# Patient Record
Sex: MALE | Race: WHITE
[De-identification: names, ages, dates, MRNs, and addresses within clinical notes are randomized per-mention and may not be internally consistent; named-entity substitution may affect disease eponyms.]

---

## 2017-02-12 ENCOUNTER — HOSPITAL ENCOUNTER (INPATIENT)
Dept: HOSPITAL 74 - JER | Age: 82
LOS: 17 days | Discharge: NURSING, REHAB OR LONG-TERM CARE | DRG: 870 | End: 2017-03-01
Attending: INTERNAL MEDICINE | Admitting: INTERNAL MEDICINE
Payer: COMMERCIAL

## 2017-02-12 VITALS — BODY MASS INDEX: 23.9 KG/M2

## 2017-02-12 DIAGNOSIS — G47.00: ICD-10-CM

## 2017-02-12 DIAGNOSIS — Z85.53: ICD-10-CM

## 2017-02-12 DIAGNOSIS — Z85.46: ICD-10-CM

## 2017-02-12 DIAGNOSIS — N40.0: ICD-10-CM

## 2017-02-12 DIAGNOSIS — D72.829: ICD-10-CM

## 2017-02-12 DIAGNOSIS — I35.0: ICD-10-CM

## 2017-02-12 DIAGNOSIS — K92.2: ICD-10-CM

## 2017-02-12 DIAGNOSIS — G47.30: ICD-10-CM

## 2017-02-12 DIAGNOSIS — R65.21: ICD-10-CM

## 2017-02-12 DIAGNOSIS — E78.5: ICD-10-CM

## 2017-02-12 DIAGNOSIS — A41.50: Primary | ICD-10-CM

## 2017-02-12 DIAGNOSIS — E87.6: ICD-10-CM

## 2017-02-12 DIAGNOSIS — N17.9: ICD-10-CM

## 2017-02-12 DIAGNOSIS — R33.8: ICD-10-CM

## 2017-02-12 DIAGNOSIS — E11.9: ICD-10-CM

## 2017-02-12 DIAGNOSIS — I63.9: ICD-10-CM

## 2017-02-12 DIAGNOSIS — E83.39: ICD-10-CM

## 2017-02-12 DIAGNOSIS — R74.0: ICD-10-CM

## 2017-02-12 DIAGNOSIS — D65: ICD-10-CM

## 2017-02-12 DIAGNOSIS — G93.41: ICD-10-CM

## 2017-02-12 DIAGNOSIS — D69.6: ICD-10-CM

## 2017-02-12 DIAGNOSIS — I11.0: ICD-10-CM

## 2017-02-12 DIAGNOSIS — I25.119: ICD-10-CM

## 2017-02-12 DIAGNOSIS — J44.9: ICD-10-CM

## 2017-02-12 DIAGNOSIS — J96.01: ICD-10-CM

## 2017-02-12 DIAGNOSIS — H04.123: ICD-10-CM

## 2017-02-12 DIAGNOSIS — K80.00: ICD-10-CM

## 2017-02-12 DIAGNOSIS — Z86.73: ICD-10-CM

## 2017-02-12 DIAGNOSIS — A41.9: ICD-10-CM

## 2017-02-12 DIAGNOSIS — E87.2: ICD-10-CM

## 2017-02-12 DIAGNOSIS — D68.9: ICD-10-CM

## 2017-02-12 DIAGNOSIS — I50.30: ICD-10-CM

## 2017-02-12 DIAGNOSIS — Z87.891: ICD-10-CM

## 2017-02-12 DIAGNOSIS — D62: ICD-10-CM

## 2017-02-12 LAB
ALBUMIN SERPL-MCNC: 3.2 G/DL (ref 3.4–5)
ALP SERPL-CCNC: 63 U/L (ref 45–117)
ALT SERPL-CCNC: 22 U/L (ref 12–78)
ANION GAP SERPL CALC-SCNC: 11 MMOL/L (ref 8–16)
APPEARANCE UR: CLEAR
APTT BLD: 35.6 SECONDS (ref 26.9–34.4)
AST SERPL-CCNC: 16 U/L (ref 15–37)
BILIRUB SERPL-MCNC: 0.9 MG/DL (ref 0.2–1)
BILIRUB UR STRIP.AUTO-MCNC: NEGATIVE MG/DL
CALCIUM SERPL-MCNC: 9.1 MG/DL (ref 8.5–10.1)
CO2 SERPL-SCNC: 28 MMOL/L (ref 21–32)
COLOR UR: (no result)
CREAT SERPL-MCNC: 1.3 MG/DL (ref 0.7–1.3)
DEPRECATED RDW RBC AUTO: 15 % (ref 11.9–15.9)
GLUCOSE SERPL-MCNC: 166 MG/DL (ref 74–106)
HYALINE CASTS URNS QL MICRO: 2 /LPF
INR BLD: 1.43 (ref 0.82–1.09)
KETONES UR QL STRIP: (no result)
LEUKOCYTE ESTERASE UR QL STRIP.AUTO: NEGATIVE
MCH RBC QN AUTO: 30 PG (ref 25.7–33.7)
MCHC RBC AUTO-ENTMCNC: 34 G/DL (ref 32–35.9)
MCV RBC: 88 FL (ref 80–96)
MUCOUS THREADS URNS QL MICRO: (no result)
NITRITE UR QL STRIP: NEGATIVE
PH BLDV: 7.43 [PH] (ref 7.31–7.41)
PH UR: 5 [PH] (ref 5–8)
PLATELET # BLD AUTO: 346 K/MM3 (ref 134–434)
PMV BLD: 7.9 FL (ref 7.5–11.1)
PROT SERPL-MCNC: 6.9 G/DL (ref 6.4–8.2)
PROT UR QL STRIP: (no result)
PROT UR QL STRIP: NEGATIVE
PT PNL PPP: 15.9 SEC (ref 9.98–11.88)
RBC # BLD AUTO: <1 /HPF (ref 0–3)
RBC # UR STRIP: NEGATIVE /UL
SAO2 % BLDV: 25.9 MEQ/L (ref 22–29)
SP GR UR: 1.02 (ref 1–1.03)
TROPONIN I SERPL-MCNC: < 0.02 NG/ML (ref 0–0.05)
UROBILINOGEN UR STRIP-MCNC: NEGATIVE E.U./DL (ref 0.2–1)
WBC # BLD AUTO: 20.9 K/MM3 (ref 4–10)
WBC # UR AUTO: (no result) /HPF (ref 3–5)

## 2017-02-12 PROCEDURE — A4358 URINARY LEG OR ABDOMEN BAG: HCPCS

## 2017-02-12 PROCEDURE — G0480 DRUG TEST DEF 1-7 CLASSES: HCPCS

## 2017-02-12 PROCEDURE — C1769 GUIDE WIRE: HCPCS

## 2017-02-12 PROCEDURE — P9058 RBC, L/R, CMV-NEG, IRRAD: HCPCS

## 2017-02-12 PROCEDURE — P9012 CRYOPRECIPITATE EACH UNIT: HCPCS

## 2017-02-12 PROCEDURE — C1729 CATH, DRAINAGE: HCPCS

## 2017-02-12 PROCEDURE — P9038 RBC IRRADIATED: HCPCS

## 2017-02-12 PROCEDURE — P9017 PLASMA 1 DONOR FRZ W/IN 8 HR: HCPCS

## 2017-02-12 NOTE — PDOC
History of Present Illness





- General


History Source: Patient, Family, Old Records


Exam Limitations: No Limitations





- History of Present Illness


Initial Comments: 


02/12/17 22:26


The patient is a 85 year old male with a significant past medical history of 

sleep apnea, HTN, HLD, prostate cancer, kidney cancer status-post nephrectomy, 

COPD with chronic cough, who presents to the emergency department today for 

further evaluation of upper right quadrant pain for 3 days. The patient reports 

that he had one episode of vomiting the first day of pain but has not vomited 

since. The patient reports associated difficulty sleeping and eating. The 

patient states that he has not eaten in 3 days. The patient reports that he 

took 8 Gas-X intermittently to alleviate symptoms with no relief. The patient 

notes that he uses a C-path at night for his sleep apnea. The patient states 

that he is not on O2 at home. The patient denies any associated back pain





The patient denies fever, chills, and sweats.


The patient denies nausea, vomiting, and diarrhea.


The patient denies chest pain and shortness of breath.





PCP: Dr. Conrad


CARDIO: Dr. Robin Vazquez


GASTRO: Dr. Bond





PAST MEDICAL HISTORY: HTN, HLD, prostate cancer, kidney cancer, COPD


PAST SURGICAL HISTORY: Appendectomy, nephrectomy (march 2011), colonoscopy, 

obstructed bowel, 


FAMILY HISTORY:  No pertinent history reported


SOCIAL HISTORY: Former cigarette smoker smoked for 40 years, quit 30 years ago


MEDICATIONS:  Reviewed


ALLERGIES:  As per nursing notes








<Carlton Casey - Last Filed: 02/12/17 22:26>





<Nicole Iniguez - Last Filed: 02/13/17 02:07>





- General


Stated Complaint: ABD PAIN


Time Seen by Provider: 02/12/17 21:07





Past History





<Carlton Casey - Last Filed: 02/12/17 22:26>





- Past Medical History


Anemia: No (PLACED ON IRON PREOP)


Asthma: No


Cancer: Yes (prostate cancer,kidney cancer)


Cardiac Disorders: No


CVA: Yes (TIA,2002)


COPD: Yes (mild,USES ALBUTEROL AS NEEDED)


CHF: No


Dementia: No


Diabetes: No


GI Disorders: Yes (COLON POLYPS, ISCHEMIC COLITIS, DIVERTICULOSIS)


 Disorders: Yes (PROSTATE CA)


HTN: Yes


Hypercholesterolemia: Yes


Liver Disease: No


Suicide Attempt (Hx): No


Seizures: No


Thyroid Disease: No





- Surgical History


Abdominal Surgery: Yes (BILATERAL  INGUINAL HERNIA)


Appendectomy: Yes


Cardiac Surgery: No


Cholecystectomy: No


Lung Surgery: No


Neurologic Surgery: Yes (NECK FUSION)


Orthopedic Surgery: Yes (NECK SURGERY, L knee sx 12/8)





- Immunization History


Immunization Up to Date: Yes





- Psycho/Social/Smoking Cessation Hx


Anxiety: No


Suicidal Ideation: No


Smoking Status: Yes


Smoking History: Former smoker


Have you smoked in the past 12 months: No


Number of Cigarettes Smoked Daily: 0


If you are a former smoker, when did you quit?: 35 years ago


Hx Alcohol Use: No


Drug/Substance Use Hx: No


Substance Use Type: Alcohol


Hx Substance Use Treatment: No





<Nicole Iniguez - Last Filed: 02/13/17 02:07>





- Past Medical History


Allergies/Adverse Reactions: 


 Allergies











Allergy/AdvReac Type Severity Reaction Status Date / Time


 


No Known Drug Allergies Allergy   Verified 02/12/17 21:20











Home Medications: 


Ambulatory Orders





Amlodipine Besylate [Norvasc -] 10 mg PO DAILY 11/06/14 


Ca Cmb No.1/Vit D3/B-6/FA/B12 [Vitamin D3 1,000 Unit Tablet] 1 tab PO DAILY 11/ 06/14 


Carvedilol 12.5 mg PO BID 11/06/14 


Vitamin E 400 unit PO DAILY 11/06/14 


Losartan Potassium [Cozaar] 50 mg PO DAILY 11/07/14 


Atorvastatin Ca [Lipitor] 10 mg PO HS #30 tablet 01/21/15 


Albuterol Sulfate Inhaler - [Ventolin HFA Inhaler -] 1 - 2 inh PO QID PRN 12/08/

15 


Aspirin [ASA -] 325 mg PO DAILY@0800  tablet 12/09/15 


Tamsulosin HCl [Flomax -] 0.8 mg PO DAILY@0830  cap.er.24h 12/23/15 


Cinnamon Bark [Cinnamon] 500 mg PO DAILY 02/12/17 


Finasteride 0 mg PO DAILY 02/12/17 











Abd/GI Specific PMHX





- Complaint Specific PMHX


Diverticulitis: Yes





<Nicole Iniguez - Last Filed: 02/13/17 02:07>





**Review of Systems





- Review of Systems


Able to Perform ROS?: Yes


Comments:: 


02/12/17 22:27


CONSTITUTIONAL:


Absent: fever, no chills, no fatigue


EYES:


Absent: visual changes


ENT:


Absent: ear pain, no sore throat


CARDIOVASCULAR:


Absent: chest pain, no palpitations


RESPIRATORY:


Present: Cough


Absent: No SOB


GI:


Present: abdominal pain, loss of appetite 


Absent: no nausea, no vomiting, no constipation, no diarrhea


GENITOURINARY:


Absent: dysuria, no frequency, no hematuria


MUSCULOSKELETAL:


Absent: back pain, no arthralgia, no myalgia


SKIN:


Absent: rash


NEURO:


Present: difficulty sleeping


Absent: headache





<Carlton Casey - Last Filed: 02/12/17 22:26>





*Physical Exam





- Vital Signs


 Last Vital Signs











Temp Pulse Resp BP Pulse Ox


 


 101.4 F H  90   20   141/52   95 


 


 02/12/17 21:27  02/12/17 21:17  02/12/17 21:17  02/12/17 21:17  02/12/17 21:17














- Physical Exam


Comments: 


02/12/17 22:27


Gaurding rebound abdomen tender to palpaion 


GENERAL: 


Well-appearing, well-nourished. No apparent distress.


HEENT: 


Normocephalic, atraumatic. PERRL, EOM intact.


CARDIOVASCULAR: 


Normal S1, S2. Regular rate and rhythm.


PULMONARY: 


(+) Fine bibasal rales 


ABDOMEN: 


(+) Abdominal pain tender to palpation, guarding, rebound.


EXTREMITIES: 


Normal ROM in all four extremities. No gross deformities.


SKIN: 


Warm, dry.  No rash


NEUROLOGICAL: 


No focal neurological deficits





<Eric Caseyke - Last Filed: 02/12/17 22:26>





ED Treatment Course





- LABORATORY


CBC & Chemistry Diagram: 


 02/12/17 21:40





 02/12/17 21:40





- ADDITIONAL ORDERS


Additional order review: 


 Laboratory  Results











  02/12/17 02/12/17 02/12/17





  22:00 21:40 21:40


 


INR   


 


PTT (Actin FS)   


 


Sodium   


 


Potassium   


 


Chloride   


 


Carbon Dioxide   


 


Anion Gap   


 


BUN   


 


Creatinine   


 


Creat Clearance w eGFR   


 


Random Glucose   


 


Lactic Acid   1.404 


 


Calcium   


 


Total Bilirubin   


 


AST   


 


ALT   


 


Alkaline Phosphatase   


 


Creatine Kinase    Cancelled


 


Troponin I    Cancelled


 


Total Protein   


 


Albumin   


 


Lipase   


 


Urine Color  Dkyellow  


 


Urine Appearance  Clear  


 


Urine pH  5.0  D  


 


Ur Specific Gravity  1.025  


 


Urine Protein  1+ H  


 


Urine Glucose (UA)  Negative  


 


Urine Ketones  Trace H  


 


Urine Blood  Negative  


 


Urine Nitrite  Negative  


 


Urine Bilirubin  Negative  


 


Urine Urobilinogen  Negative  


 


Ur Leukocyte Esterase  Negative  














  02/12/17 02/12/17





  21:40 21:40


 


INR  1.43 H 


 


PTT (Actin FS)  35.6 H 


 


Sodium   Cancelled


 


Potassium   Cancelled


 


Chloride   Cancelled


 


Carbon Dioxide   Cancelled


 


Anion Gap   Cancelled


 


BUN   Cancelled


 


Creatinine   Cancelled


 


Creat Clearance w eGFR   Cancelled


 


Random Glucose   Cancelled


 


Lactic Acid  


 


Calcium   Cancelled


 


Total Bilirubin   Cancelled


 


AST   Cancelled


 


ALT   Cancelled


 


Alkaline Phosphatase   Cancelled


 


Creatine Kinase  


 


Troponin I  


 


Total Protein   Cancelled


 


Albumin   Cancelled


 


Lipase   Cancelled


 


Urine Color  


 


Urine Appearance  


 


Urine pH  


 


Ur Specific Gravity  


 


Urine Protein  


 


Urine Glucose (UA)  


 


Urine Ketones  


 


Urine Blood  


 


Urine Nitrite  


 


Urine Bilirubin  


 


Urine Urobilinogen  


 


Ur Leukocyte Esterase  














- Medications


Given in the ED: 


ED Medications














Discontinued Medications














Generic Name Dose Route Start Last Admin





  Trade Name Jose Alfredo  PRN Reason Stop Dose Admin


 


Acetaminophen  1,000 mg 02/12/17 22:19 02/12/17 22:24





  Ofirmev Injection -  IVPB 02/12/17 22:20  1,000 mg





  ONCE ONE   Administration


 


Piperacillin Sod/Tazobactam  50 mls @ 100 mls/hr 02/12/17 21:51 02/12/17 22:08





  Sod 3.375 gm/ Dextrose  IVPB 02/12/17 22:20  100 mls/hr





  ONCE ONE   Administration





  Protocol   


 


Ondansetron HCl  4 mg 02/12/17 22:07 02/12/17 22:18





  Zofran Injection  IVPUSH 02/12/17 22:08  4 mg





  ONCE ONE   Administration














<Carlton Casey - Last Filed: 02/12/17 22:26>





- LABORATORY


CBC & Chemistry Diagram: 


 02/12/17 21:40





 02/12/17 22:42





<Nicole Iniguez - Last Filed: 02/13/17 02:07>





Medical Decision Making





- Medical Decision Making





02/13/17 01:25


86 yo male with 3 days of abd pain  and fever.                        His 

cardiologist is Dr Robin Vazquez and GI doctor is Dr Bond


-wife said he refused to come earlier


-pt has guarding ,tenderness in epigastric area





leukocytosis 20,000 and pt given zosyn


ct scan revealed acute cholecystitis


the pt's PCP  is Dr Jasso  and Dr Garcia requests hospitalist admits but 

wants Dr Mauri Kellogg for surgical consult and if ID is required he wants Dr Rowan





<Nicole nIiguez - Last Filed: 02/13/17 02:07>





*DC/Admit/Observation/Transfer





- Attestations


Scribe Attestion: 


02/12/17 22:27


Documentation prepared by Carlton Casey, acting as medical scribe for Nicole Iniguez MD.





<Carlton Casey - Last Filed: 02/12/17 22:26>





- Discharge Dispostion


Admit: Yes





<Nicole Iniguez - Last Filed: 02/13/17 02:07>


Diagnosis at time of Disposition: 


 Cholecystitis, acute





Leukocytosis


Qualifiers:


 Leukocytosis type: unspecified Qualified Code(s): D72.829 - Elevated white 

blood cell count, unspecified





Fever


Qualifiers:


 Fever type: other Qualified Code(s): R50.81 - Fever presenting with conditions 

classified elsewhere

## 2017-02-13 LAB
ALBUMIN SERPL-MCNC: 2.7 G/DL (ref 3.4–5)
ALP SERPL-CCNC: 130 U/L (ref 45–117)
ALT SERPL-CCNC: 33 U/L (ref 12–78)
ANION GAP SERPL CALC-SCNC: 9 MMOL/L (ref 8–16)
APTT BLD: 36.9 SECONDS (ref 26.9–34.4)
ART PUNCT SITE: (no result)
ART PUNCT SITE: (no result)
ARTERIAL PATENCY WRIST A: POSITIVE
ARTERIAL PATENCY WRIST A: POSITIVE
AST SERPL-CCNC: 40 U/L (ref 15–37)
BASE EXCESS BLDA CALC-SCNC: -1.9 MEQ/L (ref -2–2)
BASE EXCESS BLDA CALC-SCNC: -5.4 MEQ/L (ref -2–2)
BASOPHILS # BLD: 0.4 % (ref 0–2)
BILIRUB SERPL-MCNC: 0.9 MG/DL (ref 0.2–1)
CALCIUM SERPL-MCNC: 8.7 MG/DL (ref 8.5–10.1)
CO2 SERPL-SCNC: 26 MMOL/L (ref 21–32)
CREAT SERPL-MCNC: 1.1 MG/DL (ref 0.7–1.3)
DEPRECATED RDW RBC AUTO: 14.8 % (ref 11.9–15.9)
DEPRECATED RDW RBC AUTO: 15 % (ref 11.9–15.9)
EOSINOPHIL # BLD: 0.6 % (ref 0–4.5)
EOSINOPHIL # BLD: 1 % (ref 0–4.5)
GLUCOSE SERPL-MCNC: 126 MG/DL (ref 74–106)
HCO3 BLDA-SCNC: 18.5 MEQ/L (ref 22–26)
HCO3 BLDA-SCNC: 19.5 MEQ/L (ref 22–26)
INR BLD: 1.56 (ref 0.82–1.09)
INR BLD: 2.32 (ref 0.82–1.09)
LPM/O2%: (no result)
LPM/O2%: (no result)
MAGNESIUM SERPL-MCNC: 1.9 MG/DL (ref 1.8–2.4)
MCH RBC QN AUTO: 29.8 PG (ref 25.7–33.7)
MCH RBC QN AUTO: 29.9 PG (ref 25.7–33.7)
MCHC RBC AUTO-ENTMCNC: 33.7 G/DL (ref 32–35.9)
MCHC RBC AUTO-ENTMCNC: 33.7 G/DL (ref 32–35.9)
MCV RBC: 88.5 FL (ref 80–96)
MCV RBC: 88.5 FL (ref 80–96)
MECH. VENT.: YES
NEUTROPHILS # BLD: 82.5 % (ref 42.8–82.8)
NEUTROPHILS # BLD: 90 % (ref 42.8–82.8)
PEEP SETTING VENT: 5 CMH2O
PHOSPHATE SERPL-MCNC: 1.7 MG/DL (ref 2.5–4.9)
PLATELET # BLD AUTO: 241 K/MM3 (ref 134–434)
PLATELET # BLD AUTO: 276 K/MM3 (ref 134–434)
PLATELET # BLD EST: ADEQUATE 10*3/UL
PMV BLD: 7.9 FL (ref 7.5–11.1)
PMV BLD: 8 FL (ref 7.5–11.1)
PO2 BLDA: 57.3 MMHG (ref 68–100)
PO2 BLDA: 71.1 MMHG (ref 68–100)
PROT SERPL-MCNC: 5.9 G/DL (ref 6.4–8.2)
PT PNL PPP: 17.3 SEC (ref 9.98–11.88)
PT PNL PPP: 26 SEC (ref 9.98–11.88)
PT. ON O2?: YES
PT. ON O2?: YES
SAO2 % BLDA: 92.3 % (ref 90–98.9)
SAO2 % BLDA: 92.5 % (ref 90–98.9)
TROPONIN I SERPL-MCNC: 0.06 NG/ML (ref 0–0.05)
TYPE OF O2: (no result)
TYPE OF O2: (no result)
VENT RATE: 12
VENT RATE: 16
VT/PRESS: 450
WBC # BLD AUTO: 13.8 K/MM3 (ref 4–10)
WBC # BLD AUTO: 17.3 K/MM3 (ref 4–10)

## 2017-02-13 PROCEDURE — 0F9430Z DRAINAGE OF GALLBLADDER WITH DRAINAGE DEVICE, PERCUTANEOUS APPROACH: ICD-10-PCS | Performed by: RADIOLOGY

## 2017-02-13 PROCEDURE — 0BH17EZ INSERTION OF ENDOTRACHEAL AIRWAY INTO TRACHEA, VIA NATURAL OR ARTIFICIAL OPENING: ICD-10-PCS

## 2017-02-13 PROCEDURE — 5A1955Z RESPIRATORY VENTILATION, GREATER THAN 96 CONSECUTIVE HOURS: ICD-10-PCS

## 2017-02-13 RX ADMIN — PIPERACILLIN SODIUM,TAZOBACTAM SODIUM SCH: 3; .375 INJECTION, POWDER, FOR SOLUTION INTRAVENOUS at 10:56

## 2017-02-13 RX ADMIN — SODIUM CHLORIDE SCH MLS/HR: 9 INJECTION, SOLUTION INTRAVENOUS at 15:12

## 2017-02-13 RX ADMIN — TAMSULOSIN HYDROCHLORIDE SCH MG: 0.4 CAPSULE ORAL at 13:59

## 2017-02-13 RX ADMIN — PIPERACILLIN SODIUM,TAZOBACTAM SODIUM SCH GM: 3; .375 INJECTION, POWDER, FOR SOLUTION INTRAVENOUS at 17:55

## 2017-02-13 RX ADMIN — TAMSULOSIN HYDROCHLORIDE SCH: 0.4 CAPSULE ORAL at 08:22

## 2017-02-13 RX ADMIN — SODIUM CHLORIDE SCH MLS/HR: 9 INJECTION, SOLUTION INTRAVENOUS at 03:09

## 2017-02-13 NOTE — CONS
DATE OF CONSULTATION:

 

REQUESTING PHYSICIAN:  Hospitalist service 

 

HISTORY:  This is an 85-year-old man with past medical history of obstructive sleep

apnea.  He is status post left nephrectomy.  He lives at home with his wife.  He

developed right upper quadrant pain 3 days ago, sharp, intermittent, persistent.  He

had fever to 101 with the pain.  He tried to relieve the symptoms with Gas-X, which

did not work.  The pain persisted.  His daughter and wife encouraged him to come to

the emergency room, which he finally did.  He describes the pain as sharp and

intermittent.  It is not improved since admission.  He had a CAT scan done in the

emergency room that was consistent with cholecystitis.  He reports having had a

previous episode about 1 month ago where he had right upper quadrant gas pain that

resolved spontaneously.  He denies any dysuria.  He has not had a bowel movement in

the last, he thinks, about 48 hours.  He denies any chest pain.

 

PAST MEDICAL HISTORY:  Notable for sleep apnea.  He has a history of hypertension,

hyperlipidemia, prostate cancer, kidney cancer, and COPD.  He has a history of mild

COPD.  He has a history of obstructive sleep apnea and has been using CPAP for the

last 5-7 years at home.  He is not on daytime oxygen.  He is status post TIA in 2002.

 He has a history of ischemic colitis and diverticulosis in the past,

hypercholesterolemia, hypertension.

 

SURGICAL HISTORY:  Notable for bilateral inguinal hernia repairs.  He has had an

appendectomy, neck surgery as well as left knee MAKOplasty in 2005.  He has had

kidney stones in the past and ultimately had a left nephrectomy for renal cell

cancer.  

 

ALLERGIES:  He has no known drug allergies.

 

MEDICATIONS:  He takes amlodipine, calcium with vitamin D, Coreg, vitamin E, Cozaar,

Lipitor, Ventolin, aspirin, Flomax, cinnamon, and finasteride as an outpatient.

 

SOCIAL HISTORY:  He is .  He is retired.  He used to be a salesman.  He lives

with his wife.  There is no history of any recent travel.  He is a former smoker.

 

FAMILY HISTORY:  Notable for Parkinson disease and diabetes.

 

REVIEW OF SYSTEMS:  As per HPI.  He denies any shortness of breath or cough.  His

nausea and vomiting has resolved.  He continues to have abdominal pain.  There is no

dysuria.

 

PHYSICAL EXAMINATION: 

General:  He is awake and alert.

VITAL SIGNS:  Temperature 98.5, T-max 101.4, blood pressure 128/55, pulse 79,

respiratory rate 20.  He weighs 157 pounds.

HEENT:  He is normocephalic.  His eyes are anicteric.

Neck:  Supple.

Lungs:  Clear to auscultation.

Heart:  Regular rate and rhythm.

Abdomen:  Soft.  He has right upper quadrant tenderness.  He has abdominal pain that

is referred to his right upper quadrant when his abdomen is examined.  

Extremities:  Without edema.  He has a well-healed left knee scar that is prior

MAKOplasty.

 

White count on admission was 20.9, this morning is 17.3, hemoglobin 13.4, platelets

276.  BUN 36, creatinine 1.1.  Urinalysis is unremarkable.  Cultures are pending. 

CAT scan report has not been officially read but, per the emergency room note, is

noted for distended thick wall gallbladder with hazy pericholecystic fat.

 

In summary, this is an 85-year-old man with acute cholecystitis started on Zosyn,

which I would continue at this time.  He is for surgical evaluation at this time and

is currently n.p.o.  Further recommendations to follow.

 

 

 

 

SHELLI [id number]

DD: 02/13/2017 10:50

DT: 02/13/2017 11:50

Job #:  [id number]

## 2017-02-13 NOTE — MSN
Progress Note (SOAP)





- Subjective


Chief Complaint: 


Right Upper Quadrant Pain


History of Present Illness: 


84 YO M w/ Hx of HTN, [place], HLD, TIA, COPD, insomnia, Prostate CA, Kidney cancer 

s/p nephrectomy, presened tot he ED after 3 days of Upper right quadrant pain.  

The pain started after the patient ate breakfast 3 days ago, at which time he 

vomited a tan brown color. He has not been able to eat since then.  He has not 

had a bowel movement since then.  He has a sharp intermittent 8/10 pain that 

occurs when he moves.  At rest he has 0/10 pain.  He has had a similar pain 

before but never this bad.  Patient had a fever of 101.4 w/ elevated white 

count on admission.  The patient does not want morphine because it made the 

pain much worse.  He is currently feeling acid reflux symptoms.  Patient denies 

any CP, SOB, Fever, palps,. chills, NVD, or dysuria.  





- Current Medications


Current Medications: 


Active Medications





Acetaminophen (Tylenol -)  650 mg PO Q4H PRN


   PRN Reason: FEVER OR PAIN


Albuterol/Ipratropium (Duoneb -)  1 amp NEB Q4H PRN


   PRN Reason: SHORTNESS OF BREATH


Amlodipine Besylate (Norvasc -)  10 mg PO DAILY [facility]


Carvedilol (Coreg -)  12.5 mg PO BID [facility]


Finasteride (Proscar -)  5 mg PO DAILY [facility]


Heparin Sodium (Porcine) (Heparin -)  5,000 unit SQ BID [facility]


Sodium Chloride (Normal Saline -)  1,000 mls @ 75 mls/hr IV ASDIR [facility]


   Last Admin: 02/13/17 03:09 Dose:  75 mls/hr


Ketorolac Tromethamine (Toradol Injection -)  15 mg IVPUSH Q6H PRN


   PRN Reason: PAIN


   Stop: 02/18/17 10:57


Losartan Potassium (Cozaar -)  50 mg PO DAILY [facility]


Melatonin (Melatonin)  5 mg PO HS PRN


   PRN Reason: INSOMNIA


Morphine Sulfate (Morphine Injection -)  1 mg IVPUSH Q6H PRN


   PRN Reason: PAIN


   Last Admin: 02/13/17 04:50 Dose:  1 mg


Ondansetron HCl (Zofran Injection)  4 mg IVPB Q4H PRN


   PRN Reason: NAUSEA AND/OR VOMITING


Piperacillin Sod/Tazobactam Sod (Zosyn 3.375gm Ivpb (Pre-Docked))  3.375 gm 

IVPB Q8H-IV LAUREN


   PRN Reason: Protocol


   Last Admin: 02/13/17 10:56 Dose:  Not Given


Tamsulosin HCl (Flomax -)  0.8 mg PO DAILY@0830 [facility]


   Last Admin: 02/13/17 08:22 Dose:  Not Given











- Objective


Vital Signs: 


 Vital Signs











Temperature  98.5 F   02/13/17 09:56


 


Pulse Rate  79   02/13/17 09:56


 


Respiratory Rate  20   02/13/17 09:56


 


Blood Pressure  128/55   02/13/17 09:56


 


O2 Sat by Pulse Oximetry (%)  93 L  02/13/17 06:00











Constitutional: Yes: Well Nourished, Calm, Mild Distress


Eyes: Yes: WNL, Conjunctiva Clear, EOM Intact


HENT: Yes: WNL, Atraumatic, Normocephalic


Neck: Yes: WNL, Supple, Trachea Midline


Cardiovascular: Yes: S1, S2, Other (Diminished Heart sounds)


Respiratory: Yes: WNL, Regular, CTA Bilaterally


Gastrointestinal: Yes: Hyperactive Bowel Sounds, Tenderness, Tenderness, 

Epigastrium, Tenderness, Rebound


Musculoskeletal: Yes: WNL


Extremities: Yes: WNL


Peripheral Pulses WNL: Yes


Edema: No


Integumentary: Yes: WNL


Neurological: Yes: WNL, Alert, Oriented


...Motor Strength: Yes: WNL


Psychiatric: Yes: WNL, Alert, Oriented





Labs


Lab Results: 


 CBC, BMP





 02/13/17 06:50 





 02/13/17 06:50 











Assessment/Plan


84 YO M w/ Hx of HTN, [place], HLD, TIA, COPD, insomnia, Prostate CA, Kidney cancer 

s/p nephrectomy, presened tot he ED after 3 days of Upper right quadrant pain. 

Due to the nature of the pain and CT findings, a diagnosis of acute 

cholycystitis was made. 





Acute Cholycystits/Choledocolithasis


-Zosyn


-Surg Consult


-Fluids


-ID Consult- Cont. Abx


-Ultrasound GB


-MRCP


-NPO





Pleural Effusion


-Pulm consult





Possible Bladder obstruction


-Suprapubic distension


-Bladder US


-possible [place]





HTN


-Cont. Home meds





CAR[place]INA


-Cont. Home meds


-Cpap





HLD


-Cont. Home meds





TIA


-Cont. Home meds





Insomnia


-Cont. Home meds

## 2017-02-13 NOTE — PROC
Intubation





- Intubation


Reason for Intubation: Respiratory Insufficiency


Time of Intubation: 22:10


Blade used: Glidescope


Tube Size (cm): 7.5


Tube position @ lip (cm): 23


Tube position confirmed by: Direct visualization, CO2 detector, Chest x-ray (

pending), Breath sounds


Breath Sounds after Intubation: equal


Post Intubation Xray:  (pending)


Remarks: 


Dried blood in oropharynx. Intubated without incident. Dr. Chacon supervising.

## 2017-02-13 NOTE — CONSULT
Consult


Consult Specialty:: Pulm/CC





- History of Present Illness


History of Present Illness: 


Pt is an 85yr old man with PMHx of COPD, [place], HTN, HLD, TIA, prostate CA, renal 

CA now s/p left nephrectomy. Pt presents to the Er on 2/12 with CC of RT 

quadrant pain x 3 days. Pt found to have acute cholecystitis. Pt now s/p RUQ 

percutaneous drain. On 2/13 ~2200 I was called for transfer from floor 

secondary to acute respiratory distress. Upon presentation pt confused, not 

orientated and lethargic, BP 94/52, , RR 30s, O2 sat upper 80s on 50% 

venti, temp 103.1. Pt emergently intubated.  








0030 -- Pt vomited copious coffee ground emesis, immediately hooked NGT to LWS, 

1300mL suctioned in approximately 10 minutes. Pt continues to be hypotensive, 

requiring increased pressor support. GI stat consulted. PPI stat/BID, PRBC stat 

ordered. 





- History Source


History Provided By: Medical Record


Limitations to Obtaining History: Intubated





- Past Medical History


CNS: Yes: TIA


Cardio/Vascular: Yes: HTN


Pulmonary: Yes: COPD


Gastrointestinal: Yes: Diverticulitis, Other (ischemic colitis)


Renal/: Yes: Cancer (Prostate/KIDNEY)


Psych: Yes: Anxiety


Rheumatology: Yes: Other (arthritis knees)





- Past Surgical History


Past Surgical History: Yes: Appendectomy, Colonoscopy (last 1 yrs ago, lynn 

2 polyps. h/o ischemic colitis 3,2 and ?years ago), Hernia Repair (b/l), 

Nephrectomy (left)





- Alcohol/Substance Use


Hx Alcohol Use: No





- Smoking History


Smoking history: Former smoker


Have you smoked in the past 12 months: No


Aproximately how many cigarettes per day: 0


If you are a former smoker, when did you quit?: 35 years ago





- Social History


ADL: Independent


Occupation: retired salesman


History of Recent Travel: No





Home Medications





- Allergies


Allergies/Adverse Reactions: 


 Allergies











Allergy/AdvReac Type Severity Reaction Status Date / Time


 


No Known Drug Allergies Allergy   Verified 02/12/17 21:20














- Home Medications


Home Medications: 


Ambulatory Orders





Amlodipine Besylate [Norvasc -] 10 mg PO DAILY 11/06/14 


Ca Cmb No.1/Vit D3/B-6/FA/B12 [Vitamin D3 1,000 Unit Tablet] 1 tab PO DAILY 11/ 06/14 


Carvedilol 12.5 mg PO BID 11/06/14 


Vitamin E 400 unit PO DAILY 11/06/14 


Losartan Potassium [Cozaar] 50 mg PO DAILY 11/07/14 


Atorvastatin Ca [Lipitor] 10 mg PO HS #30 tablet 01/21/15 


Aspirin [ASA -] 325 mg PO DAILY@0800  tablet 12/09/15 


Tamsulosin HCl [Flomax -] 0.8 mg PO DAILY@0830  cap.er.24h 12/23/15 


Cinnamon Bark [Cinnamon] 500 mg PO DAILY 02/12/17 


Finasteride 0 mg PO DAILY 02/12/17 


Ranitidine [Zantac -] 150 mg PO DAILY 02/13/17 











Family Disease History





- Family Disease History


Family History: Unable to Obtain





Review of Systems


Unable to obtain ROS, reason: ALEJANDRA





Physical Exam


Vital Signs: 


 Vital Signs











 Period  Temp  Pulse  Resp  BP Sys/Yun  Pulse Ox


 


 Last 24 Hr  98 F-100.5 F    16-36  123-164/54-87  90-97








 Intake & Output











 02/10/17 02/11/17 02/12/17 02/13/17





 23:59 23:59 23:59 23:59


 


Intake Total    1250


 


Output Total    650


 


Balance    600


 


Weight   160 lb 157 lb 6.4 oz











Constitutional: Yes: Well Nourished, Anxious, Mild Distress


HENT: Yes: WNL


Cardiovascular: Yes: Tachycardia (low 100s), S1, S2


Respiratory: Yes: Diminished (L>R), Rhonchi (L>R), Other (initially on NRB, 

then intubated)


Gastrointestinal: Yes: Normal Bowel Sounds, Abdomen, Obese, Distention (slight)

, Other (rt UQ drain with sanguinous/pus drainage ~50mL)


...Rectal Exam: Yes: Deferred


Renal/: Yes: [place] Present (yellow output)


Musculoskeletal: Yes: WNL


Edema: No


Peripheral Pulses WNL: Yes (+2 bilateral pedal pulses)


Integumentary: Yes: WNL


Neurological: Yes: Confusion, Lethargy


Psychiatric: No: Oriented


Labs: 


 Abnormal Lab Results











  02/12/17 02/12/17 02/13/17





  21:40 22:42 06:50


 


WBC    17.3 H


 


Neutrophils %  90.0 H D  


 


Lymphocytes %    7.0 L


 


Monocytes %  1.0 L D  


 


INR   


 


ABG pH   


 


ABG pCO2 at Pt Temp   


 


ABG pO2 at Pt Temp   


 


ABG HCO3   


 


Sodium   135 L 


 


Chloride   96 L 


 


BUN   37 H D 


 


Random Glucose   166 H 


 


AST   


 


Alkaline Phosphatase   


 


Total Protein   


 


Albumin   3.2 L 














  02/13/17 02/13/17 02/13/17





  06:50 06:50 21:09


 


WBC   


 


Neutrophils %   


 


Lymphocytes %   


 


Monocytes %   


 


INR   1.56 H 


 


ABG pH    7.54 H


 


ABG pCO2 at Pt Temp    21.6 L


 


ABG pO2 at Pt Temp    57.3 L


 


ABG HCO3    18.5 L


 


Sodium   


 


Chloride   


 


BUN  36 H  


 


Random Glucose  126 H D  


 


AST  40 H D  


 


Alkaline Phosphatase  130 H D  


 


Total Protein  5.9 L  


 


Albumin  2.7 L  














Imaging





- Results


X-ray: Image Reviewed


Cat Scan: Report Reviewed (Prelim read of ab/pel CT)





Assessment/Plan


Pt is an 85yr old man with PMHx of COPD, [place], HTN, HLD, TIA, prostate CA, renal 

CA now s/p left nephrectomy. Now in the ICU for management of acute on chronic 

respiratory failure, sepsis secondary to acute cholecystitis +/- hcap and GIB.





Pulm:


-Continue mechanical ventilation overnight with sedation for vent synchrony


-SBT as able


-Fio2 for O2 90-94


-Nebulizers standing and prn


-f/u repeat abg and chest xray





ID: Septic, likely from abdominal source +/- left hcap


-f/u cultures


-ID consulted


-Continue Zosyn


-Will add Flagyl


-F/U lactic acid





GI:


-GI consulted


-NGT to LWS


-PPI


-Aspiration precautions


-Abdomen xray, CT when more stable





Renal: s/p nephrectomy


-IVF as tolerated


-Replete electrolytes PRN


-I/Os





Cardiovascular


-Consult


-Levophed for BP support, MAP 65-75


-f/u cardiac enzymes


-Monitor H/H, transfuse prn


-Consider Echo


-CVP goal 8-12





Heme: 


-Monitor coags/fibrinogen as INR increasing with concern for early d.i.c


-Transfuse prn





Neuro


-Pain management





Prophylactic


-SCDs

## 2017-02-13 NOTE — EKG
Test Reason : 

Blood Pressure : ***/*** mmHG

Vent. Rate : 087 BPM     Atrial Rate : 087 BPM

   P-R Int : 162 ms          QRS Dur : 122 ms

    QT Int : 372 ms       P-R-T Axes : 032 -53 041 degrees

   QTc Int : 447 ms

 

SINUS RHYTHM WITH MARKED SINUS ARRHYTHMIA

INCOMPLETE  RIGHT BUNDLE BRANCH BLOCK

LEFT ANTERIOR FASCICULAR BLOCK

*** BIFASCICULAR BLOCK ***

MODERATE VOLTAGE CRITERIA FOR LVH, MAY BE NORMAL VARIANT

ABNORMAL ECG

WHEN COMPARED WITH ECG OF 28-MAR-2016 16:42,

COMPARED TO EKG

NO SIGNIFICANT CHANGE IS FOUND

Confirmed by MARTIN CHU MD ([id number]) on 2/13/2017 12:01:28 PM

 

Referred By:             Confirmed By:MARTIN CHU MD

## 2017-02-13 NOTE — HOSP
Addendum entered and electronically signed by Mitchell Hays RES  02/14/17 00:

56: 





Patient was intubated and central line placed in ICU. Fentanyl ,Phenylephrine ,

Norepinephrine were started. Shortly after patient was found to have coffee 

emesis. 1300 cc of black emesis suctioned out. GI (dr. fitzgerald) consulted. 

Protonix drip started. All NSAIDS and AC stopped. Possible DIC  given sepsis.   

STAT labs CBC, Lactic acid, fibrinogen, fibrin degrad.products, and haptoglobin 

ordered. 





Original Note:








Subjective





- Review of Symptoms


Events since last encounter: 


called to see patient with acute respirtory failure while on BiPAP. 


Subjective: 


Patient seen at bedside. Increased work of breathing. Accessory muscle use. 

Minimally responsive. 


General: Yes: Fatigue


Cardiovascular: Yes: Palpitations.  No: Chest Pain


Musculoskeletal: Yes: No Symptoms


Neurological: Yes: Weakness, Confusion





Physical Examination


Vital Signs: 


               Vital Signs











Temperature  100.8 F   02/13/17 20:46


 


Pulse Rate  118 H  02/13/17 20:46


 


Respiratory Rate  36 H  02/13/17 20:46


 


Blood Pressure  84/56   02/13/17 20:46


 


O2 Sat by Pulse Oximetry (%)  89 L  02/13/17 20:46











Constitutional: Yes: Moderate Distress, Pallor


Eyes: Yes: Conjunctiva Clear.  No: Sclera Icterus


HENT: Yes: Atraumatic, Normocephalic


Neck: Yes: Supple


Cardiovascular: Yes: Tachycardia.  No: JVD


Respiratory: Yes: Accessory Muscle Use, On BiPap, Tachypnea


Gastrointestinal: Yes: Normal Bowel Sounds, Soft, Other


...Rectal Exam: Yes: Deferred


Labs: 


 CBC, BMP





 02/13/17 06:50 





 02/13/17 06:50 











Hospitalist Encounter


Assessment: 


Patient increased work of breathing and hypotension  despite fluid challenge 

and bipap. STAT ABG showed respiratory alkalosis. Decision was made to transfer 

patient to ICU for intubation.


Outcome: 


transfer to ICU


Recommendations/Interventions: 


Transfer to ICU.  STAT labs- CBC, CMP,EKG, lactic acid, and CXR. 





Visit type





- Emergency Visit


Emergency Visit: Yes


ED Registration Date: 02/13/17


Care time: The patient presented to the Emergency Department on the above date 

and was hospitalized for further evaluation of their emergent condition.





- New Patient


This patient is new to me today: Yes


Date on this admission: 02/14/17





- Critical Care


Critical Care patient: No

## 2017-02-13 NOTE — HOSP
Subjective





- Review of Symptoms


General: Yes: Chills.  No: Fatigue


HEENT: No: Head Aches


Pulmonary: No: Dyspnea, Pleuritic Chest Pain


Cardiovascular: No: Chest Pain, Palpitations, Light Headedness


Gastrointestinal: Yes: Abdominal Pain.  No: Nausea, Vomiting


Musculoskeletal: No: Extremity Pain, Muscle Cramps


Neurological: No: Confusion





Physical Examination


Vital Signs: 


 Vital Signs











Temperature  98.1 F   02/13/17 14:33


 


Pulse Rate  86   02/13/17 14:33


 


Respiratory Rate  20   02/13/17 14:33


 


Blood Pressure  159/73   02/13/17 14:33


 


O2 Sat by Pulse Oximetry (%)  90 L  02/13/17 13:31











Constitutional: Yes: Calm, Mild Distress


Eyes: Yes: Conjunctiva Clear, EOM Intact


HENT: Yes: Atraumatic, Normocephalic.  No: Nasal Congestion


Neck: Yes: Supple, Trachea Midline


Cardiovascular: Yes: Tachycardia (100-117), S1, S2


Respiratory: Yes: CTA Bilaterally, On Nasal O2


Gastrointestinal: Yes: Normal Bowel Sounds, Soft, Other (drain in RUQ, no 

surrounding erythema/leakage, tenderness around site, no guarding)


Musculoskeletal: No: Muscle Pain, Muscle Weakness


Extremities: Yes: WNL


Edema: No


Peripheral Pulses WNL: Yes


Peripheral Pulses: Left Radial: 2+, Right Radial: 2+, Left Doralis Pedis: 2+, 

Right Dorsalis Pedis: 2+


Wound/Incision: Yes: Clean/Dry, Dressing Dry and Intact (RUQ drain)


Neurological: Yes: Alert, Oriented.  No: Confusion


Labs: 


 CBC, BMP





 02/13/17 06:50 





 02/13/17 06:50 











Hospitalist Encounter


Assessment: 


Recently brought up from IR after having drain in placed in RUQ, 100cc of pus 

drained.


fever 100.5 oral temp. was having oxygen saturation to 88-90% on 3lpm, BP of 164

/78, hr 113, o2 sat88% on 3lpm prior to being called.


BP and chills improved with increase in NS to 100mls/hr, repeat 149/87 rr22 

o2sat 91-92% on 3lpm rr29/min


patient is awake, alert, able to speak in full sentences, denies feeling short 

of breath or having difficulty breathing, denies chest pain, or abdominal pain, 

nausea. 


he declined pain medication.





Tylenol 650mg po for fever


Venti mask 40%, if increased respiration continues or saturation does not 

improve, will switch to bipap.








Visit type





- Emergency Visit


Emergency Visit: No





- New Patient


This patient is new to me today: Yes


Date on this admission: 02/13/17





- Critical Care


Critical Care patient: No

## 2017-02-13 NOTE — HP
Admitting History and Physical





- Admission


Chief Complaint: abdomen pain


History of Present Illness: 


85 year old male with a significant past medical history of sleep apnea, HTN, 

HLD, prostate cancer, TIA, [place] kidney cancer status-post nephrectomy, COPD with 

chronic cough, who presents to the emergency department today for further 

evaluation of upper right quadrant pain for 3 day. He states the pain began 

across his lower abdomen and he took some Gas x which seemed to help but for 

the RUQ. Patient states the pain is sharp, intermittent, 7/10 with movement. He 

reports having similar pain before but not as bad. He states on the 1st day the 

pain started he vomited tan/brown fluid. He denies chest pain, sob, heart palps

, fevers, chills, nausea, dysuria. He denies any heart trouble, heart attack, 

cad. 








PMH/PSH: Sleep apnea, HTN, HLD, prostate cancer, kidney cancer status-post 

nephrectomy, COPD, insomnia with chronic cough, TIA, [place]


Social: former smoker, . Denies rec drugs. 


Famhx:dad- parkinsons, mom- dm





Ros neg except for hpi








Phsyical:


General- in nad, alert


HENT- at/nc,jovi, neck supple, trachea midline, no lymphandeopathy


RESP- no cough, no accessory muscle use, no rales, no ronchi, no wheeze


CARDS- s1s2 heard, no rubs, no jvd,no leg edema, extremity pulses +2


Skin- no rash, no erythema


Psych- cooperative, no agitation


Neuro- cn2-12 grossly intact


GI- TTP RUQ, no rebound, no guarding, no distention, bowel sounds normoactive


Musk- Normal arom bue/ble











Prob list





Acute amy


htn


carolina


hlp


copd


bph


hx of TIA


insomnia





Imaging:


EKG-NSR, RBBB, L anterior fasicular block


ctap - distended thick walled gallbladder w hazy infiltration of the 

pericholecystic fat. consistent with acute amy


cxr pending





A/P:85 year old male with a significant past medical history of sleep apnea, HTN

, HLD, prostate cancer, tia, [place], insomnia, kidney cancer status-post 

nephrectomy, COPD with chronic cough, who presents to the emergency department 

today for further evaluation of upper right quadrant pain for 3 day admitted 

for eval of their emergent condition.





1. Acute cholecysitis, sepsis


Zosyn 3.375 q8hour


Surgery consult


IVF


ID consult





2.HTN


Cont home meds





3. [place], COPD


Continue home meds


Patient states he uses cpap; continue 





4. HLP


Cont elizabeth meds





5. BPH


Continue home meds





6. Hx of TIA


Hold ASA 3235mg in anticipation for surgery





7. Insomnia


Cont home meds





FEN


NPO


IVF 75cc/hr





DVT prophy


scd, oob, hep sq





Dispo- Requires >2mn stay for acute amy. surgery eval pending


History Source: Patient


Limitations to Obtaining History: No Limitations





- Past Medical History


CNS: Yes: TIA.  No: Alzheimer's, CVA, Dementia, Migraine


Cardiovascular: Yes: HTN.  No: AFIB, Aneurysm, Aortic Insufficiency


Pulmonary: Yes: COPD


Gastrointestinal: Yes: Diverticulitis, Other (ischemic colitis)


Renal/: Yes: Cancer (Prostate/KIDNEY)


Psych: Yes: Anxiety


Rheumatology: Yes: Other (arthritis knees)





- Past Surgical History


Past Surgical History: Yes: Appendectomy, Colonoscopy (last 1 yrs ago, lynn 

2 polyps. h/o ischemic colitis 3,2 and ?years ago), Hernia Repair (b/l), 

Nephrectomy (left)





- Smoking History


Smoking history: Former smoker


Have you smoked in the past 12 months: No


Aproximately how many cigarettes per day: 0


If you are a former smoker, when did you quit?: 35 years ago





- Alcohol/Substance Use


Hx Alcohol Use: No





- Social History


ADL: Independent


Occupation: retired salesman


History of Recent Travel: No





Home Medications





- Allergies


Allergies/Adverse Reactions: 


 Allergies











Allergy/AdvReac Type Severity Reaction Status Date / Time


 


No Known Drug Allergies Allergy   Verified 02/12/17 21:20














- Home Medications


Home Medications: 


Ambulatory Orders





Amlodipine Besylate [Norvasc -] 10 mg PO DAILY 11/06/14 


Ca Cmb No.1/Vit D3/B-6/FA/B12 [Vitamin D3 1,000 Unit Tablet] 1 tab PO DAILY 11/ 06/14 


Carvedilol 12.5 mg PO BID 11/06/14 


Vitamin E 400 unit PO DAILY 11/06/14 


Losartan Potassium [Cozaar] 50 mg PO DAILY 11/07/14 


Atorvastatin Ca [Lipitor] 10 mg PO HS #30 tablet 01/21/15 


Albuterol Sulfate Inhaler - [Ventolin HFA Inhaler -] 1 - 2 inh PO QID PRN 12/08/

15 


Aspirin [ASA -] 325 mg PO DAILY@0800  tablet 12/09/15 


Tamsulosin HCl [Flomax -] 0.8 mg PO DAILY@0830  cap.er.24h 12/23/15 


Cinnamon Bark [Cinnamon] 500 mg PO DAILY 02/12/17 


Finasteride 0 mg PO DAILY 02/12/17 











Physical Examination


Vital Signs: 


 Vital Signs











Temperature  99.1 F   02/13/17 00:05


 


Pulse Rate  65   02/13/17 00:05


 


Respiratory Rate  16   02/13/17 00:05


 


Blood Pressure  148/63   02/13/17 00:05


 


O2 Sat by Pulse Oximetry (%)  94 L  02/13/17 00:05











Labs: 


 CBC, BMP





 02/12/17 21:40 





 02/12/17 22:42 











Visit type





- Emergency Visit


Emergency Visit: Yes


ED Registration Date: 02/13/17


Care time: The patient presented to the Emergency Department on the above date 

and was hospitalized for further evaluation of their emergent condition.





- New Patient


This patient is new to me today: Yes


Date on this admission: 02/13/17





- Critical Care


Critical Care patient: No

## 2017-02-13 NOTE — CON.PULM
Consult


Consult Specialty:: PULM/CCM 


Referred by:: GHISLAINE


Reason for Consultation:: COPD / OSAS





- History of Present Illness


Chief Complaint: Abdominal pain


History of Present Illness: 


85 M, sleep apnea on CPAP @ 10 cm H2O, COPD due to former smoking (quit 20 

years ago), HTN, HLD, prostate cancer, TIA, and kidney cancer status-post 

nephrectomy.  





Admitted via the ER due to upper right quadrant pain and fever for 3 day.  Pain 

is localized across his lower abdomen into the RUQ. 





He took GasX without relief.  He had 1 episode of vomiting (bilious).  





He denies chest pain or SOB. 





- History Source


History Provided By: Patient


Limitations to Obtaining History: No Limitations





- Past Medical History


CNS: Yes: TIA.  No: Alzheimer's, CVA, Dementia, Migraine


Cardio/Vascular: Yes: HTN.  No: AFIB, Aneurysm, Aortic Insufficiency


Pulmonary: Yes: COPD


Gastrointestinal: Yes: Diverticulitis, Other (ischemic colitis)


Renal/: Yes: Cancer (Prostate/KIDNEY)


Psych: Yes: Anxiety


Rheumatology: Yes: Other (arthritis knees)





- Past Surgical History


Past Surgical History: Yes: Appendectomy, Colonoscopy (last 1 yrs ago, lynn 

2 polyps. h/o ischemic colitis 3,2 and ?years ago), Hernia Repair (b/l), 

Nephrectomy (left)





- Alcohol/Substance Use


Hx Alcohol Use: No





- Smoking History


Smoking history: Former smoker


Have you smoked in the past 12 months: No


Aproximately how many cigarettes per day: 0


If you are a former smoker, when did you quit?: 35 years ago





- Social History


ADL: Independent


Occupation: retired salesman


History of Recent Travel: No





Home Medications





- Allergies


Allergies/Adverse Reactions: 


 Allergies











Allergy/AdvReac Type Severity Reaction Status Date / Time


 


No Known Drug Allergies Allergy   Verified 02/12/17 21:20














- Home Medications


Home Medications: 


Ambulatory Orders





Amlodipine Besylate [Norvasc -] 10 mg PO DAILY 11/06/14 


Ca Cmb No.1/Vit D3/B-6/FA/B12 [Vitamin D3 1,000 Unit Tablet] 1 tab PO DAILY 11/ 06/14 


Carvedilol 12.5 mg PO BID 11/06/14 


Vitamin E 400 unit PO DAILY 11/06/14 


Losartan Potassium [Cozaar] 50 mg PO DAILY 11/07/14 


Atorvastatin Ca [Lipitor] 10 mg PO HS #30 tablet 01/21/15 


Albuterol Sulfate Inhaler - [Ventolin HFA Inhaler -] 1 - 2 inh PO QID PRN 12/08/

15 


Aspirin [ASA -] 325 mg PO DAILY@0800  tablet 12/09/15 


Tamsulosin HCl [Flomax -] 0.8 mg PO DAILY@0830  cap.er.24h 12/23/15 


Cinnamon Bark [Cinnamon] 500 mg PO DAILY 02/12/17 


Finasteride 0 mg PO DAILY 02/12/17 











Review of Systems





- Review of Systems


Constitutional: reports: Fever, Loss of Appetite, Malaise, Weakness.  denies: 

Chills, Night Sweats, Unintentional Wgt. Loss


Eyes: reports: No Symptoms


HENT: reports: No Symptoms


Neck: reports: No Symptoms


Cardiovascular: reports: No Symptoms.  denies: Chest Pain, Edema, Palpitations, 

Shortness of Breath


Respiratory: reports: Cough, Snoring.  denies: Hemoptysis, SOB, SOB on Exertion

, Wheezing


Gastrointestinal: reports: Abdominal Pain, Bloating, Indigestion, Nausea, 

Vomiting.  denies: Diarrhea, Melena, Rectal Bleeding, Vomiting Blood


Genitourinary: reports: No Symptoms


Breasts: reports: No Symptoms Reported


Musculoskeletal: reports: No Symptoms


Integumentary: reports: No Symptoms


Neurological: reports: No Symptoms


Endocrine: reports: No Symptoms


Hematology/Lymphatic: reports: No Symptoms


Psychiatric: reports: No Symptoms





Physical Exam


Vital Sings: 


 Vital Signs











Temperature  98.5 F   02/13/17 09:56


 


Pulse Rate  79   02/13/17 09:56


 


Respiratory Rate  20   02/13/17 09:56


 


Blood Pressure  128/55   02/13/17 09:56


 


O2 Sat by Pulse Oximetry (%)  93 L  02/13/17 06:00











Constitutional: Yes: Well Nourished, No Distress, Calm


Eyes: Yes: Conjunctiva Clear, EOM Intact


HENT: Yes: Atraumatic, Normocephalic


Neck: Yes: Supple, Trachea Midline


Cardiovascular: Yes: Regular Rate and Rhythm


Respiratory: Yes: Regular, CTA Bilaterally


...Inspection: Yes: WNL


...Clubbing: No


Gastrointestinal: Yes: Soft, Abdomen, Obese, Tenderness, Tenderness, 

Epigastrium.  No: Ascites, Palpable Mass, Pulsatile Mass, Rectal Bleeding, 

Tenderness, Rebound, Vomiting


Renal/: Yes: WNL


Musculoskeletal: Yes: WNL


Extremities: Yes: WNL


Edema: No


Peripheral Pulses WNL: Yes


Integumentary: Yes: WNL


Neurological: Yes: WNL, Alert, Oriented


...Motor Strength: WNL


Psychiatric: Yes: WNL, Alert, Oriented


Labs: 


 CBC, BMP





 02/13/17 06:50 





 02/13/17 06:50 











Imaging





- Results


Chest X-ray: Report Reviewed, Image Reviewed (Basilar atelectasis)





Problem List





- Problems


(1) Cholecystitis, acute


Code(s): K81.0 - ACUTE CHOLECYSTITIS





(2) Fever


Code(s): R50.9 - FEVER, UNSPECIFIED   Qualifiers: 


     Fever type: other     Qualified Code(s): R50.81 - Fever presenting with 

conditions classified elsewhere  





(3) Leukocytosis


Code(s): D72.829 - ELEVATED WHITE BLOOD CELL COUNT, UNSPECIFIED   Qualifiers: 


     Leukocytosis type: unspecified     Qualified Code(s): D72.829 - Elevated 

white blood cell count, unspecified  





(4) Kidney malignancy


Code(s): C64.9 - MALIGNANT NEOPLASM OF UNSP KIDNEY, EXCEPT RENAL PELVIS   

Qualifiers: 


     Laterality: left     Qualified Code(s): C64.2 - Malignant neoplasm of left 

kidney, except renal pelvis  





(5) Prostate CA


Code(s): C61 - MALIGNANT NEOPLASM OF PROSTATE





(6) COPD (chronic obstructive pulmonary disease)


Code(s): J44.9 - CHRONIC OBSTRUCTIVE PULMONARY DISEASE, UNSPECIFIED   





(7) Sleep apnea


Code(s): G47.30 - SLEEP APNEA, UNSPECIFIED   








Assessment/Plan


PLAN: 


CPAP @ 10 cm H2O


BD TX PRN 


O2 as needed


ABX per ID 


Surgical consult has been called 


NPO 


VTE prophylaxis 


No need for systemic steroids 


Will follow 





Thank you. 





Dr Madera

## 2017-02-13 NOTE — EKG
Test Reason : 

Blood Pressure : ***/*** mmHG

Vent. Rate : 082 BPM     Atrial Rate : 082 BPM

   P-R Int : 164 ms          QRS Dur : 124 ms

    QT Int : 406 ms       P-R-T Axes : 052 -63 060 degrees

   QTc Int : 474 ms

 

NORMAL SINUS RHYTHM

RIGHT BUNDLE BRANCH BLOCK

LEFT ANTERIOR FASCICULAR BLOCK

*** BIFASCICULAR BLOCK ***

MINIMAL VOLTAGE CRITERIA FOR LVH, MAY BE NORMAL VARIANT

SEPTAL INFARCT , AGE UNDETERMINED

ABNORMAL ECG

WHEN COMPARED WITH ECG OF 12-FEB-2017 21:33,

COMPARED TO EKG

NO SIGNIFICANT CHANGE IS FOUND

Confirmed by MARTIN CHU MD ([id number]) on 2/13/2017 11:57:25 AM

 

Referred By:  WILMER           Confirmed By:MARTIN CHU MD

## 2017-02-13 NOTE — CON.CARD
Consult


Consult Specialty:: Cardiology


Referred by:: Hospitalist


Reason for Consultation:: Cardiac evaluation and for clearance





- History of Present Illness


Chief Complaint: Abdominal pain (RUQ)


History of Present Illness: 


Patient is an 85 year old male well known to me with underlying history of 

prostate CA, renal CA s/p left nephrectomy, TIA, COPD and hypertension now 

admitted with RUQ pain c/w acute cholecystitis (also determined by CT scan of 

abdomen in the ED)  He started to have abdominal pain few days ago and ended up 

coming in after his symptoms got worse.  He also had fever of 101.  He denies 

chest pain, shortness of breath or palpitations. Denies paroxysmal nocturnal 

dyspnea or orthopnea. Denies headache or lightheadedness. Cardiology 

consultation was called for further input and for cardiac clearance.





- History Source


History Provided By: Patient, Family Member, Medical Record


Limitations to Obtaining History: No Limitations





- Past Medical History


CNS: Yes: TIA


Cardio/Vascular: Yes: HTN


Pulmonary: Yes: COPD


Gastrointestinal: Yes: Diverticulitis, Other (ischemic colitis)


Renal/: Yes: Cancer (Prostate/KIDNEY)


Psych: Yes: Anxiety


Rheumatology: Yes: Other (arthritis knees)





- Past Surgical History


Past Surgical History: Yes: Appendectomy, Colonoscopy (last 1 yrs ago, lynn 

2 polyps. h/o ischemic colitis 3,2 and ?years ago), Hernia Repair (b/l), 

Nephrectomy (left)





- Alcohol/Substance Use


Hx Alcohol Use: No





- Smoking History


Smoking history: Former smoker


Have you smoked in the past 12 months: No


Aproximately how many cigarettes per day: 0


If you are a former smoker, when did you quit?: 35 years ago





- Social History


ADL: Independent


Occupation: retired salesman


History of Recent Travel: No





Home Medications





- Allergies


Allergies/Adverse Reactions: 


 Allergies











Allergy/AdvReac Type Severity Reaction Status Date / Time


 


No Known Drug Allergies Allergy   Verified 02/12/17 21:20














- Home Medications


Home Medications: 


Ambulatory Orders





Amlodipine Besylate [Norvasc -] 10 mg PO DAILY 11/06/14 


Ca Cmb No.1/Vit D3/B-6/FA/B12 [Vitamin D3 1,000 Unit Tablet] 1 tab PO DAILY 11/ 06/14 


Carvedilol 12.5 mg PO BID 11/06/14 


Vitamin E 400 unit PO DAILY 11/06/14 


Losartan Potassium [Cozaar] 50 mg PO DAILY 11/07/14 


Atorvastatin Ca [Lipitor] 10 mg PO HS #30 tablet 01/21/15 


Aspirin [ASA -] 325 mg PO DAILY@0800  tablet 12/09/15 


Tamsulosin HCl [Flomax -] 0.8 mg PO DAILY@0830  cap.er.24h 12/23/15 


Cinnamon Bark [Cinnamon] 500 mg PO DAILY 02/12/17 


Finasteride 0 mg PO DAILY 02/12/17 


Ranitidine [Zantac -] 150 mg PO DAILY 02/13/17 











Review of Systems





- Review of Systems


Constitutional: reports: Fever.  denies: Chills


Cardiovascular: denies: Chest Pain, Palpitations, Shortness of Breath


Respiratory: denies: Cough, Hemoptysis, Orthopnea, PND


Gastrointestinal: reports: Abdominal Pain.  denies: Diarrhea, Melena, Nausea, 

Rectal Bleeding, Vomiting


Genitourinary: denies: Dysuria


Neurological: denies: Dizziness, Headache, Seizure, Syncope


Vital Signs: 


 Vital Signs











Temperature  98.5 F   02/13/17 09:56


 


Pulse Rate  79   02/13/17 09:56


 


Respiratory Rate  20   02/13/17 09:56


 


Blood Pressure  128/55   02/13/17 09:56


 


O2 Sat by Pulse Oximetry (%)  93 L  02/13/17 06:00











Neck: Yes: Supple


Respiratory: Yes: CTA Bilaterally


Gastrointestinal: Yes: Normal Bowel Sounds, Tenderness


Cardiovascular: Yes: Regular Rate and Rhythm


JVD: No


Carotid Bruit: No


PMI: Non-Displaced


Heart Sounds: Yes: S1, S2


Edema: No





- Other Data


Labs, Other Data: 


 CBC, BMP





 02/13/17 06:50 





 02/13/17 06:50 





 INR, PTT











INR  1.56  (0.82-1.09)  H  02/13/17  06:50    








 Troponin, BNP











  02/13/17





  05:45


 


Troponin I  < 0.02








 


 Laboratory Results - last 24 hr











  02/12/17 02/12/17 02/12/17





  21:40 21:40 21:40


 


WBC  20.9 H D  


 


RBC  5.07  D  


 


Hgb  15.2  D  


 


Hct  44.6  D  


 


MCV  88.0  


 


MCHC  34.0  


 


RDW  15.0  


 


Plt Count  346  


 


MPV  7.9  


 


Neutrophils %  90.0 H D  


 


Lymphocytes %  8.0  D  


 


Monocytes %  1.0 L D  


 


Eosinophils %  1.0  


 


Basophils %   


 


Platelet Estimate  Adequate  


 


RBC Morphology  Appears normal  


 


INR    1.43 H


 


PTT (Actin FS)    35.6 H


 


VBG pH   


 


POC VBG pCO2   


 


POC VBG pO2   


 


Sodium   Cancelled 


 


Potassium   Cancelled 


 


Chloride   Cancelled 


 


Carbon Dioxide   Cancelled 


 


Anion Gap   Cancelled 


 


BUN   Cancelled 


 


Creatinine   Cancelled 


 


Creat Clearance w eGFR   Cancelled 


 


Random Glucose   Cancelled 


 


Lactic Acid   


 


Calcium   Cancelled 


 


Total Bilirubin   Cancelled 


 


AST   Cancelled 


 


ALT   Cancelled 


 


Alkaline Phosphatase   Cancelled 


 


Creatine Kinase   


 


Troponin I   


 


Total Protein   Cancelled 


 


Albumin   Cancelled 


 


Lipase   Cancelled 


 


Urine Color   


 


Urine Appearance   


 


Urine pH   


 


Ur Specific Gravity   


 


Urine Protein   


 


Urine Glucose (UA)   


 


Urine Ketones   


 


Urine Blood   


 


Urine Nitrite   


 


Urine Bilirubin   


 


Urine Urobilinogen   


 


Ur Leukocyte Esterase   


 


Urine RBC   


 


Urine WBC   


 


Hyaline Casts   


 


Urine Mucus   


 


Blood Type   


 


Antibody Screen   

















  02/12/17 02/12/17 02/12/17





  22:00 22:35 22:42


 


WBC   


 


RBC   


 


Hgb   


 


Hct   


 


MCV   


 


MCHC   


 


RDW   


 


Plt Count   


 


MPV   


 


Neutrophils %   


 


Lymphocytes %   


 


Monocytes %   


 


Eosinophils %   


 


Basophils %   


 


Platelet Estimate   


 


RBC Morphology   


 


INR   


 


PTT (Actin FS)   


 


VBG pH   7.43 H 


 


POC VBG pCO2   39.4 L 


 


POC VBG pO2   25.1 L 


 


Sodium   


 


Potassium   


 


Chloride   


 


Carbon Dioxide   


 


Anion Gap   


 


BUN   


 


Creatinine   


 


Creat Clearance w eGFR   


 


Random Glucose   


 


Lactic Acid   


 


Calcium   


 


Total Bilirubin   


 


AST   


 


ALT   


 


Alkaline Phosphatase   


 


Creatine Kinase    52


 


Troponin I    < 0.02


 


Total Protein   


 


Albumin   


 


Lipase   


 


Urine Color  Dkyellow  


 


Urine Appearance  Clear  


 


Urine pH  5.0  D  


 


Ur Specific Gravity  1.025  


 


Urine Protein  1+ H  


 


Urine Glucose (UA)  Negative  


 


Urine Ketones  Trace H  


 


Urine Blood  Negative  


 


Urine Nitrite  Negative  


 


Urine Bilirubin  Negative  


 


Urine Urobilinogen  Negative  


 


Ur Leukocyte Esterase  Negative  


 


Urine RBC  <1  


 


Urine WBC  None  


 


Hyaline Casts  2  


 


Urine Mucus  Rare  


 


Blood Type   


 


Antibody Screen   














  02/12/17 02/12/17 02/13/17





  22:42 22:42 01:16


 


WBC   


 


RBC   


 


Hgb   


 


Hct   


 


MCV   


 


MCHC   


 


RDW   


 


Plt Count   


 


MPV   


 


Neutrophils %   


 


Lymphocytes %   


 


Monocytes %   


 


Eosinophils %   


 


Basophils %   


 


Platelet Estimate   


 


RBC Morphology   


 


INR   


 


PTT (Actin FS)   


 


VBG pH   


 


POC VBG pCO2   


 


POC VBG pO2   


 


Sodium  135 L  


 


Potassium  4.6  D  


 


Chloride  96 L  


 


Carbon Dioxide  28  


 


Anion Gap  11  


 


BUN  37 H D  


 


Creatinine  1.3  D  


 


Creat Clearance w eGFR  52.47  


 


Random Glucose  166 H  


 


Lactic Acid    1.078


 


Calcium  9.1  


 


Total Bilirubin  0.9  D  


 


AST  16  D  


 


ALT  22  


 


Alkaline Phosphatase  63  


 


Creatine Kinase   


 


Troponin I   


 


Total Protein  6.9  


 


Albumin  3.2 L  


 


Lipase   122 


 


Urine Color   


 


Urine Appearance   


 


Urine pH   


 


Ur Specific Gravity   


 


Urine Protein   


 


Urine Glucose (UA)   


 


Urine Ketones   


 


Urine Blood   


 


Urine Nitrite   


 


Urine Bilirubin   


 


Urine Urobilinogen   


 


Ur Leukocyte Esterase   


 


Urine RBC   


 


Urine WBC   


 


Hyaline Casts   


 


Urine Mucus   


 


Blood Type   


 


Antibody Screen   














  02/13/17 02/13/17 02/13/17





  05:45 06:50 06:50


 


WBC   17.3 H 


 


RBC   4.49 


 


Hgb   13.4  D 


 


Hct   39.7 


 


MCV   88.5 


 


MCHC   33.7 


 


RDW   14.8 


 


Plt Count   276  D 


 


MPV   7.9 


 


Neutrophils %   82.5 


 


Lymphocytes %   7.0 L 


 


Monocytes %   9.5  D 


 


Eosinophils %   0.6 


 


Basophils %   0.4 


 


Platelet Estimate   


 


RBC Morphology   


 


INR   


 


PTT (Actin FS)   


 


VBG pH   


 


POC VBG pCO2   


 


POC VBG pO2   


 


Sodium    137


 


Potassium    3.7


 


Chloride    102


 


Carbon Dioxide    26


 


Anion Gap    9


 


BUN    36 H


 


Creatinine    1.1


 


Creat Clearance w eGFR    > 60


 


Random Glucose    126 H D


 


Lactic Acid   


 


Calcium    8.7


 


Total Bilirubin    0.9


 


AST    40 H D


 


ALT    33  D


 


Alkaline Phosphatase    130 H D


 


Creatine Kinase   


 


Troponin I  < 0.02  


 


Total Protein    5.9 L


 


Albumin    2.7 L


 


Lipase   


 


Urine Color   


 


Urine Appearance   


 


Urine pH   


 


Ur Specific Gravity   


 


Urine Protein   


 


Urine Glucose (UA)   


 


Urine Ketones   


 


Urine Blood   


 


Urine Nitrite   


 


Urine Bilirubin   


 


Urine Urobilinogen   


 


Ur Leukocyte Esterase   


 


Urine RBC   


 


Urine WBC   


 


Hyaline Casts   


 


Urine Mucus   


 


Blood Type   


 


Antibody Screen   














Sinus rhythm with RBBB





Imaging





- Results


Chest X-ray: Report Reviewed (Mild increased density - pleural effusion)


Cat Scan: Pending (Abdominal CT official reading to follow)


EKG: Report Reviewed





Problem List





- Problems


(1) COPD (chronic obstructive pulmonary disease)


Code(s): J44.9 - CHRONIC OBSTRUCTIVE PULMONARY DISEASE, UNSPECIFIED   Qualifiers

: 


     COPD type: unspecified COPD        Qualified Code(s): J44.9 - Chronic 

obstructive pulmonary disease, unspecified  





(2) Cholecystitis, acute


Code(s): K81.0 - ACUTE CHOLECYSTITIS





(3) Fever


Code(s): R50.9 - FEVER, UNSPECIFIED   Qualifiers: 


     Fever type: other     Qualified Code(s): R50.81 - Fever presenting with 

conditions classified elsewhere  





(4) HLD (hyperlipidemia)


Code(s): E78.5 - HYPERLIPIDEMIA, UNSPECIFIED   Qualifiers: 


     Hyperlipidemia type: pure hypercholesterolemia        Qualified Code(s): 

E78.0 - Pure hypercholesterolemia  





(5) HTN (hypertension)


Code(s): I10 - ESSENTIAL (PRIMARY) HYPERTENSION   Qualifiers: 


     Hypertension type: essential hypertension        Qualified Code(s): I10 - 

Essential (primary) hypertension  





(6) Leukocytosis


Code(s): D72.829 - ELEVATED WHITE BLOOD CELL COUNT, UNSPECIFIED   Qualifiers: 


     Leukocytosis type: unspecified     Qualified Code(s): D72.829 - Elevated 

white blood cell count, unspecified  





(7) Colitis


Code(s): K52.9 - NONINFECTIVE GASTROENTERITIS AND COLITIS, UNSPECIFIED





(8) Diverticulitis


Code(s): K57.92 - DVTRCLI OF INTEST, PART UNSP, W/O PERF OR ABSCESS W/O BLEED   





(9) Kidney malignancy


Code(s): C64.9 - MALIGNANT NEOPLASM OF UNSP KIDNEY, EXCEPT RENAL PELVIS   

Qualifiers: 


     Laterality: left     Qualified Code(s): C64.2 - Malignant neoplasm of left 

kidney, except renal pelvis  





(10) Prostate CA


Code(s): C61 - MALIGNANT NEOPLASM OF PROSTATE








Assessment/Plan


1. Acute cholecystitis


2. Hypertension


3. History of diverticular disease/colitis


4. History of TIA


5. COPD


6. History of renal CA s/p nephrectomy


7. History of prostate CA





PLAN:


1. Await surgical consultation in regards to timing of surgery (cholecystectomy)


2. Antibiotic coverage


3. Continue Carvedilol, Losartan and Amlodipine as tolerated


4. No absolute contraindication in proceeding with cholecystectomy if deemed 

necessary in view of absence of ischemic symptoms, decompensated congestive 

heart failure or malignant arrhythmias. 





Further plans are to follow


Robin Vazquez MM

## 2017-02-13 NOTE — PN
Progress Note (short form)





- Note


Progress Note: 


ID consutl dictated





imp/reccd





85 year old man with CAROLINA (cpap at night), left nephrectomy for renal cancer


admitted with 3 day history of RUQ pain, no vomiting, no nausea


fevers to 101 at home


was taking gas-x at home without relief





one month ago had transient RUQ gas pains that resolved on its own





acute cholycystits


started on zosyn, will continue 


for surgical evaluation


NPO

## 2017-02-13 NOTE — PN
Teaching Attending Note


Name of Resident: Buster Ramirez


ATTENDING PHYSICIAN STATEMENT





I saw and evaluated the patient.


I reviewed the resident's note and discussed the case with the resident.


I agree with the resident's findings and plan as documented.








SUBJECTIVE:continues to have abdominal pain. states morphine makes it worse. 

reports no repeated CP. denies CP, fever, chills, N/V/C/D








OBJECTIVE:


 Last Vital Signs











Temp Pulse Resp BP Pulse Ox


 


 98.5 F   79   20   128/55   93 L


 


 02/13/17 09:56  02/13/17 09:56  02/13/17 09:56  02/13/17 09:56  02/13/17 06:00








General NAD


CV S1 S2 RRR no murmur/rub/gallop


Lungs CTA B/L no wheezing/rales/rhonchi


Abdomen diffusely tender +guarding. does not allow for deep palpation or 

testing of murphys sign





ASSESSMENT AND PLAN:


86yo M with PMH HTN, prostate ca, TIA, RCCC s/p nephrectomy, COPD presented to 

the ER and was admitted for further evaluation of their emergent condition


1. Sepsis due to acute cholecystitis- Tm 101.4. started on Zosyn day2. pt will 

likely require cholecystectomy on this admission. surgery consulted awaiting 

their decision, no indication for emergent surgery. will advance diet to 

liquids. will switch morphine to ketorlac as increased pain may be related to 

sphincter of oddi dysfunction. awaiting full report on CT scan. no mention of 

CBD distention. may need to be further evaluated for choledocholithasis if 

dilated. decision pending official read. nausea control


2. CP- likely referred from abdominal pain. cardiac markers neg x2. cardio 

consulted on the case


3. HTN- controlled. cont home medications


4. BPD- on flomax/proscar


5. COPD- no signs of acute exacerbation. cont inhalers.


6. DVT ppx- hep sq

## 2017-02-13 NOTE — PN
Physical Exam: 


SUBJECTIVE: Patient seen and examined at bedside. Pt continues to have pain 

when moving at RUQ. Pt states morphine made pain worse last night in RUQ. 

Denies N/V/F/C currently. Would like to try clear liquid diet in meantime if 

possible. Pt states at rest pain is not severe but any movement makes the pain 

jump to 8/10 in severity. Pt has not had BM in 3 days and says it may be 

because he has not eaten over last 3 days.  Pt is passing gas. Also states he 

has not urinated for at least a day.








OBJECTIVE:





 


 Vital Signs











Temperature  98.5 F   02/13/17 09:56


 


Pulse Rate  79   02/13/17 09:56


 


Respiratory Rate  20   02/13/17 09:56


 


Blood Pressure  128/55   02/13/17 09:56


 


O2 Sat by Pulse Oximetry (%)  93 L  02/13/17 06:00














GENERAL: The patient is awake, alert, and fully oriented, in no acute distress.


HEAD: Normal with no signs of trauma.


EYES: PERRL, extraocular movements intact, sclera anicteric, conjunctiva clear. 

No ptosis. 


ENT: Ears normal, nares patent, oropharynx clear without exudates, moist mucous 


membranes.


NECK: Trachea midline, full range of motion, supple. 


LUNGS: Diminshed breath sounds B/L. 


HEART: Regular rate and rhythm, S1, S2 without murmur, rub or gallop.


ABDOMEN: Normoactive bowel sounds, tender in RUQ when palpating any part of 

abdomen. Severe RUQ tenderness to palpation.


EXTREMITIES: 2+ pulses, warm, well-perfused, no edema. 


NEUROLOGICAL:  Normal speech, gait not observed.


PSYCH: Normal mood, normal affect.


SKIN: Warm, dry, normal turgor, no rashes or lesions noted














 Laboratory Results - last 24 hr











  02/13/17 02/13/17 02/13/17





  05:45 06:50 06:50


 


WBC   17.3 H 


 


RBC   4.49 


 


Hgb   13.4  D 


 


Hct   39.7 


 


MCV   88.5 


 


MCHC   33.7 


 


RDW   14.8 


 


Plt Count   276  D 


 


MPV   7.9 


 


Neutrophils %   82.5 


 


Lymphocytes %   7.0 L 


 


Monocytes %   9.5  D 


 


Eosinophils %   0.6 


 


Basophils %   0.4 


 


INR   


 


Sodium    137


 


Potassium    3.7


 


Chloride    102


 


Carbon Dioxide    26


 


Anion Gap    9


 


BUN    36 H


 


Creatinine    1.1


 


Creat Clearance w eGFR    > 60


 


Random Glucose    126 H D


 


Calcium    8.7


 


Total Bilirubin    0.9


 


AST    40 H D


 


ALT    33  D


 


Alkaline Phosphatase    130 H D


 


Troponin I  < 0.02  


 


Total Protein    5.9 L


 


Albumin    2.7 L


 


Blood Type   


 


Antibody Screen   














  02/13/17 02/13/17





  06:50 06:50


 


WBC  


 


RBC  


 


Hgb  


 


Hct  


 


MCV  


 


MCHC  


 


RDW  


 


Plt Count  


 


MPV  


 


Neutrophils %  


 


Lymphocytes %  


 


Monocytes %  


 


Eosinophils %  


 


Basophils %  


 


INR  1.56 H 


 


Sodium  


 


Potassium  


 


Chloride  


 


Carbon Dioxide  


 


Anion Gap  


 


BUN  


 


Creatinine  


 


Creat Clearance w eGFR  


 


Random Glucose  


 


Calcium  


 


Total Bilirubin  


 


AST  


 


ALT  


 


Alkaline Phosphatase  


 


Troponin I  


 


Total Protein  


 


Albumin  


 


Blood Type   A POSITIVE


 


Antibody Screen   Negative








Active Medications











Generic Name Dose Route Start Last Admin





  Trade Name Freq  PRN Reason Stop Dose Admin


 


Acetaminophen  650 mg 02/13/17 01:44  





  Tylenol -  PO   





  Q4H PRN   





  FEVER OR PAIN   


 


Albuterol/Ipratropium  1 amp 02/13/17 01:45  





  Duoneb -  NEB   





  Q4H PRN   





  SHORTNESS OF BREATH   


 


Amlodipine Besylate  10 mg 02/13/17 10:00  





  Norvasc -  PO   





  DAILY [facility]   


 


Carvedilol  12.5 mg 02/13/17 10:00  





  Coreg -  PO   





  BID [facility]   


 


Finasteride  5 mg 02/13/17 10:00  





  Proscar -  PO   





  DAILY [facility]   


 


Heparin Sodium (Porcine)  5,000 unit 02/13/17 10:00  





  Heparin -  SQ   





  BID [facility]   


 


Sodium Chloride  1,000 mls @ 75 mls/hr 02/13/17 02:45 02/13/17 03:09





  Normal Saline -  IV   75 mls/hr





  ASDIR LAUREN   Administration


 


Ketorolac Tromethamine  15 mg 02/13/17 10:58  





  Toradol Injection -  IVPUSH 02/18/17 10:57  





  Q6H PRN   





  PAIN   


 


Losartan Potassium  50 mg 02/13/17 10:00  





  Cozaar -  PO   





  DAILY [facility]   


 


Melatonin  5 mg 02/13/17 05:26  





  Melatonin  PO   





  HS PRN   





  INSOMNIA   


 


Morphine Sulfate  1 mg 02/13/17 02:34 02/13/17 04:50





  Morphine Injection -  IVPUSH   1 mg





  Q6H PRN   Administration





  PAIN   


 


Ondansetron HCl  4 mg 02/13/17 01:45  





  Zofran Injection  IVPB   





  Q4H PRN   





  NAUSEA AND/OR VOMITING   


 


Piperacillin Sod/Tazobactam Sod  3.375 gm 02/13/17 10:45 02/13/17 10:56





  Zosyn 3.375gm Ivpb (Pre-Docked)  IVPB   Not Given





  Q8H-IV [facility]   





  Protocol   


 


Tamsulosin HCl  0.8 mg 02/13/17 08:30 02/13/17 08:22





  Flomax -  PO   Not Given





  DAILY@0830 [facility]   











ASSESSMENT/PLAN:


84 y/o M w/ PMH of CAROLINA, HTN, HLD, prostate ca, TIA, Kidney cancer s/p 

nephrectomy, COPD presented to ER with abdominal pain (RUQ) for 3 days. 

Admitted for acute cholecystitis.





-Leukocytosis secondary to Acute Cholecystitis with possible choledocholithiasis


   -CT abd/pelvis: pending official read. prelim: distended thick walled 

gallbladder w hazy infiltration of the pericholecystic fat. consistent with 

acute amy.


   -RUQ pain, worse with food.


   -AST and Alk phos elevated now - suspicion for choledocholithiasis possibly


   -Lipase wnl 122


   -c/w zosyn; ID on board


   -f/u BCx


   -Surgery consulted


   -NS @ 75 ml/hr


   -Placed on clear liquid diet for now, NPO after midnight


   -pain control with tylenol 650 mg po q4h prn, toradol 15 mg IV q6h prn


   -zofran 4 mg iv q4h prn





-Distended abdomen


   -possibly due to recent/acute urinary retention


   -bladder scan ordered, will order muñiz placement if retaining urine





-HTN


   -c/w losartan 50 mg po qd, coreg 12.5 mg po bid, amlodipine 10 mg po qd





-CAR[place]


   -CPAP @ 10 cm H2O





-Hx of TIA


   -asa held





-HLD


   c/w lipitor 10 mg PO qhs





-insomnia


   -c/w melatonin 5 mg po qhs prn





-BPH


   -c/w flomax 0.8 mg po qd


   -c/w finasteride 5 mg po qd





-DVT ppx


   -SCDs; heparin sq bid





-FEN


   -NS @ 75 ml/hr


   -monitor lytes


   -Clear liquid diet; NPO after midnight





-Dispo:


   -Monitor on floors





Problem List





- Problems


(1) COPD (chronic obstructive pulmonary disease)


Code(s): J44.9 - CHRONIC OBSTRUCTIVE PULMONARY DISEASE, UNSPECIFIED   





(2) Cholecystitis, acute


Code(s): K81.0 - ACUTE CHOLECYSTITIS





(3) Fever


Code(s): R50.9 - FEVER, UNSPECIFIED   Qualifiers: 


     Fever type: other     Qualified Code(s): R50.81 - Fever presenting with 

conditions classified elsewhere  





(4) Leukocytosis


Code(s): D72.829 - ELEVATED WHITE BLOOD CELL COUNT, UNSPECIFIED   Qualifiers: 


     Leukocytosis type: unspecified     Qualified Code(s): D72.829 - Elevated 

white blood cell count, unspecified  





(5) Sleep apnea


Code(s): G47.30 - SLEEP APNEA, UNSPECIFIED   





(6) Acute urinary retention


Code(s): R33.8 - OTHER RETENTION OF URINE





(7) Insomnia


Code(s): G47.00 - INSOMNIA, UNSPECIFIED   





(8) HTN (hypertension)


Code(s): I10 - ESSENTIAL (PRIMARY) HYPERTENSION   





(9) HLD (hyperlipidemia)


Code(s): E78.5 - HYPERLIPIDEMIA, UNSPECIFIED   





(10) BPH (benign prostatic hyperplasia)


Code(s): N40.0 - BENIGN PROSTATIC HYPERPLASIA WITHOUT LOWER URINRY TRACT SYMP   








Visit type





- Emergency Visit


Emergency Visit: Yes


ED Registration Date: 02/13/17


Care time: The patient presented to the Emergency Department on the above date 

and was hospitalized for further evaluation of their emergent condition.





- New Patient


This patient is new to me today: Yes


Date on this admission: 02/13/17





- Critical Care


Critical Care patient: No

## 2017-02-13 NOTE — CONS
DATE OF CONSULTATION:  02/13/2017

 

SURGICAL CONSULTATION 

 

REQUESTING PHYSICIAN:  Corey Garcia M.D. 

 

RESPONDING PHYSICIAN:  Jhoana Zhu M.D. 

 

REASON FOR CONSULTATION:  Cholecystitis less cholelithiasis.  

 

HISTORY:  This is an 85-year-old man who was apparently admitted to the hospital

yesterday with a 3-day history of a right upper quadrant abdominal pain.  Here at the

hospital, patient has an obvious infected gallbladder/acute cholecystitis.  Since

admission, he presented with fever and a white count of 20,000.  He has exquisite

tenderness in the right upper quadrant.  

 

Review of CT scan of abdomen and pelvis with Dr. Severo Evans reveals a massively

distended gallbladder abutting the anterior abdominal wall with very thick

gallbladder wall and pericholecystic fluid.  

 

According to the patient, he has had no prior history with his gallbladder. 

Nonetheless, the current presentation has now persisted for several days.  

 

PAST MEDICAL HISTORY:  Patient's past medical history is significant for

hypertension, hyperlipidemia, COPD, history of TIAs, history of prostate cancer,

history of renal cancer status post left nephrectomy.  

 

Patient also with severe sleep apnea.  

 

PAST SURGICAL HISTORY:  Significant for appendectomy, nephrectomy.  

 

SOCIAL HISTORY:  A 40 year history of smoking at least a pack per day, quit

approximately 20 years ago.  Essentially negative alcohol.  

 

FAMILY HISTORY:  None.

 

PHYSICAL EXAMINATION:

General:  Awake and alert in obvious distress.  Mildly tachypneic.  

Abdomen:  Distended, exquisite tenderness in the right upper quadrant associated with

the local guarding.  

 

Local guarding.  Palpable mass in right upper quadrant.  

 

LABORATORY DATA:  White count 20.9 yesterday/white count 17.3 today.  Liver

chemistries normal on admission with mild elevation of his transaminases and ALT

today.  Bilirubin normal.  

 

IMPRESSION:  Acute cholecystitis/cholelithiasis in this ill-appearing 85-year-old man

with fever, leukocytosis.  Multiple underlying medical issues.

 

PLAN:  Patient more likely to fare better with percutaneous drainage at this time and

consideration for elective cholecystectomy once the acute infectious process has been

managed.  Given the patient's age, medical status, and current sepsis,

cholecystectomy will be associated with significant increase in morbidity and

mortality at this time.  

 

Spoke with Dr. Severo Evans of the interventional service.  Spoke with family as

well.  Patient and family agree.  Dr. Evans agrees.  Will proceed with percutaneous

cholecystostomy at this time. 

 

Continue antibiotics.  Remain available and thank you.  

 

 

JHOANA ZHU M.D.

 

[id number]

DD: 02/13/2017 14:52

DT: 02/13/2017 19:53

Job #:  [id number]

## 2017-02-13 NOTE — HOSP
Subjective





- Review of Symptoms


Subjective: 


Pt. Seen at bedside for inc. RR on BIPAP


Confused at bedside [id number]


 Vital Signs











 Period  Temp  Pulse  Resp  BP Sys/Yun  Pulse Ox


 


 Last 24 Hr  98 F-100.5 F    16-36  123-164/54-87  90-97








Physical:


GEN: Mod Distress, unable to speak full sentences


CARD: S tach S1, S2


RESP: Decreased Breath Sounds at bases


ABD: Drain in place with serosanginous drainage arouns 100cc, Soft, non-

distended, Non-tender, BSX4


EXT: - C/C/E





 ABG Results











ABG pH  7.54  (7.35-7.45)  H  02/13/17  21:09    


 


ABG pCO2 at Pt Temp  21.6 mmHg (35-45)  L  02/13/17  21:09    


 


ABG pO2 at Pt Temp  57.3 mmHg ()  L  02/13/17  21:09    


 


ABG HCO3  18.5 meq/L (22-26)  L  02/13/17  21:09    


 


ABG O2 Sat (Measured)  92.5 % (90-98.9)   02/13/17  21:09    


 


ABG O2 Content  17.3 % vol (15-22)   02/13/17  21:09    


 


ABG Base Excess  -1.9 meq/l (-2-2)   02/13/17  21:09    














A/P.) 85 M with Sepsis secondary to acute choley, with acute resp. failure


- ABG reviewed


- Intubation


- Repeat labs, LA


- Tyelnol prn fever, already on abx


- Cx taken


- Accepted to ICU


- Family notified





CC Time: 40 minutes











Physical Examination


Vital Signs: 


 Vital Signs











Temperature  98.6 F   02/13/17 20:46


 


Pulse Rate  118 H  02/13/17 20:46


 


Respiratory Rate  36 H  02/13/17 20:46


 


Blood Pressure  124/56   02/13/17 20:46


 


O2 Sat by Pulse Oximetry (%)  94 L  02/13/17 20:46











Labs: 


 CBC, BMP





 02/13/17 06:50 





 02/13/17 06:50

## 2017-02-14 LAB
ALBUMIN SERPL-MCNC: 1.9 G/DL (ref 3.4–5)
ALBUMIN SERPL-MCNC: 1.9 G/DL (ref 3.4–5)
ALBUMIN SERPL-MCNC: 2.2 G/DL (ref 3.4–5)
ALP SERPL-CCNC: 223 U/L (ref 45–117)
ALP SERPL-CCNC: 235 U/L (ref 45–117)
ALP SERPL-CCNC: 307 U/L (ref 45–117)
ALT SERPL-CCNC: 115 U/L (ref 12–78)
ALT SERPL-CCNC: 121 U/L (ref 12–78)
ALT SERPL-CCNC: 84 U/L (ref 12–78)
ANION GAP SERPL CALC-SCNC: 12 MMOL/L (ref 8–16)
ANION GAP SERPL CALC-SCNC: 17 MMOL/L (ref 8–16)
ANION GAP SERPL CALC-SCNC: 17 MMOL/L (ref 8–16)
APPEARANCE UR: CLEAR
APTT BLD: 39.6 SECONDS (ref 26.9–34.4)
ART PUNCT SITE: (no result)
ARTERIAL PATENCY WRIST A: POSITIVE
AST SERPL-CCNC: 108 U/L (ref 15–37)
AST SERPL-CCNC: 131 U/L (ref 15–37)
AST SERPL-CCNC: 156 U/L (ref 15–37)
BASE EXCESS BLDA CALC-SCNC: -4.4 MEQ/L (ref -2–2)
BASOPHILS # BLD: 0 % (ref 0–2)
BASOPHILS # BLD: 0.1 % (ref 0–2)
BILIRUB SERPL-MCNC: 2.2 MG/DL (ref 0.2–1)
BILIRUB SERPL-MCNC: 3 MG/DL (ref 0.2–1)
BILIRUB SERPL-MCNC: 3.2 MG/DL (ref 0.2–1)
BILIRUB UR STRIP.AUTO-MCNC: NEGATIVE MG/DL
CALCIUM SERPL-MCNC: 7 MG/DL (ref 8.5–10.1)
CALCIUM SERPL-MCNC: 7.4 MG/DL (ref 8.5–10.1)
CALCIUM SERPL-MCNC: 7.4 MG/DL (ref 8.5–10.1)
CO2 SERPL-SCNC: 17 MMOL/L (ref 21–32)
CO2 SERPL-SCNC: 21 MMOL/L (ref 21–32)
CO2 SERPL-SCNC: 24 MMOL/L (ref 21–32)
COLOR UR: YELLOW
CREAT SERPL-MCNC: 1.6 MG/DL (ref 0.7–1.3)
CREAT SERPL-MCNC: 1.7 MG/DL (ref 0.7–1.3)
CREAT SERPL-MCNC: 1.8 MG/DL (ref 0.7–1.3)
DEPRECATED RDW RBC AUTO: 15 % (ref 11.9–15.9)
DEPRECATED RDW RBC AUTO: 15.2 % (ref 11.9–15.9)
DEPRECATED RDW RBC AUTO: 15.4 % (ref 11.9–15.9)
EOSINOPHIL # BLD: 0 % (ref 0–4.5)
EOSINOPHIL # BLD: 0 % (ref 0–4.5)
EOSINOPHIL # BLD: 1 % (ref 0–4.5)
FIBRINOGEN PPP-MCNC: < 100 MG/DL (ref 238–498)
GLUCOSE SERPL-MCNC: 104 MG/DL (ref 74–106)
GLUCOSE SERPL-MCNC: 301 MG/DL (ref 74–106)
GLUCOSE SERPL-MCNC: 401 MG/DL (ref 74–106)
HCO3 BLDA-SCNC: 20.5 MEQ/L (ref 22–26)
INR BLD: 1.86 (ref 0.82–1.09)
KETONES UR QL STRIP: (no result)
LEUKOCYTE ESTERASE UR QL STRIP.AUTO: NEGATIVE
LPM/O2%: (no result)
MAGNESIUM SERPL-MCNC: 1.8 MG/DL (ref 1.8–2.4)
MAGNESIUM SERPL-MCNC: 1.9 MG/DL (ref 1.8–2.4)
MCH RBC QN AUTO: 29.5 PG (ref 25.7–33.7)
MCH RBC QN AUTO: 29.8 PG (ref 25.7–33.7)
MCH RBC QN AUTO: 30 PG (ref 25.7–33.7)
MCHC RBC AUTO-ENTMCNC: 33.6 G/DL (ref 32–35.9)
MCHC RBC AUTO-ENTMCNC: 33.8 G/DL (ref 32–35.9)
MCHC RBC AUTO-ENTMCNC: 33.9 G/DL (ref 32–35.9)
MCV RBC: 87.9 FL (ref 80–96)
MCV RBC: 87.9 FL (ref 80–96)
MCV RBC: 88.7 FL (ref 80–96)
MECH. VENT.: YES
NEUTROPHILS # BLD: 87 % (ref 42.8–82.8)
NEUTROPHILS # BLD: 94 % (ref 42.8–82.8)
NEUTROPHILS # BLD: 94 % (ref 42.8–82.8)
NEUTROPHILS # BLD: 95.9 % (ref 42.8–82.8)
NITRITE UR QL STRIP: NEGATIVE
PEEP SETTING VENT: 5 CMH2O
PH UR: 5 [PH] (ref 5–8)
PHOSPHATE SERPL-MCNC: 2.1 MG/DL (ref 2.5–4.9)
PHOSPHATE SERPL-MCNC: 2.2 MG/DL (ref 2.5–4.9)
PLATELET # BLD AUTO: 157 K/MM3 (ref 134–434)
PLATELET # BLD AUTO: 225 K/MM3 (ref 134–434)
PLATELET # BLD AUTO: 226 K/MM3 (ref 134–434)
PLATELET # BLD EST: ADEQUATE 10*3/UL
PLATELET # BLD EST: ADEQUATE 10*3/UL
PMV BLD: 7.5 FL (ref 7.5–11.1)
PMV BLD: 8.1 FL (ref 7.5–11.1)
PMV BLD: 8.4 FL (ref 7.5–11.1)
PO2 BLDA: 120 MMHG (ref 68–100)
PROT SERPL-MCNC: 4.3 G/DL (ref 6.4–8.2)
PROT SERPL-MCNC: 4.6 G/DL (ref 6.4–8.2)
PROT SERPL-MCNC: 4.7 G/DL (ref 6.4–8.2)
PROT UR QL STRIP: NEGATIVE
PROT UR QL STRIP: NEGATIVE
PT PNL PPP: 20.7 SEC (ref 9.98–11.88)
PT. ON O2?: YES
RBC # UR STRIP: NEGATIVE /UL
SAO2 % BLDA: 98.1 % (ref 90–98.9)
SP GR UR: 1.02 (ref 1–1.03)
TROPONIN I SERPL-MCNC: 0.06 NG/ML (ref 0–0.05)
TYPE OF O2: (no result)
UROBILINOGEN UR STRIP-MCNC: NEGATIVE E.U./DL (ref 0.2–1)
VENT RATE: 16
VT/PRESS: 450
WBC # BLD AUTO: 22.6 K/MM3 (ref 4–10)
WBC # BLD AUTO: 28.5 K/MM3 (ref 4–10)
WBC # BLD AUTO: 33.5 K/MM3 (ref 4–10)

## 2017-02-14 PROCEDURE — 05HM33Z INSERTION OF INFUSION DEVICE INTO RIGHT INTERNAL JUGULAR VEIN, PERCUTANEOUS APPROACH: ICD-10-PCS

## 2017-02-14 PROCEDURE — 30233M1 TRANSFUSION OF NONAUTOLOGOUS PLASMA CRYOPRECIPITATE INTO PERIPHERAL VEIN, PERCUTANEOUS APPROACH: ICD-10-PCS | Performed by: INTERNAL MEDICINE

## 2017-02-14 RX ADMIN — PANTOPRAZOLE SODIUM SCH MLS/HR: 40 INJECTION, POWDER, FOR SOLUTION INTRAVENOUS at 02:46

## 2017-02-14 RX ADMIN — PANTOPRAZOLE SODIUM SCH MLS/HR: 40 INJECTION, POWDER, FOR SOLUTION INTRAVENOUS at 22:01

## 2017-02-14 RX ADMIN — PIPERACILLIN SODIUM,TAZOBACTAM SODIUM SCH GM: 3; .375 INJECTION, POWDER, FOR SOLUTION INTRAVENOUS at 09:03

## 2017-02-14 RX ADMIN — METRONIDAZOLE SCH: 500 INJECTION, SOLUTION INTRAVENOUS at 11:26

## 2017-02-14 RX ADMIN — INSULIN ASPART SCH: 100 INJECTION, SOLUTION INTRAVENOUS; SUBCUTANEOUS at 22:14

## 2017-02-14 RX ADMIN — IPRATROPIUM BROMIDE AND ALBUTEROL SULFATE SCH AMP: .5; 3 SOLUTION RESPIRATORY (INHALATION) at 10:00

## 2017-02-14 RX ADMIN — IMIPENEM AND CILASTATIN SODIUM SCH MLS/HR: 500; 500 INJECTION, POWDER, FOR SOLUTION INTRAVENOUS at 14:59

## 2017-02-14 RX ADMIN — IPRATROPIUM BROMIDE AND ALBUTEROL SULFATE SCH AMP: .5; 3 SOLUTION RESPIRATORY (INHALATION) at 21:20

## 2017-02-14 RX ADMIN — PIPERACILLIN SODIUM,TAZOBACTAM SODIUM SCH GM: 3; .375 INJECTION, POWDER, FOR SOLUTION INTRAVENOUS at 02:51

## 2017-02-14 RX ADMIN — IMIPENEM AND CILASTATIN SODIUM SCH MLS/HR: 500; 500 INJECTION, POWDER, FOR SOLUTION INTRAVENOUS at 18:00

## 2017-02-14 RX ADMIN — DEXTROSE MONOHYDRATE SCH MLS/HR: 50 INJECTION, SOLUTION INTRAVENOUS at 00:30

## 2017-02-14 RX ADMIN — FENTANYL CITRATE SCH MLS/HR: 50 INJECTION INTRAMUSCULAR; INTRAVENOUS at 15:00

## 2017-02-14 RX ADMIN — METRONIDAZOLE SCH MLS/HR: 500 INJECTION, SOLUTION INTRAVENOUS at 09:00

## 2017-02-14 RX ADMIN — SODIUM CHLORIDE SCH MLS/HR: 9 INJECTION, SOLUTION INTRAVENOUS at 21:54

## 2017-02-14 RX ADMIN — PANTOPRAZOLE SODIUM SCH: 40 INJECTION, POWDER, FOR SOLUTION INTRAVENOUS at 09:01

## 2017-02-14 RX ADMIN — PANTOPRAZOLE SODIUM SCH MLS/HR: 40 INJECTION, POWDER, FOR SOLUTION INTRAVENOUS at 11:26

## 2017-02-14 RX ADMIN — METRONIDAZOLE SCH MLS/HR: 500 INJECTION, SOLUTION INTRAVENOUS at 18:00

## 2017-02-14 RX ADMIN — DOPAMINE HYDROCHLORIDE SCH MLS/HR: 160 INJECTION, SOLUTION INTRAVENOUS at 07:48

## 2017-02-14 RX ADMIN — POTASSIUM CHLORIDE SCH MLS/HR: 7.46 INJECTION, SOLUTION INTRAVENOUS at 09:00

## 2017-02-14 RX ADMIN — POTASSIUM CHLORIDE SCH MLS/HR: 7.46 INJECTION, SOLUTION INTRAVENOUS at 06:00

## 2017-02-14 RX ADMIN — POTASSIUM CHLORIDE SCH MLS/HR: 7.46 INJECTION, SOLUTION INTRAVENOUS at 07:46

## 2017-02-14 RX ADMIN — FENTANYL CITRATE SCH MLS/HR: 50 INJECTION INTRAMUSCULAR; INTRAVENOUS at 02:39

## 2017-02-14 NOTE — PN
Progress Note (short form)





- Note


Progress Note: 


events noted


underwent percutaneous cholystotomy tube yesterday


last PM developed acute respiratory distress with confusion and fever


transferred to ICU


intubated


coffee ground emesis noted


central line placed


on pressors- dopamine and levophed


he is awake and responsive


Fi02 50%





received zosyn/flagyl/zithromax and vancomycin





 Vital Signs











 Period  Temp  Pulse  Resp  BP Sys/Yun  Pulse Ox


 


 Last 24 Hr  97 F-103.1 F    12-36  /41-87  90-94








orallly intubated


coffee grounds in NGT


cor-rrr


lungs decreased bs at bases


abd soft, +biliary tube with bile- no pus


+muñiz-clear urine


ext no edema





 CBC, BMP





 02/14/17 05:00 





 02/14/17 05:00 





 Microbiology





02/12/17 21:40   Blood - Peripheral Venous   Blood Culture - Preliminary


                            NO GROWTH OBTAINED AFTER 24 HOURS, INCUBATION TO 

CONTINUE


                            FOR 4 DAYS.


02/12/17 21:40   Blood - Peripheral Venous   Blood Culture - Preliminary


                            NO GROWTH OBTAINED AFTER 24 HOURS, INCUBATION TO 

CONTINUE


                            FOR 4 DAYS.





 Current Medications





Acetaminophen (Tylenol -)  650 mg PO Q4H PRN


   PRN Reason: FEVER OR PAIN


Albuterol/Ipratropium (Duoneb -)  1 amp NEB Q4H PRN


   PRN Reason: SHORTNESS OF BREATH


Albuterol/Ipratropium (Duoneb -)  1 amp NEB BID LAUREN


Fentanyl 500 mcg/ Dextrose  100 mls @ 10 mls/hr IVPB TITR LAUREN


   PRN Reason: 50 MCG/HR


   Stop: 02/14/17 22:29


   Last Admin: 02/14/17 02:39 Dose:  10 mls/hr


Phenylephrine HCl 10,000 mcg/ (Dextrose)  500 mls @ 120 mls/hr IV TITR LAUREN; 40 

MCG/MIN


   PRN Reason: Protocol


   Last Admin: 02/13/17 22:30 Dose:  120 mls/hr


Metronidazole (Flagyl 500mg Premixed Ivpb -)  100 mls @ 100 mls/hr IVPB Q8H-IV 

LAUREN


Sodium Chloride (Normal Saline -)  1,000 mls @ 100 mls/hr IV ASDIR LAUREN


   Last Admin: 02/13/17 23:40 Dose:  100 mls/hr


Norepinephrine Bitartrate 8, (000 mcg/ Dextrose)  500 mls @ 18.75 mls/hr IV 

TITR LAUREN; 5 MCG/MIN


   PRN Reason: Protocol


   Last Admin: 02/14/17 00:30 Dose:  18.75 mls/hr


Pantoprazole Sodium (Protonix 40mg Ivpb (Pre-Docked))  100 mls @ 200 mls/hr 

IVPB BID LAUREN


Pantoprazole Sodium 80 mg/ (Sodium Chloride)  100 mls @ 10 mls/hr IVPB Q10H LAUREN


   PRN Reason: 8 MG/HR


   Last Admin: 02/14/17 02:46 Dose:  10 mls/hr


Lactated Ringer's (Lactated Ringers Solution)  1,000 mls @ 100 mls/hr IV ASDIR 

LAUREN


   Last Admin: 02/14/17 01:15 Dose:  100 mls/hr


Propofol (Diprivan -)  100 mls @ 2.142 mls/hr IVPB TITR LAUREN; 5 MCG/KG/MIN


   PRN Reason: Protocol


   Last Admin: 02/14/17 02:50 Dose:  2.142 mls/hr


Azithromycin 250 mg/ Dextrose  250 mls @ 250 mls/hr IVPB DAILY LAUREN


Potassium Chloride (Potassium Chloride 10 Meq Premix Ivpb -)  100 mls @ 100 mls/

hr IVPB Q60M LAUREN


   Stop: 02/14/17 08:59


   Last Admin: 02/14/17 07:46 Dose:  100 mls/hr


Dopamine HCl/Dextrose (Dopamine 400 Mg/D5w -)  250 mls @ 13.387 mls/hr IVPB 

TITR LAUREN; 5 MCG/KG/MIN


   PRN Reason: Protocol


   Last Admin: 02/14/17 07:48 Dose:  26.773 mls/hr


Piperacillin Sod/Tazobactam Sod (Zosyn 3.375gm Ivpb (Pre-Docked))  3.375 gm 

IVPB Q8H-IV LAUREN


   PRN Reason: Protocol


   Last Admin: 02/14/17 02:51 Dose:  3.375 gm





 Microbiology





02/12/17 21:40   Blood - Peripheral Venous   Blood Culture - Preliminary


                            NO GROWTH OBTAINED AFTER 24 HOURS, INCUBATION TO 

CONTINUE


                            FOR 4 DAYS.


02/12/17 21:40   Blood - Peripheral Venous   Blood Culture - Preliminary


                            NO GROWTH OBTAINED AFTER 24 HOURS, INCUBATION TO 

CONTINUE


                            FOR 4 DAYS.








a/p


sepsis


acute respiratory failure


acute cholycystitis s/p percutaneous drainage


coffee ground emesis


SANGITA (solitary kidney)





pressors/fluids per ICU


correct electrolytes


will f/u cultures





overall prognosis is guarded


45 minutes spent in the care of this critically ill patient





repeat blood cultures sent


rising lfts noted


consider repeat imaging- ?retained stone


will change empirically to imipenem/flagyl to cover esbl GNR

## 2017-02-14 NOTE — PN
Progress Note (short form)





- Note


Progress Note: 


Chief Complaint: Events noted, notes reviewed, intubated acute respiratory 

failure related to probable pneumonia, sedated and on pressors for hypotension 

related to probable sepsis syndrome/septic shock





History of Present Illness: 


Seen and examined in the ICU. Events noted, notes reviewed, intubated acute 

respiratory failure related to probable pneumonia, sedated and on pressors for 

hypotension related to probable sepsis syndrome/septic shock





Upper GI bleed was noted





Sedation on Fentanyl and Propofol (Diprivan)





Pressors on Phenylephrine and Dopamine





Medications: 





 Current Medications





Acetaminophen (Tylenol -)  650 mg PO Q4H PRN


   PRN Reason: FEVER OR PAIN


Albuterol/Ipratropium (Duoneb -)  1 amp NEB Q4H PRN


   PRN Reason: SHORTNESS OF BREATH


Albuterol/Ipratropium (Duoneb -)  1 amp NEB BID LAUREN


Fentanyl 500 mcg/ Dextrose  100 mls @ 10 mls/hr IVPB TITR LAUREN


   PRN Reason: 50 MCG/HR


   Stop: 02/14/17 22:29


   Last Admin: 02/14/17 02:39 Dose:  10 mls/hr


Phenylephrine HCl 10,000 mcg/ (Dextrose)  500 mls @ 120 mls/hr IV TITR LAUREN; 40 

MCG/MIN


   PRN Reason: Protocol


   Last Admin: 02/13/17 22:30 Dose:  120 mls/hr


Metronidazole (Flagyl 500mg Premixed Ivpb -)  100 mls @ 100 mls/hr IVPB Q8H-IV 

LAUREN


Sodium Chloride (Normal Saline -)  1,000 mls @ 100 mls/hr IV ASDIR LAUREN


   Last Admin: 02/13/17 23:40 Dose:  100 mls/hr


Norepinephrine Bitartrate 8, (000 mcg/ Dextrose)  500 mls @ 18.75 mls/hr IV 

TITR LAUREN; 5 MCG/MIN


   PRN Reason: Protocol


   Last Admin: 02/14/17 00:30 Dose:  18.75 mls/hr


Pantoprazole Sodium (Protonix 40mg Ivpb (Pre-Docked))  100 mls @ 200 mls/hr 

IVPB BID LAUREN


Pantoprazole Sodium 80 mg/ (Sodium Chloride)  100 mls @ 10 mls/hr IVPB Q10H LAUREN


   PRN Reason: 8 MG/HR


   Last Admin: 02/14/17 02:46 Dose:  10 mls/hr


Lactated Ringer's (Lactated Ringers Solution)  1,000 mls @ 100 mls/hr IV ASDIR 

LAUREN


   Last Admin: 02/14/17 01:15 Dose:  100 mls/hr


Propofol (Diprivan -)  100 mls @ 2.142 mls/hr IVPB TITR LAUREN; 5 MCG/KG/MIN


   PRN Reason: Protocol


   Last Admin: 02/14/17 02:50 Dose:  2.142 mls/hr


Azithromycin 250 mg/ Dextrose  250 mls @ 250 mls/hr IVPB DAILY LAUREN


Potassium Chloride (Potassium Chloride 10 Meq Premix Ivpb -)  100 mls @ 100 mls/

hr IVPB Q60M LAUREN


   Stop: 02/14/17 08:59


Vancomycin HCl (Vancomycin (Pre-Docked))  250 mls @ 166.667 mls/hr IVPB ONCE ONE


   Stop: 02/14/17 07:38


Piperacillin Sod/Tazobactam Sod (Zosyn 3.375gm Ivpb (Pre-Docked))  3.375 gm 

IVPB Q8H-IV LAUREN


   PRN Reason: Protocol


   Last Admin: 02/14/17 02:51 Dose:  3.375 gm








 Review of Systems 





Unable to obtain





Vital Signs: 





 Last Vital Signs











Temp Pulse Resp BP Pulse Ox


 


 97.9 F   82   21   127/55   92 L


 


 02/14/17 02:00  02/14/17 06:09  02/14/17 06:09  02/14/17 06:09  02/13/17 23:38











Neck: Supple Negative JVD No Bruit


Respiratory: Bilateral Scattered Rhonchi


Cardiovascular: S1 S2 Regular Rate and Rhythm


Gastrointestinal: Soft Benign Normal Bowel Sounds, Tenderness


Cardiovascular: Yes: Regular Rate and Rhythm


Ext: No Edema





Labs: 





 CBC, BMP





 02/14/17 05:00 





 02/14/17 05:00 








 Assessment/Plan 





ASSESSMENT:





1. Acute respiratory failure related to probable pneumonia, probable aspiration 

pneumonia complicated by


2. Hypotension related to sepsis syndrome/septic shock


3. Acute cholecystitis post cholecystostomy drain


4. Upper GI bleed 


5. History of hypertension


6. Hyperglycemia


7. Hypokalemia


8. Acute renal insufficiency


9. History of TIA


10. History of COPD


11. History of diverticular disease


12. History of colitis


13. History of renal carcinoma post nephrectomy


14. History of prostate carcinoma





PLAN:





1. Continue pressors maintaining MAP's > 65


2. Hold Carvedilol, Losartan and Amlodipine therapies


3. Monitor CBC and transfuse as needed maintaining Hg > 8.0


4. Correction of Hypokalemia


5. Antibiotic as per the primary team


6. Ventilator management as per the critical care team





Condition critical














Viji Emanuel MD

## 2017-02-14 NOTE — PN
<Buster Ramirez - Last Filed: 02/14/17 12:13>


Physical Exam: 


SUBJECTIVE: Patient seen and examined at bedside in ICU. Pt was sedated, 

intubated, on ventilator, and on pressors.  Pt was able to answer questions by 

nodding or shaking head despite being on pressors this AM. Pt denied pain when 

asked.








OBJECTIVE:





 


 Vital Signs











Temperature  97.8 F   02/14/17 10:00


 


Pulse Rate  84   02/14/17 11:02


 


Respiratory Rate  23   02/14/17 10:02


 


Blood Pressure  91/53   02/14/17 10:00


 


O2 Sat by Pulse Oximetry (%)  93 L  02/14/17 11:02














GENERAL: Sedated, on ventilator/intubated.


HEAD: Normal with no signs of trauma.


EYES: Constricted pupils. 


ENT: moist mucous membranes. OG tube in place.


NECK: Trachea midline, full range of motion


LUNGS: Auscultated anteriorly. Diminished breath sounds. 


HEART: Regular rate and rhythm, S1, S2 without murmur, rub or gallop.


ABDOMEN: RUQ pain upon palpation. Normoactive bowel sounds.


EXTREMITIES: 2+ pulses, warm, well-perfused, no edema. 


NEUROLOGICAL:  gait not observed. Sedated.


PSYCH: Sedated.


SKIN: Warm, dry, normal turgor, no rashes or lesions noted














 Laboratory Results - last 24 hr











  02/13/17 02/13/17 02/13/17





  06:50 21:09 21:14


 


WBC   


 


RBC   


 


Hgb   


 


Hct   


 


MCV   


 


MCHC   


 


RDW   


 


Plt Count   


 


MPV   


 


Neutrophils %   


 


Lymphocytes %   


 


Monocytes %   


 


Eosinophils %   


 


Basophils %   


 


Band Neutrophils   


 


Nucleated RBCs   


 


Platelet Estimate   


 


Platelet Comment   


 


INR   


 


PTT (Actin FS)   


 


Fibrinogen   


 


Fibrin Degrad Products   


 


D-Dimer   


 


Puncture Site   Right radial 


 


ABG pH   7.54 H 


 


ABG pCO2 at Pt Temp   21.6 L 


 


ABG pO2 at Pt Temp   57.3 L 


 


ABG HCO3   18.5 L 


 


ABG O2 Sat (Measured)   92.5 


 


ABG O2 Content   17.3 


 


ABG Base Excess   -1.9 


 


Sb Test   Positive 


 


O2 Delivery Device   Bipap 


 


Oxygen Flow Rate   50% 


 


Vent Mode   S/t 


 


Vent Rate   12 


 


Mechanical Rate   


 


PEEP   


 


Pressure Support Vent   10/4 


 


Sodium   


 


Potassium   


 


Chloride   


 


Carbon Dioxide   


 


Anion Gap   


 


BUN   


 


Creatinine   


 


Creat Clearance w eGFR   


 


POC Glucometer    119


 


Random Glucose   


 


Lactic Acid   


 


Calcium   


 


Phosphorus   


 


Magnesium   


 


Total Bilirubin   


 


AST   


 


ALT   


 


Alkaline Phosphatase   


 


Creatine Kinase   


 


Troponin I   


 


B-Natriuretic Peptide   


 


Total Protein   


 


Albumin   


 


Total Amylase   


 


Lipase   


 


Urine Color   


 


Urine Appearance   


 


Urine pH   


 


Ur Specific Gravity   


 


Urine Protein   


 


Urine Glucose (UA)   


 


Urine Ketones   


 


Urine Blood   


 


Urine Nitrite   


 


Urine Bilirubin   


 


Urine Urobilinogen   


 


Ur Leukocyte Esterase   


 


Blood Type  A POSITIVE  


 


Antibody Screen  Negative  


 


Crossmatch  See Detail  














  02/13/17 02/13/17 02/13/17





  22:46 22:46 22:46


 


WBC   


 


RBC   


 


Hgb   


 


Hct   


 


MCV   


 


MCHC   


 


RDW   


 


Plt Count   


 


MPV   


 


Neutrophils %   


 


Lymphocytes %   


 


Monocytes %   


 


Eosinophils %   


 


Basophils %   


 


Band Neutrophils   


 


Nucleated RBCs   


 


Platelet Estimate   


 


Platelet Comment   


 


INR   


 


PTT (Actin FS)   


 


Fibrinogen   


 


Fibrin Degrad Products   


 


D-Dimer   


 


Puncture Site   


 


ABG pH   


 


ABG pCO2 at Pt Temp   


 


ABG pO2 at Pt Temp   


 


ABG HCO3   


 


ABG O2 Sat (Measured)   


 


ABG O2 Content   


 


ABG Base Excess   


 


Sb Test   


 


O2 Delivery Device   


 


Oxygen Flow Rate   


 


Vent Mode   


 


Vent Rate   


 


Mechanical Rate   


 


PEEP   


 


Pressure Support Vent   


 


Sodium  144  


 


Potassium  3.5  


 


Chloride  106  


 


Carbon Dioxide  21  


 


Anion Gap  17 H  


 


BUN  42 H  


 


Creatinine  1.8 H D  


 


Creat Clearance w eGFR  36.04  


 


POC Glucometer   


 


Random Glucose  104  


 


Lactic Acid   


 


Calcium  7.4 L  


 


Phosphorus   


 


Magnesium   


 


Total Bilirubin  3.0 H D  


 


AST  131 H D  


 


ALT  84 H D  


 


Alkaline Phosphatase  307 H D  


 


Creatine Kinase   


 


Troponin I   


 


B-Natriuretic Peptide   2683.01 H 


 


Total Protein  4.6 L D  


 


Albumin  2.2 L  


 


Total Amylase    79


 


Lipase   


 


Urine Color   


 


Urine Appearance   


 


Urine pH   


 


Ur Specific Gravity   


 


Urine Protein   


 


Urine Glucose (UA)   


 


Urine Ketones   


 


Urine Blood   


 


Urine Nitrite   


 


Urine Bilirubin   


 


Urine Urobilinogen   


 


Ur Leukocyte Esterase   


 


Blood Type   


 


Antibody Screen   


 


Crossmatch   














  02/13/17 02/13/17 02/13/17





  22:46 22:46 22:50


 


WBC    13.8 H


 


RBC    4.31


 


Hgb    12.9


 


Hct    38.1


 


MCV    88.5


 


MCHC    33.7


 


RDW    15.0


 


Plt Count    241


 


MPV    8.0


 


Neutrophils %    94.0 H


 


Lymphocytes %    3.0 L D


 


Monocytes %   


 


Eosinophils %   


 


Basophils %   


 


Band Neutrophils    3.0  D


 


Nucleated RBCs    1 H


 


Platelet Estimate    Adequate


 


Platelet Comment    Few large plts


 


INR   


 


PTT (Actin FS)   


 


Fibrinogen   


 


Fibrin Degrad Products   


 


D-Dimer   


 


Puncture Site   


 


ABG pH   


 


ABG pCO2 at Pt Temp   


 


ABG pO2 at Pt Temp   


 


ABG HCO3   


 


ABG O2 Sat (Measured)   


 


ABG O2 Content   


 


ABG Base Excess   


 


Sb Test   


 


O2 Delivery Device   


 


Oxygen Flow Rate   


 


Vent Mode   


 


Vent Rate   


 


Mechanical Rate   


 


PEEP   


 


Pressure Support Vent   


 


Sodium   


 


Potassium   


 


Chloride   


 


Carbon Dioxide   


 


Anion Gap   


 


BUN   


 


Creatinine   


 


Creat Clearance w eGFR   


 


POC Glucometer   


 


Random Glucose   


 


Lactic Acid   


 


Calcium   


 


Phosphorus   


 


Magnesium   


 


Total Bilirubin   


 


AST   


 


ALT   


 


Alkaline Phosphatase   


 


Creatine Kinase   


 


Troponin I   


 


B-Natriuretic Peptide   


 


Total Protein   


 


Albumin   


 


Total Amylase   


 


Lipase   162 


 


Urine Color  Yellow  


 


Urine Appearance  Clear  


 


Urine pH  5.0  


 


Ur Specific Gravity  1.016  


 


Urine Protein  Negative  


 


Urine Glucose (UA)  Negative  


 


Urine Ketones  Trace H  


 


Urine Blood  Negative  


 


Urine Nitrite  Negative  


 


Urine Bilirubin  Negative  


 


Urine Urobilinogen  Negative  


 


Ur Leukocyte Esterase  Negative  


 


Blood Type   


 


Antibody Screen   


 


Crossmatch   














  02/13/17 02/13/17 02/13/17





  22:50 22:50 22:50


 


WBC   


 


RBC   


 


Hgb   


 


Hct   


 


MCV   


 


MCHC   


 


RDW   


 


Plt Count   


 


MPV   


 


Neutrophils %   


 


Lymphocytes %   


 


Monocytes %   


 


Eosinophils %   


 


Basophils %   


 


Band Neutrophils   


 


Nucleated RBCs   


 


Platelet Estimate   


 


Platelet Comment   


 


INR   2.32 H D 


 


PTT (Actin FS)   36.9 H 


 


Fibrinogen   


 


Fibrin Degrad Products   


 


D-Dimer   


 


Puncture Site   


 


ABG pH   


 


ABG pCO2 at Pt Temp   


 


ABG pO2 at Pt Temp   


 


ABG HCO3   


 


ABG O2 Sat (Measured)   


 


ABG O2 Content   


 


ABG Base Excess   


 


Sb Test   


 


O2 Delivery Device   


 


Oxygen Flow Rate   


 


Vent Mode   


 


Vent Rate   


 


Mechanical Rate   


 


PEEP   


 


Pressure Support Vent   


 


Sodium   


 


Potassium   


 


Chloride   


 


Carbon Dioxide   


 


Anion Gap   


 


BUN   


 


Creatinine   


 


Creat Clearance w eGFR   


 


POC Glucometer   


 


Random Glucose   


 


Lactic Acid  6.778 H*  


 


Calcium   


 


Phosphorus   


 


Magnesium   


 


Total Bilirubin    3.0 H


 


AST   


 


ALT   


 


Alkaline Phosphatase   


 


Creatine Kinase   


 


Troponin I   


 


B-Natriuretic Peptide   


 


Total Protein   


 


Albumin   


 


Total Amylase   


 


Lipase   


 


Urine Color   


 


Urine Appearance   


 


Urine pH   


 


Ur Specific Gravity   


 


Urine Protein   


 


Urine Glucose (UA)   


 


Urine Ketones   


 


Urine Blood   


 


Urine Nitrite   


 


Urine Bilirubin   


 


Urine Urobilinogen   


 


Ur Leukocyte Esterase   


 


Blood Type   


 


Antibody Screen   


 


Crossmatch   














  02/13/17 02/13/17 02/13/17





  22:50 23:00 23:01


 


WBC   


 


RBC   


 


Hgb   


 


Hct   


 


MCV   


 


MCHC   


 


RDW   


 


Plt Count   


 


MPV   


 


Neutrophils %   


 


Lymphocytes %   


 


Monocytes %   


 


Eosinophils %   


 


Basophils %   


 


Band Neutrophils   


 


Nucleated RBCs   


 


Platelet Estimate   


 


Platelet Comment   


 


INR   


 


PTT (Actin FS)   


 


Fibrinogen   


 


Fibrin Degrad Products   


 


D-Dimer   


 


Puncture Site   Right radial 


 


ABG pH   7.33 L D 


 


ABG pCO2 at Pt Temp   38.4  D 


 


ABG pO2 at Pt Temp   71.1  D 


 


ABG HCO3   19.5 L 


 


ABG O2 Sat (Measured)   92.3 


 


ABG O2 Content   14.8 L 


 


ABG Base Excess   -5.4 L 


 


Sb Test   Positive 


 


O2 Delivery Device   Mech vent 


 


Oxygen Flow Rate   75% 


 


Vent Mode   A/c 


 


Vent Rate   16 


 


Mechanical Rate   Yes 


 


PEEP   5.0 


 


Pressure Support Vent   450 


 


Sodium   


 


Potassium   


 


Chloride   


 


Carbon Dioxide   


 


Anion Gap   


 


BUN   


 


Creatinine   


 


Creat Clearance w eGFR   


 


POC Glucometer    120.71269


 


Random Glucose   


 


Lactic Acid   


 


Calcium   


 


Phosphorus  1.7 L D  


 


Magnesium  1.9  


 


Total Bilirubin   


 


AST   


 


ALT   


 


Alkaline Phosphatase   


 


Creatine Kinase  102  


 


Troponin I  0.06 H D  


 


B-Natriuretic Peptide   


 


Total Protein   


 


Albumin   


 


Total Amylase   


 


Lipase   


 


Urine Color   


 


Urine Appearance   


 


Urine pH   


 


Ur Specific Gravity   


 


Urine Protein   


 


Urine Glucose (UA)   


 


Urine Ketones   


 


Urine Blood   


 


Urine Nitrite   


 


Urine Bilirubin   


 


Urine Urobilinogen   


 


Ur Leukocyte Esterase   


 


Blood Type   


 


Antibody Screen   


 


Crossmatch   














  02/14/17 02/14/17 02/14/17





  01:35 01:35 02:00


 


WBC   22.6 H D 


 


RBC   3.62 L 


 


Hgb   10.8 L D 


 


Hct   32.1 L D 


 


MCV   88.7 


 


MCHC   33.8 


 


RDW   15.0 


 


Plt Count   225 


 


MPV   8.4 


 


Neutrophils %   95.9 H 


 


Lymphocytes %   1.4 L D 


 


Monocytes %   2.7 L 


 


Eosinophils %   0.0  D 


 


Basophils %   0.0 


 


Band Neutrophils   


 


Nucleated RBCs   


 


Platelet Estimate   


 


Platelet Comment   


 


INR   


 


PTT (Actin FS)   


 


Fibrinogen    < 100.0 L*


 


Fibrin Degrad Products    Greater than 40


 


D-Dimer   


 


Puncture Site   


 


ABG pH   


 


ABG pCO2 at Pt Temp   


 


ABG pO2 at Pt Temp   


 


ABG HCO3   


 


ABG O2 Sat (Measured)   


 


ABG O2 Content   


 


ABG Base Excess   


 


Sb Test   


 


O2 Delivery Device   


 


Oxygen Flow Rate   


 


Vent Mode   


 


Vent Rate   


 


Mechanical Rate   


 


PEEP   


 


Pressure Support Vent   


 


Sodium   


 


Potassium   


 


Chloride   


 


Carbon Dioxide   


 


Anion Gap   


 


BUN   


 


Creatinine   


 


Creat Clearance w eGFR   


 


POC Glucometer   


 


Random Glucose   


 


Lactic Acid  2.888 H*  


 


Calcium   


 


Phosphorus   


 


Magnesium   


 


Total Bilirubin   


 


AST   


 


ALT   


 


Alkaline Phosphatase   


 


Creatine Kinase   


 


Troponin I   


 


B-Natriuretic Peptide   


 


Total Protein   


 


Albumin   


 


Total Amylase   


 


Lipase   


 


Urine Color   


 


Urine Appearance   


 


Urine pH   


 


Ur Specific Gravity   


 


Urine Protein   


 


Urine Glucose (UA)   


 


Urine Ketones   


 


Urine Blood   


 


Urine Nitrite   


 


Urine Bilirubin   


 


Urine Urobilinogen   


 


Ur Leukocyte Esterase   


 


Blood Type   


 


Antibody Screen   


 


Crossmatch   














  02/14/17 02/14/17 02/14/17





  02:00 04:30 05:00


 


WBC    33.5 H* D


 


RBC    3.72 L


 


Hgb    11.1 L


 


Hct    32.7 L


 


MCV    87.9


 


MCHC    33.9


 


RDW    15.2


 


Plt Count    226


 


MPV    8.1


 


Neutrophils %    94.0 H


 


Lymphocytes %    1.5 L


 


Monocytes %    4.4


 


Eosinophils %    0.0


 


Basophils %    0.1  D


 


Band Neutrophils   


 


Nucleated RBCs   


 


Platelet Estimate   


 


Platelet Comment   


 


INR   


 


PTT (Actin FS)   


 


Fibrinogen   


 


Fibrin Degrad Products   


 


D-Dimer  4753 H  


 


Puncture Site   Right radial 


 


ABG pH   7.34 L 


 


ABG pCO2 at Pt Temp   39.2 


 


ABG pO2 at Pt Temp   120.0 H D 


 


ABG HCO3   20.5 L 


 


ABG O2 Sat (Measured)   98.1 


 


ABG O2 Content   15.7 


 


ABG Base Excess   -4.4 L 


 


Sb Test   Positive 


 


O2 Delivery Device   Mech vent 


 


Oxygen Flow Rate   70% 


 


Vent Mode   A/c 


 


Vent Rate   16 


 


Mechanical Rate   Yes 


 


PEEP   5.0 


 


Pressure Support Vent   450 


 


Sodium   


 


Potassium   


 


Chloride   


 


Carbon Dioxide   


 


Anion Gap   


 


BUN   


 


Creatinine   


 


Creat Clearance w eGFR   


 


POC Glucometer   


 


Random Glucose   


 


Lactic Acid   


 


Calcium   


 


Phosphorus   


 


Magnesium   


 


Total Bilirubin   


 


AST   


 


ALT   


 


Alkaline Phosphatase   


 


Creatine Kinase   


 


Troponin I   


 


B-Natriuretic Peptide   


 


Total Protein   


 


Albumin   


 


Total Amylase   


 


Lipase   


 


Urine Color   


 


Urine Appearance   


 


Urine pH   


 


Ur Specific Gravity   


 


Urine Protein   


 


Urine Glucose (UA)   


 


Urine Ketones   


 


Urine Blood   


 


Urine Nitrite   


 


Urine Bilirubin   


 


Urine Urobilinogen   


 


Ur Leukocyte Esterase   


 


Blood Type   


 


Antibody Screen   


 


Crossmatch   














  02/14/17 02/14/17 02/14/17





  05:00 05:00 05:00


 


WBC   


 


RBC   


 


Hgb   


 


Hct   


 


MCV   


 


MCHC   


 


RDW   


 


Plt Count   


 


MPV   


 


Neutrophils %   


 


Lymphocytes %   


 


Monocytes %   


 


Eosinophils %   


 


Basophils %   


 


Band Neutrophils   


 


Nucleated RBCs   


 


Platelet Estimate   


 


Platelet Comment   


 


INR    1.86 H


 


PTT (Actin FS)    39.6 H


 


Fibrinogen   


 


Fibrin Degrad Products   


 


D-Dimer   


 


Puncture Site   


 


ABG pH   


 


ABG pCO2 at Pt Temp   


 


ABG pO2 at Pt Temp   


 


ABG HCO3   


 


ABG O2 Sat (Measured)   


 


ABG O2 Content   


 


ABG Base Excess   


 


Sb Test   


 


O2 Delivery Device   


 


Oxygen Flow Rate   


 


Vent Mode   


 


Vent Rate   


 


Mechanical Rate   


 


PEEP   


 


Pressure Support Vent   


 


Sodium  141  


 


Potassium  2.9 L*  


 


Chloride  105  


 


Carbon Dioxide  24  


 


Anion Gap  12  


 


BUN  39 H  


 


Creatinine  1.6 H  


 


Creat Clearance w eGFR  41.29  


 


POC Glucometer   


 


Random Glucose  301 H* D  


 


Lactic Acid   2.297 H* 


 


Calcium  7.0 L  


 


Phosphorus   


 


Magnesium   


 


Total Bilirubin  3.2 H  


 


AST  156 H  


 


ALT  121 H D  


 


Alkaline Phosphatase  235 H D  


 


Creatine Kinase   


 


Troponin I   


 


B-Natriuretic Peptide   


 


Total Protein  4.3 L  


 


Albumin  1.9 L  


 


Total Amylase   


 


Lipase   


 


Urine Color   


 


Urine Appearance   


 


Urine pH   


 


Ur Specific Gravity   


 


Urine Protein   


 


Urine Glucose (UA)   


 


Urine Ketones   


 


Urine Blood   


 


Urine Nitrite   


 


Urine Bilirubin   


 


Urine Urobilinogen   


 


Ur Leukocyte Esterase   


 


Blood Type   


 


Antibody Screen   


 


Crossmatch   














  02/14/17 02/14/17





  05:00 05:00


 


WBC  


 


RBC  


 


Hgb  


 


Hct  


 


MCV  


 


MCHC  


 


RDW  


 


Plt Count  


 


MPV  


 


Neutrophils %  


 


Lymphocytes %  


 


Monocytes %  


 


Eosinophils %  


 


Basophils %  


 


Band Neutrophils  


 


Nucleated RBCs  


 


Platelet Estimate  


 


Platelet Comment  


 


INR  


 


PTT (Actin FS)  


 


Fibrinogen  


 


Fibrin Degrad Products  


 


D-Dimer  


 


Puncture Site  


 


ABG pH  


 


ABG pCO2 at Pt Temp  


 


ABG pO2 at Pt Temp  


 


ABG HCO3  


 


ABG O2 Sat (Measured)  


 


ABG O2 Content  


 


ABG Base Excess  


 


Sb Test  


 


O2 Delivery Device  


 


Oxygen Flow Rate  


 


Vent Mode  


 


Vent Rate  


 


Mechanical Rate  


 


PEEP  


 


Pressure Support Vent  


 


Sodium  


 


Potassium  


 


Chloride  


 


Carbon Dioxide  


 


Anion Gap  


 


BUN  


 


Creatinine  


 


Creat Clearance w eGFR  


 


POC Glucometer  


 


Random Glucose  


 


Lactic Acid  


 


Calcium  


 


Phosphorus  2.1 L D 


 


Magnesium  1.8 


 


Total Bilirubin  


 


AST  


 


ALT  


 


Alkaline Phosphatase  


 


Creatine Kinase   101


 


Troponin I   0.06 H


 


B-Natriuretic Peptide  


 


Total Protein  


 


Albumin  


 


Total Amylase  


 


Lipase  


 


Urine Color  


 


Urine Appearance  


 


Urine pH  


 


Ur Specific Gravity  


 


Urine Protein  


 


Urine Glucose (UA)  


 


Urine Ketones  


 


Urine Blood  


 


Urine Nitrite  


 


Urine Bilirubin  


 


Urine Urobilinogen  


 


Ur Leukocyte Esterase  


 


Blood Type  


 


Antibody Screen  


 


Crossmatch  








 Microbiology





02/12/17 21:40   Urine - Urine Clean Catch   Urine Culture - Final


                            NO GROWTH OBTAINED


02/12/17 21:40   Blood - Peripheral Venous   Blood Culture - Preliminary


                            NO GROWTH OBTAINED AFTER 24 HOURS, INCUBATION TO 

CONTINUE


                            FOR 4 DAYS.


02/12/17 21:40   Blood - Peripheral Venous   Blood Culture - Preliminary


                            NO GROWTH OBTAINED AFTER 24 HOURS, INCUBATION TO 

CONTINUE


                            FOR 4 DAYS.


Imaging


CT Abd/Pelvis 2/13/17: Significantly dilated/enlarged gallbladder with diffuse 

thickening of its wall and possible minimal pericholecystic free fluid 

consistent with acute cholecystitis.





Abd KUB 2/14/17: No evidence of intestinal obstruction. Residual contrast is 

seen in the nondilated colon.





CXR 2/14/17: The tip of ET tube projecting over the bifurcation of the trachea. 

Right vascular catheter is noted, the tip projecting over the right fourth rib. 

Nasogastric tube extending below the EG junction over the region of the 

stomach. No evidence of pneumothorax. Unchanged contour of the 

cardiomediastinal silhouette. The right diaphragm is elevated. No evidence of 

vascular congestion. Chronic postinflammatory changes are noted in the left 

upper lobe region.











Active Medications











Generic Name Dose Route Start Last Admin





  Trade Name Freq  PRN Reason Stop Dose Admin


 


Acetaminophen  650 mg 02/13/17 23:33  





  Tylenol -  PO   





  Q4H PRN   





  FEVER OR PAIN   


 


Albuterol/Ipratropium  1 amp 02/13/17 23:33  





  Duoneb -  NEB   





  Q4H PRN   





  SHORTNESS OF BREATH   


 


Albuterol/Ipratropium  1 amp 02/14/17 10:00  





  Duoneb -  NEB   





  BID LAUREN   


 


Fentanyl 500 mcg/ Dextrose  100 mls @ 10 mls/hr 02/13/17 22:30 02/14/17 02:39





  IVPB 02/14/17 22:29  10 mls/hr





  TITR LAUREN   Administration





  50 MCG/HR   


 


Phenylephrine HCl 10,000 mcg/  500 mls @ 120 mls/hr 02/13/17 22:30 02/13/17 22:

30





  Dextrose  IV   120 mls/hr





  TITR LAUREN   Administration





  Protocol   





  40 MCG/MIN   


 


Metronidazole  100 mls @ 100 mls/hr 02/14/17 08:00 02/14/17 09:00





  Flagyl 500mg Premixed Ivpb -  IVPB   100 mls/hr





  Q8H-IV LAUREN   Administration


 


Sodium Chloride  1,000 mls @ 100 mls/hr 02/13/17 23:33 02/13/17 23:40





  Normal Saline -  IV   100 mls/hr





  ASDIR LAUREN   Administration


 


Norepinephrine Bitartrate 8,  500 mls @ 18.75 mls/hr 02/14/17 00:30 02/14/17 00:

30





  000 mcg/ Dextrose  IV   18.75 mls/hr





  TITR LAUREN   Administration





  Protocol   





  5 MCG/MIN   


 


Pantoprazole Sodium  100 mls @ 200 mls/hr 02/14/17 10:00 02/14/17 09:01





  Protonix 40mg Ivpb (Pre-Docked)  IVPB   Not Given





  BID LAUREN   


 


Pantoprazole Sodium 80 mg/  100 mls @ 10 mls/hr 02/14/17 00:45 02/14/17 02:46





  Sodium Chloride  IVPB   10 mls/hr





  Q10H LAUREN   Administration





  8 MG/HR   


 


Lactated Ringer's  1,000 mls @ 100 mls/hr 02/14/17 01:15 02/14/17 01:15





  Lactated Ringers Solution  IV   100 mls/hr





  ASDIR LAUREN   Administration


 


Propofol  100 mls @ 2.142 mls/hr 02/14/17 01:45 02/14/17 02:50





  Diprivan -  IVPB   2.142 mls/hr





  TITR LAUREN   Administration





  Protocol   





  5 MCG/KG/MIN   


 


Dopamine HCl/Dextrose  250 mls @ 13.387 mls/hr 02/14/17 07:00 02/14/17 07:48





  Dopamine 400 Mg/D5w -  IVPB   26.773 mls/hr





  TITR LAUREN   Administration





  Protocol   





  5 MCG/KG/MIN   


 


Piperacillin Sod/Tazobactam Sod  3.375 gm 02/14/17 02:00 02/14/17 09:03





  Zosyn 3.375gm Ivpb (Pre-Docked)  IVPB   3.375 gm





  Q8H-IV LAUREN   Administration





  Protocol   











ASSESSMENT/PLAN:


84 y/o M w/ PMH of [place], HTN, HLD, prostate ca, TIA, Kidney cancer s/p 

nephrectomy, COPD presented to ER with abdominal pain (RUQ) for 3 days. 

Admitted for acute cholecystitis. Perc cholecystostomy placed on 2/13/17. Pt 

developed SOB after procedure, was intubated, had central line placed, on 

pressors and sedated and in ICU for septic shock.





-Septic shock secondary to Acute Cholecystitis with possible choledocholithiasis


   -Spiked fever of 103.1 last night. (repeat BCx sent); WBC 33.5


   -On pressors: norepi and dopamine, titrate to keep MAP > 65


   -CT abd/pelvis: Significantly dilated/enlarged gallbladder with diffuse 

thickening of its wall and possible minimal pericholecystic free fluid 

consistent with acute cholecystitis.


   -LFTs trending up, f/u LFTs


   -Lactic acid trending down: now at 2.3


   -Lipase wnl 162


   -Abx : imipenem/flagyl as per ID


   -f/u repeat BCx


   -Surgery consulted


   -LR @ 100 ml/hr to keep MAP >65


   -zofran 4 mg iv q4h prn





-Acute hypoxic respiratory failure


   -Sedated on propofol and fentanyl


   -Intubated, on vent


   -ABG in AM


   -ABG today: 7.34/39/120





-Upper GI bleed


   -OG tube in place, not currently draining anything


   -Initially 1300 ml of coffee ground emesis reported from OG tube.





-Elevated trops


   -mildly elevated


   -most likely secondary to demand ischemia from septic shock





-Transaminitis


   -Secondary to septic shock


   -INR elevated as well


   -Monitor LFTs





-SANGITA secondary to septic shock


   -BUN/Cr 39/1.6


   -Producing urine at the moment


   -monitor urine output, BUN/Cr





-Possible DIC secondary to septic shock


   -fibrinogen low, firbinogen degradation products elevated, d-dimer elevated





-Hyperglycemia


   -out of range BGM; f/u CMP that was drawn near time of BGM


   -10 units novolog sq ordered once





-HTN


   -hold anti-hypertensives in light of septic shock





-[place]


   -Intubated, on vent





-Hx of TIA


   -asa held





-HLD


   -held lipitor 10 mg PO qhs





-insomnia


   -held melatonin 5 mg po qhs prn





-BPH


   -held flomax 0.8 mg po qd


   -held finasteride 5 mg po qd





-DVT ppx


   -SCDs; heparin held due to coffee ground emesis/upper gi bleed





-FEN


   -LR @ 100 ml/hr


   -hypokalemia - repleted with 3x 10 meq potassium chloride. f/u lytes


   -phos low at 2.1 - repleted with 10 mmol sodium phosphate. monitor phos, mg.


   -Clear liquid diet; NPO after midnight





-Dispo:


   -Monitor on floors





Problem List





- Problems


(1) COPD (chronic obstructive pulmonary disease)


Code(s): J44.9 - CHRONIC OBSTRUCTIVE PULMONARY DISEASE, UNSPECIFIED   Qualifiers

: 


     COPD type: unspecified COPD        Qualified Code(s): J44.9 - Chronic 

obstructive pulmonary disease, unspecified  





(2) Cholecystitis, acute


Code(s): K81.0 - ACUTE CHOLECYSTITIS





(3) Fever


Code(s): R50.9 - FEVER, UNSPECIFIED   Qualifiers: 


     Fever type: other        Qualified Code(s): R50.81 - Fever presenting with 

conditions classified elsewhere  





(4) Leukocytosis


Code(s): D72.829 - ELEVATED WHITE BLOOD CELL COUNT, UNSPECIFIED   Qualifiers: 


     Leukocytosis type: unspecified        Qualified Code(s): D72.829 - 

Elevated white blood cell count, unspecified  





(5) Sleep apnea


Code(s): G47.30 - SLEEP APNEA, UNSPECIFIED   





(6) Acute urinary retention


Code(s): R33.8 - OTHER RETENTION OF URINE





(7) Insomnia


Code(s): G47.00 - INSOMNIA, UNSPECIFIED   





(8) HTN (hypertension)


Code(s): I10 - ESSENTIAL (PRIMARY) HYPERTENSION   Qualifiers: 


     Hypertension type: essential hypertension        Qualified Code(s): I10 - 

Essential (primary) hypertension  





(9) HLD (hyperlipidemia)


Code(s): E78.5 - HYPERLIPIDEMIA, UNSPECIFIED   Qualifiers: 


     Hyperlipidemia type: pure hypercholesterolemia        Qualified Code(s): 

E78.0 - Pure hypercholesterolemia  





(10) BPH (benign prostatic hyperplasia)


Code(s): N40.0 - BENIGN PROSTATIC HYPERPLASIA WITHOUT LOWER URINRY TRACT SYMP   





(11) Septic shock


Code(s): A41.9 - SEPSIS, UNSPECIFIED ORGANISM


R65.21 - SEVERE SEPSIS WITH SEPTIC SHOCK





(12) Acute respiratory failure with hypoxia


Code(s): J96.01 - ACUTE RESPIRATORY FAILURE WITH HYPOXIA





(13) SANGITA (acute kidney injury)


Code(s): N17.9 - ACUTE KIDNEY FAILURE, UNSPECIFIED





(14) Transaminitis


Code(s): R74.0 - NONSPEC ELEV OF LEVELS OF TRANSAMNS & LACTIC ACID DEHYDRGNSE





(15) Upper GI bleed


Code(s): K92.2 - GASTROINTESTINAL HEMORRHAGE, UNSPECIFIED





(16) Elevated troponin


Code(s): R79.89 - OTHER SPECIFIED ABNORMAL FINDINGS OF BLOOD CHEMISTRY





(17) Demand ischemia


Code(s): I24.8 - OTHER FORMS OF ACUTE ISCHEMIC HEART DISEASE





(18) DIC (disseminated intravascular coagulation)


Code(s): D65 - DISSEMINATED INTRAVASCULAR COAGULATION





(19) Hypophosphatemia


Code(s): E83.39 - OTHER DISORDERS OF PHOSPHORUS METABOLISM





(20) Hypokalemia


Code(s): E87.6 - HYPOKALEMIA








Visit type





- Emergency Visit


Emergency Visit: Yes


ED Registration Date: 02/13/17


Care time: The patient presented to the Emergency Department on the above date 

and was hospitalized for further evaluation of their emergent condition.





- New Patient


This patient is new to me today: No





- Critical Care


Critical Care patient: Yes


Total Critical Care Time (in minutes): 39


Critical Care Statement: The care of this patient involved high complexity 

decision making to prevent further life threatening deterioration of the patient

's condition and/or to evalute & treat vital organ system(s) failure or risk of 

failure.





<Fabian Hale - Last Filed: 02/14/17 15:45>


Physical Exam: 


ATTENDING PHYSICIAN STATEMENT





I saw and evaluated the patient.


I reviewed the resident's note and discussed the case with the resident.


I agree with the resident's findings and plan as documented.








SUBJECTIVE:


seen and evaluated at the bedside





OBJECTIVE:


intubated and sedated





ASSESSMENT AND PLAN:


85 year old man admitted for sepsis due to acute cholecystitis





Sepsis


-s/p drainage by IR


-pt currently intubated and sedated on levophed and dopamine in septic shock


-cont zosyn as per ID recs


-vent management as per pulm





Acute blood loss anemia due to upper GI Bleed


--pt currently intubated and sedated 


-on protonix drip


-trend Hb


-follow up with GI attending

## 2017-02-14 NOTE — PN
Progress Note (short form)





- Note


Progress Note: 


Consult dictated:


85M with suspected ? acalculous cholecystitis s/p cholecystotstomy drain


Worsened clinically last night with coffee grounds. now intubated on pressors





On exam:


hypoactive bowelsounds, no grimacing upon palpation











Imp:


suspected sepsis secondary to cholecystitis


current clinical condition unlikely from primary GI bleed event: ? sstress 

gastritis / ? retained gastrointestinal content secondary to stasis from sepsis





Plan:


ID following


Surgery following


Ordered bedside US 


BID protonix for now


Will review cholecystostomy films with Dr. Watson to see if CBD was adequately 

evaluated on this exam

## 2017-02-14 NOTE — PN
Progress Note (short form)





- Note


Progress Note: 


reviewed cholangiogram feom cholecystostomy with dr. becerra. contrasr beiskly 

traveled through cbd into small bowel. No obvious biliary obstruction noted. 

can have formal cholangiogram when more stable. in terms of timing of drop in h/

h and coffee ground emesis, will continue to observe. if melena develops, 

continued drop in h/h, if patient stable enough egd could be performed to 

exclude bleeding source such as hemobilia given transhepatic cholecystostomy in 

setting of anticoagulation /elevated inr/ low fibeinogen. would correct 

coagulopathy, hold a/c for now. ordered repeat cbc.

## 2017-02-14 NOTE — PN
Physical Exam: 


SUBJECTIVE: 





Patient seen and examined at bedside in ICU. Pt was on vent and sedated. He was 

awake, alert at one point in the AM even though under sedation and able to open 

eyes and follow commands. When asked, he denied pain. Per nurse, no acute event 

since transfer to ICU.











OBJECTIVE:





 Vital Signs











 Period  Temp  Pulse  Resp  BP Sys/Yun  Pulse Ox


 


 Last 24 Hr  97 F-103.1 F    12-36  /32-87  90-94











GENERAL: The patient is sedated and on mechanical ventilation


EYES: bilateral pin point pupils, non-reactive


NECK: triple lumen central line in place 


LUNGS: CTAB


HEART: Regular rate and rhythm, S1, S2 without murmur, rub or gallop.


ABDOMEN: Soft, RUQ tenderness, nondistended, hypoactive bowel sounds. RUQ 

percutaneous drain in place


EXTREMITIES: no edema. 


NEUROLOGICAL: unable to assess


: muñiz in place, clear urine output














 Laboratory Results - last 24 hr











  02/13/17 02/13/17 02/13/17





  06:50 21:09 21:14


 


WBC   


 


RBC   


 


Hgb   


 


Hct   


 


MCV   


 


MCHC   


 


RDW   


 


Plt Count   


 


MPV   


 


Neutrophils %   


 


Lymphocytes %   


 


Monocytes %   


 


Eosinophils %   


 


Basophils %   


 


Band Neutrophils   


 


Nucleated RBCs   


 


Platelet Estimate   


 


Platelet Comment   


 


INR   


 


PTT (Actin FS)   


 


Fibrinogen   


 


Fibrin Degrad Products   


 


D-Dimer   


 


Puncture Site   Right radial 


 


ABG pH   7.54 H 


 


ABG pCO2 at Pt Temp   21.6 L 


 


ABG pO2 at Pt Temp   57.3 L 


 


ABG HCO3   18.5 L 


 


ABG O2 Sat (Measured)   92.5 


 


ABG O2 Content   17.3 


 


ABG Base Excess   -1.9 


 


Sb Test   Positive 


 


O2 Delivery Device   Bipap 


 


Oxygen Flow Rate   50% 


 


Vent Mode   S/t 


 


Vent Rate   12 


 


Mechanical Rate   


 


PEEP   


 


Pressure Support Vent   10/4 


 


Sodium   


 


Potassium   


 


Chloride   


 


Carbon Dioxide   


 


Anion Gap   


 


BUN   


 


Creatinine   


 


Creat Clearance w eGFR   


 


POC Glucometer    119


 


Random Glucose   


 


Lactic Acid   


 


Calcium   


 


Phosphorus   


 


Magnesium   


 


Total Bilirubin   


 


AST   


 


ALT   


 


Alkaline Phosphatase   


 


Creatine Kinase   


 


Troponin I   


 


B-Natriuretic Peptide   


 


Total Protein   


 


Albumin   


 


Total Amylase   


 


Lipase   


 


Urine Color   


 


Urine Appearance   


 


Urine pH   


 


Ur Specific Gravity   


 


Urine Protein   


 


Urine Glucose (UA)   


 


Urine Ketones   


 


Urine Blood   


 


Urine Nitrite   


 


Urine Bilirubin   


 


Urine Urobilinogen   


 


Ur Leukocyte Esterase   


 


Blood Type  A POSITIVE  


 


Antibody Screen  Negative  


 


Crossmatch  See Detail  














  02/13/17 02/13/17 02/13/17





  22:46 22:46 22:46


 


WBC   


 


RBC   


 


Hgb   


 


Hct   


 


MCV   


 


MCHC   


 


RDW   


 


Plt Count   


 


MPV   


 


Neutrophils %   


 


Lymphocytes %   


 


Monocytes %   


 


Eosinophils %   


 


Basophils %   


 


Band Neutrophils   


 


Nucleated RBCs   


 


Platelet Estimate   


 


Platelet Comment   


 


INR   


 


PTT (Actin FS)   


 


Fibrinogen   


 


Fibrin Degrad Products   


 


D-Dimer   


 


Puncture Site   


 


ABG pH   


 


ABG pCO2 at Pt Temp   


 


ABG pO2 at Pt Temp   


 


ABG HCO3   


 


ABG O2 Sat (Measured)   


 


ABG O2 Content   


 


ABG Base Excess   


 


Sb Test   


 


O2 Delivery Device   


 


Oxygen Flow Rate   


 


Vent Mode   


 


Vent Rate   


 


Mechanical Rate   


 


PEEP   


 


Pressure Support Vent   


 


Sodium  144  


 


Potassium  3.5  


 


Chloride  106  


 


Carbon Dioxide  21  


 


Anion Gap  17 H  


 


BUN  42 H  


 


Creatinine  1.8 H D  


 


Creat Clearance w eGFR  36.04  


 


POC Glucometer   


 


Random Glucose  104  


 


Lactic Acid   


 


Calcium  7.4 L  


 


Phosphorus   


 


Magnesium   


 


Total Bilirubin  3.0 H D  


 


AST  131 H D  


 


ALT  84 H D  


 


Alkaline Phosphatase  307 H D  


 


Creatine Kinase   


 


Troponin I   


 


B-Natriuretic Peptide   2683.01 H 


 


Total Protein  4.6 L D  


 


Albumin  2.2 L  


 


Total Amylase    79


 


Lipase   


 


Urine Color   


 


Urine Appearance   


 


Urine pH   


 


Ur Specific Gravity   


 


Urine Protein   


 


Urine Glucose (UA)   


 


Urine Ketones   


 


Urine Blood   


 


Urine Nitrite   


 


Urine Bilirubin   


 


Urine Urobilinogen   


 


Ur Leukocyte Esterase   


 


Blood Type   


 


Antibody Screen   


 


Crossmatch   














  02/13/17 02/13/17 02/13/17





  22:46 22:46 22:50


 


WBC    13.8 H


 


RBC    4.31


 


Hgb    12.9


 


Hct    38.1


 


MCV    88.5


 


MCHC    33.7


 


RDW    15.0


 


Plt Count    241


 


MPV    8.0


 


Neutrophils %    94.0 H


 


Lymphocytes %    3.0 L D


 


Monocytes %   


 


Eosinophils %   


 


Basophils %   


 


Band Neutrophils    3.0  D


 


Nucleated RBCs    1 H


 


Platelet Estimate    Adequate


 


Platelet Comment    Few large plts


 


INR   


 


PTT (Actin FS)   


 


Fibrinogen   


 


Fibrin Degrad Products   


 


D-Dimer   


 


Puncture Site   


 


ABG pH   


 


ABG pCO2 at Pt Temp   


 


ABG pO2 at Pt Temp   


 


ABG HCO3   


 


ABG O2 Sat (Measured)   


 


ABG O2 Content   


 


ABG Base Excess   


 


Sb Test   


 


O2 Delivery Device   


 


Oxygen Flow Rate   


 


Vent Mode   


 


Vent Rate   


 


Mechanical Rate   


 


PEEP   


 


Pressure Support Vent   


 


Sodium   


 


Potassium   


 


Chloride   


 


Carbon Dioxide   


 


Anion Gap   


 


BUN   


 


Creatinine   


 


Creat Clearance w eGFR   


 


POC Glucometer   


 


Random Glucose   


 


Lactic Acid   


 


Calcium   


 


Phosphorus   


 


Magnesium   


 


Total Bilirubin   


 


AST   


 


ALT   


 


Alkaline Phosphatase   


 


Creatine Kinase   


 


Troponin I   


 


B-Natriuretic Peptide   


 


Total Protein   


 


Albumin   


 


Total Amylase   


 


Lipase   162 


 


Urine Color  Yellow  


 


Urine Appearance  Clear  


 


Urine pH  5.0  


 


Ur Specific Gravity  1.016  


 


Urine Protein  Negative  


 


Urine Glucose (UA)  Negative  


 


Urine Ketones  Trace H  


 


Urine Blood  Negative  


 


Urine Nitrite  Negative  


 


Urine Bilirubin  Negative  


 


Urine Urobilinogen  Negative  


 


Ur Leukocyte Esterase  Negative  


 


Blood Type   


 


Antibody Screen   


 


Crossmatch   














  02/13/17 02/13/17 02/13/17





  22:50 22:50 22:50


 


WBC   


 


RBC   


 


Hgb   


 


Hct   


 


MCV   


 


MCHC   


 


RDW   


 


Plt Count   


 


MPV   


 


Neutrophils %   


 


Lymphocytes %   


 


Monocytes %   


 


Eosinophils %   


 


Basophils %   


 


Band Neutrophils   


 


Nucleated RBCs   


 


Platelet Estimate   


 


Platelet Comment   


 


INR   2.32 H D 


 


PTT (Actin FS)   36.9 H 


 


Fibrinogen   


 


Fibrin Degrad Products   


 


D-Dimer   


 


Puncture Site   


 


ABG pH   


 


ABG pCO2 at Pt Temp   


 


ABG pO2 at Pt Temp   


 


ABG HCO3   


 


ABG O2 Sat (Measured)   


 


ABG O2 Content   


 


ABG Base Excess   


 


Sb Test   


 


O2 Delivery Device   


 


Oxygen Flow Rate   


 


Vent Mode   


 


Vent Rate   


 


Mechanical Rate   


 


PEEP   


 


Pressure Support Vent   


 


Sodium   


 


Potassium   


 


Chloride   


 


Carbon Dioxide   


 


Anion Gap   


 


BUN   


 


Creatinine   


 


Creat Clearance w eGFR   


 


POC Glucometer   


 


Random Glucose   


 


Lactic Acid  6.778 H*  


 


Calcium   


 


Phosphorus   


 


Magnesium   


 


Total Bilirubin    3.0 H


 


AST   


 


ALT   


 


Alkaline Phosphatase   


 


Creatine Kinase   


 


Troponin I   


 


B-Natriuretic Peptide   


 


Total Protein   


 


Albumin   


 


Total Amylase   


 


Lipase   


 


Urine Color   


 


Urine Appearance   


 


Urine pH   


 


Ur Specific Gravity   


 


Urine Protein   


 


Urine Glucose (UA)   


 


Urine Ketones   


 


Urine Blood   


 


Urine Nitrite   


 


Urine Bilirubin   


 


Urine Urobilinogen   


 


Ur Leukocyte Esterase   


 


Blood Type   


 


Antibody Screen   


 


Crossmatch   














  02/13/17 02/13/17 02/13/17





  22:50 23:00 23:01


 


WBC   


 


RBC   


 


Hgb   


 


Hct   


 


MCV   


 


MCHC   


 


RDW   


 


Plt Count   


 


MPV   


 


Neutrophils %   


 


Lymphocytes %   


 


Monocytes %   


 


Eosinophils %   


 


Basophils %   


 


Band Neutrophils   


 


Nucleated RBCs   


 


Platelet Estimate   


 


Platelet Comment   


 


INR   


 


PTT (Actin FS)   


 


Fibrinogen   


 


Fibrin Degrad Products   


 


D-Dimer   


 


Puncture Site   Right radial 


 


ABG pH   7.33 L D 


 


ABG pCO2 at Pt Temp   38.4  D 


 


ABG pO2 at Pt Temp   71.1  D 


 


ABG HCO3   19.5 L 


 


ABG O2 Sat (Measured)   92.3 


 


ABG O2 Content   14.8 L 


 


ABG Base Excess   -5.4 L 


 


Sb Test   Positive 


 


O2 Delivery Device   Mech vent 


 


Oxygen Flow Rate   75% 


 


Vent Mode   A/c 


 


Vent Rate   16 


 


Mechanical Rate   Yes 


 


PEEP   5.0 


 


Pressure Support Vent   450 


 


Sodium   


 


Potassium   


 


Chloride   


 


Carbon Dioxide   


 


Anion Gap   


 


BUN   


 


Creatinine   


 


Creat Clearance w eGFR   


 


POC Glucometer    120.14814


 


Random Glucose   


 


Lactic Acid   


 


Calcium   


 


Phosphorus  1.7 L D  


 


Magnesium  1.9  


 


Total Bilirubin   


 


AST   


 


ALT   


 


Alkaline Phosphatase   


 


Creatine Kinase  102  


 


Troponin I  0.06 H D  


 


B-Natriuretic Peptide   


 


Total Protein   


 


Albumin   


 


Total Amylase   


 


Lipase   


 


Urine Color   


 


Urine Appearance   


 


Urine pH   


 


Ur Specific Gravity   


 


Urine Protein   


 


Urine Glucose (UA)   


 


Urine Ketones   


 


Urine Blood   


 


Urine Nitrite   


 


Urine Bilirubin   


 


Urine Urobilinogen   


 


Ur Leukocyte Esterase   


 


Blood Type   


 


Antibody Screen   


 


Crossmatch   














  02/14/17 02/14/17 02/14/17





  01:35 01:35 02:00


 


WBC   22.6 H D 


 


RBC   3.62 L 


 


Hgb   10.8 L D 


 


Hct   32.1 L D 


 


MCV   88.7 


 


MCHC   33.8 


 


RDW   15.0 


 


Plt Count   225 


 


MPV   8.4 


 


Neutrophils %   95.9 H 


 


Lymphocytes %   1.4 L D 


 


Monocytes %   2.7 L 


 


Eosinophils %   0.0  D 


 


Basophils %   0.0 


 


Band Neutrophils   


 


Nucleated RBCs   


 


Platelet Estimate   


 


Platelet Comment   


 


INR   


 


PTT (Actin FS)   


 


Fibrinogen    < 100.0 L*


 


Fibrin Degrad Products    Greater than 40


 


D-Dimer   


 


Puncture Site   


 


ABG pH   


 


ABG pCO2 at Pt Temp   


 


ABG pO2 at Pt Temp   


 


ABG HCO3   


 


ABG O2 Sat (Measured)   


 


ABG O2 Content   


 


ABG Base Excess   


 


Sb Test   


 


O2 Delivery Device   


 


Oxygen Flow Rate   


 


Vent Mode   


 


Vent Rate   


 


Mechanical Rate   


 


PEEP   


 


Pressure Support Vent   


 


Sodium   


 


Potassium   


 


Chloride   


 


Carbon Dioxide   


 


Anion Gap   


 


BUN   


 


Creatinine   


 


Creat Clearance w eGFR   


 


POC Glucometer   


 


Random Glucose   


 


Lactic Acid  2.888 H*  


 


Calcium   


 


Phosphorus   


 


Magnesium   


 


Total Bilirubin   


 


AST   


 


ALT   


 


Alkaline Phosphatase   


 


Creatine Kinase   


 


Troponin I   


 


B-Natriuretic Peptide   


 


Total Protein   


 


Albumin   


 


Total Amylase   


 


Lipase   


 


Urine Color   


 


Urine Appearance   


 


Urine pH   


 


Ur Specific Gravity   


 


Urine Protein   


 


Urine Glucose (UA)   


 


Urine Ketones   


 


Urine Blood   


 


Urine Nitrite   


 


Urine Bilirubin   


 


Urine Urobilinogen   


 


Ur Leukocyte Esterase   


 


Blood Type   


 


Antibody Screen   


 


Crossmatch   














  02/14/17 02/14/17 02/14/17





  02:00 04:30 05:00


 


WBC    33.5 H* D


 


RBC    3.72 L


 


Hgb    11.1 L


 


Hct    32.7 L


 


MCV    87.9


 


MCHC    33.9


 


RDW    15.2


 


Plt Count    226


 


MPV    8.1


 


Neutrophils %    94.0 H


 


Lymphocytes %    1.5 L


 


Monocytes %    4.4


 


Eosinophils %    0.0


 


Basophils %    0.1  D


 


Band Neutrophils   


 


Nucleated RBCs   


 


Platelet Estimate   


 


Platelet Comment   


 


INR   


 


PTT (Actin FS)   


 


Fibrinogen   


 


Fibrin Degrad Products   


 


D-Dimer  4753 H  


 


Puncture Site   Right radial 


 


ABG pH   7.34 L 


 


ABG pCO2 at Pt Temp   39.2 


 


ABG pO2 at Pt Temp   120.0 H D 


 


ABG HCO3   20.5 L 


 


ABG O2 Sat (Measured)   98.1 


 


ABG O2 Content   15.7 


 


ABG Base Excess   -4.4 L 


 


Sb Test   Positive 


 


O2 Delivery Device   Mech vent 


 


Oxygen Flow Rate   70% 


 


Vent Mode   A/c 


 


Vent Rate   16 


 


Mechanical Rate   Yes 


 


PEEP   5.0 


 


Pressure Support Vent   450 


 


Sodium   


 


Potassium   


 


Chloride   


 


Carbon Dioxide   


 


Anion Gap   


 


BUN   


 


Creatinine   


 


Creat Clearance w eGFR   


 


POC Glucometer   


 


Random Glucose   


 


Lactic Acid   


 


Calcium   


 


Phosphorus   


 


Magnesium   


 


Total Bilirubin   


 


AST   


 


ALT   


 


Alkaline Phosphatase   


 


Creatine Kinase   


 


Troponin I   


 


B-Natriuretic Peptide   


 


Total Protein   


 


Albumin   


 


Total Amylase   


 


Lipase   


 


Urine Color   


 


Urine Appearance   


 


Urine pH   


 


Ur Specific Gravity   


 


Urine Protein   


 


Urine Glucose (UA)   


 


Urine Ketones   


 


Urine Blood   


 


Urine Nitrite   


 


Urine Bilirubin   


 


Urine Urobilinogen   


 


Ur Leukocyte Esterase   


 


Blood Type   


 


Antibody Screen   


 


Crossmatch   














  02/14/17 02/14/17 02/14/17





  05:00 05:00 05:00


 


WBC   


 


RBC   


 


Hgb   


 


Hct   


 


MCV   


 


MCHC   


 


RDW   


 


Plt Count   


 


MPV   


 


Neutrophils %   


 


Lymphocytes %   


 


Monocytes %   


 


Eosinophils %   


 


Basophils %   


 


Band Neutrophils   


 


Nucleated RBCs   


 


Platelet Estimate   


 


Platelet Comment   


 


INR    1.86 H


 


PTT (Actin FS)    39.6 H


 


Fibrinogen   


 


Fibrin Degrad Products   


 


D-Dimer   


 


Puncture Site   


 


ABG pH   


 


ABG pCO2 at Pt Temp   


 


ABG pO2 at Pt Temp   


 


ABG HCO3   


 


ABG O2 Sat (Measured)   


 


ABG O2 Content   


 


ABG Base Excess   


 


Sb Test   


 


O2 Delivery Device   


 


Oxygen Flow Rate   


 


Vent Mode   


 


Vent Rate   


 


Mechanical Rate   


 


PEEP   


 


Pressure Support Vent   


 


Sodium  141  


 


Potassium  2.9 L*  


 


Chloride  105  


 


Carbon Dioxide  24  


 


Anion Gap  12  


 


BUN  39 H  


 


Creatinine  1.6 H  


 


Creat Clearance w eGFR  41.29  


 


POC Glucometer   


 


Random Glucose  301 H* D  


 


Lactic Acid   2.297 H* 


 


Calcium  7.0 L  


 


Phosphorus   


 


Magnesium   


 


Total Bilirubin  3.2 H  


 


AST  156 H  


 


ALT  121 H D  


 


Alkaline Phosphatase  235 H D  


 


Creatine Kinase   


 


Troponin I   


 


B-Natriuretic Peptide   


 


Total Protein  4.3 L  


 


Albumin  1.9 L  


 


Total Amylase   


 


Lipase   


 


Urine Color   


 


Urine Appearance   


 


Urine pH   


 


Ur Specific Gravity   


 


Urine Protein   


 


Urine Glucose (UA)   


 


Urine Ketones   


 


Urine Blood   


 


Urine Nitrite   


 


Urine Bilirubin   


 


Urine Urobilinogen   


 


Ur Leukocyte Esterase   


 


Blood Type   


 


Antibody Screen   


 


Crossmatch   














  02/14/17 02/14/17 02/14/17





  05:00 05:00 12:00


 


WBC   


 


RBC   


 


Hgb   


 


Hct   


 


MCV   


 


MCHC   


 


RDW   


 


Plt Count   


 


MPV   


 


Neutrophils %   


 


Lymphocytes %   


 


Monocytes %   


 


Eosinophils %   


 


Basophils %   


 


Band Neutrophils   


 


Nucleated RBCs   


 


Platelet Estimate   


 


Platelet Comment   


 


INR   


 


PTT (Actin FS)   


 


Fibrinogen   


 


Fibrin Degrad Products   


 


D-Dimer   


 


Puncture Site   


 


ABG pH   


 


ABG pCO2 at Pt Temp   


 


ABG pO2 at Pt Temp   


 


ABG HCO3   


 


ABG O2 Sat (Measured)   


 


ABG O2 Content   


 


ABG Base Excess   


 


Sb Test   


 


O2 Delivery Device   


 


Oxygen Flow Rate   


 


Vent Mode   


 


Vent Rate   


 


Mechanical Rate   


 


PEEP   


 


Pressure Support Vent   


 


Sodium    139


 


Potassium    3.5  D


 


Chloride    105


 


Carbon Dioxide    17 L D


 


Anion Gap    17 H


 


BUN    32 H


 


Creatinine    1.7 H


 


Creat Clearance w eGFR    38.50


 


POC Glucometer   


 


Random Glucose    401 H* D


 


Lactic Acid   


 


Calcium    7.4 L


 


Phosphorus  2.1 L D   2.2 L


 


Magnesium  1.8   1.9


 


Total Bilirubin    2.2 H D


 


AST    108 H D


 


ALT    115 H


 


Alkaline Phosphatase    223 H


 


Creatine Kinase   101 


 


Troponin I   0.06 H 


 


B-Natriuretic Peptide   


 


Total Protein    4.7 L


 


Albumin    1.9 L


 


Total Amylase   


 


Lipase   


 


Urine Color   


 


Urine Appearance   


 


Urine pH   


 


Ur Specific Gravity   


 


Urine Protein   


 


Urine Glucose (UA)   


 


Urine Ketones   


 


Urine Blood   


 


Urine Nitrite   


 


Urine Bilirubin   


 


Urine Urobilinogen   


 


Ur Leukocyte Esterase   


 


Blood Type   


 


Antibody Screen   


 


Crossmatch   








Active Medications











Generic Name Dose Route Start Last Admin





  Trade Name Freq  PRN Reason Stop Dose Admin


 


Acetaminophen  650 mg 02/13/17 23:33  





  Tylenol -  PO   





  Q4H PRN   





  FEVER OR PAIN   


 


Albuterol/Ipratropium  1 amp 02/13/17 23:33  





  Duoneb -  NEB   





  Q4H PRN   





  SHORTNESS OF BREATH   


 


Albuterol/Ipratropium  1 amp 02/14/17 10:00  





  Duoneb -  NEB   





  BID LAUREN   


 


Fentanyl 500 mcg/ Dextrose  100 mls @ 10 mls/hr 02/13/17 22:30 02/14/17 02:39





  IVPB 02/14/17 22:29  10 mls/hr





  TITR LAUREN   Administration





  50 MCG/HR   


 


Phenylephrine HCl 10,000 mcg/  500 mls @ 120 mls/hr 02/13/17 22:30 02/13/17 22:

30





  Dextrose  IV   120 mls/hr





  TITR LAUREN   Administration





  Protocol   





  40 MCG/MIN   


 


Metronidazole  100 mls @ 100 mls/hr 02/14/17 08:00 02/14/17 11:26





  Flagyl 500mg Premixed Ivpb -  IVPB   Not Given





  Q8H-IV LAUREN   


 


Sodium Chloride  1,000 mls @ 100 mls/hr 02/13/17 23:33 02/13/17 23:40





  Normal Saline -  IV   100 mls/hr





  ASDIR LAUREN   Administration


 


Norepinephrine Bitartrate 8,  500 mls @ 18.75 mls/hr 02/14/17 00:30 02/14/17 00:

30





  000 mcg/ Dextrose  IV   18.75 mls/hr





  TITR LAUREN   Administration





  Protocol   





  5 MCG/MIN   


 


Pantoprazole Sodium  100 mls @ 200 mls/hr 02/14/17 10:00 02/14/17 09:01





  Protonix 40mg Ivpb (Pre-Docked)  IVPB   Not Given





  BID LAUREN   


 


Lactated Ringer's  1,000 mls @ 100 mls/hr 02/14/17 01:15 02/14/17 01:15





  Lactated Ringers Solution  IV   100 mls/hr





  ASDIR ALUREN   Administration


 


Propofol  100 mls @ 2.142 mls/hr 02/14/17 01:45 02/14/17 02:50





  Diprivan -  IVPB   2.142 mls/hr





  TITR LARUEN   Administration





  Protocol   





  5 MCG/KG/MIN   


 


Dopamine HCl/Dextrose  250 mls @ 13.387 mls/hr 02/14/17 07:00 02/14/17 07:48





  Dopamine 400 Mg/D5w -  IVPB   26.773 mls/hr





  TITR LAUREN   Administration





  Protocol   





  5 MCG/KG/MIN   


 


Imipenem/Cilastatin Sodium 500  100 mls @ 200 mls/hr 02/14/17 13:00  





  mg/ Sodium Chloride  IVPB   





  Q8H-IV LAUREN   





  Protocol   


 


Sodium Phosphate 10 mm/  253.3333 mls @ 62.5 mls/hr 02/14/17 13:00  





  Dextrose  IVPB 02/14/17 17:03  





  ONCE ONE   








Imaging





KUB: No evidence of intestinal obstruction. Residual contrast is seen in the 

nondilated colon


CXR: no acute pathology





Microbiology





02/13/17 22:41   Blood - Central Line   Blood Culture - Preliminary


                            Pending Organism


02/13/17 22:46   Sputum - Endotrachea Suction/Ventilator   Gram Stain - Final


02/13/17 17:30   Body Fluid - Other   Gram Stain - Final


02/12/17 21:40   Urine - Urine Clean Catch   Urine Culture - Final


                            NO GROWTH OBTAINED


02/12/17 21:40   Blood - Peripheral Venous   Blood Culture - Preliminary


                            NO GROWTH OBTAINED AFTER 24 HOURS, INCUBATION TO 

CONTINUE


                            FOR 4 DAYS.


02/12/17 21:40   Blood - Peripheral Venous   Blood Culture - Preliminary


                            NO GROWTH OBTAINED AFTER 24 HOURS, INCUBATION TO 

CONTINUE


                            FOR 4 DAYS.














ASSESSMENT/PLAN:





86 yo h/o COPD, [place], HTN, HLD, TIA, prostate CA, renal CA now s/p left 

nephrectomy was admitted to the ICU for acute respiratory failure and sepsis 

secondary to acute cholecystitis s/p percutaenous drain.





Neuro


   -cont. sedation


      * will attempt sedation holiday tomorrow





ID


   - septic shock 2/2 acute cholecystitis


   - cont. imipenem and flagyl


   - trend lactate


   - f/u on cultures


   - daily CBC


   


Pulm


   -cont. mechanical ventilation with current settings


      * maintain O2 sat. > 90%


      * will attempt SBT in due course


   -daily ABG and CXR





Cardio


   - cont. levophed and dopamine


   - maintain MAP > 65%


   - maintain CVP 8 -12





GI


   - acute cholecystitis s/p percutaneous drain


   - 


   - cont. PPI drip





Renal/


   - cont. hydration NS 100ml/hr


   - replete electrolytes as needed


   - monitor I/O





Heme 


   - monitor H/H


   - daily coag study


   - 10 units of cryoprecipitate





Prophylaxis


   - DVT: SCD


   - GI: protonix





Nutrition


   - NPO





Dispo: cont. to monitor in ICU





Visit type





- Emergency Visit


Emergency Visit: No





- New Patient


This patient is new to me today: Yes


Date on this admission: 02/14/17





- Critical Care


Critical Care patient: Yes


Total Critical Care Time (in minutes): 45


Critical Care Statement: The care of this patient involved high complexity 

decision making to prevent further life threatening deterioration of the patient

's condition and/or to evalute & treat vital organ system(s) failure or risk of 

failure.





- Discharge Referral


Referred to [facility] No

## 2017-02-14 NOTE — CONS
DATE OF CONSULTATION:

 

DATE OF DICTATION:  2017

 

GASTROINTESTINAL CONSULTATION 

 

REQUESTING PHYSICIAN:  Aunpam Carter D.O. 

 

HISTORY OF PRESENT ILLNESS:  Patient is an 85-year-old male initially admitted to [facility] emergency room on the  for evaluation of intermittent

right upper quadrant pain which was going on for approximately 3 days prior to his

admission, it was associated difficulty of sleeping and eating.  The history was

obtained through the chart, as the patient is currently nonverbal and intubated in

the ICU.  He apparently took Gas-Ex intermittently to alleviate his symptoms and no

relief.  He was on admission noted to have a white blood count of 20.9, elevated

liver chemistries of AST of 16, ALT of 22, alkaline phosphatase 23, and total

bilirubin of 0.9.  He also had a CT scan of the abdomen and pelvis performed at that

time that revealed significantly over-distended gallbladder that measured 16 cm in

length with diffuse thickness of its wall and also revealed an enlarged prostate and

distended gallbladder without thickening of its wall.  Patient was evaluated by

surgery yesterday, was also evaluated by infectious disease given the leukocytosis. 

Surgery advised percutaneous drainage, as he felt he had acute cholecystitis;

however, given his age and multiple medical problems, the risk of cholecystectomy was

high at that time, and he did undergo percutaneous drainage that revealed "infected

bile" by the radiologist.  The hospitalist note states that possibly was aspirated

out of the gallbladder, that 100 mL of pus was drained out of the gallbladder.  He

then was noted to be in respiratory distress.  Last night he had coffee ground emesis

which I was called to evaluate and was intubated.  He is hypotensive.  He is placed

on pressors and transferred to the ICU where he remains on 2 pressors, dopamine and

Talib-Synephrine.  Per his nurse today, about 1 to 1.5 L of dark fluid was suctioned

from his NG tube last night when he was intubated an NG tube was placed.  There has

been no drainage from his NG tube today.  There has been no reported gross rectal

bleeding or melena.  There has been no reported gross hematemesis or blood from his

NG tube.

 

PAST MEDICAL HISTORY:  Includes hypertension, COPD he uses BiPAP at night secondary

to obstructive sleep apnea, hyperlipidemia, TIA, left renal cancer status post

resection, prostate cancer, E. coli sepsis in the summer of  after a prostate

biopsy, epididymitis, adenomatous colon polyps, diverticulosis, history of ischemia

colitis, small bowel  obstruction in 2015, BPH.

 

PAST SURGICAL HISTORY:  Includes appendectomy, right inguinal hernia repair, left

inguinal hernia repair,  neck fusion, left nephrectomy for renal cancer in , left

knee replacement which was a Makoplasty.  

 

MEDICATION:  Prior to admission included finasteride, Flomax, vitamin B, cinnamon

allergenic extract, vitamin E, vitamin D, albuterol, Nasonex, Ecotrin 325 mg aspirin

daily, carvedilol, losartan, and amlodipine.  

 

SOCIAL HISTORY:  He was born in [place].  He is .  Retired furniture

salesman.  Drinks occasional beer.  Stopped smoking in .  No history of

intravenous drug abuse or illicit drugs.

FAMILY HISTORY:  Father had Parkinson disease,  of heart disease at age 67, his

mother  at age 66 of heart disease, she had diabetes mellitus as well.  Eight

siblings, one had breast cancer, another had uterine cancer, another had ovarian

cancer, and a brother had lung cancer.  There is no reported history of GI

malignancies.  

 

ALLERGIES:  No known drug allergies per the chart.

 

REVIEW OF SYSTEMS:  He is unable to give review of systems, as patient is intubated

and sedated on pressors.  There has been no reported rectal bleeding, melena, gross

hematemesis.  Right upper quadrant pain was described prior to me evaluating him and

prior to his percutaneous cholecystostomy placement.  There has been no reported

diarrhea.  He did have fevers during admission.  

 

PHYSICAL EXAMINATION:

General:  The patient is found lying in his ICU bed.  He appeared to be in  no

distress.  ET tube was in place.  He was afebrile with a pulse os 85, blood pressure

105/32.  

HEENT:  Sclerae are mildly icteric.

Neck:  Supple.

Cardiovascular:  Heart regular rate and rhythm.  No murmurs were appreciated.  

Lungs:  Decreased breath sounds at the bases bilaterally.  

Abdomen:   Nondistended.  He did have hypoactive bowel sounds.  There was no tympany

noted.  No hepatosplenomegaly was appreciated.  No masses were palpated.  No hernias

were noted.  In particular, there is no right upper quadrant tenderness.  There was

no grimacing upon palpation.  

Extremities:  No lower extremity edema.  

Rectal:  Digital rectal exam, no external lesions, and he had soft brown stool in the

rectal vault.  No gross melena or blood.  NG tube was lavaged with 500 mL of sterile

water.  He did have some coffee grounds initially in the NG tube, it was clear, and

then there was some coffee ground material towards the end of the lavage.  There was

no gross blood identified.  

 

LABORATORY EVALUATION:  White blood count 33.5, hemoglobin 11.1, hematocrit 32.7,

platelets 226, yesterday his white blood count was 22.6 with a hemoglobin of 10.8. 

Sodium 139, potassium 3.5, chloride 105, bicarbonate 17, BUN 32, creatinine 1.7,

glucose 401, , , alkaline phosphatase 223, with a total bilirubin of

2.2.  Earlier this morning his AST was 156 with an ALT of 121, alkaline phosphatase

235 and total bilirubin of 3.2.  

 

RADIOLOGY REPORTS:  As noted in history of present illness.  He also had an abdominal

x-ray performed today revealing no evidence of intestinal obstruction with residual

contrast seen in the nondilated colon.

 

IMPRESSION:  An 85-year-old male with sepsis likely secondary to acalculous

cholecystitis.  Liver function tests appear to be trending down which is trending

down from this morning.  It appears as though during the cholecystostomy tube

placement a cholangiogram was performed, albeit it may be limited; however, I will

review this with Dr. Evans to see if he can comment more on the common bile duct. 

If not, if he continues to improve clinically a formal cholangiogram through the

cholecystostomy could be performed to further evaluated for pathology within the bile

duct itself; however, for now, would not subject him to endoscopic retrograde

cholangiopancreatography given his current clinical situation.  I would continue to

monitor his liver chemistries, and he is on broad spectrum antibiotics per infectious

disease, and surgery is following as well, and ultimately the patient, if he recovers

from this acute issue, will need to have a cholecystectomy.  I have ordered an

abdominal ultrasound to assess to see if any information can be obtained regarding

the bile duct and to assess for any perihepatic fluid collections.  In terms of his

coffee ground emesis, it is possible that he may have a stress gastritis or that this

could represent retained gastrointestinal content in the setting of sepsis.  His

hemoglobin and hematocrit has remained stable from yesterday to today, and there has

been no overt gastrointestinal bleeding.  As noted, there is no melena on my exam.  I

have stopped the PPI drip and I have changed it to Protonix 40 mg twice daily.  I

have continued to monitor for signs of active ongoing gastrointestinal bleeding,

would avoid nonsteroidal antiinflammatory drugs therapy, and the treatment of his

metabolic derangement per critical care and his primary care team.  We will follow

the patient with you.  I thank you for this consultative opportunity.  

 

 

LEONARDO FERNÁNDEZ DO [id number]

DD: 2017 13:08

DT: 2017 19:58

Job #:  [id number]

## 2017-02-14 NOTE — PN
Progress Note (short form)





- Note


Progress Note: 


surgery





pt seen and examined.  developed fever, septic shock, respiratory failure after 

intervention last night.  currently on high dose levophed and dopamine.  

sedated with propofol and fentanyl.





afebrile, tmax 103, 90/60 on pressors





gram stain of bile pending, cultures pending, bc pending





abd- soft, nt, nd (benign exam), cholecystostomy tube with bile present





Plan- acute cholecystitis s/p perc drainage with septic shock- based on 

physical exam and trashepatic approach unlikely bile peritonitis or injury to 

other viscera.  More likey bacteremia as cause. 





agree with current management.  would repeat ct in several days if no 

improvement.  Eventual cholecystectomy as an outpt if medically a candidate.

## 2017-02-14 NOTE — PROC
Central Line Insertion


Indication: Sepsis, Vasopressor


Risks and Benefits Explained: Yes (To wife, pt intubated)


Consent on Chart: Yes


Central Line: Triple Lumen Catheter


Anesthesia: 1% Lidocaine


Sterile Technique: Yes


Ultrasound Guided Assistance: Yes


Position: Right Internal Jugular


Post Insertion: Yes: Bilateral  Breath Sounds, Bilateral Chest Expansion, Chest 

X-Ray Ordered


Sterile Dressing Applied: Yes


Remarks: 


Good blood flow and flush x 3. Chest xray read pending.

## 2017-02-14 NOTE — PN
Teaching Attending Note


Name of Resident: Zaire Phillips


ATTENDING PHYSICIAN STATEMENT





I saw and evaluated the patient.


I reviewed the resident's note and discussed the case with the resident.


I agree with the resident's findings and plan as documented.








SUBJECTIVE:





Pt seen and examined in the ICU. Pt intubated, sedated on high dose levophed 

and dopamine gtt.





OBJECTIVE:





 Last Vital Signs











Temp Pulse Resp BP Pulse Ox


 


 97.8 F   85   16   105/32   93 L


 


 02/14/17 10:00  02/14/17 12:00  02/14/17 12:02  02/14/17 12:00  02/14/17 11:02








 Intake & Output











 02/11/17 02/12/17 02/13/17 02/14/17





 23:59 23:59 23:59 23:59


 


Intake Total   1250 8119


 


Output Total   650 3000


 


Balance   600 5119


 


Weight  160 lb 157 lb 6.4 oz 174 lb 9.698 oz








Gen:  intubated, sedated


Heart:  RRR


Lung: decreased breath sounds at the bases


Abd: soft, +cholecystostomy tube with bilious drainage


Ext: + edema, warm, well perfused





 CBC, BMP





 02/14/17 05:00 





 INR, PTT











INR  1.86  (0.82-1.09)  H  02/14/17  05:00    


 


Fibrinogen  < 100.0 mg/dL (238-498)  L*  02/14/17  02:00    











Active Medications





Acetaminophen (Tylenol -)  650 mg PO Q4H PRN


   PRN Reason: FEVER OR PAIN


Albuterol/Ipratropium (Duoneb -)  1 amp NEB Q4H PRN


   PRN Reason: SHORTNESS OF BREATH


Albuterol/Ipratropium (Duoneb -)  1 amp NEB BID LAUREN


Fentanyl 500 mcg/ Dextrose  100 mls @ 10 mls/hr IVPB TITR LAUREN


   PRN Reason: 50 MCG/HR


   Stop: 02/14/17 22:29


   Last Admin: 02/14/17 02:39 Dose:  10 mls/hr


Phenylephrine HCl 10,000 mcg/ (Dextrose)  500 mls @ 120 mls/hr IV TITR LAUREN; 40 

MCG/MIN


   PRN Reason: Protocol


   Last Admin: 02/13/17 22:30 Dose:  120 mls/hr


Metronidazole (Flagyl 500mg Premixed Ivpb -)  100 mls @ 100 mls/hr IVPB Q8H-IV 

LAUREN


   Last Admin: 02/14/17 11:26 Dose:  Not Given


Sodium Chloride (Normal Saline -)  1,000 mls @ 100 mls/hr IV ASDIR LAUREN


   Last Admin: 02/13/17 23:40 Dose:  100 mls/hr


Norepinephrine Bitartrate 8, (000 mcg/ Dextrose)  500 mls @ 18.75 mls/hr IV 

TITR LAUREN; 5 MCG/MIN


   PRN Reason: Protocol


   Last Admin: 02/14/17 00:30 Dose:  18.75 mls/hr


Pantoprazole Sodium (Protonix 40mg Ivpb (Pre-Docked))  100 mls @ 200 mls/hr 

IVPB BID LAUREN


   Last Admin: 02/14/17 09:01 Dose:  Not Given


Lactated Ringer's (Lactated Ringers Solution)  1,000 mls @ 100 mls/hr IV ASDIR 

LAUREN


   Last Admin: 02/14/17 01:15 Dose:  100 mls/hr


Propofol (Diprivan -)  100 mls @ 2.142 mls/hr IVPB TITR LAUREN; 5 MCG/KG/MIN


   PRN Reason: Protocol


   Last Admin: 02/14/17 02:50 Dose:  2.142 mls/hr


Dopamine HCl/Dextrose (Dopamine 400 Mg/D5w -)  250 mls @ 13.387 mls/hr IVPB 

TITR LAUREN; 5 MCG/KG/MIN


   PRN Reason: Protocol


   Last Admin: 02/14/17 07:48 Dose:  26.773 mls/hr


Imipenem/Cilastatin Sodium 500 (mg/ Sodium Chloride)  100 mls @ 200 mls/hr IVPB 

Q8H-IV LAUREN


   PRN Reason: Protocol


Potassium Phosphate 20 mm/ (Dextrose)  256.6667 mls @ 62.5 mls/hr IVPB ONCE ONE


   Stop: 02/14/17 16:36


Sodium Phosphate 10 mm/ (Dextrose)  253.3333 mls @ 62.5 mls/hr IVPB ONCE ONE


   Stop: 02/14/17 16:33


Potassium Chloride (Kcl Oral Solution -)  40 meq PO ONCE ONE


   Stop: 02/14/17 12:30








ASSESSMENT AND PLAN:


Acute Cholecystitis


s/p Cholecystostomy Placement


Profound Septic Shock


Acute Hypoxic Respiratory Failure


Acute Kidney Injury


Lactic Acidosis


Demand Ischemia


RCC s/p Left Nephrectomy


r/o DIC


COPD


HTN


Hyperlipidemia





-  antibiotics per ID


-  f/u cultures


-  monitor drainage


-  IVF to keep CVP 8-12


-  titrate pressors to maintain MAP >65


-  echocardiogram 


-  replete lytes


-  sedate for vent synchrony


-  trend lactate, cardiac enzymes


-  transfuse cryo


-  monitor CBC, coags, fibrinogen level


-  continue volume assist control


-  taper FiO2 to keep SpO2 >90%


-  not a candidate for weaning at this time


-  NPO


-  DVT/GI prophylaxis


-  ICU monitoring

## 2017-02-14 NOTE — EKG
Test Reason : 

Blood Pressure : ***/*** mmHG

Vent. Rate : 115 BPM     Atrial Rate : 115 BPM

   P-R Int : 148 ms          QRS Dur : 114 ms

    QT Int : 334 ms       P-R-T Axes : 036 -54 049 degrees

   QTc Int : 462 ms

 

SINUS TACHYCARDIA

INCOMPLETE RIGHT BUNDLE BRANCH BLOCK

LEFT ANTERIOR FASCICULAR BLOCK

MODERATE VOLTAGE CRITERIA FOR LVH, MAY BE NORMAL VARIANT

ABNORMAL ECG

WHEN COMPARED WITH ECG OF 13-FEB-2017 05:55,

LIKELY NO SIGNIFICANT CHANGES

Confirmed by ALLAN HUA MD ([id number]) on 2/14/2017 10:27:38 AM

 

Referred By:             Confirmed By:ALLAN HUA MD

## 2017-02-14 NOTE — MSN
Progress Note (SOAP)





- Subjective


Chief Complaint: 


RUQ pain


History of Present Illness: 


Patient was seen at bedside in the ICU.  Patient was intubated and unable to 

speak, however could respond to yes or no questions.  Patient said he was 

feeling hot.  He denied any pain or NVD.  He has an OG tube placed, and had 

200mL of urine production over the night into his muñiz catheter.  Though he is 

on both fentanyl and propofol and was still able to respond to questions





- Current Medications


Current Medications: 


Active Medications





Acetaminophen (Tylenol -)  650 mg PO Q4H PRN


   PRN Reason: FEVER OR PAIN


Albuterol/Ipratropium (Duoneb -)  1 amp NEB Q4H PRN


   PRN Reason: SHORTNESS OF BREATH


Albuterol/Ipratropium (Duoneb -)  1 amp NEB BID LAUREN


Fentanyl 500 mcg/ Dextrose  100 mls @ 10 mls/hr IVPB TITR LAUREN


   PRN Reason: 50 MCG/HR


   Stop: 02/14/17 22:29


   Last Admin: 02/14/17 02:39 Dose:  10 mls/hr


Phenylephrine HCl 10,000 mcg/ (Dextrose)  500 mls @ 120 mls/hr IV TITR LAUREN; 40 

MCG/MIN


   PRN Reason: Protocol


   Last Admin: 02/13/17 22:30 Dose:  120 mls/hr


Metronidazole (Flagyl 500mg Premixed Ivpb -)  100 mls @ 100 mls/hr IVPB Q8H-IV 

LAUREN


Sodium Chloride (Normal Saline -)  1,000 mls @ 100 mls/hr IV ASDIR LAUREN


   Last Admin: 02/13/17 23:40 Dose:  100 mls/hr


Norepinephrine Bitartrate 8, (000 mcg/ Dextrose)  500 mls @ 18.75 mls/hr IV 

TITR LAUREN; 5 MCG/MIN


   PRN Reason: Protocol


   Last Admin: 02/14/17 00:30 Dose:  18.75 mls/hr


Pantoprazole Sodium (Protonix 40mg Ivpb (Pre-Docked))  100 mls @ 200 mls/hr 

IVPB BID LAUREN


Pantoprazole Sodium 80 mg/ (Sodium Chloride)  100 mls @ 10 mls/hr IVPB Q10H LAUREN


   PRN Reason: 8 MG/HR


   Last Admin: 02/14/17 02:46 Dose:  10 mls/hr


Lactated Ringer's (Lactated Ringers Solution)  1,000 mls @ 100 mls/hr IV ASDIR 

LAUREN


   Last Admin: 02/14/17 01:15 Dose:  100 mls/hr


Propofol (Diprivan -)  100 mls @ 2.142 mls/hr IVPB TITR LAUREN; 5 MCG/KG/MIN


   PRN Reason: Protocol


   Last Admin: 02/14/17 02:50 Dose:  2.142 mls/hr


Potassium Chloride (Potassium Chloride 10 Meq Premix Ivpb -)  100 mls @ 100 mls/

hr IVPB Q60M LAUREN


   Stop: 02/14/17 08:59


   Last Admin: 02/14/17 07:46 Dose:  100 mls/hr


Dopamine HCl/Dextrose (Dopamine 400 Mg/D5w -)  250 mls @ 13.387 mls/hr IVPB 

TITR LAUREN; 5 MCG/KG/MIN


   PRN Reason: Protocol


   Last Admin: 02/14/17 07:48 Dose:  26.773 mls/hr


Piperacillin Sod/Tazobactam Sod (Zosyn 3.375gm Ivpb (Pre-Docked))  3.375 gm 

IVPB Q8H-IV LAUREN


   PRN Reason: Protocol


   Last Admin: 02/14/17 02:51 Dose:  3.375 gm











- Objective


Vital Signs: 


 Vital Signs











Temperature  97 F L  02/14/17 08:00


 


Pulse Rate  92 H  02/14/17 08:00


 


Respiratory Rate  18   02/14/17 08:00


 


Blood Pressure  112/61   02/14/17 08:00


 


O2 Sat by Pulse Oximetry (%)  92 L  02/13/17 23:38











Constitutional: Yes: No Distress, Calm


Eyes: Yes: WNL, Conjunctiva Clear, EOM Intact


HENT: Yes: WNL, Atraumatic, Normocephalic


Neck: Yes: WNL, Supple, Trachea Midline


Cardiovascular: Yes: S1, S2, Other (Diminished)


Respiratory: Yes: Mechanically Ventilated, Rales (Course rales)


Gastrointestinal: Yes: Hematemesis


Genitourinary: Yes: WNL


Extremities: Yes: WNL


Peripheral Pulses WNL: No


Edema: No


Integumentary: Yes: WNL


Neurological: Yes: Alert


...Motor Strength: Yes: WNL


Psychiatric: Yes: Alert





Labs


Lab Results: 


 CBC, BMP





 02/14/17 05:00 





 02/14/17 05:00 











Assessment/Plan


86 YO M w/ Hx of HTN, [place], HLD, TIA, COPD, insomnia, Prostate CA, Kidney cancer 

s/p nephrectomy, presened tot he ED after 3 days of Upper right quadrant pain. 

Due to the nature of the pain and CT findings, a diagnosis of acute 

cholycystitis was made. 





Sepsis Acute Cholycystits/Choledocolithasis


-Zosyn


-Surg Consult-Tube placed


-Fluids


-ID Consult- Cont. Abx


-Blood Cult-Neg growth


-Ultrasound GB


-MRCP


-NPO





Intubation


-Note if O2 status improves


-CXR for secondary PNA


-Cont Abx


-If O2 improves decrease setting and start breathing trials





Hematemasis 


-GI consult


-PPI


-NPO


-Endoscopy 





Pleural Effusion


-Pulm consult





Possible Bladder obstruction


-Suprapubic distension


-Bladder US


-possible Muñiz





HTN


-Cont. Home meds





[place]


-Cont. Home meds


-Cpap





HLD


-Cont. Home meds





TIA


-Cont. Home meds





Insomnia


-Cont. Home meds

## 2017-02-15 LAB
ALBUMIN SERPL-MCNC: 1.7 G/DL (ref 3.4–5)
ALBUMIN SERPL-MCNC: 1.9 G/DL (ref 3.4–5)
ALP SERPL-CCNC: 155 U/L (ref 45–117)
ALP SERPL-CCNC: 184 U/L (ref 45–117)
ALT SERPL-CCNC: 66 U/L (ref 12–78)
ALT SERPL-CCNC: 80 U/L (ref 12–78)
ANION GAP SERPL CALC-SCNC: 10 MMOL/L (ref 8–16)
ANION GAP SERPL CALC-SCNC: 9 MMOL/L (ref 8–16)
APTT BLD: 35 SECONDS (ref 26.9–34.4)
ART PUNCT SITE: (no result)
ARTERIAL PATENCY WRIST A: POSITIVE
AST SERPL-CCNC: 32 U/L (ref 15–37)
AST SERPL-CCNC: 44 U/L (ref 15–37)
BASE EXCESS BLDA CALC-SCNC: -3.4 MEQ/L (ref -2–2)
BILIRUB CONJ SERPL-MCNC: 1.2 MG/DL (ref 0–0.2)
BILIRUB SERPL-MCNC: 1.1 MG/DL (ref 0.2–1)
BILIRUB SERPL-MCNC: 1.5 MG/DL (ref 0.2–1)
CALCIUM SERPL-MCNC: 6.8 MG/DL (ref 8.5–10.1)
CALCIUM SERPL-MCNC: 7.3 MG/DL (ref 8.5–10.1)
CO2 SERPL-SCNC: 24 MMOL/L (ref 21–32)
CO2 SERPL-SCNC: 26 MMOL/L (ref 21–32)
CREAT SERPL-MCNC: 1.5 MG/DL (ref 0.7–1.3)
CREAT SERPL-MCNC: 1.5 MG/DL (ref 0.7–1.3)
DEPRECATED RDW RBC AUTO: 15.1 % (ref 11.9–15.9)
EOSINOPHIL # BLD: 3 % (ref 0–4.5)
GLUCOSE SERPL-MCNC: 173 MG/DL (ref 74–106)
GLUCOSE SERPL-MCNC: 213 MG/DL (ref 74–106)
HCO3 BLDA-SCNC: 21.4 MEQ/L (ref 22–26)
INR BLD: 1.46 (ref 0.82–1.09)
LPM/O2%: (no result)
MAGNESIUM SERPL-MCNC: 1.7 MG/DL (ref 1.8–2.4)
MAGNESIUM SERPL-MCNC: 2 MG/DL (ref 1.8–2.4)
MCH RBC QN AUTO: 29.7 PG (ref 25.7–33.7)
MCHC RBC AUTO-ENTMCNC: 33.8 G/DL (ref 32–35.9)
MCV RBC: 88 FL (ref 80–96)
MECH. VENT.: YES
NEUTROPHILS # BLD: 77 % (ref 42.8–82.8)
PEEP SETTING VENT: 5 CMH2O
PHOSPHATE SERPL-MCNC: 1.6 MG/DL (ref 2.5–4.9)
PHOSPHATE SERPL-MCNC: 1.8 MG/DL (ref 2.5–4.9)
PLATELET # BLD AUTO: 140 K/MM3 (ref 134–434)
PMV BLD: 8 FL (ref 7.5–11.1)
PO2 BLDA: 84.8 MMHG (ref 68–100)
PROT SERPL-MCNC: 1.7 G/DL (ref 6.4–8.2)
PROT SERPL-MCNC: 4.7 G/DL (ref 6.4–8.2)
PT PNL PPP: 16.2 SEC (ref 9.98–11.88)
PT. ON O2?: YES
SAO2 % BLDA: 96.5 % (ref 90–98.9)
TROPONIN I SERPL-MCNC: 0.12 NG/ML (ref 0–0.05)
TYPE OF O2: (no result)
VENT RATE: 17
VT/PRESS: 450
WBC # BLD AUTO: 29.2 K/MM3 (ref 4–10)

## 2017-02-15 RX ADMIN — IPRATROPIUM BROMIDE AND ALBUTEROL SULFATE SCH AMP: .5; 3 SOLUTION RESPIRATORY (INHALATION) at 09:58

## 2017-02-15 RX ADMIN — IPRATROPIUM BROMIDE AND ALBUTEROL SULFATE SCH AMP: .5; 3 SOLUTION RESPIRATORY (INHALATION) at 22:19

## 2017-02-15 RX ADMIN — INSULIN ASPART SCH UNITS: 100 INJECTION, SOLUTION INTRAVENOUS; SUBCUTANEOUS at 11:44

## 2017-02-15 RX ADMIN — DOPAMINE HYDROCHLORIDE SCH MLS/HR: 160 INJECTION, SOLUTION INTRAVENOUS at 06:35

## 2017-02-15 RX ADMIN — DOPAMINE HYDROCHLORIDE SCH MLS/HR: 160 INJECTION, SOLUTION INTRAVENOUS at 09:41

## 2017-02-15 RX ADMIN — INSULIN ASPART SCH UNITS: 100 INJECTION, SOLUTION INTRAVENOUS; SUBCUTANEOUS at 06:35

## 2017-02-15 RX ADMIN — IMIPENEM AND CILASTATIN SODIUM SCH MLS/HR: 500; 500 INJECTION, POWDER, FOR SOLUTION INTRAVENOUS at 18:40

## 2017-02-15 RX ADMIN — PANTOPRAZOLE SODIUM SCH MLS/HR: 40 INJECTION, POWDER, FOR SOLUTION INTRAVENOUS at 21:40

## 2017-02-15 RX ADMIN — IMIPENEM AND CILASTATIN SODIUM SCH MLS/HR: 500; 500 INJECTION, POWDER, FOR SOLUTION INTRAVENOUS at 01:23

## 2017-02-15 RX ADMIN — INSULIN ASPART SCH UNITS: 100 INJECTION, SOLUTION INTRAVENOUS; SUBCUTANEOUS at 16:57

## 2017-02-15 RX ADMIN — IMIPENEM AND CILASTATIN SODIUM SCH MLS/HR: 500; 500 INJECTION, POWDER, FOR SOLUTION INTRAVENOUS at 10:15

## 2017-02-15 RX ADMIN — METRONIDAZOLE SCH MLS/HR: 500 INJECTION, SOLUTION INTRAVENOUS at 01:22

## 2017-02-15 RX ADMIN — SODIUM CHLORIDE SCH MLS/HR: 9 INJECTION, SOLUTION INTRAVENOUS at 21:56

## 2017-02-15 RX ADMIN — METRONIDAZOLE SCH MLS/HR: 500 INJECTION, SOLUTION INTRAVENOUS at 09:12

## 2017-02-15 RX ADMIN — DEXTROSE MONOHYDRATE SCH MLS/HR: 50 INJECTION, SOLUTION INTRAVENOUS at 01:23

## 2017-02-15 RX ADMIN — METRONIDAZOLE SCH MLS/HR: 500 INJECTION, SOLUTION INTRAVENOUS at 17:24

## 2017-02-15 RX ADMIN — ACETAMINOPHEN PRN MG: 325 TABLET ORAL at 21:55

## 2017-02-15 RX ADMIN — INSULIN ASPART SCH UNITS: 100 INJECTION, SOLUTION INTRAVENOUS; SUBCUTANEOUS at 21:57

## 2017-02-15 RX ADMIN — PANTOPRAZOLE SODIUM SCH MLS/HR: 40 INJECTION, POWDER, FOR SOLUTION INTRAVENOUS at 09:10

## 2017-02-15 NOTE — PN
GI Progress Note


Subjective: 


GI NOte: Minimal NG output and Hb is stable arguing against significant GI 

bleeding. I believe that he has an ileus related to cholecystitis causing small 

bowel contents to stagnate. BC has gm neg bacilli. Await GB fluid cultures. 

Pressors being weaned. Patient is moving but not interactive. Situation 

discussed with the family 





- Objective


Vital Signs: 


 Vital Signs











Temperature  99.9 F H  02/15/17 12:00


 


Pulse Rate  97 H  02/15/17 12:08


 


Respiratory Rate  19   02/15/17 12:00


 


Blood Pressure  129/55   02/15/17 12:08


 


O2 Sat by Pulse Oximetry (%)  95   02/15/17 10:10











Constitutional: Other (Not interative in venitilator)


...Auscultate: Yes: Hypoactive Bowel Sounds


...Palpate: Yes: Soft, Other (cholecystostomy tube in place)


Labs: 


 CBC, BMP





 02/15/17 05:00 





 INR, PTT











INR  1.46  (0.82-1.09)  H  02/15/17  04:55    


 


Fibrinogen  123.0 mg/dL (238-498)  L D 02/15/17  04:55    














Assessment/Plan


Gram negative sepsis related to cholecystitis being managed by percutaneous 

drainage and antibiotics. Doubt GI bleeding but would continue PPI empiricially 

and to prevent stress gastritis . Keep NG suctioning for now.

## 2017-02-15 NOTE — PN
Progress Note, Physician


Chief Complaint: 


ID








ICU follow up for this 85 year old male with sepsit c shock in the ICU 

gallbladder source








Remains intubated with low grade fevers





Sedation off poorly responsive 





On pressor support





Imipenem for gram negative bacteremia





- Current Medication List


Current Medications: 


Active Medications





Acetaminophen (Tylenol -)  650 mg PO Q4H PRN


   PRN Reason: FEVER OR PAIN


Albuterol/Ipratropium (Duoneb -)  1 amp NEB Q4H PRN


   PRN Reason: SHORTNESS OF BREATH


Albuterol/Ipratropium (Duoneb -)  1 amp NEB BID LAUREN


   Last Admin: 02/14/17 21:20 Dose:  1 amp


Metronidazole (Flagyl 500mg Premixed Ivpb -)  100 mls @ 100 mls/hr IVPB Q8H-IV 

LAUREN


   Last Admin: 02/15/17 01:22 Dose:  100 mls/hr


Norepinephrine Bitartrate 8, (000 mcg/ Dextrose)  500 mls @ 18.75 mls/hr IV 

TITR LAUREN; 5 MCG/MIN


   PRN Reason: Protocol


   Last Titration: 02/15/17 07:00 Dose:  35 mcg/min


Pantoprazole Sodium (Protonix 40mg Ivpb (Pre-Docked))  100 mls @ 200 mls/hr 

IVPB BID LARUEN


   Last Admin: 02/14/17 22:01 Dose:  200 mls/hr


Dopamine HCl/Dextrose (Dopamine 400 Mg/D5w -)  250 mls @ 13.387 mls/hr IVPB 

TITR LAUREN; 5 MCG/KG/MIN


   PRN Reason: Protocol


   Last Titration: 02/15/17 07:00 Dose:  10 mcg/kg/min


Imipenem/Cilastatin Sodium 500 (mg/ Sodium Chloride)  100 mls @ 200 mls/hr IVPB 

Q8H-IV LAUREN


   PRN Reason: Protocol


   Last Admin: 02/15/17 01:23 Dose:  200 mls/hr


Midazolam HCl 100 mg/ Sodium (Chloride)  100 mls @ 5 mls/hr IVPB TITR LAUREN; 5 MG/

HR


   PRN Reason: Protocol


   Last Titration: 02/15/17 07:00 Dose:  4 mg/hr


Potassium Phosphate 20 mm/ (Dextrose)  256.6667 mls @ 62.5 mls/hr IVPB ONCE ONE


   Stop: 02/15/17 09:47


   Last Admin: 02/15/17 06:04 Dose:  62.5 mls/hr


Insulin Aspart (Novolog Vial Sliding Scale -)  1 vial SQ ACHS LAUREN


   PRN Reason: Protocol


   Last Admin: 02/15/17 06:35 Dose:  2 units











- Objective


Vital Signs: 


 Vital Signs











Temperature  99.6 F   02/15/17 06:00


 


Pulse Rate  102 H  02/15/17 07:00


 


Respiratory Rate  17   02/15/17 06:17


 


Blood Pressure  87/42   02/15/17 07:00


 


O2 Sat by Pulse Oximetry (%)  92 L  02/14/17 22:25











Constitutional: Yes: No Distress, Other (Intubated)


HENT: Yes: Other (ET tube)


Neck: Yes: WNL


Cardiovascular: Yes: Regular Rate and Rhythm, S1, S2.  No: Murmur


Respiratory: Yes: WNL, Regular, CTA Bilaterally.  No: Rhonchi, Wheezes


Gastrointestinal: Yes: WNL, Normal Bowel Sounds, Soft, Other (Biliary drain 

gallbladder).  No: Tenderness


Extremities: No: Cold, Cool, Cyanosis


Edema: No


Labs: 


 CBC, BMP





 02/15/17 05:00 





 02/15/17 04:55 





 INR, PTT











INR  1.46  (0.82-1.09)  H  02/15/17  04:55    


 


Fibrinogen  123.0 mg/dL (238-498)  L D 02/15/17  04:55    














Problem List





- Problems


(1) Acute respiratory failure with hypoxia


Code(s): J96.01 - ACUTE RESPIRATORY FAILURE WITH HYPOXIA





(2) Cholecystitis, acute


Code(s): K81.0 - ACUTE CHOLECYSTITIS





(3) DIC (disseminated intravascular coagulation)


Code(s): D65 - DISSEMINATED INTRAVASCULAR COAGULATION





(4) Gram-negative bacteremia


Code(s): R78.81 - BACTEREMIA








Assessment/Plan


Microbiology





02/13/17 17:30   Body Fluid - Other   Gram Stain - Final


02/12/17 21:40   Urine - Urine Clean Catch   Urine Culture - Final


                              NO GROWTH OBTAINED


02/13/17 22:41   Blood - Central Line   Blood Culture - Preliminary


                              Pending Organism


02/12/17 21:40   Blood - Peripheral Venous   Blood Culture - Preliminary


                              NO GROWTH OBTAINED AFTER 48 HOURS, INCUBATION TO 

CONTINUE


                              FOR 3 DAYS.


02/12/17 21:40   Blood - Peripheral Venous   Blood Culture - Preliminary


                              NO GROWTH OBTAINED AFTER 48 HOURS, INCUBATION TO 

CONTINUE


                              FOR 3 DAYS.





 Laboratory Tests











  02/14/17 02/14/17 02/15/17





  02:00 05:00 04:55


 


WBC   33.5 H* D 


 


Hgb   


 


Hct   


 


Plt Count   


 


Lymphocytes %   


 


Eosinophils %   


 


Band Neutrophils   


 


INR   


 


Fibrinogen   


 


Fibrin Degrad Products  Greater than 40  


 


ABG pH   


 


ABG pCO2 at Pt Temp   


 


ABG pO2 at Pt Temp   


 


Oxygen Flow Rate   


 


Random Vancomycin    5.270














  02/15/17 02/15/17 02/15/17





  04:55 04:55 05:00


 


WBC    29.2 H


 


Hgb    11.2 L


 


Hct    33.2 L


 


Plt Count    140


 


Lymphocytes %    3.0 L D


 


Eosinophils %    3.0  D


 


Band Neutrophils    15.0 H D


 


INR  1.46 H  


 


Fibrinogen   123.0 L D 


 


Fibrin Degrad Products   


 


ABG pH   


 


ABG pCO2 at Pt Temp   


 


ABG pO2 at Pt Temp   


 


Oxygen Flow Rate   


 


Random Vancomycin   














  02/15/17





  07:20


 


WBC 


 


Hgb 


 


Hct 


 


Plt Count 


 


Lymphocytes % 


 


Eosinophils % 


 


Band Neutrophils 


 


INR 


 


Fibrinogen 


 


Fibrin Degrad Products 


 


ABG pH  7.35


 


ABG pCO2 at Pt Temp  40.1


 


ABG pO2 at Pt Temp  84.8  D


 


Oxygen Flow Rate  40%


 


Random Vancomycin 








Assessment





Respiratory failure


Septic shock gallbladder the source


Post cholecystomy tube


DIC


Gram negative bacteremia LF ? E Coli








Plan


Continue Imiepenem while waiting for final cultures 


Off Sedation


Cholecystomy tube


Pressor support





"Critical care time" spent reviewing chart examining patient 








Anum CARBALLO

## 2017-02-15 NOTE — PN
Teaching Attending Note


Name of Resident: Zaire Phillips


ATTENDING PHYSICIAN STATEMENT





I saw and evaluated the patient.


I reviewed the resident's note and discussed the case with the resident.


I agree with the resident's findings and plan as documented.








SUBJECTIVE:





Pt seen and examined in the ICU. Remains intubated, sedated. On lower dose 

levophed and dopamine gtts. Vented on volume assist control with 40% FiO2.





OBJECTIVE:





 Last Vital Signs











Temp Pulse Resp BP Pulse Ox


 


 100.2 F H  99 H  17   119/68   95 


 


 02/15/17 10:00  02/15/17 10:10  02/15/17 10:00  02/15/17 10:04  02/15/17 10:10








 Intake & Output











 02/12/17 02/13/17 02/14/17 02/15/17





 23:59 23:59 23:59 23:59


 


Intake Total  1250 84064 3023.2


 


Output Total  650 5580 925


 


Balance  600 6731 2098.2


 


Weight 160 lb 157 lb 6.4 oz 174 lb 9.698 oz 








Gen:  intubated, sedated


Heart:  RRR


Lung:  distant breath sounds


Abd: soft, +cholecystostomy with dark brown bilious drainage


Ext:  + edema





 CBC, BMP





 02/15/17 05:00 





 02/15/17 04:55 





Active Medications





Acetaminophen (Tylenol -)  650 mg PO Q4H PRN


   PRN Reason: FEVER OR PAIN


Albuterol/Ipratropium (Duoneb -)  1 amp NEB Q4H PRN


   PRN Reason: SHORTNESS OF BREATH


Albuterol/Ipratropium (Duoneb -)  1 amp NEB BID LAUREN


   Last Admin: 02/15/17 09:58 Dose:  1 amp


Metronidazole (Flagyl 500mg Premixed Ivpb -)  100 mls @ 100 mls/hr IVPB Q8H-IV 

LAUREN


   Last Admin: 02/15/17 09:12 Dose:  100 mls/hr


Norepinephrine Bitartrate 8, (000 mcg/ Dextrose)  500 mls @ 18.75 mls/hr IV 

TITR LAUREN; 5 MCG/MIN


   PRN Reason: Protocol


   Last Titration: 02/15/17 10:04 Dose:  15 mcg/min


Pantoprazole Sodium (Protonix 40mg Ivpb (Pre-Docked))  100 mls @ 200 mls/hr 

IVPB BID LAUREN


   Last Admin: 02/15/17 09:10 Dose:  200 mls/hr


Dopamine HCl/Dextrose (Dopamine 400 Mg/D5w -)  250 mls @ 13.387 mls/hr IVPB 

TITR LAUREN; 5 MCG/KG/MIN


   PRN Reason: Protocol


   Last Admin: 02/15/17 09:41 Dose:  26.773 mls/hr


Imipenem/Cilastatin Sodium 500 (mg/ Sodium Chloride)  100 mls @ 200 mls/hr IVPB 

Q8H-IV LAUREN


   PRN Reason: Protocol


   Last Admin: 02/15/17 10:15 Dose:  200 mls/hr


Midazolam HCl 100 mg/ Sodium (Chloride)  100 mls @ 5 mls/hr IVPB TITR LAUREN; 5 MG/

HR


   PRN Reason: Protocol


   Last Titration: 02/15/17 08:30 Dose:  0 mg/hr


Insulin Aspart (Novolog Vial Sliding Scale -)  1 vial SQ ACHS LAUERN


   PRN Reason: Protocol


   Last Admin: 02/15/17 06:35 Dose:  2 units








ASSESSMENT AND PLAN:


Acute Cholecystitis


s/p Cholecystostomy Placement


Profound Septic Shock


Acute Hypoxic Respiratory Failure


Acute Kidney Injury


Lactic Acidosis resolving


Demand Ischemia


RCC s/p Left Nephrectomy


r/o DIC


COPD


HTN


Hyperlipidemia





-  antibiotics per ID


-  f/u cultures


-  monitor drainage


-  IVF to keep CVP 8-12


-  titrate pressors to maintain MAP >65


-  replete lytes


-  sedate for vent synchrony


-  trend lactate, cardiac enzymes


-  monitor CBC, coags, fibrinogen level


-  continue volume assist control


-  taper FiO2 to keep SpO2 >90%


-  not a candidate for weaning at this time


-  NPO


-  DVT/GI prophylaxis


-  continue ICU monitoring

## 2017-02-15 NOTE — PN
Progress Note, Physician


History of Present Illness: 


Weaning off sedation, remains on levo and dopa gtt and mechanical ventilation.





- Current Medication List


Current Medications: 


Active Medications





Acetaminophen (Tylenol -)  650 mg PO Q4H PRN


   PRN Reason: FEVER OR PAIN


Albuterol/Ipratropium (Duoneb -)  1 amp NEB Q4H PRN


   PRN Reason: SHORTNESS OF BREATH


Albuterol/Ipratropium (Duoneb -)  1 amp NEB BID LAUREN


   Last Admin: 02/14/17 21:20 Dose:  1 amp


Metronidazole (Flagyl 500mg Premixed Ivpb -)  100 mls @ 100 mls/hr IVPB Q8H-IV 

LAUREN


   Last Admin: 02/15/17 09:12 Dose:  100 mls/hr


Norepinephrine Bitartrate 8, (000 mcg/ Dextrose)  500 mls @ 18.75 mls/hr IV 

TITR LAUREN; 5 MCG/MIN


   PRN Reason: Protocol


   Last Titration: 02/15/17 08:00 Dose:  25 mcg/min


Pantoprazole Sodium (Protonix 40mg Ivpb (Pre-Docked))  100 mls @ 200 mls/hr 

IVPB BID LAUREN


   Last Admin: 02/15/17 09:10 Dose:  200 mls/hr


Dopamine HCl/Dextrose (Dopamine 400 Mg/D5w -)  250 mls @ 13.387 mls/hr IVPB 

TITR LAUREN; 5 MCG/KG/MIN


   PRN Reason: Protocol


   Last Titration: 02/15/17 07:00 Dose:  10 mcg/kg/min


Imipenem/Cilastatin Sodium 500 (mg/ Sodium Chloride)  100 mls @ 200 mls/hr IVPB 

Q8H-IV LAUREN


   PRN Reason: Protocol


   Last Admin: 02/15/17 01:23 Dose:  200 mls/hr


Midazolam HCl 100 mg/ Sodium (Chloride)  100 mls @ 5 mls/hr IVPB TITR LAUREN; 5 MG/

HR


   PRN Reason: Protocol


   Last Titration: 02/15/17 08:30 Dose:  0 mg/hr


Potassium Phosphate 20 mm/ (Dextrose)  256.6667 mls @ 62.5 mls/hr IVPB ONCE ONE


   Stop: 02/15/17 09:47


   Last Admin: 02/15/17 06:04 Dose:  62.5 mls/hr


Insulin Aspart (Novolog Vial Sliding Scale -)  1 vial SQ ACHS LAUREN


   PRN Reason: Protocol


   Last Admin: 02/15/17 06:35 Dose:  2 units











- Objective


Vital Signs: 


 Vital Signs











Temperature  100.2 F H  02/15/17 09:00


 


Pulse Rate  108 H  02/15/17 09:00


 


Respiratory Rate  21   02/15/17 09:02


 


Blood Pressure  109/48   02/15/17 09:00


 


O2 Sat by Pulse Oximetry (%)  95   02/15/17 09:02











Cardiovascular: Yes: Regular Rate and Rhythm


Respiratory: Yes: Intubated, Mechanically Ventilated, Rhonchi


Gastrointestinal: Yes: Normal Bowel Sounds


Edema: No


Labs: 


 CBC, BMP





 02/15/17 05:00 





 02/15/17 04:55 





 INR, PTT











INR  1.46  (0.82-1.09)  H  02/15/17  04:55    


 


Fibrinogen  123.0 mg/dL (238-498)  L D 02/15/17  04:55    














- ....Imaging


Chest X-ray: Report Reviewed (Cardiomegaly, mild congestion)





Problem List





- Problems


(1) SANGITA (acute kidney injury)


Code(s): N17.9 - ACUTE KIDNEY FAILURE, UNSPECIFIED





(2) Acute respiratory failure with hypoxia


Code(s): J96.01 - ACUTE RESPIRATORY FAILURE WITH HYPOXIA





(3) COPD (chronic obstructive pulmonary disease)


Code(s): J44.9 - CHRONIC OBSTRUCTIVE PULMONARY DISEASE, UNSPECIFIED   Qualifiers

: 


     COPD type: unspecified COPD        Qualified Code(s): J44.9 - Chronic 

obstructive pulmonary disease, unspecified  





(4) Cholecystitis, acute


Code(s): K81.0 - ACUTE CHOLECYSTITIS





(5) DIC (disseminated intravascular coagulation)


Code(s): D65 - DISSEMINATED INTRAVASCULAR COAGULATION





(6) Demand ischemia


Code(s): I24.8 - OTHER FORMS OF ACUTE ISCHEMIC HEART DISEASE





(7) Diastolic CHF


Code(s): I50.30 - UNSPECIFIED DIASTOLIC (CONGESTIVE) HEART FAILURE   Qualifiers

: 


     Congestive heart failure chronicity: chronic        Qualified Code(s): 

I50.32 - Chronic diastolic (congestive) heart failure  





(8) Gram-negative bacteremia


Code(s): R78.81 - BACTEREMIA





(9) HLD (hyperlipidemia)


Code(s): E78.5 - HYPERLIPIDEMIA, UNSPECIFIED   Qualifiers: 


     Hyperlipidemia type: pure hypercholesterolemia        Qualified Code(s): 

E78.0 - Pure hypercholesterolemia  





(10) HTN (hypertension)


Code(s): I10 - ESSENTIAL (PRIMARY) HYPERTENSION   Qualifiers: 


     Hypertension type: essential hypertension        Qualified Code(s): I10 - 

Essential (primary) hypertension  





(11) Hypokalemia


Code(s): E87.6 - HYPOKALEMIA





(12) Septic shock


Code(s): A41.9 - SEPSIS, UNSPECIFIED ORGANISM


R65.21 - SEVERE SEPSIS WITH SEPTIC SHOCK








Assessment/Plan


1. Acute hypoxic respiratory failure referable to septic shock (gram negative 

bacteremia)


2. Acute cholecystitis post cholecystostomy drain


3. Acute kidney injury with lactic acidosis improving


4. Demand ischemia


5. Hyperglycemia


6. Hypokalemia


7. History of TIA


8. History of COPD


9. History of diverticular disease


10. History of colitis


11. History of renal carcinoma post nephrectomy


12. History of prostate carcinoma





PLAN:





1. Titirate pressors maintaining MAP's > 65


2. Hold Carvedilol, Losartan and Amlodipine therapies pending hemodynamic 

stability


3. Monitor CBC and transfuse as needed maintaining Hg > 8.0


4. Correction of Hypokalemia


5. Antibiotic as per ID with f/u C&S


6. Ventilator management as per the critical care team, BD as needed


7. DVT/GI prophylaxis

## 2017-02-15 NOTE — PN
<Buster Ramirez - Last Filed: 02/15/17 08:48>


Physical Exam: 


SUBJECTIVE: Patient seen and examined at bedside. Pt is intubated, on vent, and 

recently off sedation (for morning sedation vacation). Had approximately 700 ml 

of bilious fluid collection from OG tube since yesterday. Urine output stable. 

Pt is non-responsive due to being on sedation recently.  Blood pressure 

improving with decreased titration of pressors this AM.








OBJECTIVE:





 


 Vital Signs











Temperature  99.6 F   02/15/17 06:00


 


Pulse Rate  103 H  02/15/17 08:00


 


Respiratory Rate  17   02/15/17 06:17


 


Blood Pressure  121/50   02/15/17 08:00


 


O2 Sat by Pulse Oximetry (%)  92 L  02/14/17 22:25














GENERAL: Sedated, on ventilator/intubated.


HEAD: Normal with no signs of trauma.


EYES: Constricted pupils. 


ENT: moist mucous membranes. OG tube in place. Some bilious fluid in tube.


NECK: Trachea midline


LUNGS: Auscultated anteriorly. Diminished breath sounds. 


HEART: Distant heart sounds, Regular rate and rhythm, S1, S2 without murmur, 

rub or gallop.


ABDOMEN: No grimacing today upon palpation of abdomen. Hypoactive bowel sounds.


EXTREMITIES: 2+ pulses, warm, well-perfused, no edema. 


NEUROLOGICAL:  gait not observed. Sedated.


PSYCH: Sedated.


SKIN: Warm, dry, normal turgor, no rashes or lesions noted

















 Laboratory Results - last 24 hr











  02/13/17 02/14/17 02/14/17





  06:50 01:35 12:00


 


WBC   


 


RBC   


 


Hgb   


 


Hct   


 


MCV   


 


MCHC   


 


RDW   


 


Plt Count   


 


MPV   


 


Neutrophils %   


 


Lymphocytes %   


 


Monocytes %   


 


Eosinophils %   


 


Band Neutrophils   


 


Differential Comment   


 


Platelet Estimate   


 


Haptoglobin   187 


 


INR   


 


PTT (Actin FS)   


 


Fibrinogen   


 


Puncture Site   


 


ABG pH   


 


ABG pCO2 at Pt Temp   


 


ABG pO2 at Pt Temp   


 


ABG HCO3   


 


ABG O2 Sat (Measured)   


 


ABG O2 Content   


 


ABG Base Excess   


 


Sb Test   


 


O2 Delivery Device   


 


Oxygen Flow Rate   


 


Vent Mode   


 


Vent Rate   


 


Mechanical Rate   


 


PEEP   


 


Pressure Support Vent   


 


Sodium    139


 


Potassium    3.5  D


 


Chloride    105


 


Carbon Dioxide    17 L D


 


Anion Gap    17 H


 


BUN    32 H


 


Creatinine    1.7 H


 


Creat Clearance w eGFR    38.50


 


POC Glucometer   


 


Random Glucose    401 H* D


 


Lactic Acid   


 


Calcium    7.4 L


 


Phosphorus    2.2 L


 


Magnesium    1.9


 


Total Bilirubin    2.2 H D


 


Direct Bilirubin   


 


AST    108 H D


 


ALT    115 H


 


Alkaline Phosphatase    223 H


 


Total Protein    4.7 L


 


Albumin    1.9 L


 


Random Vancomycin   


 


Crossmatch  See Detail  














  02/14/17 02/14/17 02/14/17





  17:05 20:00 22:06


 


WBC   28.5 H 


 


RBC   3.54 L 


 


Hgb   10.5 L 


 


Hct   31.1 L 


 


MCV   87.9 


 


MCHC   33.6 


 


RDW   15.4 


 


Plt Count   157  D 


 


MPV   7.5 


 


Neutrophils %   87.0 H 


 


Lymphocytes %   6.0 L D 


 


Monocytes %   1.0 L 


 


Eosinophils %   1.0  D 


 


Band Neutrophils   5.0  D 


 


Differential Comment   Manual diff done 


 


Platelet Estimate   Adequate 


 


Haptoglobin   


 


INR   


 


PTT (Actin FS)   


 


Fibrinogen   


 


Puncture Site   


 


ABG pH   


 


ABG pCO2 at Pt Temp   


 


ABG pO2 at Pt Temp   


 


ABG HCO3   


 


ABG O2 Sat (Measured)   


 


ABG O2 Content   


 


ABG Base Excess   


 


Sb Test   


 


O2 Delivery Device   


 


Oxygen Flow Rate   


 


Vent Mode   


 


Vent Rate   


 


Mechanical Rate   


 


PEEP   


 


Pressure Support Vent   


 


Sodium   


 


Potassium   


 


Chloride   


 


Carbon Dioxide   


 


Anion Gap   


 


BUN   


 


Creatinine   


 


Creat Clearance w eGFR   


 


POC Glucometer  [phone]   [phone]


 


Random Glucose   


 


Lactic Acid   


 


Calcium   


 


Phosphorus   


 


Magnesium   


 


Total Bilirubin   


 


Direct Bilirubin   


 


AST   


 


ALT   


 


Alkaline Phosphatase   


 


Total Protein   


 


Albumin   


 


Random Vancomycin   


 


Crossmatch   














  02/15/17 02/15/17 02/15/17





  04:55 04:55 04:55


 


WBC   


 


RBC   


 


Hgb   


 


Hct   


 


MCV   


 


MCHC   


 


RDW   


 


Plt Count   


 


MPV   


 


Neutrophils %   


 


Lymphocytes %   


 


Monocytes %   


 


Eosinophils %   


 


Band Neutrophils   


 


Differential Comment   


 


Platelet Estimate   


 


Haptoglobin   


 


INR   


 


PTT (Actin FS)   


 


Fibrinogen   


 


Puncture Site   


 


ABG pH   


 


ABG pCO2 at Pt Temp   


 


ABG pO2 at Pt Temp   


 


ABG HCO3   


 


ABG O2 Sat (Measured)   


 


ABG O2 Content   


 


ABG Base Excess   


 


Sb Test   


 


O2 Delivery Device   


 


Oxygen Flow Rate   


 


Vent Mode   


 


Vent Rate   


 


Mechanical Rate   


 


PEEP   


 


Pressure Support Vent   


 


Sodium  139  


 


Potassium  3.0 L  


 


Chloride  104  


 


Carbon Dioxide  26  D  


 


Anion Gap  9  


 


BUN  28 H  


 


Creatinine  1.5 H  


 


Creat Clearance w eGFR   


 


POC Glucometer   


 


Random Glucose  173 H D  


 


Lactic Acid    1.477


 


Calcium  7.3 L  


 


Phosphorus   1.6 L D 


 


Magnesium   1.7 L 


 


Total Bilirubin   1.5 H D 


 


Direct Bilirubin   1.2 H D 


 


AST   44 H D 


 


ALT   80 H D 


 


Alkaline Phosphatase   184 H 


 


Total Protein   4.7 L 


 


Albumin   1.9 L 


 


Random Vancomycin  5.270  


 


Crossmatch   














  02/15/17 02/15/17 02/15/17





  04:55 04:55 05:00


 


WBC    29.2 H


 


RBC    3.77 L


 


Hgb    11.2 L


 


Hct    33.2 L


 


MCV    88.0


 


MCHC    33.8


 


RDW    15.1


 


Plt Count    140


 


MPV    8.0


 


Neutrophils %    77.0


 


Lymphocytes %    3.0 L D


 


Monocytes %    2.0 L D


 


Eosinophils %    3.0  D


 


Band Neutrophils    15.0 H D


 


Differential Comment   


 


Platelet Estimate   


 


Haptoglobin   


 


INR  1.46 H  


 


PTT (Actin FS)  35.0 H  


 


Fibrinogen   123.0 L D 


 


Puncture Site   


 


ABG pH   


 


ABG pCO2 at Pt Temp   


 


ABG pO2 at Pt Temp   


 


ABG HCO3   


 


ABG O2 Sat (Measured)   


 


ABG O2 Content   


 


ABG Base Excess   


 


Sb Test   


 


O2 Delivery Device   


 


Oxygen Flow Rate   


 


Vent Mode   


 


Vent Rate   


 


Mechanical Rate   


 


PEEP   


 


Pressure Support Vent   


 


Sodium   


 


Potassium   


 


Chloride   


 


Carbon Dioxide   


 


Anion Gap   


 


BUN   


 


Creatinine   


 


Creat Clearance w eGFR   


 


POC Glucometer   


 


Random Glucose   


 


Lactic Acid   


 


Calcium   


 


Phosphorus   


 


Magnesium   


 


Total Bilirubin   


 


Direct Bilirubin   


 


AST   


 


ALT   


 


Alkaline Phosphatase   


 


Total Protein   


 


Albumin   


 


Random Vancomycin   


 


Crossmatch   














  02/15/17 02/15/17





  05:14 07:20


 


WBC  


 


RBC  


 


Hgb  


 


Hct  


 


MCV  


 


MCHC  


 


RDW  


 


Plt Count  


 


MPV  


 


Neutrophils %  


 


Lymphocytes %  


 


Monocytes %  


 


Eosinophils %  


 


Band Neutrophils  


 


Differential Comment  


 


Platelet Estimate  


 


Haptoglobin  


 


INR  


 


PTT (Actin FS)  


 


Fibrinogen  


 


Puncture Site   Right radial


 


ABG pH   7.35


 


ABG pCO2 at Pt Temp   40.1


 


ABG pO2 at Pt Temp   84.8  D


 


ABG HCO3   21.4 L


 


ABG O2 Sat (Measured)   96.5


 


ABG O2 Content   15.1


 


ABG Base Excess   -3.4 L


 


Sb Test   Positive


 


O2 Delivery Device   Vent


 


Oxygen Flow Rate   40%


 


Vent Mode   A/c


 


Vent Rate   17


 


Mechanical Rate   Yes


 


PEEP   5.0


 


Pressure Support Vent   450


 


Sodium  


 


Potassium  


 


Chloride  


 


Carbon Dioxide  


 


Anion Gap  


 


BUN  


 


Creatinine  


 


Creat Clearance w eGFR  


 


POC Glucometer  [phone] 


 


Random Glucose  


 


Lactic Acid  


 


Calcium  


 


Phosphorus  


 


Magnesium  


 


Total Bilirubin  


 


Direct Bilirubin  


 


AST  


 


ALT  


 


Alkaline Phosphatase  


 


Total Protein  


 


Albumin  


 


Random Vancomycin  


 


Crossmatch  








 Microbiology





02/13/17 22:41   Blood - Central Line   Blood Culture - Preliminary


                            Lactose Fermenting Neg Bacilli


02/12/17 21:40   Blood - Peripheral Venous   Blood Culture - Preliminary


                            NO GROWTH OBTAINED AFTER 48 HOURS, INCUBATION TO 

CONTINUE


                            FOR 3 DAYS.


02/12/17 21:40   Blood - Peripheral Venous   Blood Culture - Preliminary


                            NO GROWTH OBTAINED AFTER 48 HOURS, INCUBATION TO 

CONTINUE


                            FOR 3 DAYS.


02/13/17 22:46   Sputum - Endotrachea Suction/Ventilator   Gram Stain - Final


02/13/17 17:30   Body Fluid - Other   Gram Stain - Final


02/12/17 21:40   Urine - Urine Clean Catch   Urine Culture - Final


                            NO GROWTH OBTAINED











Imaging:


CXR: 2/15/17: R moderate pleural effusion. L small pleural effusion.





Abd U/S: 2/14/17 gallbladder has small calculi. No evidence of intra or 

extrahepatic duct dilatation. CBD = 7mm. Thick gallbladder wall, cholelithiasis

, no evidence of sig biliary ductal dilatation.





ECHO: 2/14/17 - EF: 63%, LV nl size, LVSF nl, no regional wall abnl noted, LV 

impaired relaxation, mild MR, mod TR, mild AS, no pericardial effusion.








Active Medications











Generic Name Dose Route Start Last Admin





  Trade Name Freq  PRN Reason Stop Dose Admin


 


Acetaminophen  650 mg 02/13/17 23:33  





  Tylenol -  PO   





  Q4H PRN   





  FEVER OR PAIN   


 


Albuterol/Ipratropium  1 amp 02/13/17 23:33  





  Duoneb -  NEB   





  Q4H PRN   





  SHORTNESS OF BREATH   


 


Albuterol/Ipratropium  1 amp 02/14/17 10:00 02/14/17 21:20





  Duoneb -  NEB   1 amp





  BID LAUREN   Administration


 


Metronidazole  100 mls @ 100 mls/hr 02/14/17 08:00 02/15/17 01:22





  Flagyl 500mg Premixed Ivpb -  IVPB   100 mls/hr





  Q8H-IV LAUREN   Administration


 


Norepinephrine Bitartrate 8,  500 mls @ 18.75 mls/hr 02/14/17 00:30 02/15/17 08:

00





  000 mcg/ Dextrose  IV   25 mcg/min





  TITR LAUREN   Titration





  Protocol   





  5 MCG/MIN   


 


Pantoprazole Sodium  100 mls @ 200 mls/hr 02/14/17 10:00 02/14/17 22:01





  Protonix 40mg Ivpb (Pre-Docked)  IVPB   200 mls/hr





  BID LAUREN   Administration


 


Dopamine HCl/Dextrose  250 mls @ 13.387 mls/hr 02/14/17 07:00 02/15/17 07:00





  Dopamine 400 Mg/D5w -  IVPB   10 mcg/kg/min





  TITR LAUREN   Titration





  Protocol   





  5 MCG/KG/MIN   


 


Imipenem/Cilastatin Sodium 500  100 mls @ 200 mls/hr 02/14/17 13:00 02/15/17 01:

23





  mg/ Sodium Chloride  IVPB   200 mls/hr





  Q8H-IV LAUREN   Administration





  Protocol   


 


Midazolam HCl 100 mg/ Sodium  100 mls @ 5 mls/hr 02/14/17 21:45 02/15/17 08:30





  Chloride  IVPB   0 mg/hr





  TITR LAUREN   Titration





  Protocol   





  5 MG/HR   


 


Potassium Phosphate 20 mm/  256.6667 mls @ 62.5 mls/hr 02/15/17 05:41 02/15/17 

06:04





  Dextrose  IVPB 02/15/17 09:47  62.5 mls/hr





  ONCE ONE   Administration


 


Insulin Aspart  1 vial 02/14/17 22:00 02/15/17 06:35





  Novolog Vial Sliding Scale -  SQ   2 units





  ACHS LAUREN   Administration





  Protocol   











ASSESSMENT/PLAN:


86 y/o M w/ PMH of [place], HTN, HLD, prostate ca, TIA, Kidney cancer s/p 

nephrectomy, COPD presented to ER with abdominal pain (RUQ) for 3 days. 

Admitted for acute cholecystitis. Perc cholecystostomy placed on 2/13/17. Pt 

developed SOB after procedure, was intubated, had central line placed, on 

pressors and sedated and in ICU for septic shock.





-Septic shock secondary to Acute Cholecystitis from cholelithiasis


   -improving


   -No fevers over last 24 hours but currently temp is trending up; WBC 29.2


      -IV ofirmev once ordered


   -On pressors: norepi and dopamine, titrate to keep MAP > 65


   -CT abd/pelvis: 2/13/17 - Significantly dilated/enlarged gallbladder with 

diffuse thickening of its wall and possible minimal pericholecystic free fluid 

consistent with acute cholecystitis.


   -Abd U/S: 2/14/17 - gallbladder has small calculi. No evidence of intra or 

extrahepatic duct dilatation. CBD = 7mm. Thick gallbladder wall, cholelithiasis

, no evidence of sig biliary ductal dilatation.


   -LFTs trending down.


   -Lactic acid trending down: now at 1.5


   -Abx : imipenem/flagyl as per ID; ID on board


   -repeat BCx -1/2 bottles: Gram Neg bacilli (possibly e. coli, awaiting final 

organism)


   -Body fluid cx (from drain): Gram neg bacilli


   -Surgery on board





-Acute hypoxic respiratory failure


   -Sedated on midazolam


   -Intubated, on vent


   -ABG in AM


   -ABG today: 7.35/40/85


   -wean trials





-Anemia secondary to Upper GI bleed


   -resolving, bilious fluid drainage now


   -OG tube in place


   -H/H stable


   -EGD when more stable


   -GI on board





-Elevated trops


   -mildly elevated


   -most likely secondary to demand ischemia from septic shock


   -Echo: 2/14/17 - EF: 63%, LV nl size, LVSF nl, no regional wall abnl noted, 

LV impaired relaxation, mild MR, mod TR, mild AS, no pericardial effusion.





-Diastolic CHF:


   -CXR: 2/15/17: R moderate pleural effusion. L small pleural effusion.


   -ECHO: 2/14/17 - EF: 63%, LV nl size, LVSF nl, no regional wall abnl noted, 

LV impaired relaxation, mild MR, mod TR, mild AS, no pericardial effusion.


   -not on fluids currently.





-Transaminitis


   -Secondary to septic shock


   -INR elevated as well, trending down


   -Monitor LFTs, LFTs currently trending down





-SANGITA secondary to septic shock


   -improving


   -BUN/Cr 28/1.5


   -Producing urine at the moment


   -monitor urine output, BUN/Cr





-Possible DIC secondary to septic shock


   -s/p cyroprecipitate yesterday


   -fibrinogen low, firbinogen degradation products elevated, d-dimer elevated


   -fibrinogen trending up





-Hyperglycemia


   -Monitor sugars


   -Currently random glucose 173





-HTN


   -hold anti-hypertensives in light of septic shock





-[place]


   -Intubated, on vent





-Hx of TIA


   -asa held





-HLD


   -held lipitor 10 mg PO qhs





-insomnia


   -held melatonin 5 mg po qhs prn





-BPH


   -held flomax 0.8 mg po qd


   -held finasteride 5 mg po qd





-DVT ppx


   -SCDs; heparin held due to coffee ground emesis/upper gi bleed





-FEN


   -no fluids for now.


   -hypokalemia - currently being repleted, f/u lytes


   -phos low at 1.6 - currently being repleted. Mg low at 1.7, being repleted. 

monitor phos, mg.


   -NPO, once more stable can start enteral feeds as per dietary 

recommendations.





-Dispo:


   -Monitor in ICU.





Problem List





- Problems


(1) COPD (chronic obstructive pulmonary disease)


Code(s): J44.9 - CHRONIC OBSTRUCTIVE PULMONARY DISEASE, UNSPECIFIED   Qualifiers

: 


     COPD type: unspecified COPD        Qualified Code(s): J44.9 - Chronic 

obstructive pulmonary disease, unspecified  





(2) Cholecystitis, acute


Code(s): K81.0 - ACUTE CHOLECYSTITIS





(3) Fever


Code(s): R50.9 - FEVER, UNSPECIFIED   Qualifiers: 


     Fever type: other        Qualified Code(s): R50.81 - Fever presenting with 

conditions classified elsewhere  





(4) Leukocytosis


Code(s): D72.829 - ELEVATED WHITE BLOOD CELL COUNT, UNSPECIFIED   Qualifiers: 


     Leukocytosis type: unspecified        Qualified Code(s): D72.829 - 

Elevated white blood cell count, unspecified  





(5) Sleep apnea


Code(s): G47.30 - SLEEP APNEA, UNSPECIFIED   





(6) Acute urinary retention


Code(s): R33.8 - OTHER RETENTION OF URINE





(7) Insomnia


Code(s): G47.00 - INSOMNIA, UNSPECIFIED   





(8) HTN (hypertension)


Code(s): I10 - ESSENTIAL (PRIMARY) HYPERTENSION   Qualifiers: 


     Hypertension type: essential hypertension        Qualified Code(s): I10 - 

Essential (primary) hypertension  





(9) HLD (hyperlipidemia)


Code(s): E78.5 - HYPERLIPIDEMIA, UNSPECIFIED   Qualifiers: 


     Hyperlipidemia type: pure hypercholesterolemia        Qualified Code(s): 

E78.0 - Pure hypercholesterolemia  





(10) BPH (benign prostatic hyperplasia)


Code(s): N40.0 - BENIGN PROSTATIC HYPERPLASIA WITHOUT LOWER URINRY TRACT SYMP   





(11) Septic shock


Code(s): A41.9 - SEPSIS, UNSPECIFIED ORGANISM


R65.21 - SEVERE SEPSIS WITH SEPTIC SHOCK





(12) Acute respiratory failure with hypoxia


Code(s): J96.01 - ACUTE RESPIRATORY FAILURE WITH HYPOXIA





(13) SANGITA (acute kidney injury)


Code(s): N17.9 - ACUTE KIDNEY FAILURE, UNSPECIFIED





(14) Transaminitis


Code(s): R74.0 - NONSPEC ELEV OF LEVELS OF TRANSAMNS & LACTIC ACID DEHYDRGNSE





(15) Upper GI bleed


Code(s): K92.2 - GASTROINTESTINAL HEMORRHAGE, UNSPECIFIED





(16) Elevated troponin


Code(s): R79.89 - OTHER SPECIFIED ABNORMAL FINDINGS OF BLOOD CHEMISTRY





(17) Demand ischemia


Code(s): I24.8 - OTHER FORMS OF ACUTE ISCHEMIC HEART DISEASE





(18) DIC (disseminated intravascular coagulation)


Code(s): D65 - DISSEMINATED INTRAVASCULAR COAGULATION





(19) Hypophosphatemia


Code(s): E83.39 - OTHER DISORDERS OF PHOSPHORUS METABOLISM





(20) Hypokalemia


Code(s): E87.6 - HYPOKALEMIA





(21) Anemia due to GI blood loss


Code(s): D50.0 - IRON DEFICIENCY ANEMIA SECONDARY TO BLOOD LOSS (CHRONIC)





(22) Diastolic CHF


Code(s): I50.30 - UNSPECIFIED DIASTOLIC (CONGESTIVE) HEART FAILURE   








Visit type





- Emergency Visit


Emergency Visit: Yes


ED Registration Date: 02/13/17


Care time: The patient presented to the Emergency Department on the above date 

and was hospitalized for further evaluation of their emergent condition.





- New Patient


This patient is new to me today: No





- Critical Care


Critical Care patient: Yes


Total Critical Care Time (in minutes): 40


Critical Care Statement: The care of this patient involved high complexity 

decision making to prevent further life threatening deterioration of the patient

's condition and/or to evalute & treat vital organ system(s) failure or risk of 

failure.





<Fabian Hale - Last Filed: 02/15/17 11:15>


Physical Exam: 


ATTENDING PHYSICIAN STATEMENT





I saw and evaluated the patient.


I reviewed the resident's note and discussed the case with the resident.


I agree with the resident's findings and plan as documented.








SUBJECTIVE:


seen and evaluated at the bedside





OBJECTIVE:


intubated and sedated





ASSESSMENT AND PLAN:


85 year old man admitted for sepsis due to acute cholecystitis





Sepsis


-s/p drainage by IR


-pt currently intubated and sedated on levophed and dopamine in septic shock


-now on imipenem/flagyl as per ID recs


-vent management as per pulm





Acute blood loss anemia due to upper GI Bleed


-pt currently intubated and sedated 


-on protonix IV


-trend Hb


-follow up with GI attending

## 2017-02-15 NOTE — PN
Physical Exam: 


SUBJECTIVE: 





Patient seen and examined at bedside in the ICU. Off sedation but unable to 

arouse. Still intubated and only respond to painful stimuli. Per nurse, patient 

remains status quo and tolerating sedation vocation. No other acute event noted.








OBJECTIVE:





 Vital Signs











 Period  Temp  Pulse  Resp  BP Sys/Yun  Pulse Ox


 


 Last 24 Hr  97.4 F-100.2 F    12-22  /40-68  92-95








GENERAL: The patient off sedation and on pressors and mechanical ventilation


EYES: bilateral pin point pupils, non-reactive


NECK: triple lumen central line in place 


LUNGS: CTAB


HEART: Regular rate and rhythm, S1, S2 without murmur, rub or gallop.


ABDOMEN: Soft, facial grimaces upon deep palpations, nondistended, hypoactive 

bowel sounds. RUQ percutaneous drain in dark yellow/brown color  


EXTREMITIES: warm and no edema 


NEUROLOGICAL: unable to assess


: muñiz in place, clear urine output














 Laboratory Results - last 24 hr











  02/13/17 02/14/17 02/14/17





  06:50 01:35 11:18


 


WBC   


 


RBC   


 


Hgb   


 


Hct   


 


MCV   


 


MCHC   


 


RDW   


 


Plt Count   


 


MPV   


 


Neutrophils %   


 


Lymphocytes %   


 


Monocytes %   


 


Eosinophils %   


 


Band Neutrophils   


 


Differential Comment   


 


Platelet Estimate   


 


Haptoglobin   187 


 


INR   


 


PTT (Actin FS)   


 


Fibrinogen   


 


Puncture Site   


 


ABG pH   


 


ABG pCO2 at Pt Temp   


 


ABG pO2 at Pt Temp   


 


ABG HCO3   


 


ABG O2 Sat (Measured)   


 


ABG O2 Content   


 


ABG Base Excess   


 


Sb Test   


 


O2 Delivery Device   


 


Oxygen Flow Rate   


 


Vent Mode   


 


Vent Rate   


 


Mechanical Rate   


 


PEEP   


 


Pressure Support Vent   


 


Sodium   


 


Potassium   


 


Chloride   


 


Carbon Dioxide   


 


Anion Gap   


 


BUN   


 


Creatinine   


 


Creat Clearance w eGFR   


 


POC Glucometer    > 400


 


Random Glucose   


 


Lactic Acid   


 


Calcium   


 


Phosphorus   


 


Magnesium   


 


Total Bilirubin   


 


Direct Bilirubin   


 


AST   


 


ALT   


 


Alkaline Phosphatase   


 


Creatine Kinase   


 


Troponin I   


 


Total Protein   


 


Albumin   


 


Random Vancomycin   


 


Crossmatch  See Detail  














  02/14/17 02/14/17 02/14/17





  11:22 17:05 20:00


 


WBC    28.5 H


 


RBC    3.54 L


 


Hgb    10.5 L


 


Hct    31.1 L


 


MCV    87.9


 


MCHC    33.6


 


RDW    15.4


 


Plt Count    157  D


 


MPV    7.5


 


Neutrophils %    87.0 H


 


Lymphocytes %    6.0 L D


 


Monocytes %    1.0 L


 


Eosinophils %    1.0  D


 


Band Neutrophils    5.0  D


 


Differential Comment    Manual diff done


 


Platelet Estimate    Adequate


 


Haptoglobin   


 


INR   


 


PTT (Actin FS)   


 


Fibrinogen   


 


Puncture Site   


 


ABG pH   


 


ABG pCO2 at Pt Temp   


 


ABG pO2 at Pt Temp   


 


ABG HCO3   


 


ABG O2 Sat (Measured)   


 


ABG O2 Content   


 


ABG Base Excess   


 


Sb Test   


 


O2 Delivery Device   


 


Oxygen Flow Rate   


 


Vent Mode   


 


Vent Rate   


 


Mechanical Rate   


 


PEEP   


 


Pressure Support Vent   


 


Sodium   


 


Potassium   


 


Chloride   


 


Carbon Dioxide   


 


Anion Gap   


 


BUN   


 


Creatinine   


 


Creat Clearance w eGFR   


 


POC Glucometer  > 400  [phone] 


 


Random Glucose   


 


Lactic Acid   


 


Calcium   


 


Phosphorus   


 


Magnesium   


 


Total Bilirubin   


 


Direct Bilirubin   


 


AST   


 


ALT   


 


Alkaline Phosphatase   


 


Creatine Kinase   


 


Troponin I   


 


Total Protein   


 


Albumin   


 


Random Vancomycin   


 


Crossmatch   














  02/14/17 02/15/17 02/15/17





  22:06 04:55 04:55


 


WBC   


 


RBC   


 


Hgb   


 


Hct   


 


MCV   


 


MCHC   


 


RDW   


 


Plt Count   


 


MPV   


 


Neutrophils %   


 


Lymphocytes %   


 


Monocytes %   


 


Eosinophils %   


 


Band Neutrophils   


 


Differential Comment   


 


Platelet Estimate   


 


Haptoglobin   


 


INR   


 


PTT (Actin FS)   


 


Fibrinogen   


 


Puncture Site   


 


ABG pH   


 


ABG pCO2 at Pt Temp   


 


ABG pO2 at Pt Temp   


 


ABG HCO3   


 


ABG O2 Sat (Measured)   


 


ABG O2 Content   


 


ABG Base Excess   


 


Sb Test   


 


O2 Delivery Device   


 


Oxygen Flow Rate   


 


Vent Mode   


 


Vent Rate   


 


Mechanical Rate   


 


PEEP   


 


Pressure Support Vent   


 


Sodium   139 


 


Potassium   3.0 L 


 


Chloride   104 


 


Carbon Dioxide   26  D 


 


Anion Gap   9 


 


BUN   28 H 


 


Creatinine   1.5 H 


 


Creat Clearance w eGFR   


 


POC Glucometer  [phone]  


 


Random Glucose   173 H D 


 


Lactic Acid   


 


Calcium   7.3 L 


 


Phosphorus    1.6 L D


 


Magnesium    1.7 L


 


Total Bilirubin    1.5 H D


 


Direct Bilirubin    1.2 H D


 


AST    44 H D


 


ALT    80 H D


 


Alkaline Phosphatase    184 H


 


Creatine Kinase   


 


Troponin I   


 


Total Protein    4.7 L


 


Albumin    1.9 L


 


Random Vancomycin   5.270 


 


Crossmatch   














  02/15/17 02/15/17 02/15/17





  04:55 04:55 04:55


 


WBC   


 


RBC   


 


Hgb   


 


Hct   


 


MCV   


 


MCHC   


 


RDW   


 


Plt Count   


 


MPV   


 


Neutrophils %   


 


Lymphocytes %   


 


Monocytes %   


 


Eosinophils %   


 


Band Neutrophils   


 


Differential Comment   


 


Platelet Estimate   


 


Haptoglobin   


 


INR   1.46 H 


 


PTT (Actin FS)   35.0 H 


 


Fibrinogen    123.0 L D


 


Puncture Site   


 


ABG pH   


 


ABG pCO2 at Pt Temp   


 


ABG pO2 at Pt Temp   


 


ABG HCO3   


 


ABG O2 Sat (Measured)   


 


ABG O2 Content   


 


ABG Base Excess   


 


Sb Test   


 


O2 Delivery Device   


 


Oxygen Flow Rate   


 


Vent Mode   


 


Vent Rate   


 


Mechanical Rate   


 


PEEP   


 


Pressure Support Vent   


 


Sodium   


 


Potassium   


 


Chloride   


 


Carbon Dioxide   


 


Anion Gap   


 


BUN   


 


Creatinine   


 


Creat Clearance w eGFR   


 


POC Glucometer   


 


Random Glucose   


 


Lactic Acid  1.477  


 


Calcium   


 


Phosphorus   


 


Magnesium   


 


Total Bilirubin   


 


Direct Bilirubin   


 


AST   


 


ALT   


 


Alkaline Phosphatase   


 


Creatine Kinase   


 


Troponin I   


 


Total Protein   


 


Albumin   


 


Random Vancomycin   


 


Crossmatch   














  02/15/17 02/15/17 02/15/17





  05:00 05:14 07:20


 


WBC  29.2 H  


 


RBC  3.77 L  


 


Hgb  11.2 L  


 


Hct  33.2 L  


 


MCV  88.0  


 


MCHC  33.8  


 


RDW  15.1  


 


Plt Count  140  


 


MPV  8.0  


 


Neutrophils %  77.0  


 


Lymphocytes %  3.0 L D  


 


Monocytes %  2.0 L D  


 


Eosinophils %  3.0  D  


 


Band Neutrophils  15.0 H D  


 


Differential Comment   


 


Platelet Estimate   


 


Haptoglobin   


 


INR   


 


PTT (Actin FS)   


 


Fibrinogen   


 


Puncture Site    Right radial


 


ABG pH    7.35


 


ABG pCO2 at Pt Temp    40.1


 


ABG pO2 at Pt Temp    84.8  D


 


ABG HCO3    21.4 L


 


ABG O2 Sat (Measured)    96.5


 


ABG O2 Content    15.1


 


ABG Base Excess    -3.4 L


 


Sb Test    Positive


 


O2 Delivery Device    Vent


 


Oxygen Flow Rate    40%


 


Vent Mode    A/c


 


Vent Rate    17


 


Mechanical Rate    Yes


 


PEEP    5.0


 


Pressure Support Vent    450


 


Sodium   


 


Potassium   


 


Chloride   


 


Carbon Dioxide   


 


Anion Gap   


 


BUN   


 


Creatinine   


 


Creat Clearance w eGFR   


 


POC Glucometer   [phone] 


 


Random Glucose   


 


Lactic Acid   


 


Calcium   


 


Phosphorus   


 


Magnesium   


 


Total Bilirubin   


 


Direct Bilirubin   


 


AST   


 


ALT   


 


Alkaline Phosphatase   


 


Creatine Kinase   


 


Troponin I   


 


Total Protein   


 


Albumin   


 


Random Vancomycin   


 


Crossmatch   














  02/15/17





  11:35


 


WBC 


 


RBC 


 


Hgb 


 


Hct 


 


MCV 


 


MCHC 


 


RDW 


 


Plt Count 


 


MPV 


 


Neutrophils % 


 


Lymphocytes % 


 


Monocytes % 


 


Eosinophils % 


 


Band Neutrophils 


 


Differential Comment 


 


Platelet Estimate 


 


Haptoglobin 


 


INR 


 


PTT (Actin FS) 


 


Fibrinogen 


 


Puncture Site 


 


ABG pH 


 


ABG pCO2 at Pt Temp 


 


ABG pO2 at Pt Temp 


 


ABG HCO3 


 


ABG O2 Sat (Measured) 


 


ABG O2 Content 


 


ABG Base Excess 


 


Sb Test 


 


O2 Delivery Device 


 


Oxygen Flow Rate 


 


Vent Mode 


 


Vent Rate 


 


Mechanical Rate 


 


PEEP 


 


Pressure Support Vent 


 


Sodium  136


 


Potassium  3.4 L


 


Chloride  102


 


Carbon Dioxide  24


 


Anion Gap  10


 


BUN  24 H


 


Creatinine  1.5 H


 


Creat Clearance w eGFR  44.48


 


POC Glucometer 


 


Random Glucose  213 H D


 


Lactic Acid 


 


Calcium  6.8 L*


 


Phosphorus  1.8 L


 


Magnesium  2.0


 


Total Bilirubin  1.1 H D


 


Direct Bilirubin 


 


AST  32  D


 


ALT  66


 


Alkaline Phosphatase  155 H


 


Creatine Kinase  53


 


Troponin I  0.12 H D


 


Total Protein  1.7 L D


 


Albumin  1.7 L


 


Random Vancomycin 


 


Crossmatch 








Active Medications











Generic Name Dose Route Start Last Admin





  Trade Name Freq  PRN Reason Stop Dose Admin


 


Acetaminophen  650 mg 02/13/17 23:33  





  Tylenol -  PO   





  Q4H PRN   





  FEVER OR PAIN   


 


Albuterol/Ipratropium  1 amp 02/13/17 23:33  





  Duoneb -  NEB   





  Q4H PRN   





  SHORTNESS OF BREATH   


 


Albuterol/Ipratropium  1 amp 02/14/17 10:00 02/15/17 09:58





  Duoneb -  NEB   1 amp





  BID LAUREN   Administration


 


Metronidazole  100 mls @ 100 mls/hr 02/14/17 08:00 02/15/17 09:12





  Flagyl 500mg Premixed Ivpb -  IVPB   100 mls/hr





  Q8H-IV LAUREN   Administration


 


Norepinephrine Bitartrate 8,  500 mls @ 18.75 mls/hr 02/14/17 00:30 02/15/17 12:

08





  000 mcg/ Dextrose  IV   17.5 mcg/min





  TITR LAUREN   Titration





  Protocol   





  5 MCG/MIN   


 


Pantoprazole Sodium  100 mls @ 200 mls/hr 02/14/17 10:00 02/15/17 09:10





  Protonix 40mg Ivpb (Pre-Docked)  IVPB   200 mls/hr





  BID LAUREN   Administration


 


Dopamine HCl/Dextrose  250 mls @ 13.387 mls/hr 02/14/17 07:00 02/15/17 12:08





  Dopamine 400 Mg/D5w -  IVPB   5 mcg/kg/min





  TITR LAUREN   Titration





  Protocol   





  5 MCG/KG/MIN   


 


Imipenem/Cilastatin Sodium 500  100 mls @ 200 mls/hr 02/14/17 13:00 02/15/17 10:

15





  mg/ Sodium Chloride  IVPB   200 mls/hr





  Q8H-IV LAUREN   Administration





  Protocol   


 


Midazolam HCl 100 mg/ Sodium  100 mls @ 5 mls/hr 02/14/17 21:45 02/15/17 08:30





  Chloride  IVPB   0 mg/hr





  TITR LAUREN   Titration





  Protocol   





  5 MG/HR   


 


Potassium Chloride  100 mls @ 100 mls/hr 02/15/17 14:00  





  Potassium Chloride 10 Meq Premix Ivpb -  IVPB 02/15/17 14:59  





  Q60M LAUREN   


 


Potassium Phosphate 20 mm/  256.6667 mls @ 62.5 mls/hr 02/15/17 13:48  





  Dextrose  IVPB 02/15/17 17:54  





  ONCE ONE   


 


Insulin Aspart  1 vial 02/14/17 22:00 02/15/17 11:44





  Novolog Vial Sliding Scale -  SQ   2 units





  ACHS LAUREN   Administration





  Protocol   








Microbiology





02/13/17 17:30   Body Fluid - Other   Gram Stain - Final


02/13/17 17:30   Body Fluid - Other   Body Fluid Culture - Preliminary


                            Lactose Fermenting Neg Bacilli


                            Group D Strep Or Entero Coccus


02/13/17 22:46   Sputum - Endotrachea Suction/Ventilator   Gram Stain - Final


02/13/17 22:46   Sputum - Endotrachea Suction/Ventilator   Sputum Culture - 

Preliminary


                            Yeast Like Organism


02/13/17 22:41   Blood - Central Line   Blood Culture - Preliminary


                            NO GROWTH OBTAINED AFTER 24 HOURS, INCUBATION TO 

CONTINUE


                            FOR 4 DAYS.


02/13/17 22:41   Blood - Central Line   Blood Culture - Preliminary


                            Lactose Fermenting Neg Bacilli


02/12/17 21:40   Blood - Peripheral Venous   Blood Culture - Preliminary


                            NO GROWTH OBTAINED AFTER 48 HOURS, INCUBATION TO 

CONTINUE


                            FOR 3 DAYS.


02/12/17 21:40   Blood - Peripheral Venous   Blood Culture - Preliminary


                            NO GROWTH OBTAINED AFTER 48 HOURS, INCUBATION TO 

CONTINUE


                            FOR 3 DAYS.


02/12/17 21:40   Urine - Urine Clean Catch   Urine Culture - Final


                            NO GROWTH OBTAINED





Imaging





CXR: Mild cardiomegaly and probable mild pulmonary venous congestion. Interval 

better evaluation of the lower lobes 


U/S Markedly limited study with thick walled gallbladder, cholelithiasis and no 

evidence of significant biliary ductal dilatation.


ECHO: normal EF





ASSESSMENT/PLAN:





84 yo h/o COPD, [place], HTN, HLD, TIA, prostate CA, renal CA now s/p left 

nephrectomy was admitted to the ICU for acute respiratory failure and sepsis 

secondary to acute cholecystitis s/p percutaenous drain.





Neuro


   - off sedation


   - neuro checks





ID


   - septic shock 2/2 acute cholecystitis


   - cont. imipenem and flagyl


   - lactate normalized


   - f/u on final cultures


   - daily CBC


   


Pulm


   -cont. mechanical ventilation with current settings


      * maintain O2 sat. > 90%


      * will attempt SBT in due course


   -daily ABG and CXR





Cardio


   - cont. levophed (15mcg/hr) and dopamine (5mcg/hr)


   - maintain MAP > 65%


   - maintain CVP 8 -12





GI


   - GIB unlikely


   - acute cholecystitis s/p percutaneous drain


   - liver chemistries improved


      * cont. to monitor CMP


   - cont. PPI drip





Renal/


   - SANGITA 2/2 hypotension


      *cont. IVF


      *Cr improving


   - replete phos and K+ with K+phosph and KCl


   - monitor I/O





Heme 


   - monitor H/H


   - daily coag study


   - s/p 10 units of cryoprecipitate





Endo


   - on sliding scale





Prophylaxis


   - DVT: SCD


   - GI: protonix





Nutrition


   - NPO





Dispo: cont. to monitor in ICU





Visit type





- Emergency Visit


Emergency Visit: No





- New Patient


This patient is new to me today: No





- Critical Care


Critical Care patient: Yes


Total Critical Care Time (in minutes): 45


Critical Care Statement: The care of this patient involved high complexity 

decision making to prevent further life threatening deterioration of the patient

's condition and/or to evalute & treat vital organ system(s) failure or risk of 

failure.

## 2017-02-16 LAB
ALBUMIN SERPL-MCNC: 1.8 G/DL (ref 3.4–5)
ALP SERPL-CCNC: 156 U/L (ref 45–117)
ALT SERPL-CCNC: 45 U/L (ref 12–78)
ANION GAP SERPL CALC-SCNC: 11 MMOL/L (ref 8–16)
APTT BLD: 36.1 SECONDS (ref 26.9–34.4)
ART PUNCT SITE: (no result)
ARTERIAL PATENCY WRIST A: POSITIVE
AST SERPL-CCNC: 21 U/L (ref 15–37)
BASE EXCESS BLDA CALC-SCNC: 0.4 MEQ/L (ref -2–2)
BASOPHILS # BLD: 0.1 % (ref 0–2)
BILIRUB CONJ SERPL-MCNC: 0.5 MG/DL (ref 0–0.2)
BILIRUB SERPL-MCNC: 0.8 MG/DL (ref 0.2–1)
CALCIUM SERPL-MCNC: 7 MG/DL (ref 8.5–10.1)
CO2 SERPL-SCNC: 25 MMOL/L (ref 21–32)
CREAT SERPL-MCNC: 1.3 MG/DL (ref 0.7–1.3)
CRP SERPL-MCNC: 25.9 MG/DL (ref 0–0.3)
DEPRECATED RDW RBC AUTO: 15.3 % (ref 11.9–15.9)
EOSINOPHIL # BLD: 1.9 % (ref 0–4.5)
FIBRINOGEN PPP-MCNC: < 100 MG/DL (ref 238–498)
GLUCOSE SERPL-MCNC: 202 MG/DL (ref 74–106)
HCO3 BLDA-SCNC: 23.5 MEQ/L (ref 22–26)
INR BLD: 1.7 (ref 0.82–1.09)
MAGNESIUM SERPL-MCNC: 2 MG/DL (ref 1.8–2.4)
MCH RBC QN AUTO: 29.6 PG (ref 25.7–33.7)
MCHC RBC AUTO-ENTMCNC: 34.1 G/DL (ref 32–35.9)
MCV RBC: 86.6 FL (ref 80–96)
MECH. VENT.: (no result)
NEUTROPHILS # BLD: 88.7 % (ref 42.8–82.8)
PEEP SETTING VENT: 5 CMH2O
PHOSPHATE SERPL-MCNC: 1.8 MG/DL (ref 2.5–4.9)
PLATELET # BLD AUTO: 114 K/MM3 (ref 134–434)
PMV BLD: 8.5 FL (ref 7.5–11.1)
PO2 BLDA: 74.2 MMHG (ref 68–100)
PROT SERPL-MCNC: 4.2 G/DL (ref 6.4–8.2)
PT PNL PPP: 18.9 SEC (ref 9.98–11.88)
PT. ON O2?: YES
SAO2 % BLDA: 95.9 % (ref 90–98.9)
TROPONIN I SERPL-MCNC: 0.06 NG/ML (ref 0–0.05)
TYPE OF O2: (no result)
VENT RATE: 16
VT/PRESS: 450
WBC # BLD AUTO: 21.9 K/MM3 (ref 4–10)

## 2017-02-16 RX ADMIN — PANTOPRAZOLE SODIUM SCH MLS/HR: 40 INJECTION, POWDER, FOR SOLUTION INTRAVENOUS at 21:15

## 2017-02-16 RX ADMIN — DOPAMINE HYDROCHLORIDE SCH MLS/HR: 160 INJECTION, SOLUTION INTRAVENOUS at 07:00

## 2017-02-16 RX ADMIN — IPRATROPIUM BROMIDE AND ALBUTEROL SULFATE SCH AMP: .5; 3 SOLUTION RESPIRATORY (INHALATION) at 10:15

## 2017-02-16 RX ADMIN — METRONIDAZOLE SCH MLS/HR: 500 INJECTION, SOLUTION INTRAVENOUS at 01:45

## 2017-02-16 RX ADMIN — PROPOFOL SCH: 10 INJECTION, EMULSION INTRAVENOUS at 17:56

## 2017-02-16 RX ADMIN — INSULIN ASPART SCH UNITS: 100 INJECTION, SOLUTION INTRAVENOUS; SUBCUTANEOUS at 07:01

## 2017-02-16 RX ADMIN — IMIPENEM AND CILASTATIN SODIUM SCH MLS/HR: 500; 500 INJECTION, POWDER, FOR SOLUTION INTRAVENOUS at 01:45

## 2017-02-16 RX ADMIN — IPRATROPIUM BROMIDE AND ALBUTEROL SULFATE SCH AMP: .5; 3 SOLUTION RESPIRATORY (INHALATION) at 21:40

## 2017-02-16 RX ADMIN — INSULIN ASPART SCH: 100 INJECTION, SOLUTION INTRAVENOUS; SUBCUTANEOUS at 21:19

## 2017-02-16 RX ADMIN — DEXTROSE MONOHYDRATE SCH MLS/HR: 50 INJECTION, SOLUTION INTRAVENOUS at 01:45

## 2017-02-16 RX ADMIN — INSULIN ASPART SCH: 100 INJECTION, SOLUTION INTRAVENOUS; SUBCUTANEOUS at 17:24

## 2017-02-16 RX ADMIN — IMIPENEM AND CILASTATIN SODIUM SCH MLS/HR: 500; 500 INJECTION, POWDER, FOR SOLUTION INTRAVENOUS at 18:46

## 2017-02-16 RX ADMIN — PANTOPRAZOLE SODIUM SCH MLS/HR: 40 INJECTION, POWDER, FOR SOLUTION INTRAVENOUS at 09:56

## 2017-02-16 RX ADMIN — INSULIN ASPART SCH: 100 INJECTION, SOLUTION INTRAVENOUS; SUBCUTANEOUS at 11:24

## 2017-02-16 RX ADMIN — IMIPENEM AND CILASTATIN SODIUM SCH MLS/HR: 500; 500 INJECTION, POWDER, FOR SOLUTION INTRAVENOUS at 09:55

## 2017-02-16 RX ADMIN — ACETAMINOPHEN PRN MG: 10 INJECTION, SOLUTION INTRAVENOUS at 16:36

## 2017-02-16 RX ADMIN — ACETAMINOPHEN PRN MG: 325 TABLET ORAL at 13:46

## 2017-02-16 NOTE — PN
Progress Note (short form)





- Note


Progress Note: 


surgery  2/16/17





pt seen and examined.  still intubated and sedated.  on minimal levophed.  wbc 

improved.  good urine output.  cholecystotomy with 80 drainage of bile.





afebrile





abd- soft, nt, nt





  


Plan- acute cholecystitis s/p perc drainage with septic shock- based on 

physical exam and trans-hepatic approach unlikely bile peritonitis or injury to 

other viscera.  More likey bacteremia as cause. Being treated for gram negative 

sepsis bacteremia matching organism in bile.  possible eneroccocus in bile as 

well.  follow final cultures and sensitivity per ID.





agree with current management.   Eventual cholecystectomy as an outpt if 

medically a candidate.   Would not do inpatient cholecystectomy.





 











Surgery





2/14/17





pt seen and examined.  developed fever, septic shock, respiratory failure after 

intervention last night.  currently on high dose levophed and dopamine.  

sedated with propofol and fentanyl.





afebrile, tmax 103, 90/60 on pressors





gram stain of bile pending, cultures pending, bc pending





abd- soft, nt, nd (benign exam), cholecystostomy tube with bile present





Plan- acute cholecystitis s/p perc drainage with septic shock- based on 

physical exam and trashepatic approach unlikely bile peritonitis or injury to 

other viscera.  More likey bacteremia as cause. 





agree with current management.  would repeat ct in several days if no 

improvement.  Eventual cholecystectomy as an outpt if medically a candidate.

## 2017-02-16 NOTE — PN
Progress Note, Physician


History of Present Illness: 


Weaning off sedation, remains on levo gtt and mechanical ventilation.





- Current Medication List


Current Medications: 


Active Medications





Acetaminophen (Tylenol -)  650 mg PO Q4H PRN


   PRN Reason: FEVER OR PAIN


   Last Admin: 02/15/17 21:55 Dose:  650 mg


Albuterol/Ipratropium (Duoneb -)  1 amp NEB Q4H PRN


   PRN Reason: SHORTNESS OF BREATH


Albuterol/Ipratropium (Duoneb -)  1 amp NEB BID LAUREN


   Last Admin: 02/16/17 10:15 Dose:  1 amp


Norepinephrine Bitartrate 8, (000 mcg/ Dextrose)  500 mls @ 18.75 mls/hr IV 

TITR LAUREN; 5 MCG/MIN


   PRN Reason: Protocol


   Last Titration: 02/16/17 02:00 Dose:  11 mcg/min


Pantoprazole Sodium (Protonix 40mg Ivpb (Pre-Docked))  100 mls @ 200 mls/hr 

IVPB BID LAUREN


   Last Admin: 02/16/17 09:56 Dose:  200 mls/hr


Dopamine HCl/Dextrose (Dopamine 400 Mg/D5w -)  250 mls @ 13.387 mls/hr IVPB 

TITR LAUREN; 5 MCG/KG/MIN


   PRN Reason: Protocol


   Last Admin: 02/16/17 07:00 Dose:  5.355 mls/hr


Fentanyl 500 mcg/ Dextrose  100 mls @ 5 mls/hr IJ TITR LAUREN


   PRN Reason: 25 MCG/HR


   Last Admin: 02/16/17 03:45 Dose:  5 mls/hr


Fluconazole (Diflucan 200 Mg/Ns Premixed Ivpb -)  100 mls @ 100 mls/hr IVPB 

DAILY LAUREN


Imipenem/Cilastatin Sodium 500 (mg/ Sodium Chloride)  100 mls @ 200 mls/hr IVPB 

Q8H-IV LAUREN


   PRN Reason: Protocol


   Last Admin: 02/16/17 09:55 Dose:  200 mls/hr


Propofol (Diprivan -)  100 mls @ 2.457 mls/hr IVPB TITR LAUREN; 5 MCG/KG/MIN


   PRN Reason: Protocol


Insulin Aspart (Novolog Vial Sliding Scale -)  1 vial SQ ACHS LAUREN


   PRN Reason: Protocol


   Last Admin: 02/16/17 11:24 Dose:  Not Given











- Objective


Vital Signs: 


 Vital Signs











Temperature  100.7 F H  02/16/17 10:00


 


Pulse Rate  83   02/16/17 12:00


 


Respiratory Rate  14   02/16/17 12:00


 


Blood Pressure  110/51   02/16/17 12:00


 


O2 Sat by Pulse Oximetry (%)  95   02/16/17 11:00











Cardiovascular: Yes: Regular Rate and Rhythm


Respiratory: Yes: Diminished, Intubated, Mechanically Ventilated


Gastrointestinal: Yes: Normal Bowel Sounds, Soft


Edema: No


Labs: 


 CBC, BMP





 02/16/17 05:00 





 02/16/17 05:00 





 INR, PTT











INR  1.70  (0.82-1.09)  H  02/16/17  05:00    


 


Fibrinogen  < 100.0 mg/dL (238-498)  L*  02/16/17  05:00    














- ....Imaging


Chest X-ray: Report Reviewed (Congestion with bilateral effusions)


EKG: Report Reviewed (Tele: SR)





Problem List





- Problems


(1) SANGITA (acute kidney injury)


Code(s): N17.9 - ACUTE KIDNEY FAILURE, UNSPECIFIED





(2) Acute respiratory failure with hypoxia


Code(s): J96.01 - ACUTE RESPIRATORY FAILURE WITH HYPOXIA





(3) COPD (chronic obstructive pulmonary disease)


Code(s): J44.9 - CHRONIC OBSTRUCTIVE PULMONARY DISEASE, UNSPECIFIED   Qualifiers

: 


     COPD type: unspecified COPD        Qualified Code(s): J44.9 - Chronic 

obstructive pulmonary disease, unspecified  





(4) Cholecystitis, acute


Code(s): K81.0 - ACUTE CHOLECYSTITIS





(5) DIC (disseminated intravascular coagulation)


Code(s): D65 - DISSEMINATED INTRAVASCULAR COAGULATION





(6) Demand ischemia


Code(s): I24.8 - OTHER FORMS OF ACUTE ISCHEMIC HEART DISEASE





(7) Diastolic CHF


Code(s): I50.30 - UNSPECIFIED DIASTOLIC (CONGESTIVE) HEART FAILURE   Qualifiers

: 


     Congestive heart failure chronicity: chronic        Qualified Code(s): 

I50.32 - Chronic diastolic (congestive) heart failure  





(8) Gram-negative bacteremia


Code(s): R78.81 - BACTEREMIA





(9) HLD (hyperlipidemia)


Code(s): E78.5 - HYPERLIPIDEMIA, UNSPECIFIED   Qualifiers: 


     Hyperlipidemia type: pure hypercholesterolemia        Qualified Code(s): 

E78.0 - Pure hypercholesterolemia  





(10) HTN (hypertension)


Code(s): I10 - ESSENTIAL (PRIMARY) HYPERTENSION   Qualifiers: 


     Hypertension type: essential hypertension        Qualified Code(s): I10 - 

Essential (primary) hypertension  





(11) Hypokalemia


Code(s): E87.6 - HYPOKALEMIA





(12) Septic shock


Code(s): A41.9 - SEPSIS, UNSPECIFIED ORGANISM


R65.21 - SEVERE SEPSIS WITH SEPTIC SHOCK








Assessment/Plan


1. Acute hypoxic respiratory failure referable to septic shock (gram negative 

bacteremia) improving


2. Acute cholecystitis post cholecystostomy drain


3. Acute kidney injury with lactic acidosis improving


4. Demand ischemia


5. Hyperglycemia


6. Hypokalemia


7. History of TIA


8. History of COPD


9. History of diverticular disease


10. History of colitis


11. History of renal carcinoma post left nephrectomy


12. History of prostate carcinoma


13. HTN


14. Hyperlipidemia


13. DIC





PLAN:





1. Titirate pressors maintaining MAP's > 65, d/c ivf


2. Hold Carvedilol, Losartan and Amlodipine therapies pending hemodynamic 

stability


3. Monitor CBC and transfuse as needed maintaining Hg > 8.0, cryoprecipitate


4. Correction of Hypokalemia


5. Antibiotic as per ID with f/u C&S


6. Ventilator management as per the critical care team, BD as needed


7. DVT/GI prophylaxis

## 2017-02-16 NOTE — PN
GI Progress Note


Subjective: 


Off pressors


Still febrile


Noted bacteremic on Abx per ID


Bilious drainage from cholecystostomy





- Objective


Vital Signs: 


 Vital Signs











Temperature  100.7 F H  02/16/17 10:00


 


Pulse Rate  83   02/16/17 12:00


 


Respiratory Rate  20   02/16/17 13:54


 


Blood Pressure  110/51   02/16/17 12:00


 


O2 Sat by Pulse Oximetry (%)  95   02/16/17 11:00











Constitutional: Calm


Eyes: No: Sclera Icterus


Cardiovascular: Yes: Regular Rate and Rhythm


Respiratory: Yes: Diminished (at bases, poor inspiratory effort)


Gastrointestinal Inspection: Yes: Other (RUQ drain, bilious fluid).  No: 

Distention


...Auscultate: Yes: Hypoactive Bowel Sounds


...Palpate: No: Tenderness (no grimacing upon palpation)


...Percussion: No: Tympanitic


Neurological: Yes: Other (intubated)


Labs: 


 CBC, BMP





 02/16/17 05:00 





 02/16/17 05:00 





 INR, PTT











INR  1.70  (0.82-1.09)  H  02/16/17  05:00    


 


Fibrinogen  < 100.0 mg/dL (238-498)  L*  02/16/17  05:00    








 Hepatic Panel











Total Bilirubin  0.8 mg/dL (0.2-1.0)  D 02/16/17  05:00    


 


Direct Bilirubin  0.5 mg/dL (0.0-0.2)  H D 02/16/17  05:00    


 


AST  21 U/L (15-37)  D 02/16/17  05:00    


 


ALT  45 U/L (12-78)  D 02/16/17  05:00    


 


Alkaline Phosphatase  156 U/L ()  H  02/16/17  05:00    


 


Albumin  1.8 g/dl (3.4-5.0)  L  02/16/17  05:00    














Problem List





- Problems


(1) Cholecystitis, acute


Assessment/Plan: 


Now with cholecystostomy tube


On Abx per ID


Tube feeds should be considered


When more stable, formal cholangiogram via cholecystostomy


Surgical f/u


ID following








Code(s): K81.0 - ACUTE CHOLECYSTITIS

## 2017-02-16 NOTE — PN
Progress Note, Physician


Chief Complaint: 


ID








Followup for sepsis syndrome in the ICU





Remains intubated and still with pressor requirements





Imipenem continues empirically as cultures not final yet





- Current Medication List


Current Medications: 


Active Medications





Acetaminophen (Tylenol -)  650 mg PO Q4H PRN


   PRN Reason: FEVER OR PAIN


   Last Admin: 02/15/17 21:55 Dose:  650 mg


Albuterol/Ipratropium (Duoneb -)  1 amp NEB Q4H PRN


   PRN Reason: SHORTNESS OF BREATH


Albuterol/Ipratropium (Duoneb -)  1 amp NEB BID LAUREN


   Last Admin: 02/15/17 22:19 Dose:  1 amp


Metronidazole (Flagyl 500mg Premixed Ivpb -)  100 mls @ 100 mls/hr IVPB Q8H-IV 

LAUREN


   Last Admin: 02/16/17 01:45 Dose:  100 mls/hr


Norepinephrine Bitartrate 8, (000 mcg/ Dextrose)  500 mls @ 18.75 mls/hr IV 

TITR LAUREN; 5 MCG/MIN


   PRN Reason: Protocol


   Last Admin: 02/16/17 01:45 Dose:  41.25 mls/hr


Pantoprazole Sodium (Protonix 40mg Ivpb (Pre-Docked))  100 mls @ 200 mls/hr 

IVPB BID LAUREN


   Last Admin: 02/15/17 21:40 Dose:  200 mls/hr


Dopamine HCl/Dextrose (Dopamine 400 Mg/D5w -)  250 mls @ 13.387 mls/hr IVPB 

TITR LAUREN; 5 MCG/KG/MIN


   PRN Reason: Protocol


   Last Titration: 02/15/17 14:10 Dose:  5 mcg/kg/min


Imipenem/Cilastatin Sodium 500 (mg/ Sodium Chloride)  100 mls @ 200 mls/hr IVPB 

Q8H-IV LAUERN


   PRN Reason: Protocol


   Last Admin: 02/16/17 01:45 Dose:  200 mls/hr


Midazolam HCl 100 mg/ Sodium (Chloride)  100 mls @ 5 mls/hr IVPB TITR LAUREN; 5 MG/

HR


   PRN Reason: Protocol


   Last Admin: 02/15/17 21:56 Dose:  6 mls/hr


Fentanyl 500 mcg/ Dextrose  100 mls @ 5 mls/hr IJ TITR LAUREN


   PRN Reason: 25 MCG/HR


Insulin Aspart (Novolog Vial Sliding Scale -)  1 vial SQ ACHS LAUREN


   PRN Reason: Protocol


   Last Admin: 02/15/17 21:57 Dose:  2 units











- Objective


Vital Signs: 


 Vital Signs











Temperature  100.3 F H  02/16/17 06:00


 


Pulse Rate  83   02/16/17 06:00


 


Respiratory Rate  18   02/16/17 06:00


 


Blood Pressure  119/55   02/16/17 06:00


 


O2 Sat by Pulse Oximetry (%)  95   02/15/17 21:02











Constitutional: Yes: Other (Intubated)


HENT: Yes: WNL, Atraumatic


Neck: Yes: WNL, Supple, Other (Central lne right)


Respiratory: Yes: WNL, Regular, CTA Bilaterally, Rhonchi.  No: Rales, Wheezes


Gastrointestinal: Yes: WNL, Normal Bowel Sounds, Soft, Other (RUQ GB drain).  No

: Splenomegaly, Tenderness, Tenderness, Rebound


Genitourinary: Yes: [place] Present, Other ([place]).  No: Hematuria


Extremities: No: Cold, Cool, Erythema


Edema: No


Integumentary: No: Rash


Labs: 


 CBC, BMP





 02/16/17 05:00 





 02/16/17 05:00 





 INR, PTT











INR  1.70  (0.82-1.09)  H  02/16/17  05:00    


 


Fibrinogen  123.0 mg/dL (238-498)  L D 02/15/17  04:55    














Problem List





- Problems


(1) Acute respiratory failure with hypoxia


Code(s): J96.01 - ACUTE RESPIRATORY FAILURE WITH HYPOXIA





(2) Cholecystitis, acute


Code(s): K81.0 - ACUTE CHOLECYSTITIS





(3) DIC (disseminated intravascular coagulation)


Code(s): D65 - DISSEMINATED INTRAVASCULAR COAGULATION





(4) Gram-negative bacteremia


Code(s): R78.81 - BACTEREMIA








Assessment/Plan


Microbiology





02/13/17 22:46   Sputum - Endotrachea Suction/Ventilator   Gram Stain - Final


02/13/17 17:30   Body Fluid - Other   Gram Stain - Final


02/13/17 22:46   Sputum - Endotrachea Suction/Ventilator   Sputum Culture - 

Preliminary


                              Yeast Like Organism


02/13/17 22:41   Blood - Central Line   Blood Culture - Preliminary


                              NO GROWTH OBTAINED AFTER 24 HOURS, INCUBATION TO 

CONTINUE


                              FOR 4 DAYS.


02/13/17 22:41   Blood - Central Line   Blood Culture - Preliminary


                              Lactose Fermenting Neg Bacilli


02/13/17 17:30   Body Fluid - Other   Body Fluid Culture - Preliminary


                              Lactose Fermenting Neg Bacilli


                              Group D Strep Or Entero Coccus





 Laboratory Tests











  02/15/17 02/15/17 02/16/17





  04:55 07:20 05:00


 


WBC    21.9 H


 


Hgb    11.1 L


 


Hct    32.5 L


 


Plt Count    114 L


 


INR   


 


Fibrinogen  123.0 L D  


 


ABG pH   7.35 


 


ABG pCO2 at Pt Temp   40.1 


 


ABG pO2 at Pt Temp   84.8  D 


 


Oxygen Flow Rate   40% 


 


BUN   


 


Creatinine   


 


Creat Clearance w eGFR   


 


Direct Bilirubin   


 


Alkaline Phosphatase   














  02/16/17 02/16/17





  05:00 05:00


 


WBC  


 


Hgb  


 


Hct  


 


Plt Count  


 


INR  1.70 H 


 


Fibrinogen  


 


ABG pH  


 


ABG pCO2 at Pt Temp  


 


ABG pO2 at Pt Temp  


 


Oxygen Flow Rate  


 


BUN   21 H


 


Creatinine   1.3


 


Creat Clearance w eGFR   52.47


 


Direct Bilirubin   0.5 H D


 


Alkaline Phosphatase   156 H








Assessment


Septic shock biliary source


Cholecystitis post cholecystotomy drain


Gram negative neda bacteremia


Drain with GNB and Enteroccus to be identified


DIC secondary GNR sepsis


SANGITA





Plan


Continue Imipenem pending final cultures ( this has enterococcal coverage)


Await ID of isolates


Add Fluconazole at critically ill and high risk for Candida sepsis


Remains critical





38 minutes ICU time spent


Anum CARBALLO

## 2017-02-16 NOTE — PN
Teaching Attending Note


Name of Resident: Zaire Phillips


ATTENDING PHYSICIAN STATEMENT





I saw and evaluated the patient.


I reviewed the resident's note and discussed the case with the resident.


I agree with the resident's findings and plan as documented.








SUBJECTIVE:





Pt seen and examined in the ICU. Remains intubated, sedated. On levophed gtt 

5mcg/min. On volume assist control with 35% FiO2.





OBJECTIVE:





 Last Vital Signs











Temp Pulse Resp BP Pulse Ox


 


 100.7 F H  82   23   110/50   99 


 


 02/16/17 10:00  02/16/17 10:00  02/16/17 10:20  02/16/17 10:00  02/16/17 09:00








 Intake & Output











 02/13/17 02/14/17 02/15/17 02/16/17





 23:59 23:59 23:59 23:59


 


Intake Total 1250 32308 4723.2 851.0


 


Output Total 650 5580 2910 1800


 


Balance 600 6731 1813.2 -949.0


 


Weight 157 lb 6.4 oz 174 lb 9.698 oz  180 lb 9 oz








Gen:  intubated, sedated


Heart:  RRR


Lung: decreased breath sounds at the bases


Abd: soft, nontender, +cholecystostomy with dark bilious fluid


Ext: + edema





 CBC, BMP





 02/16/17 05:00 





 02/16/17 05:00 





Active Medications





Acetaminophen (Tylenol -)  650 mg PO Q4H PRN


   PRN Reason: FEVER OR PAIN


   Last Admin: 02/15/17 21:55 Dose:  650 mg


Albuterol/Ipratropium (Duoneb -)  1 amp NEB Q4H PRN


   PRN Reason: SHORTNESS OF BREATH


Albuterol/Ipratropium (Duoneb -)  1 amp NEB BID LAUREN


   Last Admin: 02/16/17 10:15 Dose:  1 amp


Norepinephrine Bitartrate 8, (000 mcg/ Dextrose)  500 mls @ 18.75 mls/hr IV 

TITR LAUREN; 5 MCG/MIN


   PRN Reason: Protocol


   Last Titration: 02/16/17 02:00 Dose:  11 mcg/min


Pantoprazole Sodium (Protonix 40mg Ivpb (Pre-Docked))  100 mls @ 200 mls/hr 

IVPB BID LAUREN


   Last Admin: 02/16/17 09:56 Dose:  200 mls/hr


Dopamine HCl/Dextrose (Dopamine 400 Mg/D5w -)  250 mls @ 13.387 mls/hr IVPB 

TITR LAUREN; 5 MCG/KG/MIN


   PRN Reason: Protocol


   Last Admin: 02/16/17 07:00 Dose:  5.355 mls/hr


Fentanyl 500 mcg/ Dextrose  100 mls @ 5 mls/hr IJ TITR LAUREN


   PRN Reason: 25 MCG/HR


   Last Admin: 02/16/17 03:45 Dose:  5 mls/hr


Fluconazole (Diflucan 200 Mg/Ns Premixed Ivpb -)  100 mls @ 100 mls/hr IVPB 

DAILY LAUREN


Potassium Phosphate 20 mm/ (Sodium Chloride)  256.6667 mls @ 64.16 mls/hr IVPB 

ONCE ONE


   PRN Reason: 20 MM/4 HR


   Stop: 02/16/17 12:46


   Last Admin: 02/16/17 10:31 Dose:  64.16 mls/hr


Imipenem/Cilastatin Sodium 500 (mg/ Sodium Chloride)  100 mls @ 200 mls/hr IVPB 

Q8H-IV LAUREN


   PRN Reason: Protocol


   Last Admin: 02/16/17 09:55 Dose:  200 mls/hr


Propofol (Diprivan -)  100 mls @ 2.457 mls/hr IVPB PRN LAUREN; 5 MCG/KG/MIN


   PRN Reason: Protocol


Insulin Aspart (Novolog Vial Sliding Scale -)  1 vial SQ ACHS LAUREN


   PRN Reason: Protocol


   Last Admin: 02/16/17 11:24 Dose:  Not Given








ASSESSMENT AND PLAN:


Acute Cholecystitis


s/p Cholecystostomy Placement


Profound Septic Shock improving


Acute Hypoxic Respiratory Failure


Acute Kidney Injury


Lactic Acidosis resolved


Demand Ischemia


RCC s/p Left Nephrectomy


DIC


COPD


HTN


Hyperlipidemia





-  antibiotics per ID


-  f/u cultures


-  monitor cholecystostomy drainage


-  IVF to keep CVP 8-12


-  titrate pressors to maintain MAP >65


-  replete lytes


-  transfuse cryoprecipitate today


-  monitor CBC, coags, fibrinogen level


-  continue volume assist control


-  FiO2 to keep SpO2 >90%


-  lighten sedation in AM to assess mental status


-  start spontaneous breathing trials when mental status improves


-  NPO


-  DVT/GI prophylaxis


-  continue ICU monitoring

## 2017-02-16 NOTE — PN
<Buster Ramirez - Last Filed: 02/16/17 10:59>


Physical Exam: 


SUBJECTIVE: Patient seen and examined at bedside in ICU. Pt has been off 

sedation since approximately 7 this morning and dopamine has been stopped this 

AM as well.  Pt currently is only on norepi. Despite being off sedation pt is 

still currently unarousable.








OBJECTIVE:





 


 Vital Signs











Temperature  100.3 F H  02/16/17 07:00


 


Pulse Rate  84   02/16/17 07:00


 


Respiratory Rate  20   02/16/17 07:13


 


Blood Pressure  140/65   02/16/17 07:00


 


O2 Sat by Pulse Oximetry (%)  95   02/15/17 21:02














GENERAL: Sedated, on ventilator/intubated.


HEAD: Normal with no signs of trauma.


EYES: Constricted pupils. Reactive to light.


ENT: moist mucous membranes. OG tube in place. Some bilious fluid in tube.


NECK: Trachea midline


LUNGS: Auscultated anteriorly. Diminished breath sounds. 


HEART: Distant heart sounds, Regular rate and rhythm, S1, S2 without murmur, 

rub or gallop.


ABDOMEN: Grimacing on RUQ palpation. Hypoactive bowel sounds.


EXTREMITIES: 2+ pulses, warm, well-perfused, no edema. 


NEUROLOGICAL:  gait not observed. Sedated.


PSYCH: Sedated.


SKIN: Warm, dry, normal turgor, no rashes or lesions noted











 Laboratory Results - last 24 hr











  02/14/17 02/14/17 02/15/17





  11:18 11:22 11:35


 


WBC   


 


RBC   


 


Hgb   


 


Hct   


 


MCV   


 


MCHC   


 


RDW   


 


Plt Count   


 


MPV   


 


Neutrophils %   


 


Lymphocytes %   


 


Monocytes %   


 


Eosinophils %   


 


Basophils %   


 


INR   


 


PTT (Actin FS)   


 


Fibrinogen   


 


Puncture Site   


 


ABG pH   


 


ABG pCO2 at Pt Temp   


 


ABG pO2 at Pt Temp   


 


ABG HCO3   


 


ABG O2 Sat (Measured)   


 


ABG O2 Content   


 


ABG Base Excess   


 


Sb Test   


 


O2 Delivery Device   


 


Oxygen Flow Rate   


 


Vent Mode   


 


Vent Rate   


 


Mechanical Rate   


 


PEEP   


 


Pressure Support Vent   


 


Sodium    136


 


Potassium    3.4 L


 


Chloride    102


 


Carbon Dioxide    24


 


Anion Gap    10


 


BUN    24 H


 


Creatinine    1.5 H


 


Creat Clearance w eGFR    44.48


 


POC Glucometer  > 400  > 400 


 


Random Glucose    213 H D


 


Lactic Acid   


 


Calcium    6.8 L*


 


Phosphorus    1.8 L


 


Magnesium    2.0


 


Total Bilirubin    1.1 H D


 


Direct Bilirubin   


 


AST    32  D


 


ALT    66


 


Alkaline Phosphatase    155 H


 


Creatine Kinase    53


 


Troponin I    0.12 H D


 


Total Protein    1.7 L D


 


Albumin    1.7 L














  02/16/17 02/16/17 02/16/17





  05:00 05:00 05:00


 


WBC  21.9 H  


 


RBC  3.75 L  


 


Hgb  11.1 L  


 


Hct  32.5 L  


 


MCV  86.6  


 


MCHC  34.1  


 


RDW  15.3  


 


Plt Count  114 L  


 


MPV  8.5  


 


Neutrophils %  88.7 H  


 


Lymphocytes %  5.7 L D  


 


Monocytes %  3.6 L  


 


Eosinophils %  1.9  


 


Basophils %  0.1  


 


INR   1.70 H 


 


PTT (Actin FS)   36.1 H 


 


Fibrinogen   < 100.0 L* 


 


Puncture Site   


 


ABG pH   


 


ABG pCO2 at Pt Temp   


 


ABG pO2 at Pt Temp   


 


ABG HCO3   


 


ABG O2 Sat (Measured)   


 


ABG O2 Content   


 


ABG Base Excess   


 


Sb Test   


 


O2 Delivery Device   


 


Oxygen Flow Rate   


 


Vent Mode   


 


Vent Rate   


 


Mechanical Rate   


 


PEEP   


 


Pressure Support Vent   


 


Sodium    140


 


Potassium    3.1 L


 


Chloride    104


 


Carbon Dioxide    25


 


Anion Gap    11


 


BUN    21 H


 


Creatinine    1.3


 


Creat Clearance w eGFR    52.47


 


POC Glucometer   


 


Random Glucose    202 H


 


Lactic Acid   


 


Calcium    7.0 L


 


Phosphorus    1.8 L


 


Magnesium    2.0


 


Total Bilirubin    0.8  D


 


Direct Bilirubin    0.5 H D


 


AST    21  D


 


ALT    45  D


 


Alkaline Phosphatase    156 H


 


Creatine Kinase   


 


Troponin I   


 


Total Protein    4.2 L D


 


Albumin    1.8 L














  02/16/17 02/16/17 02/16/17





  05:00 05:00 07:45


 


WBC   


 


RBC   


 


Hgb   


 


Hct   


 


MCV   


 


MCHC   


 


RDW   


 


Plt Count   


 


MPV   


 


Neutrophils %   


 


Lymphocytes %   


 


Monocytes %   


 


Eosinophils %   


 


Basophils %   


 


INR   


 


PTT (Actin FS)   


 


Fibrinogen   


 


Puncture Site    Right radial


 


ABG pH    7.45


 


ABG pCO2 at Pt Temp    34.1 L


 


ABG pO2 at Pt Temp    74.2


 


ABG HCO3    23.5


 


ABG O2 Sat (Measured)    95.9


 


ABG O2 Content    14.6 L


 


ABG Base Excess    0.4


 


Sb Test    Positive


 


O2 Delivery Device    Mec.vent


 


Oxygen Flow Rate    35%


 


Vent Mode    A/c


 


Vent Rate    16


 


Mechanical Rate    Esprit


 


PEEP    5.0


 


Pressure Support Vent    450


 


Sodium   


 


Potassium   


 


Chloride   


 


Carbon Dioxide   


 


Anion Gap   


 


BUN   


 


Creatinine   


 


Creat Clearance w eGFR   


 


POC Glucometer   


 


Random Glucose   


 


Lactic Acid  1.538  


 


Calcium   


 


Phosphorus   


 


Magnesium   


 


Total Bilirubin   


 


Direct Bilirubin   


 


AST   


 


ALT   


 


Alkaline Phosphatase   


 


Creatine Kinase   26 L 


 


Troponin I   0.06 H D 


 


Total Protein   


 


Albumin   








 ABG Results











ABG pH  7.45  (7.35-7.45)   02/16/17  07:45    


 


ABG pCO2 at Pt Temp  34.1 mmHg (35-45)  L  02/16/17  07:45    


 


ABG pO2 at Pt Temp  74.2 mmHg ()   02/16/17  07:45    


 


ABG HCO3  23.5 meq/L (22-26)   02/16/17  07:45    


 


ABG O2 Sat (Measured)  95.9 % (90-98.9)   02/16/17  07:45    


 


ABG O2 Content  14.6 % vol (15-22)  L  02/16/17  07:45    


 


ABG Base Excess  0.4 meq/l (-2-2)   02/16/17  07:45    








 Microbiology





02/12/17 21:40   Blood - Peripheral Venous   Blood Culture - Preliminary


                            NO GROWTH OBTAINED AFTER 72 HOURS, INCUBATION TO 

CONTINUE


                            FOR 2 DAYS.


02/12/17 21:40   Blood - Peripheral Venous   Blood Culture - Preliminary


                            NO GROWTH OBTAINED AFTER 72 HOURS, INCUBATION TO 

CONTINUE


                            FOR 2 DAYS.


02/13/17 17:30   Body Fluid - Other   Gram Stain - Final


02/13/17 17:30   Body Fluid - Other   Body Fluid Culture - Preliminary


                            Lactose Fermenting Neg Bacilli


                            Group D Strep Or Entero Coccus


02/13/17 22:46   Sputum - Endotrachea Suction/Ventilator   Gram Stain - Final


02/13/17 22:46   Sputum - Endotrachea Suction/Ventilator   Sputum Culture - 

Preliminary


                            Yeast Like Organism


02/13/17 22:41   Blood - Central Line   Blood Culture - Preliminary


                            NO GROWTH OBTAINED AFTER 24 HOURS, INCUBATION TO 

CONTINUE


                            FOR 4 DAYS.


02/13/17 22:41   Blood - Central Line   Blood Culture - Preliminary


                            Lactose Fermenting Neg Bacilli


02/12/17 21:40   Urine - Urine Clean Catch   Urine Culture - Final


                            NO GROWTH OBTAINED








Active Medications











Generic Name Dose Route Start Last Admin





  Trade Name Freq  PRN Reason Stop Dose Admin


 


Acetaminophen  650 mg 02/13/17 23:33 02/15/17 21:55





  Tylenol -  PO   650 mg





  Q4H PRN   Administration





  FEVER OR PAIN   


 


Albuterol/Ipratropium  1 amp 02/13/17 23:33  





  Duoneb -  NEB   





  Q4H PRN   





  SHORTNESS OF BREATH   


 


Albuterol/Ipratropium  1 amp 02/14/17 10:00 02/15/17 22:19





  Duoneb -  NEB   1 amp





  BID LAUREN   Administration


 


Metronidazole  100 mls @ 100 mls/hr 02/14/17 08:00 02/16/17 01:45





  Flagyl 500mg Premixed Ivpb -  IVPB   100 mls/hr





  Q8H-IV LAUREN   Administration


 


Norepinephrine Bitartrate 8,  500 mls @ 18.75 mls/hr 02/14/17 00:30 02/16/17 02:

00





  000 mcg/ Dextrose  IV   11 mcg/min





  TITR LAUREN   Titration





  Protocol   





  5 MCG/MIN   


 


Pantoprazole Sodium  100 mls @ 200 mls/hr 02/14/17 10:00 02/15/17 21:40





  Protonix 40mg Ivpb (Pre-Docked)  IVPB   200 mls/hr





  BID LAUREN   Administration


 


Dopamine HCl/Dextrose  250 mls @ 13.387 mls/hr 02/14/17 07:00 02/16/17 07:00





  Dopamine 400 Mg/D5w -  IVPB   5.355 mls/hr





  TITR LAUREN   Administration





  Protocol   





  5 MCG/KG/MIN   


 


Imipenem/Cilastatin Sodium 500  100 mls @ 200 mls/hr 02/14/17 13:00 02/16/17 01:

45





  mg/ Sodium Chloride  IVPB   200 mls/hr





  Q8H-IV LAUREN   Administration





  Protocol   


 


Midazolam HCl 100 mg/ Sodium  100 mls @ 5 mls/hr 02/14/17 21:45 02/15/17 21:56





  Chloride  IVPB   6 mls/hr





  TITR LAUREN   Administration





  Protocol   





  5 MG/HR   


 


Fentanyl 500 mcg/ Dextrose  100 mls @ 5 mls/hr 02/16/17 03:45 02/16/17 03:45





  IJ   5 mls/hr





  TITR LAUREN   Administration





  25 MCG/HR   


 


Fluconazole  100 mls @ 100 mls/hr 02/17/17 10:00  





  Diflucan 200 Mg/Ns Premixed Ivpb -  IVPB   





  DAILY LAUREN   


 


Fluconazole  200 mls @ 100 mls/hr 02/16/17 08:45  





  Diflucan 400 Mg/Ns Premixed Ivpb -  IVPB 02/16/17 10:44  





  ONCE ONE   


 


Potassium Phosphate 20 mm/  256.6667 mls @ 62.5 mls/hr 02/16/17 08:46  





  Sodium Chloride  IVPB 02/16/17 12:52  





  ONCE ONE   


 


Insulin Aspart  1 vial 02/14/17 22:00 02/16/17 07:01





  Novolog Vial Sliding Scale -  SQ   2 units





  ACHS LAUREN   Administration





  Protocol   


 


Potassium Chloride  20 meq 02/16/17 08:48  





  Potassium Chloride 20 Meq Premix Ivpb -  IVPB 02/16/17 08:49  





  ONCE ONE   











ASSESSMENT/PLAN:


84 y/o M w/ PMH of [place], HTN, HLD, prostate ca, TIA, Kidney cancer s/p 

nephrectomy, COPD presented to ER with abdominal pain (RUQ) for 3 days. 

Admitted for acute cholecystitis. Perc cholecystostomy placed on 2/13/17. Pt 

developed SOB after procedure, was intubated, had central line placed, on 

pressors and sedated and in ICU for septic shock.





-Septic shock secondary to Acute Cholecystitis from cholelithiasis


   -improving


   -Febrile, Tmax 24 hours: 101.6; WBC: 22 (improving)


   -On pressors: norepi, titrate to keep MAP > 65


   -CT abd/pelvis: 2/13/17 - Significantly dilated/enlarged gallbladder with 

diffuse thickening of its wall and possible minimal pericholecystic free fluid 

consistent with acute cholecystitis.


   -Abd U/S: 2/14/17 - gallbladder has small calculi. No evidence of intra or 

extrahepatic duct dilatation. CBD = 7mm. Thick gallbladder wall, cholelithiasis

, no evidence of sig biliary ductal dilatation.


   -LFTs trending down.


   -Lactic acid trending down: now at 1.5, trending down


   -Abx : imipenem/flagyl as per ID; ID on board


   -Spcx: yeast like organism, at risk for candida sepsis and diflucan added by 

ID


   -repeat BCx -1/2 bottles: Lactose fermenting Gram Neg bacilli (possibly e. 

coli, awaiting final organism); Group D strep or enterococcus


   -Body fluid cx (from drain): Lactose fermenting Gram neg bacilli


   -Surgery on board





-Acute hypoxic respiratory failure


   -off sedation this AM


   -Intubated, on vent: 35% FiO2, 16 RR, 5 PEEP, 450 TV


   -ABG in AM


   -ABG today: 7.45/34/74


   -wean trials, vent management as per ICU team





-Anemia secondary to Upper GI bleed


   -resolving, bilious fluid drainage now; 50 ml drained yesterday


   -OG tube in place


   -H/H stable


   -EGD when more stable


   -GI on board


   -c/w protonix





-Elevated trops


   -mildly elevated, 0.12 peak, last trop 0.06


   -most likely secondary to demand ischemia from septic shock


   -Echo: 2/14/17 - EF: 63%, LV nl size, LVSF nl, no regional wall abnl noted, 

LV impaired relaxation, mild MR, mod TR, mild AS, no pericardial effusion.





-Diastolic CHF:


   -CXR: 2/15/17: R moderate pleural effusion. L small pleural effusion.


   -ECHO: 2/14/17 - EF: 63%, LV nl size, LVSF nl, no regional wall abnl noted, 

LV impaired relaxation, mild MR, mod TR, mild AS, no pericardial effusion.


   -not on fluids currently.





-Transaminitis


   -Secondary to septic shock


   -INR elevated as well, trending down


   -Monitor LFTs, LFTs currently trending down





-SANGITA secondary to septic shock


   -improving


   -BUN/Cr 21/1.3


   -Producing urine at the moment


   -monitor urine output, BUN/Cr





-Ileus secondary to cholecystitis


   -will monitor 





-Possible DIC secondary to septic shock


   -s/p cyroprecipitate


   -fibrinogen again trending down





-Hyperglycemia


   -Monitor sugars


   -Currently random glucose 173





-HTN


   -hold anti-hypertensives in light of septic shock





-[place]


   -Intubated, on vent





-Hx of TIA


   -asa held





-HLD


   -held lipitor 10 mg PO qhs





-insomnia


   -held melatonin 5 mg po qhs prn





-BPH


   -held flomax 0.8 mg po qd


   -held finasteride 5 mg po qd





-DVT ppx


   -SCDs; heparin held due to coffee ground emesis/upper gi bleed





-FEN


   -no fluids for now.


   -hypokalemia - 20 meq kcl ordered, monitor lytes


   -hypophosphatemia - 20 mmol k-phos ordered, monitor


   -NPO, once more stable can start enteral feeds as per dietary 

recommendations.





-Dispo:


   -Monitor in ICU.





Problem List





- Problems


(1) COPD (chronic obstructive pulmonary disease)


Code(s): J44.9 - CHRONIC OBSTRUCTIVE PULMONARY DISEASE, UNSPECIFIED   Qualifiers

: 


     COPD type: unspecified COPD        Qualified Code(s): J44.9 - Chronic 

obstructive pulmonary disease, unspecified  





(2) Cholecystitis, acute


Code(s): K81.0 - ACUTE CHOLECYSTITIS





(3) Fever


Code(s): R50.9 - FEVER, UNSPECIFIED   Qualifiers: 


     Fever type: other        Qualified Code(s): R50.81 - Fever presenting with 

conditions classified elsewhere  





(4) Leukocytosis


Code(s): D72.829 - ELEVATED WHITE BLOOD CELL COUNT, UNSPECIFIED   Qualifiers: 


     Leukocytosis type: unspecified        Qualified Code(s): D72.829 - 

Elevated white blood cell count, unspecified  





(5) Sleep apnea


Code(s): G47.30 - SLEEP APNEA, UNSPECIFIED   





(6) Acute urinary retention


Code(s): R33.8 - OTHER RETENTION OF URINE





(7) Insomnia


Code(s): G47.00 - INSOMNIA, UNSPECIFIED   





(8) HTN (hypertension)


Code(s): I10 - ESSENTIAL (PRIMARY) HYPERTENSION   Qualifiers: 


     Hypertension type: essential hypertension        Qualified Code(s): I10 - 

Essential (primary) hypertension  





(9) HLD (hyperlipidemia)


Code(s): E78.5 - HYPERLIPIDEMIA, UNSPECIFIED   Qualifiers: 


     Hyperlipidemia type: pure hypercholesterolemia        Qualified Code(s): 

E78.0 - Pure hypercholesterolemia  





(10) BPH (benign prostatic hyperplasia)


Code(s): N40.0 - BENIGN PROSTATIC HYPERPLASIA WITHOUT LOWER URINRY TRACT SYMP   





(11) Septic shock


Code(s): A41.9 - SEPSIS, UNSPECIFIED ORGANISM


R65.21 - SEVERE SEPSIS WITH SEPTIC SHOCK





(12) Acute respiratory failure with hypoxia


Code(s): J96.01 - ACUTE RESPIRATORY FAILURE WITH HYPOXIA





(13) SANGITA (acute kidney injury)


Code(s): N17.9 - ACUTE KIDNEY FAILURE, UNSPECIFIED





(14) Transaminitis


Code(s): R74.0 - NONSPEC ELEV OF LEVELS OF TRANSAMNS & LACTIC ACID DEHYDRGNSE





(15) Upper GI bleed


Code(s): K92.2 - GASTROINTESTINAL HEMORRHAGE, UNSPECIFIED





(16) Elevated troponin


Code(s): R79.89 - OTHER SPECIFIED ABNORMAL FINDINGS OF BLOOD CHEMISTRY





(17) Demand ischemia


Code(s): I24.8 - OTHER FORMS OF ACUTE ISCHEMIC HEART DISEASE





(18) DIC (disseminated intravascular coagulation)


Code(s): D65 - DISSEMINATED INTRAVASCULAR COAGULATION





(19) Hypophosphatemia


Code(s): E83.39 - OTHER DISORDERS OF PHOSPHORUS METABOLISM





(20) Hypokalemia


Code(s): E87.6 - HYPOKALEMIA





(21) Anemia due to GI blood loss


Code(s): D50.0 - IRON DEFICIENCY ANEMIA SECONDARY TO BLOOD LOSS (CHRONIC)





(22) Diastolic CHF


Code(s): I50.30 - UNSPECIFIED DIASTOLIC (CONGESTIVE) HEART FAILURE   Qualifiers

: 


     Congestive heart failure chronicity: chronic        Qualified Code(s): 

I50.32 - Chronic diastolic (congestive) heart failure  








Visit type





- Emergency Visit


Emergency Visit: Yes


ED Registration Date: 02/13/17


Care time: The patient presented to the Emergency Department on the above date 

and was hospitalized for further evaluation of their emergent condition.





- New Patient


This patient is new to me today: No





- Critical Care


Critical Care patient: Yes


Total Critical Care Time (in minutes): 39


Critical Care Statement: The care of this patient involved high complexity 

decision making to prevent further life threatening deterioration of the patient

's condition and/or to evalute & treat vital organ system(s) failure or risk of 

failure.





<Fabian Hale - Last Filed: 02/16/17 19:11>


Physical Exam: 


ATTENDING PHYSICIAN STATEMENT





I saw and evaluated the patient.


I reviewed the resident's note and discussed the case with the resident.


I agree with the resident's findings and plan as documented.








SUBJECTIVE:


seen and evaluated at the bedside





OBJECTIVE:


intubated and sedated





ASSESSMENT AND PLAN:


85 year old man admitted for sepsis due to acute cholecystitis





Sepsis


-s/p drainage by IR


-pt currently intubated and sedated on levophed and dopamine in septic shock


-now on imipenem/flagyl as per ID recs


-vent management as per pulm





Acute blood loss anemia due to upper GI Bleed


-pt currently intubated and sedated 


-on protonix IV


-Hb trended down from 13 to 11 after hematemesis but has been stable since


-follow up with GI attending for possible upper endoscopy when stable for 

procedure

## 2017-02-16 NOTE — PN
Physical Exam: 


SUBJECTIVE: 





Patient seen and examined at bedside in the ICU. On sedation overnight due to 

agitation. Off all sedation and dopamine in AM and remained unarousable. Still 

intubated and only respond to painful stimuli. Per nurse, low grade fever 

overnight but no other acute event noted.








OBJECTIVE:





 Vital Signs











 Period  Temp  Pulse  Resp  BP Sys/Yun  Pulse Ox


 


 Last 24 Hr  99.4 F-101.6 F    12-26  /41-75  95-99








GENERAL: off sedation and on pressors support and mechanical ventilation


EYES: bilateral pin point pupils, non-reactive


NECK: triple lumen central line in place 


LUNGS: CTAB


HEART: Regular rate and rhythm, S1, S2 without murmur, rub or gallop.


ABDOMEN: Soft, facial grimaces upon deep palpations, nondistended, hypoactive 

bowel sounds. RUQ percutaneous drain in dark yellow/brown color  


EXTREMITIES: warm and no edema 


NEUROLOGICAL: unable to assess


: muñiz in place, clear urine output











 Laboratory Results - last 24 hr











  02/13/17 02/15/17 02/16/17





  06:50 11:35 05:00


 


WBC    21.9 H


 


RBC    3.75 L


 


Hgb    11.1 L


 


Hct    32.5 L


 


MCV    86.6


 


MCHC    34.1


 


RDW    15.3


 


Plt Count    114 L


 


MPV    8.5


 


Neutrophils %    88.7 H


 


Lymphocytes %    5.7 L D


 


Monocytes %    3.6 L


 


Eosinophils %    1.9


 


Basophils %    0.1


 


INR   


 


PTT (Actin FS)   


 


Fibrinogen   


 


Puncture Site   


 


ABG pH   


 


ABG pCO2 at Pt Temp   


 


ABG pO2 at Pt Temp   


 


ABG HCO3   


 


ABG O2 Sat (Measured)   


 


ABG O2 Content   


 


ABG Base Excess   


 


Sb Test   


 


O2 Delivery Device   


 


Oxygen Flow Rate   


 


Vent Mode   


 


Vent Rate   


 


Mechanical Rate   


 


PEEP   


 


Pressure Support Vent   


 


Sodium   136 


 


Potassium   3.4 L 


 


Chloride   102 


 


Carbon Dioxide   24 


 


Anion Gap   10 


 


BUN   24 H 


 


Creatinine   1.5 H 


 


Creat Clearance w eGFR   44.48 


 


Random Glucose   213 H D 


 


Lactic Acid   


 


Calcium   6.8 L* 


 


Phosphorus   1.8 L 


 


Magnesium   2.0 


 


Total Bilirubin   1.1 H D 


 


Direct Bilirubin   


 


AST   32  D 


 


ALT   66 


 


Alkaline Phosphatase   155 H 


 


Creatine Kinase   53 


 


Troponin I   0.12 H D 


 


C-Reactive Protein   


 


Total Protein   1.7 L D 


 


Albumin   1.7 L 


 


Crossmatch  See Detail  














  02/16/17 02/16/17 02/16/17





  05:00 05:00 05:00


 


WBC   


 


RBC   


 


Hgb   


 


Hct   


 


MCV   


 


MCHC   


 


RDW   


 


Plt Count   


 


MPV   


 


Neutrophils %   


 


Lymphocytes %   


 


Monocytes %   


 


Eosinophils %   


 


Basophils %   


 


INR  1.70 H  


 


PTT (Actin FS)  36.1 H  


 


Fibrinogen  < 100.0 L*  


 


Puncture Site   


 


ABG pH   


 


ABG pCO2 at Pt Temp   


 


ABG pO2 at Pt Temp   


 


ABG HCO3   


 


ABG O2 Sat (Measured)   


 


ABG O2 Content   


 


ABG Base Excess   


 


Sb Test   


 


O2 Delivery Device   


 


Oxygen Flow Rate   


 


Vent Mode   


 


Vent Rate   


 


Mechanical Rate   


 


PEEP   


 


Pressure Support Vent   


 


Sodium   140 


 


Potassium   3.1 L 


 


Chloride   104 


 


Carbon Dioxide   25 


 


Anion Gap   11 


 


BUN   21 H 


 


Creatinine   1.3 


 


Creat Clearance w eGFR   52.47 


 


Random Glucose   202 H 


 


Lactic Acid    1.538


 


Calcium   7.0 L 


 


Phosphorus   1.8 L 


 


Magnesium   2.0 


 


Total Bilirubin   0.8  D 


 


Direct Bilirubin   0.5 H D 


 


AST   21  D 


 


ALT   45  D 


 


Alkaline Phosphatase   156 H 


 


Creatine Kinase   


 


Troponin I   


 


C-Reactive Protein   25.9 H D 


 


Total Protein   4.2 L D 


 


Albumin   1.8 L 


 


Crossmatch   














  02/16/17 02/16/17 02/16/17





  05:00 05:00 07:45


 


WBC   


 


RBC   


 


Hgb   


 


Hct   


 


MCV   


 


MCHC   


 


RDW   


 


Plt Count   


 


MPV   


 


Neutrophils %   


 


Lymphocytes %   


 


Monocytes %   


 


Eosinophils %   


 


Basophils %   


 


INR   


 


PTT (Actin FS)   


 


Fibrinogen   


 


Puncture Site    Right radial


 


ABG pH    7.45


 


ABG pCO2 at Pt Temp    34.1 L


 


ABG pO2 at Pt Temp    74.2


 


ABG HCO3    23.5


 


ABG O2 Sat (Measured)    95.9


 


ABG O2 Content    14.6 L


 


ABG Base Excess    0.4


 


Sb Test    Positive


 


O2 Delivery Device    Mec.vent


 


Oxygen Flow Rate    35%


 


Vent Mode    A/c


 


Vent Rate    16


 


Mechanical Rate    Esprit


 


PEEP    5.0


 


Pressure Support Vent    450


 


Sodium   


 


Potassium   


 


Chloride   


 


Carbon Dioxide   


 


Anion Gap   


 


BUN   


 


Creatinine   


 


Creat Clearance w eGFR   


 


Random Glucose   


 


Lactic Acid   


 


Calcium   


 


Phosphorus   


 


Magnesium   


 


Total Bilirubin   


 


Direct Bilirubin   


 


AST   


 


ALT   


 


Alkaline Phosphatase   


 


Creatine Kinase  26 L  


 


Troponin I  0.06 H D  


 


C-Reactive Protein   Cancelled 


 


Total Protein   


 


Albumin   


 


Crossmatch   








Active Medications











Generic Name Dose Route Start Last Admin





  Trade Name Freq  PRN Reason Stop Dose Admin


 


Acetaminophen  650 mg 02/13/17 23:33 02/15/17 21:55





  Tylenol -  PO   650 mg





  Q4H PRN   Administration





  FEVER OR PAIN   


 


Albuterol/Ipratropium  1 amp 02/13/17 23:33  





  Duoneb -  NEB   





  Q4H PRN   





  SHORTNESS OF BREATH   


 


Albuterol/Ipratropium  1 amp 02/14/17 10:00 02/16/17 10:15





  Duoneb -  NEB   1 amp





  BID LAUREN   Administration


 


Norepinephrine Bitartrate 8,  500 mls @ 18.75 mls/hr 02/14/17 00:30 02/16/17 02:

00





  000 mcg/ Dextrose  IV   11 mcg/min





  TITR LAUREN   Titration





  Protocol   





  5 MCG/MIN   


 


Pantoprazole Sodium  100 mls @ 200 mls/hr 02/14/17 10:00 02/16/17 09:56





  Protonix 40mg Ivpb (Pre-Docked)  IVPB   200 mls/hr





  BID LAUREN   Administration


 


Dopamine HCl/Dextrose  250 mls @ 13.387 mls/hr 02/14/17 07:00 02/16/17 07:00





  Dopamine 400 Mg/D5w -  IVPB   5.355 mls/hr





  TITR LAUREN   Administration





  Protocol   





  5 MCG/KG/MIN   


 


Fentanyl 500 mcg/ Dextrose  100 mls @ 5 mls/hr 02/16/17 03:45 02/16/17 03:45





  IJ   5 mls/hr





  TITR LAUREN   Administration





  25 MCG/HR   


 


Fluconazole  100 mls @ 100 mls/hr 02/17/17 10:00  





  Diflucan 200 Mg/Ns Premixed Ivpb -  IVPB   





  DAILY LAUREN   


 


Potassium Phosphate 20 mm/  256.6667 mls @ 64.16 mls/hr 02/16/17 08:46 02/16/17 

10:31





  Sodium Chloride  IVPB 02/16/17 12:46  64.16 mls/hr





  ONCE ONE   Administration





  20 MM/4 HR   


 


Imipenem/Cilastatin Sodium 500  100 mls @ 200 mls/hr 02/16/17 10:00 02/16/17 09:

55





  mg/ Sodium Chloride  IVPB   200 mls/hr





  Q8H-IV LAUREN   Administration





  Protocol   


 


Propofol  100 mls @ 2.457 mls/hr 02/16/17 12:00  





  Diprivan -  IVPB   





  PRN LAUREN   





  Protocol   





  5 MCG/KG/MIN   


 


Insulin Aspart  1 vial 02/14/17 22:00 02/16/17 11:24





  Novolog Vial Sliding Scale -  SQ   Not Given





  ACHS LAUREN   





  Protocol   





Imaging





CXR: b/l congestive changes and pleural effusion





ASSESSMENT/PLAN:





86 yo h/o COPD, [place], HTN, HLD, TIA, prostate CA, renal CA now s/p left 

nephrectomy was admitted to the ICU for acute respiratory failure and sepsis 

secondary to acute cholecystitis s/p percutaenous drain.





Neuro


   - off sedation


   - d/c versed


   - propofol drip and fentyl push PRN


   - neuro checks





ID


   - septic shock 2/2 acute cholecystitis


   - cont. imipenem and flagyl


   - lactate normalized


   - daily CBC


   


Pulm


   -cont. mechanical ventilation with current settings


      * maintain O2 sat. > 90%


      * will attempt SBT in due course


   -daily ABG and CXR





Cardio


   - titrate down levophed 


   - maintain MAP > 65%


   - maintain CVP 8 -12





GI


   - GIB unlikely


   - acute cholecystitis s/p percutaneous drain


   - liver chemistries improved


      * cont. to monitor CMP


   - cont. PPI drip





Renal/


   - SANGITA 2/2 hypotension


      *cont. IVF


      *Cr improving


   - replete phos and K+ with K+phosph  


   - monitor I/O





Heme 


   - low platelets and fibrinogen


      * transfuse another 10 units of cryoprecipitate


   - f/u on daily coag and fibrinogen





Endo


   - on sliding scale





Prophylaxis


   - DVT: SCD


   - GI: protonix





Nutrition


   - NPO





Dispo: cont. to monitor in ICU





Visit type





- Emergency Visit


Emergency Visit: No





- New Patient


This patient is new to me today: No





- Critical Care


Critical Care patient: Yes


Total Critical Care Time (in minutes): 45


Critical Care Statement: The care of this patient involved high complexity 

decision making to prevent further life threatening deterioration of the patient

's condition and/or to evalute & treat vital organ system(s) failure or risk of 

failure.

## 2017-02-17 LAB
ALBUMIN SERPL-MCNC: 1.9 G/DL (ref 3.4–5)
ALP SERPL-CCNC: 170 U/L (ref 45–117)
ALT SERPL-CCNC: 30 U/L (ref 12–78)
ANION GAP SERPL CALC-SCNC: 11 MMOL/L (ref 8–16)
APTT BLD: 36.8 SECONDS (ref 26.9–34.4)
AST SERPL-CCNC: 19 U/L (ref 15–37)
BASOPHILS # BLD: 0.3 % (ref 0–2)
BILIRUB SERPL-MCNC: 0.7 MG/DL (ref 0.2–1)
CALCIUM SERPL-MCNC: 7.3 MG/DL (ref 8.5–10.1)
CO2 SERPL-SCNC: 25 MMOL/L (ref 21–32)
CREAT SERPL-MCNC: 1.1 MG/DL (ref 0.7–1.3)
DEPRECATED RDW RBC AUTO: 15.8 % (ref 11.9–15.9)
EOSINOPHIL # BLD: 2.2 % (ref 0–4.5)
FIBRINOGEN PPP-MCNC: 118 MG/DL (ref 238–498)
GLUCOSE SERPL-MCNC: 146 MG/DL (ref 74–106)
INR BLD: 1.71 (ref 0.82–1.09)
MAGNESIUM SERPL-MCNC: 2.1 MG/DL (ref 1.8–2.4)
MCH RBC QN AUTO: 29.7 PG (ref 25.7–33.7)
MCHC RBC AUTO-ENTMCNC: 34.6 G/DL (ref 32–35.9)
MCV RBC: 85.9 FL (ref 80–96)
NEUTROPHILS # BLD: 83 % (ref 42.8–82.8)
PEEP SETTING VENT: 0 CMH2O
PHOSPHATE SERPL-MCNC: 2.3 MG/DL (ref 2.5–4.9)
PLATELET # BLD AUTO: 103 K/MM3 (ref 134–434)
PMV BLD: 9.1 FL (ref 7.5–11.1)
PROT SERPL-MCNC: 4.5 G/DL (ref 6.4–8.2)
PT PNL PPP: 19 SEC (ref 9.98–11.88)
WBC # BLD AUTO: 14.9 K/MM3 (ref 4–10)

## 2017-02-17 RX ADMIN — PROPOFOL SCH: 10 INJECTION, EMULSION INTRAVENOUS at 14:39

## 2017-02-17 RX ADMIN — VORICONAZOLE SCH MLS/HR: 10 INJECTION, POWDER, LYOPHILIZED, FOR SOLUTION INTRAVENOUS at 10:32

## 2017-02-17 RX ADMIN — INSULIN ASPART SCH: 100 INJECTION, SOLUTION INTRAVENOUS; SUBCUTANEOUS at 21:08

## 2017-02-17 RX ADMIN — ACETAMINOPHEN PRN MG: 10 INJECTION, SOLUTION INTRAVENOUS at 06:36

## 2017-02-17 RX ADMIN — INSULIN ASPART SCH: 100 INJECTION, SOLUTION INTRAVENOUS; SUBCUTANEOUS at 17:19

## 2017-02-17 RX ADMIN — INSULIN ASPART SCH: 100 INJECTION, SOLUTION INTRAVENOUS; SUBCUTANEOUS at 11:37

## 2017-02-17 RX ADMIN — IPRATROPIUM BROMIDE AND ALBUTEROL SULFATE SCH AMP: .5; 3 SOLUTION RESPIRATORY (INHALATION) at 23:15

## 2017-02-17 RX ADMIN — VORICONAZOLE SCH MLS/HR: 10 INJECTION, POWDER, LYOPHILIZED, FOR SOLUTION INTRAVENOUS at 21:08

## 2017-02-17 RX ADMIN — IMIPENEM AND CILASTATIN SODIUM SCH MLS/HR: 500; 500 INJECTION, POWDER, FOR SOLUTION INTRAVENOUS at 01:18

## 2017-02-17 RX ADMIN — PANTOPRAZOLE SODIUM SCH MLS/HR: 40 INJECTION, POWDER, FOR SOLUTION INTRAVENOUS at 21:03

## 2017-02-17 RX ADMIN — IMIPENEM AND CILASTATIN SODIUM SCH MLS/HR: 500; 500 INJECTION, POWDER, FOR SOLUTION INTRAVENOUS at 09:45

## 2017-02-17 RX ADMIN — PANTOPRAZOLE SODIUM SCH MLS/HR: 40 INJECTION, POWDER, FOR SOLUTION INTRAVENOUS at 09:44

## 2017-02-17 RX ADMIN — IMIPENEM AND CILASTATIN SODIUM SCH MLS/HR: 500; 500 INJECTION, POWDER, FOR SOLUTION INTRAVENOUS at 17:22

## 2017-02-17 RX ADMIN — IPRATROPIUM BROMIDE AND ALBUTEROL SULFATE SCH AMP: .5; 3 SOLUTION RESPIRATORY (INHALATION) at 09:20

## 2017-02-17 RX ADMIN — INSULIN ASPART SCH: 100 INJECTION, SOLUTION INTRAVENOUS; SUBCUTANEOUS at 07:17

## 2017-02-17 RX ADMIN — DEXTROSE MONOHYDRATE SCH MLS/HR: 50 INJECTION, SOLUTION INTRAVENOUS at 01:19

## 2017-02-17 RX ADMIN — ACETAMINOPHEN PRN MG: 10 INJECTION, SOLUTION INTRAVENOUS at 21:36

## 2017-02-17 NOTE — PN
Physical Exam: 


SUBJECTIVE: Patient seen and examined at bedside. Pt has been off sedation 

since 4 am this morning and off pressors since approximately 7 am. Pt is still 

lethargic/unarousable despite being off pressors. Blood pressure is stable off 

pressors. Pt has also been on cpap mode since approximately 7 am and has been 

tolerating it well.








OBJECTIVE:





 


 Vital Signs











Temperature  98.6 F   02/17/17 12:00


 


Pulse Rate  76   02/17/17 12:00


 


Respiratory Rate  24   02/17/17 12:00


 


Blood Pressure  104/60   02/17/17 12:00


 


O2 Sat by Pulse Oximetry (%)  94 L  02/17/17 09:00











GENERAL: Sedated, on ventilator/intubated.


HEAD: Normal with no signs of trauma.


EYES: Constricted pupils. Reactive to light.


ENT: moist mucous membranes. OG tube in place. Some bilious fluid in tube.


NECK: Trachea midline


LUNGS: Auscultated anteriorly. Diminished breath sounds. 


HEART: Distant heart sounds, Regular rate and rhythm, S1, S2 without murmur, 

rub or gallop.


ABDOMEN: Grimacing on RUQ, LUQ palpation. Hypoactive bowel sounds.


EXTREMITIES: 2+ pulses, warm, well-perfused, no edema. 


NEUROLOGICAL:  gait not observed. Sedated.


PSYCH: Sedated.


SKIN: Warm, dry, normal turgor, no rashes or lesions noted

















 Laboratory Results - last 24 hr











  02/13/17 02/17/17 02/17/17





  21:09 05:35 05:35


 


WBC   14.9 H D 


 


RBC   3.89 L 


 


Hgb   11.6 L 


 


Hct   33.4 L 


 


MCV   85.9 


 


MCHC   34.6 


 


RDW   15.8 


 


Plt Count   103 L 


 


MPV   9.1 


 


Neutrophils %   83.0 H 


 


Lymphocytes %   8.7  D 


 


Monocytes %   5.8 


 


Eosinophils %   2.2 


 


Basophils %   0.3 


 


INR   


 


PTT (Actin FS)   


 


Fibrinogen   


 


PEEP  0.0  


 


Sodium    144


 


Potassium    3.6


 


Chloride    108 H


 


Carbon Dioxide    25


 


Anion Gap    11


 


BUN    24 H


 


Creatinine    1.1


 


Creat Clearance w eGFR    > 60


 


Random Glucose    146 H D


 


Calcium    7.3 L


 


Phosphorus    2.3 L D


 


Magnesium    2.1


 


Total Bilirubin    0.7


 


AST    19


 


ALT    30  D


 


Alkaline Phosphatase    170 H


 


Total Protein    4.5 L


 


Albumin    1.9 L














  02/17/17





  05:35


 


WBC 


 


RBC 


 


Hgb 


 


Hct 


 


MCV 


 


MCHC 


 


RDW 


 


Plt Count 


 


MPV 


 


Neutrophils % 


 


Lymphocytes % 


 


Monocytes % 


 


Eosinophils % 


 


Basophils % 


 


INR  1.71 H


 


PTT (Actin FS)  36.8 H


 


Fibrinogen  118.0 L


 


PEEP 


 


Sodium 


 


Potassium 


 


Chloride 


 


Carbon Dioxide 


 


Anion Gap 


 


BUN 


 


Creatinine 


 


Creat Clearance w eGFR 


 


Random Glucose 


 


Calcium 


 


Phosphorus 


 


Magnesium 


 


Total Bilirubin 


 


AST 


 


ALT 


 


Alkaline Phosphatase 


 


Total Protein 


 


Albumin 








 Microbiology





02/13/17 17:30   Body Fluid - Other   Gram Stain - Final


02/13/17 17:30   Body Fluid - Other   Body Fluid Culture - Final


                            Enterobacter Asburiae


                            Enterococcus Faecium


02/13/17 17:30   Body Fluid - Other   Anaerobic Culture - Final


                            NO ANAEROBES WERE ISOLATED


02/13/17 22:41   Blood - Central Line   Blood Culture - Preliminary


                            NO GROWTH OBTAINED AFTER 72 HOURS, INCUBATION TO 

CONTINUE


                            FOR 2 DAYS.


02/13/17 22:46   Sputum - Endotrachea Suction/Ventilator   Gram Stain - Final


02/13/17 22:46   Sputum - Endotrachea Suction/Ventilator   Sputum Culture - 

Final


                            Fungal Isolate: Mold


                            Mr S Aureus


02/12/17 21:40   Blood - Peripheral Venous   Blood Culture - Preliminary


                            NO GROWTH OBTAINED AFTER 96 HOURS, INCUBATION TO 

CONTINUE


                            FOR 1 DAYS.


02/12/17 21:40   Blood - Peripheral Venous   Blood Culture - Preliminary


                            NO GROWTH OBTAINED AFTER 96 HOURS, INCUBATION TO 

CONTINUE


                            FOR 1 DAYS.


02/13/17 22:41   Blood - Central Line   Blood Culture - Final


                            Escherichia Coli


02/12/17 21:40   Urine - Urine Clean Catch   Urine Culture - Final


                            NO GROWTH OBTAINED








Active Medications











Generic Name Dose Route Start Last Admin





  Trade Name Freq  PRN Reason Stop Dose Admin


 


Acetaminophen  650 mg 02/13/17 23:33 02/16/17 13:46





  Tylenol -  PO   650 mg





  Q4H PRN   Administration





  FEVER OR PAIN   


 


Acetaminophen  1,000 mg 02/16/17 16:07 02/17/17 06:36





  Ofirmev Injection -  IVPB   1,000 mg





  Q6H PRN   Administration





  FEVER   


 


Albuterol/Ipratropium  1 amp 02/13/17 23:33  





  Duoneb -  NEB   





  Q4H PRN   





  SHORTNESS OF BREATH   


 


Albuterol/Ipratropium  1 amp 02/14/17 10:00 02/17/17 09:20





  Duoneb -  NEB   1 amp





  BID LAUREN   Administration


 


Norepinephrine Bitartrate 8,  500 mls @ 18.75 mls/hr 02/14/17 00:30 02/17/17 04:

00





  000 mcg/ Dextrose  IV   0 mcg/min





  TITR LAUREN   Titration





  Protocol   





  5 MCG/MIN   


 


Pantoprazole Sodium  100 mls @ 200 mls/hr 02/14/17 10:00 02/17/17 09:44





  Protonix 40mg Ivpb (Pre-Docked)  IVPB   200 mls/hr





  BID LAUREN   Administration


 


Imipenem/Cilastatin Sodium 500  100 mls @ 200 mls/hr 02/16/17 10:00 02/17/17 09:

45





  mg/ Sodium Chloride  IVPB   200 mls/hr





  Q8H-IV LAUREN   Administration





  Protocol   


 


Propofol  100 mls @ 2.457 mls/hr 02/16/17 12:00 02/16/17 17:56





  Diprivan -  IVPB   Not Given





  TITR LAUREN   





  Protocol   





  5 MCG/KG/MIN   


 


Voriconazole 480 mg/ Dextrose  298 mls @ 149 mls/hr 02/17/17 10:00 02/17/17 10:

32





  IVPB 02/17/17 23:59  149 mls/hr





  BID LAUREN   Administration


 


Voriconazole 320 mg/ Dextrose  282 mls @ 141 mls/hr 02/18/17 10:00  





  IVPB   





  BID LAUREN   


 


Insulin Aspart  1 vial 02/14/17 22:00 02/17/17 11:37





  Novolog Vial Sliding Scale -  SQ   Not Given





  ACHS LAUREN   





  Protocol   











ASSESSMENT/PLAN:


84 y/o M w/ PMH of [place], HTN, HLD, prostate ca, TIA, Kidney cancer s/p 

nephrectomy, COPD presented to ER with abdominal pain (RUQ) for 3 days. 

Admitted for acute cholecystitis. Perc cholecystostomy placed on 2/13/17. Pt 

developed SOB after procedure, was intubated, had central line placed, on 

pressors and sedated and in ICU for septic shock.





-Septic shock secondary to Acute Cholecystitis from cholelithiasis


   -improving


   -Febrile, Tmax 24 hours: 101.2; WBC: 15 (improving)


   -On pressors: norepi, titrate to keep MAP > 65


   -CT abd/pelvis: 2/13/17 - Significantly dilated/enlarged gallbladder with 

diffuse thickening of its wall and possible minimal pericholecystic free fluid 

consistent with acute cholecystitis.


   -Abd U/S: 2/14/17 - gallbladder has small calculi. No evidence of intra or 

extrahepatic duct dilatation. CBD = 7mm. Thick gallbladder wall, cholelithiasis

, no evidence of sig biliary ductal dilatation.


   -LFTs trending down; Alk phos elevated


      -Cholangiogram via cholecystostomy once more stable recommended by GI


   -Abx : imipenem, vanco; ID on board


   -Spcx: mold, MRSA - voriconazole started by ID to cover for aspergillus, 

vanco restarted.


      -CT chest when more stable to assess for cavitations.


   -repeat BCx -1/2 bottles: E. Coli


   -Body fluid cx (from drain): Enterobacter asburiae, enterococcus faecium


   -Surgery on board





-Acute hypoxic respiratory failure


   -off sedation this AM


   -Intubated, on vent - CPAP: 35% FiO2, 16 RR, 10 PSV, 450 TV


   -wean trials, vent management as per ICU team





-Anemia secondary to Upper GI bleed


   -resolving upper gi bleed, bilious fluid drainage now; 50 ml drained today


   -OG tube in place


   -H/H stable


   -EGD when more stable


   -GI on board


   -c/w protonix





-Elevated trops


   -mildly elevated, 0.12 peak, last trop 0.06


   -most likely secondary to demand ischemia from septic shock


   -Echo: 2/14/17 - EF: 63%, LV nl size, LVSF nl, no regional wall abnl noted, 

LV impaired relaxation, mild MR, mod TR, mild AS, no pericardial effusion.





-Diastolic CHF:


   -CXR: 2/15/17: R moderate pleural effusion. L small pleural effusion.


   -ECHO: 2/14/17 - EF: 63%, LV nl size, LVSF nl, no regional wall abnl noted, 

LV impaired relaxation, mild MR, mod TR, mild AS, no pericardial effusion.


   -not on fluids currently.


   -CXR, pleural effusions still present, no change from yesterday's cxr





-Transaminitis


   -Secondary to septic shock


   -INR elevated as well, trending down


   -Monitor LFTs, LFTs currently trending down





-SANGITA secondary to septic shock


   -improving


   -BUN/Cr 24/1.1


   -Urine output 2400 ml yesterday, 350 ml today


   -monitor urine output, BUN/Cr





-Ileus secondary to cholecystitis


   -will monitor 





-Possible DIC secondary to septic shock


   -s/p cyroprecipitate


   -fibrinogen trending up


   -platelets trending down





-Hyperglycemia


   -Monitor sugars


   -Currently random glucose 146





-HTN


   -hold anti-hypertensives in light of septic shock





-[place]


   -Intubated, on vent





-Hx of TIA


   -asa held





-HLD


   -held lipitor 10 mg PO qhs





-insomnia


   -held melatonin 5 mg po qhs prn





-BPH


   -held flomax 0.8 mg po qd


   -held finasteride 5 mg po qd





-DVT ppx


   -SCDs; heparin held due to coffee ground emesis/upper gi bleed





-FEN


   -no fluids for now.


   -hypokalemia - resolved


   -hypophosphatemia - replete


   -NPO, once more stable can start enteral feeds as per dietary 

recommendations.


      -Dietary recommendations: -If/when able, recommend initiate Vital 1.2 @ 

20cc/hr, increase 10cc as tolerated to goal 62cc/hr.


      -When pressors decreased, switch to volume based feeds.  Target volume 

1500ml.





-Dispo:


   -Monitor in ICU.





Problem List





- Problems


(1) COPD (chronic obstructive pulmonary disease)


Code(s): J44.9 - CHRONIC OBSTRUCTIVE PULMONARY DISEASE, UNSPECIFIED   Qualifiers

: 


     COPD type: unspecified COPD        Qualified Code(s): J44.9 - Chronic 

obstructive pulmonary disease, unspecified  





(2) Cholecystitis, acute


Code(s): K81.0 - ACUTE CHOLECYSTITIS





(3) Fever


Code(s): R50.9 - FEVER, UNSPECIFIED   Qualifiers: 


     Fever type: other     Qualified Code(s): R50.81 - Fever presenting with 

conditions classified elsewhere  





(4) Leukocytosis


Code(s): D72.829 - ELEVATED WHITE BLOOD CELL COUNT, UNSPECIFIED   Qualifiers: 


     Leukocytosis type: unspecified     Qualified Code(s): D72.829 - Elevated 

white blood cell count, unspecified  





(5) Sleep apnea


Code(s): G47.30 - SLEEP APNEA, UNSPECIFIED   





(6) Acute urinary retention


Code(s): R33.8 - OTHER RETENTION OF URINE





(7) Insomnia


Code(s): G47.00 - INSOMNIA, UNSPECIFIED   





(8) HTN (hypertension)


Code(s): I10 - ESSENTIAL (PRIMARY) HYPERTENSION   Qualifiers: 


     Hypertension type: essential hypertension        Qualified Code(s): I10 - 

Essential (primary) hypertension  





(9) HLD (hyperlipidemia)


Code(s): E78.5 - HYPERLIPIDEMIA, UNSPECIFIED   Qualifiers: 


     Hyperlipidemia type: pure hypercholesterolemia        Qualified Code(s): 

E78.0 - Pure hypercholesterolemia  





(10) BPH (benign prostatic hyperplasia)


Code(s): N40.0 - BENIGN PROSTATIC HYPERPLASIA WITHOUT LOWER URINRY TRACT SYMP   





(11) Septic shock


Code(s): A41.9 - SEPSIS, UNSPECIFIED ORGANISM


R65.21 - SEVERE SEPSIS WITH SEPTIC SHOCK





(12) Acute respiratory failure with hypoxia


Code(s): J96.01 - ACUTE RESPIRATORY FAILURE WITH HYPOXIA





(13) SANGITA (acute kidney injury)


Code(s): N17.9 - ACUTE KIDNEY FAILURE, UNSPECIFIED





(14) Transaminitis


Code(s): R74.0 - NONSPEC ELEV OF LEVELS OF TRANSAMNS & LACTIC ACID DEHYDRGNSE





(15) Upper GI bleed


Code(s): K92.2 - GASTROINTESTINAL HEMORRHAGE, UNSPECIFIED





(16) Elevated troponin


Code(s): R79.89 - OTHER SPECIFIED ABNORMAL FINDINGS OF BLOOD CHEMISTRY





(17) Demand ischemia


Code(s): I24.8 - OTHER FORMS OF ACUTE ISCHEMIC HEART DISEASE





(18) DIC (disseminated intravascular coagulation)


Code(s): D65 - DISSEMINATED INTRAVASCULAR COAGULATION





(19) Hypophosphatemia


Code(s): E83.39 - OTHER DISORDERS OF PHOSPHORUS METABOLISM





(20) Hypokalemia


Code(s): E87.6 - HYPOKALEMIA





(21) Anemia due to GI blood loss


Code(s): D50.0 - IRON DEFICIENCY ANEMIA SECONDARY TO BLOOD LOSS (CHRONIC)





(22) Diastolic CHF


Code(s): I50.30 - UNSPECIFIED DIASTOLIC (CONGESTIVE) HEART FAILURE   Qualifiers

: 


     Congestive heart failure chronicity: chronic        Qualified Code(s): 

I50.32 - Chronic diastolic (congestive) heart failure  








Visit type





- Emergency Visit


Emergency Visit: Yes


ED Registration Date: 02/13/17


Care time: The patient presented to the Emergency Department on the above date 

and was hospitalized for further evaluation of their emergent condition.





- New Patient


This patient is new to me today: No





- Critical Care


Critical Care patient: Yes


Total Critical Care Time (in minutes): 40


Critical Care Statement: The care of this patient involved high complexity 

decision making to prevent further life threatening deterioration of the patient

's condition and/or to evalute & treat vital organ system(s) failure or risk of 

failure.

## 2017-02-17 NOTE — PN
Physical Exam: 


SUBJECTIVE: 





Patient seen and examined at bedside in the ICU. Off sedation and pressors but 

remained unarousable. On CPAP now and only respond to painful stimuli. Per nurse

, low grade fever overnight but no other acute event noted.








OBJECTIVE:





 Vital Signs











 Period  Temp  Pulse  Resp  BP Sys/Yun  Pulse Ox


 


 Last 24 Hr  98.6 F-100.2 F  63-83  15-29  /46-90  94-95











GENERAL: on CPAP, off sedation and off pressors, unarousable, only respond to 

painful stimuli


EYES: pupils markedly constricted, reactive


NECK: triple lumen central line in place 


LUNGS: CTAB


HEART: Regular rate and rhythm, S1, S2 without murmur, rub or gallop.


ABDOMEN: Soft, facial grimaces upon deep palpations, nondistended, hypoactive 

bowel sounds. RUQ percutaneous drain in dark yellow/brown color  


EXTREMITIES: warm and no edema 


NEUROLOGICAL: unable to assess


: muñiz in place, clear urine output














 Laboratory Results - last 24 hr











  02/13/17 02/17/17 02/17/17





  21:09 05:35 05:35


 


WBC   14.9 H D 


 


RBC   3.89 L 


 


Hgb   11.6 L 


 


Hct   33.4 L 


 


MCV   85.9 


 


MCHC   34.6 


 


RDW   15.8 


 


Plt Count   103 L 


 


MPV   9.1 


 


Neutrophils %   83.0 H 


 


Lymphocytes %   8.7  D 


 


Monocytes %   5.8 


 


Eosinophils %   2.2 


 


Basophils %   0.3 


 


INR   


 


PTT (Actin FS)   


 


Fibrinogen   


 


PEEP  0.0  


 


Sodium    144


 


Potassium    3.6


 


Chloride    108 H


 


Carbon Dioxide    25


 


Anion Gap    11


 


BUN    24 H


 


Creatinine    1.1


 


Creat Clearance w eGFR    > 60


 


Random Glucose    146 H D


 


Calcium    7.3 L


 


Phosphorus    2.3 L D


 


Magnesium    2.1


 


Total Bilirubin    0.7


 


AST    19


 


ALT    30  D


 


Alkaline Phosphatase    170 H


 


Total Protein    4.5 L


 


Albumin    1.9 L














  02/17/17





  05:35


 


WBC 


 


RBC 


 


Hgb 


 


Hct 


 


MCV 


 


MCHC 


 


RDW 


 


Plt Count 


 


MPV 


 


Neutrophils % 


 


Lymphocytes % 


 


Monocytes % 


 


Eosinophils % 


 


Basophils % 


 


INR  1.71 H


 


PTT (Actin FS)  36.8 H


 


Fibrinogen  118.0 L


 


PEEP 


 


Sodium 


 


Potassium 


 


Chloride 


 


Carbon Dioxide 


 


Anion Gap 


 


BUN 


 


Creatinine 


 


Creat Clearance w eGFR 


 


Random Glucose 


 


Calcium 


 


Phosphorus 


 


Magnesium 


 


Total Bilirubin 


 


AST 


 


ALT 


 


Alkaline Phosphatase 


 


Total Protein 


 


Albumin 








Active Medications











Generic Name Dose Route Start Last Admin





  Trade Name Freq  PRN Reason Stop Dose Admin


 


Acetaminophen  650 mg 02/13/17 23:33 02/16/17 13:46





  Tylenol -  PO   650 mg





  Q4H PRN   Administration





  FEVER OR PAIN   


 


Acetaminophen  1,000 mg 02/16/17 16:07 02/17/17 06:36





  Ofirmev Injection -  IVPB   1,000 mg





  Q6H PRN   Administration





  FEVER   


 


Albuterol/Ipratropium  1 amp 02/13/17 23:33  





  Duoneb -  NEB   





  Q4H PRN   





  SHORTNESS OF BREATH   


 


Albuterol/Ipratropium  1 amp 02/14/17 10:00 02/17/17 09:20





  Duoneb -  NEB   1 amp





  BID LAUREN   Administration


 


Norepinephrine Bitartrate 8,  500 mls @ 18.75 mls/hr 02/14/17 00:30 02/17/17 04:

00





  000 mcg/ Dextrose  IV   0 mcg/min





  TITR LAUREN   Titration





  Protocol   





  5 MCG/MIN   


 


Pantoprazole Sodium  100 mls @ 200 mls/hr 02/14/17 10:00 02/17/17 09:44





  Protonix 40mg Ivpb (Pre-Docked)  IVPB   200 mls/hr





  BID LAUERN   Administration


 


Imipenem/Cilastatin Sodium 500  100 mls @ 200 mls/hr 02/16/17 10:00 02/17/17 09:

45





  mg/ Sodium Chloride  IVPB   200 mls/hr





  Q8H-IV LAUREN   Administration





  Protocol   


 


Propofol  100 mls @ 2.457 mls/hr 02/16/17 12:00 02/17/17 14:39





  Diprivan -  IVPB   Not Given





  TITR LAUREN   





  Protocol   





  5 MCG/KG/MIN   


 


Voriconazole 480 mg/ Dextrose  298 mls @ 149 mls/hr 02/17/17 10:00 02/17/17 10:

32





  IVPB 02/17/17 23:59  149 mls/hr





  BID LAUREN   Administration


 


Voriconazole 320 mg/ Dextrose  282 mls @ 141 mls/hr 02/18/17 10:00  





  IVPB   





  BID LAUREN   


 


Insulin Aspart  1 vial 02/14/17 22:00 02/17/17 11:37





  Novolog Vial Sliding Scale -  SQ   Not Given





  ACHS LAUREN   





  Protocol   








Microbiology





02/13/17 17:30   Body Fluid - Other   Gram Stain - Final


02/13/17 17:30   Body Fluid - Other   Body Fluid Culture - Final


                            Enterobacter Asburiae


                            Enterococcus Faecium


02/13/17 22:46   Sputum - Endotrachea Suction/Ventilator   Gram Stain - Final


02/13/17 22:46   Sputum - Endotrachea Suction/Ventilator   Sputum Culture - 

Final


                            Fungal Isolate: Mold


                            Mr S Aureus





ASSESSMENT/PLAN:





84 yo h/o COPD, [place], HTN, HLD, TIA, prostate CA, renal CA now s/p left 

nephrectomy was admitted to the ICU for acute respiratory failure and sepsis 

secondary to acute cholecystitis s/p percutaenous drain.





Neuro


   - off sedation and off pressors


   - propofol drip and fentyl push PRN


   - neuro checks





ID


   - septic shock 2/2 acute cholecystitis


   - positive sputum and drain cultures (see above)


      * cont. imipenem


      * redosed vanco


      * added voriconazole


   - lactate normalized


   - daily CBC


   


Pulm


   -cont. mechanical ventilation AC mode


      * maintain O2 sat. > 90%


      * will attempt SBT tomorrow again


   -daily ABG and CXR





Cardio


   - off pressor  


   - maintain MAP > 65%





GI


   - GIB unlikely


   - acute cholecystitis s/p percutaneous drain


   - liver chemistries improved


      * cont. to monitor 


   - cont. PPI drip





Renal/


   - SANGITA 2/2 hypotension


      * resolved


   - replete phos and K+ with K+phosph  


   - monitor I/O


   - diurese as needed after mental status returns





Heme 


   - DIC


      * transfused 20 cryoprecipitate total so far


   - f/u on daily coag and fibrinogen





Endo


   - on sliding scale





Prophylaxis


   - DVT: SCD


   - GI: protonix





Nutrition


   - NPO





Dispo: cont. to monitor in ICU








Visit type





- Emergency Visit


Emergency Visit: No





- New Patient


This patient is new to me today: No





- Critical Care


Critical Care patient: Yes


Total Critical Care Time (in minutes): 45


Critical Care Statement: The care of this patient involved high complexity 

decision making to prevent further life threatening deterioration of the patient

's condition and/or to evalute & treat vital organ system(s) failure or risk of 

failure.

## 2017-02-17 NOTE — PN
Progress Note, Physician


Chief Complaint: 


ID








Remains intubated





New finding MOLD and Staph aureus in the respiratory culture !!





On Imipenem and Fluconazole ( the latter will not cover mold)





- Current Medication List


Current Medications: 


Active Medications





Acetaminophen (Tylenol -)  650 mg PO Q4H PRN


   PRN Reason: FEVER OR PAIN


   Last Admin: 02/16/17 13:46 Dose:  650 mg


Acetaminophen (Ofirmev Injection -)  1,000 mg IVPB Q6H PRN


   PRN Reason: FEVER


   Last Admin: 02/17/17 06:36 Dose:  1,000 mg


Albuterol/Ipratropium (Duoneb -)  1 amp NEB Q4H PRN


   PRN Reason: SHORTNESS OF BREATH


Albuterol/Ipratropium (Duoneb -)  1 amp NEB BID LAUREN


   Last Admin: 02/16/17 21:40 Dose:  1 amp


Norepinephrine Bitartrate 8, (000 mcg/ Dextrose)  500 mls @ 18.75 mls/hr IV 

TITR LAUREN; 5 MCG/MIN


   PRN Reason: Protocol


   Last Titration: 02/17/17 04:00 Dose:  0 mcg/min


Pantoprazole Sodium (Protonix 40mg Ivpb (Pre-Docked))  100 mls @ 200 mls/hr 

IVPB BID LAUREN


   Last Admin: 02/16/17 21:15 Dose:  200 mls/hr


Fluconazole (Diflucan 200 Mg/Ns Premixed Ivpb -)  100 mls @ 100 mls/hr IVPB 

DAILY LAUREN


Imipenem/Cilastatin Sodium 500 (mg/ Sodium Chloride)  100 mls @ 200 mls/hr IVPB 

Q8H-IV LAUREN


   PRN Reason: Protocol


   Last Admin: 02/17/17 01:18 Dose:  200 mls/hr


Propofol (Diprivan -)  100 mls @ 2.457 mls/hr IVPB TITR LAUREN; 5 MCG/KG/MIN


   PRN Reason: Protocol


   Last Admin: 02/16/17 17:56 Dose:  Not Given


Insulin Aspart (Novolog Vial Sliding Scale -)  1 vial SQ ACHS LAUREN


   PRN Reason: Protocol


   Last Admin: 02/17/17 07:17 Dose:  Not Given











- Objective


Vital Signs: 


 Vital Signs











Temperature  100.2 F H  02/17/17 06:00


 


Pulse Rate  80   02/17/17 07:00


 


Respiratory Rate  20   02/17/17 07:00


 


Blood Pressure  98/46   02/17/17 07:00


 


O2 Sat by Pulse Oximetry (%)  95   02/16/17 21:00











Constitutional: Yes: Other (INtubated)


Cardiovascular: Yes: S1, S2


Respiratory: Yes: WNL, Regular, CTA Bilaterally


Gastrointestinal: Yes: Soft


Edema: No


Labs: 


 CBC, BMP





 02/17/17 05:35 





 02/17/17 05:35 





 INR, PTT











INR  1.71  (0.82-1.09)  H  02/17/17  05:35    


 


Fibrinogen  118.0 mg/dL (238-498)  L  02/17/17  05:35    














Problem List





- Problems


(1) Acute respiratory failure with hypoxia


Code(s): J96.01 - ACUTE RESPIRATORY FAILURE WITH HYPOXIA





(2) Cholecystitis, acute


Code(s): K81.0 - ACUTE CHOLECYSTITIS





(3) DIC (disseminated intravascular coagulation)


Code(s): D65 - DISSEMINATED INTRAVASCULAR COAGULATION





(4) Gram-negative bacteremia


Code(s): R78.81 - BACTEREMIA











Assessment/Plan


Microbiology





02/13/17 22:46   Sputum - Endotrachea Suction/Ventilator   Gram Stain - Final


02/13/17 22:41   Blood - Central Line   Blood Culture - Final


                              Escherichia Coli


02/13/17 17:30   Body Fluid - Other   Gram Stain - Final


02/13/17 17:30   Body Fluid - Other   Body Fluid Culture - Final


                              Enterobacter Asburiae


                              Enterococcus Faecium


02/13/17 22:46   Sputum - Endotrachea Suction/Ventilator   Sputum Culture - 

Preliminary


                              Fungal Isolate: Mold


                              Staphylococcus Latex Coag Pos


02/13/17 22:41   Blood - Central Line   Blood Culture - Preliminary


                              NO GROWTH OBTAINED AFTER 48 HOURS, INCUBATION TO 

CONTINUE


                              FOR 3 DAYS.


02/13/17 17:30   Body Fluid - Other   Anaerobic Culture - Preliminary


                              No growth.





 Laboratory Tests











  02/15/17 02/17/17 02/17/17





  04:55 05:35 05:35


 


WBC   14.9 H D 


 


Hgb   11.6 L 


 


Hct   33.4 L 


 


Plt Count   103 L 


 


INR   


 


BUN    24 H


 


Creatinine    1.1


 


AST    19


 


ALT    30  D


 


Alkaline Phosphatase    170 H


 


Random Vancomycin  5.270  














  02/17/17





  05:35


 


WBC 


 


Hgb 


 


Hct 


 


Plt Count 


 


INR  1.71 H


 


BUN 


 


Creatinine 


 


AST 


 


ALT 


 


Alkaline Phosphatase 


 


Random Vancomycin 








Assessment


Sepsis syndrome from the gallbladder


Cholecystitis now with drainage tube


E Coli bacteremia


DIC


Finding of mold and Staph aureus in the spustum ?? Aspergillus wiath history of 

chronic couph ???PTA











Plan


Continue Imipenem ( sensititivity of GNB in bile pending)


Stop Diflucan


Add Voriconazole


Beta D glucan and galactomannin


CRP


CT chest when stable


38 minute spent with critical care and discussion with resident


Anum CARBALLO

## 2017-02-17 NOTE — PN
Progress Note (short form)





- Note


Progress Note: 


surgery





no surgical contraindication to feeds.  Being treated for staph and mold /

pneumonia.  No indication for surgery this admission.

## 2017-02-17 NOTE — PN
Progress Note, Physician


History of Present Illness: 


Weaned off levo gtt, remains on mechanical ventilation, low grade temp 

overnight.





- Current Medication List


Current Medications: 


Active Medications





Acetaminophen (Tylenol -)  650 mg PO Q4H PRN


   PRN Reason: FEVER OR PAIN


   Last Admin: 02/16/17 13:46 Dose:  650 mg


Acetaminophen (Ofirmev Injection -)  1,000 mg IVPB Q6H PRN


   PRN Reason: FEVER


   Last Admin: 02/17/17 06:36 Dose:  1,000 mg


Albuterol/Ipratropium (Duoneb -)  1 amp NEB Q4H PRN


   PRN Reason: SHORTNESS OF BREATH


Albuterol/Ipratropium (Duoneb -)  1 amp NEB BID LAUREN


   Last Admin: 02/17/17 09:20 Dose:  1 amp


Norepinephrine Bitartrate 8, (000 mcg/ Dextrose)  500 mls @ 18.75 mls/hr IV 

TITR LAUREN; 5 MCG/MIN


   PRN Reason: Protocol


   Last Titration: 02/17/17 04:00 Dose:  0 mcg/min


Pantoprazole Sodium (Protonix 40mg Ivpb (Pre-Docked))  100 mls @ 200 mls/hr 

IVPB BID LAUREN


   Last Admin: 02/17/17 09:44 Dose:  200 mls/hr


Imipenem/Cilastatin Sodium 500 (mg/ Sodium Chloride)  100 mls @ 200 mls/hr IVPB 

Q8H-IV LAUREN


   PRN Reason: Protocol


   Last Admin: 02/17/17 09:45 Dose:  200 mls/hr


Propofol (Diprivan -)  100 mls @ 2.457 mls/hr IVPB TITR LAUREN; 5 MCG/KG/MIN


   PRN Reason: Protocol


   Last Admin: 02/16/17 17:56 Dose:  Not Given


Voriconazole 480 mg/ Dextrose  298 mls @ 149 mls/hr IVPB BID LAUREN


   Stop: 02/17/17 23:59


   Last Admin: 02/17/17 10:32 Dose:  149 mls/hr


Voriconazole 320 mg/ Dextrose  282 mls @ 141 mls/hr IVPB BID LAUREN


Insulin Aspart (Novolog Vial Sliding Scale -)  1 vial SQ ACHS LAUREN


   PRN Reason: Protocol


   Last Admin: 02/17/17 11:37 Dose:  Not Given











- Objective


Vital Signs: 


 Vital Signs











Temperature  98.6 F   02/17/17 12:00


 


Pulse Rate  76   02/17/17 12:00


 


Respiratory Rate  24   02/17/17 12:00


 


Blood Pressure  104/60   02/17/17 12:00


 


O2 Sat by Pulse Oximetry (%)  94 L  02/17/17 09:00











Constitutional: Yes: No Distress, Calm


Cardiovascular: Yes: Regular Rate and Rhythm


Respiratory: Yes: Intubated, Mechanically Ventilated, Rhonchi


Gastrointestinal: Yes: Soft, Hypoactive Bowel Sounds, Other (Bilious drainage)


Edema: No


Labs: 


 CBC, BMP





 02/17/17 05:35 





 02/17/17 05:35 





 INR, PTT











INR  1.71  (0.82-1.09)  H  02/17/17  05:35    


 


Fibrinogen  118.0 mg/dL (238-498)  L  02/17/17  05:35    














- ....Imaging


Chest X-ray: Report Reviewed (Stable effusions)





Problem List





- Problems


(1) Acute respiratory failure with hypoxia


Code(s): J96.01 - ACUTE RESPIRATORY FAILURE WITH HYPOXIA





(2) COPD (chronic obstructive pulmonary disease)


Code(s): J44.9 - CHRONIC OBSTRUCTIVE PULMONARY DISEASE, UNSPECIFIED   Qualifiers

: 


     COPD type: unspecified COPD        Qualified Code(s): J44.9 - Chronic 

obstructive pulmonary disease, unspecified  





(3) Cholecystitis, acute


Code(s): K81.0 - ACUTE CHOLECYSTITIS





(4) DIC (disseminated intravascular coagulation)


Code(s): D65 - DISSEMINATED INTRAVASCULAR COAGULATION





(5) Demand ischemia


Code(s): I24.8 - OTHER FORMS OF ACUTE ISCHEMIC HEART DISEASE





(6) Diastolic CHF


Code(s): I50.30 - UNSPECIFIED DIASTOLIC (CONGESTIVE) HEART FAILURE   Qualifiers

: 


     Congestive heart failure chronicity: chronic        Qualified Code(s): 

I50.32 - Chronic diastolic (congestive) heart failure  





(7) Gram-negative bacteremia


Code(s): R78.81 - BACTEREMIA





(8) HLD (hyperlipidemia)


Code(s): E78.5 - HYPERLIPIDEMIA, UNSPECIFIED   Qualifiers: 


     Hyperlipidemia type: pure hypercholesterolemia        Qualified Code(s): 

E78.0 - Pure hypercholesterolemia  





(9) HTN (hypertension)


Code(s): I10 - ESSENTIAL (PRIMARY) HYPERTENSION   Qualifiers: 


     Hypertension type: essential hypertension        Qualified Code(s): I10 - 

Essential (primary) hypertension  





(10) Hypokalemia


Code(s): E87.6 - HYPOKALEMIA





(11) Septic shock


Code(s): A41.9 - SEPSIS, UNSPECIFIED ORGANISM


R65.21 - SEVERE SEPSIS WITH SEPTIC SHOCK





(12) Cholecystostomy care


Code(s): Z43.4 - ENCOUNTER FOR ATTN TO OTH ARTIF OPENINGS OF DIGESTIVE TRACT








Assessment/Plan


1. Acute hypoxic respiratory failure referable to septic shock (E. coli 

bacteremia) improving


2. Acute cholecystitis post cholecystostomy drain


3. Demand ischemia


4. Hyperglycemia


5. Hypokalemia


6. History of TIA


7. History of COPD


8. History of diverticular disease


9. History of colitis


10. History of renal carcinoma post left nephrectomy


11. History of prostate carcinoma


12. HTN


13. Hyperlipidemia


14. DIC





PLAN:





1. Weaned off pressors


2. Hold Carvedilol, Losartan and Amlodipine therapies pending hemodynamic 

stability


3. Monitor CBC and transfuse as needed maintaining Hg > 8.0, cryoprecipitate


4. Correction of Hypokalemia


5. Antibiotic and antifungal as per ID with f/u C&S


6. Ventilator management as per the critical care team, BD as needed


7. Cholangiogram through cholecystotomy once stable


8. DVT/GI prophylaxis

## 2017-02-17 NOTE — PN
Teaching Attending Note


Name of Resident: Buster Ramirez


ATTENDING PHYSICIAN STATEMENT





I saw and evaluated the patient.


I reviewed the resident's note and discussed the case with the resident.


I agree with the resident's findings and plan as documented.








SUBJECTIVE:


seen and evaluated at the bedside





OBJECTIVE:


intubated and sedated





ASSESSMENT AND PLAN:


85 year old man admitted for sepsis due to acute cholecystitis





Sepsis


-s/p drainage by IR


-pt currently intubated and sedated but now off levophed and dopamine 


-has MRSA and mold in sputum culture; E. coli in blood culture; enterobacter 

and enterococcus in billiary drain culture


-now on imipenem/vanco/voriconazole as per ID recs 


-vent management as per pulm





Acute blood loss anemia due to upper GI Bleed


-on protonix IV BID


-Hb trended down from 13 to 11 after hematemesis but has been stable since


-no need for endoscopy as per GI attending

## 2017-02-17 NOTE — PN
Teaching Attending Note


Name of Resident: Zaire Phillips


ATTENDING PHYSICIAN STATEMENT





I saw and evaluated the patient.


I reviewed the resident's note and discussed the case with the resident.


I agree with the resident's findings and plan as documented.








SUBJECTIVE:


Patient seen and examined in the ICU.  Remains intubated, sedated. 


Off NE since 4 AM.  


AC mode of vent. 





 Intake & Output











 02/14/17 02/15/17 02/16/17 02/17/17





 23:59 23:59 23:59 23:59


 


Intake Total 68006 4723.2 1961.0 190


 


Output Total 5580 2910 2450 550


 


Balance 6731 1813.2 -489.0 -360


 


Weight 174 lb 9.698 oz  180 lb 9 oz 179 lb 4 oz








 Last Vital Signs











Temp Pulse Resp BP Pulse Ox


 


 98.6 F   76   24   104/60   94 L


 


 02/17/17 12:00  02/17/17 12:00  02/17/17 12:00  02/17/17 12:00  02/17/17 09:00








Active Medications





Acetaminophen (Tylenol -)  650 mg PO Q4H PRN


   PRN Reason: FEVER OR PAIN


   Last Admin: 02/16/17 13:46 Dose:  650 mg


Acetaminophen (Ofirmev Injection -)  1,000 mg IVPB Q6H PRN


   PRN Reason: FEVER


   Last Admin: 02/17/17 06:36 Dose:  1,000 mg


Albuterol/Ipratropium (Duoneb -)  1 amp NEB Q4H PRN


   PRN Reason: SHORTNESS OF BREATH


Albuterol/Ipratropium (Duoneb -)  1 amp NEB BID LAUREN


   Last Admin: 02/17/17 09:20 Dose:  1 amp


Norepinephrine Bitartrate 8, (000 mcg/ Dextrose)  500 mls @ 18.75 mls/hr IV 

TITR LAUREN; 5 MCG/MIN


   PRN Reason: Protocol


   Last Titration: 02/17/17 04:00 Dose:  0 mcg/min


Pantoprazole Sodium (Protonix 40mg Ivpb (Pre-Docked))  100 mls @ 200 mls/hr 

IVPB BID LAUREN


   Last Admin: 02/17/17 09:44 Dose:  200 mls/hr


Imipenem/Cilastatin Sodium 500 (mg/ Sodium Chloride)  100 mls @ 200 mls/hr IVPB 

Q8H-IV LAUREN


   PRN Reason: Protocol


   Last Admin: 02/17/17 09:45 Dose:  200 mls/hr


Propofol (Diprivan -)  100 mls @ 2.457 mls/hr IVPB TITR LAUREN; 5 MCG/KG/MIN


   PRN Reason: Protocol


   Last Admin: 02/16/17 17:56 Dose:  Not Given


Voriconazole 480 mg/ Dextrose  298 mls @ 149 mls/hr IVPB BID LAUREN


   Stop: 02/17/17 23:59


   Last Admin: 02/17/17 10:32 Dose:  149 mls/hr


Voriconazole 320 mg/ Dextrose  282 mls @ 141 mls/hr IVPB BID LAUREN


Insulin Aspart (Novolog Vial Sliding Scale -)  1 vial SQ ACHS LAUREN


   PRN Reason: Protocol


   Last Admin: 02/17/17 11:37 Dose:  Not Given








Gen:  intubated, sedated


Heart:  RRR


Lung: decreased breath sounds at the bases


Abd: soft, nontender, +cholecystostomy with dark bilious fluid


Ext: + edema





 


 Laboratory Results - last 24 hr











  02/13/17 02/17/17 02/17/17





  21:09 05:35 05:35


 


WBC   14.9 H D 


 


RBC   3.89 L 


 


Hgb   11.6 L 


 


Hct   33.4 L 


 


MCV   85.9 


 


MCHC   34.6 


 


RDW   15.8 


 


Plt Count   103 L 


 


MPV   9.1 


 


Neutrophils %   83.0 H 


 


Lymphocytes %   8.7  D 


 


Monocytes %   5.8 


 


Eosinophils %   2.2 


 


Basophils %   0.3 


 


INR   


 


PTT (Actin FS)   


 


Fibrinogen   


 


PEEP  0.0  


 


Sodium    144


 


Potassium    3.6


 


Chloride    108 H


 


Carbon Dioxide    25


 


Anion Gap    11


 


BUN    24 H


 


Creatinine    1.1


 


Creat Clearance w eGFR    > 60


 


Random Glucose    146 H D


 


Calcium    7.3 L


 


Phosphorus    2.3 L D


 


Magnesium    2.1


 


Total Bilirubin    0.7


 


AST    19


 


ALT    30  D


 


Alkaline Phosphatase    170 H


 


Total Protein    4.5 L


 


Albumin    1.9 L














  02/17/17





  05:35


 


WBC 


 


RBC 


 


Hgb 


 


Hct 


 


MCV 


 


MCHC 


 


RDW 


 


Plt Count 


 


MPV 


 


Neutrophils % 


 


Lymphocytes % 


 


Monocytes % 


 


Eosinophils % 


 


Basophils % 


 


INR  1.71 H


 


PTT (Actin FS)  36.8 H


 


Fibrinogen  118.0 L


 


PEEP 


 


Sodium 


 


Potassium 


 


Chloride 


 


Carbon Dioxide 


 


Anion Gap 


 


BUN 


 


Creatinine 


 


Creat Clearance w eGFR 


 


Random Glucose 


 


Calcium 


 


Phosphorus 


 


Magnesium 


 


Total Bilirubin 


 


AST 


 


ALT 


 


Alkaline Phosphatase 


 


Total Protein 


 


Albumin 











ASSESSMENT AND PLAN:


Acute Cholecystitis


s/p Cholecystostomy Placement


Profound Septic Shock improving


Acute Hypoxic Respiratory Failure


Acute Kidney Injury


Lactic Acidosis resolved


Demand Ischemia


RCC s/p Left Nephrectomy


DIC


COPD


HTN


Hyperlipidemia








-  antibiotics/antifungal per ID


-  monitor cholecystostomy drainage


-  Monitor off IVF 


-  titrate pressors to maintain MAP >65


-  replete lytes


-  transfuse cryoprecipitate today


-  monitor CBC, coags, fibrinogen level


-  continue volume assist control


-  FiO2 to keep SpO2 >90%


-  lighten sedation in AM to assess mental status


-  start spontaneous breathing trials when mental status improves


-  NPO


-  DVT/GI prophylaxis


-  continue ICU monitoring








OBJECTIVE:








ASSESSMENT AND PLAN:








Problem List





- Problems


(1) Cholecystitis, acute


Code(s): K81.0 - ACUTE CHOLECYSTITIS





(2) Fever


Code(s): R50.9 - FEVER, UNSPECIFIED   Qualifiers: 


        Qualified Code(s): R50.81 - Fever presenting with conditions classified 

elsewhere  





(3) Leukocytosis


Code(s): D72.829 - ELEVATED WHITE BLOOD CELL COUNT, UNSPECIFIED   Qualifiers: 


        Qualified Code(s): D72.829 - Elevated white blood cell count, 

unspecified  





(4) Kidney malignancy


Code(s): C64.9 - MALIGNANT NEOPLASM OF UNSP KIDNEY, EXCEPT RENAL PELVIS   

Qualifiers: 


        Qualified Code(s): C64.2 - Malignant neoplasm of left kidney, except 

renal pelvis  





(5) Prostate CA


Code(s): C61 - MALIGNANT NEOPLASM OF PROSTATE





(6) COPD (chronic obstructive pulmonary disease)


Code(s): J44.9 - CHRONIC OBSTRUCTIVE PULMONARY DISEASE, UNSPECIFIED   Qualifiers

: 


        Qualified Code(s): J44.9 - Chronic obstructive pulmonary disease, 

unspecified  





(7) Sleep apnea


Code(s): G47.30 - SLEEP APNEA, UNSPECIFIED

## 2017-02-18 LAB
ALBUMIN SERPL-MCNC: 1.7 G/DL (ref 3.4–5)
ALP SERPL-CCNC: 235 U/L (ref 45–117)
ALT SERPL-CCNC: 23 U/L (ref 12–78)
ANION GAP SERPL CALC-SCNC: 9 MMOL/L (ref 8–16)
APTT BLD: 36.4 SECONDS (ref 26.9–34.4)
ART PUNCT SITE: (no result)
ARTERIAL PATENCY WRIST A: POSITIVE
AST SERPL-CCNC: 17 U/L (ref 15–37)
BASE EXCESS BLDA CALC-SCNC: 0.5 MEQ/L (ref -2–2)
BILIRUB SERPL-MCNC: 0.6 MG/DL (ref 0.2–1)
CALCIUM SERPL-MCNC: 7.1 MG/DL (ref 8.5–10.1)
CO2 SERPL-SCNC: 25 MMOL/L (ref 21–32)
CREAT SERPL-MCNC: 0.9 MG/DL (ref 0.7–1.3)
DEPRECATED RDW RBC AUTO: 15.3 % (ref 11.9–15.9)
EOSINOPHIL # BLD: 4 % (ref 0–4.5)
FIBRINOGEN PPP-MCNC: 101 MG/DL (ref 238–498)
GLUCOSE SERPL-MCNC: 177 MG/DL (ref 74–106)
HCO3 BLDA-SCNC: 24.3 MEQ/L (ref 22–26)
INR BLD: 1.79 (ref 0.82–1.09)
MAGNESIUM SERPL-MCNC: 2.1 MG/DL (ref 1.8–2.4)
MCH RBC QN AUTO: 29.7 PG (ref 25.7–33.7)
MCHC RBC AUTO-ENTMCNC: 34.9 G/DL (ref 32–35.9)
MCV RBC: 85.1 FL (ref 80–96)
MECH. VENT.: (no result)
NEUTROPHILS # BLD: 75 % (ref 42.8–82.8)
PEEP SETTING VENT: 5 CMH2O
PHOSPHATE SERPL-MCNC: 2.2 MG/DL (ref 2.5–4.9)
PLATELET # BLD AUTO: 95 K/MM3 (ref 134–434)
PMV BLD: 9.1 FL (ref 7.5–11.1)
PO2 BLDA: 77.3 MMHG (ref 68–100)
PROT SERPL-MCNC: 4.2 G/DL (ref 6.4–8.2)
PT PNL PPP: 19.9 SEC (ref 9.98–11.88)
PT. ON O2?: YES
SAO2 % BLDA: 95.5 % (ref 90–98.9)
TYPE OF O2: (no result)
VENT RATE: 16
VT/PRESS: 450
WBC # BLD AUTO: 10.8 K/MM3 (ref 4–10)

## 2017-02-18 RX ADMIN — POLYETHYLENE GLYCOL 3350 SCH GRAMS: 17 POWDER, FOR SOLUTION ORAL at 21:44

## 2017-02-18 RX ADMIN — INSULIN ASPART SCH UNITS: 100 INJECTION, SOLUTION INTRAVENOUS; SUBCUTANEOUS at 18:27

## 2017-02-18 RX ADMIN — VANCOMYCIN HYDROCHLORIDE SCH MG: 1 INJECTION, POWDER, LYOPHILIZED, FOR SOLUTION INTRAVENOUS at 22:49

## 2017-02-18 RX ADMIN — ACETAMINOPHEN PRN MG: 10 INJECTION, SOLUTION INTRAVENOUS at 20:36

## 2017-02-18 RX ADMIN — PANTOPRAZOLE SODIUM SCH MLS/HR: 40 INJECTION, POWDER, FOR SOLUTION INTRAVENOUS at 10:45

## 2017-02-18 RX ADMIN — PROPOFOL SCH: 10 INJECTION, EMULSION INTRAVENOUS at 12:58

## 2017-02-18 RX ADMIN — VORICONAZOLE SCH MLS/HR: 10 INJECTION, POWDER, LYOPHILIZED, FOR SOLUTION INTRAVENOUS at 12:53

## 2017-02-18 RX ADMIN — INSULIN ASPART SCH UNITS: 100 INJECTION, SOLUTION INTRAVENOUS; SUBCUTANEOUS at 22:46

## 2017-02-18 RX ADMIN — IMIPENEM AND CILASTATIN SODIUM SCH MLS/HR: 500; 500 INJECTION, POWDER, FOR SOLUTION INTRAVENOUS at 02:46

## 2017-02-18 RX ADMIN — ERTAPENEM SODIUM SCH MLS/HR: 1 INJECTION, POWDER, LYOPHILIZED, FOR SOLUTION INTRAMUSCULAR; INTRAVENOUS at 09:26

## 2017-02-18 RX ADMIN — IPRATROPIUM BROMIDE AND ALBUTEROL SULFATE SCH AMP: .5; 3 SOLUTION RESPIRATORY (INHALATION) at 09:54

## 2017-02-18 RX ADMIN — INSULIN ASPART SCH UNITS: 100 INJECTION, SOLUTION INTRAVENOUS; SUBCUTANEOUS at 13:03

## 2017-02-18 RX ADMIN — PANTOPRAZOLE SODIUM SCH MLS/HR: 40 INJECTION, POWDER, FOR SOLUTION INTRAVENOUS at 21:44

## 2017-02-18 RX ADMIN — INSULIN ASPART SCH UNITS: 100 INJECTION, SOLUTION INTRAVENOUS; SUBCUTANEOUS at 06:24

## 2017-02-18 RX ADMIN — IPRATROPIUM BROMIDE AND ALBUTEROL SULFATE SCH AMP: .5; 3 SOLUTION RESPIRATORY (INHALATION) at 23:00

## 2017-02-18 RX ADMIN — PROPOFOL SCH MLS/HR: 10 INJECTION, EMULSION INTRAVENOUS at 20:37

## 2017-02-18 RX ADMIN — VORICONAZOLE SCH MLS/HR: 10 INJECTION, POWDER, LYOPHILIZED, FOR SOLUTION INTRAVENOUS at 21:44

## 2017-02-18 RX ADMIN — VANCOMYCIN HYDROCHLORIDE SCH MG: 1 INJECTION, POWDER, LYOPHILIZED, FOR SOLUTION INTRAVENOUS at 10:50

## 2017-02-18 RX ADMIN — CHLORHEXIDINE GLUCONATE 0.12% ORAL RINSE SCH ML: 1.2 LIQUID ORAL at 21:43

## 2017-02-18 NOTE — PN
Progress Note (short form)





- Note


Progress Note: 


Patient seen and examined in the ICU.  Remains intubated on AC Mode of vent.  

Drowsy but arousbale and intermittently able to follow commands. 


No pressors. 





CXR: No gross change in effusion / congestion 








 Intake & Output











 02/15/17 02/16/17 02/17/17 02/18/17





 23:59 23:59 23:59 23:59


 


Intake Total 4723.2 1961.0 1650 320


 


Output Total 2910 2450 1150 300


 


Balance 1813.2 -489.0 500 20


 


Weight  180 lb 9 oz 179 lb 4 oz 180 lb








 Last Vital Signs











Temp Pulse Resp BP Pulse Ox


 


 99.5 F   92 H  26 H  133/55   96 


 


 02/18/17 08:00  02/18/17 08:00  02/18/17 09:52  02/18/17 08:00  02/18/17 08:58








Active Medications





Acetaminophen (Tylenol -)  650 mg PO Q4H PRN


   PRN Reason: FEVER OR PAIN


   Last Admin: 02/16/17 13:46 Dose:  650 mg


Acetaminophen (Ofirmev Injection -)  1,000 mg IVPB Q6H PRN


   PRN Reason: FEVER


   Last Admin: 02/17/17 21:36 Dose:  1,000 mg


Albuterol/Ipratropium (Duoneb -)  1 amp NEB Q4H PRN


   PRN Reason: SHORTNESS OF BREATH


   Last Admin: 02/18/17 05:57 Dose:  1 amp


Albuterol/Ipratropium (Duoneb -)  1 amp NEB BID LAUREN


   Last Admin: 02/18/17 09:54 Dose:  1 amp


Artificial Tears (Artificial Tears)  1 drop OU TID PRN


   PRN Reason: DRY EYES


Pantoprazole Sodium (Protonix 40mg Ivpb (Pre-Docked))  100 mls @ 200 mls/hr 

IVPB BID LAUREN


   Last Admin: 02/17/17 21:03 Dose:  200 mls/hr


Propofol (Diprivan -)  100 mls @ 2.457 mls/hr IVPB TITR LAUREN; 5 MCG/KG/MIN


   PRN Reason: Protocol


   Last Admin: 02/17/17 14:39 Dose:  Not Given


Voriconazole 320 mg/ Dextrose  282 mls @ 141 mls/hr IVPB BID LAUREN


Ertapenem (Invanz (Pre-Docked))  50 mls @ 50 mls/hr IVPB DAILY LAUREN


   PRN Reason: Protocol


   Last Admin: 02/18/17 09:26 Dose:  50 mls/hr


Insulin Aspart (Novolog Vial Sliding Scale -)  1 vial SQ ACHS LAUREN


   PRN Reason: Protocol


   Last Admin: 02/18/17 06:24 Dose:  2 units


Vancomycin HCl (Vancomycin (Pre-Docked))  1,000 mg IVPB BID LAUREN


   PRN Reason: Protocol











Gen:  intubated, drowsy 


Heart:  RRR


Lung: decreased breath sounds at the bases


Abd: soft, nontender, +cholecystostomy with dark bilious fluid


Ext: + edema





 


 Laboratory Results - last 24 hr











  02/13/17 02/15/17 02/15/17





  06:50 11:42 16:55


 


WBC   


 


RBC   


 


Hgb   


 


Hct   


 


MCV   


 


MCHC   


 


RDW   


 


Plt Count   


 


MPV   


 


Neutrophils %   


 


Lymphocytes %   


 


Monocytes %   


 


Eosinophils %   


 


INR   


 


PTT (Actin FS)   


 


Fibrinogen   


 


Puncture Site   


 


ABG pH   


 


ABG pCO2 at Pt Temp   


 


ABG pO2 at Pt Temp   


 


ABG HCO3   


 


ABG O2 Sat (Measured)   


 


ABG O2 Content   


 


ABG Base Excess   


 


Sb Test   


 


O2 Delivery Device   


 


Oxygen Flow Rate   


 


Vent Mode   


 


Vent Rate   


 


Mechanical Rate   


 


PEEP   


 


Pressure Support Vent   


 


Sodium   


 


Potassium   


 


Chloride   


 


Carbon Dioxide   


 


Anion Gap   


 


BUN   


 


Creatinine   


 


Creat Clearance w eGFR   


 


POC Glucometer   [phone]  [phone]


 


Random Glucose   


 


Calcium   


 


Total Bilirubin   


 


AST   


 


ALT   


 


Alkaline Phosphatase   


 


Total Protein   


 


Albumin   


 


Crossmatch  See Detail  














  02/15/17 02/16/17 02/16/17





  21:52 05:21 11:23


 


WBC   


 


RBC   


 


Hgb   


 


Hct   


 


MCV   


 


MCHC   


 


RDW   


 


Plt Count   


 


MPV   


 


Neutrophils %   


 


Lymphocytes %   


 


Monocytes %   


 


Eosinophils %   


 


INR   


 


PTT (Actin FS)   


 


Fibrinogen   


 


Puncture Site   


 


ABG pH   


 


ABG pCO2 at Pt Temp   


 


ABG pO2 at Pt Temp   


 


ABG HCO3   


 


ABG O2 Sat (Measured)   


 


ABG O2 Content   


 


ABG Base Excess   


 


Sb Test   


 


O2 Delivery Device   


 


Oxygen Flow Rate   


 


Vent Mode   


 


Vent Rate   


 


Mechanical Rate   


 


PEEP   


 


Pressure Support Vent   


 


Sodium   


 


Potassium   


 


Chloride   


 


Carbon Dioxide   


 


Anion Gap   


 


BUN   


 


Creatinine   


 


Creat Clearance w eGFR   


 


POC Glucometer  [phone]  [phone]  [phone]


 


Random Glucose   


 


Calcium   


 


Total Bilirubin   


 


AST   


 


ALT   


 


Alkaline Phosphatase   


 


Total Protein   


 


Albumin   


 


Crossmatch   














  02/16/17 02/16/17 02/17/17





  16:44 21:18 06:03


 


WBC   


 


RBC   


 


Hgb   


 


Hct   


 


MCV   


 


MCHC   


 


RDW   


 


Plt Count   


 


MPV   


 


Neutrophils %   


 


Lymphocytes %   


 


Monocytes %   


 


Eosinophils %   


 


INR   


 


PTT (Actin FS)   


 


Fibrinogen   


 


Puncture Site   


 


ABG pH   


 


ABG pCO2 at Pt Temp   


 


ABG pO2 at Pt Temp   


 


ABG HCO3   


 


ABG O2 Sat (Measured)   


 


ABG O2 Content   


 


ABG Base Excess   


 


Sb Test   


 


O2 Delivery Device   


 


Oxygen Flow Rate   


 


Vent Mode   


 


Vent Rate   


 


Mechanical Rate   


 


PEEP   


 


Pressure Support Vent   


 


Sodium   


 


Potassium   


 


Chloride   


 


Carbon Dioxide   


 


Anion Gap   


 


BUN   


 


Creatinine   


 


Creat Clearance w eGFR   


 


POC Glucometer  [phone]  169.78396  [phone]


 


Random Glucose   


 


Calcium   


 


Total Bilirubin   


 


AST   


 


ALT   


 


Alkaline Phosphatase   


 


Total Protein   


 


Albumin   


 


Crossmatch   














  02/17/17 02/17/17 02/17/17





  11:36 17:10 21:07


 


WBC   


 


RBC   


 


Hgb   


 


Hct   


 


MCV   


 


MCHC   


 


RDW   


 


Plt Count   


 


MPV   


 


Neutrophils %   


 


Lymphocytes %   


 


Monocytes %   


 


Eosinophils %   


 


INR   


 


PTT (Actin FS)   


 


Fibrinogen   


 


Puncture Site   


 


ABG pH   


 


ABG pCO2 at Pt Temp   


 


ABG pO2 at Pt Temp   


 


ABG HCO3   


 


ABG O2 Sat (Measured)   


 


ABG O2 Content   


 


ABG Base Excess   


 


Sb Test   


 


O2 Delivery Device   


 


Oxygen Flow Rate   


 


Vent Mode   


 


Vent Rate   


 


Mechanical Rate   


 


PEEP   


 


Pressure Support Vent   


 


Sodium   


 


Potassium   


 


Chloride   


 


Carbon Dioxide   


 


Anion Gap   


 


BUN   


 


Creatinine   


 


Creat Clearance w eGFR   


 


POC Glucometer  [phone]  195.18871  [phone]


 


Random Glucose   


 


Calcium   


 


Total Bilirubin   


 


AST   


 


ALT   


 


Alkaline Phosphatase   


 


Total Protein   


 


Albumin   


 


Crossmatch   














  02/18/17 02/18/17 02/18/17





  05:00 05:00 05:00


 


WBC  10.8 H  


 


RBC  3.68 L  


 


Hgb  10.9 L  


 


Hct  31.3 L  


 


MCV  85.1  


 


MCHC  34.9  


 


RDW  15.3  


 


Plt Count  95 L  


 


MPV  9.1  


 


Neutrophils %  75.0  


 


Lymphocytes %  13.0  D  


 


Monocytes %  8.0  


 


Eosinophils %  4.0  D  


 


INR   1.79 H 


 


PTT (Actin FS)   36.4 H 


 


Fibrinogen   101.0 L 


 


Puncture Site   


 


ABG pH   


 


ABG pCO2 at Pt Temp   


 


ABG pO2 at Pt Temp   


 


ABG HCO3   


 


ABG O2 Sat (Measured)   


 


ABG O2 Content   


 


ABG Base Excess   


 


Sb Test   


 


O2 Delivery Device   


 


Oxygen Flow Rate   


 


Vent Mode   


 


Vent Rate   


 


Mechanical Rate   


 


PEEP   


 


Pressure Support Vent   


 


Sodium    141


 


Potassium    3.2 L


 


Chloride    107


 


Carbon Dioxide    25


 


Anion Gap    9


 


BUN    26 H


 


Creatinine    0.9


 


Creat Clearance w eGFR    > 60


 


POC Glucometer   


 


Random Glucose    177 H D


 


Calcium    7.1 L


 


Total Bilirubin    0.6


 


AST    17


 


ALT    23  D


 


Alkaline Phosphatase    235 H D


 


Total Protein    4.2 L


 


Albumin    1.7 L


 


Crossmatch   














  02/18/17





  07:45


 


WBC 


 


RBC 


 


Hgb 


 


Hct 


 


MCV 


 


MCHC 


 


RDW 


 


Plt Count 


 


MPV 


 


Neutrophils % 


 


Lymphocytes % 


 


Monocytes % 


 


Eosinophils % 


 


INR 


 


PTT (Actin FS) 


 


Fibrinogen 


 


Puncture Site  Right radial


 


ABG pH  7.42


 


ABG pCO2 at Pt Temp  37.8


 


ABG pO2 at Pt Temp  77.3


 


ABG HCO3  24.3


 


ABG O2 Sat (Measured)  95.5


 


ABG O2 Content  14.9 L


 


ABG Base Excess  0.5


 


Sb Test  Positive


 


O2 Delivery Device  Mec.vent


 


Oxygen Flow Rate  35%


 


Vent Mode  A/c


 


Vent Rate  16


 


Mechanical Rate  Esprit


 


PEEP  5.0


 


Pressure Support Vent  450


 


Sodium 


 


Potassium 


 


Chloride 


 


Carbon Dioxide 


 


Anion Gap 


 


BUN 


 


Creatinine 


 


Creat Clearance w eGFR 


 


POC Glucometer 


 


Random Glucose 


 


Calcium 


 


Total Bilirubin 


 


AST 


 


ALT 


 


Alkaline Phosphatase 


 


Total Protein 


 


Albumin 


 


Crossmatch 











Problem List





- Problems


(1) Cholecystitis, acute


Code(s): K81.0 - ACUTE CHOLECYSTITIS





(2) Fever


Code(s): R50.9 - FEVER, UNSPECIFIED   Qualifiers: 


        Qualified Code(s): R50.81 - Fever presenting with conditions classified 

elsewhere  





(3) Leukocytosis


Code(s): D72.829 - ELEVATED WHITE BLOOD CELL COUNT, UNSPECIFIED   Qualifiers: 


        Qualified Code(s): D72.829 - Elevated white blood cell count, 

unspecified  





(4) Kidney malignancy


Code(s): C64.9 - MALIGNANT NEOPLASM OF UNSP KIDNEY, EXCEPT RENAL PELVIS   

Qualifiers: 


        Qualified Code(s): C64.2 - Malignant neoplasm of left kidney, except 

renal pelvis  





(5) Prostate CA


Code(s): C61 - MALIGNANT NEOPLASM OF PROSTATE





(6) COPD (chronic obstructive pulmonary disease)


Code(s): J44.9 - CHRONIC OBSTRUCTIVE PULMONARY DISEASE, UNSPECIFIED   Qualifiers

: 


        Qualified Code(s): J44.9 - Chronic obstructive pulmonary disease, 

unspecified  





(7) Sleep apnea


Code(s): G47.30 - SLEEP APNEA, UNSPECIFIED   








ASSESSMENT AND PLAN:


Acute Cholecystitis


s/p Cholecystostomy Placement


Profound Septic Shock improving


Acute Hypoxic Respiratory Failure


Acute Kidney Injury


Lactic Acidosis resolved


Demand Ischemia


RCC s/p Left Nephrectomy


DIC


COPD


HTN


Hyperlipidemia








-  antibiotics/antifungal per ID


-  monitor cholecystostomy drainage


-  Monitor off IVF 


-  replete lytes


-  SBTs as tolerated 


-  FiO2 to keep SpO2 >90%


-  Monitor off sedation 


-  DVT/GI prophylaxis


-  Enteral feeds 


-  CT chest 








Dr [place] 


CCTime 35" 





Problem List





- Problems


(1) Cholecystitis, acute


Code(s): K81.0 - ACUTE CHOLECYSTITIS





(2) Fever


Code(s): R50.9 - FEVER, UNSPECIFIED   Qualifiers: 


     Fever type: other     Qualified Code(s): R50.81 - Fever presenting with 

conditions classified elsewhere  





(3) Leukocytosis


Code(s): D72.829 - ELEVATED WHITE BLOOD CELL COUNT, UNSPECIFIED   Qualifiers: 


     Leukocytosis type: unspecified     Qualified Code(s): D72.829 - Elevated 

white blood cell count, unspecified  





(4) Kidney malignancy


Code(s): C64.9 - MALIGNANT NEOPLASM OF UNSP KIDNEY, EXCEPT RENAL PELVIS   

Qualifiers: 


     Laterality: left     Qualified Code(s): C64.2 - Malignant neoplasm of left 

kidney, except renal pelvis  





(5) Prostate CA


Code(s): C61 - MALIGNANT NEOPLASM OF PROSTATE





(6) COPD (chronic obstructive pulmonary disease)


Code(s): J44.9 - CHRONIC OBSTRUCTIVE PULMONARY DISEASE, UNSPECIFIED   Qualifiers

: 


     COPD type: unspecified COPD        Qualified Code(s): J44.9 - Chronic 

obstructive pulmonary disease, unspecified  





(7) Sleep apnea


Code(s): G47.30 - SLEEP APNEA, UNSPECIFIED

## 2017-02-18 NOTE — PN
<Buster Ramirez - Last Filed: 02/18/17 09:35>


Physical Exam: 


SUBJECTIVE: Patient seen and examined at bedside. Off pressors and off 

sedation. Opens eyes when name is called loudly. Squeezes fingers when asked. 

Following some basic commands, still lethargic.








OBJECTIVE:





 


 Vital Signs











Temperature  99.5 F   02/18/17 08:00


 


Pulse Rate  92 H  02/18/17 08:00


 


Respiratory Rate  24   02/18/17 09:00


 


Blood Pressure  133/55   02/18/17 08:00


 


O2 Sat by Pulse Oximetry (%)  96   02/18/17 08:58














GENERAL: lethargic, on ventilator/intubated. Following some basic commands, 

opens eyes when called, squeezes fingers.


HEAD: Normal with no signs of trauma.


EYES: Constricted pupils. Reactive to light.


ENT: moist mucous membranes. OG tube in place w/ feeds


NECK: Trachea midline


LUNGS: Auscultated anteriorly. Diminished breath sounds. 


HEART: Distant heart sounds, Regular rate and rhythm, S1, S2 without murmur, 

rub or gallop.


ABDOMEN: Grimacing on RUQ, LUQ palpation. Hypoactive bowel sounds.


EXTREMITIES: 2+ pulses, warm, well-perfused, no edema. 


NEUROLOGICAL:  gait not observed. Lethargic


PSYCH: Lethargic


SKIN: Warm, dry, normal turgor, no rashes or lesions noted

















 Laboratory Results - last 24 hr











  02/13/17 02/15/17 02/15/17





  06:50 11:42 16:55


 


WBC   


 


RBC   


 


Hgb   


 


Hct   


 


MCV   


 


MCHC   


 


RDW   


 


Plt Count   


 


MPV   


 


Neutrophils %   


 


Lymphocytes %   


 


Monocytes %   


 


Eosinophils %   


 


INR   


 


PTT (Actin FS)   


 


Fibrinogen   


 


Puncture Site   


 


ABG pH   


 


ABG pCO2 at Pt Temp   


 


ABG pO2 at Pt Temp   


 


ABG HCO3   


 


ABG O2 Sat (Measured)   


 


ABG O2 Content   


 


ABG Base Excess   


 


Sb Test   


 


O2 Delivery Device   


 


Oxygen Flow Rate   


 


Vent Mode   


 


Vent Rate   


 


Mechanical Rate   


 


PEEP   


 


Pressure Support Vent   


 


Sodium   


 


Potassium   


 


Chloride   


 


Carbon Dioxide   


 


Anion Gap   


 


BUN   


 


Creatinine   


 


Creat Clearance w eGFR   


 


POC Glucometer   [phone]  [phone]


 


Random Glucose   


 


Calcium   


 


Total Bilirubin   


 


AST   


 


ALT   


 


Alkaline Phosphatase   


 


Total Protein   


 


Albumin   


 


Crossmatch  See Detail  














  02/15/17 02/16/17 02/16/17





  21:52 05:21 11:23


 


WBC   


 


RBC   


 


Hgb   


 


Hct   


 


MCV   


 


MCHC   


 


RDW   


 


Plt Count   


 


MPV   


 


Neutrophils %   


 


Lymphocytes %   


 


Monocytes %   


 


Eosinophils %   


 


INR   


 


PTT (Actin FS)   


 


Fibrinogen   


 


Puncture Site   


 


ABG pH   


 


ABG pCO2 at Pt Temp   


 


ABG pO2 at Pt Temp   


 


ABG HCO3   


 


ABG O2 Sat (Measured)   


 


ABG O2 Content   


 


ABG Base Excess   


 


Sb Test   


 


O2 Delivery Device   


 


Oxygen Flow Rate   


 


Vent Mode   


 


Vent Rate   


 


Mechanical Rate   


 


PEEP   


 


Pressure Support Vent   


 


Sodium   


 


Potassium   


 


Chloride   


 


Carbon Dioxide   


 


Anion Gap   


 


BUN   


 


Creatinine   


 


Creat Clearance w eGFR   


 


POC Glucometer  [phone]  225.87388  [phone]


 


Random Glucose   


 


Calcium   


 


Total Bilirubin   


 


AST   


 


ALT   


 


Alkaline Phosphatase   


 


Total Protein   


 


Albumin   


 


Crossmatch   














  02/16/17 02/16/17 02/17/17





  16:44 21:18 06:03


 


WBC   


 


RBC   


 


Hgb   


 


Hct   


 


MCV   


 


MCHC   


 


RDW   


 


Plt Count   


 


MPV   


 


Neutrophils %   


 


Lymphocytes %   


 


Monocytes %   


 


Eosinophils %   


 


INR   


 


PTT (Actin FS)   


 


Fibrinogen   


 


Puncture Site   


 


ABG pH   


 


ABG pCO2 at Pt Temp   


 


ABG pO2 at Pt Temp   


 


ABG HCO3   


 


ABG O2 Sat (Measured)   


 


ABG O2 Content   


 


ABG Base Excess   


 


Sb Test   


 


O2 Delivery Device   


 


Oxygen Flow Rate   


 


Vent Mode   


 


Vent Rate   


 


Mechanical Rate   


 


PEEP   


 


Pressure Support Vent   


 


Sodium   


 


Potassium   


 


Chloride   


 


Carbon Dioxide   


 


Anion Gap   


 


BUN   


 


Creatinine   


 


Creat Clearance w eGFR   


 


POC Glucometer  [phone]  169.49216  [phone]


 


Random Glucose   


 


Calcium   


 


Total Bilirubin   


 


AST   


 


ALT   


 


Alkaline Phosphatase   


 


Total Protein   


 


Albumin   


 


Crossmatch   














  02/17/17 02/17/17 02/17/17





  11:36 17:10 21:07


 


WBC   


 


RBC   


 


Hgb   


 


Hct   


 


MCV   


 


MCHC   


 


RDW   


 


Plt Count   


 


MPV   


 


Neutrophils %   


 


Lymphocytes %   


 


Monocytes %   


 


Eosinophils %   


 


INR   


 


PTT (Actin FS)   


 


Fibrinogen   


 


Puncture Site   


 


ABG pH   


 


ABG pCO2 at Pt Temp   


 


ABG pO2 at Pt Temp   


 


ABG HCO3   


 


ABG O2 Sat (Measured)   


 


ABG O2 Content   


 


ABG Base Excess   


 


Sb Test   


 


O2 Delivery Device   


 


Oxygen Flow Rate   


 


Vent Mode   


 


Vent Rate   


 


Mechanical Rate   


 


PEEP   


 


Pressure Support Vent   


 


Sodium   


 


Potassium   


 


Chloride   


 


Carbon Dioxide   


 


Anion Gap   


 


BUN   


 


Creatinine   


 


Creat Clearance w eGFR   


 


POC Glucometer  [phone]  195.33842  [phone]


 


Random Glucose   


 


Calcium   


 


Total Bilirubin   


 


AST   


 


ALT   


 


Alkaline Phosphatase   


 


Total Protein   


 


Albumin   


 


Crossmatch   














  02/18/17 02/18/17 02/18/17





  05:00 05:00 05:00


 


WBC  10.8 H  


 


RBC  3.68 L  


 


Hgb  10.9 L  


 


Hct  31.3 L  


 


MCV  85.1  


 


MCHC  34.9  


 


RDW  15.3  


 


Plt Count  95 L  


 


MPV  9.1  


 


Neutrophils %  75.0  


 


Lymphocytes %  13.0  D  


 


Monocytes %  8.0  


 


Eosinophils %  4.0  D  


 


INR   1.79 H 


 


PTT (Actin FS)   36.4 H 


 


Fibrinogen   101.0 L 


 


Puncture Site   


 


ABG pH   


 


ABG pCO2 at Pt Temp   


 


ABG pO2 at Pt Temp   


 


ABG HCO3   


 


ABG O2 Sat (Measured)   


 


ABG O2 Content   


 


ABG Base Excess   


 


Sb Test   


 


O2 Delivery Device   


 


Oxygen Flow Rate   


 


Vent Mode   


 


Vent Rate   


 


Mechanical Rate   


 


PEEP   


 


Pressure Support Vent   


 


Sodium    141


 


Potassium    3.2 L


 


Chloride    107


 


Carbon Dioxide    25


 


Anion Gap    9


 


BUN    26 H


 


Creatinine    0.9


 


Creat Clearance w eGFR    > 60


 


POC Glucometer   


 


Random Glucose    177 H D


 


Calcium    7.1 L


 


Total Bilirubin    0.6


 


AST    17


 


ALT    23  D


 


Alkaline Phosphatase    235 H D


 


Total Protein    4.2 L


 


Albumin    1.7 L


 


Crossmatch   














  02/18/17





  07:45


 


WBC 


 


RBC 


 


Hgb 


 


Hct 


 


MCV 


 


MCHC 


 


RDW 


 


Plt Count 


 


MPV 


 


Neutrophils % 


 


Lymphocytes % 


 


Monocytes % 


 


Eosinophils % 


 


INR 


 


PTT (Actin FS) 


 


Fibrinogen 


 


Puncture Site  Right radial


 


ABG pH  7.42


 


ABG pCO2 at Pt Temp  37.8


 


ABG pO2 at Pt Temp  77.3


 


ABG HCO3  24.3


 


ABG O2 Sat (Measured)  95.5


 


ABG O2 Content  14.9 L


 


ABG Base Excess  0.5


 


Sb Test  Positive


 


O2 Delivery Device  Mec.vent


 


Oxygen Flow Rate  35%


 


Vent Mode  A/c


 


Vent Rate  16


 


Mechanical Rate  Esprit


 


PEEP  5.0


 


Pressure Support Vent  450


 


Sodium 


 


Potassium 


 


Chloride 


 


Carbon Dioxide 


 


Anion Gap 


 


BUN 


 


Creatinine 


 


Creat Clearance w eGFR 


 


POC Glucometer 


 


Random Glucose 


 


Calcium 


 


Total Bilirubin 


 


AST 


 


ALT 


 


Alkaline Phosphatase 


 


Total Protein 


 


Albumin 


 


Crossmatch 








 Microbiology





02/12/17 21:40   Blood - Peripheral Venous   Blood Culture - Final


                            NO GROWTH AFTER 5 DAYS INCUBATION


02/12/17 21:40   Blood - Peripheral Venous   Blood Culture - Final


                            NO GROWTH AFTER 5 DAYS INCUBATION


02/13/17 17:30   Body Fluid - Other   Gram Stain - Final


02/13/17 17:30   Body Fluid - Other   Body Fluid Culture - Final


                            Enterobacter Asburiae


                            Enterococcus Faecium


02/13/17 17:30   Body Fluid - Other   Anaerobic Culture - Final


                            NO ANAEROBES WERE ISOLATED


02/13/17 22:41   Blood - Central Line   Blood Culture - Preliminary


                            NO GROWTH OBTAINED AFTER 72 HOURS, INCUBATION TO 

CONTINUE


                            FOR 2 DAYS.


02/13/17 22:46   Sputum - Endotrachea Suction/Ventilator   Gram Stain - Final


02/13/17 22:46   Sputum - Endotrachea Suction/Ventilator   Sputum Culture - 

Final


                            Fungal Isolate: Mold


                            Mr S Aureus


02/13/17 22:41   Blood - Central Line   Blood Culture - Final


                            Escherichia Coli


02/12/17 21:40   Urine - Urine Clean Catch   Urine Culture - Final


                            NO GROWTH OBTAINED








Active Medications











Generic Name Dose Route Start Last Admin





  Trade Name Freq  PRN Reason Stop Dose Admin


 


Acetaminophen  650 mg 02/13/17 23:33 02/16/17 13:46





  Tylenol -  PO   650 mg





  Q4H PRN   Administration





  FEVER OR PAIN   


 


Acetaminophen  1,000 mg 02/16/17 16:07 02/17/17 21:36





  Ofirmev Injection -  IVPB   1,000 mg





  Q6H PRN   Administration





  FEVER   


 


Albuterol/Ipratropium  1 amp 02/13/17 23:33 02/18/17 05:57





  Duoneb -  NEB   1 amp





  Q4H PRN   Administration





  SHORTNESS OF BREATH   


 


Albuterol/Ipratropium  1 amp 02/14/17 10:00 02/17/17 23:15





  Duoneb -  NEB   1 amp





  BID LAUREN   Administration


 


Artificial Tears  1 drop 02/18/17 09:28  





  Artificial Tears  OU   





  TID PRN   





  DRY EYES   


 


Pantoprazole Sodium  100 mls @ 200 mls/hr 02/14/17 10:00 02/17/17 21:03





  Protonix 40mg Ivpb (Pre-Docked)  IVPB   200 mls/hr





  BID LAUREN   Administration


 


Propofol  100 mls @ 2.457 mls/hr 02/16/17 12:00 02/17/17 14:39





  Diprivan -  IVPB   Not Given





  TITR LAUREN   





  Protocol   





  5 MCG/KG/MIN   


 


Voriconazole 320 mg/ Dextrose  282 mls @ 141 mls/hr 02/18/17 10:00  





  IVPB   





  BID LAUREN   


 


Ertapenem  50 mls @ 50 mls/hr 02/18/17 10:00 02/18/17 09:26





  Invanz (Pre-Docked)  IVPB   50 mls/hr





  DAILY LAUREN   Administration





  Protocol   


 


Insulin Aspart  1 vial 02/14/17 22:00 02/18/17 06:24





  Novolog Vial Sliding Scale -  SQ   2 units





  [facility]   Administration





  Protocol   


 


Vancomycin HCl  1,000 mg 02/18/17 10:00  





  Vancomycin (Pre-Docked)  IVPB   





  [facility]   





  Protocol   











ASSESSMENT/PLAN:


86 y/o M w/ PMH of [place], HTN, HLD, prostate ca, TIA, Kidney cancer s/p 

nephrectomy, COPD presented to ER with abdominal pain (RUQ) for 3 days. 

Admitted for acute cholecystitis. Perc cholecystostomy placed on 2/13/17. Pt 

developed SOB after procedure, was intubated, had central line placed, on 

pressors and sedated and in ICU for septic shock.





-Septic shock secondary to Acute Cholecystitis from cholelithiasis


   -improving


   - Tmax 24 hours: 100.3; WBC: 10.8 (improving)


   -Off pressors, hemodynamically stable off pressors


   -CT abd/pelvis: 2/13/17 - Significantly dilated/enlarged gallbladder with 

diffuse thickening of its wall and possible minimal pericholecystic free fluid 

consistent with acute cholecystitis.


   -Abd U/S: 2/14/17 - gallbladder has small calculi. No evidence of intra or 

extrahepatic duct dilatation. CBD = 7mm. Thick gallbladder wall, cholelithiasis

, no evidence of sig biliary ductal dilatation.


   -LFTs trending down; Alk phos elevated


      -Cholangiogram via cholecystostomy once more stable recommended by GI


   -Abx : off imipenem, now on ertapenem, vanco; ID on board


   -Spcx: mold, MRSA - voriconazole started by ID to cover for aspergillus, 

vanco restarted.


      -CT chest to assess for cavitations ordered


   -repeat BCx -1/2 bottles: E. Coli


   -Body fluid cx (from drain): Enterobacter asburiae, enterococcus faecium


   -Surgery on board





-Acute hypoxic respiratory failure


   -off sedation since yesterday AM


   -Intubated, on vent: 35% FiO2, 16 RR, PEEP 5, 450 TV


   -wean trials, vent management as per ICU team





-Anemia secondary to Upper GI bleed


   -resolving upper gi bleed, receiving feeds via OGT


   -OG tube in place


   -H/H stable, monitor


   -EGD when more stable


   -GI on board


   -c/w protonix





-Elevated trops


   -mildly elevated, 0.12 peak, last trop 0.06


   -most likely secondary to demand ischemia from septic shock


   -Echo: 2/14/17 - EF: 63%, LV nl size, LVSF nl, no regional wall abnl noted, 

LV impaired relaxation, mild MR, mod TR, mild AS, no pericardial effusion.





-Diastolic CHF:


   -CXR: 2/15/17: R moderate pleural effusion. L small pleural effusion.


   -ECHO: 2/14/17 - EF: 63%, LV nl size, LVSF nl, no regional wall abnl noted, 

LV impaired relaxation, mild MR, mod TR, mild AS, no pericardial effusion.


   -not on fluids currently.


   -CXR, pleural effusions still present, no change from yesterday's cxr


   -not on fluids





-Transaminitis


   -Secondary to septic shock


   -INR elevated as well, trending down


   -Monitor LFTs, LFTs currently trending down


   -Alk phos trending up, secondary to drain vs acute amy





-SANGITA secondary to septic shock


   -improving


   -BUN/Cr 26/0.9


   -Urine output 950 ml yesterday, 300 ml today


   -monitor urine output, BUN/Cr





-Possible DIC secondary to septic shock


   -s/p cyroprecipitate


   -fibrinogen slightly down from yesterday


   -platelets trending down





-Hyperglycemia


   -Monitor sugars


   -Currently random glucose 177, on ISS





-Dry eyes


   -Artifical tears tid PRN for dry eyes





-HTN


   -hold anti-hypertensives in light of septic shock





-[place]


   -Intubated, on vent





-Hx of TIA


   -asa held





-HLD


   -held lipitor 10 mg PO qhs





-insomnia


   -held melatonin 5 mg po qhs prn





-BPH


   -held flomax 0.8 mg po qd


   -held finasteride 5 mg po qd





-DVT ppx


   -SCDs





-FEN


   -no fluids for now.


   -hypokalemia - repleted with k-cl 20 meq, on feeds now too, will monitor


   -hypophosphatemia - add-on lab for today order, monitor, replete as necessary

, on feeds now.


   -Dietary recommendations: -Vital 1.2 @ 20cc/hr, increase 10cc as tolerated 

to goal 62cc/hr.


      -Target volume 1500ml.





-Dispo:


   -Monitor in ICU.





Problem List





- Problems


(1) COPD (chronic obstructive pulmonary disease)


Code(s): J44.9 - CHRONIC OBSTRUCTIVE PULMONARY DISEASE, UNSPECIFIED   Qualifiers

: 


     COPD type: unspecified COPD        Qualified Code(s): J44.9 - Chronic 

obstructive pulmonary disease, unspecified  





(2) Cholecystitis, acute


Code(s): K81.0 - ACUTE CHOLECYSTITIS





(3) Fever


Code(s): R50.9 - FEVER, UNSPECIFIED   Qualifiers: 


     Fever type: other        Qualified Code(s): R50.81 - Fever presenting with 

conditions classified elsewhere  





(4) Leukocytosis


Code(s): D72.829 - ELEVATED WHITE BLOOD CELL COUNT, UNSPECIFIED   Qualifiers: 


     Leukocytosis type: unspecified        Qualified Code(s): D72.829 - 

Elevated white blood cell count, unspecified  





(5) Sleep apnea


Code(s): G47.30 - SLEEP APNEA, UNSPECIFIED   





(6) Acute urinary retention


Code(s): R33.8 - OTHER RETENTION OF URINE





(7) Insomnia


Code(s): G47.00 - INSOMNIA, UNSPECIFIED   





(8) HTN (hypertension)


Code(s): I10 - ESSENTIAL (PRIMARY) HYPERTENSION   Qualifiers: 


     Hypertension type: essential hypertension        Qualified Code(s): I10 - 

Essential (primary) hypertension  





(9) HLD (hyperlipidemia)


Code(s): E78.5 - HYPERLIPIDEMIA, UNSPECIFIED   Qualifiers: 


     Hyperlipidemia type: pure hypercholesterolemia        Qualified Code(s): 

E78.0 - Pure hypercholesterolemia  





(10) BPH (benign prostatic hyperplasia)


Code(s): N40.0 - BENIGN PROSTATIC HYPERPLASIA WITHOUT LOWER URINRY TRACT SYMP   





(11) Septic shock


Code(s): A41.9 - SEPSIS, UNSPECIFIED ORGANISM


R65.21 - SEVERE SEPSIS WITH SEPTIC SHOCK





(12) Acute respiratory failure with hypoxia


Code(s): J96.01 - ACUTE RESPIRATORY FAILURE WITH HYPOXIA





(13) SANGITA (acute kidney injury)


Code(s): N17.9 - ACUTE KIDNEY FAILURE, UNSPECIFIED





(14) Transaminitis


Code(s): R74.0 - NONSPEC ELEV OF LEVELS OF TRANSAMNS & LACTIC ACID DEHYDRGNSE





(15) Upper GI bleed


Code(s): K92.2 - GASTROINTESTINAL HEMORRHAGE, UNSPECIFIED





(16) Elevated troponin


Code(s): R79.89 - OTHER SPECIFIED ABNORMAL FINDINGS OF BLOOD CHEMISTRY





(17) Demand ischemia


Code(s): I24.8 - OTHER FORMS OF ACUTE ISCHEMIC HEART DISEASE





(18) DIC (disseminated intravascular coagulation)


Code(s): D65 - DISSEMINATED INTRAVASCULAR COAGULATION





(19) Hypophosphatemia


Code(s): E83.39 - OTHER DISORDERS OF PHOSPHORUS METABOLISM





(20) Hypokalemia


Code(s): E87.6 - HYPOKALEMIA





(21) Anemia due to GI blood loss


Code(s): D50.0 - IRON DEFICIENCY ANEMIA SECONDARY TO BLOOD LOSS (CHRONIC)





(22) Diastolic CHF


Code(s): I50.30 - UNSPECIFIED DIASTOLIC (CONGESTIVE) HEART FAILURE   Qualifiers

: 


     Congestive heart failure chronicity: chronic        Qualified Code(s): 

I50.32 - Chronic diastolic (congestive) heart failure  





(23) Dry eyes


Code(s): H04.123 - DRY EYE SYNDROME OF BILATERAL LACRIMAL GLANDS








Visit type





- Emergency Visit


Emergency Visit: Yes


ED Registration Date: 02/13/17


Care time: The patient presented to the Emergency Department on the above date 

and was hospitalized for further evaluation of their emergent condition.





- New Patient


This patient is new to me today: No





- Critical Care


Critical Care patient: Yes


Total Critical Care Time (in minutes): 38


Critical Care Statement: The care of this patient involved high complexity 

decision making to prevent further life threatening deterioration of the patient

's condition and/or to evalute & treat vital organ system(s) failure or risk of 

failure.





<Fabian Hale - Last Filed: 02/18/17 11:49>


Physical Exam: 


ATTENDING PHYSICIAN STATEMENT





I saw and evaluated the patient.


I reviewed the resident's note and discussed the case with the resident.


I agree with the resident's findings and plan as documented.








SUBJECTIVE:


seen and evaluated at the bedside





OBJECTIVE:


intubated and sedated





ASSESSMENT AND PLAN:


85 year old man admitted for sepsis due to acute cholecystitis





Sepsis


-s/p drainage by IR


-pt currently intubated and sedated but now off levophed and dopamine 


-has MRSA and mold in sputum culture; E. coli in blood culture; enterobacter 

and enterococcus in billiary drain culture


-now on imipenem/vanco/voriconazole as per ID recs 


-for CT chest to eval for possible cavitary lesion given mold in sputum culture


-vent management as per pulm





Acute blood loss anemia due to upper GI Bleed


-on protonix IV BID


-Hb trended down from 13 to 11 after hematemesis but has been stable since


-no need for endoscopy as per GI attending

## 2017-02-18 NOTE — PN
Progress Note (short form)





- Note


Progress Note: 


off pressors


remains intubated





following commands, wiggles toes





 Vital Signs











 Period  Temp  Pulse  Resp  BP Sys/Yun  Pulse Ox


 


 Last 24 Hr  98.6 F-100.3 F  76-88  19-29  102-130/44-60  94-94








cor-rrr


lungs decreased bs at bases


abd soft,nt


+bs +ruq biliary drain


ext no edema





 CBC, BMP





 02/18/17 05:00 





 02/18/17 05:00 





 Microbiology





02/12/17 21:40   Blood - Peripheral Venous   Blood Culture - Final


                            NO GROWTH AFTER 5 DAYS INCUBATION


02/12/17 21:40   Blood - Peripheral Venous   Blood Culture - Final


                            NO GROWTH AFTER 5 DAYS INCUBATION


02/13/17 17:30   Body Fluid - Other   Gram Stain - Final


02/13/17 17:30   Body Fluid - Other   Body Fluid Culture - Final


                            Enterobacter Asburiae


                            Enterococcus Faecium


02/13/17 17:30   Body Fluid - Other   Anaerobic Culture - Final


                            NO ANAEROBES WERE ISOLATED


02/13/17 22:41   Blood - Central Line   Blood Culture - Preliminary


                            NO GROWTH OBTAINED AFTER 72 HOURS, INCUBATION TO 

CONTINUE


                            FOR 2 DAYS.


02/13/17 22:46   Sputum - Endotrachea Suction/Ventilator   Gram Stain - Final


02/13/17 22:46   Sputum - Endotrachea Suction/Ventilator   Sputum Culture - 

Final


                            Fungal Isolate: Mold


                            Mr S Aureus


02/13/17 22:41   Blood - Central Line   Blood Culture - Final


                            Escherichia Coli


02/12/17 21:40   Urine - Urine Clean Catch   Urine Culture - Final


                            NO GROWTH OBTAINED





a/p


septic shock- biliary sepsis secondary to acute cholycystitis- now with biliary 

drain


ecoli bacteremia


sputum with mold and MRSA


DIC


thrombocytopenia


respiratory failure


started on vancomycin and voriconazole with plans for ct scan of chest when 

stable


clinicallly improved


d/c imipenem, switch to ertapenem

## 2017-02-18 NOTE — PN
Progress Note, Physician


Chief Complaint: 


Events noted


Currently in ICU intubated and sedated


History of Present Illness: 


Patient was seen and examined in ICU. On mechanical respirator. Chart was 

reviewed


Post cholecystostomy draining bilious fluid





- Current Medication List


Current Medications: 


Active Medications





Acetaminophen (Tylenol -)  650 mg PO Q4H PRN


   PRN Reason: FEVER OR PAIN


   Last Admin: 02/16/17 13:46 Dose:  650 mg


Acetaminophen (Ofirmev Injection -)  1,000 mg IVPB Q6H PRN


   PRN Reason: FEVER


   Last Admin: 02/17/17 21:36 Dose:  1,000 mg


Albuterol/Ipratropium (Duoneb -)  1 amp NEB Q4H PRN


   PRN Reason: SHORTNESS OF BREATH


   Last Admin: 02/18/17 05:57 Dose:  1 amp


Albuterol/Ipratropium (Duoneb -)  1 amp NEB BID [facility]


   Last Admin: 02/18/17 09:54 Dose:  1 amp


Artificial Tears (Artificial Tears)  1 drop OU TID PRN


   PRN Reason: DRY EYES


Pantoprazole Sodium (Protonix 40mg Ivpb (Pre-Docked))  100 mls @ 200 mls/hr 

IVPB BID [facility]


   Last Admin: 02/18/17 10:45 Dose:  200 mls/hr


Propofol (Diprivan -)  100 mls @ 2.457 mls/hr IVPB TITR [facility]; 5 MCG/KG/MIN


   PRN Reason: Protocol


   Last Admin: 02/18/17 12:58 Dose:  Not Given


Voriconazole 320 mg/ Dextrose  282 mls @ 141 mls/hr IVPB BID [facility]


   Last Admin: 02/18/17 12:53 Dose:  141 mls/hr


Ertapenem (Invanz (Pre-Docked))  50 mls @ 50 mls/hr IVPB DAILY [facility]


   PRN Reason: Protocol


   Last Admin: 02/18/17 09:26 Dose:  50 mls/hr


Potassium Phosphate 15 mm/ (Sodium Chloride)  255 mls @ 62.5 mls/hr IVPB ONCE 

ONE


   Stop: 02/18/17 17:04


   Last Admin: 02/18/17 14:05 Dose:  62.5 mls/hr


Insulin Aspart (Novolog Vial Sliding Scale -)  1 vial SQ ACHS [facility]


   PRN Reason: Protocol


   Last Admin: 02/18/17 13:03 Dose:  2 units


Vancomycin HCl (Vancomycin (Pre-Docked))  1,000 mg IVPB BID [facility]


   PRN Reason: Protocol


   Last Admin: 02/18/17 10:50 Dose:  1,000 mg











- Objective


Vital Signs: 


 Vital Signs











Temperature  99.5 F   02/18/17 08:00


 


Pulse Rate  92 H  02/18/17 08:00


 


Respiratory Rate  19   02/18/17 12:49


 


Blood Pressure  133/55   02/18/17 08:00


 


O2 Sat by Pulse Oximetry (%)  96   02/18/17 08:58











Cardiovascular: Yes: Regular Rate and Rhythm, Murmur (Soft SM), S1, S2


Respiratory: Yes: Diminished, Mechanically Ventilated


Gastrointestinal: Yes: Soft.  No: Tenderness


Edema: No


Labs: 


 CBC, BMP





 02/18/17 05:00 





 02/18/17 05:00 





 INR, PTT











INR  1.79  (0.82-1.09)  H  02/18/17  05:00    


 


Fibrinogen  101.0 mg/dL (238-498)  L  02/18/17  05:00    














Problem List





- Problems


(1) COPD (chronic obstructive pulmonary disease)


Code(s): J44.9 - CHRONIC OBSTRUCTIVE PULMONARY DISEASE, UNSPECIFIED   Qualifiers

: 


     COPD type: unspecified COPD        Qualified Code(s): J44.9 - Chronic 

obstructive pulmonary disease, unspecified  





(2) Cholecystitis, acute


Code(s): K81.0 - ACUTE CHOLECYSTITIS





(3) Fever


Code(s): R50.9 - FEVER, UNSPECIFIED   Qualifiers: 


     Fever type: other     Qualified Code(s): R50.81 - Fever presenting with 

conditions classified elsewhere  





(4) HLD (hyperlipidemia)


Code(s): E78.5 - HYPERLIPIDEMIA, UNSPECIFIED   Qualifiers: 


     Hyperlipidemia type: pure hypercholesterolemia        Qualified Code(s): 

E78.0 - Pure hypercholesterolemia  





(5) HTN (hypertension)


Code(s): I10 - ESSENTIAL (PRIMARY) HYPERTENSION   Qualifiers: 


     Hypertension type: essential hypertension        Qualified Code(s): I10 - 

Essential (primary) hypertension  





(6) Leukocytosis


Code(s): D72.829 - ELEVATED WHITE BLOOD CELL COUNT, UNSPECIFIED   Qualifiers: 


     Leukocytosis type: unspecified     Qualified Code(s): D72.829 - Elevated 

white blood cell count, unspecified  





(7) Colitis


Code(s): K52.9 - NONINFECTIVE GASTROENTERITIS AND COLITIS, UNSPECIFIED





(8) Diverticulitis


Code(s): K57.92 - DVTRCLI OF INTEST, PART UNSP, W/O PERF OR ABSCESS W/O BLEED   





(9) Kidney malignancy


Code(s): C64.9 - MALIGNANT NEOPLASM OF UNSP KIDNEY, EXCEPT RENAL PELVIS   

Qualifiers: 


     Laterality: left     Qualified Code(s): C64.2 - Malignant neoplasm of left 

kidney, except renal pelvis  





(10) Prostate CA


Code(s): C61 - MALIGNANT NEOPLASM OF PROSTATE





(11) Acute respiratory failure with hypoxia


Code(s): J96.01 - ACUTE RESPIRATORY FAILURE WITH HYPOXIA





(12) Cholecystostomy care


Code(s): Z43.4 - ENCOUNTER FOR ATTN TO OTH ARTIF OPENINGS OF DIGESTIVE TRACT





(13) DIC (disseminated intravascular coagulation)


Code(s): D65 - DISSEMINATED INTRAVASCULAR COAGULATION





(14) Hypokalemia


Code(s): E87.6 - HYPOKALEMIA





(15) Septic shock


Code(s): A41.9 - SEPSIS, UNSPECIFIED ORGANISM


R65.21 - SEVERE SEPSIS WITH SEPTIC SHOCK








Assessment/Plan


1. Acute hypoxic respiratory failure referable to septic shock (E. coli 

bacteremia) 


2. Acute cholecystitis post cholecystostomy drain


3. Post demand ischemia


4. Hyperglycemia


5. Hypokalemia


6. History of TIA


7. History of COPD


8. History of diverticular disease


9. History of colitis


10. History of renal carcinoma post left nephrectomy


11. History of prostate carcinoma


12. HTN


13. Hyperlipidemia


14. DIC





PLAN:


1. Ventilator management as per ICU team


2. Cardiac medications are on hold (Carvedilol, Losartan and Amlodipine). 

Resume therapies pending hemodynamic stability


3. Monitor CBC and transfuse as needed maintaining Hgb > 8.0


4. Correction of Hypokalemia


5. Antibiotic and antifungal as per ID service


6. Surgical follow up





Further plans are to follow


Sunshine Vazquez MD

## 2017-02-19 LAB
ALBUMIN SERPL-MCNC: 1.8 G/DL (ref 3.4–5)
ALP SERPL-CCNC: 287 U/L (ref 45–117)
ALT SERPL-CCNC: 23 U/L (ref 12–78)
ANION GAP SERPL CALC-SCNC: 9 MMOL/L (ref 8–16)
APTT BLD: 30.3 SECONDS (ref 26.9–34.4)
AST SERPL-CCNC: 27 U/L (ref 15–37)
BILIRUB SERPL-MCNC: 0.4 MG/DL (ref 0.2–1)
CALCIUM SERPL-MCNC: 7.2 MG/DL (ref 8.5–10.1)
CO2 SERPL-SCNC: 26 MMOL/L (ref 21–32)
CREAT SERPL-MCNC: 1 MG/DL (ref 0.7–1.3)
DEPRECATED RDW RBC AUTO: 15.7 % (ref 11.9–15.9)
FIBRINOGEN PPP-MCNC: < 100 MG/DL (ref 238–498)
GLUCOSE SERPL-MCNC: 168 MG/DL (ref 74–106)
INR BLD: 1.54 (ref 0.82–1.09)
MAGNESIUM SERPL-MCNC: 2 MG/DL (ref 1.8–2.4)
MCH RBC QN AUTO: 30.1 PG (ref 25.7–33.7)
MCHC RBC AUTO-ENTMCNC: 34.8 G/DL (ref 32–35.9)
MCV RBC: 86.4 FL (ref 80–96)
PHOSPHATE SERPL-MCNC: 2.9 MG/DL (ref 2.5–4.9)
PLATELET # BLD AUTO: 123 K/MM3 (ref 134–434)
PMV BLD: 9.4 FL (ref 7.5–11.1)
PROT SERPL-MCNC: 4.4 G/DL (ref 6.4–8.2)
PT PNL PPP: 17.1 SEC (ref 9.98–11.88)
WBC # BLD AUTO: 10 K/MM3 (ref 4–10)

## 2017-02-19 RX ADMIN — PANTOPRAZOLE SODIUM SCH MLS/HR: 40 INJECTION, POWDER, FOR SOLUTION INTRAVENOUS at 21:55

## 2017-02-19 RX ADMIN — CHLORHEXIDINE GLUCONATE 0.12% ORAL RINSE SCH ML: 1.2 LIQUID ORAL at 22:00

## 2017-02-19 RX ADMIN — CHLORHEXIDINE GLUCONATE 0.12% ORAL RINSE SCH ML: 1.2 LIQUID ORAL at 10:07

## 2017-02-19 RX ADMIN — POLYETHYLENE GLYCOL 3350 SCH GRAMS: 17 POWDER, FOR SOLUTION ORAL at 10:06

## 2017-02-19 RX ADMIN — ERTAPENEM SODIUM SCH MLS/HR: 1 INJECTION, POWDER, LYOPHILIZED, FOR SOLUTION INTRAMUSCULAR; INTRAVENOUS at 10:04

## 2017-02-19 RX ADMIN — INSULIN ASPART SCH UNITS: 100 INJECTION, SOLUTION INTRAVENOUS; SUBCUTANEOUS at 18:23

## 2017-02-19 RX ADMIN — INSULIN ASPART SCH: 100 INJECTION, SOLUTION INTRAVENOUS; SUBCUTANEOUS at 07:25

## 2017-02-19 RX ADMIN — PROPOFOL SCH MLS/HR: 10 INJECTION, EMULSION INTRAVENOUS at 19:00

## 2017-02-19 RX ADMIN — VORICONAZOLE SCH MLS/HR: 10 INJECTION, POWDER, LYOPHILIZED, FOR SOLUTION INTRAVENOUS at 12:38

## 2017-02-19 RX ADMIN — IPRATROPIUM BROMIDE AND ALBUTEROL SULFATE SCH AMP: .5; 3 SOLUTION RESPIRATORY (INHALATION) at 09:30

## 2017-02-19 RX ADMIN — VANCOMYCIN HYDROCHLORIDE SCH MG: 1 INJECTION, POWDER, LYOPHILIZED, FOR SOLUTION INTRAVENOUS at 10:08

## 2017-02-19 RX ADMIN — ACETAMINOPHEN PRN MG: 10 INJECTION, SOLUTION INTRAVENOUS at 21:54

## 2017-02-19 RX ADMIN — VANCOMYCIN HYDROCHLORIDE SCH MG: 1 INJECTION, POWDER, LYOPHILIZED, FOR SOLUTION INTRAVENOUS at 21:58

## 2017-02-19 RX ADMIN — VANCOMYCIN HYDROCHLORIDE SCH: 1 INJECTION, POWDER, LYOPHILIZED, FOR SOLUTION INTRAVENOUS at 22:29

## 2017-02-19 RX ADMIN — POLYETHYLENE GLYCOL 3350 SCH GRAMS: 17 POWDER, FOR SOLUTION ORAL at 22:07

## 2017-02-19 RX ADMIN — PANTOPRAZOLE SODIUM SCH MLS/HR: 40 INJECTION, POWDER, FOR SOLUTION INTRAVENOUS at 12:39

## 2017-02-19 RX ADMIN — INSULIN ASPART SCH UNITS: 100 INJECTION, SOLUTION INTRAVENOUS; SUBCUTANEOUS at 12:37

## 2017-02-19 RX ADMIN — INSULIN ASPART SCH UNITS: 100 INJECTION, SOLUTION INTRAVENOUS; SUBCUTANEOUS at 22:08

## 2017-02-19 RX ADMIN — VORICONAZOLE SCH MLS/HR: 10 INJECTION, POWDER, LYOPHILIZED, FOR SOLUTION INTRAVENOUS at 22:00

## 2017-02-19 NOTE — PN
Progress Note, Physician


Chief Complaint: 


Events noted


Currently in ICU intubated and sedated


History of Present Illness: 


Patient was seen and examined in ICU. On mechanical respirator. Chart was 

reviewed


Post cholecystostomy draining bilious fluid





- Current Medication List


Current Medications: 


Active Medications





Acetaminophen (Tylenol -)  650 mg PO Q4H PRN


   PRN Reason: FEVER OR PAIN


   Last Admin: 02/16/17 13:46 Dose:  650 mg


Acetaminophen (Ofirmev Injection -)  1,000 mg IVPB Q6H PRN


   PRN Reason: FEVER


   Last Admin: 02/18/17 20:36 Dose:  1,000 mg


Artificial Tears (Artificial Tears)  1 drop OU TID PRN


   PRN Reason: DRY EYES


Bisacodyl (Dulcolax Suppository -)  10 mg RC PRN PRN


   PRN Reason: CONSTIPATION


Chlorhexidine Gluconate (Peridex -)  15 ml MM BID [facility]


   Last Admin: 02/19/17 10:07 Dose:  15 ml


Pantoprazole Sodium (Protonix 40mg Ivpb (Pre-Docked))  100 mls @ 200 mls/hr 

IVPB BID [facility]


   Last Admin: 02/19/17 12:39 Dose:  200 mls/hr


Voriconazole 320 mg/ Dextrose  282 mls @ 141 mls/hr IVPB BID [facility]


   Last Admin: 02/19/17 12:38 Dose:  141 mls/hr


Ertapenem (Invanz (Pre-Docked))  50 mls @ 50 mls/hr IVPB DAILY [facility]


   PRN Reason: Protocol


   Last Admin: 02/19/17 10:04 Dose:  50 mls/hr


Propofol (Diprivan -)  100 mls @ 2.457 mls/hr IVPB TITR [facility]; 5 MCG/KG/MIN


   PRN Reason: Protocol


   Last Admin: 02/18/17 20:37 Dose:  2.457 mls/hr


Insulin Aspart (Novolog Vial Sliding Scale -)  1 vial SQ ACHS [facility]


   PRN Reason: Protocol


   Last Admin: 02/19/17 18:23 Dose:  2 units


Polyethylene Glycol (Miralax (For Daily Use) -)  17 gm NGT BID [facility]


   Last Admin: 02/19/17 10:06 Dose:  17 grams


Vancomycin HCl (Vancomycin (Pre-Docked))  1,000 mg IVPB BID [facility]


   PRN Reason: Protocol


   Last Admin: 02/19/17 10:08 Dose:  1,000 mg











- Objective


Vital Signs: 


 Vital Signs











Temperature  99.4 F   02/19/17 18:20


 


Pulse Rate  94 H  02/19/17 18:20


 


Respiratory Rate  21   02/19/17 18:47


 


Blood Pressure  144/53   02/19/17 18:20


 


O2 Sat by Pulse Oximetry (%)  100   02/19/17 09:00











Neck: Yes: Supple


Cardiovascular: Yes: Regular Rate and Rhythm, Murmur (Soft SM), S1, S2


Respiratory: Yes: Mechanically Ventilated


Gastrointestinal: Yes: Soft.  No: Tenderness


Edema: No


Labs: 


 CBC, BMP





 02/19/17 05:00 





 02/19/17 05:00 





 INR, PTT











INR  1.54  (0.82-1.09)  H  02/19/17  05:00    


 


Fibrinogen  < 100.0 mg/dL (238-498)  L*  02/19/17  05:00    














Problem List





- Problems


(1) COPD (chronic obstructive pulmonary disease)


Code(s): J44.9 - CHRONIC OBSTRUCTIVE PULMONARY DISEASE, UNSPECIFIED   Qualifiers

: 


     COPD type: unspecified COPD        Qualified Code(s): J44.9 - Chronic 

obstructive pulmonary disease, unspecified  





(2) Cholecystitis, acute


Code(s): K81.0 - ACUTE CHOLECYSTITIS





(3) Fever


Code(s): R50.9 - FEVER, UNSPECIFIED   Qualifiers: 


     Fever type: other     Qualified Code(s): R50.81 - Fever presenting with 

conditions classified elsewhere  





(4) HLD (hyperlipidemia)


Code(s): E78.5 - HYPERLIPIDEMIA, UNSPECIFIED   Qualifiers: 


     Hyperlipidemia type: pure hypercholesterolemia        Qualified Code(s): 

E78.0 - Pure hypercholesterolemia  





(5) HTN (hypertension)


Code(s): I10 - ESSENTIAL (PRIMARY) HYPERTENSION   Qualifiers: 


     Hypertension type: essential hypertension        Qualified Code(s): I10 - 

Essential (primary) hypertension  





(6) Leukocytosis


Code(s): D72.829 - ELEVATED WHITE BLOOD CELL COUNT, UNSPECIFIED   Qualifiers: 


     Leukocytosis type: unspecified     Qualified Code(s): D72.829 - Elevated 

white blood cell count, unspecified  





(7) Colitis


Code(s): K52.9 - NONINFECTIVE GASTROENTERITIS AND COLITIS, UNSPECIFIED





(8) Diverticulitis


Code(s): K57.92 - DVTRCLI OF INTEST, PART UNSP, W/O PERF OR ABSCESS W/O BLEED   





(9) Kidney malignancy


Code(s): C64.9 - MALIGNANT NEOPLASM OF UNSP KIDNEY, EXCEPT RENAL PELVIS   

Qualifiers: 


     Laterality: left     Qualified Code(s): C64.2 - Malignant neoplasm of left 

kidney, except renal pelvis  





(10) Prostate CA


Code(s): C61 - MALIGNANT NEOPLASM OF PROSTATE





(11) Acute respiratory failure with hypoxia


Code(s): J96.01 - ACUTE RESPIRATORY FAILURE WITH HYPOXIA





(12) Cholecystostomy care


Code(s): Z43.4 - ENCOUNTER FOR ATTN TO OT ARTIF OPENINGS OF DIGESTIVE TRACT





(13) DIC (disseminated intravascular coagulation)


Code(s): D65 - DISSEMINATED INTRAVASCULAR COAGULATION





(14) Hypokalemia


Code(s): E87.6 - HYPOKALEMIA





(15) Septic shock


Code(s): A41.9 - SEPSIS, UNSPECIFIED ORGANISM


R65.21 - SEVERE SEPSIS WITH SEPTIC SHOCK








Assessment/Plan


1. Acute hypoxic respiratory failure referable to septic shock (E. coli 

bacteremia) 


2. Acute cholecystitis post cholecystostomy drain


3. Post demand ischemia


4. Hyperglycemia


5. Hypokalemia


6. History of TIA


7. History of COPD


8. History of diverticular disease


9. History of colitis


10. History of renal carcinoma post left nephrectomy


11. History of prostate carcinoma


12. HTN


13. Hyperlipidemia


14. DIC





PLAN:


1. Ventilator management as per ICU team


2. Cardiac medications are on hold (Carvedilol, Losartan and Amlodipine). 

Resume therapies pending hemodynamic stability


3. Monitor CBC and transfuse as needed maintaining Hgb > 8.0


4. Antibiotic and antifungal as per ID service





Further plans are to follow


Sunshine Vazquez MD

## 2017-02-19 NOTE — PN
Progress Note (short form)





- Note


Progress Note: 


Patient seen and examined in the ICU.  Remains intubated on AC Mode of vent.  

Sedated on propofol.  


No pressors. 





CT Chest: Bibasilar consolidation / infiltrate / RUL cavitary lesion 





CXR: ETT in position 





 Intake & Output











 02/16/17 02/17/17 02/18/17 02/19/17





 23:59 23:59 23:59 23:59


 


Intake Total 1961.0 1650 2340 539


 


Output Total 2450 1150 835 425


 


Balance -489.0 500 1505 114


 


Weight 180 lb 9 oz 179 lb 4 oz 180 lb 180 lb 8 oz








 Last Vital Signs











Temp Pulse Resp BP Pulse Ox


 


 98.6 F   88   24   133/59   100 


 


 02/19/17 06:00  02/19/17 08:00  02/19/17 09:30  02/19/17 08:00  02/18/17 21:00








Active Medications





Acetaminophen (Tylenol -)  650 mg PO Q4H PRN


   PRN Reason: FEVER OR PAIN


   Last Admin: 02/16/17 13:46 Dose:  650 mg


Acetaminophen (Ofirmev Injection -)  1,000 mg IVPB Q6H PRN


   PRN Reason: FEVER


   Last Admin: 02/18/17 20:36 Dose:  1,000 mg


Artificial Tears (Artificial Tears)  1 drop OU TID PRN


   PRN Reason: DRY EYES


Bisacodyl (Dulcolax Suppository -)  10 mg RC PRN PRN


   PRN Reason: CONSTIPATION


Chlorhexidine Gluconate (Peridex -)  15 ml MM BID [facility]


   Last Admin: 02/18/17 21:43 Dose:  15 ml


Pantoprazole Sodium (Protonix 40mg Ivpb (Pre-Docked))  100 mls @ 200 mls/hr 

IVPB BID [facility]


   Last Admin: 02/18/17 21:44 Dose:  200 mls/hr


Voriconazole 320 mg/ Dextrose  282 mls @ 141 mls/hr IVPB BID [facility]


   Last Admin: 02/18/17 21:44 Dose:  141 mls/hr


Ertapenem (Invanz (Pre-Docked))  50 mls @ 50 mls/hr IVPB DAILY [facility]


   PRN Reason: Protocol


   Last Admin: 02/18/17 09:26 Dose:  50 mls/hr


Propofol (Diprivan -)  100 mls @ 2.457 mls/hr IVPB TITR [facility]; 5 MCG/KG/MIN


   PRN Reason: Protocol


   Last Admin: 02/18/17 20:37 Dose:  2.457 mls/hr


Insulin Aspart (Novolog Vial Sliding Scale -)  1 vial SQ ACHS [facility]


   PRN Reason: Protocol


   Last Admin: 02/19/17 07:25 Dose:  Not Given


Polyethylene Glycol (Miralax (For Daily Use) -)  17 gm NGT BID [facility]


   Last Admin: 02/18/17 21:44 Dose:  17 grams


Vancomycin HCl (Vancomycin (Pre-Docked))  1,000 mg IVPB BID [facility]


   PRN Reason: Protocol


   Last Admin: 02/18/17 22:49 Dose:  1,000 mg








Gen:  intubated, sedated  


Heart:  RRR


Lung: decreased breath sounds at the bases


Abd: soft, nontender, +cholecystostomy with dark bilious fluid


Ext: + edema





 


 


 Laboratory Results - last 24 hr











  02/18/17 02/18/17 02/18/17





  05:00 05:00 06:15


 


WBC   


 


RBC   


 


Hgb   


 


Hct   


 


MCV   


 


MCHC   


 


RDW   


 


Plt Count   


 


MPV   


 


INR   


 


PTT (Actin FS)   


 


Fibrinogen   


 


Sodium   


 


Potassium   


 


Chloride   


 


Carbon Dioxide   


 


Anion Gap   


 


BUN   


 


Creatinine   


 


Creat Clearance w eGFR   


 


POC Glucometer    [phone]


 


Random Glucose   


 


Calcium   


 


Phosphorus  2.2 L  Cancelled 


 


Magnesium  2.1  Cancelled 


 


Total Bilirubin   


 


AST   


 


ALT   


 


Alkaline Phosphatase   


 


Total Protein   


 


Albumin   














  02/18/17 02/18/17 02/18/17





  12:47 16:45 22:46


 


WBC   


 


RBC   


 


Hgb   


 


Hct   


 


MCV   


 


MCHC   


 


RDW   


 


Plt Count   


 


MPV   


 


INR   


 


PTT (Actin FS)   


 


Fibrinogen   


 


Sodium   


 


Potassium   


 


Chloride   


 


Carbon Dioxide   


 


Anion Gap   


 


BUN   


 


Creatinine   


 


Creat Clearance w eGFR   


 


POC Glucometer  [phone]  [phone]  212.36385


 


Random Glucose   


 


Calcium   


 


Phosphorus   


 


Magnesium   


 


Total Bilirubin   


 


AST   


 


ALT   


 


Alkaline Phosphatase   


 


Total Protein   


 


Albumin   














  02/19/17 02/19/17 02/19/17





  05:00 05:00 05:00


 


WBC  10.0  


 


RBC  3.72 L  


 


Hgb  11.2 L  


 


Hct  32.1 L  


 


MCV  86.4  


 


MCHC  34.8  


 


RDW  15.7  


 


Plt Count  123 L D  


 


MPV  9.4  


 


INR    1.54 H


 


PTT (Actin FS)    30.3


 


Fibrinogen    < 100.0 L*


 


Sodium   143 


 


Potassium   3.9  D 


 


Chloride   108 H 


 


Carbon Dioxide   26 


 


Anion Gap   9 


 


BUN   25 H 


 


Creatinine   1.0 


 


Creat Clearance w eGFR   > 60 


 


POC Glucometer   


 


Random Glucose   168 H 


 


Calcium   7.2 L 


 


Phosphorus   2.9  D 


 


Magnesium   2.0 


 


Total Bilirubin   0.4  D 


 


AST   27  D 


 


ALT   23 


 


Alkaline Phosphatase   287 H D 


 


Total Protein   4.4 L 


 


Albumin   1.8 L 











Problem List





- Problems


(1) Cholecystitis, acute


Code(s): K81.0 - ACUTE CHOLECYSTITIS





(2) Fever


Code(s): R50.9 - FEVER, UNSPECIFIED   Qualifiers: 


        Qualified Code(s): R50.81 - Fever presenting with conditions classified 

elsewhere  





(3) Leukocytosis


Code(s): D72.829 - ELEVATED WHITE BLOOD CELL COUNT, UNSPECIFIED   Qualifiers: 


        Qualified Code(s): D72.829 - Elevated white blood cell count, 

unspecified  





(4) Kidney malignancy


Code(s): C64.9 - MALIGNANT NEOPLASM OF UNSP KIDNEY, EXCEPT RENAL PELVIS   

Qualifiers: 


        Qualified Code(s): C64.2 - Malignant neoplasm of left kidney, except 

renal pelvis  





(5) Prostate CA


Code(s): C61 - MALIGNANT NEOPLASM OF PROSTATE





(6) COPD (chronic obstructive pulmonary disease)


Code(s): J44.9 - CHRONIC OBSTRUCTIVE PULMONARY DISEASE, UNSPECIFIED   Qualifiers

: 


        Qualified Code(s): J44.9 - Chronic obstructive pulmonary disease, 

unspecified  





(7) Sleep apnea


Code(s): G47.30 - SLEEP APNEA, UNSPECIFIED   








ASSESSMENT AND PLAN:


Acute Cholecystitis


s/p Cholecystostomy Placement


Profound Septic Shock improving


Acute Hypoxic Respiratory Failure


Acute Kidney Injury


Lactic Acidosis resolved


Demand Ischemia


RCC s/p Left Nephrectomy


DIC


COPD


HTN


Hyperlipidemia








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 


-  antibiotics/antifungal per ID


-  monitor cholecystostomy drainage


-  SBTs as tolerated 


-  FiO2 to keep SpO2 >90%


-  Sedation vacation to assess mental status  


-  DVT/GI prophylaxis


-  Enteral feeds 








Dr [place] 


CCTime 35" 





Problem List





- Problems


(1) Cholecystitis, acute


Code(s): K81.0 - ACUTE CHOLECYSTITIS





(2) Fever


Code(s): R50.9 - FEVER, UNSPECIFIED   Qualifiers: 


     Fever type: other     Qualified Code(s): R50.81 - Fever presenting with 

conditions classified elsewhere  





(3) Leukocytosis


Code(s): D72.829 - ELEVATED WHITE BLOOD CELL COUNT, UNSPECIFIED   Qualifiers: 


     Leukocytosis type: unspecified     Qualified Code(s): D72.829 - Elevated 

white blood cell count, unspecified  





(4) Kidney malignancy


Code(s): C64.9 - MALIGNANT NEOPLASM OF UNSP KIDNEY, EXCEPT RENAL PELVIS   

Qualifiers: 


     Laterality: left     Qualified Code(s): C64.2 - Malignant neoplasm of left 

kidney, except renal pelvis  





(5) Prostate CA


Code(s): C61 - MALIGNANT NEOPLASM OF PROSTATE





(6) COPD (chronic obstructive pulmonary disease)


Code(s): J44.9 - CHRONIC OBSTRUCTIVE PULMONARY DISEASE, UNSPECIFIED   Qualifiers

: 


     COPD type: unspecified COPD        Qualified Code(s): J44.9 - Chronic 

obstructive pulmonary disease, unspecified  





(7) Sleep apnea


Code(s): G47.30 - SLEEP APNEA, UNSPECIFIED

## 2017-02-19 NOTE — PN
Progress Note (short form)





- Note


Progress Note: 


sedated


intubated





 Vital Signs











 Period  Temp  Pulse  Resp  BP Sys/Yun  Pulse Ox


 


 Last 24 Hr  98.6 F-100.3 F    19-30  104-146/49-67  100-100








cor-rrr


lungs decreased bs at bases


abd softl +biliary drain


ext no edema





 CBC, BMP





 02/19/17 05:00 





 02/19/17 05:00 





 Microbiology





02/13/17 22:41   Blood - Central Line   Blood Culture - Final


                            NO GROWTH AFTER 5 DAYS INCUBATION


02/12/17 21:40   Blood - Peripheral Venous   Blood Culture - Final


                            NO GROWTH AFTER 5 DAYS INCUBATION


02/12/17 21:40   Blood - Peripheral Venous   Blood Culture - Final


                            NO GROWTH AFTER 5 DAYS INCUBATION


02/13/17 17:30   Body Fluid - Other   Gram Stain - Final


02/13/17 17:30   Body Fluid - Other   Body Fluid Culture - Final


                            Enterobacter Asburiae


                            Enterococcus Faecium


02/13/17 17:30   Body Fluid - Other   Anaerobic Culture - Final


                            NO ANAEROBES WERE ISOLATED


02/13/17 22:46   Sputum - Endotrachea Suction/Ventilator   Gram Stain - Final


02/13/17 22:46   Sputum - Endotrachea Suction/Ventilator   Sputum Culture - 

Final


                            Fungal Isolate: Mold


                            Mr S Aureus


02/13/17 22:41   Blood - Central Line   Blood Culture - Final


                            Escherichia Coli


02/12/17 21:40   Urine - Urine Clean Catch   Urine Culture - Final


                            NO GROWTH OBTAINED





chest ct noted with left apical cavity





a/p


resp failure


sepsis- ecoli bacteremia,entrobacter/enterococcus in biliary fluid


mold/mrsa in sputum with cavity on ct- await fungitell and galactomannan


check vancomycin trough


continue ertapenem/vanco/voriconazole


DIC


improving thrombocytopenia

## 2017-02-20 LAB
ALBUMIN SERPL-MCNC: 1.8 G/DL (ref 3.4–5)
ALP SERPL-CCNC: 427 U/L (ref 45–117)
ALT SERPL-CCNC: 53 U/L (ref 12–78)
ANION GAP SERPL CALC-SCNC: 11 MMOL/L (ref 8–16)
APTT BLD: 21.1 SECONDS (ref 26.9–34.4)
ART PUNCT SITE: (no result)
ARTERIAL PATENCY WRIST A: POSITIVE
AST SERPL-CCNC: 88 U/L (ref 15–37)
BASE EXCESS BLDA CALC-SCNC: 2.5 MEQ/L (ref -2–2)
BILIRUB SERPL-MCNC: 0.7 MG/DL (ref 0.2–1)
CALCIUM SERPL-MCNC: 7.6 MG/DL (ref 8.5–10.1)
CO2 SERPL-SCNC: 26 MMOL/L (ref 21–32)
CREAT SERPL-MCNC: 0.8 MG/DL (ref 0.7–1.3)
DEPRECATED RDW RBC AUTO: 15.4 % (ref 11.9–15.9)
FIBRINOGEN PPP-MCNC: < 100 MG/DL (ref 238–498)
GLUCOSE SERPL-MCNC: 189 MG/DL (ref 74–106)
HCO3 BLDA-SCNC: 26.3 MEQ/L (ref 22–26)
INR BLD: 1.31 (ref 0.82–1.09)
LPM/O2%: 35
MAGNESIUM SERPL-MCNC: 2.1 MG/DL (ref 1.8–2.4)
MCH RBC QN AUTO: 29.7 PG (ref 25.7–33.7)
MCHC RBC AUTO-ENTMCNC: 34.5 G/DL (ref 32–35.9)
MCV RBC: 86.3 FL (ref 80–96)
MECH. VENT.: YES
PEEP SETTING VENT: 5 CMH2O
PHOSPHATE SERPL-MCNC: 2.4 MG/DL (ref 2.5–4.9)
PLATELET # BLD AUTO: 138 K/MM3 (ref 134–434)
PMV BLD: 9.2 FL (ref 7.5–11.1)
PO2 BLDA: 78.8 MMHG (ref 68–100)
PROT SERPL-MCNC: 4.4 G/DL (ref 6.4–8.2)
PT PNL PPP: 14.5 SEC (ref 9.98–11.88)
PT. ON O2?: YES
SAO2 % BLDA: 96.2 % (ref 90–98.9)
TYPE OF O2: (no result)
VENT RATE: 16
VT/PRESS: 450
WBC # BLD AUTO: 7.5 K/MM3 (ref 4–10)

## 2017-02-20 PROCEDURE — 30233N1 TRANSFUSION OF NONAUTOLOGOUS RED BLOOD CELLS INTO PERIPHERAL VEIN, PERCUTANEOUS APPROACH: ICD-10-PCS | Performed by: INTERNAL MEDICINE

## 2017-02-20 RX ADMIN — VANCOMYCIN HYDROCHLORIDE SCH MG: 1 INJECTION, POWDER, LYOPHILIZED, FOR SOLUTION INTRAVENOUS at 21:14

## 2017-02-20 RX ADMIN — PROPOFOL SCH MLS/HR: 10 INJECTION, EMULSION INTRAVENOUS at 21:12

## 2017-02-20 RX ADMIN — VORICONAZOLE SCH MLS/HR: 10 INJECTION, POWDER, LYOPHILIZED, FOR SOLUTION INTRAVENOUS at 12:33

## 2017-02-20 RX ADMIN — PANTOPRAZOLE SODIUM SCH MLS/HR: 40 INJECTION, POWDER, FOR SOLUTION INTRAVENOUS at 09:24

## 2017-02-20 RX ADMIN — INSULIN ASPART SCH UNITS: 100 INJECTION, SOLUTION INTRAVENOUS; SUBCUTANEOUS at 13:33

## 2017-02-20 RX ADMIN — POLYETHYLENE GLYCOL 3350 SCH GRAMS: 17 POWDER, FOR SOLUTION ORAL at 21:13

## 2017-02-20 RX ADMIN — CARVEDILOL SCH MG: 3.12 TABLET, FILM COATED ORAL at 21:13

## 2017-02-20 RX ADMIN — POLYETHYLENE GLYCOL 3350 SCH GRAMS: 17 POWDER, FOR SOLUTION ORAL at 09:28

## 2017-02-20 RX ADMIN — ERTAPENEM SODIUM SCH MLS/HR: 1 INJECTION, POWDER, LYOPHILIZED, FOR SOLUTION INTRAMUSCULAR; INTRAVENOUS at 09:23

## 2017-02-20 RX ADMIN — ACETAMINOPHEN PRN MG: 10 INJECTION, SOLUTION INTRAVENOUS at 16:07

## 2017-02-20 RX ADMIN — INSULIN ASPART SCH UNITS: 100 INJECTION, SOLUTION INTRAVENOUS; SUBCUTANEOUS at 21:43

## 2017-02-20 RX ADMIN — CHLORHEXIDINE GLUCONATE 0.12% ORAL RINSE SCH ML: 1.2 LIQUID ORAL at 21:15

## 2017-02-20 RX ADMIN — FAMOTIDINE SCH MLS/HR: 20 INJECTION, SOLUTION INTRAVENOUS at 21:14

## 2017-02-20 RX ADMIN — INSULIN ASPART SCH UNITS: 100 INJECTION, SOLUTION INTRAVENOUS; SUBCUTANEOUS at 06:24

## 2017-02-20 RX ADMIN — INSULIN ASPART SCH: 100 INJECTION, SOLUTION INTRAVENOUS; SUBCUTANEOUS at 18:19

## 2017-02-20 RX ADMIN — CHLORHEXIDINE GLUCONATE 0.12% ORAL RINSE SCH ML: 1.2 LIQUID ORAL at 09:24

## 2017-02-20 RX ADMIN — VANCOMYCIN HYDROCHLORIDE SCH MG: 1 INJECTION, POWDER, LYOPHILIZED, FOR SOLUTION INTRAVENOUS at 09:26

## 2017-02-20 RX ADMIN — VORICONAZOLE SCH MLS/HR: 10 INJECTION, POWDER, LYOPHILIZED, FOR SOLUTION INTRAVENOUS at 21:50

## 2017-02-20 RX ADMIN — CARVEDILOL SCH MG: 3.12 TABLET, FILM COATED ORAL at 13:35

## 2017-02-20 NOTE — PN
Physical Exam: 


SUBJECTIVE: 





Patient seen and examined at bedside in the ICU. He remains intubated and 

sedated. Unable to tolerate CPAP during weaning trial. Per nurse in charge, he 

had spike fever of 100.7 F overnight.








OBJECTIVE:





Intubation day 7; Vent Settings: AC, , FiO2 35%, RR 16, PEEP 5


Now sedated on propofol gtt at 30mcg, fentanyl gtt at 5mcg 


Central line (IJ) day 7








GENERAL: off pressors, lightly sedated, does not open eyes, only respond to 

painful stimuli


NECK: triple lumen central line in place 


LUNGS: CTAB


HEART: Regular rate and rhythm, S1, S2 without murmur, rub or gallop.


ABDOMEN: Soft, facial grimaces upon deep palpations, nondistended. RUQ 

percutaneous drain in dark yellow/brown color  


EXTREMITIES: warm and no edema 


NEUROLOGICAL: unable to assess


: muñiz in place, clear urine outpu








 Vital Signs











Temp  99.5 F   02/20/17 20:00


 


Pulse  77   02/20/17 20:00


 


Resp  21   02/20/17 20:00


 


BP  124/49   02/20/17 20:00


 


Pulse Ox  96   02/20/17 19:51











 Intake & Output











 02/17/17 02/18/17 02/19/17 02/20/17





 23:59 23:59 23:59 23:59


 


Intake Total 1650 2340 539 1224


 


Output Total 9754 343 2155 840


 


Balance 500 1505 -626 384


 


Weight 81.306 kg 81.647 kg 81.873 kg 83.915 kg














 CBCD











WBC  7.5 K/mm3 (4.0-10.0)   02/20/17  05:00    


 


RBC  2.78 M/mm3 (4.00-5.60)  L D 02/20/17  05:00    


 


Hgb  8.3 GM/dL (11.7-16.9)  L D 02/20/17  05:00    


 


Hct  24.0 % (35.4-49)  L D 02/20/17  05:00    


 


MCV  86.3 fl (80-96)   02/20/17  05:00    


 


MCHC  34.5 g/dl (32.0-35.9)   02/20/17  05:00    


 


RDW  15.4 % (11.9-15.9)   02/20/17  05:00    


 


Plt Count  138 K/MM3 (134-434)   02/20/17  05:00    


 


MPV  9.2 fl (7.5-11.1)   02/20/17  05:00    








 CMP











Sodium  144 mmol/L (136-145)   02/20/17  05:00    


 


Potassium  3.8 mmol/L (3.5-5.1)   02/20/17  05:00    


 


Chloride  107 mmol/L ()   02/20/17  05:00    


 


Carbon Dioxide  26 mmol/L (21-32)   02/20/17  05:00    


 


Anion Gap  11  (8-16)   02/20/17  05:00    


 


BUN  25 mg/dL (7-18)  H  02/20/17  05:00    


 


Creatinine  0.8 mg/dL (0.7-1.3)   02/20/17  05:00    


 


Creat Clearance w eGFR  > 60  (>60)   02/20/17  05:00    


 


Calcium  7.6 mg/dL (8.5-10.1)  L  02/20/17  05:00    


 


Total Bilirubin  0.7 mg/dL (0.2-1.0)  D 02/20/17  05:00    


 


AST  88 U/L (15-37)  H D 02/20/17  05:00    


 


ALT  53 U/L (12-78)  D 02/20/17  05:00    


 


Alkaline Phosphatase  427 U/L ()  H D 02/20/17  05:00    


 


Total Protein  4.4 g/dl (6.4-8.2)  L  02/20/17  05:00    


 


Albumin  1.8 g/dl (3.4-5.0)  L  02/20/17  05:00    








 ABG Results











ABG pH  7.44  (7.35-7.45)   02/20/17  07:30    


 


ABG pCO2 at Pt Temp  39.4 mmHg (35-45)   02/20/17  07:30    


 


ABG pO2 at Pt Temp  78.8 mmHg ()   02/20/17  07:30    


 


ABG HCO3  26.3 meq/L (22-26)  H  02/20/17  07:30    


 


ABG O2 Sat (Measured)  96.2 % (90-98.9)   02/20/17  07:30    


 


ABG O2 Content  13.7 % vol (15-22)  L  02/20/17  07:30    


 


ABG Base Excess  2.5 meq/l (-2-2)  H  02/20/17  07:30    











Active Medications











Generic Name Dose Route Start Last Admin





  Trade Name Freq  PRN Reason Stop Dose Admin


 


Acetaminophen  650 mg 02/13/17 23:33 02/16/17 13:46





  Tylenol -  PO   650 mg





  Q4H PRN   Administration





  FEVER OR PAIN   


 


Acetaminophen  1,000 mg 02/16/17 16:07 02/20/17 16:07





  Ofirmev Injection -  IVPB   1,000 mg





  Q6H PRN   Administration





  FEVER   


 


Artificial Tears  1 drop 02/18/17 09:28  





  Artificial Tears  OU   





  TID PRN   





  DRY EYES   


 


Bisacodyl  10 mg 02/18/17 20:25  





  Dulcolax Suppository -  RC   





  PRN PRN   





  CONSTIPATION   


 


Carvedilol  3.125 mg 02/20/17 10:30 02/20/17 13:35





  Coreg -  NGT   3.125 mg





  BID LAUREN   Administration


 


Chlorhexidine Gluconate  15 ml 02/18/17 22:00 02/20/17 09:24





  Peridex -  MM   15 ml





  BID LAUREN   Administration


 


Voriconazole 320 mg/ Dextrose  282 mls @ 141 mls/hr 02/18/17 10:00 02/20/17 12:

33





  IVPB   141 mls/hr





  BID LAUREN   Administration


 


Ertapenem  50 mls @ 50 mls/hr 02/18/17 10:00 02/20/17 09:23





  Invanz (Pre-Docked)  IVPB   50 mls/hr





  DAILY LAUREN   Administration





  Protocol   


 


Propofol  100 mls @ 2.457 mls/hr 02/18/17 18:29 02/20/17 06:23





  Diprivan -  IVPB   10 mcg/kg/min





  TITR LAUREN   Titration





  Protocol   





  5 MCG/KG/MIN   


 


Fentanyl 500 mcg/ Dextrose  100 mls @ 0.4 mls/hr 02/20/17 16:00 02/20/17 16:06





  IJ 02/21/17 15:59  10 mcg/hr





  TITR LAUREN   Titration





  2 MCG/HR   


 


Famotidine/Sodium Chloride  50 mls @ 100 mls/hr 02/20/17 22:00  





  Pepcid 20 Mg Premixed Ivpb -  IVPB   





  BID LAUREN   


 


Insulin Aspart  1 vial 02/14/17 22:00 02/20/17 18:19





  Novolog Vial Sliding Scale -  SQ   Not Given





  ACHS LAUREN   





  Protocol   


 


Polyethylene Glycol  17 gm 02/18/17 22:00 02/20/17 09:28





  Miralax (For Daily Use) -  NGT   17 grams





  BID LAUREN   Administration


 


Vancomycin HCl  1,000 mg 02/18/17 10:00 02/20/17 09:26





  Vancomycin (Pre-Docked)  IVPB   1,000 mg





  BID LAUREN   Administration





  Protocol   











 Microbiology





02/13/17 22:46   Sputum - Endotrachea Suction/Ventilator   Fungus Mold 

Identification - Preliminary


02/13/17 17:30   Body Fluid - Other   Body Fluid Culture - Final


                            Enterobacter Asburiae


                            Enterococcus Faecium


02/13/17 22:46   Sputum - Endotrachea Suction/Ventilator   Sputum Culture - 

Final


                            Fungal Isolate: Mold


                            Mr S Aureus


02/13/17 22:41   Blood - Central Line   Blood Culture - Final


                            Escherichia Coli





IMAGING





CXR - 2/20: Endotracheal tube placement as described above with improving 

bibasilar infiltrates. 


CT Chest - 2/18: Bilateral lower lobe infiltrates, right more the left. Small 

bilateral pleural effusions. 1.3 x 1 cm left apical cyst/cavity with concentric 

wall thickening. There is subjacent mild left upper lobe bronchiectasis with 

associated peribronchial thickening. centrilobular emphysema. 


CT Head - 2/18: In comparison to a previous CT exam of 4/9/2015 interval 

development of left occipital and left parietal cortical infarcts are noted 

which are probably chronic versus late subacute   





ASSESSMENT/PLAN:





86 yo h/o COPD, [place], HTN, HLD, TIA, prostate CA, renal CA now s/p left 

nephrectomy was admitted to the ICU for acute respiratory failure and sepsis 

secondary to acute cholecystitis s/p percutaenous drain. Patient has been 

intubated for 7 days, on an off sedation but completely off pressors. 

Experienced respiratory distress during daily weaning trials and sustained low 

grade fevers at times.





Respiratory: Acute hypoxic respiratory failure


   - intubation day 7


      * see above for vent settings


      * maintain SpO2 > 90%


   - daily ABGs essentially normal


      * will stop daily ABGs unless necessary


   - Maintain SPO2 > 90% 


   - SBT as tolerated





ID: Profound septic shock 


   - source: gallbladder vs. lung


   - WBC normalized, improving infiltrate, fevers


      *re-cultured


      *IV tynelol for spike fevers


      *f/u daily CXR


   - positive sputum, blood and biliary fluid cultures (see micro lab)


   - cavity/cyst on CT chest


      *staph vs. aspergillus


   - cont. vanco, ertapanem, voriconazole





GI: Acute cholecystitis s/p percutaneous drain


   - abnormal LFT likely 2/2 cholecystitis vs. sepsis


      * cont. to monitor liver chemistries


   - cholangiogram via cholecystostomy when stable


   - cont. PPI drip





Heme: DIC with sepsis


   - low plt and fibrinogen


      * will transfuse 2 units of cryo


      * f/u daily coag.


   - low H&H


      * will transfuse 1 unit PRBC


      * maintain H/H at 8 gm





Neuro: on sedation


   - restarted propofol gtt and fentanyl gtt due to pain and agitation


      * daily sedation vocation





Cardiac: HTN


   - resumed carvedilol


   - off pressor


   - maintain MAP > 65%





Endo: DM2


   - on sliding scale


   - BGM





FEN


   - fluid restrict due to pleural effusion


   - low phos but will hold off giving K+Phos because tube feed has began


   - tube feed vital 1.2





Prophylaxis 


   - DVT: SCD


   - GI: protonix 





Disposition


   - cont. to monitor in ICU





Code status


   - Full code





Visit type





- Emergency Visit


Emergency Visit: No





- New Patient


This patient is new to me today: No





- Critical Care


Critical Care patient: Yes


Total Critical Care Time (in minutes): 45


Critical Care Statement: The care of this patient involved high complexity 

decision making to prevent further life threatening deterioration of the patient

's condition and/or to evalute & treat vital organ system(s) failure or risk of 

failure.

## 2017-02-20 NOTE — PN
Teaching Attending Note


Name of Resident: Zaire Phillips


ATTENDING PHYSICIAN STATEMENT





I saw and evaluated the patient.


I reviewed the resident's note and discussed the case with the resident.


I agree with the resident's findings and plan as documented.








SUBJECTIVE:


Patient seen and examined in the ICU.  Remains intubated on AC Mode of vent, 35

% FiO2.  Did not tolerate wean this AM, RR increased to 40s.   


No pressors. 





CXR: No gross change in effusion / congestion 





 Intake & Output











 02/17/17 02/18/17 02/19/17 02/20/17





 23:59 23:59 23:59 23:59


 


Intake Total 1650 2340 539 1224


 


Output Total 8096 380 2132 400


 


Balance 500 1505 -626 824


 


Weight 179 lb 4 oz 180 lb 180 lb 8 oz 185 lb








 Last Vital Signs











Temp Pulse Resp BP Pulse Ox


 


 97.9 F   88   22   128/52   96 


 


 02/20/17 06:00  02/20/17 10:15  02/20/17 11:39  02/20/17 06:00  02/20/17 11:23








Active Medications





Acetaminophen (Tylenol -)  650 mg PO Q4H PRN


   PRN Reason: FEVER OR PAIN


   Last Admin: 02/16/17 13:46 Dose:  650 mg


Acetaminophen (Ofirmev Injection -)  1,000 mg IVPB Q6H PRN


   PRN Reason: FEVER


   Last Admin: 02/19/17 21:54 Dose:  1,000 mg


Artificial Tears (Artificial Tears)  1 drop OU TID PRN


   PRN Reason: DRY EYES


Bisacodyl (Dulcolax Suppository -)  10 mg RC PRN PRN


   PRN Reason: CONSTIPATION


Carvedilol (Coreg -)  3.125 mg NGT BID [facility]


Chlorhexidine Gluconate (Peridex -)  15 ml MM BID [facility]


   Last Admin: 02/20/17 09:24 Dose:  15 ml


Pantoprazole Sodium (Protonix 40mg Ivpb (Pre-Docked))  100 mls @ 200 mls/hr 

IVPB BID LAUREN


   Last Admin: 02/20/17 09:24 Dose:  200 mls/hr


Voriconazole 320 mg/ Dextrose  282 mls @ 141 mls/hr IVPB BID [facility]


   Last Admin: 02/19/17 22:00 Dose:  141 mls/hr


Ertapenem (Invanz (Pre-Docked))  50 mls @ 50 mls/hr IVPB DAILY LAUREN


   PRN Reason: Protocol


   Last Admin: 02/20/17 09:23 Dose:  50 mls/hr


Propofol (Diprivan -)  100 mls @ 2.457 mls/hr IVPB TITR LAUREN; 5 MCG/KG/MIN


   PRN Reason: Protocol


   Last Titration: 02/20/17 06:23 Dose:  10 mcg/kg/min


Insulin Aspart (Novolog Vial Sliding Scale -)  1 vial SQ ACHS LAUREN


   PRN Reason: Protocol


   Last Admin: 02/20/17 06:24 Dose:  2 units


Polyethylene Glycol (Miralax (For Daily Use) -)  17 gm NGT BID [facility]


   Last Admin: 02/20/17 09:28 Dose:  17 grams


Vancomycin HCl (Vancomycin (Pre-Docked))  1,000 mg IVPB BID LAUREN


   PRN Reason: Protocol


   Last Admin: 02/20/17 09:26 Dose:  1,000 mg








Gen:  intubated, sedated  


Heart:  RRR


Lung: decreased breath sounds at the bases


Abd: soft, nontender, +cholecystostomy with dark bilious fluid


Ext: + edema





 


 


 Laboratory Results - last 24 hr











  02/19/17 02/19/17 02/19/17





  12:10 16:42 21:50


 


WBC   


 


RBC   


 


Hgb   


 


Hct   


 


MCV   


 


MCHC   


 


RDW   


 


Plt Count   


 


MPV   


 


INR   


 


PTT (Actin FS)   


 


Fibrinogen   


 


Fibrin Degrad Products   


 


Puncture Site   


 


ABG pH   


 


ABG pCO2 at Pt Temp   


 


ABG pO2 at Pt Temp   


 


ABG HCO3   


 


ABG O2 Sat (Measured)   


 


ABG O2 Content   


 


ABG Base Excess   


 


Sb Test   


 


O2 Delivery Device   


 


Oxygen Flow Rate   


 


Vent Mode   


 


Vent Rate   


 


Mechanical Rate   


 


PEEP   


 


Pressure Support Vent   


 


Sodium   


 


Potassium   


 


Chloride   


 


Carbon Dioxide   


 


Anion Gap   


 


BUN   


 


Creatinine   


 


Creat Clearance w eGFR   


 


POC Glucometer  [phone]  [phone] 


 


Random Glucose   


 


Calcium   


 


Phosphorus   


 


Magnesium   


 


Total Bilirubin   


 


AST   


 


ALT   


 


Alkaline Phosphatase   


 


Total Protein   


 


Albumin   


 


Vancomycin Trough    14.171 H


 


Random Vancomycin   














  02/19/17 02/20/17 02/20/17





  22:07 05:00 05:00


 


WBC   7.5 


 


RBC   2.78 L D 


 


Hgb   8.3 L D 


 


Hct   24.0 L D 


 


MCV   86.3 


 


MCHC   34.5 


 


RDW   15.4 


 


Plt Count   138 


 


MPV   9.2 


 


INR   


 


PTT (Actin FS)   


 


Fibrinogen    < 100.0 L*


 


Fibrin Degrad Products    Greater than 40


 


Puncture Site   


 


ABG pH   


 


ABG pCO2 at Pt Temp   


 


ABG pO2 at Pt Temp   


 


ABG HCO3   


 


ABG O2 Sat (Measured)   


 


ABG O2 Content   


 


ABG Base Excess   


 


Sb Test   


 


O2 Delivery Device   


 


Oxygen Flow Rate   


 


Vent Mode   


 


Vent Rate   


 


Mechanical Rate   


 


PEEP   


 


Pressure Support Vent   


 


Sodium   


 


Potassium   


 


Chloride   


 


Carbon Dioxide   


 


Anion Gap   


 


BUN   


 


Creatinine   


 


Creat Clearance w eGFR   


 


POC Glucometer  [phone]  


 


Random Glucose   


 


Calcium   


 


Phosphorus   


 


Magnesium   


 


Total Bilirubin   


 


AST   


 


ALT   


 


Alkaline Phosphatase   


 


Total Protein   


 


Albumin   


 


Vancomycin Trough   


 


Random Vancomycin   














  02/20/17 02/20/17 02/20/17





  05:00 05:00 05:00


 


WBC   


 


RBC   


 


Hgb   


 


Hct   


 


MCV   


 


MCHC   


 


RDW   


 


Plt Count   


 


MPV   


 


INR   1.31 H 


 


PTT (Actin FS)   21.1 L D 


 


Fibrinogen   


 


Fibrin Degrad Products   


 


Puncture Site   


 


ABG pH   


 


ABG pCO2 at Pt Temp   


 


ABG pO2 at Pt Temp   


 


ABG HCO3   


 


ABG O2 Sat (Measured)   


 


ABG O2 Content   


 


ABG Base Excess   


 


Sb Test   


 


O2 Delivery Device   


 


Oxygen Flow Rate   


 


Vent Mode   


 


Vent Rate   


 


Mechanical Rate   


 


PEEP   


 


Pressure Support Vent   


 


Sodium  144  


 


Potassium  3.8  


 


Chloride  107  


 


Carbon Dioxide  26  


 


Anion Gap  11  


 


BUN  25 H  


 


Creatinine  0.8  


 


Creat Clearance w eGFR  > 60  


 


POC Glucometer   


 


Random Glucose  189 H  


 


Calcium  7.6 L  


 


Phosphorus  2.4 L  


 


Magnesium  2.1  


 


Total Bilirubin  0.7  D  


 


AST  88 H D  


 


ALT  53  D  


 


Alkaline Phosphatase  427 H D  


 


Total Protein  4.4 L  


 


Albumin  1.8 L  


 


Vancomycin Trough   


 


Random Vancomycin    14.013














  02/20/17 02/20/17





  05:29 07:30


 


WBC  


 


RBC  


 


Hgb  


 


Hct  


 


MCV  


 


MCHC  


 


RDW  


 


Plt Count  


 


MPV  


 


INR  


 


PTT (Actin FS)  


 


Fibrinogen  


 


Fibrin Degrad Products  


 


Puncture Site   Left radial


 


ABG pH   7.44


 


ABG pCO2 at Pt Temp   39.4


 


ABG pO2 at Pt Temp   78.8


 


ABG HCO3   26.3 H


 


ABG O2 Sat (Measured)   96.2


 


ABG O2 Content   13.7 L


 


ABG Base Excess   2.5 H


 


Sb Test   Positive


 


O2 Delivery Device   Mech vent


 


Oxygen Flow Rate   35


 


Vent Mode   A/c


 


Vent Rate   16


 


Mechanical Rate   Yes


 


PEEP   5.0


 


Pressure Support Vent   450


 


Sodium  


 


Potassium  


 


Chloride  


 


Carbon Dioxide  


 


Anion Gap  


 


BUN  


 


Creatinine  


 


Creat Clearance w eGFR  


 


POC Glucometer  [phone] 


 


Random Glucose  


 


Calcium  


 


Phosphorus  


 


Magnesium  


 


Total Bilirubin  


 


AST  


 


ALT  


 


Alkaline Phosphatase  


 


Total Protein  


 


Albumin  


 


Vancomycin Trough  


 


Random Vancomycin  











Problem List





- Problems


(1) Cholecystitis, acute


Code(s): K81.0 - ACUTE CHOLECYSTITIS





(2) Fever


Code(s): R50.9 - FEVER, UNSPECIFIED   Qualifiers: 


        Qualified Code(s): R50.81 - Fever presenting with conditions classified 

elsewhere  





(3) Leukocytosis


Code(s): D72.829 - ELEVATED WHITE BLOOD CELL COUNT, UNSPECIFIED   Qualifiers: 


        Qualified Code(s): D72.829 - Elevated white blood cell count, 

unspecified  





(4) Kidney malignancy


Code(s): C64.9 - MALIGNANT NEOPLASM OF UNSP KIDNEY, EXCEPT RENAL PELVIS   

Qualifiers: 


        Qualified Code(s): C64.2 - Malignant neoplasm of left kidney, except 

renal pelvis  





(5) Prostate CA


Code(s): C61 - MALIGNANT NEOPLASM OF PROSTATE





(6) COPD (chronic obstructive pulmonary disease)


Code(s): J44.9 - CHRONIC OBSTRUCTIVE PULMONARY DISEASE, UNSPECIFIED   Qualifiers

: 


        Qualified Code(s): J44.9 - Chronic obstructive pulmonary disease, 

unspecified  





(7) Sleep apnea


Code(s): G47.30 - SLEEP APNEA, UNSPECIFIED   








ASSESSMENT AND PLAN:


Acute Cholecystitis


s/p Cholecystostomy Placement


Profound Septic Shock improving


Acute Hypoxic Respiratory Failure


Acute Kidney Injury


Lactic Acidosis resolved


Demand Ischemia


RCC s/p Left Nephrectomy


DIC


COPD


HTN


Hyperlipidemia


(?) Fungal lung abscess 








-  antibiotics/antifungal per ID


-  monitor cholecystostomy drainage


-  Monitor off IVF 


-  replete lytes


-  SBTs as tolerated 


-  FiO2 to keep SpO2 >90%


-  Sedation vacation  


-  DVT/GI prophylaxis


-  Enteral feeds 








Dr [place] 


CCTime 35" 





Problem List





- Problems


(1) Cholecystitis, acute


Code(s): K81.0 - ACUTE CHOLECYSTITIS





(2) Fever


Code(s): R50.9 - FEVER, UNSPECIFIED   Qualifiers: 


     Fever type: other     Qualified Code(s): R50.81 - Fever presenting with 

conditions classified elsewhere  





(3) Leukocytosis


Code(s): D72.829 - ELEVATED WHITE BLOOD CELL COUNT, UNSPECIFIED   Qualifiers: 


     Leukocytosis type: unspecified     Qualified Code(s): D72.829 - Elevated 

white blood cell count, unspecified  





(4) Kidney malignancy


Code(s): C64.9 - MALIGNANT NEOPLASM OF UNSP KIDNEY, EXCEPT RENAL PELVIS   

Qualifiers: 


     Laterality: left     Qualified Code(s): C64.2 - Malignant neoplasm of left 

kidney, except renal pelvis  





(5) Prostate CA


Code(s): C61 - MALIGNANT NEOPLASM OF PROSTATE





(6) COPD (chronic obstructive pulmonary disease)


Code(s): J44.9 - CHRONIC OBSTRUCTIVE PULMONARY DISEASE, UNSPECIFIED   Qualifiers

: 


     COPD type: unspecified COPD        Qualified Code(s): J44.9 - Chronic 

obstructive pulmonary disease, unspecified  





(7) Sleep apnea


Code(s): G47.30 - SLEEP APNEA, UNSPECIFIED

## 2017-02-20 NOTE — PN
Progress Note, Physician





- Current Medication List


Current Medications: 


Active Medications





Acetaminophen (Tylenol -)  650 mg PO Q4H PRN


   PRN Reason: FEVER OR PAIN


   Last Admin: 02/16/17 13:46 Dose:  650 mg


Acetaminophen (Ofirmev Injection -)  1,000 mg IVPB Q6H PRN


   PRN Reason: FEVER


   Last Admin: 02/19/17 21:54 Dose:  1,000 mg


Artificial Tears (Artificial Tears)  1 drop OU TID PRN


   PRN Reason: DRY EYES


Bisacodyl (Dulcolax Suppository -)  10 mg RC PRN PRN


   PRN Reason: CONSTIPATION


Carvedilol (Coreg -)  3.125 mg NGT BID [facility]


   Last Admin: 02/20/17 13:35 Dose:  3.125 mg


Chlorhexidine Gluconate (Peridex -)  15 ml MM BID [facility]


   Last Admin: 02/20/17 09:24 Dose:  15 ml


Pantoprazole Sodium (Protonix 40mg Ivpb (Pre-Docked))  100 mls @ 200 mls/hr 

IVPB BID [facility]


   Last Admin: 02/20/17 09:24 Dose:  200 mls/hr


Voriconazole 320 mg/ Dextrose  282 mls @ 141 mls/hr IVPB BID [facility]


   Last Admin: 02/20/17 12:33 Dose:  141 mls/hr


Ertapenem (Invanz (Pre-Docked))  50 mls @ 50 mls/hr IVPB DAILY [facility]


   PRN Reason: Protocol


   Last Admin: 02/20/17 09:23 Dose:  50 mls/hr


Propofol (Diprivan -)  100 mls @ 2.457 mls/hr IVPB TITR LAUREN; 5 MCG/KG/MIN


   PRN Reason: Protocol


   Last Titration: 02/20/17 06:23 Dose:  10 mcg/kg/min


Insulin Aspart (Novolog Vial Sliding Scale -)  1 vial SQ ACHS LAUREN


   PRN Reason: Protocol


   Last Admin: 02/20/17 13:33 Dose:  2 units


Polyethylene Glycol (Miralax (For Daily Use) -)  17 gm NGT BID [facility]


   Last Admin: 02/20/17 09:28 Dose:  17 grams


Vancomycin HCl (Vancomycin (Pre-Docked))  1,000 mg IVPB BID [facility]


   PRN Reason: Protocol


   Last Admin: 02/20/17 09:26 Dose:  1,000 mg











- Objective


Vital Signs: 


 Vital Signs











Temperature  97.9 F   02/20/17 06:00


 


Pulse Rate  88   02/20/17 10:15


 


Respiratory Rate  18   02/20/17 13:01


 


Blood Pressure  128/52   02/20/17 06:00


 


O2 Sat by Pulse Oximetry (%)  96   02/20/17 11:23











Constitutional: Yes: Well Nourished, No Distress, Calm, Other (intubated, 

arousable)


Eyes: Yes: WNL, Conjunctiva Clear


HENT: Yes: WNL, Atraumatic, Normocephalic


Neck: Yes: WNL, Supple, Trachea Midline


Cardiovascular: Yes: WNL, Regular Rate and Rhythm


Respiratory: Yes: WNL, Regular, CTA Bilaterally


Gastrointestinal: Yes: Normal Bowel Sounds, Soft, Other (biliary drain in place)


Musculoskeletal: Yes: WNL


Extremities: Yes: WNL


Edema: No


Integumentary: Yes: WNL


Neurological: Yes: WNL, Alert, Oriented


...Motor Strength: WNL


Psychiatric: Yes: WNL


Labs: 


 CBC, BMP





 02/20/17 05:00 





 02/20/17 05:00 





 INR, PTT











INR  1.31  (0.82-1.09)  H  02/20/17  05:00    


 


Fibrinogen  < 100.0 mg/dL (238-498)  L*  02/20/17  05:00    














Impression/Plan


Impression/Plan: 


85 year old man admitted for sepsis due to acute cholecystitis





Sepsis


-s/p drainage by IR


-pt currently intubated off sedation


-off levophed and dopamine 


-has MRSA and mold in sputum culture; E. coli in blood culture; enterobacter 

and enterococcus in billiary drain culture


-now on imipenem/vanco/voriconazole as per ID recs 


-CT chest shows bronchiectasis and left sided cavitary lesion which goes along 

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


-vent management as per pulm





Acute blood loss anemia due to upper GI Bleed


-on protonix IV BID


-Hb trended down from 13 to 11 after hematemesis but has been stable since


-no need for endoscopy as per GI attending 





Thrombocytopenia


-now trending up


-was likely due to DIC from septic shock


-infection now improving which is likely why platelets now trending up but 

still has decreased fibrinogen


-follow up heme consult





Visit type





- Emergency Visit


Emergency Visit: Yes


ED Registration Date: 02/13/17


Care time: The patient presented to the Emergency Department on the above date 

and was hospitalized for further evaluation of their emergent condition.





- New Patient


This patient is new to me today: No





- Critical Care


Critical Care patient: Yes


Total Critical Care Time (in minutes): 40


Critical Care Statement: The care of this patient involved high complexity 

decision making to prevent further life threatening deterioration of the patient

's condition and/or to evalute & treat vital organ system(s) failure or risk of 

failure.

## 2017-02-20 NOTE — CONSULT
Consult


Consult Specialty:: Oncology -hematology


Referred by:: Dr. Carias





- History of Present Illness


Chief Complaint: Gall bladder sepsis. Gram negative bacteremia, Coagulopathy, 

DIC, Respiratory failure, intubation,.  Hypofibrinogenemia





- History Source


History Provided By: Medical Record


Limitations to Obtaining History: Clinical Condition





- Past Medical History


CNS: Yes: TIA


Cardio/Vascular: Yes: HTN


Pulmonary: Yes: COPD


Gastrointestinal: Yes: Diverticulitis


Psych: Yes: Anxiety





- Past Surgical History


Past Surgical History: Yes: Appendectomy, Colonoscopy (last 1 yrs ago, lynn 

2 polyps. h/o ischemic colitis 3,2 and ?years ago), Hernia Repair (b/l), 

Nephrectomy (left)





- Alcohol/Substance Use


Hx Alcohol Use: No





- Smoking History


Smoking history: Former smoker


Have you smoked in the past 12 months: No


Aproximately how many cigarettes per day: 0


If you are a former smoker, when did you quit?: 35 years ago





- Social History


ADL: Independent


Occupation: retired salesman


History of Recent Travel: No





Home Medications





- Allergies


Allergies/Adverse Reactions: 


 Allergies











Allergy/AdvReac Type Severity Reaction Status Date / Time


 


No Known Drug Allergies Allergy   Verified 02/12/17 21:20














- Home Medications


Home Medications: 


Ambulatory Orders





Amlodipine Besylate [Norvasc -] 10 mg PO DAILY 11/06/14 


Ca Cmb No.1/Vit D3/B-6/FA/B12 [Vitamin D3 1,000 Unit Tablet] 1 tab PO DAILY 11/ 06/14 


Carvedilol 12.5 mg PO BID 11/06/14 


Vitamin E 400 unit PO DAILY 11/06/14 


Losartan Potassium [Cozaar] 50 mg PO DAILY 11/07/14 


Atorvastatin Ca [Lipitor] 10 mg PO HS #30 tablet 01/21/15 


Aspirin [ASA -] 325 mg PO DAILY@0800  tablet 12/09/15 


Tamsulosin HCl [Flomax -] 0.8 mg PO DAILY@0830  cap.er.24h 12/23/15 


Cinnamon Bark [Cinnamon] 500 mg PO DAILY 02/12/17 


Finasteride 0 mg PO DAILY 02/12/17 


Ranitidine [Zantac -] 150 mg PO DAILY 02/13/17 











Physical Exam


Vital Signs: 


 Vital Signs











Temperature  97.9 F   02/20/17 06:00


 


Pulse Rate  88   02/20/17 10:15


 


Respiratory Rate  22   02/20/17 11:39


 


Blood Pressure  128/52   02/20/17 06:00


 


O2 Sat by Pulse Oximetry (%)  96   02/20/17 11:23











Constitutional: Yes: Other (Intubated)


Eyes: Yes: PERRL.  No: Diplopia, Sclera Icterus


HENT: Yes: Thrush.  No: Epistaxis, Pharyngeal Erythema


Neck: Yes: Other (E-T tibe)


Cardiovascular: Yes: Regular Rate and Rhythm


Respiratory: Yes: Rales


Gastrointestinal: Yes: Other (s/p biliary drainage).  No: Hepatomegaly, 

Splenomegaly


Renal/: Yes: Other (muñiz catheter)


Musculoskeletal: No: Back Pain


Extremities: Yes: Other (scd).  No: Calf Tenderness, Cool, Cyanosis


Neurological: Yes: Other (intubated , non responsive)


Labs: 


 CBC, BMP





 02/20/17 05:00 





 02/20/17 05:00 











Problem List





- Problems


(1) SANGITA (acute kidney injury)


Code(s): N17.9 - ACUTE KIDNEY FAILURE, UNSPECIFIED





(2) Acute respiratory failure with hypoxia


Assessment/Plan: 


On ventilator support


Code(s): J96.01 - ACUTE RESPIRATORY FAILURE WITH HYPOXIA





(3) Anemia due to GI blood loss


Assessment/Plan: 


Blood letting, sepsis, ineffective erythropoiesis, GI blood loss


Code(s): D50.0 - IRON DEFICIENCY ANEMIA SECONDARY TO BLOOD LOSS (CHRONIC)





(4) Cholecystitis, acute


Assessment/Plan: 


s/p cholecystostomy





Code(s): K81.0 - ACUTE CHOLECYSTITIS





(5) DIC (disseminated intravascular coagulation)


Assessment/Plan: 


Coagulopathy with DIC with sepsis, E. coli bacteremia, and enterobacter via 

biliary drainage. On antibiotics per I.D. 


Still with abnormal LFT's, hypofibrinogenemia. 


Falling Hct-- 32%- 24%. 


In light of falling Hct, , abnormal LfT's suggestionof ongoing smoldering 

coagulopathy and bleeding , would currently give cryoppt. 


Maintain Hct /Hb 8.0 gm , > 24%, 


Fibrinogen if feasible > 100 mg% 


Ongoing treatment of underlying sepsis.


 


Code(s): D65 - DISSEMINATED INTRAVASCULAR COAGULATION





(6) Transaminitis


Code(s): R74.0 - NONSPEC ELEV OF LEVELS OF TRANSAMNS & LACTIC ACID DEHYDRGNSE





(7) Kidney malignancy


Code(s): C64.9 - MALIGNANT NEOPLASM OF UNSP KIDNEY, EXCEPT RENAL PELVIS   

Qualifiers: 


     Laterality: left     Qualified Code(s): C64.2 - Malignant neoplasm of left 

kidney, except renal pelvis  





(8) Prostate CA


Code(s): C61 - MALIGNANT NEOPLASM OF PROSTATE

## 2017-02-20 NOTE — PN
Progress Note, Physician


Chief Complaint: 


ID








Unresponsive on the ventilator





Voriconazole


Vancomcyin


Ertepenem ( day 7 since biliary drainage) 





- Current Medication List


Current Medications: 


Active Medications





Acetaminophen (Tylenol -)  650 mg PO Q4H PRN


   PRN Reason: FEVER OR PAIN


   Last Admin: 02/16/17 13:46 Dose:  650 mg


Acetaminophen (Ofirmev Injection -)  1,000 mg IVPB Q6H PRN


   PRN Reason: FEVER


   Last Admin: 02/19/17 21:54 Dose:  1,000 mg


Artificial Tears (Artificial Tears)  1 drop OU TID PRN


   PRN Reason: DRY EYES


Bisacodyl (Dulcolax Suppository -)  10 mg RC PRN PRN


   PRN Reason: CONSTIPATION


Chlorhexidine Gluconate (Peridex -)  15 ml MM BID [facility]


   Last Admin: 02/19/17 22:00 Dose:  15 ml


Pantoprazole Sodium (Protonix 40mg Ivpb (Pre-Docked))  100 mls @ 200 mls/hr 

IVPB BID [facility]


   Last Admin: 02/19/17 21:55 Dose:  200 mls/hr


Voriconazole 320 mg/ Dextrose  282 mls @ 141 mls/hr IVPB BID [facility]


   Last Admin: 02/19/17 22:00 Dose:  141 mls/hr


Ertapenem (Invanz (Pre-Docked))  50 mls @ 50 mls/hr IVPB DAILY [facility]


   PRN Reason: Protocol


   Last Admin: 02/19/17 10:04 Dose:  50 mls/hr


Propofol (Diprivan -)  100 mls @ 2.457 mls/hr IVPB TITR [facility]; 5 MCG/KG/MIN


   PRN Reason: Protocol


   Last Titration: 02/20/17 06:23 Dose:  10 mcg/kg/min


Insulin Aspart (Novolog Vial Sliding Scale -)  1 vial SQ ACHS [facility]


   PRN Reason: Protocol


   Last Admin: 02/20/17 06:24 Dose:  2 units


Polyethylene Glycol (Miralax (For Daily Use) -)  17 gm NGT BID [facility]


   Last Admin: 02/19/17 22:07 Dose:  17 grams


Vancomycin HCl (Vancomycin (Pre-Docked))  1,000 mg IVPB BID [facility]


   PRN Reason: Protocol


   Last Admin: 02/19/17 22:29 Dose:  Not Given











- Objective


Vital Signs: 


 Vital Signs











Temperature  97.9 F   02/20/17 06:00


 


Pulse Rate  75   02/20/17 06:00


 


Respiratory Rate  20   02/20/17 06:10


 


Blood Pressure  128/52   02/20/17 06:00


 


O2 Sat by Pulse Oximetry (%)  97   02/19/17 21:00











Constitutional: Yes: Other (INtubated)


Cardiovascular: Yes: S1, S2


Respiratory: Yes: WNL, Regular, CTA Bilaterally


Gastrointestinal: Yes: Soft.  No: Tenderness, Rebound


Edema: No


Labs: 


 CBC, BMP





 02/20/17 05:00 





 02/20/17 05:00 





 INR, PTT











INR  1.31  (0.82-1.09)  H  02/20/17  05:00    


 


Fibrinogen  < 100.0 mg/dL (238-498)  L*  02/20/17  05:00    














Problem List





- Problems


(1) Acute respiratory failure with hypoxia


Code(s): J96.01 - ACUTE RESPIRATORY FAILURE WITH HYPOXIA





(2) Cholecystitis, acute


Code(s): K81.0 - ACUTE CHOLECYSTITIS





(3) DIC (disseminated intravascular coagulation)


Code(s): D65 - DISSEMINATED INTRAVASCULAR COAGULATION





(4) Gram-negative bacteremia


Code(s): R78.81 - BACTEREMIA











Assessment/Plan


 Laboratory Tests











  02/18/17 02/18/17 02/20/17





  05:00 05:00 05:00


 


WBC    7.5


 


Hgb    8.3 L D


 


Hct    24.0 L D


 


Plt Count    138


 


ABG pH   


 


ABG pCO2 at Pt Temp   


 


ABG pO2 at Pt Temp   


 


Oxygen Flow Rate   


 


BUN   


 


AST   


 


ALT   


 


Alkaline Phosphatase   


 


Random Vancomycin   


 


A. galactomannan Ag  Pending  


 


Beta-(1,3)-D-Glucan   Pending 














  02/20/17 02/20/17 02/20/17





  05:00 05:00 07:30


 


WBC   


 


Hgb   


 


Hct   


 


Plt Count   


 


ABG pH    7.44


 


ABG pCO2 at Pt Temp    39.4


 


ABG pO2 at Pt Temp    78.8


 


Oxygen Flow Rate    35


 


BUN  25 H  


 


AST  88 H D  


 


ALT  53  D  


 


Alkaline Phosphatase  427 H D  


 


Random Vancomycin   14.013 


 


A. galactomannan Ag   


 


Beta-(1,3)-D-Glucan   








Assessment


Sepsis syndrome gallbladder source


E Coli bacteremia


Enterobacater sp. in biliary drain


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 

disseminated disease


MRSA sputum on Vanco


Elevated Alk phos








Plan


Continue Vancomycin


Blood markers for mold pending


Fungal culture sent out Should sent fungal culture specifically


Ertepenem 


Should look for paranasal sinus disease with mold


Consider GI evaluation for elevated Alk phos








38 minutes spent critical care time and reviewing with johnny Rowan MD

## 2017-02-20 NOTE — PN
Progress Note, Physician


History of Present Illness: 


Arousable, remains on mechanical ventilation, low grade temp overnight, 

hemodynamics stable.





- Current Medication List


Current Medications: 


Active Medications





Acetaminophen (Tylenol -)  650 mg PO Q4H PRN


   PRN Reason: FEVER OR PAIN


   Last Admin: 02/16/17 13:46 Dose:  650 mg


Acetaminophen (Ofirmev Injection -)  1,000 mg IVPB Q6H PRN


   PRN Reason: FEVER


   Last Admin: 02/19/17 21:54 Dose:  1,000 mg


Artificial Tears (Artificial Tears)  1 drop OU TID PRN


   PRN Reason: DRY EYES


Bisacodyl (Dulcolax Suppository -)  10 mg RC PRN PRN


   PRN Reason: CONSTIPATION


Chlorhexidine Gluconate (Peridex -)  15 ml MM BID [facility]


   Last Admin: 02/20/17 09:24 Dose:  15 ml


Pantoprazole Sodium (Protonix 40mg Ivpb (Pre-Docked))  100 mls @ 200 mls/hr 

IVPB BID [facility]


   Last Admin: 02/20/17 09:24 Dose:  200 mls/hr


Voriconazole 320 mg/ Dextrose  282 mls @ 141 mls/hr IVPB BID [facility]


   Last Admin: 02/19/17 22:00 Dose:  141 mls/hr


Ertapenem (Invanz (Pre-Docked))  50 mls @ 50 mls/hr IVPB DAILY [facility]


   PRN Reason: Protocol


   Last Admin: 02/20/17 09:23 Dose:  50 mls/hr


Propofol (Diprivan -)  100 mls @ 2.457 mls/hr IVPB TITR [facility]; 5 MCG/KG/MIN


   PRN Reason: Protocol


   Last Titration: 02/20/17 06:23 Dose:  10 mcg/kg/min


Insulin Aspart (Novolog Vial Sliding Scale -)  1 vial SQ ACHS [facility]


   PRN Reason: Protocol


   Last Admin: 02/20/17 06:24 Dose:  2 units


Polyethylene Glycol (Miralax (For Daily Use) -)  17 gm NGT BID [facility]


   Last Admin: 02/20/17 09:28 Dose:  17 grams


Vancomycin HCl (Vancomycin (Pre-Docked))  1,000 mg IVPB BID [facility]


   PRN Reason: Protocol


   Last Admin: 02/20/17 09:26 Dose:  1,000 mg











- Objective


Vital Signs: 


 Vital Signs











Temperature  97.9 F   02/20/17 06:00


 


Pulse Rate  75   02/20/17 06:00


 


Respiratory Rate  20   02/20/17 08:28


 


Blood Pressure  128/52   02/20/17 06:00


 


O2 Sat by Pulse Oximetry (%)  97   02/20/17 08:28











Cardiovascular: Yes: Regular Rate and Rhythm


Respiratory: Yes: Intubated, Mechanically Ventilated, Rhonchi


Gastrointestinal: Yes: Normal Bowel Sounds, Soft, Abdomen, Obese


Edema: No


Labs: 


 CBC, BMP





 02/20/17 05:00 





 02/20/17 05:00 





 INR, PTT











INR  1.31  (0.82-1.09)  H  02/20/17  05:00    


 


Fibrinogen  < 100.0 mg/dL (238-498)  L*  02/20/17  05:00    














Problem List





- Problems


(1) Acute respiratory failure with hypoxia


Code(s): J96.01 - ACUTE RESPIRATORY FAILURE WITH HYPOXIA





(2) COPD (chronic obstructive pulmonary disease)


Code(s): J44.9 - CHRONIC OBSTRUCTIVE PULMONARY DISEASE, UNSPECIFIED   Qualifiers

: 


     COPD type: unspecified COPD        Qualified Code(s): J44.9 - Chronic 

obstructive pulmonary disease, unspecified  





(3) Cholecystitis, acute


Code(s): K81.0 - ACUTE CHOLECYSTITIS





(4) DIC (disseminated intravascular coagulation)


Code(s): D65 - DISSEMINATED INTRAVASCULAR COAGULATION





(5) Demand ischemia


Code(s): I24.8 - OTHER FORMS OF ACUTE ISCHEMIC HEART DISEASE





(6) Diastolic CHF


Code(s): I50.30 - UNSPECIFIED DIASTOLIC (CONGESTIVE) HEART FAILURE   Qualifiers

: 


     Congestive heart failure chronicity: chronic        Qualified Code(s): 

I50.32 - Chronic diastolic (congestive) heart failure  





(7) Gram-negative bacteremia


Code(s): R78.81 - BACTEREMIA





(8) HLD (hyperlipidemia)


Code(s): E78.5 - HYPERLIPIDEMIA, UNSPECIFIED   Qualifiers: 


     Hyperlipidemia type: pure hypercholesterolemia        Qualified Code(s): 

E78.0 - Pure hypercholesterolemia  





(9) HTN (hypertension)


Code(s): I10 - ESSENTIAL (PRIMARY) HYPERTENSION   Qualifiers: 


     Hypertension type: essential hypertension        Qualified Code(s): I10 - 

Essential (primary) hypertension  





(10) Hypokalemia


Code(s): E87.6 - HYPOKALEMIA





(11) Septic shock


Code(s): A41.9 - SEPSIS, UNSPECIFIED ORGANISM


R65.21 - SEVERE SEPSIS WITH SEPTIC SHOCK





(12) Cholecystostomy care


Code(s): Z43.4 - ENCOUNTER FOR ATTN TO OTH ARTIF OPENINGS OF DIGESTIVE TRACT








Assessment/Plan


1. Acute hypoxic respiratory failure referable to septic shock (E. coli 

bacteremia, fungal mold related aspergillus, enterobacter sp in biliary drain)


2. Acute cholecystitis post cholecystostomy drain


3. Demand ischemia


4. Hyperglycemia


5. Hypokalemia


6. History of TIA


7. History of COPD


8. History of diverticular disease


9. History of colitis


10. History of renal carcinoma post left nephrectomy


11. History of prostate carcinoma


12. HTN


13. Hyperlipidemia


14. DIC


15. Anemia





PLAN:


1. Ventilator management as per ICU team


2. Resume cardiac medications (Carvedilol, Losartan and Amlodipine) as 

hemodynamics tolerate


3. Monitor CBC and transfuse as needed maintaining Hgb > 8.0


4. Antibiotic and antifungal as per ID service


5. Cholangiogram through cholecystotomy once stable


6. DVT/GI prophylaxis

## 2017-02-21 LAB
ALBUMIN SERPL-MCNC: 1.8 G/DL (ref 3.4–5)
ALP SERPL-CCNC: 415 U/L (ref 45–117)
ALT SERPL-CCNC: 59 U/L (ref 12–78)
ANION GAP SERPL CALC-SCNC: 9 MMOL/L (ref 8–16)
APPEARANCE UR: (no result)
APTT BLD: 33.9 SECONDS (ref 26.9–34.4)
ART PUNCT SITE: (no result)
ARTERIAL PATENCY WRIST A: POSITIVE
AST SERPL-CCNC: 76 U/L (ref 15–37)
BASE EXCESS BLDA CALC-SCNC: 3.1 MEQ/L (ref -2–2)
BILIRUB SERPL-MCNC: 2.1 MG/DL (ref 0.2–1)
BILIRUB UR STRIP.AUTO-MCNC: NEGATIVE MG/DL
CALCIUM SERPL-MCNC: 7.4 MG/DL (ref 8.5–10.1)
CO2 SERPL-SCNC: 27 MMOL/L (ref 21–32)
COLOR UR: YELLOW
CREAT SERPL-MCNC: 0.8 MG/DL (ref 0.7–1.3)
DEPRECATED RDW RBC AUTO: 15.5 % (ref 11.9–15.9)
FIBRINOGEN PPP-MCNC: 116 MG/DL (ref 238–498)
GLUCOSE SERPL-MCNC: 172 MG/DL (ref 74–106)
HCO3 BLDA-SCNC: 26.9 MEQ/L (ref 22–26)
INR BLD: 1.36 (ref 0.82–1.09)
KETONES UR QL STRIP: NEGATIVE
LEUKOCYTE ESTERASE UR QL STRIP.AUTO: NEGATIVE
LPM/O2%: 45
MAGNESIUM SERPL-MCNC: 2 MG/DL (ref 1.8–2.4)
MCH RBC QN AUTO: 30.3 PG (ref 25.7–33.7)
MCHC RBC AUTO-ENTMCNC: 35.8 G/DL (ref 32–35.9)
MCV RBC: 84.8 FL (ref 80–96)
MECH. VENT.: YES
NITRITE UR QL STRIP: NEGATIVE
PEEP SETTING VENT: 5 CMH2O
PH UR: 5 [PH] (ref 5–8)
PHOSPHATE SERPL-MCNC: 2.5 MG/DL (ref 2.5–4.9)
PLATELET # BLD AUTO: 212 K/MM3 (ref 134–434)
PMV BLD: 8.7 FL (ref 7.5–11.1)
PO2 BLDA: 64.8 MMHG (ref 68–100)
PROT SERPL-MCNC: 4.4 G/DL (ref 6.4–8.2)
PROT UR QL STRIP: NEGATIVE
PROT UR QL STRIP: NEGATIVE
PT PNL PPP: 15 SEC (ref 9.98–11.88)
PT. ON O2?: YES
RBC # UR STRIP: NEGATIVE /UL
SAO2 % BLDA: 93.6 % (ref 90–98.9)
SP GR UR: 1.01 (ref 1–1.03)
TYPE OF O2: (no result)
UROBILINOGEN UR STRIP-MCNC: NEGATIVE E.U./DL (ref 0.2–1)
VENT RATE: 16
VT/PRESS: 450
WBC # BLD AUTO: 10.2 K/MM3 (ref 4–10)

## 2017-02-21 PROCEDURE — BF13YZZ FLUOROSCOPY OF GALLBLADDER AND BILE DUCTS USING OTHER CONTRAST: ICD-10-PCS | Performed by: RADIOLOGY

## 2017-02-21 RX ADMIN — HEPARIN SODIUM SCH UNIT: 5000 INJECTION, SOLUTION INTRAVENOUS; SUBCUTANEOUS at 21:26

## 2017-02-21 RX ADMIN — FAMOTIDINE SCH MLS/HR: 20 INJECTION, SOLUTION INTRAVENOUS at 21:30

## 2017-02-21 RX ADMIN — FAMOTIDINE SCH MLS/HR: 20 INJECTION, SOLUTION INTRAVENOUS at 09:31

## 2017-02-21 RX ADMIN — ASPIRIN 325 MG ORAL TABLET SCH MG: 325 PILL ORAL at 15:41

## 2017-02-21 RX ADMIN — CARVEDILOL SCH MG: 3.12 TABLET, FILM COATED ORAL at 09:32

## 2017-02-21 RX ADMIN — CHLORHEXIDINE GLUCONATE 0.12% ORAL RINSE SCH ML: 1.2 LIQUID ORAL at 09:32

## 2017-02-21 RX ADMIN — POLYETHYLENE GLYCOL 3350 SCH GRAMS: 17 POWDER, FOR SOLUTION ORAL at 09:32

## 2017-02-21 RX ADMIN — CHLORHEXIDINE GLUCONATE 0.12% ORAL RINSE SCH ML: 1.2 LIQUID ORAL at 21:25

## 2017-02-21 RX ADMIN — ACETAMINOPHEN PRN MG: 10 INJECTION, SOLUTION INTRAVENOUS at 08:41

## 2017-02-21 RX ADMIN — POLYETHYLENE GLYCOL 3350 SCH GRAMS: 17 POWDER, FOR SOLUTION ORAL at 21:28

## 2017-02-21 RX ADMIN — VORICONAZOLE SCH MLS/HR: 10 INJECTION, POWDER, LYOPHILIZED, FOR SOLUTION INTRAVENOUS at 22:05

## 2017-02-21 RX ADMIN — CARVEDILOL SCH MG: 3.12 TABLET, FILM COATED ORAL at 21:26

## 2017-02-21 RX ADMIN — VANCOMYCIN HYDROCHLORIDE SCH MG: 1 INJECTION, POWDER, LYOPHILIZED, FOR SOLUTION INTRAVENOUS at 09:32

## 2017-02-21 RX ADMIN — ATORVASTATIN CALCIUM SCH MG: 20 TABLET, FILM COATED ORAL at 21:26

## 2017-02-21 RX ADMIN — INSULIN ASPART SCH UNITS: 100 INJECTION, SOLUTION INTRAVENOUS; SUBCUTANEOUS at 19:22

## 2017-02-21 RX ADMIN — INSULIN ASPART SCH: 100 INJECTION, SOLUTION INTRAVENOUS; SUBCUTANEOUS at 06:19

## 2017-02-21 RX ADMIN — INSULIN ASPART SCH UNITS: 100 INJECTION, SOLUTION INTRAVENOUS; SUBCUTANEOUS at 12:34

## 2017-02-21 RX ADMIN — VANCOMYCIN HYDROCHLORIDE SCH MG: 1 INJECTION, POWDER, LYOPHILIZED, FOR SOLUTION INTRAVENOUS at 21:26

## 2017-02-21 RX ADMIN — INSULIN ASPART SCH: 100 INJECTION, SOLUTION INTRAVENOUS; SUBCUTANEOUS at 22:08

## 2017-02-21 RX ADMIN — ERTAPENEM SODIUM SCH MLS/HR: 1 INJECTION, POWDER, LYOPHILIZED, FOR SOLUTION INTRAMUSCULAR; INTRAVENOUS at 09:31

## 2017-02-21 RX ADMIN — VORICONAZOLE SCH MLS/HR: 10 INJECTION, POWDER, LYOPHILIZED, FOR SOLUTION INTRAVENOUS at 12:18

## 2017-02-21 RX ADMIN — PROPOFOL SCH MLS/HR: 10 INJECTION, EMULSION INTRAVENOUS at 18:13

## 2017-02-21 NOTE — PN
Teaching Attending Note


Name of Resident: Buster Ramirez


ATTENDING PHYSICIAN STATEMENT





I saw and evaluated the patient.


I reviewed the resident's note and discussed the case with the resident.


I agree with the resident's findings and plan as documented.








SUBJECTIVE:alert, following simple commands. intubated








OBJECTIVE:


 Last Vital Signs











Temp Pulse Resp BP Pulse Ox


 


 101.5 F H  110 H  16   124/46   96 


 


 02/21/17 10:00  02/21/17 13:05  02/21/17 13:05  02/21/17 13:05  02/21/17 07:35








 Intake & Output











 02/18/17 02/19/17 02/20/17 02/21/17





 23:59 23:59 23:59 23:59


 


Intake Total 2340 539 1224 1836


 


Output Total 835 1165 1140 720


 


Balance 1505 -097 04 4496


 


Weight 180 lb 180 lb 8 oz 185 lb 189 lb 4.8 oz








General NAD, following simple commands


CV S1 S2 RRR no murmur/rub/gallop


Lungs CTA B/L no wheezing/rales/rhonchi


Abdomen RUQ drain in place. no erythema or swelling around insertion site. 

abdomen is firm and distended. NT


Extremities trace pitting edema B/L UE


 Microbiology





02/13/17 22:46   Sputum - Endotrachea Suction/Ventilator   Fungus Mold 

Identification - Preliminary


02/13/17 22:41   Blood - Central Line   Blood Culture - Final


                            NO GROWTH AFTER 5 DAYS INCUBATION


02/12/17 21:40   Blood - Peripheral Venous   Blood Culture - Final


                            NO GROWTH AFTER 5 DAYS INCUBATION


02/12/17 21:40   Blood - Peripheral Venous   Blood Culture - Final


                            NO GROWTH AFTER 5 DAYS INCUBATION


02/13/17 17:30   Body Fluid - Other   Gram Stain - Final


02/13/17 17:30   Body Fluid - Other   Body Fluid Culture - Final


                            Enterobacter Asburiae


                            Enterococcus Faecium


02/13/17 17:30   Body Fluid - Other   Anaerobic Culture - Final


                            NO ANAEROBES WERE ISOLATED


02/13/17 22:46   Sputum - Endotrachea Suction/Ventilator   Gram Stain - Final


02/13/17 22:46   Sputum - Endotrachea Suction/Ventilator   Sputum Culture - 

Final


                            Fungal Isolate: Mold


                            Mr S Aureus


02/13/17 22:41   Blood - Central Line   Blood Culture - Final


                            Escherichia Coli


02/12/17 21:40   Urine - Urine Clean Catch   Urine Culture - Final


                            NO GROWTH OBTAINED








ASSESSMENT AND PLAN:


86yo M with PMH [place], dyslipidemia, COPD, TIA and prostate and RCC presented to 

the ER and was admitted for further evaluation of their emergent condition


1. Sepsis due to Acute cholecystitis- continues to have intermittent fevers. ROGELIO 

drain with 140cc serosangeous drainage which is more than previously. 

Enterobacter and enterococcus in growth from WCx. on Ertapenem/Vanco. ID on 

board. drain management per surgery. mild uptrend in bilirubin is concerning 

for blocked tube. will trend labs. have IR re-evaluate placement if continues 

to trend up. 


2. Acute posthemorrhagic anemia in the setting of upper GI bleed- no repeated 

episodes. s/p 1 unit PRBC this hospitalization. on PPI ggt


3. DIC- likely in the setting of sepsis. received 4 units of cryo this 

admission. now starting to improve. fibrinogen trending up. platelet counts 

WNL. no signs of bleeding. hematology on board


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 awaiting isolate. on voriconazole


5. Acute hypoxic respiratory failure- remains intubated. follow some commands 

on the vent. tolerating current cpap trial. wean to extubate per ICU team


6. CVA- subacute seen on CT scan from 2/18. neuro consulted. started on full 

dose ASA. will need PT and speech therapist once extubated. 


7. DVT ppx- hep sq








The care of this patient involved high complexity decision making to prevent 

further life threatening deterioration of the patient's condition and/or to 

evaluate & treat vital organ system(s) failure or risk of failure. critical 

care time 45 minutes

## 2017-02-21 NOTE — PN
Progress Note, Physician


Chief Complaint: 


Events noted


Currently in ICU intubated and sedated - arousable


History of Present Illness: 


Patient was seen and examined in ICU. On mechanical respirator. Chart was 

reviewed


Post cholecystostomy 


Low grade fever





- Current Medication List


Current Medications: 


Active Medications





Acetaminophen (Tylenol -)  650 mg PO Q4H PRN


   PRN Reason: FEVER OR PAIN


   Last Admin: 02/16/17 13:46 Dose:  650 mg


Acetaminophen (Ofirmev Injection -)  1,000 mg IVPB Q6H PRN


   PRN Reason: FEVER


   Last Admin: 02/21/17 08:41 Dose:  1,000 mg


Artificial Tears (Artificial Tears)  1 drop OU TID PRN


   PRN Reason: DRY EYES


Bisacodyl (Dulcolax Suppository -)  10 mg RC PRN PRN


   PRN Reason: CONSTIPATION


Carvedilol (Coreg -)  3.125 mg NGT BID [facility]


   Last Admin: 02/20/17 21:13 Dose:  3.125 mg


Chlorhexidine Gluconate (Peridex -)  15 ml MM BID [facility]


   Last Admin: 02/20/17 21:15 Dose:  15 ml


Voriconazole 320 mg/ Dextrose  282 mls @ 141 mls/hr IVPB BID [facility]


   Last Admin: 02/20/17 21:50 Dose:  141 mls/hr


Ertapenem (Invanz (Pre-Docked))  50 mls @ 50 mls/hr IVPB DAILY [facility]


   PRN Reason: Protocol


   Last Admin: 02/20/17 09:23 Dose:  50 mls/hr


Propofol (Diprivan -)  100 mls @ 2.457 mls/hr IVPB TITR [facility]; 5 MCG/KG/MIN


   PRN Reason: Protocol


   Last Titration: 02/21/17 02:30 Dose:  20 mcg/kg/min


Fentanyl 500 mcg/ Dextrose  100 mls @ 0.4 mls/hr IJ TITR [facility]


   PRN Reason: 2 MCG/HR


   Stop: 02/21/17 15:59


   Last Titration: 02/21/17 02:30 Dose:  50 mcg/hr


Famotidine/Sodium Chloride (Pepcid 20 Mg Premixed Ivpb -)  50 mls @ 100 mls/hr 

IVPB BID [facility]


   Last Admin: 02/20/17 21:14 Dose:  100 mls/hr


Insulin Aspart (Novolog Vial Sliding Scale -)  1 vial SQ ACHS [facility]


   PRN Reason: Protocol


   Last Admin: 02/21/17 06:19 Dose:  Not Given


Polyethylene Glycol (Miralax (For Daily Use) -)  17 gm NGT BID [facility]


   Last Admin: 02/20/17 21:13 Dose:  17 grams


Vancomycin HCl (Vancomycin (Pre-Docked))  1,000 mg IVPB BID [facility]


   PRN Reason: Protocol


   Last Admin: 02/20/17 21:14 Dose:  1,000 mg











- Objective


Vital Signs: 


 Vital Signs











Temperature  100.1 F H  02/21/17 06:00


 


Pulse Rate  82   02/21/17 08:29


 


Respiratory Rate  16   02/21/17 08:29


 


Blood Pressure  139/52   02/21/17 08:29


 


O2 Sat by Pulse Oximetry (%)  96   02/21/17 07:35











Cardiovascular: Yes: Regular Rate and Rhythm, S1, S2


Respiratory: Yes: Diminished, Mechanically Ventilated


Gastrointestinal: Yes: Soft.  No: Tenderness


Edema: No


Labs: 


 CBC, BMP





 02/21/17 05:00 





 02/21/17 05:00 





 INR, PTT











INR  1.36  (0.82-1.09)  H  02/21/17  05:00    


 


Fibrinogen  116.0 mg/dL (238-498)  L  02/21/17  05:00    














Problem List





- Problems


(1) COPD (chronic obstructive pulmonary disease)


Code(s): J44.9 - CHRONIC OBSTRUCTIVE PULMONARY DISEASE, UNSPECIFIED   Qualifiers

: 


     COPD type: unspecified COPD        Qualified Code(s): J44.9 - Chronic 

obstructive pulmonary disease, unspecified  





(2) Cholecystitis, acute


Code(s): K81.0 - ACUTE CHOLECYSTITIS





(3) Fever


Code(s): R50.9 - FEVER, UNSPECIFIED   Qualifiers: 


     Fever type: other     Qualified Code(s): R50.81 - Fever presenting with 

conditions classified elsewhere  





(4) HLD (hyperlipidemia)


Code(s): E78.5 - HYPERLIPIDEMIA, UNSPECIFIED   Qualifiers: 


     Hyperlipidemia type: pure hypercholesterolemia        Qualified Code(s): 

E78.0 - Pure hypercholesterolemia  





(5) HTN (hypertension)


Code(s): I10 - ESSENTIAL (PRIMARY) HYPERTENSION   Qualifiers: 


     Hypertension type: essential hypertension        Qualified Code(s): I10 - 

Essential (primary) hypertension  





(6) Leukocytosis


Code(s): D72.829 - ELEVATED WHITE BLOOD CELL COUNT, UNSPECIFIED   Qualifiers: 


     Leukocytosis type: unspecified     Qualified Code(s): D72.829 - Elevated 

white blood cell count, unspecified  





(7) Colitis


Code(s): K52.9 - NONINFECTIVE GASTROENTERITIS AND COLITIS, UNSPECIFIED





(8) Diverticulitis


Code(s): K57.92 - DVTRCLI OF INTEST, PART UNSP, W/O PERF OR ABSCESS W/O BLEED   





(9) Kidney malignancy


Code(s): C64.9 - MALIGNANT NEOPLASM OF UNSP KIDNEY, EXCEPT RENAL PELVIS   

Qualifiers: 


     Laterality: left     Qualified Code(s): C64.2 - Malignant neoplasm of left 

kidney, except renal pelvis  





(10) Prostate CA


Code(s): C61 - MALIGNANT NEOPLASM OF PROSTATE





(11) Acute respiratory failure with hypoxia


Code(s): J96.01 - ACUTE RESPIRATORY FAILURE WITH HYPOXIA





(12) Cholecystostomy care


Code(s): Z43.4 - ENCOUNTER FOR ATTN TO OTH ARTIF OPENINGS OF DIGESTIVE TRACT





(13) DIC (disseminated intravascular coagulation)


Code(s): D65 - DISSEMINATED INTRAVASCULAR COAGULATION





(14) Hypokalemia


Code(s): E87.6 - HYPOKALEMIA





(15) Septic shock


Code(s): A41.9 - SEPSIS, UNSPECIFIED ORGANISM


R65.21 - SEVERE SEPSIS WITH SEPTIC SHOCK








Assessment/Plan


1. Acute hypoxic respiratory failure referable to septic shock (E. coli 

bacteremia) 


2. Acute cholecystitis post cholecystostomy drain


3. Post demand ischemia


4. Hyperglycemia


5. Hypokalemia


6. History of TIA 


7. History of COPD


8. History of diverticular disease


9. History of colitis


10. History of renal carcinoma post left nephrectomy


11. History of prostate carcinoma


12. HTN


13. Hyperlipidemia


14. DIC





PLAN:


1. Ventilator management as per ICU team


2. Continue Carvedilol, ASA and Lipitor for now via NG.  Resume remaining 

therapies pending hemodynamic stability (Losartan and Amlodipine).


3. Monitor CBC and transfuse as needed maintaining Hgb > 8.0


4. Antibiotic and antifungal as per ID service


5. Neuro input pending


6. Hematology input noted regarding DIC





Further plans are to follow


Guarded


Robin Vazquez MD

## 2017-02-21 NOTE — PN
Progress Note (short form)





- Note


Progress Note: 


Patient seen and examined 


Remains intubated and sedated


S/P cerebral infarcts


Ongoing coagulopathy


Abnormal LFT's persistent


Febrile course continues


Good urinary  drainage


60+ cc of biliary drainage


 Last Vital Signs











Temp Pulse Resp BP Pulse Ox


 


 100.4 F H  86   16   125/55   96 


 


 02/21/17 17:00  02/21/17 17:00  02/21/17 17:00  02/21/17 17:00  02/21/17 07:35











HEENT: BOGDAN, EOM Intact


Oropharynx: coated tongue, mucositis, E-T tube


Cor: RSR, No murmurs, No gallops


Lungs: diminished breath sounds bilaterally


Abd: Soft, Normal bowel sounds, No organomegaly


Ext:No significant edema LE, Upper extremity edema


Skin: No rashes, Integument intact


 CBC, BMP





 02/21/17 05:00 





 02/21/17 05:00 





 INR, PTT











INR  1.36  (0.82-1.09)  H  02/21/17  05:00    


 


Fibrinogen  116.0 mg/dL (238-498)  L  02/21/17  05:00    








 Current Medications











Generic Name Dose Route Start Last Admin





  Trade Name Freq  PRN Reason Stop Dose Admin


 


Acetaminophen  650 mg 02/13/17 23:33 02/16/17 13:46





  Tylenol -  PO   650 mg





  Q4H PRN   Administration





  FEVER OR PAIN   


 


Acetaminophen  1,000 mg 02/16/17 16:07 02/21/17 08:41





  Ofirmev Injection -  IVPB   1,000 mg





  Q6H PRN   Administration





  FEVER   


 


Artificial Tears  1 drop 02/18/17 09:28  





  Artificial Tears  OU   





  TID PRN   





  DRY EYES   


 


Aspirin  325 mg 02/21/17 12:45 02/21/17 15:41





  Asa -  NGT   325 mg





  DAILY LAUREN   Administration


 


Atorvastatin Calcium  20 mg 02/21/17 22:00  





  Lipitor -  PO   





  HS LAUREN   


 


Bisacodyl  10 mg 02/18/17 20:25  





  Dulcolax Suppository -  RC   





  PRN PRN   





  CONSTIPATION   


 


Carvedilol  3.125 mg 02/20/17 10:30 02/21/17 09:32





  Coreg -  NGT   3.125 mg





  BID LAUREN   Administration


 


Chlorhexidine Gluconate  15 ml 02/18/17 22:00 02/21/17 09:32





  Peridex -  MM   15 ml





  BID LAUREN   Administration


 


Heparin Sodium (Porcine)  5,000 unit 02/21/17 22:00  





  Heparin -  SQ   





  BID LAUREN   


 


Voriconazole 320 mg/ Dextrose  282 mls @ 141 mls/hr 02/18/17 10:00 02/21/17 12:

18





  IVPB   141 mls/hr





  BID LAUREN   Administration


 


Ertapenem  50 mls @ 50 mls/hr 02/18/17 10:00 02/21/17 09:31





  Invanz (Pre-Docked)  IVPB   50 mls/hr





  DAILY LAUREN   Administration





  Protocol   


 


Propofol  100 mls @ 2.457 mls/hr 02/18/17 18:29 02/21/17 18:13





  Diprivan -  IVPB   14.742 mls/hr





  TITR LAUREN   Administration





  Protocol   





  5 MCG/KG/MIN   


 


Famotidine/Sodium Chloride  50 mls @ 100 mls/hr 02/20/17 22:00 02/21/17 09:31





  Pepcid 20 Mg Premixed Ivpb -  IVPB   100 mls/hr





  BID LAUREN   Administration


 


Insulin Aspart  1 vial 02/14/17 22:00 02/21/17 12:34





  Novolog Vial Sliding Scale -  SQ   2 units





  ACHS LAUREN   Administration





  Protocol   


 


Polyethylene Glycol  17 gm 02/18/17 22:00 02/21/17 09:32





  Miralax (For Daily Use) -  NGT   17 grams





  BID LAUREN   Administration


 


Vancomycin HCl  1,000 mg 02/18/17 10:00 02/21/17 09:32





  Vancomycin (Pre-Docked)  IVPB   1,000 mg





  BID LAUREN   Administration





  Protocol   








Impression:





Respiratory failure- intubated, awake, intermittent sedation


Fevers- continuing on antibiotic therapy


Coagulopathy- underling sepsis, liver disease, 


Hypofibrinogenemia- s/p 2 units of cryoppt - fibrinogen-116


Cerebral infarction-ASA per Neurology


DVT prophylaxis- heparin therapy





Difficult problem:


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 

Biliary or pulmonary infection, hypofibrinogenemia and now with multiple 

cerebral infarcts. 


Risk of bleeding not insignificant. Will need to attempt to maintain fibrinogen

  at > 100 while on asa and heparin prophylaxis.  











Problem List





- Problems


(1) SANGITA (acute kidney injury)


Code(s): N17.9 - ACUTE KIDNEY FAILURE, UNSPECIFIED





(2) Acute respiratory failure with hypoxia


Code(s): J96.01 - ACUTE RESPIRATORY FAILURE WITH HYPOXIA





(3) Anemia due to GI blood loss


Code(s): D50.0 - IRON DEFICIENCY ANEMIA SECONDARY TO BLOOD LOSS (CHRONIC)





(4) Cholecystitis, acute


Code(s): K81.0 - ACUTE CHOLECYSTITIS





(5) DIC (disseminated intravascular coagulation)


Code(s): D65 - DISSEMINATED INTRAVASCULAR COAGULATION





(6) Transaminitis


Code(s): R74.0 - NONSPEC ELEV OF LEVELS OF TRANSAMNS & LACTIC ACID DEHYDRGNSE





(7) Kidney malignancy


Code(s): C64.9 - MALIGNANT NEOPLASM OF UNSP KIDNEY, EXCEPT RENAL PELVIS   

Qualifiers: 


     Laterality: left     Qualified Code(s): C64.2 - Malignant neoplasm of left 

kidney, except renal pelvis  





(8) Prostate CA


Code(s): C61 - MALIGNANT NEOPLASM OF PROSTATE

## 2017-02-21 NOTE — PN
Physical Exam: 


SUBJECTIVE: 





Patient seen and examined at bedside in the ICU. He remains intubated. 

Tolerated sedation break and CPAP. Persistent fever throughout the day and 

night.








OBJECTIVE:





Intubation day 8; Vent Settings: AC, , FiO2 35%, RR 16, PEEP 5


off sedation


Central line (IJ) day 7


 Vital Signs











 Period  Temp  Pulse  Resp  BP Sys/Yun  Pulse Ox


 


 Last 24 Hr  98.5 F-101.5 F    16-33  116-139/46-57  96-96











GENERAL: off pressors and intubated, arousable, open eyes but unable to 

maintain open.


NECK: triple lumen central line in place 


LUNGS: b/l rhonchi


HEART: Regular rate and rhythm, S1, S2 without murmur, rub or gallop.


ABDOMEN: Soft, facial grimaces upon deep palpations, nondistended. RUQ 

percutaneous drain in dark brown color  


EXTREMITIES: warm and no edema 


NEUROLOGICAL: unable to assess


: muñiz in place, clear urine output





 CBCD











WBC  10.2 K/mm3 (4.0-10.0)  H D 02/21/17  05:00    


 


RBC  3.56 M/mm3 (4.00-5.60)  L D 02/21/17  05:00    


 


Hgb  10.8 GM/dL (11.7-16.9)  L D 02/21/17  05:00    


 


Hct  30.2 % (35.4-49)  L D 02/21/17  05:00    


 


MCV  84.8 fl (80-96)   02/21/17  05:00    


 


MCHC  35.8 g/dl (32.0-35.9)   02/21/17  05:00    


 


RDW  15.5 % (11.9-15.9)   02/21/17  05:00    


 


Plt Count  212 K/MM3 (134-434)  D 02/21/17  05:00    


 


MPV  8.7 fl (7.5-11.1)   02/21/17  05:00    








 CMP











Sodium  141 mmol/L (136-145)   02/21/17  05:00    


 


Potassium  3.9 mmol/L (3.5-5.1)   02/21/17  05:00    


 


Chloride  105 mmol/L ()   02/21/17  05:00    


 


Carbon Dioxide  27 mmol/L (21-32)   02/21/17  05:00    


 


Anion Gap  9  (8-16)   02/21/17  05:00    


 


BUN  29 mg/dL (7-18)  H  02/21/17  05:00    


 


Creatinine  0.8 mg/dL (0.7-1.3)   02/21/17  05:00    


 


Creat Clearance w eGFR  > 60  (>60)   02/21/17  05:00    


 


Calcium  7.4 mg/dL (8.5-10.1)  L  02/21/17  05:00    


 


Total Bilirubin  2.1 mg/dL (0.2-1.0)  H D 02/21/17  05:00    


 


AST  76 U/L (15-37)  H  02/21/17  05:00    


 


ALT  59 U/L (12-78)   02/21/17  05:00    


 


Alkaline Phosphatase  415 U/L ()  H  02/21/17  05:00    


 


Total Protein  4.4 g/dl (6.4-8.2)  L  02/21/17  05:00    


 


Albumin  1.8 g/dl (3.4-5.0)  L  02/21/17  05:00    








 ABG Results











ABG pH  7.45  (7.35-7.45)   02/21/17  07:20    


 


ABG pCO2 at Pt Temp  39.5 mmHg (35-45)   02/21/17  07:20    


 


ABG pO2 at Pt Temp  64.8 mmHg ()  L  02/21/17  07:20    


 


ABG HCO3  26.9 meq/L (22-26)  H  02/21/17  07:20    


 


ABG O2 Sat (Measured)  93.6 % (90-98.9)   02/21/17  07:20    


 


ABG O2 Content  13.8 % vol (15-22)  L  02/21/17  07:20    


 


ABG Base Excess  3.1 meq/l (-2-2)  H  02/21/17  07:20    








 Intake & Output











 02/18/17 02/19/17 02/20/17 02/21/17





 23:59 23:59 23:59 23:59


 


Intake Total 3002 539 1224 1836


 


Output Total 837 1165 1140 1720


 


Balance 1505 -626 84 116


 


Weight 81.647 kg 81.873 kg 83.915 kg 85.865 kg











Active Medications











Generic Name Dose Route Start Last Admin





  Trade Name Freq  PRN Reason Stop Dose Admin


 


Acetaminophen  650 mg 02/13/17 23:33 02/16/17 13:46





  Tylenol -  PO   650 mg





  Q4H PRN   Administration





  FEVER OR PAIN   


 


Acetaminophen  1,000 mg 02/16/17 16:07 02/21/17 08:41





  Ofirmev Injection -  IVPB   1,000 mg





  Q6H PRN   Administration





  FEVER   


 


Artificial Tears  1 drop 02/18/17 09:28  





  Artificial Tears  OU   





  TID PRN   





  DRY EYES   


 


Aspirin  325 mg 02/21/17 12:45  





  Asa -  NGT   





  DAILY LAUREN   


 


Atorvastatin Calcium  20 mg 02/21/17 22:00  





  Lipitor -  PO   





  HS LAUREN   


 


Bisacodyl  10 mg 02/18/17 20:25  





  Dulcolax Suppository -  RC   





  PRN PRN   





  CONSTIPATION   


 


Carvedilol  3.125 mg 02/20/17 10:30 02/21/17 09:32





  Coreg -  NGT   3.125 mg





  BID LAUREN   Administration


 


Chlorhexidine Gluconate  15 ml 02/18/17 22:00 02/21/17 09:32





  Peridex -  MM   15 ml





  BID LAUREN   Administration


 


Heparin Sodium (Porcine)  5,000 unit 02/21/17 14:00  





  Heparin -  SQ   





  TID LAUREN   


 


Voriconazole 320 mg/ Dextrose  282 mls @ 141 mls/hr 02/18/17 10:00 02/21/17 12:

18





  IVPB   141 mls/hr





  BID LAUREN   Administration


 


Ertapenem  50 mls @ 50 mls/hr 02/18/17 10:00 02/21/17 09:31





  Invanz (Pre-Docked)  IVPB   50 mls/hr





  DAILY LAUREN   Administration





  Protocol   


 


Propofol  100 mls @ 2.457 mls/hr 02/18/17 18:29 02/21/17 02:30





  Diprivan -  IVPB   20 mcg/kg/min





  TITR LAUREN   Titration





  Protocol   





  5 MCG/KG/MIN   


 


Fentanyl 500 mcg/ Dextrose  100 mls @ 0.4 mls/hr 02/20/17 16:00 02/21/17 02:30





  IJ 02/21/17 15:59  50 mcg/hr





  TITR LAUREN   Titration





  2 MCG/HR   


 


Famotidine/Sodium Chloride  50 mls @ 100 mls/hr 02/20/17 22:00 02/21/17 09:31





  Pepcid 20 Mg Premixed Ivpb -  IVPB   100 mls/hr





  BID LAUREN   Administration


 


Insulin Aspart  1 vial 02/14/17 22:00 02/21/17 12:34





  Novolog Vial Sliding Scale -  SQ   2 units





  ACHS LAUREN   Administration





  Protocol   


 


Polyethylene Glycol  17 gm 02/18/17 22:00 02/21/17 09:32





  Miralax (For Daily Use) -  NGT   17 grams





  BID LAUREN   Administration


 


Vancomycin HCl  1,000 mg 02/18/17 10:00 02/21/17 09:32





  Vancomycin (Pre-Docked)  IVPB   1,000 mg





  BID LAUREN   Administration





  Protocol   








Microbiology





02/13/17 22:46   Sputum - Endotrachea Suction/Ventilator   Fungus Mold 

Identification - Preliminary


02/13/17 17:30   Body Fluid - Other   Body Fluid Culture - Final


                            Enterobacter Asburiae


                            Enterococcus Faecium


02/13/17 22:46   Sputum - Endotrachea Suction/Ventilator   Sputum Culture - 

Final


                            Fungal Isolate: Mold


                             S Aureus


02/13/17 22:41   Blood - Central Line   Blood Culture - Final


                            Escherichia Coli





IMAGING





CXR - 2/11: L pleural effusion and left retrocardiac density. Cardiomegaly and 

increased perihilar lung markings may reflect mild congestive changes.


CXR - 2/20: Endotracheal tube placement as described above with improving 

bibasilar infiltrates. 


CT Chest - 2/18: Bilateral lower lobe infiltrates, right more the left. Small 

bilateral pleural effusions. 1.3 x 1 cm left apical cyst/cavity with concentric 

wall thickening. There is subjacent mild left upper lobe bronchiectasis with 

associated peribronchial thickening. centrilobular emphysema. 


CT Head - 2/18: In comparison to a previous CT exam of 4/9/2015 interval 

development of left occipital and left parietal cortical infarcts are noted 

which are probably chronic versus late subacute   





ASSESSMENT/PLAN:





84 yo h/o COPD, CAROLINA, HTN, HLD, TIA, prostate CA, renal CA now s/p left 

nephrectomy was admitted to the ICU for acute respiratory failure and sepsis 

secondary to acute cholecystitis s/p percutaenous drain. Patient has been 

intubated for 8 days, on an off sedation but completely off pressors. 

Experienced respiratory distress during daily weaning trials and sustained low 

grade fevers at times. Will remove central line and attempt extubation today. 





Respiratory: Acute hypoxic respiratory failure


   - intubation day 8


      * see above for vent settings


      * maintain SpO2 > 90%


   - CXR shows infiltrate and pleural effusion


      * 1 dose of lasix 40mg IVPUSH


   - Stop daily ABGs unless necessary


   - Maintain SPO2 > 90% 


   - attempt extubation tomorrow





ID: Profound septic shock 


   - source: pulmonary vs. gallbladder


      * negative CT abd + pelvis w/ contrast


   - elevated WBC with CXR infiltrate and persistent fevers


      *f/u repeat cultures


      *IV tynelol for spike fevers


      *f/u daily CXR


   - positive sputum, blood and biliary fluid cultures (see micro lab)


   - cavity/cyst on CT chest


      *staph vs. aspergillus


   - cont. vanco, ertapanem, voriconazole





GI: Acute cholecystitis s/p percutaneous drain


   - abnormal LFT likely 2/2 gallbladder abscess


      * cholangiogram negative for abscess, stone, obstruction


      * cont. to monitor liver chemistries


   - cont. PPI drip





Heme: DIC with sepsis


   - low plt and fibrinogen


      * s/p 2 cryo yesterday


      * f/u daily coag.


   - low H&H


      * s/p 1 PRBC


      * maintain H/H at 8 gm





Neuro: off sedation


   - off propofol gtt and fentanyl gtt


   - repeat CT confirmed old infarct


   - f/u on a1c, lipid profile, carotid doppler





Cardiac: HTN


   - resumed carvedilol


   - maintain MAP > 65%





Endo: DM2


   - on sliding scale


   - BGM





FEN


   - fluid restrict due to pleural effusion


   - low phos but will hold off giving K+Phos because tube feed has began


   - tube feed vital 1.2





Prophylaxis 


   - DVT: SCD and heparin SQ 5000 BID


   - GI: protonix 





Disposition


   - cont. to monitor in ICU





Code status


   - Full code








Visit type





- Emergency Visit


Emergency Visit: No





- New Patient


This patient is new to me today: No





- Critical Care


Critical Care patient: Yes


Total Critical Care Time (in minutes): 45


Critical Care Statement: The care of this patient involved high complexity 

decision making to prevent further life threatening deterioration of the patient

's condition and/or to evalute & treat vital organ system(s) failure or risk of 

failure.

## 2017-02-21 NOTE — CON.NEURO
Consult


Consult Specialty:: Neurology 


Referred by:: Zaire Phillips


Reason for Consultation:: Subacute vs Old Stroke





- History of Present Illness


History of Present Illness: 


85 year old male with pmh of HTN, CAROLINA, HPLD, Prostate/Renal CA s/p left 

nephrectomy presented to the ED with right upper quadrant pain for 3 days. Pt 

was found to have acute cholecystitis. Pt was then transferred to the ICU for 

Acute respiratory failure, confusion, lethargy. In the ICU, Pt was found to be 

Hypotensive, tachycardic, febrile with T103.1, tachypneic with resp rate in 30's

, O2 sat 80's while on 50% venti-mask, pt also had copious coffee ground 

emesis. Chlecystectomy was not done due precarious general medical conditions 

but instead a cholecystostomy tube was placed by IR on 2/13/17. Pt was intubated

, sedated, on pressors for septic shock. Pt responds to verbal stimuli and 

follows simple direction while on 10 mcg/kg/mn of proprofol. For the last few 

days, the ICU team has been trying to get patient off the vent unsuccessfully, 

per ICU team pt gets agitated when awake and fail weaning trials. Pt had a CT 

head on 2/18/17 that showed chronic versus late subacute left occipital and 

left parietal cortical infarcts. We were consulted to evaluate the brain 

infarcts and see if it is rt to the weaning failures. 











- History Source


History Provided By: Medical Record


Limitations to Obtaining History: Intubated





- Past Medical History


CNS: Yes: TIA


Cardio/Vascular: Yes: HTN


Pulmonary: Yes: COPD


Gastrointestinal: Yes: Diverticulitis


Renal/: Yes: Cancer (Prostate/KIDNEY)


Psych: Yes: Anxiety


Rheumatology: Yes: Other (arthritis knees)





- Past Surgical History


Past Surgical History: Yes: Appendectomy, Colonoscopy (last 1 yrs ago, lynn 

2 polyps. h/o ischemic colitis 3,2 and ?years ago), Hernia Repair (b/l), 

Nephrectomy (left)





- Alcohol/Substance Use


Hx Alcohol Use: No





- Smoking History


Smoking history: Former smoker


Have you smoked in the past 12 months: No


Aproximately how many cigarettes per day: 0


If you are a former smoker, when did you quit?: 35 years ago





- Social History


ADL: Independent


Occupation: retired salesman


History of Recent Travel: No





Home Medications





- Allergies


Allergies/Adverse Reactions: 


 Allergies











Allergy/AdvReac Type Severity Reaction Status Date / Time


 


No Known Drug Allergies Allergy   Verified 02/12/17 21:20














- Home Medications


Home Medications: 


Ambulatory Orders





Amlodipine Besylate [Norvasc -] 10 mg PO DAILY 11/06/14 


Ca Cmb No.1/Vit D3/B-6/FA/B12 [Vitamin D3 1,000 Unit Tablet] 1 tab PO DAILY 11/ 06/14 


Carvedilol 12.5 mg PO BID 11/06/14 


Vitamin E 400 unit PO DAILY 11/06/14 


Losartan Potassium [Cozaar] 50 mg PO DAILY 11/07/14 


Atorvastatin Ca [Lipitor] 10 mg PO HS #30 tablet 01/21/15 


Aspirin [ASA -] 325 mg PO DAILY@0800  tablet 12/09/15 


Tamsulosin HCl [Flomax -] 0.8 mg PO DAILY@0830  cap.er.24h 12/23/15 


Cinnamon Bark [Cinnamon] 500 mg PO DAILY 02/12/17 


Finasteride 0 mg PO DAILY 02/12/17 


Ranitidine [Zantac -] 150 mg PO DAILY 02/13/17 











Physical Exam-Neuro


Vital Signs: 


 Vital Signs











Temperature  101.5 F H  02/21/17 10:00


 


Pulse Rate  110 H  02/21/17 13:05


 


Respiratory Rate  16   02/21/17 13:05


 


Blood Pressure  124/46   02/21/17 13:05


 


O2 Sat by Pulse Oximetry (%)  96   02/21/17 07:35











Constitutional: Yes: Other (Intubated and sedated )


Cardiovascular: Yes: Regular Rate and Rhythm, S1, S2


Respiratory: Yes: Regular, Diminished, Intubated, Mechanically Ventilated


Gastrointestinal: Yes: Normal Bowel Sounds, Soft, Abdomen, Obese


Edema: No


Labs: 


 CBC, BMP





 02/21/17 05:00 





 02/21/17 05:00 





 INR, PTT











INR  1.36  (0.82-1.09)  H  02/21/17  05:00    


 


Fibrinogen  116.0 mg/dL (238-498)  L  02/21/17  05:00    














- Neuro Exam


Level Of Consciousness: Yes: Alert, Oriented to Person


Eyes: Yes: PERRLA


Speech: Other (intubated on sedation)


Gag: Present


Response to light touch: Normal


Motor Strength: 4/5: Left Arm, Right Arm, Left Leg, Right Leg


Gait: Deferred





Imaging





- Results


Cat Scan: Report Reviewed (CT head w/o 2/18/17: In comparison to a previous CT 

exam of 4/9/2015 interval development of left occipital and left parietal 

cortical infarcts are noted which are probably chronic versus late subacute as 

discussed.)


MRI: Report Reviewed (MRI brain 1/20/17:Subacute infarct involving the cortex, 

subcortical white matter of left occipital lobe is noted with T2 shine through, 

lamina necrosis.  Subacute ischemic changes are observed in the left posterior 

periventricular parietal white matter with T2 shine through.)





Assessment/Plan


85 year old male with multiple cardiovascular comorbidities presented with 

cholecystitis and developed septic shock, DIC (resolved), GI bleed, acute 

hypoxic respiratory failure with multiple brain infarcts found on CT head 





Left occipital and left parietal ischemic infarcts


No focal gross  neurological deficit seen on neuro exam which was limited by 

intubation and pt being on propofol 


Infarct were seen on Ct head of 2/18/17. Upon review of MRI brain from 1/20/ 2016. Infarcts were already present which showed that the infarcts are old 


CT head w/o contrast to monitor evolution of infarcts


MRI brain when pt is stable  


Continue ASA 325mg PO 


Atorvastatin 20mg  qhs 


Lipid profile in am with LDL goal of 70


Glycemic control 


Hga1c in am 


Carotid Doppler US 


Echo was previously done in 2/17/17, result are noted 





Metabolic encephalopathy rt to hypoxia and septic shock


Pt is following simple directions, moves all ext 


Weaning trial failure not likely rt to brain infarcts since they are old


Most likely rt to metabolic encephalopathy 


Continue weaning trial and supportive care 





Disposition: Thank you for the consulting opportunity. We will continue to 

follow this patient. 


Case discussed with Dr Domingo, Neurology Attending, Please follow her note for 

final recommendations

## 2017-02-21 NOTE — PN
Physical Exam: 


SUBJECTIVE: Patient seen and examined at bedside in ICU. Pt off sedation since 

AM, following basic commands.








OBJECTIVE:





 Vital Signs











 Period  Temp  Pulse  Resp  BP Sys/Yun  Pulse Ox


 


 Last 24 Hr  98.5 F-101.5 F    16-33  116-139/49-63  96-96











GENERAL: on ventilator/intubated. Following some basic commands, opens eyes 

when called, squeezes fingers, wiggles toes, raises arms.


HEAD: Normal with no signs of trauma.


EYES: Reactive to light.


ENT: moist mucous membranes. OG tube in place w/ feeds


NECK: Trachea midline


LUNGS: Auscultated anteriorly. coarse breath sounds b/l 


HEART: Distant heart sounds, Regular rate and rhythm, S1, S2 without murmur, 

rub or gallop.


ABDOMEN: Grimacing on RUQ, LUQ palpation. Hypoactive bowel sounds. Abd drain in 

place.


EXTREMITIES: 2+ pulses, warm, well-perfused, no edema. 


NEUROLOGICAL:  gait not observed.


PSYCH: Lethargic


SKIN: Warm, dry, normal turgor, no rashes or lesions noted











 Laboratory Results - last 24 hr











  02/20/17 02/20/17 02/20/17





  12:35 12:50 18:06


 


WBC   


 


RBC   


 


Hgb   


 


Hct   


 


MCV   


 


MCHC   


 


RDW   


 


Plt Count   


 


MPV   


 


INR   


 


PTT (Actin FS)   


 


Fibrinogen   


 


Puncture Site   


 


ABG pH   


 


ABG pCO2 at Pt Temp   


 


ABG pO2 at Pt Temp   


 


ABG HCO3   


 


ABG O2 Sat (Measured)   


 


ABG O2 Content   


 


ABG Base Excess   


 


Sb Test   


 


O2 Delivery Device   


 


Oxygen Flow Rate   


 


Vent Mode   


 


Vent Rate   


 


Mechanical Rate   


 


PEEP   


 


Pressure Support Vent   


 


Sodium   


 


Potassium   


 


Chloride   


 


Carbon Dioxide   


 


Anion Gap   


 


BUN   


 


Creatinine   


 


Creat Clearance w eGFR   


 


POC Glucometer   [phone]  [phone]


 


Random Glucose   


 


Calcium   


 


Phosphorus   


 


Magnesium   


 


Total Bilirubin   


 


AST   


 


ALT   


 


Alkaline Phosphatase   


 


Total Protein   


 


Albumin   


 


Urine Color   


 


Urine Appearance   


 


Urine pH   


 


Ur Specific Gravity   


 


Urine Protein   


 


Urine Glucose (UA)   


 


Urine Ketones   


 


Urine Blood   


 


Urine Nitrite   


 


Urine Bilirubin   


 


Urine Urobilinogen   


 


Ur Leukocyte Esterase   


 


Blood Type  A POSITIVE  


 


Antibody Screen  Negative  


 


Crossmatch  See Detail  














  02/20/17 02/21/17 02/21/17





  21:28 05:00 05:00


 


WBC   10.2 H D 


 


RBC   3.56 L D 


 


Hgb   10.8 L D 


 


Hct   30.2 L D 


 


MCV   84.8 


 


MCHC   35.8 


 


RDW   15.5 


 


Plt Count   212  D 


 


MPV   8.7 


 


INR   


 


PTT (Actin FS)   


 


Fibrinogen   


 


Puncture Site   


 


ABG pH   


 


ABG pCO2 at Pt Temp   


 


ABG pO2 at Pt Temp   


 


ABG HCO3   


 


ABG O2 Sat (Measured)   


 


ABG O2 Content   


 


ABG Base Excess   


 


Sb Test   


 


O2 Delivery Device   


 


Oxygen Flow Rate   


 


Vent Mode   


 


Vent Rate   


 


Mechanical Rate   


 


PEEP   


 


Pressure Support Vent   


 


Sodium    141


 


Potassium    3.9


 


Chloride    105


 


Carbon Dioxide    27


 


Anion Gap    9


 


BUN    29 H


 


Creatinine    0.8


 


Creat Clearance w eGFR    > 60


 


POC Glucometer  [phone]  


 


Random Glucose    172 H


 


Calcium    7.4 L


 


Phosphorus    2.5


 


Magnesium    2.0


 


Total Bilirubin    2.1 H D


 


AST    76 H


 


ALT    59


 


Alkaline Phosphatase    415 H


 


Total Protein    4.4 L


 


Albumin    1.8 L


 


Urine Color   


 


Urine Appearance   


 


Urine pH   


 


Ur Specific Gravity   


 


Urine Protein   


 


Urine Glucose (UA)   


 


Urine Ketones   


 


Urine Blood   


 


Urine Nitrite   


 


Urine Bilirubin   


 


Urine Urobilinogen   


 


Ur Leukocyte Esterase   


 


Blood Type   


 


Antibody Screen   


 


Crossmatch   














  02/21/17 02/21/17 02/21/17





  05:00 05:14 07:20


 


WBC   


 


RBC   


 


Hgb   


 


Hct   


 


MCV   


 


MCHC   


 


RDW   


 


Plt Count   


 


MPV   


 


INR  1.36 H  


 


PTT (Actin FS)  33.9  D  


 


Fibrinogen  116.0 L  


 


Puncture Site    Right radial


 


ABG pH    7.45


 


ABG pCO2 at Pt Temp    39.5


 


ABG pO2 at Pt Temp    64.8 L


 


ABG HCO3    26.9 H


 


ABG O2 Sat (Measured)    93.6


 


ABG O2 Content    13.8 L


 


ABG Base Excess    3.1 H


 


Sb Test    Positive


 


O2 Delivery Device    Vent


 


Oxygen Flow Rate    45


 


Vent Mode    A/c


 


Vent Rate    16


 


Mechanical Rate    Yes


 


PEEP    5.0


 


Pressure Support Vent    450


 


Sodium   


 


Potassium   


 


Chloride   


 


Carbon Dioxide   


 


Anion Gap   


 


BUN   


 


Creatinine   


 


Creat Clearance w eGFR   


 


POC Glucometer   [phone] 


 


Random Glucose   


 


Calcium   


 


Phosphorus   


 


Magnesium   


 


Total Bilirubin   


 


AST   


 


ALT   


 


Alkaline Phosphatase   


 


Total Protein   


 


Albumin   


 


Urine Color   


 


Urine Appearance   


 


Urine pH   


 


Ur Specific Gravity   


 


Urine Protein   


 


Urine Glucose (UA)   


 


Urine Ketones   


 


Urine Blood   


 


Urine Nitrite   


 


Urine Bilirubin   


 


Urine Urobilinogen   


 


Ur Leukocyte Esterase   


 


Blood Type   


 


Antibody Screen   


 


Crossmatch   














  02/21/17





  12:15


 


WBC 


 


RBC 


 


Hgb 


 


Hct 


 


MCV 


 


MCHC 


 


RDW 


 


Plt Count 


 


MPV 


 


INR 


 


PTT (Actin FS) 


 


Fibrinogen 


 


Puncture Site 


 


ABG pH 


 


ABG pCO2 at Pt Temp 


 


ABG pO2 at Pt Temp 


 


ABG HCO3 


 


ABG O2 Sat (Measured) 


 


ABG O2 Content 


 


ABG Base Excess 


 


Sb Test 


 


O2 Delivery Device 


 


Oxygen Flow Rate 


 


Vent Mode 


 


Vent Rate 


 


Mechanical Rate 


 


PEEP 


 


Pressure Support Vent 


 


Sodium 


 


Potassium 


 


Chloride 


 


Carbon Dioxide 


 


Anion Gap 


 


BUN 


 


Creatinine 


 


Creat Clearance w eGFR 


 


POC Glucometer 


 


Random Glucose 


 


Calcium 


 


Phosphorus 


 


Magnesium 


 


Total Bilirubin 


 


AST 


 


ALT 


 


Alkaline Phosphatase 


 


Total Protein 


 


Albumin 


 


Urine Color  Yellow


 


Urine Appearance  Slcloudy


 


Urine pH  5.0


 


Ur Specific Gravity  1.009


 


Urine Protein  Negative


 


Urine Glucose (UA)  Negative


 


Urine Ketones  Negative


 


Urine Blood  Negative


 


Urine Nitrite  Negative


 


Urine Bilirubin  Negative


 


Urine Urobilinogen  Negative


 


Ur Leukocyte Esterase  Negative


 


Blood Type 


 


Antibody Screen 


 


Crossmatch 








 ABG Results











ABG pH  7.45  (7.35-7.45)   02/21/17  07:20    


 


ABG pCO2 at Pt Temp  39.5 mmHg (35-45)   02/21/17  07:20    


 


ABG pO2 at Pt Temp  64.8 mmHg ()  L  02/21/17  07:20    


 


ABG HCO3  26.9 meq/L (22-26)  H  02/21/17  07:20    


 


ABG O2 Sat (Measured)  93.6 % (90-98.9)   02/21/17  07:20    


 


ABG O2 Content  13.8 % vol (15-22)  L  02/21/17  07:20    


 


ABG Base Excess  3.1 meq/l (-2-2)  H  02/21/17  07:20    











 Microbiology





02/13/17 22:46   Sputum - Endotrachea Suction/Ventilator   Fungus Mold 

Identification - Preliminary


02/13/17 22:41   Blood - Central Line   Blood Culture - Final


                            NO GROWTH AFTER 5 DAYS INCUBATION


02/12/17 21:40   Blood - Peripheral Venous   Blood Culture - Final


                            NO GROWTH AFTER 5 DAYS INCUBATION


02/12/17 21:40   Blood - Peripheral Venous   Blood Culture - Final


                            NO GROWTH AFTER 5 DAYS INCUBATION


02/13/17 17:30   Body Fluid - Other   Gram Stain - Final


02/13/17 17:30   Body Fluid - Other   Body Fluid Culture - Final


                            Enterobacter Asburiae


                            Enterococcus Faecium


02/13/17 17:30   Body Fluid - Other   Anaerobic Culture - Final


                            NO ANAEROBES WERE ISOLATED


02/13/17 22:46   Sputum - Endotrachea Suction/Ventilator   Gram Stain - Final


02/13/17 22:46   Sputum - Endotrachea Suction/Ventilator   Sputum Culture - 

Final


                            Fungal Isolate: Mold


                            Mr S Aureus


02/13/17 22:41   Blood - Central Line   Blood Culture - Final


                            Escherichia Coli


02/12/17 21:40   Urine - Urine Clean Catch   Urine Culture - Final


                            NO GROWTH OBTAINED








Active Medications











Generic Name Dose Route Start Last Admin





  Trade Name Freq  PRN Reason Stop Dose Admin


 


Acetaminophen  650 mg 02/13/17 23:33 02/16/17 13:46





  Tylenol -  PO   650 mg





  Q4H PRN   Administration





  FEVER OR PAIN   


 


Acetaminophen  1,000 mg 02/16/17 16:07 02/21/17 08:41





  Ofirmev Injection -  IVPB   1,000 mg





  Q6H PRN   Administration





  FEVER   


 


Artificial Tears  1 drop 02/18/17 09:28  





  Artificial Tears  OU   





  TID PRN   





  DRY EYES   


 


Aspirin  325 mg 02/21/17 12:45  





  Asa -  NGT   





  DAILY LAUREN   


 


Bisacodyl  10 mg 02/18/17 20:25  





  Dulcolax Suppository -  RC   





  PRN PRN   





  CONSTIPATION   


 


Carvedilol  3.125 mg 02/20/17 10:30 02/21/17 09:32





  Coreg -  NGT   3.125 mg





  BID LAUREN   Administration


 


Chlorhexidine Gluconate  15 ml 02/18/17 22:00 02/21/17 09:32





  Peridex -  MM   15 ml





  BID LAUREN   Administration


 


Heparin Sodium (Porcine)  5,000 unit 02/21/17 14:00  





  Heparin -  SQ   





  TID LAUREN   


 


Voriconazole 320 mg/ Dextrose  282 mls @ 141 mls/hr 02/18/17 10:00 02/21/17 12:

18





  IVPB   141 mls/hr





  BID LAUREN   Administration


 


Ertapenem  50 mls @ 50 mls/hr 02/18/17 10:00 02/21/17 09:31





  Invanz (Pre-Docked)  IVPB   50 mls/hr





  DAILY LAUREN   Administration





  Protocol   


 


Propofol  100 mls @ 2.457 mls/hr 02/18/17 18:29 02/21/17 02:30





  Diprivan -  IVPB   20 mcg/kg/min





  TITR LAUREN   Titration





  Protocol   





  5 MCG/KG/MIN   


 


Fentanyl 500 mcg/ Dextrose  100 mls @ 0.4 mls/hr 02/20/17 16:00 02/21/17 02:30





  IJ 02/21/17 15:59  50 mcg/hr





  TITR LAUREN   Titration





  2 MCG/HR   


 


Famotidine/Sodium Chloride  50 mls @ 100 mls/hr 02/20/17 22:00 02/21/17 09:31





  Pepcid 20 Mg Premixed Ivpb -  IVPB   100 mls/hr





  BID LAUREN   Administration


 


Insulin Aspart  1 vial 02/14/17 22:00 02/21/17 12:34





  Novolog Vial Sliding Scale -  SQ   2 units





  ACHS LAUREN   Administration





  Protocol   


 


Polyethylene Glycol  17 gm 02/18/17 22:00 02/21/17 09:32





  Miralax (For Daily Use) -  NGT   17 grams





  BID LAUREN   Administration


 


Vancomycin HCl  1,000 mg 02/18/17 10:00 02/21/17 09:32





  Vancomycin (Pre-Docked)  IVPB   1,000 mg





  BID LAUREN   Administration





  Protocol   











ASSESSMENT/PLAN:


84 y/o M w/ PMH of [place], HTN, HLD, prostate ca, TIA, Kidney cancer s/p 

nephrectomy, COPD presented to ER with abdominal pain (RUQ) for 3 days. 

Admitted for acute cholecystitis. Perc cholecystostomy placed on 2/13/17. Pt 

developed SOB after procedure, was intubated, had central line placed, on 

pressors and sedated and in ICU for septic shock.





-Septic shock secondary to Acute Cholecystitis from cholelithiasis


   -improving


   -Tmax 24 hours: 101.5; WBC: 10.2


   -Off pressors, hemodynamically stable off pressors


   -LFTs trending down; Alk phos, AST elevated


      -Cholangiogram via cholecystostomy once more stable recommended by GI


   -Abx : ertapenem, vanco; ID on board


   -Spcx: mold, MRSA - voriconazole started by ID to cover for aspergillus, 

vanco restarted.


      -CT chest shows L apical lung cyst/cavity


   -BCx -1/2 bottles: E. Coli


   -Repeat cultures ordered by ID


   -Body fluid cx (from drain): Enterobacter asburiae, enterococcus faecium


   -Surgery on board





-Acute hypoxic respiratory failure


   -off sedation since AM


   -Intubated, on vent


   -wean trials, vent management as per ICU team





-Anemia secondary to Upper GI bleed


   -resolving upper gi bleed, receiving feeds via OGT


   -OG tube in place


   -H/H stable, monitor


   -EGD when more stable


   -GI on board


   -c/w protonix





-Diastolic CHF:


   -CXR: 2/15/17: R moderate pleural effusion. L small pleural effusion.


   -ECHO: 2/14/17 - EF: 63%, LV nl size, LVSF nl, no regional wall abnl noted, 

LV impaired relaxation, mild MR, mod TR, mild AS, no pericardial effusion.


   -not on fluids currently.


   -CXR, pleural effusions still present, no change from yesterday's cxr


   -not on fluids





-Transaminitis


   -Secondary to septic shock


   -INR elevated as well, trending down


   -Monitor LFTs, LFTs currently trending down


   -Alk phos trending up, secondary to drain vs acute amy





-SANGITA secondary to septic shock


   -improving


   -BUN/Cr 29/0.8, pre-renal etiology most likely as Bun:Cr ratio >20


   -Urine output 950 ml yesterday, 300 ml today


   -monitor urine output, BUN/Cr





-DIC secondary to septic shock


   -s/p cyroprecipitate


   -fibrinogen slightly improved today


   -platelets trending up





-Hyperglycemia


   -Monitor sugars


   -Currently random glucose 172, on ISS





-Dry eyes


   -Artifical tears tid PRN for dry eyes





-HTN


   -hold anti-hypertensives in light of septic shock





-[place]


   -Intubated, on vent





-Hx of TIA


   -asa held





-HLD


   -held lipitor 10 mg PO qhs





-insomnia


   -held melatonin 5 mg po qhs prn





-BPH


   -held flomax 0.8 mg po qd


   -held finasteride 5 mg po qd





-DVT ppx


   -SCDs





-FEN


   -no fluids for now.


   -Dietary recommendations: -Vital 1.2 @ 20cc/hr, increase 10cc as tolerated 

to goal 62cc/hr.


      -Target volume 1500ml.





-Dispo:


   -Monitor in ICU.








Problem List





- Problems


(1) COPD (chronic obstructive pulmonary disease)


Code(s): J44.9 - CHRONIC OBSTRUCTIVE PULMONARY DISEASE, UNSPECIFIED   Qualifiers

: 


     COPD type: unspecified COPD        Qualified Code(s): J44.9 - Chronic 

obstructive pulmonary disease, unspecified  





(2) Cholecystitis, acute


Code(s): K81.0 - ACUTE CHOLECYSTITIS





(3) Fever


Code(s): R50.9 - FEVER, UNSPECIFIED   Qualifiers: 


     Fever type: other     Qualified Code(s): R50.81 - Fever presenting with 

conditions classified elsewhere  





(4) Leukocytosis


Code(s): D72.829 - ELEVATED WHITE BLOOD CELL COUNT, UNSPECIFIED   Qualifiers: 


     Leukocytosis type: unspecified     Qualified Code(s): D72.829 - Elevated 

white blood cell count, unspecified  





(5) Sleep apnea


Code(s): G47.30 - SLEEP APNEA, UNSPECIFIED   





(6) Acute urinary retention


Code(s): R33.8 - OTHER RETENTION OF URINE





(7) Insomnia


Code(s): G47.00 - INSOMNIA, UNSPECIFIED   





(8) HTN (hypertension)


Code(s): I10 - ESSENTIAL (PRIMARY) HYPERTENSION   Qualifiers: 


     Hypertension type: essential hypertension        Qualified Code(s): I10 - 

Essential (primary) hypertension  





(9) HLD (hyperlipidemia)


Code(s): E78.5 - HYPERLIPIDEMIA, UNSPECIFIED   Qualifiers: 


     Hyperlipidemia type: pure hypercholesterolemia        Qualified Code(s): 

E78.0 - Pure hypercholesterolemia  





(10) BPH (benign prostatic hyperplasia)


Code(s): N40.0 - BENIGN PROSTATIC HYPERPLASIA WITHOUT LOWER URINRY TRACT SYMP   





(11) Septic shock


Code(s): A41.9 - SEPSIS, UNSPECIFIED ORGANISM


R65.21 - SEVERE SEPSIS WITH SEPTIC SHOCK





(12) Acute respiratory failure with hypoxia


Code(s): J96.01 - ACUTE RESPIRATORY FAILURE WITH HYPOXIA





(13) SANGITA (acute kidney injury)


Code(s): N17.9 - ACUTE KIDNEY FAILURE, UNSPECIFIED





(14) Transaminitis


Code(s): R74.0 - NONSPEC ELEV OF LEVELS OF TRANSAMNS & LACTIC ACID DEHYDRGNSE





(15) Upper GI bleed


Code(s): K92.2 - GASTROINTESTINAL HEMORRHAGE, UNSPECIFIED





(16) Elevated troponin


Code(s): R79.89 - OTHER SPECIFIED ABNORMAL FINDINGS OF BLOOD CHEMISTRY





(17) Demand ischemia


Code(s): I24.8 - OTHER FORMS OF ACUTE ISCHEMIC HEART DISEASE





(18) DIC (disseminated intravascular coagulation)


Code(s): D65 - DISSEMINATED INTRAVASCULAR COAGULATION





(19) Hypophosphatemia


Code(s): E83.39 - OTHER DISORDERS OF PHOSPHORUS METABOLISM





(20) Hypokalemia


Code(s): E87.6 - HYPOKALEMIA





(21) Anemia due to GI blood loss


Code(s): D50.0 - IRON DEFICIENCY ANEMIA SECONDARY TO BLOOD LOSS (CHRONIC)





(22) Diastolic CHF


Code(s): I50.30 - UNSPECIFIED DIASTOLIC (CONGESTIVE) HEART FAILURE   Qualifiers

: 


     Congestive heart failure chronicity: chronic        Qualified Code(s): 

I50.32 - Chronic diastolic (congestive) heart failure  





(23) Dry eyes


Code(s): H04.123 - DRY EYE SYNDROME OF BILATERAL LACRIMAL GLANDS








Visit type





- Emergency Visit


Emergency Visit: Yes


ED Registration Date: 02/13/17


Care time: The patient presented to the Emergency Department on the above date 

and was hospitalized for further evaluation of their emergent condition.





- New Patient


This patient is new to me today: No





- Critical Care


Critical Care patient: Yes


Total Critical Care Time (in minutes): 35


Critical Care Statement: The care of this patient involved high complexity 

decision making to prevent further life threatening deterioration of the patient

's condition and/or to evalute & treat vital organ system(s) failure or risk of 

failure.

## 2017-02-21 NOTE — PN
Progress Note (short form)





- Note


Progress Note: 


sedation off


more awake





intermittent fevers





 Vital Signs











 Period  Temp  Pulse  Resp  BP Sys/Yun  Pulse Ox


 


 Last 24 Hr  98.0 F-100.1 F    16-33  116-169/49-84  96-96








intubated


cor-rrr


lungs bilateral rhonchi


abd soft, NT +RUQ biliary drain


ext no edema


central line site no erythema


muñiz


 CBC, BMP





 02/21/17 05:00 





 02/21/17 05:00 








 Laboratory Tests











  02/20/17





  05:00


 


Random Vancomycin  14.013








 Microbiology





02/13/17 22:46   Sputum - Endotrachea Suction/Ventilator   Fungus Mold 

Identification - Preliminary


02/13/17 22:41   Blood - Central Line   Blood Culture - Final


                            NO GROWTH AFTER 5 DAYS INCUBATION


02/12/17 21:40   Blood - Peripheral Venous   Blood Culture - Final


                            NO GROWTH AFTER 5 DAYS INCUBATION


02/12/17 21:40   Blood - Peripheral Venous   Blood Culture - Final


                            NO GROWTH AFTER 5 DAYS INCUBATION


02/13/17 17:30   Body Fluid - Other   Gram Stain - Final


02/13/17 17:30   Body Fluid - Other   Body Fluid Culture - Final


                            Enterobacter Asburiae


                            Enterococcus Faecium


02/13/17 17:30   Body Fluid - Other   Anaerobic Culture - Final


                            NO ANAEROBES WERE ISOLATED


02/13/17 22:46   Sputum - Endotrachea Suction/Ventilator   Gram Stain - Final


02/13/17 22:46   Sputum - Endotrachea Suction/Ventilator   Sputum Culture - 

Final


                            Fungal Isolate: Mold


                            Mr S Aureus


02/13/17 22:41   Blood - Central Line   Blood Culture - Final


                            Escherichia Coli


02/12/17 21:40   Urine - Urine Clean Catch   Urine Culture - Final


                            NO GROWTH OBTAINED





cxray unchanged





a/p


resp failure


sepsis-ecoli bacterema, enterobacter biliary fluid- biliary sepsis


mrsa/mold sputum 


cavitary lung lesion


dic





s/p transfusion





continue vancomycin/voriconazole/ertapenem


reculture


fungal serologies pending

## 2017-02-21 NOTE — CONSULT
Consult


Consult Specialty:: Neurology


Reason for Consultation:: Stroke





- History of Present Illness


History of Present Illness: 


85 year old man with history of hypertension, hyperlipidemia, prostate cancer, 

kidney cancer status post nephrectomy, COPD, presented to ED with abdominal 

pain secondary to acute cholecystitis, status post cholecystectomy Feb 13, 2017. Patient subsequently developed acute respiratory failure requiring 

intubation and septic shock requiring pressors. On Feb 18th patient underwent 

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 

chronic. Neurology consulted for CT head findings.





Patient is currently intubated, on propofol. He is able to follow commands and 

exam limited but appears to be non focal.  





- Past Medical History


CNS: Yes: TIA


Cardio/Vascular: Yes: HTN


Pulmonary: Yes: COPD


Gastrointestinal: Yes: Diverticulitis


Renal/: Yes: Cancer (Prostate/KIDNEY)


Psych: Yes: Anxiety


Rheumatology: Yes: Other (arthritis knees)





- Past Surgical History


Past Surgical History: Yes: Appendectomy, Colonoscopy (last 1 yrs ago, [place] 

2 polyps. h/o ischemic colitis 3,2 and ?years ago), Hernia Repair (b/l), 

Nephrectomy (left)





- Alcohol/Substance Use


Hx Alcohol Use: No





- Smoking History


Smoking history: Former smoker


Have you smoked in the past 12 months: No


Aproximately how many cigarettes per day: 0


If you are a former smoker, when did you quit?: 35 years ago





- Social History


ADL: Independent


Occupation: retired salesman


History of Recent Travel: No





Home Medications





- Allergies


Allergies/Adverse Reactions: 


 Allergies











Allergy/AdvReac Type Severity Reaction Status Date / Time


 


No Known Drug Allergies Allergy   Verified 02/12/17 21:20














- Home Medications


Home Medications: 


Ambulatory Orders





Amlodipine Besylate [Norvasc -] 10 mg PO DAILY 11/06/14 


Ca Cmb No.1/Vit D3/B-6/FA/B12 [Vitamin D3 1,000 Unit Tablet] 1 tab PO DAILY 11/ 06/14 


Carvedilol 12.5 mg PO BID 11/06/14 


Vitamin E 400 unit PO DAILY 11/06/14 


Losartan Potassium [Cozaar] 50 mg PO DAILY 11/07/14 


Atorvastatin Ca [Lipitor] 10 mg PO HS #30 tablet 01/21/15 


Aspirin [ASA -] 325 mg PO DAILY@0800  tablet 12/09/15 


Tamsulosin HCl [Flomax -] 0.8 mg PO DAILY@0830  cap.er.24h 12/23/15 


Cinnamon Bark [Cinnamon] 500 mg PO DAILY 02/12/17 


Finasteride 0 mg PO DAILY 02/12/17 


Ranitidine [Zantac -] 150 mg PO DAILY 02/13/17 











Review of Systems


Unable to obtain ROS, reason: intubated 





Physical Exam


Vital Signs: 


 Vital Signs











Temperature  101.5 F H  02/21/17 10:00


 


Pulse Rate  110 H  02/21/17 13:05


 


Respiratory Rate  16   02/21/17 13:05


 


Blood Pressure  124/46   02/21/17 13:05


 


O2 Sat by Pulse Oximetry (%)  96   02/21/17 07:35











Constitutional: Yes: No Distress


Eyes: Yes: Conjunctiva Clear, EOM Intact


HENT: Yes: Atraumatic, Normocephalic


Cardiovascular: Yes: S1, S2 (Intubated, on sedation, can open eyes to verbal 

stimuli able to show two fingers on command CNII-XII corneal reflex intact, 

pupils pinpoint Motor/sensory withdraws to noxious stim in all extremities)


Labs: 


 CBC, BMP





 02/21/17 05:00 





 02/21/17 05:00 











Assessment/Plan


85 year old man with history of hypertension, hyperlipidemia, prostate cancer, 

kidney cancer status post nephrectomy, COPD, presented to ED with abdominal 

pain secondary to acute cholecystitis, status post cholecystectomy Feb 13, 2017. Patient subsequently developed acute respiratory failure requiring 

intubation and septic shock requiring pressors. On Feb 18th patient underwent 

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 

chronic. Neurology consulted for CT head findings.





Patient is currently intubated, on propofol. He is able to follow commands and 

exam limited but appears to be non focal. 





Ischemic infart


Left occipital and left parietal infarcts noted on head CT- personally reviewed 

prior MRI from Jan 2016 which shows stroke in similar location


Recommend repeating head CT to see if stroke has evolved


MRI brain when stable


Start aspirin 325 mg daily when able


Start atorvastatin 20 mg daily


Check carotid doppler


Echo recently completed Feb 2017- EF within normal limits


LDL, hga1c





Metabolic encephalopathy


In setting of sepsis


Patient following commands, continue supportive care

## 2017-02-21 NOTE — PN
Teaching Attending Note


Name of Resident: Zaire Phillips


ATTENDING PHYSICIAN STATEMENT





I saw and evaluated the patient.


I reviewed the resident's note and discussed the case with the resident.


I agree with the resident's findings and plan as documented.








SUBJECTIVE:


Patient seen and examined in the ICU.  Remains intubated on AC Mode of vent, 35

% FiO2.  Fever.  


No pressors. 





CXR: Possible slight increase in vascular congestion 





 Intake & Output











 02/18/17 02/19/17 02/20/17 02/21/17





 23:59 23:59 23:59 23:59


 


Intake Total 2340 539 1224 1836


 


Output Total 835 1165 1140 720


 


Balance 1505 -998 88 9488


 


Weight 180 lb 180 lb 8 oz 185 lb 189 lb 4.8 oz








 Last Vital Signs











Temp Pulse Resp BP Pulse Ox


 


 101.5 F H  109 H  33 H  131/49   96 


 


 02/21/17 10:00  02/21/17 11:03  02/21/17 12:12  02/21/17 11:03  02/21/17 07:35








Active Medications





Acetaminophen (Tylenol -)  650 mg PO Q4H PRN


   PRN Reason: FEVER OR PAIN


   Last Admin: 02/16/17 13:46 Dose:  650 mg


Acetaminophen (Ofirmev Injection -)  1,000 mg IVPB Q6H PRN


   PRN Reason: FEVER


   Last Admin: 02/21/17 08:41 Dose:  1,000 mg


Artificial Tears (Artificial Tears)  1 drop OU TID PRN


   PRN Reason: DRY EYES


Aspirin (Asa -)  325 mg NGT DAILY [facility]


Bisacodyl (Dulcolax Suppository -)  10 mg RC PRN PRN


   PRN Reason: CONSTIPATION


Carvedilol (Coreg -)  3.125 mg NGT BID [facility]


   Last Admin: 02/21/17 09:32 Dose:  3.125 mg


Chlorhexidine Gluconate (Peridex -)  15 ml MM BID [facility]


   Last Admin: 02/21/17 09:32 Dose:  15 ml


Heparin Sodium (Porcine) (Heparin -)  5,000 unit SQ TID [facility]


Voriconazole 320 mg/ Dextrose  282 mls @ 141 mls/hr IVPB BID [facility]


   Last Admin: 02/21/17 12:18 Dose:  141 mls/hr


Ertapenem (Invanz (Pre-Docked))  50 mls @ 50 mls/hr IVPB DAILY [facility]


   PRN Reason: Protocol


   Last Admin: 02/21/17 09:31 Dose:  50 mls/hr


Propofol (Diprivan -)  100 mls @ 2.457 mls/hr IVPB TITR [facility]; 5 MCG/KG/MIN


   PRN Reason: Protocol


   Last Titration: 02/21/17 02:30 Dose:  20 mcg/kg/min


Fentanyl 500 mcg/ Dextrose  100 mls @ 0.4 mls/hr IJ TITR [facility]


   PRN Reason: 2 MCG/HR


   Stop: 02/21/17 15:59


   Last Titration: 02/21/17 02:30 Dose:  50 mcg/hr


Famotidine/Sodium Chloride (Pepcid 20 Mg Premixed Ivpb -)  50 mls @ 100 mls/hr 

IVPB BID [facility]


   Last Admin: 02/21/17 09:31 Dose:  100 mls/hr


Insulin Aspart (Novolog Vial Sliding Scale -)  1 vial SQ ACHS [facility]


   PRN Reason: Protocol


   Last Admin: 02/21/17 12:34 Dose:  2 units


Polyethylene Glycol (Miralax (For Daily Use) -)  17 gm NGT BID [facility]


   Last Admin: 02/21/17 09:32 Dose:  17 grams


Vancomycin HCl (Vancomycin (Pre-Docked))  1,000 mg IVPB BID [facility]


   PRN Reason: Protocol


   Last Admin: 02/21/17 09:32 Dose:  1,000 mg














Gen:  intubated, sedated  


Heart:  RRR


Lung: Basilar rales / rhonchi 


Abd: soft, nontender, +cholecystostomy with dark bilious fluid


Ext: + edema





 


 


 Laboratory Results - last 24 hr











  02/20/17 02/20/17 02/20/17





  12:35 12:50 18:06


 


WBC   


 


RBC   


 


Hgb   


 


Hct   


 


MCV   


 


MCHC   


 


RDW   


 


Plt Count   


 


MPV   


 


INR   


 


PTT (Actin FS)   


 


Fibrinogen   


 


Puncture Site   


 


ABG pH   


 


ABG pCO2 at Pt Temp   


 


ABG pO2 at Pt Temp   


 


ABG HCO3   


 


ABG O2 Sat (Measured)   


 


ABG O2 Content   


 


ABG Base Excess   


 


Sb Test   


 


O2 Delivery Device   


 


Oxygen Flow Rate   


 


Vent Mode   


 


Vent Rate   


 


Mechanical Rate   


 


PEEP   


 


Pressure Support Vent   


 


Sodium   


 


Potassium   


 


Chloride   


 


Carbon Dioxide   


 


Anion Gap   


 


BUN   


 


Creatinine   


 


Creat Clearance w eGFR   


 


POC Glucometer   [phone]  [phone]


 


Random Glucose   


 


Calcium   


 


Phosphorus   


 


Magnesium   


 


Total Bilirubin   


 


AST   


 


ALT   


 


Alkaline Phosphatase   


 


Total Protein   


 


Albumin   


 


Urine Color   


 


Urine Appearance   


 


Urine pH   


 


Ur Specific Gravity   


 


Urine Protein   


 


Urine Glucose (UA)   


 


Urine Ketones   


 


Urine Blood   


 


Urine Nitrite   


 


Urine Bilirubin   


 


Urine Urobilinogen   


 


Ur Leukocyte Esterase   


 


Blood Type  A POSITIVE  


 


Antibody Screen  Negative  


 


Crossmatch  See Detail  














  02/20/17 02/21/17 02/21/17





  21:28 05:00 05:00


 


WBC   10.2 H D 


 


RBC   3.56 L D 


 


Hgb   10.8 L D 


 


Hct   30.2 L D 


 


MCV   84.8 


 


MCHC   35.8 


 


RDW   15.5 


 


Plt Count   212  D 


 


MPV   8.7 


 


INR   


 


PTT (Actin FS)   


 


Fibrinogen   


 


Puncture Site   


 


ABG pH   


 


ABG pCO2 at Pt Temp   


 


ABG pO2 at Pt Temp   


 


ABG HCO3   


 


ABG O2 Sat (Measured)   


 


ABG O2 Content   


 


ABG Base Excess   


 


Sb Test   


 


O2 Delivery Device   


 


Oxygen Flow Rate   


 


Vent Mode   


 


Vent Rate   


 


Mechanical Rate   


 


PEEP   


 


Pressure Support Vent   


 


Sodium    141


 


Potassium    3.9


 


Chloride    105


 


Carbon Dioxide    27


 


Anion Gap    9


 


BUN    29 H


 


Creatinine    0.8


 


Creat Clearance w eGFR    > 60


 


POC Glucometer  [phone]  


 


Random Glucose    172 H


 


Calcium    7.4 L


 


Phosphorus    2.5


 


Magnesium    2.0


 


Total Bilirubin    2.1 H D


 


AST    76 H


 


ALT    59


 


Alkaline Phosphatase    415 H


 


Total Protein    4.4 L


 


Albumin    1.8 L


 


Urine Color   


 


Urine Appearance   


 


Urine pH   


 


Ur Specific Gravity   


 


Urine Protein   


 


Urine Glucose (UA)   


 


Urine Ketones   


 


Urine Blood   


 


Urine Nitrite   


 


Urine Bilirubin   


 


Urine Urobilinogen   


 


Ur Leukocyte Esterase   


 


Blood Type   


 


Antibody Screen   


 


Crossmatch   














  02/21/17 02/21/17 02/21/17





  05:00 05:14 07:20


 


WBC   


 


RBC   


 


Hgb   


 


Hct   


 


MCV   


 


MCHC   


 


RDW   


 


Plt Count   


 


MPV   


 


INR  1.36 H  


 


PTT (Actin FS)  33.9  D  


 


Fibrinogen  116.0 L  


 


Puncture Site    Right radial


 


ABG pH    7.45


 


ABG pCO2 at Pt Temp    39.5


 


ABG pO2 at Pt Temp    64.8 L


 


ABG HCO3    26.9 H


 


ABG O2 Sat (Measured)    93.6


 


ABG O2 Content    13.8 L


 


ABG Base Excess    3.1 H


 


Sb Test    Positive


 


O2 Delivery Device    Vent


 


Oxygen Flow Rate    45


 


Vent Mode    A/c


 


Vent Rate    16


 


Mechanical Rate    Yes


 


PEEP    5.0


 


Pressure Support Vent    450


 


Sodium   


 


Potassium   


 


Chloride   


 


Carbon Dioxide   


 


Anion Gap   


 


BUN   


 


Creatinine   


 


Creat Clearance w eGFR   


 


POC Glucometer   [phone] 


 


Random Glucose   


 


Calcium   


 


Phosphorus   


 


Magnesium   


 


Total Bilirubin   


 


AST   


 


ALT   


 


Alkaline Phosphatase   


 


Total Protein   


 


Albumin   


 


Urine Color   


 


Urine Appearance   


 


Urine pH   


 


Ur Specific Gravity   


 


Urine Protein   


 


Urine Glucose (UA)   


 


Urine Ketones   


 


Urine Blood   


 


Urine Nitrite   


 


Urine Bilirubin   


 


Urine Urobilinogen   


 


Ur Leukocyte Esterase   


 


Blood Type   


 


Antibody Screen   


 


Crossmatch   














  02/21/17





  12:15


 


WBC 


 


RBC 


 


Hgb 


 


Hct 


 


MCV 


 


MCHC 


 


RDW 


 


Plt Count 


 


MPV 


 


INR 


 


PTT (Actin FS) 


 


Fibrinogen 


 


Puncture Site 


 


ABG pH 


 


ABG pCO2 at Pt Temp 


 


ABG pO2 at Pt Temp 


 


ABG HCO3 


 


ABG O2 Sat (Measured) 


 


ABG O2 Content 


 


ABG Base Excess 


 


Sb Test 


 


O2 Delivery Device 


 


Oxygen Flow Rate 


 


Vent Mode 


 


Vent Rate 


 


Mechanical Rate 


 


PEEP 


 


Pressure Support Vent 


 


Sodium 


 


Potassium 


 


Chloride 


 


Carbon Dioxide 


 


Anion Gap 


 


BUN 


 


Creatinine 


 


Creat Clearance w eGFR 


 


POC Glucometer 


 


Random Glucose 


 


Calcium 


 


Phosphorus 


 


Magnesium 


 


Total Bilirubin 


 


AST 


 


ALT 


 


Alkaline Phosphatase 


 


Total Protein 


 


Albumin 


 


Urine Color  Yellow


 


Urine Appearance  Slcloudy


 


Urine pH  5.0


 


Ur Specific Gravity  1.009


 


Urine Protein  Negative


 


Urine Glucose (UA)  Negative


 


Urine Ketones  Negative


 


Urine Blood  Negative


 


Urine Nitrite  Negative


 


Urine Bilirubin  Negative


 


Urine Urobilinogen  Negative


 


Ur Leukocyte Esterase  Negative


 


Blood Type 


 


Antibody Screen 


 


Crossmatch 














Problem List





- Problems


(1) Cholecystitis, acute


Code(s): K81.0 - ACUTE CHOLECYSTITIS





(2) Fever


Code(s): R50.9 - FEVER, UNSPECIFIED   Qualifiers: 


        Qualified Code(s): R50.81 - Fever presenting with conditions classified 

elsewhere  





(3) Leukocytosis


Code(s): D72.829 - ELEVATED WHITE BLOOD CELL COUNT, UNSPECIFIED   Qualifiers: 


        Qualified Code(s): D72.829 - Elevated white blood cell count, 

unspecified  





(4) Kidney malignancy


Code(s): C64.9 - MALIGNANT NEOPLASM OF UNSP KIDNEY, EXCEPT RENAL PELVIS   

Qualifiers: 


        Qualified Code(s): C64.2 - Malignant neoplasm of left kidney, except 

renal pelvis  





(5) Prostate CA


Code(s): C61 - MALIGNANT NEOPLASM OF PROSTATE





(6) COPD (chronic obstructive pulmonary disease)


Code(s): J44.9 - CHRONIC OBSTRUCTIVE PULMONARY DISEASE, UNSPECIFIED   Qualifiers

: 


        Qualified Code(s): J44.9 - Chronic obstructive pulmonary disease, 

unspecified  





(7) Sleep apnea


Code(s): G47.30 - SLEEP APNEA, UNSPECIFIED   








ASSESSMENT AND PLAN:


Acute Cholecystitis


s/p Cholecystostomy Placement


Profound Septic Shock improving


Acute Hypoxic Respiratory Failure


Acute Kidney Injury


Lactic Acidosis resolved


Demand Ischemia


RCC s/p Left Nephrectomy


DIC


COPD


HTN


Hyperlipidemia


(?) Fungal lung abscess 








-  CT evaluation 


-  antibiotics/antifungal per ID


-  monitor cholecystostomy drainage


-  Lasix IV today 


-  replete lytes


-  SBTs as tolerated -> Hope to extubate by tomorrow 


-  FiO2 to keep SpO2 >90%


-  Sedation vacation  


-  DVT/GI prophylaxis


-  Enteral feeds 








Dr Mad[place] 


CCTime 35" 








Problem List





- Problems


(1) Cholecystitis, acute


Code(s): K81.0 - ACUTE CHOLECYSTITIS





(2) Fever


Code(s): R50.9 - FEVER, UNSPECIFIED   Qualifiers: 


        Qualified Code(s): R50.81 - Fever presenting with conditions classified 

elsewhere  





(3) Leukocytosis


Code(s): D72.829 - ELEVATED WHITE BLOOD CELL COUNT, UNSPECIFIED   Qualifiers: 


        Qualified Code(s): D72.829 - Elevated white blood cell count, 

unspecified  





(4) Kidney malignancy


Code(s): C64.9 - MALIGNANT NEOPLASM OF UNSP KIDNEY, EXCEPT RENAL PELVIS   

Qualifiers: 


        Qualified Code(s): C64.2 - Malignant neoplasm of left kidney, except 

renal pelvis  





(5) Prostate CA


Code(s): C61 - MALIGNANT NEOPLASM OF PROSTATE





(6) COPD (chronic obstructive pulmonary disease)


Code(s): J44.9 - CHRONIC OBSTRUCTIVE PULMONARY DISEASE, UNSPECIFIED   Qualifiers

: 


        Qualified Code(s): J44.9 - Chronic obstructive pulmonary disease, 

unspecified  





(7) Sleep apnea


Code(s): G47.30 - SLEEP APNEA, UNSPECIFIED

## 2017-02-21 NOTE — MSN
Progress Note (SOAP)





- Subjective


Chief Complaint: 


RUQ Pain


History of Present Illness: 


Patient was interviewed at bedside during sedation vacation.  Patient was able 

to respond to simple commands and prompts.  He was not able to lift his head 

off the pillow but he could move both arms and legs.  Patient was in no 

apparent distress.





- Current Medications


Current Medications: 


Active Medications





Acetaminophen (Tylenol -)  650 mg PO Q4H PRN


   PRN Reason: FEVER OR PAIN


   Last Admin: 02/16/17 13:46 Dose:  650 mg


Acetaminophen (Ofirmev Injection -)  1,000 mg IVPB Q6H PRN


   PRN Reason: FEVER


   Last Admin: 02/21/17 08:41 Dose:  1,000 mg


Artificial Tears (Artificial Tears)  1 drop OU TID PRN


   PRN Reason: DRY EYES


Bisacodyl (Dulcolax Suppository -)  10 mg RC PRN PRN


   PRN Reason: CONSTIPATION


Carvedilol (Coreg -)  3.125 mg NGT BID [facility]


   Last Admin: 02/21/17 09:32 Dose:  3.125 mg


Chlorhexidine Gluconate (Peridex -)  15 ml MM BID [facility]


   Last Admin: 02/21/17 09:32 Dose:  15 ml


Voriconazole 320 mg/ Dextrose  282 mls @ 141 mls/hr IVPB BID [facility]


   Last Admin: 02/20/17 21:50 Dose:  141 mls/hr


Ertapenem (Invanz (Pre-Docked))  50 mls @ 50 mls/hr IVPB DAILY [facility]


   PRN Reason: Protocol


   Last Admin: 02/21/17 09:31 Dose:  50 mls/hr


Propofol (Diprivan -)  100 mls @ 2.457 mls/hr IVPB TITR [facility]; 5 MCG/KG/MIN


   PRN Reason: Protocol


   Last Titration: 02/21/17 02:30 Dose:  20 mcg/kg/min


Fentanyl 500 mcg/ Dextrose  100 mls @ 0.4 mls/hr IJ TITR [facility]


   PRN Reason: 2 MCG/HR


   Stop: 02/21/17 15:59


   Last Titration: 02/21/17 02:30 Dose:  50 mcg/hr


Famotidine/Sodium Chloride (Pepcid 20 Mg Premixed Ivpb -)  50 mls @ 100 mls/hr 

IVPB BID [facility]


   Last Admin: 02/21/17 09:31 Dose:  100 mls/hr


Insulin Aspart (Novolog Vial Sliding Scale -)  1 vial SQ ACHS [facility]


   PRN Reason: Protocol


   Last Admin: 02/21/17 06:19 Dose:  Not Given


Polyethylene Glycol (Miralax (For Daily Use) -)  17 gm NGT BID [facility]


   Last Admin: 02/21/17 09:32 Dose:  17 grams


Vancomycin HCl (Vancomycin (Pre-Docked))  1,000 mg IVPB BID [facility]


   PRN Reason: Protocol


   Last Admin: 02/21/17 09:32 Dose:  1,000 mg











- Objective


Vital Signs: 


 Vital Signs











Temperature  101.5 F H  02/21/17 10:00


 


Pulse Rate  109 H  02/21/17 11:03


 


Respiratory Rate  16   02/21/17 11:03


 


Blood Pressure  131/49   02/21/17 11:03


 


O2 Sat by Pulse Oximetry (%)  96   02/21/17 07:35











Constitutional: Yes: Well Nourished, No Distress, Calm


Eyes: Yes: WNL, Conjunctiva Clear, EOM Intact


HENT: Yes: WNL, Atraumatic, Normocephalic


Neck: Yes: WNL, Supple, Trachea Midline


Cardiovascular: Yes: Regular Rate and Rhythm, S1, S2


Respiratory: Yes: WNL, Regular, CTA Bilaterally, Intubated


Gastrointestinal: Yes: WNL, Normal Bowel Sounds, Soft.  No: Melena, Tenderness, 

Tenderness, Epigastrium, Tenderness, Rebound


Genitourinary: Yes: WNL


Extremities: Yes: Cold


Peripheral Pulses WNL: Yes


Edema: Yes


Integumentary: Yes: WNL


Wound/Incision: Yes: Clean/Dry, Well Approximated


Neurological: Yes: Cran Nerves II-XII Intact


Psychiatric: No: Agitated





Labs


Lab Results: 


 CBC, BMP





 02/21/17 05:00 





 02/21/17 05:00 











Assessment/Plan


84 YO M w/ hx of COPD, [place], HTN, HLD, TIA, prostate CA, renal CA now s/p left 

nephrectomy. He came to the ED with Right Upper Quadrant pain and was admitted 

to the ICU for acute respiratory failure and sepsis secondary to acute 

cholecystitis s/p percutaenous drain. Patient has been intubated and on an off 

sedation.  No pressors but patient has had remitting fevers





Respiratory: Acute hypoxic respiratory failure


- intubation day 7


-daily ABGs essentially normal


-Maintain SPO2 > 90% 


-SBT as tolerated





Profound septic shock 


-gallbladder vs. lung


-WBC normalized, improving infiltrate, fevers


-positive sputum, blood and biliary fluid cultures (see micro lab)


-cavity/cyst on CT chest


-cont. vanco, ertapanem, voriconazole





Acute cholecystitis s/p percutaneous drain


-Monitor LFTs


-ERCP when stable


-cont. PPI drip





DIC with sepsis


-low plt and fibrinogen- transfuse 2 units of cryo


-low H&H





Sedation


-propofol gtt and fentanyl gtt due to pain and agitation


-Sedation Vacation





HTN


-Cont. Meds





DM


-on sliding scale





Prophylaxis 


-DVT: SCD


-GI: protonix

## 2017-02-22 LAB
ALBUMIN SERPL-MCNC: 1.9 G/DL (ref 3.4–5)
ALP SERPL-CCNC: 395 U/L (ref 45–117)
ALT SERPL-CCNC: 53 U/L (ref 12–78)
ANION GAP SERPL CALC-SCNC: 9 MMOL/L (ref 8–16)
ART PUNCT SITE: (no result)
ARTERIAL PATENCY WRIST A: POSITIVE
AST SERPL-CCNC: 53 U/L (ref 15–37)
BASE EXCESS BLDA CALC-SCNC: 5.1 MEQ/L (ref -2–2)
BILIRUB SERPL-MCNC: 0.8 MG/DL (ref 0.2–1)
CALCIUM SERPL-MCNC: 7.4 MG/DL (ref 8.5–10.1)
CHOLEST SERPL-MCNC: 107 MG/DL (ref 50–200)
CO2 SERPL-SCNC: 29 MMOL/L (ref 21–32)
CREAT SERPL-MCNC: 1 MG/DL (ref 0.7–1.3)
DEPRECATED RDW RBC AUTO: 15.5 % (ref 11.9–15.9)
FIBRINOGEN PPP-MCNC: 102 MG/DL (ref 238–498)
GLUCOSE SERPL-MCNC: 147 MG/DL (ref 74–106)
HCO3 BLDA-SCNC: 28.9 MEQ/L (ref 22–26)
INR BLD: 1.29 (ref 0.82–1.09)
LDLC SERPL CALC-MCNC: 54 MG/DL (ref 5–100)
MAGNESIUM SERPL-MCNC: 2.2 MG/DL (ref 1.8–2.4)
MCH RBC QN AUTO: 29.9 PG (ref 25.7–33.7)
MCHC RBC AUTO-ENTMCNC: 35.3 G/DL (ref 32–35.9)
MCV RBC: 84.6 FL (ref 80–96)
MECH. VENT.: YES
PEEP SETTING VENT: 5 CMH2O
PHOSPHATE SERPL-MCNC: 3 MG/DL (ref 2.5–4.9)
PLATELET # BLD AUTO: 351 K/MM3 (ref 134–434)
PMV BLD: 8.5 FL (ref 7.5–11.1)
PO2 BLDA: 60.4 MMHG (ref 68–100)
PROT SERPL-MCNC: 4.9 G/DL (ref 6.4–8.2)
PT PNL PPP: 14.3 SEC (ref 9.98–11.88)
PT. ON O2?: YES
SAO2 % BLDA: 91.9 % (ref 90–98.9)
TYPE OF O2: (no result)
VENT RATE: 16
VT/PRESS: 450
WBC # BLD AUTO: 11 K/MM3 (ref 4–10)

## 2017-02-22 RX ADMIN — CARVEDILOL SCH: 6.25 TABLET, FILM COATED ORAL at 21:44

## 2017-02-22 RX ADMIN — INSULIN ASPART SCH: 100 INJECTION, SOLUTION INTRAVENOUS; SUBCUTANEOUS at 06:27

## 2017-02-22 RX ADMIN — ATORVASTATIN CALCIUM SCH: 20 TABLET, FILM COATED ORAL at 21:44

## 2017-02-22 RX ADMIN — VORICONAZOLE SCH MLS/HR: 10 INJECTION, POWDER, LYOPHILIZED, FOR SOLUTION INTRAVENOUS at 21:51

## 2017-02-22 RX ADMIN — CARVEDILOL SCH MG: 3.12 TABLET, FILM COATED ORAL at 09:32

## 2017-02-22 RX ADMIN — ERTAPENEM SODIUM SCH MLS/HR: 1 INJECTION, POWDER, LYOPHILIZED, FOR SOLUTION INTRAMUSCULAR; INTRAVENOUS at 09:09

## 2017-02-22 RX ADMIN — INSULIN ASPART SCH UNITS: 100 INJECTION, SOLUTION INTRAVENOUS; SUBCUTANEOUS at 12:54

## 2017-02-22 RX ADMIN — HEPARIN SODIUM SCH UNIT: 5000 INJECTION, SOLUTION INTRAVENOUS; SUBCUTANEOUS at 21:52

## 2017-02-22 RX ADMIN — VANCOMYCIN HYDROCHLORIDE SCH MG: 1 INJECTION, POWDER, LYOPHILIZED, FOR SOLUTION INTRAVENOUS at 21:51

## 2017-02-22 RX ADMIN — FAMOTIDINE SCH MLS/HR: 20 INJECTION, SOLUTION INTRAVENOUS at 09:09

## 2017-02-22 RX ADMIN — VANCOMYCIN HYDROCHLORIDE SCH MG: 1 INJECTION, POWDER, LYOPHILIZED, FOR SOLUTION INTRAVENOUS at 09:53

## 2017-02-22 RX ADMIN — FAMOTIDINE SCH MLS/HR: 20 INJECTION, SOLUTION INTRAVENOUS at 21:51

## 2017-02-22 RX ADMIN — ACETAMINOPHEN PRN MG: 10 INJECTION, SOLUTION INTRAVENOUS at 09:53

## 2017-02-22 RX ADMIN — CHLORHEXIDINE GLUCONATE 0.12% ORAL RINSE SCH: 1.2 LIQUID ORAL at 21:43

## 2017-02-22 RX ADMIN — ACETAMINOPHEN PRN MG: 10 INJECTION, SOLUTION INTRAVENOUS at 18:35

## 2017-02-22 RX ADMIN — POLYETHYLENE GLYCOL 3350 SCH: 17 POWDER, FOR SOLUTION ORAL at 15:07

## 2017-02-22 RX ADMIN — HEPARIN SODIUM SCH UNIT: 5000 INJECTION, SOLUTION INTRAVENOUS; SUBCUTANEOUS at 09:23

## 2017-02-22 RX ADMIN — CHLORHEXIDINE GLUCONATE 0.12% ORAL RINSE SCH ML: 1.2 LIQUID ORAL at 09:23

## 2017-02-22 RX ADMIN — VORICONAZOLE SCH MLS/HR: 10 INJECTION, POWDER, LYOPHILIZED, FOR SOLUTION INTRAVENOUS at 11:25

## 2017-02-22 RX ADMIN — INSULIN ASPART SCH: 100 INJECTION, SOLUTION INTRAVENOUS; SUBCUTANEOUS at 22:20

## 2017-02-22 RX ADMIN — INSULIN ASPART SCH: 100 INJECTION, SOLUTION INTRAVENOUS; SUBCUTANEOUS at 18:45

## 2017-02-22 RX ADMIN — ASPIRIN 325 MG ORAL TABLET SCH MG: 325 PILL ORAL at 09:23

## 2017-02-22 RX ADMIN — POLYETHYLENE GLYCOL 3350 SCH: 17 POWDER, FOR SOLUTION ORAL at 21:43

## 2017-02-22 RX ADMIN — PROPOFOL SCH MLS/HR: 10 INJECTION, EMULSION INTRAVENOUS at 08:07

## 2017-02-22 NOTE — PN
Teaching Attending Note


Name of Resident: Buster Ramirez


ATTENDING PHYSICIAN STATEMENT





I saw and evaluated the patient.


I reviewed the resident's note and discussed the case with the resident.


I agree with the resident's findings and plan as documented.








SUBJECTIVE:alert, intubated








OBJECTIVE:





 Last Vital Signs











Temp Pulse Resp BP Pulse Ox


 


 101.1 F H  84   28 H  141/55   95 


 


 02/22/17 10:00  02/22/17 13:33  02/22/17 12:00  02/22/17 12:00  02/22/17 13:33








 Intake & Output











 02/19/17 02/20/17 02/21/17 02/22/17





 23:59 23:59 23:59 23:59


 


Intake Total 539 1224 2666 1608


 


Output Total 1165 1140 2980 600


 


Balance -626 84 -314 1008


 


Weight 180 lb 8 oz 185 lb 189 lb 4.8 oz 187 lb 6 oz








General NAD, alert, following simple commands


CV S1 S2 RRR no murmur/rub/gallop


Lungs Coarse breath sounds diffusely no wheezing/rales/rhonchi


Abdomen RUQ drain in place. no erythema or swelling around insertion site. 

abdomen is soft and distended. NT. no bowel sounds


Extremities trace pitting edema B/L UE





ASSESSMENT AND PLAN:


86yo M with PMH [place], dyslipidemia, COPD, TIA and prostate and RCC presented to 

the ER and was admitted for further evaluation of their emergent condition


1. Sepsis due to Acute cholecystitis- Tm 101.5 continues to have intermittent 

fevers. good ROGELIO drainage. CT imaging does not show any abscess formation. 

gallbladder appears to be still infected and will need to be removed this 

admission. bilirubin normalized. repeat Cx sent and pending. on Ertapenem/

Vanco. ID on board. drain management per surgery.


2. Acute posthemorrhagic anemia in the setting of upper GI bleed- no repeated 

episodes. s/p 1 unit PRBC this hospitalization. on pepcid iV


3. DIC- likely in the setting of sepsis. received 4 units of cryo this 

admission. no active signs of bleeding.  platelet and Hgb stable. hematology on 

board


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 awaiting isolate. repeat sputum cx 

sent. on voriconazole


5. Acute hypoxic respiratory failure- remains intubated. sedation held this am. 

cpap trial. if tolerates will wean to extubate. 


6. CVA- subacute seen on CT scan from 2/18. neuro consulted. on ASA. will need 

PT and speech therapist once extubated. 


7. DVT ppx- hep sq








The care of this patient involved high complexity decision making to prevent 

further life threatening deterioration of the patient's condition and/or to 

evaluate & treat vital organ system(s) failure or risk of failure. critical 

care time 40 minutes

## 2017-02-22 NOTE — PN
No cramping, no VB, good FM, no LOF. Labor/bleeding precautions. C/S on Monday. NPO after 10pm. Labor/bleeding precautions     Physical Exam: 


SUBJECTIVE: Patient seen and examined


Low grade fever overnight


Pt is still intubated off sedation


Currently going through weaning trials on CPAP  


Alert and oriented to person


Following simple directions


No pain, no numbness or tingling





OBJECTIVE:





 Vital Signs











 Period  Temp  Pulse  Resp  BP Sys/Yun  Pulse Ox


 


 Last 24 Hr  99 F-100.4 F    16-33  120-159/46-78  96-96











GENERAL: Intubated and sedated 


HEAD: Normal with no signs of trauma.


EYES: PERRL, extraocular movements intact, 2mn,  sclera anicteric, conjunctiva 

clear. No ptosis. 


ENT: Ears normal, nares patent, oropharynx clear without exudates, moist mucous 

membranes. ETT in place 


NECK: Trachea midline, full range of motion, supple. 


LUNGS: Breath sounds equal, dimished to auscultation bilaterally, no wheezes, 

no crackles, no 


accessory muscle use. 


HEART: Regular rate and rhythm, S1, S2 with systolic  murmur, rub or gallop.


ABDOMEN: Soft, nontender, nondistended, normoactive bowel sounds, no guarding, 

no 


rebound, no hepatosplenomegaly, no masses.


EXTREMITIES: 2+ pulses, warm, well-perfused, 1+ edema in hands and feet . 


NEUROLOGICAL: Cranial nerves II through XII grossly intact. Moves all ext, no 

focal weakness, follow simple direction, oriented to person, normal sensation 

to light touch. normal plantar reflex b/l , patellar reflex 2+ b/l


PSYCH: Normal mood, normal affect. 


SKIN: Warm, dry, normal turgor, no rashes or lesions noted














 Laboratory Results - last 24 hr











  02/18/17 02/21/17 02/22/17





  05:00 12:15 05:20


 


WBC   


 


RBC   


 


Hgb   


 


Hct   


 


MCV   


 


MCHC   


 


RDW   


 


Plt Count   


 


MPV   


 


INR   


 


PTT (Actin FS)   


 


Fibrinogen   


 


Puncture Site   


 


ABG pH   


 


ABG pCO2 at Pt Temp   


 


ABG pO2 at Pt Temp   


 


ABG HCO3   


 


ABG O2 Sat (Measured)   


 


ABG O2 Content   


 


ABG Base Excess   


 


Sb Test   


 


O2 Delivery Device   


 


Oxygen Flow Rate   


 


Vent Mode   


 


Vent Rate   


 


Mechanical Rate   


 


PEEP   


 


Pressure Support Vent   


 


Sodium   


 


Potassium   


 


Chloride   


 


Carbon Dioxide   


 


Anion Gap   


 


BUN   


 


Creatinine   


 


Creat Clearance w eGFR   


 


Random Glucose   


 


Hemoglobin A1c %   


 


Calcium   


 


Phosphorus    3.0


 


Magnesium    2.2


 


Total Bilirubin   


 


AST   


 


ALT   


 


Alkaline Phosphatase   


 


Total Protein   


 


Albumin   


 


Triglycerides   


 


Cholesterol   


 


Total LDL Cholesterol   


 


HDL Cholesterol   


 


Urine Color   Yellow 


 


Urine Appearance   Slcloudy 


 


Urine pH   5.0 


 


Ur Specific Gravity   1.009 


 


Urine Protein   Negative 


 


Urine Glucose (UA)   Negative 


 


Urine Ketones   Negative 


 


Urine Blood   Negative 


 


Urine Nitrite   Negative 


 


Urine Bilirubin   Negative 


 


Urine Urobilinogen   Negative 


 


Ur Leukocyte Esterase   Negative 


 


Beta-(1,3)-D-Glucan  56  














  02/22/17 02/22/17 02/22/17





  05:20 05:20 05:20


 


WBC    11.0 H


 


RBC    3.69 L


 


Hgb    11.0 L


 


Hct    31.2 L


 


MCV    84.6


 


MCHC    35.3


 


RDW    15.5


 


Plt Count    351  D


 


MPV    8.5


 


INR   


 


PTT (Actin FS)   


 


Fibrinogen   


 


Puncture Site   


 


ABG pH   


 


ABG pCO2 at Pt Temp   


 


ABG pO2 at Pt Temp   


 


ABG HCO3   


 


ABG O2 Sat (Measured)   


 


ABG O2 Content   


 


ABG Base Excess   


 


Sb Test   


 


O2 Delivery Device   


 


Oxygen Flow Rate   


 


Vent Mode   


 


Vent Rate   


 


Mechanical Rate   


 


PEEP   


 


Pressure Support Vent   


 


Sodium  143  


 


Potassium  4.0  


 


Chloride  105  


 


Carbon Dioxide  29  


 


Anion Gap  9  


 


BUN  30 H  


 


Creatinine  1.0  D  


 


Creat Clearance w eGFR  > 60  


 


Random Glucose  147 H  


 


Hemoglobin A1c %   6.5 H 


 


Calcium  7.4 L  


 


Phosphorus   


 


Magnesium   


 


Total Bilirubin  0.8  D  


 


AST  53 H D  


 


ALT  53  


 


Alkaline Phosphatase  395 H  


 


Total Protein  4.9 L  


 


Albumin  1.9 L  


 


Triglycerides  407 H D  


 


Cholesterol  107  D  


 


Total LDL Cholesterol  54  D  


 


HDL Cholesterol  12 L D  


 


Urine Color   


 


Urine Appearance   


 


Urine pH   


 


Ur Specific Gravity   


 


Urine Protein   


 


Urine Glucose (UA)   


 


Urine Ketones   


 


Urine Blood   


 


Urine Nitrite   


 


Urine Bilirubin   


 


Urine Urobilinogen   


 


Ur Leukocyte Esterase   


 


Beta-(1,3)-D-Glucan   














  02/22/17 02/22/17 02/22/17





  05:20 05:20 07:15


 


WBC   


 


RBC   


 


Hgb   


 


Hct   


 


MCV   


 


MCHC   


 


RDW   


 


Plt Count   


 


MPV   


 


INR  1.29 H  


 


PTT (Actin FS)   35.3 H 


 


Fibrinogen  102.0 L  


 


Puncture Site    Right radial


 


ABG pH    7.47 H


 


ABG pCO2 at Pt Temp    40.6


 


ABG pO2 at Pt Temp    60.4 L


 


ABG HCO3    28.9 H


 


ABG O2 Sat (Measured)    91.9


 


ABG O2 Content    14.0 L


 


ABG Base Excess    5.1 H


 


Sb Test    Positive


 


O2 Delivery Device    Vent


 


Oxygen Flow Rate    35%


 


Vent Mode    A/c


 


Vent Rate    16


 


Mechanical Rate    Yes


 


PEEP    5.0


 


Pressure Support Vent    450


 


Sodium   


 


Potassium   


 


Chloride   


 


Carbon Dioxide   


 


Anion Gap   


 


BUN   


 


Creatinine   


 


Creat Clearance w eGFR   


 


Random Glucose   


 


Hemoglobin A1c %   


 


Calcium   


 


Phosphorus   


 


Magnesium   


 


Total Bilirubin   


 


AST   


 


ALT   


 


Alkaline Phosphatase   


 


Total Protein   


 


Albumin   


 


Triglycerides   


 


Cholesterol   


 


Total LDL Cholesterol   


 


HDL Cholesterol   


 


Urine Color   


 


Urine Appearance   


 


Urine pH   


 


Ur Specific Gravity   


 


Urine Protein   


 


Urine Glucose (UA)   


 


Urine Ketones   


 


Urine Blood   


 


Urine Nitrite   


 


Urine Bilirubin   


 


Urine Urobilinogen   


 


Ur Leukocyte Esterase   


 


Beta-(1,3)-D-Glucan   








Active Medications











Generic Name Dose Route Start Last Admin





  Trade Name Freq  PRN Reason Stop Dose Admin


 


Acetaminophen  650 mg 02/13/17 23:33 02/16/17 13:46





  Tylenol -  PO   650 mg





  Q4H PRN   Administration





  FEVER OR PAIN   


 


Acetaminophen  1,000 mg 02/16/17 16:07 02/22/17 09:53





  Ofirmev Injection -  IVPB   1,000 mg





  Q6H PRN   Administration





  FEVER   


 


Artificial Tears  1 drop 02/18/17 09:28  





  Artificial Tears  OU   





  TID PRN   





  DRY EYES   


 


Aspirin  325 mg 02/21/17 12:45 02/22/17 09:23





  Asa -  NGT   325 mg





  DAILY LAUREN   Administration


 


Atorvastatin Calcium  20 mg 02/21/17 22:00 02/21/17 21:26





  Lipitor -  PO   20 mg





  HS LAUREN   Administration


 


Bisacodyl  10 mg 02/18/17 20:25  





  Dulcolax Suppository -  RC   





  PRN PRN   





  CONSTIPATION   


 


Carvedilol  3.125 mg 02/20/17 10:30 02/22/17 09:32





  Coreg -  NGT   3.125 mg





  BID LAUREN   Administration


 


Chlorhexidine Gluconate  15 ml 02/18/17 22:00 02/22/17 09:23





  Peridex -  MM   15 ml





  BID LAUREN   Administration


 


Heparin Sodium (Porcine)  5,000 unit 02/21/17 22:00 02/22/17 09:23





  Heparin -  SQ   5,000 unit





  BID LAUREN   Administration


 


Voriconazole 320 mg/ Dextrose  282 mls @ 141 mls/hr 02/18/17 10:00 02/21/17 22:

05





  IVPB   141 mls/hr





  BID LAUREN   Administration


 


Ertapenem  50 mls @ 50 mls/hr 02/18/17 10:00 02/22/17 09:09





  Invanz (Pre-Docked)  IVPB   50 mls/hr





  DAILY LAUREN   Administration





  Protocol   


 


Propofol  100 mls @ 2.457 mls/hr 02/18/17 18:29 02/22/17 08:07





  Diprivan -  IVPB   12.285 mls/hr





  TITR LAUREN   Administration





  Protocol   





  5 MCG/KG/MIN   


 


Famotidine/Sodium Chloride  50 mls @ 100 mls/hr 02/20/17 22:00 02/22/17 09:09





  Pepcid 20 Mg Premixed Ivpb -  IVPB   100 mls/hr





  BID LAUREN   Administration


 


Insulin Aspart  1 vial 02/14/17 22:00 02/22/17 06:27





  Novolog Vial Sliding Scale -  SQ   Not Given





  [facility]   





  Protocol   


 


Polyethylene Glycol  17 gm 02/18/17 22:00 02/21/17 21:28





  Miralax (For Daily Use) -  NGT   17 grams





  BID LAUREN   Administration


 


Vancomycin HCl  1,000 mg 02/18/17 10:00 02/22/17 09:53





  Vancomycin (Pre-Docked)  IVPB   1,000 mg





  BID LAUREN   Administration





  Protocol   








 CBC, BMP





 02/22/17 05:20 





 02/22/17 05:20 





Microbiology





02/13/17 22:46   Sputum - Endotrachea Suction/Ventilator   Gram Stain - Final


02/13/17 22:46   Sputum - Endotrachea Suction/Ventilator   Sputum Culture - 

Final


                              Fungal Isolate: Mold


                              Mr S Aureus


02/13/17 22:41   Blood - Central Line   Blood Culture - Final


                              Escherichia Coli


02/13/17 17:30   Body Fluid - Other   Gram Stain - Final


02/13/17 17:30   Body Fluid - Other   Anaerobic Culture - Final


                              Enterobacter Asburiae


                              Enterococcus Faecium


                              NO ANAEROBES WERE ISOLATED








 Laboratory Tests











  02/21/17 02/22/17 02/22/17





  12:15 05:20 05:20


 


Hemoglobin A1c %    6.5 H


 


Calcium   7.4 L 


 


Total Bilirubin   0.8  D 


 


AST   53 H D 


 


ALT   53 


 


Alkaline Phosphatase   395 H 


 


Total Protein   4.9 L 


 


Albumin   1.9 L 


 


Total LDL Cholesterol   54  D 


 


HDL Cholesterol   12 L D 


 


Urine Nitrite  Negative  


 


Ur Leukocyte Esterase  Negative  








US Carotid 2/21/17  Mild intimal thickening at the right carotid bifurcation as 

well as mild intimal thickening and small plaques at the left common carotid 

bifurcation and bulb without evidence of  hemodynamically significant stenosis, 

bilaterally. 


CT head w/o contrast 2/21/17 No significant interval change. Left occipital and 

parietal subcortical lucency likely representing old infarcts. Mild ethmoid 

chronic sinusitis





ASSESSMENT/PLAN:


85 year old male with multiple cardiovascular comorbidities presented with 

cholecystitis and developed septic shock, DIC (resolved), GI bleed, acute 

hypoxic respiratory failure with multiple brain infarcts found on CT head 





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


No focal gross  neurological deficit seen on neuro exam which was limited by 

intubation and pt being on propofol 


Infarct were seen on Ct head of 2/18/17. Upon review of MRI brain from 1/20/2016

, Infarcts were already present which showed that the infarcts are old. CT head 

w/o contrast to monitor evolution of infarcts showed no changes


MRI brain when pt is stable  


Continue ASA 325mg PO 


Atorvastatin 20mg  qhs 


Lipid profile showed LDl 54 with LDL goal of 70


HGA1c 6.5


Carotid Doppler US showed no hemodynamically significant stenosis, bilaterally


Echo was previously done in 2/17/17, result are noted 





Metabolic encephalopathy rt to hypoxia and septic shock


Pt is following simple directions, moves all ext 


Weaning trial failure not likely rt to brain infarcts since they are old


Most likely rt to metabolic encephalopathy 


Continue weaning trial and supportive care 





Disposition: Thank you for the consulting opportunity. We will follow this 

patient intermittently. 


Case discussed with Dr Domingo, Neurology Attending, Please follow her note for 

final recommendations 


 





Visit type





- Emergency Visit


Emergency Visit: Yes


ED Registration Date: 02/13/17


Care time: The patient presented to the Emergency Department on the above date 

and was hospitalized for further evaluation of their emergent condition.





- New Patient


This patient is new to me today: Yes


Date on this admission: 02/22/17





- Critical Care


Critical Care patient: Yes


Total Critical Care Time (in minutes): 35


Critical Care Statement: The care of this patient involved high complexity 

decision making to prevent further life threatening deterioration of the patient

's condition and/or to evalute & treat vital organ system(s) failure or risk of 

failure.

## 2017-02-22 NOTE — PN
Physical Exam: 


SUBJECTIVE: Patient seen and examined at bedside in ICU. Pt was recently off 

sedation for sedation vacation this morning and pt was able to follow basic 

commands.  Pt had CT head, abd, cholangiogram yesterday.  Multiple soft BMs 

yesterday.








OBJECTIVE:





 


 Vital Signs











Temperature  100.1 F H  02/22/17 08:00


 


Pulse Rate  76   02/22/17 08:00


 


Respiratory Rate  27 H  02/22/17 09:40


 


Blood Pressure  144/56   02/22/17 08:00


 


O2 Sat by Pulse Oximetry (%)  96   02/21/17 20:11














GENERAL: on ventilator/intubated. Following some basic commands


HEAD: Normal with no signs of trauma.


EYES: Reactive to light.


ENT: moist mucous membranes. OG tube in place w/ feeds


NECK: Trachea midline


LUNGS: Auscultated anteriorly. coarse breath sounds b/l 


HEART: Distant heart sounds, Regular rate and rhythm, S1, S2 without murmur, 

rub or gallop.


ABDOMEN: Not tender to palpation. Hypoactive bowel sounds. Abd drain in place.


EXTREMITIES: 2+ pulses, warm, well-perfused, no edema. 


NEUROLOGICAL:  gait not observed.


PSYCH: Lethargic


SKIN: Warm, dry, normal turgor, no rashes or lesions noted














 Laboratory Results - last 24 hr











  02/18/17 02/21/17 02/22/17





  05:00 12:15 05:20


 


WBC   


 


RBC   


 


Hgb   


 


Hct   


 


MCV   


 


MCHC   


 


RDW   


 


Plt Count   


 


MPV   


 


INR   


 


PTT (Actin FS)   


 


Fibrinogen   


 


Puncture Site   


 


ABG pH   


 


ABG pCO2 at Pt Temp   


 


ABG pO2 at Pt Temp   


 


ABG HCO3   


 


ABG O2 Sat (Measured)   


 


ABG O2 Content   


 


ABG Base Excess   


 


Bs Test   


 


O2 Delivery Device   


 


Oxygen Flow Rate   


 


Vent Mode   


 


Vent Rate   


 


Mechanical Rate   


 


PEEP   


 


Pressure Support Vent   


 


Sodium   


 


Potassium   


 


Chloride   


 


Carbon Dioxide   


 


Anion Gap   


 


BUN   


 


Creatinine   


 


Creat Clearance w eGFR   


 


Random Glucose   


 


Hemoglobin A1c %   


 


Calcium   


 


Phosphorus    3.0


 


Magnesium    2.2


 


Total Bilirubin   


 


AST   


 


ALT   


 


Alkaline Phosphatase   


 


Total Protein   


 


Albumin   


 


Triglycerides   


 


Cholesterol   


 


Total LDL Cholesterol   


 


HDL Cholesterol   


 


Urine Color   Yellow 


 


Urine Appearance   Slcloudy 


 


Urine pH   5.0 


 


Ur Specific Gravity   1.009 


 


Urine Protein   Negative 


 


Urine Glucose (UA)   Negative 


 


Urine Ketones   Negative 


 


Urine Blood   Negative 


 


Urine Nitrite   Negative 


 


Urine Bilirubin   Negative 


 


Urine Urobilinogen   Negative 


 


Ur Leukocyte Esterase   Negative 


 


Beta-(1,3)-D-Glucan  56  














  02/22/17 02/22/17 02/22/17





  05:20 05:20 05:20


 


WBC    11.0 H


 


RBC    3.69 L


 


Hgb    11.0 L


 


Hct    31.2 L


 


MCV    84.6


 


MCHC    35.3


 


RDW    15.5


 


Plt Count    351  D


 


MPV    8.5


 


INR   


 


PTT (Actin FS)   


 


Fibrinogen   


 


Puncture Site   


 


ABG pH   


 


ABG pCO2 at Pt Temp   


 


ABG pO2 at Pt Temp   


 


ABG HCO3   


 


ABG O2 Sat (Measured)   


 


ABG O2 Content   


 


ABG Base Excess   


 


Sb Test   


 


O2 Delivery Device   


 


Oxygen Flow Rate   


 


Vent Mode   


 


Vent Rate   


 


Mechanical Rate   


 


PEEP   


 


Pressure Support Vent   


 


Sodium  143  


 


Potassium  4.0  


 


Chloride  105  


 


Carbon Dioxide  29  


 


Anion Gap  9  


 


BUN  30 H  


 


Creatinine  1.0  D  


 


Creat Clearance w eGFR  > 60  


 


Random Glucose  147 H  


 


Hemoglobin A1c %   6.5 H 


 


Calcium  7.4 L  


 


Phosphorus   


 


Magnesium   


 


Total Bilirubin  0.8  D  


 


AST  53 H D  


 


ALT  53  


 


Alkaline Phosphatase  395 H  


 


Total Protein  4.9 L  


 


Albumin  1.9 L  


 


Triglycerides  407 H D  


 


Cholesterol  107  D  


 


Total LDL Cholesterol  54  D  


 


HDL Cholesterol  12 L D  


 


Urine Color   


 


Urine Appearance   


 


Urine pH   


 


Ur Specific Gravity   


 


Urine Protein   


 


Urine Glucose (UA)   


 


Urine Ketones   


 


Urine Blood   


 


Urine Nitrite   


 


Urine Bilirubin   


 


Urine Urobilinogen   


 


Ur Leukocyte Esterase   


 


Beta-(1,3)-D-Glucan   














  02/22/17 02/22/17 02/22/17





  05:20 05:20 07:15


 


WBC   


 


RBC   


 


Hgb   


 


Hct   


 


MCV   


 


MCHC   


 


RDW   


 


Plt Count   


 


MPV   


 


INR  1.29 H  


 


PTT (Actin FS)   35.3 H 


 


Fibrinogen  102.0 L  


 


Puncture Site    Right radial


 


ABG pH    7.47 H


 


ABG pCO2 at Pt Temp    40.6


 


ABG pO2 at Pt Temp    60.4 L


 


ABG HCO3    28.9 H


 


ABG O2 Sat (Measured)    91.9


 


ABG O2 Content    14.0 L


 


ABG Base Excess    5.1 H


 


Sb Test    Positive


 


O2 Delivery Device    Vent


 


Oxygen Flow Rate    35%


 


Vent Mode    A/c


 


Vent Rate    16


 


Mechanical Rate    Yes


 


PEEP    5.0


 


Pressure Support Vent    450


 


Sodium   


 


Potassium   


 


Chloride   


 


Carbon Dioxide   


 


Anion Gap   


 


BUN   


 


Creatinine   


 


Creat Clearance w eGFR   


 


Random Glucose   


 


Hemoglobin A1c %   


 


Calcium   


 


Phosphorus   


 


Magnesium   


 


Total Bilirubin   


 


AST   


 


ALT   


 


Alkaline Phosphatase   


 


Total Protein   


 


Albumin   


 


Triglycerides   


 


Cholesterol   


 


Total LDL Cholesterol   


 


HDL Cholesterol   


 


Urine Color   


 


Urine Appearance   


 


Urine pH   


 


Ur Specific Gravity   


 


Urine Protein   


 


Urine Glucose (UA)   


 


Urine Ketones   


 


Urine Blood   


 


Urine Nitrite   


 


Urine Bilirubin   


 


Urine Urobilinogen   


 


Ur Leukocyte Esterase   


 


Beta-(1,3)-D-Glucan   








 Microbiology





02/21/17 12:15   Urine - Urine [place]   Urine Culture - Final


                            NO GROWTH OBTAINED


02/20/17 18:00   Blood - Central Line   Blood Culture - Preliminary


                            NO GROWTH OBTAINED AFTER 24 HOURS, INCUBATION TO 

CONTINUE


                            FOR 4 DAYS.


02/20/17 18:00   Blood - Central Line   Blood Culture - Preliminary


                            NO GROWTH OBTAINED AFTER 24 HOURS, INCUBATION TO 

CONTINUE


                            FOR 4 DAYS.


02/13/17 22:46   Sputum - Endotrachea Suction/Ventilator   Fungus Mold 

Identification - Preliminary


02/13/17 22:41   Blood - Central Line   Blood Culture - Final


                            NO GROWTH AFTER 5 DAYS INCUBATION


02/12/17 21:40   Blood - Peripheral Venous   Blood Culture - Final


                            NO GROWTH AFTER 5 DAYS INCUBATION


02/12/17 21:40   Blood - Peripheral Venous   Blood Culture - Final


                            NO GROWTH AFTER 5 DAYS INCUBATION


02/13/17 17:30   Body Fluid - Other   Gram Stain - Final


02/13/17 17:30   Body Fluid - Other   Body Fluid Culture - Final


                            Enterobacter Asburiae


                            Enterococcus Faecium


02/13/17 17:30   Body Fluid - Other   Anaerobic Culture - Final


                            NO ANAEROBES WERE ISOLATED


02/13/17 22:46   Sputum - Endotrachea Suction/Ventilator   Gram Stain - Final


02/13/17 22:46   Sputum - Endotrachea Suction/Ventilator   Sputum Culture - 

Final


                            Fungal Isolate: Mold


                            Mr S Aureus


02/13/17 22:41   Blood - Central Line   Blood Culture - Final


                            Escherichia Coli


02/12/17 21:40   Urine - Urine Clean Catch   Urine Culture - Final


                            NO GROWTH OBTAINED








Active Medications











Generic Name Dose Route Start Last Admin





  Trade Name Freq  PRN Reason Stop Dose Admin


 


Acetaminophen  650 mg 02/13/17 23:33 02/16/17 13:46





  Tylenol -  PO   650 mg





  Q4H PRN   Administration





  FEVER OR PAIN   


 


Acetaminophen  1,000 mg 02/16/17 16:07 02/22/17 09:53





  Ofirmev Injection -  IVPB   1,000 mg





  Q6H PRN   Administration





  FEVER   


 


Artificial Tears  1 drop 02/18/17 09:28  





  Artificial Tears  OU   





  TID PRN   





  DRY EYES   


 


Aspirin  325 mg 02/21/17 12:45 02/22/17 09:23





  Asa -  NGT   325 mg





  DAILY LAUREN   Administration


 


Atorvastatin Calcium  20 mg 02/21/17 22:00 02/21/17 21:26





  Lipitor -  PO   20 mg





  HS LAUREN   Administration


 


Bisacodyl  10 mg 02/18/17 20:25  





  Dulcolax Suppository -  RC   





  PRN PRN   





  CONSTIPATION   


 


Carvedilol  3.125 mg 02/20/17 10:30 02/22/17 09:32





  Coreg -  NGT   3.125 mg





  BID LAUREN   Administration


 


Chlorhexidine Gluconate  15 ml 02/18/17 22:00 02/22/17 09:23





  Peridex -  MM   15 ml





  BID LAUREN   Administration


 


Heparin Sodium (Porcine)  5,000 unit 02/21/17 22:00 02/22/17 09:23





  Heparin -  SQ   5,000 unit





  BID LAUREN   Administration


 


Voriconazole 320 mg/ Dextrose  282 mls @ 141 mls/hr 02/18/17 10:00 02/21/17 22:

05





  IVPB   141 mls/hr





  BID LAUREN   Administration


 


Ertapenem  50 mls @ 50 mls/hr 02/18/17 10:00 02/22/17 09:09





  Invanz (Pre-Docked)  IVPB   50 mls/hr





  DAILY LAUREN   Administration





  Protocol   


 


Propofol  100 mls @ 2.457 mls/hr 02/18/17 18:29 02/22/17 08:07





  Diprivan -  IVPB   12.285 mls/hr





  TITR LAUREN   Administration





  Protocol   





  5 MCG/KG/MIN   


 


Famotidine/Sodium Chloride  50 mls @ 100 mls/hr 02/20/17 22:00 02/22/17 09:09





  Pepcid 20 Mg Premixed Ivpb -  IVPB   100 mls/hr





  BID LAUREN   Administration


 


Insulin Aspart  1 vial 02/14/17 22:00 02/22/17 06:27





  Novolog Vial Sliding Scale -  SQ   Not Given





  ACHS LAUREN   





  Protocol   


 


Polyethylene Glycol  17 gm 02/18/17 22:00 02/21/17 21:28





  Miralax (For Daily Use) -  NGT   17 grams





  BID LAUREN   Administration


 


Vancomycin HCl  1,000 mg 02/18/17 10:00 02/22/17 09:53





  Vancomycin (Pre-Docked)  IVPB   1,000 mg





  BID LAUREN   Administration





  Protocol   











ASSESSMENT/PLAN:


86 y/o M w/ PMH of [place], HTN, HLD, prostate ca, TIA, Kidney cancer s/p 

nephrectomy, COPD presented to ER with abdominal pain (RUQ) for 3 days. 

Admitted for acute cholecystitis. Perc cholecystostomy placed on 2/13/17. Pt 

developed SOB after procedure, was intubated, had central line placed, on 

pressors and sedated and in ICU for septic shock.





-Septic shock secondary to Acute Cholecystitis from cholelithiasis


   -improving; s/p perc cholecystostomy 2/13


   -Tmax 24 hours: 101.5; WBC: 11


   -Off pressors, hemodynamically stable off pressors


   -LFTs trending down


   -Cholangiogram via cholecystostomy done yesterday - awaiting results


   -Abx : ertapenem, vanco; ID on board


   -Spcx: mold, MRSA - voriconazole started by ID to cover for aspergillus, 

vanco restarted.


      -CT chest shows L apical lung cyst/cavity


      -Beta 1,3 D glucan - 56 - Spoke w/Dr. Rowan - indicates a low level, 

likely negative for aspergillus but will await antigen and mold identification.


   -BCx -1/2 bottles: E. Coli


   -Repeat cultures ordered by ID


      -Repeat BCx neg x 24hrs


   -Body fluid cx (from drain): Enterobacter asburiae, enterococcus faecium


   -Surgery on board





-Fevers - no abscesses noted on CT abd


   -Abd CT w/con: bibasal consolidation/pna and small to moderate b/l pleural 

effusion.  Gallbladder is less distended since prior exam. Significant 

thickening of gallbladder wall mainly at the fundus. No evidence of abscess 

formation. R kidney with 1.8 cm upper pole cyst and mild hydronephrosis. Mild R 

diverticulosis in the distal descending and sigmoid colon. Enlarged prostate.


   -c/w IV ofirmev 1g q6h prn





-Acute hypoxic respiratory failure


   -off sedation since AM


   -Intubated, on vent


   -wean trials, vent management as per ICU team





-Acute vs subacute vs chronic stroke


   -Head CT: no sig interval change. L occipital and parietal subcortical 

lucency likely old infarcts.


   -Triglycerides: 407; Cholesterol: 107; LDL: 54; HDL: 12


   -Neuro on board





-Anemia secondary to Upper GI bleed


   -resolving upper gi bleed, receiving feeds via OGT


   -OG tube in place


   -H/H stable, monitor


   -EGD when more stable


   -GI on board


   -c/w protonix





-Diastolic CHF:


   -CXR: 2/15/17: R moderate pleural effusion. L small pleural effusion.


   -ECHO: 2/14/17 - EF: 63%, LV nl size, LVSF nl, no regional wall abnl noted, 

LV impaired relaxation, mild MR, mod TR, mild AS, no pericardial effusion.


   -not on fluids currently.


   -CXR, pleural effusions still present, no sig change from previous cxr


   -not on fluids





-Transaminitis


   -improving


   -Secondary to septic shock


   -INR elevated as well, trending down


   -Monitor LFTs, LFTs currently trending down





-SANGITA secondary to septic shock


   -improving


   -BUN/Cr 30/1, pre-renal etiology most likely as Bun:Cr ratio >20


   -Urine output 3000 ml yesterday, 500 ml today


   -monitor urine output, BUN/Cr





-DIC secondary to septic shock


   -s/p cyroprecipitate


   -fibrinogen goal >100


   -platelets trending up


   -pt on asa 325 and heparin bid, monitor platelets





-Hyperglycemia


   -Monitor sugars


   -Currently random glucose 172, on ISS


   -A1C pending





-Dry eyes


   -Artifical tears tid PRN for dry eyes





-HTN


   -coreg 3.125 mg bid





-[place]


   -Intubated, on vent





-Hx of TIA


   -asa 325 started





-HLD


   -lipitor 20mg qhs





-insomnia


   -held melatonin 5 mg po qhs prn





-BPH


   -held flomax 0.8 mg po qd


   -held finasteride 5 mg po qd





-DVT ppx


   -SCDs


   -Heparin bid





-FEN


   -no fluids for now.


   -Diet: -Vital 1.2 





-Dispo:


   -Monitor in ICU.





Problem List





- Problems


(1) COPD (chronic obstructive pulmonary disease)


Code(s): J44.9 - CHRONIC OBSTRUCTIVE PULMONARY DISEASE, UNSPECIFIED   Qualifiers

: 


     COPD type: unspecified COPD        Qualified Code(s): J44.9 - Chronic 

obstructive pulmonary disease, unspecified  





(2) Cholecystitis, acute


Code(s): K81.0 - ACUTE CHOLECYSTITIS





(3) Fever


Code(s): R50.9 - FEVER, UNSPECIFIED   Qualifiers: 


     Fever type: other     Qualified Code(s): R50.81 - Fever presenting with 

conditions classified elsewhere  





(4) Leukocytosis


Code(s): D72.829 - ELEVATED WHITE BLOOD CELL COUNT, UNSPECIFIED   Qualifiers: 


     Leukocytosis type: unspecified     Qualified Code(s): D72.829 - Elevated 

white blood cell count, unspecified  





(5) Sleep apnea


Code(s): G47.30 - SLEEP APNEA, UNSPECIFIED   





(6) Acute urinary retention


Code(s): R33.8 - OTHER RETENTION OF URINE





(7) Insomnia


Code(s): G47.00 - INSOMNIA, UNSPECIFIED   





(8) HTN (hypertension)


Code(s): I10 - ESSENTIAL (PRIMARY) HYPERTENSION   Qualifiers: 


     Hypertension type: essential hypertension        Qualified Code(s): I10 - 

Essential (primary) hypertension  





(9) HLD (hyperlipidemia)


Code(s): E78.5 - HYPERLIPIDEMIA, UNSPECIFIED   Qualifiers: 


     Hyperlipidemia type: pure hypercholesterolemia        Qualified Code(s): 

E78.0 - Pure hypercholesterolemia  





(10) BPH (benign prostatic hyperplasia)


Code(s): N40.0 - BENIGN PROSTATIC HYPERPLASIA WITHOUT LOWER URINRY TRACT SYMP   





(11) Septic shock


Code(s): A41.9 - SEPSIS, UNSPECIFIED ORGANISM


R65.21 - SEVERE SEPSIS WITH SEPTIC SHOCK





(12) Acute respiratory failure with hypoxia


Code(s): J96.01 - ACUTE RESPIRATORY FAILURE WITH HYPOXIA





(13) SANGITA (acute kidney injury)


Code(s): N17.9 - ACUTE KIDNEY FAILURE, UNSPECIFIED





(14) Transaminitis


Code(s): R74.0 - NONSPEC ELEV OF LEVELS OF TRANSAMNS & LACTIC ACID DEHYDRGNSE





(15) Upper GI bleed


Code(s): K92.2 - GASTROINTESTINAL HEMORRHAGE, UNSPECIFIED





(16) Elevated troponin


Code(s): R79.89 - OTHER SPECIFIED ABNORMAL FINDINGS OF BLOOD CHEMISTRY





(17) Demand ischemia


Code(s): I24.8 - OTHER FORMS OF ACUTE ISCHEMIC HEART DISEASE





(18) DIC (disseminated intravascular coagulation)


Code(s): D65 - DISSEMINATED INTRAVASCULAR COAGULATION





(19) Hypophosphatemia


Code(s): E83.39 - OTHER DISORDERS OF PHOSPHORUS METABOLISM





(20) Hypokalemia


Code(s): E87.6 - HYPOKALEMIA





(21) Anemia due to GI blood loss


Code(s): D50.0 - IRON DEFICIENCY ANEMIA SECONDARY TO BLOOD LOSS (CHRONIC)





(22) Diastolic CHF


Code(s): I50.30 - UNSPECIFIED DIASTOLIC (CONGESTIVE) HEART FAILURE   Qualifiers

: 


     Congestive heart failure chronicity: chronic        Qualified Code(s): 

I50.32 - Chronic diastolic (congestive) heart failure  





(23) Dry eyes


Code(s): H04.123 - DRY EYE SYNDROME OF BILATERAL LACRIMAL GLANDS








Visit type





- Emergency Visit


Emergency Visit: Yes


ED Registration Date: 02/13/17


Care time: The patient presented to the Emergency Department on the above date 

and was hospitalized for further evaluation of their emergent condition.





- New Patient


This patient is new to me today: No





- Critical Care


Critical Care patient: Yes


Total Critical Care Time (in minutes): 40


Critical Care Statement: The care of this patient involved high complexity 

decision making to prevent further life threatening deterioration of the patient

's condition and/or to evalute & treat vital organ system(s) failure or risk of 

failure.

## 2017-02-22 NOTE — PN
Progress Note (short form)





- Note


Progress Note: 


ID








Remains intubated





Antimicrobials as follows








Vancomycin Ertepenem Voriconazole


 Selected Entries











  02/22/17 02/22/17





  02:00 02:35


 


Temperature 99.4 F 


 


Pulse Rate 68 


 


Respiratory  18





Rate  


 


Blood Pressure 125/49 








Abd  Soft with gallbladder drain


Microbiology





02/13/17 22:46   Sputum - Endotrachea Suction/Ventilator   Gram Stain - Final


02/13/17 22:46   Sputum - Endotrachea Suction/Ventilator   Sputum Culture - 

Final


                              Fungal Isolate: Mold


                              Mr S Aureus


02/13/17 22:41   Blood - Central Line   Blood Culture - Final


                              Escherichia Coli


02/13/17 17:30   Body Fluid - Other   Gram Stain - Final


02/13/17 17:30   Body Fluid - Other   Anaerobic Culture - Final


                              Enterobacter Asburiae


                              Enterococcus Faecium


                              NO ANAEROBES WERE ISOLATED


02/13/17 22:46   Sputum - Endotrachea Suction/Ventilator   Fungus Mold 

Identification - Preliminary





 Laboratory Tests











  02/22/17 02/22/17 02/22/17





  05:20 05:20 05:20


 


WBC   11.0 H 


 


Hgb   11.0 L 


 


Hct   31.2 L 


 


Plt Count   351  D 


 


INR    1.29 H


 


ABG pO2 at Pt Temp   


 


Oxygen Flow Rate   


 


BUN  30 H  


 


Creatinine  1.0  D  














  02/22/17





  07:15


 


WBC 


 


Hgb 


 


Hct 


 


Plt Count 


 


INR 


 


ABG pO2 at Pt Temp  60.4 L


 


Oxygen Flow Rate  35%


 


BUN 


 


Creatinine 








Assessment


Sepsis syndrome


Infected gallbladder


E Coli bacteremia


Mold growing from sputum with cavitary lesion


MRSA respirator culture on Vanco


Fever unclear source  CT imaging shows no abscess


Respiratory failure


Hypoxemia








Plan WIll consider stopping Ertepenem in next day or so then reculture


          Fungal isolate pending final identification


Anum CARBALLO





Problem List





- Problems


(1) Acute respiratory failure with hypoxia


Code(s): J96.01 - ACUTE RESPIRATORY FAILURE WITH HYPOXIA





(2) Cholecystitis, acute


Code(s): K81.0 - ACUTE CHOLECYSTITIS





(3) DIC (disseminated intravascular coagulation)


Code(s): D65 - DISSEMINATED INTRAVASCULAR COAGULATION





(4) Gram-negative bacteremia


Code(s): R78.81 - BACTEREMIA

## 2017-02-22 NOTE — MSN
[id number] sedation vacation.  Patient was able to respond to simple 

commands and prompts.  He was able to move both arms and legs.  Patient was in 

no apparent distress.





- Current Medications


Current Medications: 


Active Medications





Acetaminophen (Tylenol -)  650 mg PO Q4H PRN


   PRN Reason: FEVER OR PAIN


   Last Admin: 02/16/17 13:46 Dose:  650 mg


Acetaminophen (Ofirmev Injection -)  1,000 mg IVPB Q6H PRN


   PRN Reason: FEVER


   Last Admin: 02/22/17 09:53 Dose:  1,000 mg


Artificial Tears (Artificial Tears)  1 drop OU TID PRN


   PRN Reason: DRY EYES


Aspirin (Asa -)  325 mg NGT DAILY [facility]


   Last Admin: 02/22/17 09:23 Dose:  325 mg


Atorvastatin Calcium (Lipitor -)  20 mg PO HS [facility]


   Last Admin: 02/21/17 21:26 Dose:  20 mg


Bisacodyl (Dulcolax Suppository -)  10 mg RC PRN PRN


   PRN Reason: CONSTIPATION


Carvedilol (Coreg -)  6.25 mg NGT BID [facility]


Chlorhexidine Gluconate (Peridex -)  15 ml MM BID [facility]


   Last Admin: 02/22/17 09:23 Dose:  15 ml


Furosemide (Lasix Injection -)  40 mg IVPUSH ONCE ONE


   Stop: 02/22/17 16:01


Heparin Sodium (Porcine) (Heparin -)  5,000 unit SQ BID [facility]


   Last Admin: 02/22/17 09:23 Dose:  5,000 unit


Voriconazole 320 mg/ Dextrose  282 mls @ 141 mls/hr IVPB BID [facility]


   Last Admin: 02/22/17 11:25 Dose:  141 mls/hr


Ertapenem (Invanz (Pre-Docked))  50 mls @ 50 mls/hr IVPB DAILY [facility]


   PRN Reason: Protocol


   Last Admin: 02/22/17 09:09 Dose:  50 mls/hr


Propofol (Diprivan -)  100 mls @ 2.457 mls/hr IVPB TITR LAUREN; 5 MCG/KG/MIN


   PRN Reason: Protocol


   Last Titration: 02/22/17 09:00 Dose:  0 mcg/kg/min


Famotidine/Sodium Chloride (Pepcid 20 Mg Premixed Ivpb -)  50 mls @ 100 mls/hr 

IVPB BID [facility]


   Last Admin: 02/22/17 09:09 Dose:  100 mls/hr


Insulin Aspart (Novolog Vial Sliding Scale -)  1 vial SQ ACHS LAUREN


   PRN Reason: Protocol


   Last Admin: 02/22/17 12:54 Dose:  2 units


Polyethylene Glycol (Miralax (For Daily Use) -)  17 gm NGT BID [facility]


   Last Admin: 02/21/17 21:28 Dose:  17 grams


Vancomycin HCl (Vancomycin (Pre-Docked))  1,000 mg IVPB BID [facility]


   PRN Reason: Protocol


   Last Admin: 02/22/17 09:53 Dose:  1,000 mg











- Objective


Vital Signs: 


 Vital Signs











Temperature  101.1 F H  02/22/17 10:00


 


Pulse Rate  84   02/22/17 13:33


 


Respiratory Rate  28 H  02/22/17 12:00


 


Blood Pressure  141/55   02/22/17 12:00


 


O2 Sat by Pulse Oximetry (%)  95   02/22/17 13:33











Constitutional: Yes: Well Nourished, No Distress, Calm


Eyes: Yes: WNL, Conjunctiva Clear


HENT: Yes: WNL, Atraumatic, Normocephalic


Neck: Yes: WNL, Supple, Trachea Midline


Cardiovascular: Yes: WNL, Regular Rate and Rhythm, S1, S2


Respiratory: Yes: WNL, Regular, CTA Bilaterally, Intubated


Gastrointestinal: Yes: Normal Bowel Sounds, Distention, Tenderness, Tenderness, 

Epigastrium.  No: Tenderness, Rebound


Musculoskeletal: Yes: WNL


Peripheral Pulses WNL: Yes


Peripheral Pulses: Left Radial: 2+, Right Radial: 2+, Left Doralis Pedis: 2+, 

Right Dorsalis Pedis: 2+


Edema: Yes


Edema: LUE: Trace, RUE: Trace


Integumentary: Yes: WNL


Wound/Incision: Yes: Clean/Dry, Well Approximated





Labs


Lab Results: 


 CBC, BMP





 02/22/17 05:20 





 02/22/17 05:20 











Assessment/Plan


86 YO M w/ hx of COPD, [place], HTN, HLD, TIA, prostate CA, renal CA now s/p left 

nephrectomy. He came to the ED with Right Upper Quadrant pain and was admitted 

to the ICU for acute respiratory failure and sepsis secondary to acute 

cholecystitis s/p percutaenous drain. Patient has been intubated and on an off 

sedation.  No pressors but patient has had remitting fevers





Respiratory: Acute hypoxic respiratory failure


- intubation day 8-Extubated this afternoon. Patient is breathing with CPAP and 

is able to talk.  No pain and no NVD


-daily ABGs essentially normal


-Maintain SPO2 > 90% 


-SBT as tolerated





Profound septic shock 


-gallbladder vs. lung


-WBC normalized, improving infiltrate, fevers


-positive sputum, blood and biliary fluid cultures (see micro lab)


-cavity/cyst on CT chest


-cont. vanco, ertapanem, voriconazole





Acute cholecystitis s/p percutaneous drain


-Monitor LFTs


-ERCP when stable


-cont. PPI drip





DIC with sepsis


-low plt and fibrinogen- transfuse 2 units of cryo


-low H&H





Sedation


-Sedation Vacation





HTN


-Cont. Meds





DM


-on sliding scale





Prophylaxis 


-DVT: SCD


-GI: protonix

## 2017-02-22 NOTE — PN
Teaching Attending Note


Name of Resident: Zaire Phillips


ATTENDING PHYSICIAN STATEMENT





I saw and evaluated the patient.


I reviewed the resident's note and discussed the case with the resident.


I agree with the resident's findings and plan as documented.








SUBJECTIVE:





Pt seen and examined in the ICU. Remains intubated, awake off sedation. Off 

pressors. Low grade temps. Mildly tachypneic on CPAP/PS but with similar rate 

when on volume a/c. Good tidal volumes.





OBJECTIVE:





 Last Vital Signs











Temp Pulse Resp BP Pulse Ox


 


 101.1 F H  87   25 H  141/53   96 


 


 02/22/17 10:00  02/22/17 10:00  02/22/17 10:00  02/22/17 10:00  02/21/17 20:11








 Intake & Output











 02/19/17 02/20/17 02/21/17 02/22/17





 23:59 23:59 23:59 23:59


 


Intake Total 539 1224 2666 1608


 


Output Total 1165 1140 2980 600


 


Balance -626 84 -314 1008


 


Weight 180 lb 8 oz 185 lb 189 lb 4.8 oz 187 lb 6 oz








Gen:  intubated, awake


Heart:  RRR


Lung: decreased breath sounds at the bases


Abd: soft, +cholecystostomy


Ext: + edema





 CBC, BMP





 02/22/17 05:20 





 02/22/17 05:20 





Active Medications





Acetaminophen (Tylenol -)  650 mg PO Q4H PRN


   PRN Reason: FEVER OR PAIN


   Last Admin: 02/16/17 13:46 Dose:  650 mg


Acetaminophen (Ofirmev Injection -)  1,000 mg IVPB Q6H PRN


   PRN Reason: FEVER


   Last Admin: 02/22/17 09:53 Dose:  1,000 mg


Artificial Tears (Artificial Tears)  1 drop OU TID PRN


   PRN Reason: DRY EYES


Aspirin (Asa -)  325 mg NGT DAILY [facility]


   Last Admin: 02/22/17 09:23 Dose:  325 mg


Atorvastatin Calcium (Lipitor -)  20 mg PO HS [facility]


   Last Admin: 02/21/17 21:26 Dose:  20 mg


Bisacodyl (Dulcolax Suppository -)  10 mg RC PRN PRN


   PRN Reason: CONSTIPATION


Carvedilol (Coreg -)  3.125 mg NGT BID [facility]


   Last Admin: 02/22/17 09:32 Dose:  3.125 mg


Chlorhexidine Gluconate (Peridex -)  15 ml MM BID [facility]


   Last Admin: 02/22/17 09:23 Dose:  15 ml


Heparin Sodium (Porcine) (Heparin -)  5,000 unit SQ BID [facility]


   Last Admin: 02/22/17 09:23 Dose:  5,000 unit


Voriconazole 320 mg/ Dextrose  282 mls @ 141 mls/hr IVPB BID [facility]


   Last Admin: 02/21/17 22:05 Dose:  141 mls/hr


Ertapenem (Invanz (Pre-Docked))  50 mls @ 50 mls/hr IVPB DAILY [facility]


   PRN Reason: Protocol


   Last Admin: 02/22/17 09:09 Dose:  50 mls/hr


Propofol (Diprivan -)  100 mls @ 2.457 mls/hr IVPB TITR [facility]; 5 MCG/KG/MIN


   PRN Reason: Protocol


   Last Admin: 02/22/17 08:07 Dose:  12.285 mls/hr


Famotidine/Sodium Chloride (Pepcid 20 Mg Premixed Ivpb -)  50 mls @ 100 mls/hr 

IVPB BID [facility]


   Last Admin: 02/22/17 09:09 Dose:  100 mls/hr


Insulin Aspart (Novolog Vial Sliding Scale -)  1 vial SQ ACHS [facility]


   PRN Reason: Protocol


   Last Admin: 02/22/17 06:27 Dose:  Not Given


Polyethylene Glycol (Miralax (For Daily Use) -)  17 gm NGT BID [facility]


   Last Admin: 02/21/17 21:28 Dose:  17 grams


Vancomycin HCl (Vancomycin (Pre-Docked))  1,000 mg IVPB BID [facility]


   PRN Reason: Protocol


   Last Admin: 02/22/17 09:53 Dose:  1,000 mg








ASSESSMENT AND PLAN:


Acute Cholecystitis


s/p Cholecystostomy Placement


Profound Septic Shock improving


Acute Hypoxic Respiratory Failure


Acute Kidney Injury improving


Lactic Acidosis resolved


Demand Ischemia


RCC s/p Left Nephrectomy


DIC


COPD


HTN


Hyperlipidemia





-  wean to extubate


-  antibiotics/antifungal per ID


-  monitor cholecystostomy drainage


-  lasix 40mg IVP prior to extubation 


-  FiO2 to keep SpO2 >90%


-  DVT/GI prophylaxis

## 2017-02-22 NOTE — PN
Physical Exam: 


SUBJECTIVE: 





Patient seen and examined at bedside in the ICU. He's extubated, on ventimask, 

trying to speak and move extremities.











OBJECTIVE:


 Vital Signs











 Period  Temp  Pulse  Resp  BP Sys/Yun  Pulse Ox


 


 Last 24 Hr  99 F-101.1 F  67-88  16-30  120-159/49-78  94-96











GENERAL: extubated, awake, alert, open eyes, track, follow commands, appears 

weak.


NECK: central line removed


LUNGS: poor air entry, b/l rhonchi anteriorly, less rhonchi posteriorly


HEART: Regular rate and rhythm, S1, S2 without murmur, rub or gallop.


ABDOMEN: Soft, nontender, mildly distended. RUQ percutaneous drain in dark 

brown color  


EXTREMITIES: warm and no edema 


NEUROLOGICAL: unable to assess


: muñiz in place, clear urine output











 ABG Results











ABG pH  7.47  (7.35-7.45)  H  02/22/17  07:15    


 


ABG pCO2 at Pt Temp  40.6 mmHg (35-45)   02/22/17  07:15    


 


ABG pO2 at Pt Temp  60.4 mmHg ()  L  02/22/17  07:15    


 


ABG HCO3  28.9 meq/L (22-26)  H  02/22/17  07:15    


 


ABG O2 Sat (Measured)  91.9 % (90-98.9)   02/22/17  07:15    


 


ABG O2 Content  14.0 % vol (15-22)  L  02/22/17  07:15    


 


ABG Base Excess  5.1 meq/l (-2-2)  H  02/22/17  07:15    








 CBCD











WBC  11.0 K/mm3 (4.0-10.0)  H  02/22/17  05:20    


 


RBC  3.69 M/mm3 (4.00-5.60)  L  02/22/17  05:20    


 


Hgb  11.0 GM/dL (11.7-16.9)  L  02/22/17  05:20    


 


Hct  31.2 % (35.4-49)  L  02/22/17  05:20    


 


MCV  84.6 fl (80-96)   02/22/17  05:20    


 


MCHC  35.3 g/dl (32.0-35.9)   02/22/17  05:20    


 


RDW  15.5 % (11.9-15.9)   02/22/17  05:20    


 


Plt Count  351 K/MM3 (134-434)  D 02/22/17  05:20    


 


MPV  8.5 fl (7.5-11.1)   02/22/17  05:20    








 CMP











Sodium  143 mmol/L (136-145)   02/22/17  05:20    


 


Potassium  4.0 mmol/L (3.5-5.1)   02/22/17  05:20    


 


Chloride  105 mmol/L ()   02/22/17  05:20    


 


Carbon Dioxide  29 mmol/L (21-32)   02/22/17  05:20    


 


Anion Gap  9  (8-16)   02/22/17  05:20    


 


BUN  30 mg/dL (7-18)  H  02/22/17  05:20    


 


Creatinine  1.0 mg/dL (0.7-1.3)  D 02/22/17  05:20    


 


Creat Clearance w eGFR  > 60  (>60)   02/22/17  05:20    


 


Calcium  7.4 mg/dL (8.5-10.1)  L  02/22/17  05:20    


 


Total Bilirubin  0.8 mg/dL (0.2-1.0)  D 02/22/17  05:20    


 


AST  53 U/L (15-37)  H D 02/22/17  05:20    


 


ALT  53 U/L (12-78)   02/22/17  05:20    


 


Alkaline Phosphatase  395 U/L ()  H  02/22/17  05:20    


 


Total Protein  4.9 g/dl (6.4-8.2)  L  02/22/17  05:20    


 


Albumin  1.9 g/dl (3.4-5.0)  L  02/22/17  05:20    








 Intake & Output











 02/19/17 02/20/17 02/21/17 02/22/17





 23:59 23:59 23:59 23:59


 


Intake Total 539 1224 2666 1608


 


Output Total 1165 1140 2980 600


 


Balance -626 84 -314 1008


 


Weight 81.873 kg 83.915 kg 85.865 kg 84.992 kg











Active Medications











Generic Name Dose Route Start Last Admin





  Trade Name Freq  PRN Reason Stop Dose Admin


 


Acetaminophen  650 mg 02/13/17 23:33 02/16/17 13:46





  Tylenol -  PO   650 mg





  Q4H PRN   Administration





  FEVER OR PAIN   


 


Acetaminophen  1,000 mg 02/16/17 16:07 02/22/17 09:53





  Ofirmev Injection -  IVPB   1,000 mg





  Q6H PRN   Administration





  FEVER   


 


Artificial Tears  1 drop 02/18/17 09:28  





  Artificial Tears  OU   





  TID PRN   





  DRY EYES   


 


Aspirin  325 mg 02/21/17 12:45 02/22/17 09:23





  Asa -  NGT   325 mg





  DAILY [facility]   Administration


 


Atorvastatin Calcium  20 mg 02/21/17 22:00 02/21/17 21:26





  Lipitor -  PO   20 mg





  HS [facility]   Administration


 


Bisacodyl  10 mg 02/18/17 20:25  





  Dulcolax Suppository -  RC   





  PRN PRN   





  CONSTIPATION   


 


Carvedilol  6.25 mg 02/22/17 12:31  





  Coreg -  NGT   





  BID [facility]   


 


Chlorhexidine Gluconate  15 ml 02/18/17 22:00 02/22/17 09:23





  Peridex -  MM   15 ml





  BID [facility]   Administration


 


Furosemide  40 mg 02/22/17 16:00  





  Lasix Injection -  IVPUSH 02/22/17 16:01  





  ONCE ONE   


 


Heparin Sodium (Porcine)  5,000 unit 02/21/17 22:00 02/22/17 09:23





  Heparin -  SQ   5,000 unit





  BID [facility]   Administration


 


Voriconazole 320 mg/ Dextrose  282 mls @ 141 mls/hr 02/18/17 10:00 02/22/17 11:

25





  IVPB   141 mls/hr





  BID [facility]   Administration


 


Ertapenem  50 mls @ 50 mls/hr 02/18/17 10:00 02/22/17 09:09





  Invanz (Pre-Docked)  IVPB   50 mls/hr





  DAILY [facility]   Administration





  Protocol   


 


Propofol  100 mls @ 2.457 mls/hr 02/18/17 18:29 02/22/17 09:00





  Diprivan -  IVPB   0 mcg/kg/min





  TITR [facility]   Titration





  Protocol   





  5 MCG/KG/MIN   


 


Famotidine/Sodium Chloride  50 mls @ 100 mls/hr 02/20/17 22:00 02/22/17 09:09





  Pepcid 20 Mg Premixed Ivpb -  IVPB   100 mls/hr





  BID [facility]   Administration


 


Insulin Aspart  1 vial 02/14/17 22:00 02/22/17 12:54





  Novolog Vial Sliding Scale -  SQ   2 units





  [facility]   Administration





  Protocol   


 


Polyethylene Glycol  17 gm 02/18/17 22:00 02/21/17 21:28





  Miralax (For Daily Use) -  NGT   17 grams





  BID [facility]   Administration


 


Vancomycin HCl  1,000 mg 02/18/17 10:00 02/22/17 09:53





  Vancomycin (Pre-Docked)  IVPB   1,000 mg





  BID [facility]   Administration





  Protocol   











Microbiology





02/21/17 11:35   Sputum - Endotrachea Suction/Ventilator   Gram Stain - Final


02/21/17 12:15   Urine - Urine Muñiz   Urine Culture - Final


                            NO GROWTH OBTAINED


02/20/17 18:00   Blood - Central Line   Blood Culture - Preliminary


                            NO GROWTH OBTAINED AFTER 24 HOURS, INCUBATION TO 

CONTINUE


                            FOR 4 DAYS.


02/20/17 18:00   Blood - Central Line   Blood Culture - Preliminary


                            NO GROWTH OBTAINED AFTER 24 HOURS, INCUBATION TO 

CONTINUE


                            FOR 4 DAYS.








IMAGING





CXR - 2/22: no significant change


CXR - 2/21: L pleural effusion and left retrocardiac density. Cardiomegaly and 

increased perihilar lung markings may reflect mild congestive changes.


CXR - 2/20: Endotracheal tube placement as described above with improving 

bibasilar infiltrates. 


CT Chest - 2/18: Bilateral lower lobe infiltrates, right more the left. Small 

bilateral pleural effusions. 1.3 x 1 cm left apical cyst/cavity with concentric 

wall thickening. There is subjacent mild left upper lobe bronchiectasis with 

associated peribronchial thickening. centrilobular emphysema. 





CT Head - 2/21: No significant interval change. Left occipital and parietal 

subcortical lucency likely representing old infarcts. Mild ethmoid chronic 

sinusitis 


CT Head - 2/18: In comparison to a previous CT exam of 4/9/2015 interval 

development of left occipital and left parietal cortical infarcts are noted 

which are probably chronic versus late subacute   








Carotid doppler 2/21: Mild intimal thickening at the right carotid bifurcation 

as well as mild intimal thickening and small plaques at the left common carotid 

bifurcation and bulb without evidence of hemodynamically significant stenosis, 

bilaterally. 





CT Abd with oral and IV contrast 2/21: Interval decrease in the degree of 

dilatation of the gallbladder. A cholecystostomy tube is again seen in 

satisfactory position. There is significant thickening of the gallbladder wall 

at the fundus with surrounding edema. No abscess is identified. Status post 

left nephrectomy. Mild right renal hydronephrosis and hydroureter without 

evidence of an obstructing stone Extensive diverticulosis coli mainly in the 

sigmoid colon without evidence of acute diverticulitis. 





ASSESSMENT/PLAN:





86 yo h/o COPD, [place], HTN, HLD, TIA, prostate CA, renal CA now s/p left 

nephrectomy was admitted to the ICU for acute respiratory failure and sepsis 

secondary to acute cholecystitis s/p percutaenous drain. Patient has been 

extubated and central line removed.





Respiratory: Acute hypoxic respiratory failure


   - extubated today, on ventimask


      * maintain SpO2 > 90%


   - infiltrate and pleural effusion on CXR


      * received 2 doses of lasix 40mg IVPUSH


   - Maintain SPO2 > 90% 





ID: Profound septic shock 


   - source: pulmonary vs. gallbladder


      * negative CT abd + pelvis w/ contrast


   - elevated WBC with CXR infiltrate and persistent fevers


      *blood culture -ve


      *IV tynelol for spike fevers


      *f/u daily CXR


   - positive sputum, blood and biliary fluid cultures (see micro lab)


   - cavity/cyst on CT chest


      *staph vs. aspergillus


   - cont. vanco, ertapanem, voriconazole





GI: Acute cholecystitis s/p percutaneous drain


   - abnormal LFT likely 2/2 gallbladder abscess


      * cholangiogram negative for abscess, stone, obstruction


      * cont. to monitor liver chemistries


   - cont. PPI drip





Heme: DIC with sepsis


   - low plt and fibrinogen


      * f/u daily coag.


   - low H&H


      * maintain H/H above 8 gm





Neuro: off sedation


   - off propofol gtt and fentanyl gtt


   - repeat CT confirmed old infarct


   - f/u on a1c, lipid profile, carotid doppler





Cardiac: HTN


   - resumed carvedilol


   - maintain MAP > 65%





Endo: DM2


   - on sliding scale


   - BGM





FEN


   - fluid restrict due to pleural effusion


   - electrolytes normal


   - cont. tube feed vital 1.2





Prophylaxis 


   - DVT: SCD and heparin SQ 5000 BID


   - GI: protonix 





Disposition


   - cont. to monitor in ICU





Code status


   - Full code





Visit type





- Emergency Visit


Emergency Visit: No





- New Patient


This patient is new to me today: No





- Critical Care


Critical Care patient: Yes


Total Critical Care Time (in minutes): 45


Critical Care Statement: The care of this patient involved high complexity 

decision making to prevent further life threatening deterioration of the patient

's condition and/or to evalute & treat vital organ system(s) failure or risk of 

failure.

## 2017-02-22 NOTE — PN
Progress Note, Physician


History of Present Illness: 


Extubated on 40% VM, hemodynamics stable. Low grade fevers.





- Current Medication List


Current Medications: 


Active Medications





Acetaminophen (Tylenol -)  650 mg PO Q4H PRN


   PRN Reason: FEVER OR PAIN


   Last Admin: 02/16/17 13:46 Dose:  650 mg


Acetaminophen (Ofirmev Injection -)  1,000 mg IVPB Q6H PRN


   PRN Reason: FEVER


   Last Admin: 02/22/17 09:53 Dose:  1,000 mg


Artificial Tears (Artificial Tears)  1 drop OU TID PRN


   PRN Reason: DRY EYES


Aspirin (Asa -)  325 mg NGT DAILY [facility]


   Last Admin: 02/22/17 09:23 Dose:  325 mg


Atorvastatin Calcium (Lipitor -)  20 mg PO HS [facility]


   Last Admin: 02/21/17 21:26 Dose:  20 mg


Bisacodyl (Dulcolax Suppository -)  10 mg RC PRN PRN


   PRN Reason: CONSTIPATION


Carvedilol (Coreg -)  3.125 mg NGT BID [facility]


   Last Admin: 02/22/17 09:32 Dose:  3.125 mg


Chlorhexidine Gluconate (Peridex -)  15 ml MM BID [facility]


   Last Admin: 02/22/17 09:23 Dose:  15 ml


Heparin Sodium (Porcine) (Heparin -)  5,000 unit SQ BID [facility]


   Last Admin: 02/22/17 09:23 Dose:  5,000 unit


Voriconazole 320 mg/ Dextrose  282 mls @ 141 mls/hr IVPB BID [facility]


   Last Admin: 02/22/17 11:25 Dose:  141 mls/hr


Ertapenem (Invanz (Pre-Docked))  50 mls @ 50 mls/hr IVPB DAILY [facility]


   PRN Reason: Protocol


   Last Admin: 02/22/17 09:09 Dose:  50 mls/hr


Propofol (Diprivan -)  100 mls @ 2.457 mls/hr IVPB TITR [facility]; 5 MCG/KG/MIN


   PRN Reason: Protocol


   Last Admin: 02/22/17 08:07 Dose:  12.285 mls/hr


Famotidine/Sodium Chloride (Pepcid 20 Mg Premixed Ivpb -)  50 mls @ 100 mls/hr 

IVPB BID [facility]


   Last Admin: 02/22/17 09:09 Dose:  100 mls/hr


Insulin Aspart (Novolog Vial Sliding Scale -)  1 vial SQ ACHS [facility]


   PRN Reason: Protocol


   Last Admin: 02/22/17 06:27 Dose:  Not Given


Polyethylene Glycol (Miralax (For Daily Use) -)  17 gm NGT BID [facility]


   Last Admin: 02/21/17 21:28 Dose:  17 grams


Vancomycin HCl (Vancomycin (Pre-Docked))  1,000 mg IVPB BID [facility]


   PRN Reason: Protocol


   Last Admin: 02/22/17 09:53 Dose:  1,000 mg











- Objective


Vital Signs: 


 Vital Signs











Temperature  101.1 F H  02/22/17 10:00


 


Pulse Rate  87   02/22/17 10:00


 


Respiratory Rate  30 H  02/22/17 11:33


 


Blood Pressure  141/53   02/22/17 10:00


 


O2 Sat by Pulse Oximetry (%)  96   02/21/17 20:11











Constitutional: Yes: No Distress, Calm


Neck: Yes: Supple


Respiratory: Yes: Regular, Diminished, On Venti-Mask


Gastrointestinal: Yes: Soft, Hypoactive Bowel Sounds


Edema: No


Labs: 


 CBC, BMP





 02/22/17 05:20 





 02/22/17 05:20 





 INR, PTT











INR  1.29  (0.82-1.09)  H  02/22/17  05:20    


 


Fibrinogen  102.0 mg/dL (238-498)  L  02/22/17  05:20    














- ....Imaging


Chest X-ray: Report Reviewed (Mild congestion, small left effusion)





Problem List





- Problems


(1) Acute respiratory failure with hypoxia


Code(s): J96.01 - ACUTE RESPIRATORY FAILURE WITH HYPOXIA





(2) COPD (chronic obstructive pulmonary disease)


Code(s): J44.9 - CHRONIC OBSTRUCTIVE PULMONARY DISEASE, UNSPECIFIED   Qualifiers

: 


     COPD type: unspecified COPD        Qualified Code(s): J44.9 - Chronic 

obstructive pulmonary disease, unspecified  





(3) Cholecystitis, acute


Code(s): K81.0 - ACUTE CHOLECYSTITIS





(4) DIC (disseminated intravascular coagulation)


Code(s): D65 - DISSEMINATED INTRAVASCULAR COAGULATION





(5) Demand ischemia


Code(s): I24.8 - OTHER FORMS OF ACUTE ISCHEMIC HEART DISEASE





(6) Diastolic CHF


Code(s): I50.30 - UNSPECIFIED DIASTOLIC (CONGESTIVE) HEART FAILURE   Qualifiers

: 


     Congestive heart failure chronicity: chronic        Qualified Code(s): 

I50.32 - Chronic diastolic (congestive) heart failure  





(7) Gram-negative bacteremia


Code(s): R78.81 - BACTEREMIA





(8) HLD (hyperlipidemia)


Code(s): E78.5 - HYPERLIPIDEMIA, UNSPECIFIED   Qualifiers: 


     Hyperlipidemia type: pure hypercholesterolemia        Qualified Code(s): 

E78.0 - Pure hypercholesterolemia  





(9) HTN (hypertension)


Code(s): I10 - ESSENTIAL (PRIMARY) HYPERTENSION   Qualifiers: 


     Hypertension type: essential hypertension        Qualified Code(s): I10 - 

Essential (primary) hypertension  





(10) Hypokalemia


Code(s): E87.6 - HYPOKALEMIA





(11) Septic shock


Code(s): A41.9 - SEPSIS, UNSPECIFIED ORGANISM


R65.21 - SEVERE SEPSIS WITH SEPTIC SHOCK





(12) Cholecystostomy care


Code(s): Z43.4 - ENCOUNTER FOR ATTN TO OTH ARTIF OPENINGS OF DIGESTIVE TRACT








Assessment/Plan


1. Post hypoxic respiratory failure referable to septic shock (E. coli 

bacteremia, fungal mold related aspergillus, enterobacter sp in biliary drain)


2. Acute cholecystitis post cholecystostomy drain


3. Demand ischemia


4. Hyperglycemia


5. Hypokalemia


6. History of TIA


7. History of COPD


8. History of diverticular disease


9. History of colitis


10. History of renal carcinoma post left nephrectomy


11. History of prostate carcinoma


12. HTN


13. Hyperlipidemia


14. DIC


15. Anemia





PLAN:


1. Wean FIO2 to maintain saO2


2. Resume cardiac medications (Carvedilol, Losartan and Amlodipine) as 

hemodynamics tolerate, continue  qd, Lipitor 20 qhs


3. Monitor CBC and transfuse as needed maintaining Hgb > 8.0


4. Antibiotic and antifungal as per ID service


5. Cholangiogram through cholecystotomy once stable


6. DVT/GI prophylaxis

## 2017-02-22 NOTE — PN
Progress Note, Physician


History of Present Illness: 


85 year old man with history of hypertension, hyperlipidemia, prostate cancer, 

kidney cancer status post nephrectomy, COPD, presented to ED with abdominal 

pain secondary to acute cholecystitis, status post cholecystectomy Feb 13, 2017. Patient subsequently developed acute respiratory failure requiring 

intubation and septic shock requiring pressors. On Feb 18th patient underwent 

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 

chronic. Neurology consulted for CT head findings.





Patient is currently intubated, on propofol. He is able to follow commands and 

exam limited but appears to be non focal.  





- Current Medication List


Current Medications: 


Active Medications





Acetaminophen (Tylenol -)  650 mg PO Q4H PRN


   PRN Reason: FEVER OR PAIN


   Last Admin: 02/16/17 13:46 Dose:  650 mg


Acetaminophen (Ofirmev Injection -)  1,000 mg IVPB Q6H PRN


   PRN Reason: FEVER


   Last Admin: 02/22/17 09:53 Dose:  1,000 mg


Artificial Tears (Artificial Tears)  1 drop OU TID PRN


   PRN Reason: DRY EYES


Aspirin (Asa -)  325 mg NGT DAILY [facility]


   Last Admin: 02/22/17 09:23 Dose:  325 mg


Atorvastatin Calcium (Lipitor -)  20 mg PO HS [facility]


   Last Admin: 02/21/17 21:26 Dose:  20 mg


Bisacodyl (Dulcolax Suppository -)  10 mg RC PRN PRN


   PRN Reason: CONSTIPATION


Carvedilol (Coreg -)  6.25 mg NGT BID [facility]


Chlorhexidine Gluconate (Peridex -)  15 ml MM BID [facility]


   Last Admin: 02/22/17 09:23 Dose:  15 ml


Heparin Sodium (Porcine) (Heparin -)  5,000 unit SQ BID [facility]


   Last Admin: 02/22/17 09:23 Dose:  5,000 unit


Voriconazole 320 mg/ Dextrose  282 mls @ 141 mls/hr IVPB BID [facility]


   Last Admin: 02/22/17 11:25 Dose:  141 mls/hr


Ertapenem (Invanz (Pre-Docked))  50 mls @ 50 mls/hr IVPB DAILY [facility]


   PRN Reason: Protocol


   Last Admin: 02/22/17 09:09 Dose:  50 mls/hr


Propofol (Diprivan -)  100 mls @ 2.457 mls/hr IVPB TITR [facility]; 5 MCG/KG/MIN


   PRN Reason: Protocol


   Last Admin: 02/22/17 08:07 Dose:  12.285 mls/hr


Famotidine/Sodium Chloride (Pepcid 20 Mg Premixed Ivpb -)  50 mls @ 100 mls/hr 

IVPB BID [facility]


   Last Admin: 02/22/17 09:09 Dose:  100 mls/hr


Insulin Aspart (Novolog Vial Sliding Scale -)  1 vial SQ ACHS [facility]


   PRN Reason: Protocol


   Last Admin: 02/22/17 06:27 Dose:  Not Given


Polyethylene Glycol (Miralax (For Daily Use) -)  17 gm NGT BID [facility]


   Last Admin: 02/21/17 21:28 Dose:  17 grams


Vancomycin HCl (Vancomycin (Pre-Docked))  1,000 mg IVPB BID [facility]


   PRN Reason: Protocol


   Last Admin: 02/22/17 09:53 Dose:  1,000 mg











- Objective


Vital Signs: 


 Vital Signs











Temperature  101.1 F H  02/22/17 10:00


 


Pulse Rate  87   02/22/17 10:00


 


Respiratory Rate  30 H  02/22/17 11:33


 


Blood Pressure  141/53   02/22/17 10:00


 


O2 Sat by Pulse Oximetry (%)  96   02/21/17 20:11











Constitutional: Yes: No Distress


Eyes: Yes: Conjunctiva Clear, EOM Intact


HENT: Yes: Normocephalic


Cardiovascular: Yes: S1, S2


Neurological: Yes: Other (intubated, limited exam opens eyes to verbal stimuli, 

corneal reflex intact bilaterally, following one step commands motor withdraws 

equally in all ext)


Labs: 


 CBC, BMP





 02/22/17 05:20 





 02/22/17 05:20 





 INR, PTT











INR  1.29  (0.82-1.09)  H  02/22/17  05:20    


 


Fibrinogen  102.0 mg/dL (238-498)  L  02/22/17  05:20    














Assessment/Plan


85 year old man with history of hypertension, hyperlipidemia, prostate cancer, 

kidney cancer status post nephrectomy, COPD, presented to ED with abdominal 

pain secondary to acute cholecystitis, status post cholecystectomy Feb 13, 2017. Patient subsequently developed acute respiratory failure requiring 

intubation and septic shock requiring pressors. On Feb 18th patient underwent 

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 

chronic. Neurology consulted for CT head findings.





Patient is currently intubated, on propofol. He is able to follow commands and 

exam limited but appears to be non focal. 





Ischemic infart- old


Repeat CT head shows old left occipital stroke


Continue aspirin 325 mg daily when able


Continue atorvastatin 20 mg daily


Carotid doppler no sig stenosis


Echo recently completed Feb 2017- EF within normal limits





Metabolic encephalopathy


In setting of sepsis


Patient following commands, continue supportive care





Will follow intermittently, please call if further questions

## 2017-02-22 NOTE — PN
Progress Note (short form)





- Note


Progress Note: 


Patient seen and examined





Awake, alert 


denies any complaints








 Last Vital Signs











Temp Pulse Resp BP Pulse Ox


 


 101.8 F H  90   25 H  150/62   95 


 


 02/22/17 22:00  02/22/17 22:00  02/22/17 22:00  02/22/17 22:00  02/22/17 21:00











HEENT: BOGDAN, EOM Intact


Oropharynx: No thrush, No mucositis


Cor: RSR, No murmurs, No gallops


Lungs: Clear to P&A


Abd: Soft, Normal bowel sounds, No organomegaly


Ext:No significant edema








 Abnormal Lab Results











  02/13/17 02/22/17 02/22/17





  06:50 05:20 05:20


 


WBC   


 


RBC   


 


Hgb   


 


Hct   


 


INR   


 


PTT (Actin FS)   


 


Fibrinogen   


 


ABG pH   


 


ABG pO2 at Pt Temp   


 


ABG HCO3   


 


ABG O2 Content   


 


ABG Base Excess   


 


BUN   30 H 


 


Random Glucose   147 H 


 


Hemoglobin A1c %    6.5 H


 


Calcium   7.4 L 


 


AST   53 H D 


 


Alkaline Phosphatase   395 H 


 


Total Protein   4.9 L 


 


Albumin   1.9 L 


 


Triglycerides   407 H D 


 


HDL Cholesterol   12 L D 


 


Crossmatch  See Detail  














  02/22/17 02/22/17 02/22/17





  05:20 05:20 05:20


 


WBC  11.0 H  


 


RBC  3.69 L  


 


Hgb  11.0 L  


 


Hct  31.2 L  


 


INR   1.29 H 


 


PTT (Actin FS)    35.3 H


 


Fibrinogen   102.0 L 


 


ABG pH   


 


ABG pO2 at Pt Temp   


 


ABG HCO3   


 


ABG O2 Content   


 


ABG Base Excess   


 


BUN   


 


Random Glucose   


 


Hemoglobin A1c %   


 


Calcium   


 


AST   


 


Alkaline Phosphatase   


 


Total Protein   


 


Albumin   


 


Triglycerides   


 


HDL Cholesterol   


 


Crossmatch   














  02/22/17





  07:15


 


WBC 


 


RBC 


 


Hgb 


 


Hct 


 


INR 


 


PTT (Actin FS) 


 


Fibrinogen 


 


ABG pH  7.47 H


 


ABG pO2 at Pt Temp  60.4 L


 


ABG HCO3  28.9 H


 


ABG O2 Content  14.0 L


 


ABG Base Excess  5.1 H


 


BUN 


 


Random Glucose 


 


Hemoglobin A1c % 


 


Calcium 


 


AST 


 


Alkaline Phosphatase 


 


Total Protein 


 


Albumin 


 


Triglycerides 


 


HDL Cholesterol 


 


Crossmatch 








 Current Medications





Acetaminophen (Tylenol -)  650 mg PO Q4H PRN


   PRN Reason: FEVER OR PAIN


   Last Admin: 02/16/17 13:46 Dose:  650 mg


Acetaminophen (Ofirmev Injection -)  1,000 mg IVPB Q6H PRN


   PRN Reason: FEVER


   Last Admin: 02/22/17 18:35 Dose:  1,000 mg


Artificial Tears (Artificial Tears)  1 drop OU TID PRN


   PRN Reason: DRY EYES


Aspirin (Asa -)  325 mg NGT DAILY [facility]


   Last Admin: 02/22/17 09:23 Dose:  325 mg


Atorvastatin Calcium (Lipitor -)  20 mg PO HS [facility]


   Last Admin: 02/22/17 21:44 Dose:  Not Given


Bisacodyl (Dulcolax Suppository -)  10 mg RC PRN PRN


   PRN Reason: CONSTIPATION


Carvedilol (Coreg -)  6.25 mg NGT BID [facility]


   Last Admin: 02/22/17 21:44 Dose:  Not Given


Chlorhexidine Gluconate (Peridex -)  15 ml MM BID [facility]


   Last Admin: 02/22/17 21:43 Dose:  Not Given


Heparin Sodium (Porcine) (Heparin -)  5,000 unit SQ BID [facility]


   Last Admin: 02/22/17 21:52 Dose:  5,000 unit


Voriconazole 320 mg/ Dextrose  282 mls @ 141 mls/hr IVPB BID [facility]


   Last Admin: 02/22/17 21:51 Dose:  141 mls/hr


Ertapenem (Invanz (Pre-Docked))  50 mls @ 50 mls/hr IVPB DAILY [facility]


   PRN Reason: Protocol


   Last Admin: 02/22/17 09:09 Dose:  50 mls/hr


Famotidine/Sodium Chloride (Pepcid 20 Mg Premixed Ivpb -)  50 mls @ 100 mls/hr 

IVPB BID [facility]


   Last Admin: 02/22/17 21:51 Dose:  100 mls/hr


Insulin Aspart (Novolog Vial Sliding Scale -)  1 vial SQ ACHS [facility]


   PRN Reason: Protocol


   Last Admin: 02/22/17 22:20 Dose:  Not Given


Polyethylene Glycol (Miralax (For Daily Use) -)  17 gm NGT BID [facility]


   Last Admin: 02/22/17 21:43 Dose:  Not Given


Vancomycin HCl (Vancomycin (Pre-Docked))  1,000 mg IVPB BID [facility]


   PRN Reason: Protocol


   Last Admin: 02/22/17 21:51 Dose:  1,000 mg











A/P








 Respiratory failure--on ventimask


Fevers- continuing on antibiotic therapy


Coagulopathy- underling sepsis, liver disease, 


Hypofibrinogenemia- s/p 2 units of cryoppt - fibrinogen-116


Cerebral infarction-ASA per Neurology


DVT prophylaxis- heparin therapy





Difficult problem:


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 

Biliary or pulmonary infection, hypofibrinogenemia and now with multiple 

cerebral infarcts. 


Risk of bleeding not insignificant. Will need to attempt to maintain fibrinogen

  at > 100 while on asa and heparin prophylaxis.

## 2017-02-23 LAB
ALBUMIN SERPL-MCNC: 2.4 G/DL (ref 3.4–5)
ALP SERPL-CCNC: 447 U/L (ref 45–117)
ALT SERPL-CCNC: 45 U/L (ref 12–78)
ANION GAP SERPL CALC-SCNC: 9 MMOL/L (ref 8–16)
APTT BLD: 36.1 SECONDS (ref 26.9–34.4)
ART PUNCT SITE: (no result)
ARTERIAL PATENCY WRIST A: POSITIVE
AST SERPL-CCNC: 46 U/L (ref 15–37)
BASE EXCESS BLDA CALC-SCNC: 7.7 MEQ/L (ref -2–2)
BILIRUB SERPL-MCNC: 1 MG/DL (ref 0.2–1)
CALCIUM SERPL-MCNC: 8.2 MG/DL (ref 8.5–10.1)
CO2 SERPL-SCNC: 33 MMOL/L (ref 21–32)
CREAT SERPL-MCNC: 1.1 MG/DL (ref 0.7–1.3)
DEPRECATED RDW RBC AUTO: 15.3 % (ref 11.9–15.9)
FIBRINOGEN PPP-MCNC: < 100 MG/DL (ref 238–498)
GLUCOSE SERPL-MCNC: 107 MG/DL (ref 74–106)
HCO3 BLDA-SCNC: 31.8 MEQ/L (ref 22–26)
INR BLD: 1.35 (ref 0.82–1.09)
LPM/O2%: (no result)
MAGNESIUM SERPL-MCNC: 2 MG/DL (ref 1.8–2.4)
MCH RBC QN AUTO: 29.7 PG (ref 25.7–33.7)
MCHC RBC AUTO-ENTMCNC: 34.8 G/DL (ref 32–35.9)
MCV RBC: 85.3 FL (ref 80–96)
PEEP SETTING VENT: 0 CMH2O
PHOSPHATE SERPL-MCNC: 2.7 MG/DL (ref 2.5–4.9)
PLATELET # BLD AUTO: 359 K/MM3 (ref 134–434)
PMV BLD: 8.9 FL (ref 7.5–11.1)
PO2 BLDA: 76.1 MMHG (ref 68–100)
PROT SERPL-MCNC: 5.7 G/DL (ref 6.4–8.2)
PT PNL PPP: 14.9 SEC (ref 9.98–11.88)
PT. ON O2?: YES
SAO2 % BLDA: 95.6 % (ref 90–98.9)
TYPE OF O2: (no result)
WBC # BLD AUTO: 9.6 K/MM3 (ref 4–10)

## 2017-02-23 RX ADMIN — POLYETHYLENE GLYCOL 3350 SCH: 17 POWDER, FOR SOLUTION ORAL at 09:17

## 2017-02-23 RX ADMIN — LOSARTAN POTASSIUM SCH MG: 25 TABLET, FILM COATED ORAL at 12:00

## 2017-02-23 RX ADMIN — FAMOTIDINE SCH MLS/HR: 20 INJECTION, SOLUTION INTRAVENOUS at 09:07

## 2017-02-23 RX ADMIN — CARVEDILOL SCH MG: 6.25 TABLET, FILM COATED ORAL at 09:06

## 2017-02-23 RX ADMIN — CHLORHEXIDINE GLUCONATE 0.12% ORAL RINSE SCH: 1.2 LIQUID ORAL at 09:07

## 2017-02-23 RX ADMIN — ASPIRIN 325 MG ORAL TABLET SCH MG: 325 PILL ORAL at 09:06

## 2017-02-23 RX ADMIN — CARVEDILOL SCH MG: 6.25 TABLET, FILM COATED ORAL at 21:10

## 2017-02-23 RX ADMIN — INSULIN ASPART SCH: 100 INJECTION, SOLUTION INTRAVENOUS; SUBCUTANEOUS at 12:00

## 2017-02-23 RX ADMIN — FAMOTIDINE SCH MLS/HR: 20 INJECTION, SOLUTION INTRAVENOUS at 21:10

## 2017-02-23 RX ADMIN — ERTAPENEM SODIUM SCH MLS/HR: 1 INJECTION, POWDER, LYOPHILIZED, FOR SOLUTION INTRAMUSCULAR; INTRAVENOUS at 09:07

## 2017-02-23 RX ADMIN — INSULIN ASPART SCH: 100 INJECTION, SOLUTION INTRAVENOUS; SUBCUTANEOUS at 21:17

## 2017-02-23 RX ADMIN — VORICONAZOLE SCH MLS/HR: 10 INJECTION, POWDER, LYOPHILIZED, FOR SOLUTION INTRAVENOUS at 21:35

## 2017-02-23 RX ADMIN — INSULIN ASPART SCH: 100 INJECTION, SOLUTION INTRAVENOUS; SUBCUTANEOUS at 06:44

## 2017-02-23 RX ADMIN — ATORVASTATIN CALCIUM SCH MG: 20 TABLET, FILM COATED ORAL at 21:10

## 2017-02-23 RX ADMIN — VORICONAZOLE SCH MLS/HR: 10 INJECTION, POWDER, LYOPHILIZED, FOR SOLUTION INTRAVENOUS at 12:16

## 2017-02-23 RX ADMIN — INSULIN ASPART SCH: 100 INJECTION, SOLUTION INTRAVENOUS; SUBCUTANEOUS at 16:23

## 2017-02-23 RX ADMIN — HEPARIN SODIUM SCH UNIT: 5000 INJECTION, SOLUTION INTRAVENOUS; SUBCUTANEOUS at 09:06

## 2017-02-23 RX ADMIN — HEPARIN SODIUM SCH UNIT: 5000 INJECTION, SOLUTION INTRAVENOUS; SUBCUTANEOUS at 21:10

## 2017-02-23 RX ADMIN — POLYETHYLENE GLYCOL 3350 SCH: 17 POWDER, FOR SOLUTION ORAL at 21:10

## 2017-02-23 NOTE — PN
Progress Note, Physician


History of Present Illness: 


Extubated on NC, hemodynamics stable. Low grade fevers.





- Current Medication List


Current Medications: 


Active Medications





Acetaminophen (Tylenol -)  650 mg PO Q4H PRN


   PRN Reason: FEVER OR PAIN


   Last Admin: 02/16/17 13:46 Dose:  650 mg


Acetaminophen (Ofirmev Injection -)  1,000 mg IVPB Q6H PRN


   PRN Reason: FEVER


   Last Admin: 02/22/17 18:35 Dose:  1,000 mg


Artificial Tears (Artificial Tears)  1 drop OU TID PRN


   PRN Reason: DRY EYES


Aspirin (Asa -)  325 mg NGT DAILY [facility]


   Last Admin: 02/23/17 09:06 Dose:  325 mg


Atorvastatin Calcium (Lipitor -)  20 mg PO HS [facility]


   Last Admin: 02/22/17 21:44 Dose:  Not Given


Bisacodyl (Dulcolax Suppository -)  10 mg RC PRN PRN


   PRN Reason: CONSTIPATION


Carvedilol (Coreg -)  6.25 mg NGT BID [facility]


   Last Admin: 02/23/17 09:06 Dose:  6.25 mg


Chlorhexidine Gluconate (Peridex -)  15 ml MM BID [facility]


   Last Admin: 02/23/17 09:07 Dose:  Not Given


Heparin Sodium (Porcine) (Heparin -)  5,000 unit SQ BID [facility]


   Last Admin: 02/23/17 09:06 Dose:  5,000 unit


Voriconazole 320 mg/ Dextrose  282 mls @ 141 mls/hr IVPB BID [facility]


   Last Admin: 02/22/17 21:51 Dose:  141 mls/hr


Ertapenem (Invanz (Pre-Docked))  50 mls @ 50 mls/hr IVPB DAILY [facility]


   PRN Reason: Protocol


   Last Admin: 02/23/17 09:07 Dose:  50 mls/hr


Famotidine/Sodium Chloride (Pepcid 20 Mg Premixed Ivpb -)  50 mls @ 100 mls/hr 

IVPB BID [facility]


   Last Admin: 02/23/17 09:07 Dose:  100 mls/hr


Insulin Aspart (Novolog Vial Sliding Scale -)  1 vial SQ ACHS [facility]


   PRN Reason: Protocol


   Last Admin: 02/23/17 06:44 Dose:  Not Given


Polyethylene Glycol (Miralax (For Daily Use) -)  17 gm NGT BID [facility]


   Last Admin: 02/23/17 09:17 Dose:  Not Given











- Objective


Vital Signs: 


 Vital Signs











Temperature  98.4 F   02/23/17 08:00


 


Pulse Rate  84   02/23/17 08:00


 


Respiratory Rate  23   02/23/17 08:00


 


Blood Pressure  130/73   02/23/17 08:00


 


O2 Sat by Pulse Oximetry (%)  91 L  02/23/17 08:00











Constitutional: Yes: No Distress, Calm


Neck: Yes: Supple


Cardiovascular: Yes: Regular Rate and Rhythm


Respiratory: Yes: Regular, Diminished, On Nasal O2, Rhonchi


Gastrointestinal: Yes: Normal Bowel Sounds, Soft, Hypoactive Bowel Sounds


Edema: No


Labs: 


 CBC, BMP





 02/23/17 05:10 





 02/23/17 05:10 





 INR, PTT











INR  1.35  (0.82-1.09)  H  02/23/17  05:10    


 


Fibrinogen  < 100.0 mg/dL (238-498)  L*  02/23/17  05:10    














Problem List





- Problems


(1) Acute respiratory failure with hypoxia


Code(s): J96.01 - ACUTE RESPIRATORY FAILURE WITH HYPOXIA





(2) COPD (chronic obstructive pulmonary disease)


Code(s): J44.9 - CHRONIC OBSTRUCTIVE PULMONARY DISEASE, UNSPECIFIED   Qualifiers

: 


     COPD type: unspecified COPD        Qualified Code(s): J44.9 - Chronic 

obstructive pulmonary disease, unspecified  





(3) Cholecystitis, acute


Code(s): K81.0 - ACUTE CHOLECYSTITIS





(4) DIC (disseminated intravascular coagulation)


Code(s): D65 - DISSEMINATED INTRAVASCULAR COAGULATION





(5) Demand ischemia


Code(s): I24.8 - OTHER FORMS OF ACUTE ISCHEMIC HEART DISEASE





(6) Diastolic CHF


Code(s): I50.30 - UNSPECIFIED DIASTOLIC (CONGESTIVE) HEART FAILURE   Qualifiers

: 


     Congestive heart failure chronicity: chronic        Qualified Code(s): 

I50.32 - Chronic diastolic (congestive) heart failure  





(7) Gram-negative bacteremia


Code(s): R78.81 - BACTEREMIA





(8) HLD (hyperlipidemia)


Code(s): E78.5 - HYPERLIPIDEMIA, UNSPECIFIED   Qualifiers: 


     Hyperlipidemia type: pure hypercholesterolemia        Qualified Code(s): 

E78.0 - Pure hypercholesterolemia  





(9) HTN (hypertension)


Code(s): I10 - ESSENTIAL (PRIMARY) HYPERTENSION   Qualifiers: 


     Hypertension type: essential hypertension        Qualified Code(s): I10 - 

Essential (primary) hypertension  





(10) Cholecystostomy care


Code(s): Z43.4 - ENCOUNTER FOR ATTN TO OTH ARTIF OPENINGS OF DIGESTIVE TRACT








Assessment/Plan


1. Post hypoxic respiratory failure referable to septic shock improved


2. Acute cholecystitis post cholecystostomy drain


3. Demand ischemia


4. Hyperglycemia


5. Hypokalemia


6. History of TIA


7. History of COPD


8. History of diverticular disease


9. History of colitis


10. History of renal carcinoma post left nephrectomy


11. History of prostate carcinoma


12. HTN


13. Hyperlipidemia


14. Low grade DIC


15. Anemia





PLAN:


1. Wean FIO2 to maintain saO2


2. Resume cardiac medications (Carvedilol, Losartan and Amlodipine) as 

hemodynamics tolerate, continue  qd, Lipitor 20 qhs


3. Monitor CBC and transfuse as needed maintaining Hgb > 8.0


4. Antibiotic and antifungal course as per ID service


5. Cholangiogram through cholecystotomy results noted, cryoprecipitate to 

maintain fibrinogen>100


6. DVT/GI prophylaxis


7. OOB to chair

## 2017-02-23 NOTE — PN
Progress Note, Physician


Chief Complaint: 


ID








Extubated on Ventimask








vancomycin Ertepenem Voriconconazole





Temp of 102 recorded was incorrect





- Current Medication List


Current Medications: 


Active Medications





Acetaminophen (Tylenol -)  650 mg PO Q4H PRN


   PRN Reason: FEVER OR PAIN


   Last Admin: 02/16/17 13:46 Dose:  650 mg


Acetaminophen (Ofirmev Injection -)  1,000 mg IVPB Q6H PRN


   PRN Reason: FEVER


   Last Admin: 02/22/17 18:35 Dose:  1,000 mg


Artificial Tears (Artificial Tears)  1 drop OU TID PRN


   PRN Reason: DRY EYES


Aspirin (Asa -)  325 mg NGT DAILY [facility]


   Last Admin: 02/22/17 09:23 Dose:  325 mg


Atorvastatin Calcium (Lipitor -)  20 mg PO HS [facility]


   Last Admin: 02/22/17 21:44 Dose:  Not Given


Bisacodyl (Dulcolax Suppository -)  10 mg RC PRN PRN


   PRN Reason: CONSTIPATION


Carvedilol (Coreg -)  6.25 mg NGT BID [facility]


   Last Admin: 02/22/17 21:44 Dose:  Not Given


Chlorhexidine Gluconate (Peridex -)  15 ml MM BID [facility]


   Last Admin: 02/22/17 21:43 Dose:  Not Given


Heparin Sodium (Porcine) (Heparin -)  5,000 unit SQ BID [facility]


   Last Admin: 02/22/17 21:52 Dose:  5,000 unit


Voriconazole 320 mg/ Dextrose  282 mls @ 141 mls/hr IVPB BID [facility]


   Last Admin: 02/22/17 21:51 Dose:  141 mls/hr


Ertapenem (Invanz (Pre-Docked))  50 mls @ 50 mls/hr IVPB DAILY [facility]


   PRN Reason: Protocol


   Last Admin: 02/22/17 09:09 Dose:  50 mls/hr


Famotidine/Sodium Chloride (Pepcid 20 Mg Premixed Ivpb -)  50 mls @ 100 mls/hr 

IVPB BID [facility]


   Last Admin: 02/22/17 21:51 Dose:  100 mls/hr


Insulin Aspart (Novolog Vial Sliding Scale -)  1 vial SQ ACHS [facility]


   PRN Reason: Protocol


   Last Admin: 02/23/17 06:44 Dose:  Not Given


Polyethylene Glycol (Miralax (For Daily Use) -)  17 gm NGT BID [facility]


   Last Admin: 02/22/17 21:43 Dose:  Not Given


Vancomycin HCl (Vancomycin (Pre-Docked))  1,000 mg IVPB BID [facility]


   PRN Reason: Protocol


   Last Admin: 02/22/17 21:51 Dose:  1,000 mg











- Objective


Vital Signs: 


 Vital Signs











Temperature  98.4 F   02/23/17 08:00


 


Pulse Rate  84   02/23/17 08:00


 


Respiratory Rate  23   02/23/17 08:00


 


Blood Pressure  130/73   02/23/17 08:00


 


O2 Sat by Pulse Oximetry (%)  95   02/22/17 21:00











Gastrointestinal: Yes: Soft, Other (Biliary drain).  No: Tenderness


Labs: 


 CBC, BMP





 02/23/17 05:10 





 02/23/17 05:10 





 INR, PTT











INR  1.35  (0.82-1.09)  H  02/23/17  05:10    


 


Fibrinogen  < 100.0 mg/dL (238-498)  L*  02/23/17  05:10    














Problem List





- Problems


(1) Acute respiratory failure with hypoxia


Code(s): J96.01 - ACUTE RESPIRATORY FAILURE WITH HYPOXIA





(2) Cholecystitis, acute


Code(s): K81.0 - ACUTE CHOLECYSTITIS





(3) DIC (disseminated intravascular coagulation)


Code(s): D65 - DISSEMINATED INTRAVASCULAR COAGULATION





(4) Gram-negative bacteremia


Code(s): R78.81 - BACTEREMIA











Assessment/Plan


Microbiology





02/21/17 12:15   Urine - Urine [place]   Urine Culture - Final


                              NO GROWTH OBTAINED


02/21/17 11:35   Sputum - Endotrachea Suction/Ventilator   Gram Stain - Final


02/20/17 18:00   Blood - Central Line   Blood Culture - Preliminary


                              NO GROWTH OBTAINED AFTER 48 HOURS, INCUBATION TO 

CONTINUE


                              FOR 3 DAYS.


02/20/17 18:00   Blood - Central Line   Blood Culture - Preliminary


                              NO GROWTH OBTAINED AFTER 48 HOURS, INCUBATION TO 

CONTINUE


                              FOR 3 DAYS.





 Laboratory Tests











  02/23/17 02/23/17





  05:10 05:10


 


WBC  9.6 


 


Hgb  12.3  D 


 


Plt Count  359 


 


BUN   27 H


 


Creatinine   1.1


 


AST   46 H


 


ALT   45


 


Alkaline Phosphatase   447 H








Assessment





Sepsis syndrome resolved


NO FEVER !


Mold growing with bronchiectasis and ? cavitary lesion lung


MRSA suspect colonization


E Coli bacteremia


Enterobacter sps in bile culture





Plan Stop Ertepenem tomorrow


         Stop Vancomycin now


         Voriconazole continues


         Fungal cultures sent 





Anum CARBALLO

## 2017-02-23 NOTE — PN
Physical Exam: 


SUBJECTIVE: 





Patient seen and examined at bedside in the ICU. He reported feeling better but 

still weak. Had multiple bowel movements. Denies chest pain, abd pain, headache

, fever, chills, n/v. 








OBJECTIVE:





On NC 5L Sat 88-92% Vital Signs











 Period  Temp  Pulse  Resp  BP Sys/Yun  Pulse Ox


 


 Last 24 Hr  98.2 F-102.7 F  75-90  16-30  130-155/32-73  91-95











GENERAL: extubated, awake, alert, able to speak but not full sentences, follow 

commands, appears weak but improved.


NECK: clean central line wound


LUNGS: b/l rhonchi


HEART: Regular rate and rhythm, S1, S2 without murmur, rub or gallop.


ABDOMEN: Soft, nontender, mildly distended. RUQ percutaneous drain in dark 

brown color  


EXTREMITIES: warm and no edema 


NEUROLOGICAL: unable to fully assess cranial nerves, 3/5 strength in all 

extremities and sensation intact


: muñiz discontinued


 ABG Results











ABG pH  7.48  (7.35-7.45)  H  02/23/17  07:35    


 


ABG pCO2 at Pt Temp  42.8 mmHg (35-45)   02/23/17  07:35    


 


ABG pO2 at Pt Temp  76.1 mmHg ()  D 02/23/17  07:35    


 


ABG HCO3  31.8 meq/L (22-26)  H  02/23/17  07:35    


 


ABG O2 Sat (Measured)  95.6 % (90-98.9)   02/23/17  07:35    


 


ABG O2 Content  16.6 % vol (15-22)   02/23/17  07:35    


 


ABG Base Excess  7.7 meq/l (-2-2)  H  02/23/17  07:35    








 CBCD











WBC  9.6 K/mm3 (4.0-10.0)   02/23/17  05:10    


 


RBC  4.15 M/mm3 (4.00-5.60)   02/23/17  05:10    


 


Hgb  12.3 GM/dL (11.7-16.9)  D 02/23/17  05:10    


 


Hct  35.4 % (35.4-49)   02/23/17  05:10    


 


MCV  85.3 fl (80-96)   02/23/17  05:10    


 


MCHC  34.8 g/dl (32.0-35.9)   02/23/17  05:10    


 


RDW  15.3 % (11.9-15.9)   02/23/17  05:10    


 


Plt Count  359 K/MM3 (134-434)   02/23/17  05:10    


 


MPV  8.9 fl (7.5-11.1)   02/23/17  05:10    








 CMP











Sodium  145 mmol/L (136-145)   02/23/17  05:10    


 


Potassium  3.7 mmol/L (3.5-5.1)   02/23/17  05:10    


 


Chloride  103 mmol/L ()   02/23/17  05:10    


 


Carbon Dioxide  33 mmol/L (21-32)  H  02/23/17  05:10    


 


Anion Gap  9  (8-16)   02/23/17  05:10    


 


BUN  27 mg/dL (7-18)  H  02/23/17  05:10    


 


Creatinine  1.1 mg/dL (0.7-1.3)   02/23/17  05:10    


 


Creat Clearance w eGFR  > 60  (>60)   02/23/17  05:10    


 


Calcium  8.2 mg/dL (8.5-10.1)  L  02/23/17  05:10    


 


Total Bilirubin  1.0 mg/dL (0.2-1.0)  D 02/23/17  05:10    


 


AST  46 U/L (15-37)  H  02/23/17  05:10    


 


ALT  45 U/L (12-78)   02/23/17  05:10    


 


Alkaline Phosphatase  447 U/L ()  H  02/23/17  05:10    


 


Total Protein  5.7 g/dl (6.4-8.2)  L  02/23/17  05:10    


 


Albumin  2.4 g/dl (3.4-5.0)  L D 02/23/17  05:10    








 Intake & Output











 02/20/17 02/21/17 02/22/17 02/23/17





 23:59 23:59 23:59 23:59


 


Intake Total 1222 8126 2389 900


 


Output Total 1149 2980 4300 1300


 


Balance 70 -697 -0983 -400


 


Weight 83.915 kg 85.865 kg 84.992 kg 82.055 kg











Active Medications











Generic Name Dose Route Start Last Admin





  Trade Name Freq  PRN Reason Stop Dose Admin


 


Acetaminophen  650 mg 02/13/17 23:33 02/16/17 13:46





  Tylenol -  PO   650 mg





  Q4H PRN   Administration





  FEVER OR PAIN   


 


Acetaminophen  1,000 mg 02/16/17 16:07 02/22/17 18:35





  Ofirmev Injection -  IVPB   1,000 mg





  Q6H PRN   Administration





  FEVER   


 


Artificial Tears  1 drop 02/18/17 09:28  





  Artificial Tears  OU   





  TID PRN   





  DRY EYES   


 


Aspirin  325 mg 02/21/17 12:45 02/23/17 09:06





  Asa -  NGT   325 mg





  DAILY [facility]   Administration


 


Atorvastatin Calcium  20 mg 02/21/17 22:00 02/22/17 21:44





  Lipitor -  PO   Not Given





  HS [facility]   


 


Bisacodyl  10 mg 02/18/17 20:25  





  Dulcolax Suppository -  RC   





  PRN PRN   





  CONSTIPATION   


 


Carvedilol  6.25 mg 02/22/17 12:31 02/23/17 09:06





  Coreg -  NGT   6.25 mg





  BID [facility]   Administration


 


Chlorhexidine Gluconate  15 ml 02/18/17 22:00 02/23/17 09:07





  Peridex -  MM   Not Given





  BID [facility]   


 


Heparin Sodium (Porcine)  5,000 unit 02/21/17 22:00 02/23/17 09:06





  Heparin -  SQ   5,000 unit





  BID [facility]   Administration


 


Voriconazole 320 mg/ Dextrose  282 mls @ 141 mls/hr 02/18/17 10:00 02/23/17 12:

16





  IVPB   141 mls/hr





  BID [facility]   Administration


 


Ertapenem  50 mls @ 50 mls/hr 02/18/17 10:00 02/23/17 09:07





  Invanz (Pre-Docked)  IVPB   50 mls/hr





  DAILY [facility]   Administration





  Protocol   


 


Famotidine/Sodium Chloride  50 mls @ 100 mls/hr 02/20/17 22:00 02/23/17 09:07





  Pepcid 20 Mg Premixed Ivpb -  IVPB   100 mls/hr





  BID [facility]   Administration


 


Insulin Aspart  1 vial 02/14/17 22:00 02/23/17 12:00





  Novolog Vial Sliding Scale -  SQ   Not Given





  [facility]   





  Protocol   


 


Losartan Potassium  25 mg 02/23/17 10:30 02/23/17 12:00





  Cozaar -  PO   25 mg





  DAILY [facility]   Administration


 


Polyethylene Glycol  17 gm 02/18/17 22:00 02/23/17 09:17





  Miralax (For Daily Use) -  NGT   Not Given





  BID [facility]   








Microbiology





02/20/17 18:00   Blood - Central Line   Blood Culture - Preliminary


                            NO GROWTH OBTAINED AFTER 48 HOURS, INCUBATION TO 

CONTINUE


                            FOR 3 DAYS.


02/20/17 18:00   Blood - Central Line   Blood Culture - Preliminary


                            NO GROWTH OBTAINED AFTER 48 HOURS, INCUBATION TO 

CONTINUE


                            FOR 3 DAYS.


02/21/17 11:35   Sputum - Endotrachea Suction/Ventilator   Gram Stain - Final





IMAGING





CXR - 2/23: some improvement


CXR - 2/22: no significant change


CXR - 2/21: L pleural effusion and left retrocardiac density. Cardiomegaly and 

increased perihilar lung markings may reflect mild congestive changes.


CXR - 2/20: Endotracheal tube placement as described above with improving 

bibasilar infiltrates. 


CT Chest - 2/18: Bilateral lower lobe infiltrates, right more the left. Small 

bilateral pleural effusions. 1.3 x 1 cm left apical cyst/cavity with concentric 

wall thickening. There is subjacent mild left upper lobe bronchiectasis with 

associated peribronchial thickening. centrilobular emphysema. 





CT Head - 2/21: No significant interval change. Left occipital and parietal 

subcortical lucency likely representing old infarcts. Mild ethmoid chronic 

sinusitis 


CT Head - 2/18: In comparison to a previous CT exam of 4/9/2015 interval 

development of left occipital and left parietal cortical infarcts are noted 

which are probably chronic versus late subacute   








Carotid doppler 2/21: Mild intimal thickening at the right carotid bifurcation 

as well as mild intimal thickening and small plaques at the left common carotid 

bifurcation and bulb without evidence of hemodynamically significant stenosis, 

bilaterally. 





CT Abd with oral and IV contrast 2/21: Interval decrease in the degree of 

dilatation of the gallbladder. A cholecystostomy tube is again seen in 

satisfactory position. There is significant thickening of the gallbladder wall 

at the fundus with surrounding edema. No abscess is identified. Status post 

left nephrectomy. Mild right renal hydronephrosis and hydroureter without 

evidence of an obstructing stone Extensive diverticulosis coli mainly in the 

sigmoid colon without evidence of acute diverticulitis. 





ASSESSMENT/PLAN:





86 yo h/o COPD, [place], HTN, HLD, TIA, prostate CA, renal CA now s/p left 

nephrectomy was admitted to the ICU for acute respiratory failure and sepsis 

secondary to acute cholecystitis s/p percutaenous drain. Patient tolerates 

extubation well and now on NC 5L





Respiratory: Acute hypoxic respiratory failure


   - On NC 5L, maintain SpO2 > 88%


   - Improvement on CXR


   - Chest PT





ID: Profound septic shock 


   - afrebile since AM, WBC normalized


   - cavity/cyst on CT chest


      *staph vs. aspergillus


   - cont. ertapanem and voriconazole, d/c vancomycin





GI: Acute cholecystitis s/p percutaneous drain


   - no surgical intervention per surgery


      * maintain percutaneous drain x 6 weeks


   - abnormal LFT likely 2/2 gallbladder abscess


      * cholangiogram negative for abscess, stone, obstruction


      * cont. to monitor liver chemistries





Heme: DIC with sepsis


   - low plt and fibrinogen


      * 2 units of Cryo today


   - low H&H


      * maintain H/H above 8 gm





Neuro: off sedation


   - slowly returning to baseline mental status


   - neuro check Q2H





Cardiac: HTN


   - resumed carvedilol, losartan


   - maintain MAP > 65%





Endo: DM2


   - on sliding scale


   - BGM





FEN


   - fluid restrict due to pleural effusion


   - electrolytes normal


   - dysphagia diet minced + thin liquid





Prophylaxis 


   - DVT: SCD and heparin SQ 5000 BID


   - GI: on famotidine





Disposition


   - PT


   - transfer to telemetry planning





Code status


   - Full code





Visit type





- Emergency Visit


Emergency Visit: No





- New Patient


This patient is new to me today: No





- Critical Care


Critical Care patient: Yes


Total Critical Care Time (in minutes): 45


Critical Care Statement: The care of this patient involved high complexity 

decision making to prevent further life threatening deterioration of the patient

's condition and/or to evalute & treat vital organ system(s) failure or risk of 

failure.

## 2017-02-23 NOTE — PN
Physical Exam: 


SUBJECTIVE: Patient seen and examined at bedside in ICU. Pt was extubated 

yesterday. Pt states he feels depressed and just doesn't feel right. Otherwise 

he has no complaints currently and is AAOX3. He denies CP, SOB, N/V/F/C, abd 

pain. 





Was informed by nurse that pt had fever overnight that did not improve despite 

iv ofirmev and iv ibuprofen. Thermometer probe was changed and temps 

normalized. Fevers from last night most likely were from faulty probe.








OBJECTIVE:





 


 Vital Signs











Temperature  98.2 F   02/23/17 10:00


 


Pulse Rate  83   02/23/17 10:00


 


Respiratory Rate  23 02/23/17 10:00


 


Blood Pressure  136/51   02/23/17 10:00


 


O2 Sat by Pulse Oximetry (%)  91 L  02/23/17 08:00











GENERAL: AAOx3, no acute distress


HEAD: Normal with no signs of trauma.


EYES: Reactive to light.


ENT: moist mucous membranes. OG tube in place w/ feeds


NECK: Trachea midline


LUNGS: Auscultated anteriorly. coarse breath sounds b/l 


HEART: Distant heart sounds, Regular rate and rhythm, S1, S2 without murmur, 

rub or gallop.


ABDOMEN: Not tender to palpation. normoactive bowel sounds. Abd drain in place.


EXTREMITIES: 2+ pulses, warm, well-perfused, no edema in LE, edematous b/l UE. 4

/5 UE strength. 3/5 LE strength.


NEUROLOGICAL:  gait not observed. No focal neuro deficits noted.


SKIN: Warm, dry, normal turgor, no rashes or lesions noted














 Laboratory Results - last 24 hr











  02/13/17 02/21/17 02/21/17





  06:50 12:30 18:52


 


WBC   


 


RBC   


 


Hgb   


 


Hct   


 


MCV   


 


MCHC   


 


RDW   


 


Plt Count   


 


MPV   


 


INR   


 


PTT (Actin FS)   


 


Fibrinogen   


 


Puncture Site   


 


ABG pH   


 


ABG pCO2 at Pt Temp   


 


ABG pO2 at Pt Temp   


 


ABG HCO3   


 


ABG O2 Sat (Measured)   


 


ABG O2 Content   


 


ABG Base Excess   


 


Sb Test   


 


O2 Delivery Device   


 


Oxygen Flow Rate   


 


PEEP   


 


Sodium   


 


Potassium   


 


Chloride   


 


Carbon Dioxide   


 


Anion Gap   


 


BUN   


 


Creatinine   


 


Creat Clearance w eGFR   


 


POC Glucometer   [phone]  [phone]


 


Random Glucose   


 


Calcium   


 


Phosphorus   


 


Magnesium   


 


Total Bilirubin   


 


AST   


 


ALT   


 


Alkaline Phosphatase   


 


Total Protein   


 


Albumin   


 


Crossmatch  See Detail  














  02/21/17 02/22/17 02/22/17





  22:06 06:12 12:04


 


WBC   


 


RBC   


 


Hgb   


 


Hct   


 


MCV   


 


MCHC   


 


RDW   


 


Plt Count   


 


MPV   


 


INR   


 


PTT (Actin FS)   


 


Fibrinogen   


 


Puncture Site   


 


ABG pH   


 


ABG pCO2 at Pt Temp   


 


ABG pO2 at Pt Temp   


 


ABG HCO3   


 


ABG O2 Sat (Measured)   


 


ABG O2 Content   


 


ABG Base Excess   


 


Sb Test   


 


O2 Delivery Device   


 


Oxygen Flow Rate   


 


PEEP   


 


Sodium   


 


Potassium   


 


Chloride   


 


Carbon Dioxide   


 


Anion Gap   


 


BUN   


 


Creatinine   


 


Creat Clearance w eGFR   


 


POC Glucometer  [phone]  [phone]  [phone]


 


Random Glucose   


 


Calcium   


 


Phosphorus   


 


Magnesium   


 


Total Bilirubin   


 


AST   


 


ALT   


 


Alkaline Phosphatase   


 


Total Protein   


 


Albumin   


 


Crossmatch   














  02/22/17 02/22/17 02/23/17





  18:32 22:19 05:10


 


WBC    9.6


 


RBC    4.15


 


Hgb    12.3  D


 


Hct    35.4


 


MCV    85.3


 


MCHC    34.8


 


RDW    15.3


 


Plt Count    359


 


MPV    8.9


 


INR   


 


PTT (Actin FS)   


 


Fibrinogen   


 


Puncture Site   


 


ABG pH   


 


ABG pCO2 at Pt Temp   


 


ABG pO2 at Pt Temp   


 


ABG HCO3   


 


ABG O2 Sat (Measured)   


 


ABG O2 Content   


 


ABG Base Excess   


 


Sb Test   


 


O2 Delivery Device   


 


Oxygen Flow Rate   


 


PEEP   


 


Sodium   


 


Potassium   


 


Chloride   


 


Carbon Dioxide   


 


Anion Gap   


 


BUN   


 


Creatinine   


 


Creat Clearance w eGFR   


 


POC Glucometer  [phone]  [phone] 


 


Random Glucose   


 


Calcium   


 


Phosphorus   


 


Magnesium   


 


Total Bilirubin   


 


AST   


 


ALT   


 


Alkaline Phosphatase   


 


Total Protein   


 


Albumin   


 


Crossmatch   














  02/23/17 02/23/17 02/23/17





  05:10 05:10 06:23


 


WBC   


 


RBC   


 


Hgb   


 


Hct   


 


MCV   


 


MCHC   


 


RDW   


 


Plt Count   


 


MPV   


 


INR  1.35 H  


 


PTT (Actin FS)  36.1 H  


 


Fibrinogen  < 100.0 L*  


 


Puncture Site   


 


ABG pH   


 


ABG pCO2 at Pt Temp   


 


ABG pO2 at Pt Temp   


 


ABG HCO3   


 


ABG O2 Sat (Measured)   


 


ABG O2 Content   


 


ABG Base Excess   


 


Sb Test   


 


O2 Delivery Device   


 


Oxygen Flow Rate   


 


PEEP   


 


Sodium   145 


 


Potassium   3.7 


 


Chloride   103 


 


Carbon Dioxide   33 H 


 


Anion Gap   9 


 


BUN   27 H 


 


Creatinine   1.1 


 


Creat Clearance w eGFR   > 60 


 


POC Glucometer    [phone]


 


Random Glucose   107 H D 


 


Calcium   8.2 L 


 


Phosphorus   2.7 


 


Magnesium   2.0 


 


Total Bilirubin   1.0  D 


 


AST   46 H 


 


ALT   45 


 


Alkaline Phosphatase   447 H 


 


Total Protein   5.7 L 


 


Albumin   2.4 L D 


 


Crossmatch   














  02/23/17





  07:35


 


WBC 


 


RBC 


 


Hgb 


 


Hct 


 


MCV 


 


MCHC 


 


RDW 


 


Plt Count 


 


MPV 


 


INR 


 


PTT (Actin FS) 


 


Fibrinogen 


 


Puncture Site  Right radial


 


ABG pH  7.48 H


 


ABG pCO2 at Pt Temp  42.8


 


ABG pO2 at Pt Temp  76.1  D


 


ABG HCO3  31.8 H


 


ABG O2 Sat (Measured)  95.6


 


ABG O2 Content  16.6


 


ABG Base Excess  7.7 H


 


Sb Test  Positive


 


O2 Delivery Device  Venti mask


 


Oxygen Flow Rate  50%


 


PEEP  0.0


 


Sodium 


 


Potassium 


 


Chloride 


 


Carbon Dioxide 


 


Anion Gap 


 


BUN 


 


Creatinine 


 


Creat Clearance w eGFR 


 


POC Glucometer 


 


Random Glucose 


 


Calcium 


 


Phosphorus 


 


Magnesium 


 


Total Bilirubin 


 


AST 


 


ALT 


 


Alkaline Phosphatase 


 


Total Protein 


 


Albumin 


 


Crossmatch 








 Microbiology





02/20/17 18:00   Blood - Central Line   Blood Culture - Preliminary


                            NO GROWTH OBTAINED AFTER 48 HOURS, INCUBATION TO 

CONTINUE


                            FOR 3 DAYS.


02/20/17 18:00   Blood - Central Line   Blood Culture - Preliminary


                            NO GROWTH OBTAINED AFTER 48 HOURS, INCUBATION TO 

CONTINUE


                            FOR 3 DAYS.


02/21/17 11:35   Sputum - Endotrachea Suction/Ventilator   Gram Stain - Final


02/21/17 12:15   Urine - Urine [place]   Urine Culture - Final


                            NO GROWTH OBTAINED


02/13/17 22:46   Sputum - Endotrachea Suction/Ventilator   Fungus Mold 

Identification - Preliminary


02/13/17 22:41   Blood - Central Line   Blood Culture - Final


                            NO GROWTH AFTER 5 DAYS INCUBATION


02/12/17 21:40   Blood - Peripheral Venous   Blood Culture - Final


                            NO GROWTH AFTER 5 DAYS INCUBATION


02/12/17 21:40   Blood - Peripheral Venous   Blood Culture - Final


                            NO GROWTH AFTER 5 DAYS INCUBATION


02/13/17 17:30   Body Fluid - Other   Gram Stain - Final


02/13/17 17:30   Body Fluid - Other   Body Fluid Culture - Final


                            Enterobacter [place]iae


                            Enterococcus Faecium


02/13/17 17:30   Body Fluid - Other   Anaerobic Culture - Final


                            NO ANAEROBES WERE ISOLATED


02/13/17 22:46   Sputum - Endotrachea Suction/Ventilator   Gram Stain - Final


02/13/17 22:46   Sputum - Endotrachea Suction/Ventilator   Sputum Culture - 

Final


                            Fungal Isolate: Mold


                            Mr S Aureus


02/13/17 22:41   Blood - Central Line   Blood Culture - Final


                            Escherichia Coli


02/12/17 21:40   Urine - Urine Clean Catch   Urine Culture - Final


                            NO GROWTH OBTAINED








Active Medications











Generic Name Dose Route Start Last Admin





  Trade Name Freq  PRN Reason Stop Dose Admin


 


Acetaminophen  650 mg 02/13/17 23:33 02/16/17 13:46





  Tylenol -  PO   650 mg





  Q4H PRN   Administration





  FEVER OR PAIN   


 


Acetaminophen  1,000 mg 02/16/17 16:07 02/22/17 18:35





  Ofirmev Injection -  IVPB   1,000 mg





  Q6H PRN   Administration





  FEVER   


 


Artificial Tears  1 drop 02/18/17 09:28  





  Artificial Tears  OU   





  TID PRN   





  DRY EYES   


 


Aspirin  325 mg 02/21/17 12:45 02/23/17 09:06





  Asa -  NGT   325 mg





  DAILY LAUREN   Administration


 


Atorvastatin Calcium  20 mg 02/21/17 22:00 02/22/17 21:44





  Lipitor -  PO   Not Given





  HS LAUREN   


 


Bisacodyl  10 mg 02/18/17 20:25  





  Dulcolax Suppository -  RC   





  PRN PRN   





  CONSTIPATION   


 


Carvedilol  6.25 mg 02/22/17 12:31 02/23/17 09:06





  Coreg -  NGT   6.25 mg





  BID LAUREN   Administration


 


Chlorhexidine Gluconate  15 ml 02/18/17 22:00 02/23/17 09:07





  Peridex -  MM   Not Given





  BID LAUREN   


 


Heparin Sodium (Porcine)  5,000 unit 02/21/17 22:00 02/23/17 09:06





  Heparin -  SQ   5,000 unit





  BID LAUREN   Administration


 


Voriconazole 320 mg/ Dextrose  282 mls @ 141 mls/hr 02/18/17 10:00 02/22/17 21:

51





  IVPB   141 mls/hr





  BID LAUREN   Administration


 


Ertapenem  50 mls @ 50 mls/hr 02/18/17 10:00 02/23/17 09:07





  Invanz (Pre-Docked)  IVPB   50 mls/hr





  DAILY LAUREN   Administration





  Protocol   


 


Famotidine/Sodium Chloride  50 mls @ 100 mls/hr 02/20/17 22:00 02/23/17 09:07





  Pepcid 20 Mg Premixed Ivpb -  IVPB   100 mls/hr





  BID LAUREN   Administration


 


Insulin Aspart  1 vial 02/14/17 22:00 02/23/17 06:44





  Novolog Vial Sliding Scale -  SQ   Not Given





  [facility]   





  Protocol   


 


Losartan Potassium  25 mg 02/23/17 10:30  





  Cozaar -  PO   





  DAILY [facility]   


 


Polyethylene Glycol  17 gm 02/18/17 22:00 02/23/17 09:17





  Miralax (For Daily Use) -  NGT   Not Given





  BID [facility]   











ASSESSMENT/PLAN:


86 y/o M w/ PMH of [place], HTN, HLD, prostate ca, TIA, Kidney cancer s/p 

nephrectomy, COPD presented to ER with abdominal pain (RUQ) for 3 days. 

Admitted for acute cholecystitis. Perc cholecystostomy placed on 2/13/17. Pt 

developed SOB after procedure, was intubated, had central line placed, on 

pressors and sedated and in ICU for septic shock. Extubated 2/22/17.





-Septic shock secondary to Acute Cholecystitis from cholelithiasis


   -improving; s/p perc cholecystostomy 2/13


   -Tmax 24 hours: 102.7 (may be due to faulty thermometer probe); WBC: 9.6


   -Cholangiogram via cholecystostomy - adequately positioned and widely patent 

cholecystostomy tube w/patent cystic duct and CBD.


   -Abx : ertapenem, vanco; ID on board


   -Voriconazole for mold


   -Repeat BCx negative so far, awaiting mold identification


   -Will have surgery reassess gallbladder as pt will need cholecystectomy 

eventually





-Fevers


   -may be due to faulty thermometer probe, will monitor, has been afebrile 

since probe was changed.





-Acute hypoxic respiratory failure


   -improved


   -s/p extubation 2/22/17


      -Chest PT, Incentive spirometer


   -currently desaturating into 80s at times on 5L NC.


      -Ventimask 40% recommended if he continues to desaturate





-Acute vs subacute vs chronic stroke


   -Head CT: no sig interval change. L occipital and parietal subcortical 

lucency likely old infarcts.


   -Triglycerides: 407; Cholesterol: 107; LDL: 54; HDL: 12


   -Neuro on board


   -no focal neurological deficits on exam noted today





-Diastolic CHF:


   -CXR: 2/15/17: R moderate pleural effusion. L small pleural effusion.


   -ECHO: 2/14/17 - EF: 63%, LV nl size, LVSF nl, no regional wall abnl noted, 

LV impaired relaxation, mild MR, mod TR, mild AS, no pericardial effusion.


   -not on fluids currently.


   -CXR, pleural effusions still present, no sig change from previous cxr


   -not on fluids





-Elevated transaminases


   -improving


   -Secondary to septic shock


   -INR elevated as well, trending down


   -Cholangiogram via cholecystostomy - adequately positioned and widely patent 

cholecystostomy tube w/patent cystic duct and CBD.





-SANGITA secondary to septic shock


   -improving


   -BUN/Cr 27/1.1, pre-renal etiology most likely as Bun:Cr ratio >20


   -Urine output 4200 ml yesterday, 800 ml today


   -monitor urine output, BUN/Cr





-DIC secondary to septic shock


   -s/p cyroprecipitate 2 units. May require additional unit today as 

fibrinogen is <100 today.


   -fibrinogen goal >100


   -platelets trending up


   -pt on asa 325 and heparin bid, monitor platelets





-Hyperglycemia


   -Monitor sugars


   -Currently random glucose 107, on ISS


   -A1C: 6.5





-Dry eyes


   -Artifical tears tid PRN for dry eyes





-HTN


   -coreg 3.125 mg bid








-Hx of TIA


   -asa 325 mg





-HLD


   -lipitor 20mg qhs





-insomnia


   -held melatonin 5 mg po qhs prn





-BPH


   -held flomax 0.8 mg po qd


   -held finasteride 5 mg po qd





-DVT ppx


   -SCDs


   -Heparin bid





-GI ppx


   -famotidine 20 mg iv bid





-FEN


   -no fluids for now.


   -NPO currenty - awaiting swallow/speech eval





-Dispo:


   -If oxygen saturations stay above 90% on nasal cannula, can transfer to 

floors.


   -Will need physical therapy.





Problem List





- Problems


(1) COPD (chronic obstructive pulmonary disease)


Code(s): J44.9 - CHRONIC OBSTRUCTIVE PULMONARY DISEASE, UNSPECIFIED   Qualifiers

: 


     COPD type: unspecified COPD        Qualified Code(s): J44.9 - Chronic 

obstructive pulmonary disease, unspecified  





(2) Cholecystitis, acute


Code(s): K81.0 - ACUTE CHOLECYSTITIS





(3) Fever


Code(s): R50.9 - FEVER, UNSPECIFIED   Qualifiers: 


     Fever type: other     Qualified Code(s): R50.81 - Fever presenting with 

conditions classified elsewhere  





(4) Leukocytosis


Code(s): D72.829 - ELEVATED WHITE BLOOD CELL COUNT, UNSPECIFIED   Qualifiers: 


     Leukocytosis type: unspecified     Qualified Code(s): D72.829 - Elevated 

white blood cell count, unspecified  





(5) Sleep apnea


Code(s): G47.30 - SLEEP APNEA, UNSPECIFIED   





(6) Acute urinary retention


Code(s): R33.8 - OTHER RETENTION OF URINE





(7) Insomnia


Code(s): G47.00 - INSOMNIA, UNSPECIFIED   





(8) HTN (hypertension)


Code(s): I10 - ESSENTIAL (PRIMARY) HYPERTENSION   Qualifiers: 


     Hypertension type: essential hypertension        Qualified Code(s): I10 - 

Essential (primary) hypertension  





(9) HLD (hyperlipidemia)


Code(s): E78.5 - HYPERLIPIDEMIA, UNSPECIFIED   Qualifiers: 


     Hyperlipidemia type: pure hypercholesterolemia        Qualified Code(s): 

E78.0 - Pure hypercholesterolemia  





(10) BPH (benign prostatic hyperplasia)


Code(s): N40.0 - BENIGN PROSTATIC HYPERPLASIA WITHOUT LOWER URINRY TRACT SYMP   





(11) Septic shock


Code(s): A41.9 - SEPSIS, UNSPECIFIED ORGANISM


R65.21 - SEVERE SEPSIS WITH SEPTIC SHOCK





(12) Acute respiratory failure with hypoxia


Code(s): J96.01 - ACUTE RESPIRATORY FAILURE WITH HYPOXIA





(13) SANGITA (acute kidney injury)


Code(s): N17.9 - ACUTE KIDNEY FAILURE, UNSPECIFIED





(14) Transaminitis


Code(s): R74.0 - NONSPEC ELEV OF LEVELS OF TRANSAMNS & LACTIC ACID DEHYDRGNSE





(15) Upper GI bleed


Code(s): K92.2 - GASTROINTESTINAL HEMORRHAGE, UNSPECIFIED





(16) Elevated troponin


Code(s): R79.89 - OTHER SPECIFIED ABNORMAL FINDINGS OF BLOOD CHEMISTRY





(17) Demand ischemia


Code(s): I24.8 - OTHER FORMS OF ACUTE ISCHEMIC HEART DISEASE





(18) DIC (disseminated intravascular coagulation)


Code(s): D65 - DISSEMINATED INTRAVASCULAR COAGULATION





(19) Hypophosphatemia


Code(s): E83.39 - OTHER DISORDERS OF PHOSPHORUS METABOLISM





(20) Hypokalemia


Code(s): E87.6 - HYPOKALEMIA





(21) Anemia due to GI blood loss


Code(s): D50.0 - IRON DEFICIENCY ANEMIA SECONDARY TO BLOOD LOSS (CHRONIC)





(22) Diastolic CHF


Code(s): I50.30 - UNSPECIFIED DIASTOLIC (CONGESTIVE) HEART FAILURE   Qualifiers

: 


     Congestive heart failure chronicity: chronic        Qualified Code(s): 

I50.32 - Chronic diastolic (congestive) heart failure  





(23) Dry eyes


Code(s): H04.123 - DRY EYE SYNDROME OF BILATERAL LACRIMAL GLANDS








Visit type





- Emergency Visit


Emergency Visit: Yes


ED Registration Date: 02/13/17


Care time: The patient presented to the Emergency Department on the above date 

and was hospitalized for further evaluation of their emergent condition.





- New Patient


This patient is new to me today: No





- Critical Care


Critical Care patient: Yes


Total Critical Care Time (in minutes): 41


Critical Care Statement: The care of this patient involved high complexity 

decision making to prevent further life threatening deterioration of the patient

's condition and/or to evalute & treat vital organ system(s) failure or risk of 

failure.

## 2017-02-23 NOTE — PN
Teaching Attending Note


Name of Resident: Zaire Phillips


ATTENDING PHYSICIAN STATEMENT





I saw and evaluated the patient.


I reviewed the resident's note and discussed the case with the resident.


I agree with the resident's findings and plan as documented.








SUBJECTIVE:


Patient seen and examined in the ICU.  Currently extubated.  Awake and alert, 

but confused. 


More interactive with his family. 


Reports some mild right discomfort at the cholecystostomy site. 


Intermittent fever. 





CXR: Improving vascular congestion 





 Intake & Output











 02/20/17 02/21/17 02/22/17 02/23/17





 23:59 23:59 23:59 23:59


 


Intake Total 1224 2666 2389 900


 


Output Total 1140 2980 4300 1300


 


Balance 84 -314 -1911 -400


 


Weight 185 lb 189 lb 4.8 oz 187 lb 6 oz 180 lb 14.4 oz








 Last Vital Signs











Temp Pulse Resp BP Pulse Ox


 


 98.3 F   75   24   137/52   91 L


 


 02/23/17 14:00  02/23/17 14:00  02/23/17 14:00  02/23/17 14:00  02/23/17 08:00








Active Medications





Acetaminophen (Tylenol -)  650 mg PO Q4H PRN


   PRN Reason: FEVER OR PAIN


   Last Admin: 02/16/17 13:46 Dose:  650 mg


Acetaminophen (Ofirmev Injection -)  1,000 mg IVPB Q6H PRN


   PRN Reason: FEVER


   Last Admin: 02/22/17 18:35 Dose:  1,000 mg


Artificial Tears (Artificial Tears)  1 drop OU TID PRN


   PRN Reason: DRY EYES


Aspirin (Asa -)  325 mg NGT DAILY [facility]


   Last Admin: 02/23/17 09:06 Dose:  325 mg


Atorvastatin Calcium (Lipitor -)  20 mg PO HS [facility]


   Last Admin: 02/22/17 21:44 Dose:  Not Given


Bisacodyl (Dulcolax Suppository -)  10 mg RC PRN PRN


   PRN Reason: CONSTIPATION


Carvedilol (Coreg -)  6.25 mg NGT BID [facility]


   Last Admin: 02/23/17 09:06 Dose:  6.25 mg


Chlorhexidine Gluconate (Peridex -)  15 ml MM BID [facility]


   Last Admin: 02/23/17 09:07 Dose:  Not Given


Heparin Sodium (Porcine) (Heparin -)  5,000 unit SQ BID [facility]


   Last Admin: 02/23/17 09:06 Dose:  5,000 unit


Voriconazole 320 mg/ Dextrose  282 mls @ 141 mls/hr IVPB BID [facility]


   Last Admin: 02/23/17 12:16 Dose:  141 mls/hr


Ertapenem (Invanz (Pre-Docked))  50 mls @ 50 mls/hr IVPB DAILY [facility]


   PRN Reason: Protocol


   Last Admin: 02/23/17 09:07 Dose:  50 mls/hr


Famotidine/Sodium Chloride (Pepcid 20 Mg Premixed Ivpb -)  50 mls @ 100 mls/hr 

IVPB BID [facility]


   Last Admin: 02/23/17 09:07 Dose:  100 mls/hr


Insulin Aspart (Novolog Vial Sliding Scale -)  1 vial SQ ACHS [facility]


   PRN Reason: Protocol


   Last Admin: 02/23/17 12:00 Dose:  Not Given


Losartan Potassium (Cozaar -)  25 mg PO DAILY [facility]


   Last Admin: 02/23/17 12:00 Dose:  25 mg


Polyethylene Glycol (Miralax (For Daily Use) -)  17 gm NGT BID [facility]


   Last Admin: 02/23/17 09:17 Dose:  Not Given








Gen: Extubated and awake   


Heart:  RRR


Lung: Scattered basilar rales / rhonchi 


Abd: soft, nontender, +cholecystostomy with dark bilious fluid


Ext: + edema





 


 


 


 Laboratory Results - last 24 hr











  02/21/17 02/21/17 02/21/17





  12:30 18:52 22:06


 


WBC   


 


RBC   


 


Hgb   


 


Hct   


 


MCV   


 


MCHC   


 


RDW   


 


Plt Count   


 


MPV   


 


INR   


 


PTT (Actin FS)   


 


Fibrinogen   


 


Puncture Site   


 


ABG pH   


 


ABG pCO2 at Pt Temp   


 


ABG pO2 at Pt Temp   


 


ABG HCO3   


 


ABG O2 Sat (Measured)   


 


ABG O2 Content   


 


ABG Base Excess   


 


Sb Test   


 


O2 Delivery Device   


 


Oxygen Flow Rate   


 


PEEP   


 


Sodium   


 


Potassium   


 


Chloride   


 


Carbon Dioxide   


 


Anion Gap   


 


BUN   


 


Creatinine   


 


Creat Clearance w eGFR   


 


POC Glucometer  [phone]  [phone]  [phone]


 


Random Glucose   


 


Calcium   


 


Phosphorus   


 


Magnesium   


 


Total Bilirubin   


 


AST   


 


ALT   


 


Alkaline Phosphatase   


 


Total Protein   


 


Albumin   














  02/22/17 02/22/17 02/22/17





  06:12 12:04 18:32


 


WBC   


 


RBC   


 


Hgb   


 


Hct   


 


MCV   


 


MCHC   


 


RDW   


 


Plt Count   


 


MPV   


 


INR   


 


PTT (Actin FS)   


 


Fibrinogen   


 


Puncture Site   


 


ABG pH   


 


ABG pCO2 at Pt Temp   


 


ABG pO2 at Pt Temp   


 


ABG HCO3   


 


ABG O2 Sat (Measured)   


 


ABG O2 Content   


 


ABG Base Excess   


 


Sb Test   


 


O2 Delivery Device   


 


Oxygen Flow Rate   


 


PEEP   


 


Sodium   


 


Potassium   


 


Chloride   


 


Carbon Dioxide   


 


Anion Gap   


 


BUN   


 


Creatinine   


 


Creat Clearance w eGFR   


 


POC Glucometer  [phone]  245.47589  [phone]


 


Random Glucose   


 


Calcium   


 


Phosphorus   


 


Magnesium   


 


Total Bilirubin   


 


AST   


 


ALT   


 


Alkaline Phosphatase   


 


Total Protein   


 


Albumin   














  02/22/17 02/23/17 02/23/17





  22:19 05:10 05:10


 


WBC   9.6 


 


RBC   4.15 


 


Hgb   12.3  D 


 


Hct   35.4 


 


MCV   85.3 


 


MCHC   34.8 


 


RDW   15.3 


 


Plt Count   359 


 


MPV   8.9 


 


INR    1.35 H


 


PTT (Actin FS)    36.1 H


 


Fibrinogen    < 100.0 L*


 


Puncture Site   


 


ABG pH   


 


ABG pCO2 at Pt Temp   


 


ABG pO2 at Pt Temp   


 


ABG HCO3   


 


ABG O2 Sat (Measured)   


 


ABG O2 Content   


 


ABG Base Excess   


 


Sb Test   


 


O2 Delivery Device   


 


Oxygen Flow Rate   


 


PEEP   


 


Sodium   


 


Potassium   


 


Chloride   


 


Carbon Dioxide   


 


Anion Gap   


 


BUN   


 


Creatinine   


 


Creat Clearance w eGFR   


 


POC Glucometer  [phone]  


 


Random Glucose   


 


Calcium   


 


Phosphorus   


 


Magnesium   


 


Total Bilirubin   


 


AST   


 


ALT   


 


Alkaline Phosphatase   


 


Total Protein   


 


Albumin   














  02/23/17 02/23/17 02/23/17





  05:10 06:23 07:35


 


WBC   


 


RBC   


 


Hgb   


 


Hct   


 


MCV   


 


MCHC   


 


RDW   


 


Plt Count   


 


MPV   


 


INR   


 


PTT (Actin FS)   


 


Fibrinogen   


 


Puncture Site    Right radial


 


ABG pH    7.48 H


 


ABG pCO2 at Pt Temp    42.8


 


ABG pO2 at Pt Temp    76.1  D


 


ABG HCO3    31.8 H


 


ABG O2 Sat (Measured)    95.6


 


ABG O2 Content    16.6


 


ABG Base Excess    7.7 H


 


Sb Test    Positive


 


O2 Delivery Device    Venti mask


 


Oxygen Flow Rate    50%


 


PEEP    0.0


 


Sodium  145  


 


Potassium  3.7  


 


Chloride  103  


 


Carbon Dioxide  33 H  


 


Anion Gap  9  


 


BUN  27 H  


 


Creatinine  1.1  


 


Creat Clearance w eGFR  > 60  


 


POC Glucometer   [phone] 


 


Random Glucose  107 H D  


 


Calcium  8.2 L  


 


Phosphorus  2.7  


 


Magnesium  2.0  


 


Total Bilirubin  1.0  D  


 


AST  46 H  


 


ALT  45  


 


Alkaline Phosphatase  447 H  


 


Total Protein  5.7 L  


 


Albumin  2.4 L D  

















Problem List





- Problems


(1) Cholecystitis, acute


Code(s): K81.0 - ACUTE CHOLECYSTITIS





(2) Fever


Code(s): R50.9 - FEVER, UNSPECIFIED   Qualifiers: 


        Qualified Code(s): R50.81 - Fever presenting with conditions classified 

elsewhere  





(3) Leukocytosis


Code(s): D72.829 - ELEVATED WHITE BLOOD CELL COUNT, UNSPECIFIED   Qualifiers: 


        Qualified Code(s): D72.829 - Elevated white blood cell count, 

unspecified  





(4) Kidney malignancy


Code(s): C64.9 - MALIGNANT NEOPLASM OF UNSP KIDNEY, EXCEPT RENAL PELVIS   

Qualifiers: 


        Qualified Code(s): C64.2 - Malignant neoplasm of left kidney, except 

renal pelvis  





(5) Prostate CA


Code(s): C61 - MALIGNANT NEOPLASM OF PROSTATE





(6) COPD (chronic obstructive pulmonary disease)


Code(s): J44.9 - CHRONIC OBSTRUCTIVE PULMONARY DISEASE, UNSPECIFIED   Qualifiers

: 


        Qualified Code(s): J44.9 - Chronic obstructive pulmonary disease, 

unspecified  





(7) Sleep apnea


Code(s): G47.30 - SLEEP APNEA, UNSPECIFIED   








ASSESSMENT AND PLAN:


Acute Cholecystitis


s/p Cholecystostomy Placement


Profound Septic Shock improving


Acute Hypoxic Respiratory Failure


Acute Kidney Injury


Lactic Acidosis resolved


Demand Ischemia


RCC s/p Left Nephrectomy


DIC


COPD


HTN


Hyperlipidemia


(?) Fungal lung abscess 








-  antibiotics/antifungal per ID


-  monitor cholecystostomy drainage


-  Daily assessment for Lasix 


-  PO as tolerated 


-  O2 to keep SpO2 > 88% to 92%


-  DVT/GI prophylaxis 








Dr Madera 


CCTime 35" 











Problem List





- Problems


(1) Cholecystitis, acute


Code(s): K81.0 - ACUTE CHOLECYSTITIS





(2) Fever


Code(s): R50.9 - FEVER, UNSPECIFIED   Qualifiers: 


     Fever type: other     Qualified Code(s): R50.81 - Fever presenting with 

conditions classified elsewhere  





(3) Leukocytosis


Code(s): D72.829 - ELEVATED WHITE BLOOD CELL COUNT, UNSPECIFIED   Qualifiers: 


     Leukocytosis type: unspecified     Qualified Code(s): D72.829 - Elevated 

white blood cell count, unspecified  





(4) Kidney malignancy


Code(s): C64.9 - MALIGNANT NEOPLASM OF UNSP KIDNEY, EXCEPT RENAL PELVIS   

Qualifiers: 


     Laterality: left     Qualified Code(s): C64.2 - Malignant neoplasm of left 

kidney, except renal pelvis  





(5) Prostate CA


Code(s): C61 - MALIGNANT NEOPLASM OF PROSTATE





(6) COPD (chronic obstructive pulmonary disease)


Code(s): J44.9 - CHRONIC OBSTRUCTIVE PULMONARY DISEASE, UNSPECIFIED   Qualifiers

: 


     COPD type: unspecified COPD        Qualified Code(s): J44.9 - Chronic 

obstructive pulmonary disease, unspecified  





(7) Sleep apnea


Code(s): G47.30 - SLEEP APNEA, UNSPECIFIED

## 2017-02-23 NOTE — CONSULT
Admitting History and Physical





- Primary Care Physician


PCP: Cyndie Delarosa





- Admission


History of Present Illness: 


Per EMR:


"85 year old male with multiple cardiovascular comorbidities presented with 

cholecystitis and developed septic shock, DIC (resolved), GI bleed, acute 

hypoxic respiratory failure with multiple brain infarcts found on CT head "





Intubated on 7/15- extubated 7/22


History Source: Patient, Family Member, Medical Record


Limitations to Obtaining History: No Limitations





- Past Medical History


CNS: Yes: TIA


Cardiovascular: Yes: HTN


Pulmonary: Yes: COPD


Gastrointestinal: Yes: Diverticulitis


Renal/: Yes: Cancer (Prostate/KIDNEY)


Psych: Yes: Anxiety


Rheumatology: Yes: Other (arthritis knees)





- Past Surgical History


Past Surgical History: Yes: Appendectomy, Colonoscopy (last 1 yrs ago, kosarahcky 

2 polyps. h/o ischemic colitis 3,2 and ?years ago), Hernia Repair (b/l), 

Nephrectomy (left)





- Smoking History


Smoking history: Former smoker


Have you smoked in the past 12 months: No


Aproximately how many cigarettes per day: 0


If you are a former smoker, when did you quit?: 35 years ago





- Alcohol/Substance Use


Hx Alcohol Use: No





- Social History


ADL: Independent


Occupation: retired salesman


History of Recent Travel: No





History





- Admission


Reason For Visit: ACUTE CHOLECYSTITIS LEUKOCYTOSIS FEVER





- Diagnostics


X-ray: Report Reviewed


CT Scan: Report Reviewed (Left occipital and left parietal ischemic infarcts)





- General


Mental Status: Alert and Oriented, Awake and Alert, Able to Follow Commands


Attention: Intact


Ability to Follow Directions: Good


Head/Neck Control: WFL





- Hearing


Hearing: Impaired


Hearing Aide: Yes (bilateral)


With Patient: Yes





Speech Evaluation





- Communication


Primary Language: ENGLISH


Communication: Yes: Simple Responses (Slow to respond. Fairly appropriarte 

responses with occasional word errors?. Grossly o x 3 but initially said he was 

at [facility]. Spelled it to clarify. Then saw [facility] on white nboard and 

corrected himself. "1917" corrected with cues. After session, speaking to family

, producing tangential confused statements)





- Speech Production


Able to Make Needs Known: Yes: Mildly Impaired


Intelligibility: Yes: Mildly Impaired





- Speech Characteristics


Voice Loudness: Mildly Soft/Quiet


Voice Pitch: Yes: Normal


Voice Phonatory-based Quality: Yes: Hoarse (mild)


Nasal Resonance: Normal


Articulation: Yes: Imprecise (Mildly imprecise and mildly reduced vom.)





- Language/Auditory Comprehension


Follows: Yes: 1 Stage Simple Commands





- Language/Verbal Expression


Functional Communication Status: Yes: WNL





- Swallow Evaluation/Bedside Assessment


Current Nutritional Intake: NPO (except for medication.)


Oral Secretions: Yes: WFL


Dentition: Yes: Missing Teeth (has dentures at home. Family will bring in.)


Facial Symmetry at Rest: Facial Droop Right (new per pt's daughter)


Facial Symmetry on Retraction: Facial Droop Right


Pucker Lips: Droops Right


Smile: Droops Right


Lingual Movement: Symmetric


Lingual Speed of Movement: Reduced


Lingual Movement Strgth Against Opposition: Reduced


Laryngeal Movement: Able to Palpate


Rate of Intake: WFL


Chewing: Impaired


Oral Prep Time: WFL


A-P Transit: WFL


Timing of Swallow: WFL


Coughing/Throat Clear: No


Change in Voice: No





Recommendations





- Speech Evaluation, Impression/Plan


Impression: Mild right facial, daughter feels may be new. Verbal, occasional 

word errors and confused statements. Mildly slow to respond.Missing dentition, 

teeth at home. Swallow is brisk with (-) 3 oz water test.





- Dysphagia Impressions/Plan


Dysphagia Impressions: Mild Impairment, Ongoing Evaluation


*Silent aspiration: cannot be R/O at bedside


Dysphagia Treatment Plan: Chin Tuck/Down, Clear Pocket Food, Safe Rate, 1/2 

tsp. at a time, Elevate HOB during feed


Recommendations: Modified Barium Swallow (if signs of dysphagia noted)





- Recommendations


Diet Consistency: Dysphagia Minced


Liquids: Thin Liquids

## 2017-02-23 NOTE — PN
Progress Note (short form)





- Note


Progress Note: 


Patient seen and examined





Awake, alert 


denies any complaints








 


 Last Vital Signs











Temp Pulse Resp BP Pulse Ox


 


 98.3 F   75   24   137/52   91 L


 


 02/23/17 14:00  02/23/17 14:00  02/23/17 14:00  02/23/17 14:00  02/23/17 08:00











HEENT: BOGDAN, EOM Intact


Oropharynx: No thrush, No mucositis


Cor: RSR, No murmurs, No gallops


Lungs: Clear to auscultation (ant.)


Abd: Soft, Normal bowel sounds, T tube+


Ext:No significant edema





 Abnormal Lab Results











  02/23/17 02/23/17 02/23/17





  05:10 05:10 07:35


 


INR  1.35 H  


 


PTT (Actin FS)  36.1 H  


 


Fibrinogen  < 100.0 L*  


 


ABG pH    7.48 H


 


ABG HCO3    31.8 H


 


ABG Base Excess    7.7 H


 


Carbon Dioxide   33 H 


 


BUN   27 H 


 


Random Glucose   107 H D 


 


Calcium   8.2 L 


 


AST   46 H 


 


Alkaline Phosphatase   447 H 


 


Total Protein   5.7 L 


 


Albumin   2.4 L D 








 Current Medications





Acetaminophen (Tylenol -)  650 mg PO Q4H PRN


   PRN Reason: FEVER OR PAIN


   Last Admin: 02/16/17 13:46 Dose:  650 mg


Acetaminophen (Ofirmev Injection -)  1,000 mg IVPB Q6H PRN


   PRN Reason: FEVER


   Last Admin: 02/22/17 18:35 Dose:  1,000 mg


Artificial Tears (Artificial Tears)  1 drop OU TID PRN


   PRN Reason: DRY EYES


Aspirin (Asa -)  325 mg NGT DAILY [facility]


   Last Admin: 02/23/17 09:06 Dose:  325 mg


Atorvastatin Calcium (Lipitor -)  20 mg PO HS [facility]


   Last Admin: 02/22/17 21:44 Dose:  Not Given


Bisacodyl (Dulcolax Suppository -)  10 mg RC PRN PRN


   PRN Reason: CONSTIPATION


Carvedilol (Coreg -)  6.25 mg NGT BID [facility]


   Last Admin: 02/23/17 09:06 Dose:  6.25 mg


Chlorhexidine Gluconate (Peridex -)  15 ml MM BID [facility]


   Last Admin: 02/23/17 09:07 Dose:  Not Given


Heparin Sodium (Porcine) (Heparin -)  5,000 unit SQ BID [facility]


   Last Admin: 02/23/17 09:06 Dose:  5,000 unit


Voriconazole 320 mg/ Dextrose  282 mls @ 141 mls/hr IVPB BID [facility]


   Last Admin: 02/23/17 12:16 Dose:  141 mls/hr


Ertapenem (Invanz (Pre-Docked))  50 mls @ 50 mls/hr IVPB DAILY [facility]


   PRN Reason: Protocol


   Last Admin: 02/23/17 09:07 Dose:  50 mls/hr


Famotidine/Sodium Chloride (Pepcid 20 Mg Premixed Ivpb -)  50 mls @ 100 mls/hr 

IVPB BID [facility]


   Last Admin: 02/23/17 09:07 Dose:  100 mls/hr


Insulin Aspart (Novolog Vial Sliding Scale -)  1 vial SQ ACHS [facility]


   PRN Reason: Protocol


   Last Admin: 02/23/17 12:00 Dose:  Not Given


Losartan Potassium (Cozaar -)  25 mg PO DAILY [facility]


   Last Admin: 02/23/17 12:00 Dose:  25 mg


Polyethylene Glycol (Miralax (For Daily Use) -)  17 gm NGT BID [facility]


   Last Admin: 02/23/17 09:17 Dose:  Not Given











A/P








 Respiratory failure--on ventimask


Fevers- continuing on antibiotic therapy


Coagulopathy- underlying sepsis, liver disease, 


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


Cerebral infarction-ASA per Neurology


DVT prophylaxis- heparin therapy





Difficult problem:


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 

Biliary or pulmonary infection, hypofibrinogenemia and now with multiple 

cerebral infarcts. 


Risk of bleeding not insignificant. Will need to attempt to maintain fibrinogen

  at > 100 while on asa and heparin prophylaxis.

## 2017-02-23 NOTE — PN
Teaching Attending Note


Name of Resident: Buster Ramirez


ATTENDING PHYSICIAN STATEMENT





I saw and evaluated the patient.


I reviewed the resident's note and discussed the case with the resident.


I agree with the resident's findings and plan as documented.








SUBJECTIVE:currently asymptomatic. denies CP, SOB,fever, chills, pain, N/V/C/D


extubated yesterday now saturating well on NC





OBJECTIVE:


 Last Vital Signs











Temp Pulse Resp BP Pulse Ox


 


 98.5 F   80   21   145/57   91 L


 


 02/23/17 16:00  02/23/17 16:33  02/23/17 16:00  02/23/17 16:00  02/23/17 16:33








 Intake & Output











 02/20/17 02/21/17 02/22/17 02/23/17





 23:59 23:59 23:59 23:59


 


Intake Total 1224 2666 2389 1250


 


Output Total 1140 2980 4300 1300


 


Balance 84 -314 -1911 -50


 


Weight 185 lb 189 lb 4.8 oz 187 lb 6 oz 180 lb 14.4 oz














General NAD, alert, following simple commands, speech is slightly 


CV S1 S2 RRR no murmur/rub/gallop


Lungs Coarse breath sounds diffusely no wheezing/rales/rhonchi


Abdomen RUQ drain in place. no erythema or swelling around insertion site. 

abdomen is soft ND/NT. normoactive bowel sounds


Extremities trace pitting edema B/L UE





ASSESSMENT AND PLAN:


86yo M with PMH [place], dyslipidemia, COPD, TIA and prostate and RCC presented to 

the ER and was admitted for further evaluation of their emergent condition


1. Sepsis due to Acute cholecystitis- as per chart had fever, according to RN 

the thermometer probe was noted to be broken shortly after temperature. appears 

clinically improved. repeat Cx negative. spoke with ID and will start de-

escalating abx at this time. d/c vanco. cont ertapenem and Voriconazole. 

cholangiogram done yesterday and ROGELIO drain appear patent. no indication for 

emergent surgery at this time. 


2. Acute posthemorrhagic anemia in the setting of upper GI bleed- no repeated 

episodes. s/p 1 unit PRBC this hospitalization. on pepcid iV


3. DIC- likely in the setting of sepsis. received 4 units of cryo this 

admission. fibrinogen trending down. will transfuse cyro. no active signs of 

bleeding.  platelet and Hgb stable. hematology on board


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 awaiting isolate. repeat sputum cx 

sent. on voriconazole


5. Acute hypoxic respiratory failure- extubated yesterday. was de-saturating 

through the night which improved with venti mask. now saturating 98% on 3L NC. 

will titrate down as tolerated. Chest PT. incentive spirometer. 


6. CVA- subacute seen on CT scan from 2/18. neuro consulted. on ASA. will need 

PT and speech therapist today


7. DVT ppx- hep sq


8. clinically improved. if remains well on NC can possibly be transferred to 5S





The care of this patient involved high complexity decision making to prevent 

further life threatening deterioration of the patient's condition and/or to 

evaluate & treat vital organ system(s) failure or risk of failure. critical 

care time 45 minutes

## 2017-02-23 NOTE — MSN
Progress Note (SOAP)





- Subjective


Chief Complaint: 


RUQ Pain 


History of Present Illness: 


Patient was interviewed at bedside.  Today was his first day off intubation.  

He was on ventimask 40% overnight and in the morning he was transitioned to 

Nasal canuli 5L.  When he was on Nasal Cannuli his O2 status would drop, so 

ventimask was placed again.  The patient was depressed after learning how many 

days he was intubated for.  He was upset about missing his anniversary as well.

  He said he was in no pain and has a generalized feeling of not feeling well.  

He was placed on a minced and thin liquid diet.  





- Current Medications


Current Medications: 


Active Medications





Acetaminophen (Tylenol -)  650 mg PO Q4H PRN


   PRN Reason: FEVER OR PAIN


   Last Admin: 02/16/17 13:46 Dose:  650 mg


Acetaminophen (Ofirmev Injection -)  1,000 mg IVPB Q6H PRN


   PRN Reason: FEVER


   Last Admin: 02/22/17 18:35 Dose:  1,000 mg


Artificial Tears (Artificial Tears)  1 drop OU TID PRN


   PRN Reason: DRY EYES


Aspirin (Asa -)  325 mg NGT DAILY [facility]


   Last Admin: 02/23/17 09:06 Dose:  325 mg


Atorvastatin Calcium (Lipitor -)  20 mg PO HS [facility]


   Last Admin: 02/22/17 21:44 Dose:  Not Given


Bisacodyl (Dulcolax Suppository -)  10 mg RC PRN PRN


   PRN Reason: CONSTIPATION


Carvedilol (Coreg -)  6.25 mg NGT BID [facility]


   Last Admin: 02/23/17 09:06 Dose:  6.25 mg


Chlorhexidine Gluconate (Peridex -)  15 ml MM BID [facility]


   Last Admin: 02/23/17 09:07 Dose:  Not Given


Heparin Sodium (Porcine) (Heparin -)  5,000 unit SQ BID [facility]


   Last Admin: 02/23/17 09:06 Dose:  5,000 unit


Voriconazole 320 mg/ Dextrose  282 mls @ 141 mls/hr IVPB BID [facility]


   Last Admin: 02/23/17 12:16 Dose:  141 mls/hr


Ertapenem (Invanz (Pre-Docked))  50 mls @ 50 mls/hr IVPB DAILY [facility]


   PRN Reason: Protocol


   Last Admin: 02/23/17 09:07 Dose:  50 mls/hr


Famotidine/Sodium Chloride (Pepcid 20 Mg Premixed Ivpb -)  50 mls @ 100 mls/hr 

IVPB BID [facility]


   Last Admin: 02/23/17 09:07 Dose:  100 mls/hr


Insulin Aspart (Novolog Vial Sliding Scale -)  1 vial SQ ACHS [facility]


   PRN Reason: Protocol


   Last Admin: 02/23/17 12:00 Dose:  Not Given


Losartan Potassium (Cozaar -)  25 mg PO DAILY [facility]


   Last Admin: 02/23/17 12:00 Dose:  25 mg


Polyethylene Glycol (Miralax (For Daily Use) -)  17 gm NGT BID [facility]


   Last Admin: 02/23/17 09:17 Dose:  Not Given











- Objective


Vital Signs: 


 Vital Signs











Temperature  98.4 F   02/23/17 12:00


 


Pulse Rate  77   02/23/17 12:00


 


Respiratory Rate  23   02/23/17 12:00


 


Blood Pressure  144/58   02/23/17 12:00


 


O2 Sat by Pulse Oximetry (%)  91 L  02/23/17 08:00











Constitutional: Yes: No Distress, Calm


Eyes: Yes: WNL, Conjunctiva Clear, EOM Intact


HENT: Yes: WNL, Atraumatic, Normocephalic


Neck: Yes: WNL, Supple, Trachea Midline


Cardiovascular: Yes: WNL, Regular Rate and Rhythm, S1, S2


Respiratory: Yes: Regular, CTA Bilaterally


Gastrointestinal: Yes: Normal Bowel Sounds, Soft.  No: Tenderness, Tenderness, 

Epigastrium


Musculoskeletal: Yes: Muscle Weakness


Peripheral Pulses WNL: Yes


Edema: Yes


Edema: LUE: 2+, RUE: 2+, LLE: 1+, RLE: 1+


Integumentary: Yes: WNL


Wound/Incision: Yes: Clean/Dry, Well Approximated


Neurological: Yes: Alert


Psychiatric: Yes: Alert





Labs


Lab Results: 


 CBC, BMP





 02/23/17 05:10 





 02/23/17 05:10 











Assessment/Plan


84 YO M w/ hx of COPD, [place], HTN, HLD, TIA, prostate CA, renal CA now s/p left 

nephrectomy. He came to the ED with Right Upper Quadrant pain and was admitted 

to the ICU for acute respiratory failure and sepsis secondary to acute 

cholecystitis s/p percutaenous drain. Patient has been intubated and on an off 

sedation.  No pressors but patient has had remitting fevers





Respiratory: Acute hypoxic respiratory failure


- 1st day off intubation. Currently between Ventimask and NC. No pain and no NVD


-daily ABGs essentially normal


-Maintain SPO2 > 90% 


-SBT as tolerated


-Resp therapy


-PFT


-Breathing exercises





Profound septic shock 


-Fever broken


-gallbladder vs. lung


-WBC normalized, improving infiltrate, fevers


-positive sputum, blood and biliary fluid cultures (see micro lab)


-cavity/cyst on CT chest


-cont. vanco, ertapanem, voriconazole


-Cultures unnecessary 





Acute cholecystitis s/p percutaneous drain


-Monitor LFTs


-ERCP when stable


-cont. PPI drip





DIC with sepsis


-low plt and fibrinogen- transfuse 2 units of cryo


-low H&H





Sedation


-Sedation Vacation





HTN


-Cont. Meds





DM


-on sliding scale





Prophylaxis 


-DVT: SCD


-GI: protonix

## 2017-02-24 LAB
ALBUMIN SERPL-MCNC: 2.2 G/DL (ref 3.4–5)
ALP SERPL-CCNC: 370 U/L (ref 45–117)
ALT SERPL-CCNC: 33 U/L (ref 12–78)
ANION GAP SERPL CALC-SCNC: 8 MMOL/L (ref 8–16)
APTT BLD: 38.1 SECONDS (ref 26.9–34.4)
AST SERPL-CCNC: 39 U/L (ref 15–37)
BILIRUB SERPL-MCNC: 0.7 MG/DL (ref 0.2–1)
CALCIUM SERPL-MCNC: 8.2 MG/DL (ref 8.5–10.1)
CO2 SERPL-SCNC: 32 MMOL/L (ref 21–32)
CREAT SERPL-MCNC: 1 MG/DL (ref 0.7–1.3)
DEPRECATED RDW RBC AUTO: 14.9 % (ref 11.9–15.9)
FIBRINOGEN PPP-MCNC: < 100 MG/DL (ref 238–498)
GLUCOSE SERPL-MCNC: 101 MG/DL (ref 74–106)
INR BLD: 1.35 (ref 0.82–1.09)
MCH RBC QN AUTO: 29.2 PG (ref 25.7–33.7)
MCHC RBC AUTO-ENTMCNC: 34.1 G/DL (ref 32–35.9)
MCV RBC: 85.8 FL (ref 80–96)
PLATELET # BLD AUTO: 524 K/MM3 (ref 134–434)
PMV BLD: 7.6 FL (ref 7.5–11.1)
PROT SERPL-MCNC: 5.3 G/DL (ref 6.4–8.2)
PT PNL PPP: 14.9 SEC (ref 9.98–11.88)
WBC # BLD AUTO: 8.7 K/MM3 (ref 4–10)

## 2017-02-24 RX ADMIN — LOSARTAN POTASSIUM SCH MG: 25 TABLET, FILM COATED ORAL at 09:05

## 2017-02-24 RX ADMIN — POLYETHYLENE GLYCOL 3350 SCH: 17 POWDER, FOR SOLUTION ORAL at 09:10

## 2017-02-24 RX ADMIN — HEPARIN SODIUM SCH: 5000 INJECTION, SOLUTION INTRAVENOUS; SUBCUTANEOUS at 09:02

## 2017-02-24 RX ADMIN — VORICONAZOLE SCH MLS/HR: 10 INJECTION, POWDER, LYOPHILIZED, FOR SOLUTION INTRAVENOUS at 09:47

## 2017-02-24 RX ADMIN — ASPIRIN 325 MG ORAL TABLET SCH: 325 PILL ORAL at 09:02

## 2017-02-24 RX ADMIN — INSULIN ASPART SCH: 100 INJECTION, SOLUTION INTRAVENOUS; SUBCUTANEOUS at 06:08

## 2017-02-24 RX ADMIN — FAMOTIDINE SCH MLS/HR: 20 INJECTION, SOLUTION INTRAVENOUS at 21:57

## 2017-02-24 RX ADMIN — CARVEDILOL SCH MG: 6.25 TABLET, FILM COATED ORAL at 09:05

## 2017-02-24 RX ADMIN — HEPARIN SODIUM SCH UNIT: 5000 INJECTION, SOLUTION INTRAVENOUS; SUBCUTANEOUS at 21:57

## 2017-02-24 RX ADMIN — ERTAPENEM SODIUM SCH MLS/HR: 1 INJECTION, POWDER, LYOPHILIZED, FOR SOLUTION INTRAMUSCULAR; INTRAVENOUS at 09:04

## 2017-02-24 RX ADMIN — ATORVASTATIN CALCIUM SCH MG: 20 TABLET, FILM COATED ORAL at 21:54

## 2017-02-24 RX ADMIN — FAMOTIDINE SCH MLS/HR: 20 INJECTION, SOLUTION INTRAVENOUS at 09:04

## 2017-02-24 RX ADMIN — CARVEDILOL SCH MG: 6.25 TABLET, FILM COATED ORAL at 21:54

## 2017-02-24 RX ADMIN — POLYETHYLENE GLYCOL 3350 SCH G: 17 POWDER, FOR SOLUTION ORAL at 21:57

## 2017-02-24 RX ADMIN — INSULIN ASPART SCH: 100 INJECTION, SOLUTION INTRAVENOUS; SUBCUTANEOUS at 11:40

## 2017-02-24 RX ADMIN — INSULIN ASPART SCH: 100 INJECTION, SOLUTION INTRAVENOUS; SUBCUTANEOUS at 17:40

## 2017-02-24 RX ADMIN — INSULIN ASPART SCH: 100 INJECTION, SOLUTION INTRAVENOUS; SUBCUTANEOUS at 21:58

## 2017-02-24 NOTE — MSN
Progress Note (SOAP)





- Subjective


Chief Complaint: 


RUQ Pain


History of Present Illness: 


Patient has been off sedation for 2 days.  His speech and strength have greatly 

improved over the past day.  He is able to speak clearly and is more stable O2 

sat on Nasal Cannuli.  Patient was interviewed at bedside, and he asked "Am i 

really getting better?" in a depressed tone.  He was encouraged that he has 

made good progress.  patient has a cough and is complaining that he has a 

headache that comes and goes.  He has pain that radiates from ear to ear around 

the back of his head.  There are no remitting or exacerbating factors.  There 

is no change in his vision or sensation.  





- Current Medications


Current Medications: 


Active Medications





Acetaminophen (Tylenol -)  650 mg PO Q4H PRN


   PRN Reason: FEVER OR PAIN


   Last Admin: 02/16/17 13:46 Dose:  650 mg


Acetaminophen (Ofirmev Injection -)  1,000 mg IVPB Q6H PRN


   PRN Reason: FEVER


   Last Admin: 02/22/17 18:35 Dose:  1,000 mg


Artificial Tears (Artificial Tears)  1 drop OU TID PRN


   PRN Reason: DRY EYES


Aspirin (Asa -)  325 mg NGT DAILY [facility]


   Last Admin: 02/24/17 09:02 Dose:  Not Given


Atorvastatin Calcium (Lipitor -)  20 mg PO HS [facility]


   Last Admin: 02/23/17 21:10 Dose:  20 mg


Bisacodyl (Dulcolax Suppository -)  10 mg RC PRN PRN


   PRN Reason: CONSTIPATION


Carvedilol (Coreg -)  6.25 mg NGT BID [facility]


   Last Admin: 02/24/17 09:05 Dose:  6.25 mg


Finasteride (Proscar -)  5 mg PO DAILY [facility]


Heparin Sodium (Porcine) (Heparin -)  5,000 unit SQ BID [facility]


   Last Admin: 02/24/17 09:02 Dose:  Not Given


Voriconazole 320 mg/ Dextrose  282 mls @ 141 mls/hr IVPB BID [facility]


   Last Admin: 02/24/17 09:47 Dose:  141 mls/hr


Famotidine/Sodium Chloride (Pepcid 20 Mg Premixed Ivpb -)  50 mls @ 100 mls/hr 

IVPB BID [facility]


   Last Admin: 02/24/17 09:04 Dose:  100 mls/hr


Insulin Aspart (Novolog Vial Sliding Scale -)  1 vial SQ ACHS [facility]


   PRN Reason: Protocol


   Last Admin: 02/24/17 11:40 Dose:  Not Given


Losartan Potassium (Cozaar -)  25 mg PO DAILY [facility]


   Last Admin: 02/24/17 09:05 Dose:  25 mg


Polyethylene Glycol (Miralax (For Daily Use) -)  17 gm NGT BID [facility]


   Last Admin: 02/24/17 09:10 Dose:  Not Given


Tamsulosin HCl (Flomax -)  0.8 mg PO DAILY@0830 [facility]











- Objective


Vital Signs: 


 Vital Signs











Temperature  98.2 F   02/24/17 14:00


 


Pulse Rate  78   02/24/17 14:00


 


Respiratory Rate  17   02/24/17 14:00


 


Blood Pressure  141/49   02/24/17 14:00


 


O2 Sat by Pulse Oximetry (%)  95   02/24/17 08:00











Constitutional: Yes: Well Nourished, No Distress, Calm


Eyes: Yes: WNL, Conjunctiva Clear, EOM Intact


HENT: Yes: WNL, Atraumatic, Normocephalic


Neck: Yes: WNL, Supple, Trachea Midline


Cardiovascular: Yes: WNL, Regular Rate and Rhythm, S1, S2


Respiratory: Yes: WNL, Regular, CTA Bilaterally, On Nasal O2


Gastrointestinal: Yes: Normal Bowel Sounds, Soft


Genitourinary: Yes: Anuria


Extremities: Yes: WNL


Edema: Yes


Edema: LUE: Trace, RUE: Trace, LLE: Trace, RLE: Trace


Integumentary: Yes: WNL


Wound/Incision: Yes: Clean/Dry, Well Approximated


Neurological: Yes: WNL, Alert, Oriented


Psychiatric: Yes: WNL, Alert, Oriented





Labs


Lab Results: 


 CBC, BMP





 02/24/17 05:10 





 02/24/17 05:10 











Assessment/Plan


84 YO M w/ hx of COPD, [place], HTN, HLD, TIA, prostate CA, renal CA now s/p left 

nephrectomy. He came to the ED with Right Upper Quadrant pain and was admitted 

to the ICU for acute respiratory failure and sepsis secondary to acute 

cholecystitis s/p percutaenous drain.





Respiratory: Acute hypoxic respiratory failure


-2nd day off intubation. Currently between Ventimask and NC. No pain and no NVD


-daily ABGs essentially normal


-Maintain SPO2 > 88%- tansfer pt to floors is O2 maintained over 88


-SBT as tolerated


-Resp therapy


-PFT


-Breathing exercises





Profound septic shock 


-Fever broken


-gallbladder vs. lung


-WBC normalized, improving infiltrate, fevers


-positive sputum, blood and biliary fluid cultures (see micro lab)


-cavity/cyst on CT chest


-cont. vanco, ertapanem, voriconazole


-Cultures unnecessary 





Acute cholecystitis s/p percutaneous drain


-Monitor LFTs


-ERCP when stable


-cont. PPI drip


-Surg-Intrevention will take place 6 weeks post discharge





DIC with sepsis


-low plt and fibrinogen- transfuse 2 units of cryo


-low H&H





Sedation


-Sedation Vacation





HTN


-Cont. Meds





DM


-on sliding scale





Prophylaxis 


-DVT: SCD


-GI: protonix

## 2017-02-24 NOTE — PN
Teaching Attending Note


Name of Resident: Buster Ramirez


ATTENDING PHYSICIAN STATEMENT





I saw and evaluated the patient.


I reviewed the resident's note and discussed the case with the resident.


I agree with the resident's findings and plan as documented.








SUBJECTIVE:currently asymptomatic. denies CP, SOB,fever, chills, N/V/C/D








OBJECTIVE:





 Last Vital Signs











Temp Pulse Resp BP Pulse Ox


 


 98.3 F   79   17   126/46   95 


 


 02/24/17 12:00  02/24/17 12:00  02/24/17 12:00  02/24/17 12:00  02/24/17 08:00








 Intake & Output











 02/21/17 02/22/17 02/23/17 02/24/17





 23:59 23:59 23:59 23:59


 


Intake Total 2666 2389 2078 100


 


Output Total 2980 4300 1610 1325


 


Balance -314 -1911 468 -1225


 


Weight 189 lb 4.8 oz 187 lb 6 oz 180 lb 14.4 oz 181 lb 6 oz








General NAD speech clearer todayy 


CV S1 S2 RRR no murmur/rub/gallop


Lungs CTA B/L  no wheezing/rales/rhonchi


Abdomen RUQ drain in place. no erythema or swelling around insertion site. 

abdomen is soft ND/NT. normoactive bowel sounds


Extremities trace pitting edema B/L UE/LE





ASSESSMENT AND PLAN:


86yo M with PMH CAROL[place], dyslipidemia, COPD, TIA and prostate and RCC presented to 

the ER and was admitted for further evaluation of their emergent condition


1. Sepsis due to Acute cholecystitis-afebrile. clinically improved. both vanco 

and ertapenem now d/c will monitor off abx. minimal bilious drainage from ROGELIO 

drain. plan is for cholecystectomy in 6 weeks.


2. Acute posthemorrhagic anemia in the setting of upper GI bleed- no repeated 

episodes. s/p 1 unit PRBC this hospitalization. on pepcid iV


3. DIC- likely in the setting of sepsis. received 8 units of cryo this 

admission. no change in fibrinogen. will consider holding heparin sq and asa 

while it is <100. defer to hematology if additional units should be transfused. 

hematology on board


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 awaiting isolate. repeat sputum cx 

sent. on voriconazole


5. Acute hypoxic respiratory failure- episodes of desaturation in the evening 

while sleeping. possible CAROLINA although not diagnosed. will trial cpap at 

bedtime. supplemental oxygen as needed. chest PT. incentive spirometer. 


6. CVA- subacute seen on CT scan from 2/18. neuro consulted. will need PT and 

speech therapist today


7. DVT ppx- hep sq


8. stable for transfer to floors





The care of this patient involved high complexity decision making to prevent 

further life threatening deterioration of the patient's condition and/or to 

evaluate & treat vital organ system(s) failure or risk of failure. critical 

care time 40 minutes

## 2017-02-24 NOTE — PN
Progress Note (short form)





- Note


Progress Note: 


S: 85 year old gentleman, with history of acute cholecystitis, COPD, history of 

DIC, gram negative bacteremia, hypertension s/p cholecysctostomy.





Patient denies any chest pain or discomfort, no dyspnea reported, no history of 

palpitations. Patient remains afebrile.


                   


                                                                               

                                      Active Medications











Generic Name Dose Route Start Last Admin





  Trade Name Freq  PRN Reason Stop Dose Admin


 


Acetaminophen  650 mg 02/13/17 23:33 02/16/17 13:46





  Tylenol -  PO   650 mg





  Q4H PRN   Administration





  FEVER OR PAIN   


 


Acetaminophen  1,000 mg 02/16/17 16:07 02/22/17 18:35





  Ofirmev Injection -  IVPB   1,000 mg





  Q6H PRN   Administration





  FEVER   


 


Artificial Tears  1 drop 02/18/17 09:28  





  Artificial Tears  OU   





  TID PRN   





  DRY EYES   


 


Aspirin  325 mg 02/21/17 12:45 02/24/17 09:02





  Asa -  NGT   Not Given





  DAILY [facility]   


 


Atorvastatin Calcium  20 mg 02/21/17 22:00 02/23/17 21:10





  Lipitor -  PO   20 mg





  HS [facility]   Administration


 


Bisacodyl  10 mg 02/18/17 20:25  





  Dulcolax Suppository -  RC   





  PRN PRN   





  CONSTIPATION   


 


Carvedilol  6.25 mg 02/22/17 12:31 02/24/17 09:05





  Coreg -  NGT   6.25 mg





  BID [facility]   Administration


 


Heparin Sodium (Porcine)  5,000 unit 02/21/17 22:00 02/24/17 09:02





  Heparin -  SQ   Not Given





  BID [facility]   


 


Voriconazole 320 mg/ Dextrose  282 mls @ 141 mls/hr 02/18/17 10:00 02/24/17 09:

47





  IVPB   141 mls/hr





  BID [facility]   Administration


 


Ertapenem  50 mls @ 50 mls/hr 02/18/17 10:00 02/24/17 09:04





  Invanz (Pre-Docked)  IVPB   50 mls/hr





  DAILY [facility]   Administration





  Protocol   


 


Famotidine/Sodium Chloride  50 mls @ 100 mls/hr 02/20/17 22:00 02/24/17 09:04





  Pepcid 20 Mg Premixed Ivpb -  IVPB   100 mls/hr





  BID [facility]   Administration


 


Insulin Aspart  1 vial 02/14/17 22:00 02/24/17 06:08





  Novolog Vial Sliding Scale -  SQ   Not Given





  [facility]   





  Protocol   


 


Losartan Potassium  25 mg 02/23/17 10:30 02/24/17 09:05





  Cozaar -  PO   25 mg





  DAILY [facility]   Administration


 


Polyethylene Glycol  17 gm 02/18/17 22:00 02/24/17 09:10





  Miralax (For Daily Use) -  NGT   Not Given





  BID [facility]   











O: 85 year old male was in no acute distress, no pallor, cyanosis, clubbing, or 

jaundice. 





            


 Last Vital Signs











Temp Pulse Resp BP Pulse Ox


 


 97.8 F   78   19   144/53   95 


 


 02/24/17 10:00  02/24/17 10:00  02/24/17 10:00  02/24/17 10:00  02/24/17 08:00














Neck: Supple, no JVD, negative HJR, carotids were equal and upstrokes were 

normal, no thyromegaly appreciated. 


Heart: PMI was in the 5th intercostal space, no heaves or thrills, heart sounds 

were partially obscured by breath sounds, S1 and S2 were normal. 


No murmurs or gallops were appreciated. 


Lungs: Scattered crepitations bilaterally. 


Abdomen: Soft, nontender, no hepatosplenomegaly appreciated. 


Extremities: No calf tenderness or dependent edema. Pulses are normal.








               


 CBC, BMP





 02/24/17 05:10 





 02/24/17 05:10 











            


 Laboratory Results - last 24 hr











  02/20/17 02/23/17 02/23/17





  12:35 12:14 16:04


 


WBC   


 


RBC   


 


Hgb   


 


Hct   


 


MCV   


 


MCHC   


 


RDW   


 


Plt Count   


 


MPV   


 


INR   


 


PTT (Actin FS)   


 


Fibrinogen   


 


Sodium   


 


Potassium   


 


Chloride   


 


Carbon Dioxide   


 


Anion Gap   


 


BUN   


 


Creatinine   


 


Creat Clearance w eGFR   


 


POC Glucometer   [phone]  [phone]


 


Random Glucose   


 


Calcium   


 


Total Bilirubin   


 


AST   


 


ALT   


 


Alkaline Phosphatase   


 


Total Protein   


 


Albumin   


 


Blood Type  A POSITIVE  


 


Antibody Screen  Negative  


 


Crossmatch  See Detail  














  02/23/17 02/24/17 02/24/17





  21:17 05:10 05:10


 


WBC   8.7 


 


RBC   3.96 L 


 


Hgb   11.6 L 


 


Hct   34.0 L 


 


MCV   85.8 


 


MCHC   34.1 


 


RDW   14.9 


 


Plt Count   524 H D 


 


MPV   7.6  D 


 


INR    1.35 H


 


PTT (Actin FS)    38.1 H


 


Fibrinogen    < 100.0 L*


 


Sodium   


 


Potassium   


 


Chloride   


 


Carbon Dioxide   


 


Anion Gap   


 


BUN   


 


Creatinine   


 


Creat Clearance w eGFR   


 


POC Glucometer  [phone]  


 


Random Glucose   


 


Calcium   


 


Total Bilirubin   


 


AST   


 


ALT   


 


Alkaline Phosphatase   


 


Total Protein   


 


Albumin   


 


Blood Type   


 


Antibody Screen   


 


Crossmatch   














  02/24/17





  05:10


 


WBC 


 


RBC 


 


Hgb 


 


Hct 


 


MCV 


 


MCHC 


 


RDW 


 


Plt Count 


 


MPV 


 


INR 


 


PTT (Actin FS) 


 


Fibrinogen 


 


Sodium  146 H


 


Potassium  4.0


 


Chloride  106


 


Carbon Dioxide  32


 


Anion Gap  8


 


BUN  24 H


 


Creatinine  1.0


 


Creat Clearance w eGFR  > 60


 


POC Glucometer 


 


Random Glucose  101


 


Calcium  8.2 L


 


Total Bilirubin  0.7  D


 


AST  39 H


 


ALT  33  D


 


Alkaline Phosphatase  370 H


 


Total Protein  5.3 L


 


Albumin  2.2 L


 


Blood Type 


 


Antibody Screen 


 


Crossmatch 














Impression:





(1) COPD (chronic obstructive pulmonary disease)


Code(s): J44.9 - CHRONIC OBSTRUCTIVE PULMONARY DISEASE, UNSPECIFIED   Qualifiers

: 


     COPD type: unspecified COPD        Qualified Code(s): J44.9 - Chronic 

obstructive pulmonary disease, unspecified  





(2) S/P Acute respiratory failure with hypoxia


Code(s): J96.01 - ACUTE RESPIRATORY FAILURE WITH HYPOXIA





(3) Acute Cholecystitis


Code(s): K81.0 - ACUTE CHOLECYSTITIS





(4) History of Diastolic CHF


Code(s): I50.30 - UNSPECIFIED DIASTOLIC (CONGESTIVE) HEART FAILURE   Qualifiers

: 


     Congestive heart failure chronicity: chronic        Qualified Code(s): 

I50.32 - Chronic diastolic (congestive) heart failure  





(5) HLD (hyperlipidemia)


Code(s): E78.5 - HYPERLIPIDEMIA, UNSPECIFIED   Qualifiers: 


     Hyperlipidemia type: pure hypercholesterolemia        Qualified Code(s): 

E78.0 - Pure hypercholesterolemia  





(6) HTN (hypertension)


Code(s): I10 - ESSENTIAL (PRIMARY) HYPERTENSION   Qualifiers: 


     Hypertension type: essential hypertension        Qualified Code(s): I10 - 

Essential (primary) hypertension  





(7) S/P Cholecystostomy


Code(s): Z43.4 - ENCOUNTER FOR ATTN TO OTH ARTIF OPENINGS OF DIGESTIVE TRACT





Recommendations: 


1. As outlined by intensivist. 


2. Follow up ECG and enzymes.


3. Increase ambulation. 





Attestation: Documentation prepared by Jacob Yoder, acting as medical scribe 

for Riley Cortez MD.

## 2017-02-24 NOTE — PN
Physical Exam: 


SUBJECTIVE: 





Patient seen and examined at bedside in the ICU. He reported feeling better and 

in good spirit. Does not feel constipated and enjoyed his breakfast. Per nurse, 

he desaturated a bit at night and had to go back on Ventimask, unable to void, 

and muñiz was re-inserted. Denies chest pain, abd pain, headache, fever, chills

, n/v. 





OBJECTIVE:





On NC 5L Sat 88-92&%            Vital Signs











 Period  Temp  Pulse  Resp  BP Sys/Yun  Pulse Ox


 


 Last 24 Hr  97.8 F-99.5 F  78-83  17-22  126-166/46-76  92-95











GENERAL: awake, alert, able to speak more fluently but still not full sentences

, follow commands.


NECK: clean central line wound


LUNGS: b/l rhonchi


HEART: Regular rate and rhythm, S1, S2 without murmur, rub or gallop.


ABDOMEN: Soft, nontender, mildly distended. RUQ percutaneous drain in dark 

brown color  


EXTREMITIES: warm and no edema 


NEUROLOGICAL:  3/5 strength in all extremities and sensation intact


: muñiz reinserted





 CBCD











WBC  8.7 K/mm3 (4.0-10.0)   02/24/17  05:10    


 


RBC  3.96 M/mm3 (4.00-5.60)  L  02/24/17  05:10    


 


Hgb  11.6 GM/dL (11.7-16.9)  L  02/24/17  05:10    


 


Hct  34.0 % (35.4-49)  L  02/24/17  05:10    


 


MCV  85.8 fl (80-96)   02/24/17  05:10    


 


MCHC  34.1 g/dl (32.0-35.9)   02/24/17  05:10    


 


RDW  14.9 % (11.9-15.9)   02/24/17  05:10    


 


Plt Count  524 K/MM3 (134-434)  H D 02/24/17  05:10    


 


MPV  7.6 fl (7.5-11.1)  D 02/24/17  05:10    








 CMP











Sodium  146 mmol/L (136-145)  H  02/24/17  05:10    


 


Potassium  4.0 mmol/L (3.5-5.1)   02/24/17  05:10    


 


Chloride  106 mmol/L ()   02/24/17  05:10    


 


Carbon Dioxide  32 mmol/L (21-32)   02/24/17  05:10    


 


Anion Gap  8  (8-16)   02/24/17  05:10    


 


BUN  24 mg/dL (7-18)  H  02/24/17  05:10    


 


Creatinine  1.0 mg/dL (0.7-1.3)   02/24/17  05:10    


 


Creat Clearance w eGFR  > 60  (>60)   02/24/17  05:10    


 


Calcium  8.2 mg/dL (8.5-10.1)  L  02/24/17  05:10    


 


Total Bilirubin  0.7 mg/dL (0.2-1.0)  D 02/24/17  05:10    


 


AST  39 U/L (15-37)  H  02/24/17  05:10    


 


ALT  33 U/L (12-78)  D 02/24/17  05:10    


 


Alkaline Phosphatase  370 U/L ()  H  02/24/17  05:10    


 


Total Protein  5.3 g/dl (6.4-8.2)  L  02/24/17  05:10    


 


Albumin  2.2 g/dl (3.4-5.0)  L  02/24/17  05:10    








 Intake & Output











 02/21/17 02/22/17 02/23/17 02/24/17





 23:59 23:59 23:59 23:59


 


Intake Total 2668 2389 2078 650


 


Output Total 2980 4300 1610 2205


 


Balance -314 -1108 252 -3962


 


Weight 85.865 kg 84.992 kg 82.055 kg 82.27 kg











Active Medications











Generic Name Dose Route Start Last Admin





  Trade Name Freq  PRN Reason Stop Dose Admin


 


Acetaminophen  650 mg 02/24/17 16:27  





  Tylenol -  PO   





  Q4H PRN   





  FEVER OR PAIN   


 


Acetaminophen  1,000 mg 02/24/17 16:27  





  Ofirmev Injection -  IVPB   





  Q6H PRN   





  FEVER   


 


Artificial Tears  1 drop 02/24/17 16:27  





  Artificial Tears  OU   





  TID PRN   





  DRY EYES   


 


Aspirin  325 mg 02/25/17 10:00  





  Asa -  NGT   





  DAILY [facility]   


 


Atorvastatin Calcium  20 mg 02/24/17 22:00  





  Lipitor -  PO   





  HS [facility]   


 


Bisacodyl  10 mg 02/24/17 16:27  





  Dulcolax Suppository -  RC   





  PRN PRN   





  CONSTIPATION   


 


Carvedilol  6.25 mg 02/24/17 22:00  





  Coreg -  NGT   





  BID [facility]   


 


Finasteride  5 mg 02/25/17 10:00  





  Proscar -  PO   





  DAILY [facility]   


 


Heparin Sodium (Porcine)  5,000 unit 02/24/17 22:00  





  Heparin -  SQ   





  BID [facility]   


 


Famotidine/Sodium Chloride  50 mls @ 100 mls/hr 02/24/17 22:00  





  Pepcid 20 Mg Premixed Ivpb -  IVPB   





  BID [facility]   


 


Voriconazole 320 mg/ Dextrose  282 mls @ 141 mls/hr 02/24/17 22:00  





  IVPB   





  BID [facility]   


 


Insulin Aspart  1 vial 02/24/17 16:30  





  Novolog Vial Sliding Scale -  SQ   





  ACHS [facility]   





  Protocol   


 


Losartan Potassium  25 mg 02/25/17 10:00  





  Cozaar -  PO   





  DAILY [facility]   


 


Polyethylene Glycol  17 gm 02/24/17 22:00  





  Miralax (For Daily Use) -  NGT   





  BID [facility]   


 


Tamsulosin HCl  0.8 mg 02/25/17 08:30  





  Flomax -  PO   





  DAILY@0830 [facility]   








Microbiology





02/21/17 11:35   Sputum - Endotrachea Suction/Ventilator   Gram Stain - Final


02/21/17 11:35   Sputum - Endotrachea Suction/Ventilator   Sputum Culture - 

Final


                            Yeast Like Organism


02/23/17 10:00   Nares - Mrsa Screen - Right   MRSA Screen - Final


                            NO MRSA ISOLATED


02/23/17 10:00   Nares - Mrsa Screen - Left   MRSA Screen - Final


                            NO MRSA ISOLATED


02/20/17 18:00   Blood - Central Line   Blood Culture - Preliminary


                            NO GROWTH OBTAINED AFTER 72 HOURS, INCUBATION TO 

CONTINUE


                            FOR 2 DAYS.


02/20/17 18:00   Blood - Central Line   Blood Culture - Preliminary


                            NO GROWTH OBTAINED AFTER 72 HOURS, INCUBATION TO 

CONTINUE


                            FOR 2 DAYS.








IMAGING





CXR - 2/23: some improvement


CXR - 2/22: no significant change


CXR - 2/21: L pleural effusion and left retrocardiac density. Cardiomegaly and 

increased perihilar lung markings may reflect mild congestive changes.


CXR - 2/20: Endotracheal tube placement as described above with improving 

bibasilar infiltrates. 


CT Chest - 2/18: Bilateral lower lobe infiltrates, right more the left. Small 

bilateral pleural effusions. 1.3 x 1 cm left apical cyst/cavity with concentric 

wall thickening. There is subjacent mild left upper lobe bronchiectasis with 

associated peribronchial thickening. centrilobular emphysema. 





CT Head - 2/21: No significant interval change. Left occipital and parietal 

subcortical lucency likely representing old infarcts. Mild ethmoid chronic 

sinusitis 


CT Head - 2/18: In comparison to a previous CT exam of 4/9/2015 interval 

development of left occipital and left parietal cortical infarcts are noted 

which are probably chronic versus late subacute   








Carotid doppler 2/21: Mild intimal thickening at the right carotid bifurcation 

as well as mild intimal thickening and small plaques at the left common carotid 

bifurcation and bulb without evidence of hemodynamically significant stenosis, 

bilaterally. 





CT Abd with oral and IV contrast 2/21: Interval decrease in the degree of 

dilatation of the gallbladder. A cholecystostomy tube is again seen in 

satisfactory position. There is significant thickening of the gallbladder wall 

at the fundus with surrounding edema. No abscess is identified. Status post 

left nephrectomy. Mild right renal hydronephrosis and hydroureter without 

evidence of an obstructing stone Extensive diverticulosis coli mainly in the 

sigmoid colon without evidence of acute diverticulitis. 





ASSESSMENT/PLAN:





84 yo h/o COPD, [place], HTN, HLD, TIA, prostate CA, renal CA now s/p left 

nephrectomy was admitted to the ICU for acute respiratory failure and sepsis 

secondary to acute cholecystitis s/p percutaenous drain. Patient tolerates 

extubation well and now on NC 5L





Respiratory: Acute hypoxic respiratory failure


   - On NC 5L, maintain SpO2 > 88%


   - CPAP PRN at night for underlying [place]


   - Cont. Chest PT





ID: Profound septic shock 


   - afrebile, WBC normalized


   - f/u on cavity/cyst on CT chest as outpatient


   - cont.voriconazole, d/c vancomycin and ertapenem





GI: Acute cholecystitis s/p percutaneous drain


   - no surgical intervention per surgery


      * maintain percutaneous drain x 6 weeks


   - abnormal LFT likely 2/2 gallbladder abscess


      * cholangiogram negative for abscess, stone, obstruction


      * cont. to monitor liver chemistries





Heme: DIC with sepsis


   - low fibrinogen


      * cont. to monitor for sign of DIC, transfuse cryo as needed


   - low H&H


      * maintain H/H above 8 gm





Neuro: 


   - slowly returning to baseline mental status


   - neuro check Q2H





Cardiac: HTN


   - resumed home meds





Endo: DM2


   - on sliding scale


   - BGM





FEN


   - fluid restrict due to pleural effusion


   - electrolytes normal


   - dysphagia diet minced + nectar thinned liquid





Prophylaxis 


   - DVT: SCD and heparin SQ 5000 BID


   - GI: on famotidine





Disposition


   - cont. PT


   - transferred to telemetry 





Code status


   - Full code








Visit type





- Emergency Visit


Emergency Visit: No





- New Patient


This patient is new to me today: No





- Critical Care


Critical Care patient: Yes


Total Critical Care Time (in minutes): 45


Critical Care Statement: The care of this patient involved high complexity 

decision making to prevent further life threatening deterioration of the patient

's condition and/or to evalute & treat vital organ system(s) failure or risk of 

failure.

## 2017-02-24 NOTE — PN
Progress Note (short form)





- Note


Progress Note: 


Patient seen and examined





Awake, alert 


denies any complaints








 


 


 Last Vital Signs











Temp Pulse Resp BP Pulse Ox


 


 98.8 F   83   18   158/68   96 


 


 02/24/17 21:00  02/24/17 21:00  02/24/17 21:00  02/24/17 21:00  02/24/17 22:44














HEENT: BOGDAN, EOM Intact


Oropharynx: No thrush, No mucositis


Cor: RSR, No murmurs, No gallops


Lungs: Clear to auscultation (ant.)


Abd: Soft, Normal bowel sounds, T tube+





 


 Abnormal Lab Results











  02/24/17 02/24/17 02/24/17





  05:10 05:10 05:10


 


RBC  3.96 L  


 


Hgb  11.6 L  


 


Hct  34.0 L  


 


Plt Count  524 H D  


 


INR   1.35 H 


 


PTT (Actin FS)   38.1 H 


 


Fibrinogen   < 100.0 L* 


 


Sodium    146 H


 


BUN    24 H


 


Calcium    8.2 L


 


AST    39 H


 


Alkaline Phosphatase    370 H


 


Total Protein    5.3 L


 


Albumin    2.2 L











 Current Medications





Acetaminophen (Tylenol -)  650 mg PO Q4H PRN


   PRN Reason: FEVER OR PAIN


Acetaminophen (Ofirmev Injection -)  1,000 mg IVPB Q6H PRN


   PRN Reason: FEVER


Artificial Tears (Artificial Tears)  1 drop OU TID PRN


   PRN Reason: DRY EYES


Aspirin (Asa -)  325 mg NGT DAILY [facility]


Atorvastatin Calcium (Lipitor -)  20 mg PO HS [facility]


   Last Admin: 02/24/17 21:54 Dose:  20 mg


Bisacodyl (Dulcolax Suppository -)  10 mg RC PRN PRN


   PRN Reason: CONSTIPATION


Carvedilol (Coreg -)  6.25 mg NGT BID [facility]


   Last Admin: 02/24/17 21:54 Dose:  6.25 mg


Finasteride (Proscar -)  5 mg PO DAILY [facility]


Heparin Sodium (Porcine) (Heparin -)  5,000 unit SQ BID [facility]


   Last Admin: 02/24/17 21:57 Dose:  5,000 unit


Famotidine/Sodium Chloride (Pepcid 20 Mg Premixed Ivpb -)  50 mls @ 100 mls/hr 

IVPB BID [facility]


   Last Admin: 02/24/17 21:57 Dose:  100 mls/hr


Voriconazole 320 mg/ Dextrose  282 mls @ 141 mls/hr IVPB BID [facility]


   Last Admin: 02/24/17 23:29 Dose:  141 mls/hr


Insulin Aspart (Novolog Vial Sliding Scale -)  1 vial SQ ACHS [facility]


   PRN Reason: Protocol


   Last Admin: 02/24/17 21:58 Dose:  Not Given


Losartan Potassium (Cozaar -)  25 mg PO DAILY [facility]


Polyethylene Glycol (Miralax (For Daily Use) -)  17 gm NGT BID [facility]


   Last Admin: 02/24/17 21:57 Dose:  17 g


Tamsulosin HCl (Flomax -)  0.8 mg PO DAILY@0830 [facility]

















A/P








 Respiratory failure--on ventimask


Fevers- continuing on antibiotic therapy


Coagulopathy- underlying sepsis, liver disease, 


Hypofibrinogenemia- 


Cerebral infarction-ASA per Neurology


DVT prophylaxis- heparin therapy





Difficult problem:


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 

Biliary or pulmonary infection, hypofibrinogenemia and now with multiple 

cerebral infarcts. 





monitor parameters and transfuse ffp/cryo precipitate if bleeding

## 2017-02-24 NOTE — PN
Progress Note, SLP





- Note


Progress Note: 


Pt noted to cough on thin liquids, breakfast time.


He now has his dentures.





Swallowing reassessed with delayed cough noted after thin water. Less confused 

but reported to still "go in and out" at times.


pt c/o burning with citrus-h/o gerd. In esophagus vs pharynx sec intubation, 

suggested by resident..


REc; Dys chopped with 1-2 soft items, nectar thick liquid.(apple/milk/avoid oj)


May benefit from MBS to upgrade diet

## 2017-02-24 NOTE — PN
Progress Note (short form)





- Note


Progress Note: 


extubated


alert


following commands





 Vital Signs











 Period  Temp  Pulse  Resp  BP Sys/Yun  Pulse Ox


 


 Last 24 Hr  97.8 F-99.5 F  75-83  17-24  136-166/51-76  91-95








cor-rrr


lungs decreased bs at bases


abd soft,nt +biliary drain


ext +edema of hands and feet





muñiz replaced yesterday


 CBC, BMP





 02/24/17 05:10 





 02/24/17 05:10 








 


 Microbiology











 02/23/17 10:00 MRSA Screen - Final





 Nares - Mrsa Screen - Right    NO MRSA ISOLATED


 


 02/23/17 10:00 MRSA Screen - Final





 Nares - Mrsa Screen - Left    NO MRSA ISOLATED


 


 02/20/17 18:00 Blood Culture - Preliminary





 Blood - Central Line    NO GROWTH OBTAINED AFTER 72 HOURS, INCUBATION TO 

CONTINUE





    FOR 2 DAYS.


 


 02/20/17 18:00 Blood Culture - Preliminary





 Blood - Central Line    NO GROWTH OBTAINED AFTER 72 HOURS, INCUBATION TO 

CONTINUE





    FOR 2 DAYS.














cxray unchanged





a/p


resp failure-extubated 


sepsis-ecoli bacterema, enterobacter biliary fluid- biliary sepsis-s/p 12 days 

iv antibiotics 


mrsa/mold sputum 


cavitary lung lesion


dic





s/p transfusion





d/c ertapenem


voriconazole to continue pending aspergillus serologies and fungal culture

## 2017-02-24 NOTE — PN
Teaching Attending Note


Name of Resident: Zaire Phillips


ATTENDING PHYSICIAN STATEMENT





I saw and evaluated the patient.


I reviewed the resident's note and discussed the case with the resident.


I agree with the resident's findings and plan as documented.








SUBJECTIVE:


Patient seen and examined in the ICU.  Remains extubated.  Awake and alert, but 

mildly confused. 


Reports some mild right discomfort at the cholecystostomy site. 


No fever. 





 Intake & Output











 02/21/17 02/22/17 02/23/17 02/24/17





 23:59 23:59 23:59 23:59


 


Intake Total 2666 2389 2078 100


 


Output Total 2980 4300 1610 1325


 


Balance -314 -1911 468 -1225


 


Weight 189 lb 4.8 oz 187 lb 6 oz 180 lb 14.4 oz 181 lb 6 oz








 Last Vital Signs











Temp Pulse Resp BP Pulse Ox


 


 99.2 F   88   17   104/57   95 


 


 02/24/17 12:00  02/24/17 12:00  02/24/17 12:00  02/24/17 12:00  02/24/17 08:00








Active Medications





Acetaminophen (Tylenol -)  650 mg PO Q4H PRN


   PRN Reason: FEVER OR PAIN


   Last Admin: 02/16/17 13:46 Dose:  650 mg


Acetaminophen (Ofirmev Injection -)  1,000 mg IVPB Q6H PRN


   PRN Reason: FEVER


   Last Admin: 02/22/17 18:35 Dose:  1,000 mg


Artificial Tears (Artificial Tears)  1 drop OU TID PRN


   PRN Reason: DRY EYES


Aspirin (Asa -)  325 mg NGT DAILY [facility]


   Last Admin: 02/24/17 09:02 Dose:  Not Given


Atorvastatin Calcium (Lipitor -)  20 mg PO HS [facility]


   Last Admin: 02/23/17 21:10 Dose:  20 mg


Bisacodyl (Dulcolax Suppository -)  10 mg RC PRN PRN


   PRN Reason: CONSTIPATION


Carvedilol (Coreg -)  6.25 mg NGT BID [facility]


   Last Admin: 02/24/17 09:05 Dose:  6.25 mg


Heparin Sodium (Porcine) (Heparin -)  5,000 unit SQ BID [facility]


   Last Admin: 02/24/17 09:02 Dose:  Not Given


Voriconazole 320 mg/ Dextrose  282 mls @ 141 mls/hr IVPB BID [facility]


   Last Admin: 02/24/17 09:47 Dose:  141 mls/hr


Famotidine/Sodium Chloride (Pepcid 20 Mg Premixed Ivpb -)  50 mls @ 100 mls/hr 

IVPB BID [facility]


   Last Admin: 02/24/17 09:04 Dose:  100 mls/hr


Insulin Aspart (Novolog Vial Sliding Scale -)  1 vial SQ ACHS [facility]


   PRN Reason: Protocol


   Last Admin: 02/24/17 11:40 Dose:  Not Given


Losartan Potassium (Cozaar -)  25 mg PO DAILY [facility]


   Last Admin: 02/24/17 09:05 Dose:  25 mg


Polyethylene Glycol (Miralax (For Daily Use) -)  17 gm NGT BID [facility]


   Last Admin: 02/24/17 09:10 Dose:  Not Given











Gen: Extubated and awake   


Heart:  RRR


Lung: Scattered basilar rales / rhonchi 


Abd: soft, nontender, +cholecystostomy with dark bilious fluid


Ext: + edema





 


 


 


 Laboratory Results - last 24 hr











  02/20/17 02/23/17 02/23/17





  12:35 12:14 16:04


 


WBC   


 


RBC   


 


Hgb   


 


Hct   


 


MCV   


 


MCHC   


 


RDW   


 


Plt Count   


 


MPV   


 


INR   


 


PTT (Actin FS)   


 


Fibrinogen   


 


Sodium   


 


Potassium   


 


Chloride   


 


Carbon Dioxide   


 


Anion Gap   


 


BUN   


 


Creatinine   


 


Creat Clearance w eGFR   


 


POC Glucometer   141.00038  153.47398


 


Random Glucose   


 


Calcium   


 


Total Bilirubin   


 


AST   


 


ALT   


 


Alkaline Phosphatase   


 


Total Protein   


 


Albumin   


 


Blood Type  A POSITIVE  


 


Antibody Screen  Negative  


 


Crossmatch  See Detail  














  02/23/17 02/24/17 02/24/17





  21:17 05:10 05:10


 


WBC   8.7 


 


RBC   3.96 L 


 


Hgb   11.6 L 


 


Hct   34.0 L 


 


MCV   85.8 


 


MCHC   34.1 


 


RDW   14.9 


 


Plt Count   524 H D 


 


MPV   7.6  D 


 


INR    1.35 H


 


PTT (Actin FS)    38.1 H


 


Fibrinogen    < 100.0 L*


 


Sodium   


 


Potassium   


 


Chloride   


 


Carbon Dioxide   


 


Anion Gap   


 


BUN   


 


Creatinine   


 


Creat Clearance w eGFR   


 


POC Glucometer  172.02651  


 


Random Glucose   


 


Calcium   


 


Total Bilirubin   


 


AST   


 


ALT   


 


Alkaline Phosphatase   


 


Total Protein   


 


Albumin   


 


Blood Type   


 


Antibody Screen   


 


Crossmatch   














  02/24/17 02/24/17





  05:10 05:58


 


WBC  


 


RBC  


 


Hgb  


 


Hct  


 


MCV  


 


MCHC  


 


RDW  


 


Plt Count  


 


MPV  


 


INR  


 


PTT (Actin FS)  


 


Fibrinogen  


 


Sodium  146 H 


 


Potassium  4.0 


 


Chloride  106 


 


Carbon Dioxide  32 


 


Anion Gap  8 


 


BUN  24 H 


 


Creatinine  1.0 


 


Creat Clearance w eGFR  > 60 


 


POC Glucometer   120.54322


 


Random Glucose  101 


 


Calcium  8.2 L 


 


Total Bilirubin  0.7  D 


 


AST  39 H 


 


ALT  33  D 


 


Alkaline Phosphatase  370 H 


 


Total Protein  5.3 L 


 


Albumin  2.2 L 


 


Blood Type  


 


Antibody Screen  


 


Crossmatch  














Problem List





- Problems


(1) Cholecystitis, acute


Code(s): K81.0 - ACUTE CHOLECYSTITIS





(2) Fever


Code(s): R50.9 - FEVER, UNSPECIFIED   Qualifiers: 


        Qualified Code(s): R50.81 - Fever presenting with conditions classified 

elsewhere  





(3) Leukocytosis


Code(s): D72.829 - ELEVATED WHITE BLOOD CELL COUNT, UNSPECIFIED   Qualifiers: 


        Qualified Code(s): D72.829 - Elevated white blood cell count, 

unspecified  





(4) Kidney malignancy


Code(s): C64.9 - MALIGNANT NEOPLASM OF UNSP KIDNEY, EXCEPT RENAL PELVIS   

Qualifiers: 


        Qualified Code(s): C64.2 - Malignant neoplasm of left kidney, except 

renal pelvis  





(5) Prostate CA


Code(s): C61 - MALIGNANT NEOPLASM OF PROSTATE





(6) COPD (chronic obstructive pulmonary disease)


Code(s): J44.9 - CHRONIC OBSTRUCTIVE PULMONARY DISEASE, UNSPECIFIED   Qualifiers

: 


        Qualified Code(s): J44.9 - Chronic obstructive pulmonary disease, 

unspecified  





(7) Sleep apnea


Code(s): G47.30 - SLEEP APNEA, UNSPECIFIED   








ASSESSMENT AND PLAN:


Acute Cholecystitis


s/p Cholecystostomy Placement


Profound Septic Shock improving


Acute Hypoxic Respiratory Failure


Acute Kidney Injury


Lactic Acidosis resolved


Demand Ischemia


RCC s/p Left Nephrectomy


DIC


COPD


HTN


Hyperlipidemia


(?) Fungal lung abscess 








-  Antifungal per ID


-  monitor cholecystostomy drainage


-  Daily assessment for Lasix 


-  PO as tolerated 


-  O2 to keep SpO2 > 88% to 92%


-  DVT/GI prophylaxis 


-  Restart CPAP QHS 








Dr Madera 


CCTime 35" 








Problem List





- Problems


(1) Cholecystitis, acute


Code(s): K81.0 - ACUTE CHOLECYSTITIS





(2) Fever


Code(s): R50.9 - FEVER, UNSPECIFIED   Qualifiers: 


     Fever type: other     Qualified Code(s): R50.81 - Fever presenting with 

conditions classified elsewhere  





(3) Leukocytosis


Code(s): D72.829 - ELEVATED WHITE BLOOD CELL COUNT, UNSPECIFIED   Qualifiers: 


     Leukocytosis type: unspecified     Qualified Code(s): D72.829 - Elevated 

white blood cell count, unspecified  





(4) Kidney malignancy


Code(s): C64.9 - MALIGNANT NEOPLASM OF UNSP KIDNEY, EXCEPT RENAL PELVIS   

Qualifiers: 


     Laterality: left     Qualified Code(s): C64.2 - Malignant neoplasm of left 

kidney, except renal pelvis  





(5) Prostate CA


Code(s): C61 - MALIGNANT NEOPLASM OF PROSTATE





(6) COPD (chronic obstructive pulmonary disease)


Code(s): J44.9 - CHRONIC OBSTRUCTIVE PULMONARY DISEASE, UNSPECIFIED   Qualifiers

: 


     COPD type: unspecified COPD        Qualified Code(s): J44.9 - Chronic 

obstructive pulmonary disease, unspecified  





(7) Sleep apnea


Code(s): G47.30 - SLEEP APNEA, UNSPECIFIED

## 2017-02-24 NOTE — PN
Physical Exam: 


SUBJECTIVE: Patient seen and examined at bedside in ICU. Continues to say he 

feels depressed today but otherwise denies N/V/F/C, CP, SOB, abd pain, no 

issues with BM.  Was not able to void after muñiz removal. Had straight cath 

and voided 500 cc yesterday. 








OBJECTIVE:





 


 Vital Signs











Temperature  98.3 F   02/24/17 12:00


 


Pulse Rate  79   02/24/17 12:00


 


Respiratory Rate  17   02/24/17 12:00


 


Blood Pressure  126/46   02/24/17 12:00


 


O2 Sat by Pulse Oximetry (%)  95   02/24/17 08:00














GENERAL: AAOx3, no acute distress


HEAD: Normal with no signs of trauma.


EYES: Reactive to light.


ENT: moist mucous membranes.


NECK: Trachea midline


LUNGS: Auscultated anteriorly. coarse breath sounds b/l 


HEART: Distant heart sounds, Regular rate and rhythm, S1, S2 without murmur, 

rub or gallop.


ABDOMEN: Not tender to palpation. normoactive bowel sounds. Abd drain in place.


EXTREMITIES: 2+ pulses, warm, well-perfused, no edema in LE, edematous b/l UE. 4

/5 UE strength. 3/5 LE strength.


NEUROLOGICAL:  gait not observed. No focal neuro deficits noted.


SKIN: Warm, dry, normal turgor, no rashes or lesions noted














 Laboratory Results - last 24 hr











  02/20/17 02/23/17 02/23/17





  12:35 12:14 16:04


 


WBC   


 


RBC   


 


Hgb   


 


Hct   


 


MCV   


 


MCHC   


 


RDW   


 


Plt Count   


 


MPV   


 


INR   


 


PTT (Actin FS)   


 


Fibrinogen   


 


Sodium   


 


Potassium   


 


Chloride   


 


Carbon Dioxide   


 


Anion Gap   


 


BUN   


 


Creatinine   


 


Creat Clearance w eGFR   


 


POC Glucometer   [phone]  [phone]


 


Random Glucose   


 


Calcium   


 


Total Bilirubin   


 


AST   


 


ALT   


 


Alkaline Phosphatase   


 


Total Protein   


 


Albumin   


 


Blood Type  A POSITIVE  


 


Antibody Screen  Negative  


 


Crossmatch  See Detail  














  02/23/17 02/24/17 02/24/17





  21:17 05:10 05:10


 


WBC   8.7 


 


RBC   3.96 L 


 


Hgb   11.6 L 


 


Hct   34.0 L 


 


MCV   85.8 


 


MCHC   34.1 


 


RDW   14.9 


 


Plt Count   524 H D 


 


MPV   7.6  D 


 


INR    1.35 H


 


PTT (Actin FS)    38.1 H


 


Fibrinogen    < 100.0 L*


 


Sodium   


 


Potassium   


 


Chloride   


 


Carbon Dioxide   


 


Anion Gap   


 


BUN   


 


Creatinine   


 


Creat Clearance w eGFR   


 


POC Glucometer  [phone]  


 


Random Glucose   


 


Calcium   


 


Total Bilirubin   


 


AST   


 


ALT   


 


Alkaline Phosphatase   


 


Total Protein   


 


Albumin   


 


Blood Type   


 


Antibody Screen   


 


Crossmatch   














  02/24/17 02/24/17





  05:10 05:58


 


WBC  


 


RBC  


 


Hgb  


 


Hct  


 


MCV  


 


MCHC  


 


RDW  


 


Plt Count  


 


MPV  


 


INR  


 


PTT (Actin FS)  


 


Fibrinogen  


 


Sodium  146 H 


 


Potassium  4.0 


 


Chloride  106 


 


Carbon Dioxide  32 


 


Anion Gap  8 


 


BUN  24 H 


 


Creatinine  1.0 


 


Creat Clearance w eGFR  > 60 


 


POC Glucometer   [phone]


 


Random Glucose  101 


 


Calcium  8.2 L 


 


Total Bilirubin  0.7  D 


 


AST  39 H 


 


ALT  33  D 


 


Alkaline Phosphatase  370 H 


 


Total Protein  5.3 L 


 


Albumin  2.2 L 


 


Blood Type  


 


Antibody Screen  


 


Crossmatch  








 Microbiology





02/21/17 11:35   Sputum - Endotrachea Suction/Ventilator   Gram Stain - Final


02/21/17 11:35   Sputum - Endotrachea Suction/Ventilator   Sputum Culture - 

Final


                            Yeast Like Organism


02/23/17 10:00   Nares - Mrsa Screen - Right   MRSA Screen - Final


                            NO MRSA ISOLATED


02/23/17 10:00   Nares - Mrsa Screen - Left   MRSA Screen - Final


                            NO MRSA ISOLATED


02/20/17 18:00   Blood - Central Line   Blood Culture - Preliminary


                            NO GROWTH OBTAINED AFTER 72 HOURS, INCUBATION TO 

CONTINUE


                            FOR 2 DAYS.


02/20/17 18:00   Blood - Central Line   Blood Culture - Preliminary


                            NO GROWTH OBTAINED AFTER 72 HOURS, INCUBATION TO 

CONTINUE


                            FOR 2 DAYS.


02/21/17 12:15   Urine - Urine Muñiz   Urine Culture - Final


                            NO GROWTH OBTAINED


02/13/17 22:46   Sputum - Endotrachea Suction/Ventilator   Fungus Mold 

Identification - Preliminary


02/13/17 22:41   Blood - Central Line   Blood Culture - Final


                            NO GROWTH AFTER 5 DAYS INCUBATION


02/12/17 21:40   Blood - Peripheral Venous   Blood Culture - Final


                            NO GROWTH AFTER 5 DAYS INCUBATION


02/12/17 21:40   Blood - Peripheral Venous   Blood Culture - Final


                            NO GROWTH AFTER 5 DAYS INCUBATION


02/13/17 17:30   Body Fluid - Other   Gram Stain - Final


02/13/17 17:30   Body Fluid - Other   Body Fluid Culture - Final


                            Enterobacter Asburiae


                            Enterococcus Faecium


02/13/17 17:30   Body Fluid - Other   Anaerobic Culture - Final


                            NO ANAEROBES WERE ISOLATED


02/13/17 22:46   Sputum - Endotrachea Suction/Ventilator   Gram Stain - Final


02/13/17 22:46   Sputum - Endotrachea Suction/Ventilator   Sputum Culture - 

Final


                            Fungal Isolate: Mold


                            Mr S Aureus


02/13/17 22:41   Blood - Central Line   Blood Culture - Final


                            Escherichia Coli


02/12/17 21:40   Urine - Urine Clean Catch   Urine Culture - Final


                            NO GROWTH OBTAINED








Active Medications











Generic Name Dose Route Start Last Admin





  Trade Name Freq  PRN Reason Stop Dose Admin


 


Acetaminophen  650 mg 02/13/17 23:33 02/16/17 13:46





  Tylenol -  PO   650 mg





  Q4H PRN   Administration





  FEVER OR PAIN   


 


Acetaminophen  1,000 mg 02/16/17 16:07 02/22/17 18:35





  Ofirmev Injection -  IVPB   1,000 mg





  Q6H PRN   Administration





  FEVER   


 


Artificial Tears  1 drop 02/18/17 09:28  





  Artificial Tears  OU   





  TID PRN   





  DRY EYES   


 


Aspirin  325 mg 02/21/17 12:45 02/24/17 09:02





  Asa -  NGT   Not Given





  DAILY [facility]   


 


Atorvastatin Calcium  20 mg 02/21/17 22:00 02/23/17 21:10





  Lipitor -  PO   20 mg





  HS LAUREN   Administration


 


Bisacodyl  10 mg 02/18/17 20:25  





  Dulcolax Suppository -  RC   





  PRN PRN   





  CONSTIPATION   


 


Carvedilol  6.25 mg 02/22/17 12:31 02/24/17 09:05





  Coreg -  NGT   6.25 mg





  BID [facility]   Administration


 


Heparin Sodium (Porcine)  5,000 unit 02/21/17 22:00 02/24/17 09:02





  Heparin -  SQ   Not Given





  BID LAUREN   


 


Voriconazole 320 mg/ Dextrose  282 mls @ 141 mls/hr 02/18/17 10:00 02/24/17 09:

47





  IVPB   141 mls/hr





  BID [facility]   Administration


 


Famotidine/Sodium Chloride  50 mls @ 100 mls/hr 02/20/17 22:00 02/24/17 09:04





  Pepcid 20 Mg Premixed Ivpb -  IVPB   100 mls/hr





  BID LAUREN   Administration


 


Insulin Aspart  1 vial 02/14/17 22:00 02/24/17 11:40





  Novolog Vial Sliding Scale -  SQ   Not Given





  ACHS [facility]   





  Protocol   


 


Losartan Potassium  25 mg 02/23/17 10:30 02/24/17 09:05





  Cozaar -  PO   25 mg





  DAILY [facility]UREN   Administration


 


Polyethylene Glycol  17 gm 02/18/17 22:00 02/24/17 09:10





  Miralax (For Daily Use) -  NGT   Not Given





  BID [facility]   











ASSESSMENT/PLAN:


84 y/o M w/ PMH of [place], HTN, HLD, prostate ca, TIA, Kidney cancer s/p 

nephrectomy, COPD presented to ER with abdominal pain (RUQ) for 3 days. 

Admitted for acute cholecystitis. Perc cholecystostomy placed on 2/13/17. Pt 

developed SOB after procedure, was intubated, had central line placed, on 

pressors and sedated and in ICU for septic shock. Extubated 2/22/17.





-Septic shock secondary to Acute Cholecystitis from cholelithiasis


   -improving; s/p perc cholecystostomy 2/13


   -afebrile; WBC: 8.7


   -Cholangiogram via cholecystostomy - adequately positioned and widely patent 

cholecystostomy tube w/patent cystic duct and CBD.


   -Abx : vanco; d/c ertapenem as per ID


   -Voriconazole for mold


   -Repeat BCx negative so far, awaiting mold identification


   -Will need to f/u with surgery as outpatient in 6 weeks from cholecystostomy.





-Acute hypoxic respiratory failure


   -improved


   -s/p extubation 2/22/17


      -Chest PT, Incentive spirometer


   -supp O2 to maintain O2 sat >90%





-Acute vs subacute vs chronic stroke


   -Head CT: no sig interval change. L occipital and parietal subcortical 

lucency likely old infarcts.


   -Triglycerides: 407; Cholesterol: 107; LDL: 54; HDL: 12


   -Neuro on board


   -no focal neurological deficits on exam noted today





-Diastolic CHF:


   -CXR: 2/15/17: R moderate pleural effusion. L small pleural effusion.


   -ECHO: 2/14/17 - EF: 63%, LV nl size, LVSF nl, no regional wall abnl noted, 

LV impaired relaxation, mild MR, mod TR, mild AS, no pericardial effusion.


   -not on fluids currently.


   -CXR, pleural effusions still present, no sig change from previous cxr


   -not on fluids





-Elevated transaminases


   -improving


   -Secondary to septic shock


   -INR elevated as well, trending down


   -Cholangiogram via cholecystostomy - adequately positioned and widely patent 

cholecystostomy tube w/patent cystic duct and CBD.





-SANGITA secondary to septic shock


   -improving


   -BUN/Cr 24/1, pre-renal etiology most likely as Bun:Cr ratio >20


   -monitor urine output, BUN/Cr





-DIC secondary to septic shock


   -s/p cyroprecipitate 2 units. May require additional unit today as 

fibrinogen is <100 today.


   -fibrinogen goal >100


   -thrombocytosis, will monitor


   -pt on asa 325 and heparin bid, monitor platelets





-Hyperglycemia


   -Monitor sugars


   -ISS, BGMs


   -A1C: 6.5





-Dry eyes


   -Artifical tears tid PRN for dry eyes





-HTN


   -coreg 3.125 mg bid





-CAR[place]


   -Will tell family to bring in his CPAP


   -pt is desating at night while sleeping currently


   -bipap prn/at night if CPAP isn't brought in by family.





-Hx of TIA


   -asa 325 mg





-HLD


   -lipitor 20mg qhs





-insomnia


   -held melatonin 5 mg po qhs prn





-BPH


   -start flomax 0.8 mg po qd


   -start finasteride 5 mg po qd





-DVT ppx


   -SCDs


   -Heparin bid





-GI ppx


   -famotidine 20 mg iv bid





-FEN


   -no fluids for now.


   -Dysphagia minced


      -As per nurse, pt is choking on thin liquids, will change to nectar 

thickened liquids for now.





-Dispo:


   -Transfer to floors.


   -will need physical therapy.





Problem List





- Problems


(1) COPD (chronic obstructive pulmonary disease)


Code(s): J44.9 - CHRONIC OBSTRUCTIVE PULMONARY DISEASE, UNSPECIFIED   Qualifiers

: 


     COPD type: unspecified COPD        Qualified Code(s): J44.9 - Chronic 

obstructive pulmonary disease, unspecified  





(2) Cholecystitis, acute


Code(s): K81.0 - ACUTE CHOLECYSTITIS





(3) Fever


Code(s): R50.9 - FEVER, UNSPECIFIED   Qualifiers: 


     Fever type: other     Qualified Code(s): R50.81 - Fever presenting with 

conditions classified elsewhere  





(4) Leukocytosis


Code(s): D72.829 - ELEVATED WHITE BLOOD CELL COUNT, UNSPECIFIED   Qualifiers: 


     Leukocytosis type: unspecified     Qualified Code(s): D72.829 - Elevated 

white blood cell count, unspecified  





(5) Sleep apnea


Code(s): G47.30 - SLEEP APNEA, UNSPECIFIED   





(6) Acute urinary retention


Code(s): R33.8 - OTHER RETENTION OF URINE





(7) Insomnia


Code(s): G47.00 - INSOMNIA, UNSPECIFIED   





(8) HTN (hypertension)


Code(s): I10 - ESSENTIAL (PRIMARY) HYPERTENSION   Qualifiers: 


     Hypertension type: essential hypertension        Qualified Code(s): I10 - 

Essential (primary) hypertension  





(9) HLD (hyperlipidemia)


Code(s): E78.5 - HYPERLIPIDEMIA, UNSPECIFIED   Qualifiers: 


     Hyperlipidemia type: pure hypercholesterolemia        Qualified Code(s): 

E78.0 - Pure hypercholesterolemia  





(10) BPH (benign prostatic hyperplasia)


Code(s): N40.0 - BENIGN PROSTATIC HYPERPLASIA WITHOUT LOWER URINRY TRACT SYMP   





(11) Septic shock


Code(s): A41.9 - SEPSIS, UNSPECIFIED ORGANISM


R65.21 - SEVERE SEPSIS WITH SEPTIC SHOCK





(12) Acute respiratory failure with hypoxia


Code(s): J96.01 - ACUTE RESPIRATORY FAILURE WITH HYPOXIA





(13) SANGITA (acute kidney injury)


Code(s): N17.9 - ACUTE KIDNEY FAILURE, UNSPECIFIED





(14) Transaminitis


Code(s): R74.0 - NONSPEC ELEV OF LEVELS OF TRANSAMNS & LACTIC ACID DEHYDRGNSE





(15) Upper GI bleed


Code(s): K92.2 - GASTROINTESTINAL HEMORRHAGE, UNSPECIFIED





(16) Elevated troponin


Code(s): R79.89 - OTHER SPECIFIED ABNORMAL FINDINGS OF BLOOD CHEMISTRY





(17) Demand ischemia


Code(s): I24.8 - OTHER FORMS OF ACUTE ISCHEMIC HEART DISEASE





(18) DIC (disseminated intravascular coagulation)


Code(s): D65 - DISSEMINATED INTRAVASCULAR COAGULATION





(19) Hypophosphatemia


Code(s): E83.39 - OTHER DISORDERS OF PHOSPHORUS METABOLISM





(20) Hypokalemia


Code(s): E87.6 - HYPOKALEMIA





(21) Anemia due to GI blood loss


Code(s): D50.0 - IRON DEFICIENCY ANEMIA SECONDARY TO BLOOD LOSS (CHRONIC)





(22) Diastolic CHF


Code(s): I50.30 - UNSPECIFIED DIASTOLIC (CONGESTIVE) HEART FAILURE   Qualifiers

: 


     Congestive heart failure chronicity: chronic        Qualified Code(s): 

I50.32 - Chronic diastolic (congestive) heart failure  





(23) Dry eyes


Code(s): H04.123 - DRY EYE SYNDROME OF BILATERAL LACRIMAL GLANDS








Visit type





- Emergency Visit


Emergency Visit: Yes


ED Registration Date: 02/13/17


Care time: The patient presented to the Emergency Department on the above date 

and was hospitalized for further evaluation of their emergent condition.





- New Patient


This patient is new to me today: No





- Critical Care


Critical Care patient: Yes


Total Critical Care Time (in minutes): 36


Critical Care Statement: The care of this patient involved high complexity 

decision making to prevent further life threatening deterioration of the patient

's condition and/or to evalute & treat vital organ system(s) failure or risk of 

failure.

## 2017-02-25 LAB
ALBUMIN SERPL-MCNC: 2.3 G/DL (ref 3.4–5)
ALP SERPL-CCNC: 290 U/L (ref 45–117)
ALT SERPL-CCNC: 30 U/L (ref 12–78)
ANION GAP SERPL CALC-SCNC: 9 MMOL/L (ref 8–16)
APTT BLD: 36 SECONDS (ref 26.9–34.4)
AST SERPL-CCNC: 34 U/L (ref 15–37)
BILIRUB SERPL-MCNC: 0.6 MG/DL (ref 0.2–1)
CALCIUM SERPL-MCNC: 8.1 MG/DL (ref 8.5–10.1)
CO2 SERPL-SCNC: 32 MMOL/L (ref 21–32)
CREAT SERPL-MCNC: 0.9 MG/DL (ref 0.7–1.3)
DEPRECATED RDW RBC AUTO: 14.9 % (ref 11.9–15.9)
FIBRINOGEN PPP-MCNC: < 100 MG/DL (ref 238–498)
GLUCOSE SERPL-MCNC: 104 MG/DL (ref 74–106)
INR BLD: 1.44 (ref 0.82–1.09)
MCH RBC QN AUTO: 29.7 PG (ref 25.7–33.7)
MCHC RBC AUTO-ENTMCNC: 34.5 G/DL (ref 32–35.9)
MCV RBC: 86 FL (ref 80–96)
PLATELET # BLD AUTO: 518 K/MM3 (ref 134–434)
PMV BLD: 7.2 FL (ref 7.5–11.1)
PROT SERPL-MCNC: 5.2 G/DL (ref 6.4–8.2)
PT PNL PPP: 16 SEC (ref 9.98–11.88)
WBC # BLD AUTO: 7.9 K/MM3 (ref 4–10)

## 2017-02-25 RX ADMIN — IPRATROPIUM BROMIDE AND ALBUTEROL SULFATE SCH AMP: .5; 3 SOLUTION RESPIRATORY (INHALATION) at 23:04

## 2017-02-25 RX ADMIN — HEPARIN SODIUM SCH UNIT: 5000 INJECTION, SOLUTION INTRAVENOUS; SUBCUTANEOUS at 10:05

## 2017-02-25 RX ADMIN — FAMOTIDINE SCH MLS/HR: 20 INJECTION, SOLUTION INTRAVENOUS at 10:04

## 2017-02-25 RX ADMIN — INSULIN ASPART SCH: 100 INJECTION, SOLUTION INTRAVENOUS; SUBCUTANEOUS at 06:02

## 2017-02-25 RX ADMIN — INSULIN ASPART SCH: 100 INJECTION, SOLUTION INTRAVENOUS; SUBCUTANEOUS at 12:25

## 2017-02-25 RX ADMIN — VORICONAZOLE SCH MG: 200 TABLET, FILM COATED ORAL at 21:54

## 2017-02-25 RX ADMIN — IPRATROPIUM BROMIDE AND ALBUTEROL SULFATE SCH AMP: .5; 3 SOLUTION RESPIRATORY (INHALATION) at 17:54

## 2017-02-25 RX ADMIN — INSULIN ASPART SCH: 100 INJECTION, SOLUTION INTRAVENOUS; SUBCUTANEOUS at 21:39

## 2017-02-25 RX ADMIN — ACETAMINOPHEN PRN MG: 325 TABLET ORAL at 21:19

## 2017-02-25 RX ADMIN — IPRATROPIUM BROMIDE AND ALBUTEROL SULFATE SCH AMP: .5; 3 SOLUTION RESPIRATORY (INHALATION) at 11:57

## 2017-02-25 RX ADMIN — ASPIRIN 325 MG ORAL TABLET SCH MG: 325 PILL ORAL at 10:05

## 2017-02-25 RX ADMIN — CARVEDILOL SCH MG: 6.25 TABLET, FILM COATED ORAL at 21:20

## 2017-02-25 RX ADMIN — ATORVASTATIN CALCIUM SCH MG: 20 TABLET, FILM COATED ORAL at 21:20

## 2017-02-25 RX ADMIN — POLYETHYLENE GLYCOL 3350 SCH G: 17 POWDER, FOR SOLUTION ORAL at 21:21

## 2017-02-25 RX ADMIN — CARVEDILOL SCH MG: 6.25 TABLET, FILM COATED ORAL at 10:05

## 2017-02-25 RX ADMIN — POLYETHYLENE GLYCOL 3350 SCH: 17 POWDER, FOR SOLUTION ORAL at 21:39

## 2017-02-25 RX ADMIN — VORICONAZOLE SCH MG: 200 TABLET, FILM COATED ORAL at 12:25

## 2017-02-25 RX ADMIN — POLYETHYLENE GLYCOL 3350 SCH G: 17 POWDER, FOR SOLUTION ORAL at 10:06

## 2017-02-25 RX ADMIN — INSULIN ASPART SCH: 100 INJECTION, SOLUTION INTRAVENOUS; SUBCUTANEOUS at 17:38

## 2017-02-25 RX ADMIN — FINASTERIDE SCH MG: 5 TABLET, FILM COATED ORAL at 10:05

## 2017-02-25 NOTE — PN
Progress Note (short form)





- Note


Progress Note: 


c/o non productive cough. tolerated bipap last night. denies CP, SOB,fever,

chills, N/V/C/D


noted to be desaturating to 80% this morning requiring face mask to maintain 

spO2 >90%





 Current Medications











Generic Name Dose Route Start Last Admin





  Trade Name Freq  PRN Reason Stop Dose Admin


 


Acetaminophen  650 mg 02/24/17 16:27  





  Tylenol -  PO   





  Q4H PRN   





  FEVER OR PAIN   


 


Acetaminophen  1,000 mg 02/24/17 16:27  





  Ofirmev Injection -  IVPB   





  Q6H PRN   





  FEVER   


 


Albuterol Sulfate  1 amp 02/25/17 11:04  





  Ventolin 0.083% Nebulizer Soln -  NEB   





  Q4H PRN   





  SHORT OF BREATH/WHEEZING   


 


Albuterol/Ipratropium  1 amp 02/25/17 12:00 02/25/17 11:57





  Duoneb -  NEB   1 amp





  QIDR LAUREN   Administration


 


Artificial Tears  1 drop 02/24/17 16:27  





  Artificial Tears  OU   





  TID PRN   





  DRY EYES   


 


Aspirin  325 mg 02/25/17 10:00 02/25/17 10:05





  Asa -  NGT   325 mg





  DAILY [facility]   Administration


 


Atorvastatin Calcium  20 mg 02/24/17 22:00 02/24/17 21:54





  Lipitor -  PO   20 mg





  HS LAUREN   Administration


 


Bisacodyl  10 mg 02/24/17 16:27  





  Dulcolax Suppository -  RC   





  PRN PRN   





  CONSTIPATION   


 


Carvedilol  6.25 mg 02/24/17 22:00 02/25/17 10:05





  Coreg -  NGT   6.25 mg





  BID [facility]UREN   Administration


 


Finasteride  5 mg 02/25/17 10:00 02/25/17 10:05





  Proscar -  PO   5 mg





  DAILY [facility]   Administration


 


Heparin Sodium (Porcine)  5,000 unit 02/24/17 22:00 02/25/17 10:05





  Heparin -  SQ   5,000 unit





  BID [facility]UR[facility]   Administration


 


Famotidine/Sodium Chloride  50 mls @ 100 mls/hr 02/24/17 22:00 02/25/17 10:04





  Pepcid 20 Mg Premixed Ivpb -  IVPB   100 mls/hr





  BID [facility]UR[facility]   Administration


 


Insulin Aspart  1 vial 02/24/17 16:30 02/25/17 12:25





  Novolog Vial Sliding Scale -  SQ   Not Given





  [facility]UR[facility]   





  Protocol   


 


Losartan Potassium  25 mg 02/25/17 10:00 02/25/17 10:05





  Cozaar -  PO   25 mg





  DAILY [facility]   Administration


 


Polyethylene Glycol  17 gm 02/24/17 22:00 02/25/17 10:06





  Miralax (For Daily Use) -  NGT   17 g





  BID [facility]UR[facility]   Administration


 


Voriconazole  200 mg 02/25/17 12:30 02/25/17 12:25





  Vfend (Restricted To Id)  PO   200 mg





  BID LAUREN   Administration








 Last Vital Signs











Temp Pulse Resp BP Pulse Ox


 


 98.1 F   82   20   141/66   94 L


 


 02/25/17 14:00  02/25/17 14:00  02/25/17 14:00  02/25/17 14:00  02/25/17 10:00








 Intake & Output











 02/22/17 02/23/17 02/24/17 02/25/17





 23:59 23:59 23:59 23:59


 


Intake Total 2389 2078 710 350


 


Output Total 4300 1610 2521 550


 


Balance -1911 468 -1811 -200


 


Weight 187 lb 6 oz 180 lb 14.4 oz 181 lb 6 oz 








General NAD A&O x2 (self and location)


CV S1 S2 RRR no murmur/rub/gallop


Lungs CTA B/L  no wheezing/rales/rhonchi


Abdomen RUQ drain in place. no erythema or swelling around insertion site. 

abdomen is soft ND/NT. normoactive bowel sounds


Extremities trace pitting edema B/L UE/LE


 CBCD











WBC  7.9 K/mm3 (4.0-10.0)   02/25/17  05:45    


 


RBC  3.58 M/mm3 (4.00-5.60)  L  02/25/17  05:45    


 


Hgb  10.6 GM/dL (11.7-16.9)  L  02/25/17  05:45    


 


Hct  30.8 % (35.4-49)  L  02/25/17  05:45    


 


MCV  86.0 fl (80-96)   02/25/17  05:45    


 


MCHC  34.5 g/dl (32.0-35.9)   02/25/17  05:45    


 


RDW  14.9 % (11.9-15.9)   02/25/17  05:45    


 


Plt Count  518 K/MM3 (134-434)  H  02/25/17  05:45    


 


MPV  7.2 fl (7.5-11.1)  L  02/25/17  05:45    








 CMP











Sodium  147 mmol/L (136-145)  H  02/25/17  05:45    


 


Potassium  4.0 mmol/L (3.5-5.1)   02/25/17  05:45    


 


Chloride  106 mmol/L ()   02/25/17  05:45    


 


Carbon Dioxide  32 mmol/L (21-32)   02/25/17  05:45    


 


Anion Gap  9  (8-16)   02/25/17  05:45    


 


BUN  19 mg/dL (7-18)  H D 02/25/17  05:45    


 


Creatinine  0.9 mg/dL (0.7-1.3)   02/25/17  05:45    


 


Creat Clearance w eGFR  > 60  (>60)   02/25/17  05:45    


 


Calcium  8.1 mg/dL (8.5-10.1)  L  02/25/17  05:45    


 


Total Bilirubin  0.6 mg/dL (0.2-1.0)   02/25/17  05:45    


 


AST  34 U/L (15-37)   02/25/17  05:45    


 


ALT  30 U/L (12-78)   02/25/17  05:45    


 


Alkaline Phosphatase  290 U/L ()  H D 02/25/17  05:45    


 


Total Protein  5.2 g/dl (6.4-8.2)  L  02/25/17  05:45    


 


Albumin  2.3 g/dl (3.4-5.0)  L  02/25/17  05:45    











ASSESSMENT AND PLAN:


86yo M with PMH [place], dyslipidemia, COPD, TIA and prostate and RCC presented to 

the ER and was admitted for further evaluation of their emergent condition


1. Sepsis due to Acute cholecystitis-afebrile. clinically improved. off abx. 

afebrile. no leukocytosis. decrease ROGELIO drain output. Plan is for 

cholecystectomy in 6 weeks.


2. Acute posthemorrhagic anemia in the setting of upper GI bleed- no repeated 

episodes. s/p 1 unit PRBC this hospitalization. slight downtrend today but 

remains at baseline. monitor closely. on ranitidine


3. DIC- likely in the setting of sepsis. received 8 units of cryo this 

admission. no change in fibrinogen. d/c hep sq. defer to hematology if 

additional units should be transfused. hematology on board


4. thrombophilia- possible due to antifungal? hematology on board.


5. 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 awaiting isolate. repeat sputum cx 

sent.voriconazole switched to po


6. Acute hypoxic respiratory failure- episodes of desaturation in the evening 

while sleeping. bipap QHS. desaturated to 82% now with oxygen off, requires 

facemask. chest PT. incentive spirometer. 


7. HTN- uncontrolled. increase losartan to 50mg. monitor


8. CVA- subacute seen on CT scan from 2/18. neuro consulted. was only able to 

sit at edge of bed with PT. will need TRAVIS when medically stable


9. DVT ppx- SCD


10. explained to pt need for TRAVIS when medically optimized. answered all 

questions. verbalized understanding and agreed with plan. 








Visit type





- Emergency Visit


Emergency Visit: Yes


ED Registration Date: 02/13/17


Care time: The patient presented to the Emergency Department on the above date 

and was hospitalized for further evaluation of their emergent condition.





- New Patient


This patient is new to me today: No





- Critical Care


Critical Care patient: No





- Discharge Referral


Referred to [facility] No

## 2017-02-25 NOTE — PN
Progress Note, Physician


History of Present Illness: 


Denies chest pain or dyspnea.





- Current Medication List


Current Medications: 


Active Medications





Acetaminophen (Tylenol -)  650 mg PO Q4H PRN


   PRN Reason: FEVER OR PAIN


Acetaminophen (Ofirmev Injection -)  1,000 mg IVPB Q6H PRN


   PRN Reason: FEVER


Albuterol Sulfate (Ventolin 0.083% Nebulizer Soln -)  1 amp NEB Q4H PRN


   PRN Reason: SHORT OF BREATH/WHEEZING


Albuterol/Ipratropium (Duoneb -)  1 amp NEB QIDR [facility]


   Last Admin: 02/25/17 17:54 Dose:  1 amp


Artificial Tears (Artificial Tears)  1 drop OU TID PRN


   PRN Reason: DRY EYES


Aspirin (Asa -)  325 mg NGT DAILY [facility]


   Last Admin: 02/25/17 10:05 Dose:  325 mg


Atorvastatin Calcium (Lipitor -)  20 mg PO HS [facility]


   Last Admin: 02/24/17 21:54 Dose:  20 mg


Bisacodyl (Dulcolax Suppository -)  10 mg RC PRN PRN


   PRN Reason: CONSTIPATION


Carvedilol (Coreg -)  6.25 mg NGT BID [facility]


   Last Admin: 02/25/17 10:05 Dose:  6.25 mg


Finasteride (Proscar -)  5 mg PO DAILY [facility]


   Last Admin: 02/25/17 10:05 Dose:  5 mg


Insulin Aspart (Novolog Vial Sliding Scale -)  1 vial SQ ACHS [facility]


   PRN Reason: Protocol


   Last Admin: 02/25/17 17:38 Dose:  Not Given


Losartan Potassium (Cozaar -)  50 mg PO DAILY [facility]


Polyethylene Glycol (Miralax (For Daily Use) -)  17 gm NGT BID [facility]


   Last Admin: 02/25/17 10:06 Dose:  17 g


Voriconazole (Vfend (Restricted To Id))  200 mg PO BID [facility]


   Last Admin: 02/25/17 12:25 Dose:  200 mg











- Objective


Vital Signs: 


 Vital Signs











Temperature  98.1 F   02/25/17 14:00


 


Pulse Rate  82   02/25/17 14:00


 


Respiratory Rate  20   02/25/17 14:00


 


Blood Pressure  141/66   02/25/17 14:00


 


O2 Sat by Pulse Oximetry (%)  94 L  02/25/17 10:00











Constitutional: Yes: No Distress, Calm


Neck: Yes: Supple


Cardiovascular: Yes: Regular Rate and Rhythm


Respiratory: Yes: Regular, Diminished


Gastrointestinal: Yes: Normal Bowel Sounds, Soft, Other (Cholecystotomy tube in 

place)


Edema: No


Labs: 


 CBC, BMP





 02/25/17 05:45 





 02/25/17 05:45 





 INR, PTT











INR  1.44  (0.82-1.09)  H  02/25/17  05:45    


 


Fibrinogen  < 100.0 mg/dL (238-498)  L*  02/25/17  05:45    














Problem List





- Problems


(1) Acute respiratory failure with hypoxia


Code(s): J96.01 - ACUTE RESPIRATORY FAILURE WITH HYPOXIA





(2) COPD (chronic obstructive pulmonary disease)


Code(s): J44.9 - CHRONIC OBSTRUCTIVE PULMONARY DISEASE, UNSPECIFIED   Qualifiers

: 


     COPD type: unspecified COPD        Qualified Code(s): J44.9 - Chronic 

obstructive pulmonary disease, unspecified  





(3) Cholecystitis, acute


Code(s): K81.0 - ACUTE CHOLECYSTITIS





(4) DIC (disseminated intravascular coagulation)


Code(s): D65 - DISSEMINATED INTRAVASCULAR COAGULATION





(5) Demand ischemia


Code(s): I24.8 - OTHER FORMS OF ACUTE ISCHEMIC HEART DISEASE





(6) Diastolic CHF


Code(s): I50.30 - UNSPECIFIED DIASTOLIC (CONGESTIVE) HEART FAILURE   Qualifiers

: 


     Congestive heart failure chronicity: chronic        Qualified Code(s): 

I50.32 - Chronic diastolic (congestive) heart failure  





(7) Gram-negative bacteremia


Code(s): R78.81 - BACTEREMIA





(8) HLD (hyperlipidemia)


Code(s): E78.5 - HYPERLIPIDEMIA, UNSPECIFIED   Qualifiers: 


     Hyperlipidemia type: pure hypercholesterolemia        Qualified Code(s): 

E78.0 - Pure hypercholesterolemia  





(9) HTN (hypertension)


Code(s): I10 - ESSENTIAL (PRIMARY) HYPERTENSION   Qualifiers: 


     Hypertension type: essential hypertension        Qualified Code(s): I10 - 

Essential (primary) hypertension  





(10) Cholecystostomy care


Code(s): Z43.4 - ENCOUNTER FOR ATTN TO OT ARTIF OPENINGS OF DIGESTIVE TRACT








Assessment/Plan


1. Acute cholecystitis post cholecystostomy drain


2. Post septic shock and Acute Hypoxic Respiratory Failure


3. Demand ischemia


4. SANGITA resolved


5. History of TIA


6. History of COPD


7. History of diverticular disease


8. History of colitis


9. History of renal carcinoma post left nephrectomy


10. History of prostate carcinoma


11. HTN


12. Hyperlipidemia


13. Low grade DIC


14. Anemia





PLAN:


1. Wean FIO2 to maintain saO2


2. Continue Carvedilol 6.25 bid and Losartan 50 qd as hemodynamics tolerate, 

continue  qd, Lipitor 20 qhs


3. Monitor CBC and transfuse as needed maintaining Hgb > 8.0, cryoprecipitate 

to maintain fibrinogen>100


4. Antifungal course as per ID service


5. DVT/GI prophylaxis


6. OOB to chair, PT->SNF

## 2017-02-25 NOTE — PN
Progress Note (short form)





- Note


Progress Note: 


PULMONARY





Denies shortness of breath or chest pain. +cough with clear sputum. No fevers 

or chills.





 Last Vital Signs











Temp Pulse Resp BP Pulse Ox


 


 98.6 F   76   20   152/83   96 


 


 02/25/17 06:00  02/25/17 06:00  02/25/17 06:00  02/25/17 06:00  02/25/17 02:10








Gen:  NAD at rest


Heart:  RRR


Lung:  scattered rhonchi


Abd: soft, nontender


Ext: no edema





 CBC, BMP





 02/25/17 05:45 





 02/25/17 05:45 





Active Medications





Acetaminophen (Tylenol -)  650 mg PO Q4H PRN


   PRN Reason: FEVER OR PAIN


Acetaminophen (Ofirmev Injection -)  1,000 mg IVPB Q6H PRN


   PRN Reason: FEVER


Artificial Tears (Artificial Tears)  1 drop OU TID PRN


   PRN Reason: DRY EYES


Aspirin (Asa -)  325 mg NGT DAILY [facility]


   Last Admin: 02/25/17 10:05 Dose:  325 mg


Atorvastatin Calcium (Lipitor -)  20 mg PO HS [facility]


   Last Admin: 02/24/17 21:54 Dose:  20 mg


Bisacodyl (Dulcolax Suppository -)  10 mg RC PRN PRN


   PRN Reason: CONSTIPATION


Carvedilol (Coreg -)  6.25 mg NGT BID [facility]


   Last Admin: 02/25/17 10:05 Dose:  6.25 mg


Finasteride (Proscar -)  5 mg PO DAILY [facility]


   Last Admin: 02/25/17 10:05 Dose:  5 mg


Heparin Sodium (Porcine) (Heparin -)  5,000 unit SQ BID [facility]


   Last Admin: 02/25/17 10:05 Dose:  5,000 unit


Famotidine/Sodium Chloride (Pepcid 20 Mg Premixed Ivpb -)  50 mls @ 100 mls/hr 

IVPB BID [facility]


   Last Admin: 02/25/17 10:04 Dose:  100 mls/hr


Insulin Aspart (Novolog Vial Sliding Scale -)  1 vial SQ ACHS [facility]


   PRN Reason: Protocol


   Last Admin: 02/25/17 06:02 Dose:  Not Given


Losartan Potassium (Cozaar -)  25 mg PO DAILY [facility]


   Last Admin: 02/25/17 10:05 Dose:  25 mg


Polyethylene Glycol (Miralax (For Daily Use) -)  17 gm NGT BID [facility]


   Last Admin: 02/25/17 10:06 Dose:  17 g


Tamsulosin HCl (Flomax -)  0.8 mg PO DAILY@0830 [facility]


   Last Admin: 02/25/17 10:05 Dose:  0.8 mg





A/P


Acute Cholecystitis


s/p Cholecystostomy Placement


Profound Septic Shock improved


s/p Acute Hypoxic Respiratory Failure


Acute Kidney Injury improving


Lactic Acidosis resolved


Demand Ischemia


RCC s/p Left Nephrectomy


COPD


HTN


Hyperlipidemia





-  antibiotics/antifungal per ID


-  monitor cholecystostomy drainage


-  O2 to keep SpO2 >90%


-  PO as tolerated


-  rehab/PT


-  DVT/GI prophylaxis

## 2017-02-25 NOTE — PN
Progress Note (short form)





- Note


Progress Note: 


ID








Voriconazole





Stable but couphing No fever


 Selected Entries











  02/25/17





  06:00


 


Temperature 98.6 F


 


Pulse Rate 76


 


Blood Pressure 152/83








Abd  Soft non tender drain





Last chest xray with no infitrate


Microbiology





02/21/17 11:35   Sputum - Endotrachea Suction/Ventilator   Gram Stain - Final


02/21/17 11:35   Sputum - Endotrachea Suction/Ventilator   Sputum Culture - 

Final


                              Yeast Like Organism


02/13/17 22:46   Sputum - Endotrachea Suction/Ventilator   Gram Stain - Final


02/13/17 22:46   Sputum - Endotrachea Suction/Ventilator   Sputum Culture - 

Final


                              Fungal Isolate: Mold


                              Mr S Aureus





 Laboratory Tests











  02/18/17 02/18/17 02/25/17





  05:00 05:00 05:45


 


WBC    7.9


 


Hgb    10.6 L


 


Hct    30.8 L


 


Plt Count    518 H


 


BUN   


 


Creatinine   


 


AST   


 


ALT   


 


Alkaline Phosphatase   


 


A. galactomannan Ag  Pending  


 


Beta-(1,3)-D-Glucan   56 














  02/25/17





  05:45


 


WBC 


 


Hgb 


 


Hct 


 


Plt Count 


 


BUN  19 H D


 


Creatinine  0.9


 


AST  34


 


ALT  30


 


Alkaline Phosphatase  290 H D


 


A. galactomannan Ag 


 


Beta-(1,3)-D-Glucan 








Assessment Bronchiectasis with superinfection with Aspergillus ?? on 

voriconazole


                 E Coli bacteremia


                 Cholecystitis Acute with drainage tube


                 Sepsis resolved   


Plan           Change to po voriconazole 200mg bid


Anum CARBALLO  





Problem List





- Problems


(1) Acute respiratory failure with hypoxia


Code(s): J96.01 - ACUTE RESPIRATORY FAILURE WITH HYPOXIA





(2) Cholecystitis, acute


Code(s): K81.0 - ACUTE CHOLECYSTITIS





(3) DIC (disseminated intravascular coagulation)


Code(s): D65 - DISSEMINATED INTRAVASCULAR COAGULATION





(4) Gram-negative bacteremia


Code(s): R78.81 - BACTEREMIA

## 2017-02-25 NOTE — PN
Progress Note (short form)





- Note


Progress Note: 


Patient seen and examined





Awake, alert 


denies any complaints








 


 


 Last Vital Signs











Temp Pulse Resp BP Pulse Ox


 


 99.1 F   80   18   146/63   94 L


 


 02/25/17 18:30  02/25/17 18:30  02/25/17 18:30  02/25/17 18:30  02/25/17 10:00














HEENT: BOGDAN, EOM Intact


Oropharynx: No thrush, No mucositis


Cor: RSR, No murmurs, No gallops


Lungs: Clear to auscultation (ant.)


Abd: Soft, Normal bowel sounds, T tube+


LT. > Rt. UE swelling


 


 


 Abnormal Lab Results











  02/25/17 02/25/17 02/25/17





  05:45 05:45 05:45


 


RBC  3.58 L  


 


Hgb  10.6 L  


 


Hct  30.8 L  


 


Plt Count  518 H  


 


MPV  7.2 L  


 


INR   1.44 H 


 


PTT (Actin FS)   36.0 H 


 


Fibrinogen   < 100.0 L* 


 


Sodium    147 H


 


BUN    19 H D


 


Calcium    8.1 L


 


Alkaline Phosphatase    290 H D


 


Total Protein    5.2 L


 


Albumin    2.3 L











 Current Medications





Acetaminophen (Tylenol -)  650 mg PO Q4H PRN


   PRN Reason: FEVER OR PAIN


   Last Admin: 02/25/17 21:19 Dose:  650 mg


Acetaminophen (Ofirmev Injection -)  1,000 mg IVPB Q6H PRN


   PRN Reason: FEVER


Albuterol Sulfate (Ventolin 0.083% Nebulizer Soln -)  1 amp NEB Q4H PRN


   PRN Reason: SHORT OF BREATH/WHEEZING


Albuterol/Ipratropium (Duoneb -)  1 amp NEB QIDR [facility]


   Last Admin: 02/25/17 17:54 Dose:  1 amp


Artificial Tears (Artificial Tears)  1 drop OU TID PRN


   PRN Reason: DRY EYES


Aspirin (Asa -)  325 mg NGT DAILY [facility]


   Last Admin: 02/25/17 10:05 Dose:  325 mg


Atorvastatin Calcium (Lipitor -)  20 mg PO HS [facility]


   Last Admin: 02/25/17 21:20 Dose:  20 mg


Bisacodyl (Dulcolax Suppository -)  10 mg RC PRN PRN


   PRN Reason: CONSTIPATION


Carvedilol (Coreg -)  6.25 mg NGT BID [facility]


   Last Admin: 02/25/17 21:20 Dose:  6.25 mg


Finasteride (Proscar -)  5 mg PO DAILY [facility]


   Last Admin: 02/25/17 10:05 Dose:  5 mg


Insulin Aspart (Novolog Vial Sliding Scale -)  1 vial SQ ACHS [facility]


   PRN Reason: Protocol


   Last Admin: 02/25/17 21:39 Dose:  Not Given


Losartan Potassium (Cozaar -)  50 mg PO DAILY [facility]


Polyethylene Glycol (Miralax (For Daily Use) -)  17 gm NGT BID [facility]


   Last Admin: 02/25/17 21:39 Dose:  Not Given


Voriconazole (Vfend (Restricted To Id))  200 mg PO BID [facility]


   Last Admin: 02/25/17 21:54 Dose:  200 mg




















A/P








 Respiratory failure--on ventimask


Fevers- continuing on antibiotic therapy


Coagulopathy- underlying sepsis, liver disease, 


Hypofibrinogenemia- 


Cerebral infarction-ASA per Neurology





Difficult problem:


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 

Biliary or pulmonary infection, hypofibrinogenemia and now with multiple 

cerebral infarcts. 





monitor parameters and transfuse ffp/cryo precipitate if bleeding





f/u factor V/VIII/X





check du[giovanny u/s upper ext. r/o DVT

## 2017-02-26 LAB
APTT BLD: 37.1 SECONDS (ref 26.9–34.4)
BASOPHILS # BLD: 2.8 % (ref 0–2)
DEPRECATED RDW RBC AUTO: 15.1 % (ref 11.9–15.9)
EOSINOPHIL # BLD: 5.1 % (ref 0–4.5)
FIBRINOGEN PPP-MCNC: < 100 MG/DL (ref 238–498)
INR BLD: 1.45 (ref 0.82–1.09)
MCH RBC QN AUTO: 29.4 PG (ref 25.7–33.7)
MCHC RBC AUTO-ENTMCNC: 34.2 G/DL (ref 32–35.9)
MCV RBC: 86 FL (ref 80–96)
NEUTROPHILS # BLD: 57.1 % (ref 42.8–82.8)
PLATELET # BLD AUTO: 533 K/MM3 (ref 134–434)
PMV BLD: 7.3 FL (ref 7.5–11.1)
PT PNL PPP: 16.1 SEC (ref 9.98–11.88)
WBC # BLD AUTO: 8.3 K/MM3 (ref 4–10)

## 2017-02-26 RX ADMIN — FINASTERIDE SCH MG: 5 TABLET, FILM COATED ORAL at 09:39

## 2017-02-26 RX ADMIN — IPRATROPIUM BROMIDE AND ALBUTEROL SULFATE SCH AMP: .5; 3 SOLUTION RESPIRATORY (INHALATION) at 23:10

## 2017-02-26 RX ADMIN — POLYETHYLENE GLYCOL 3350 SCH: 17 POWDER, FOR SOLUTION ORAL at 21:55

## 2017-02-26 RX ADMIN — LOSARTAN POTASSIUM SCH MG: 50 TABLET, FILM COATED ORAL at 09:39

## 2017-02-26 RX ADMIN — INSULIN ASPART SCH: 100 INJECTION, SOLUTION INTRAVENOUS; SUBCUTANEOUS at 06:25

## 2017-02-26 RX ADMIN — VORICONAZOLE SCH MG: 200 TABLET, FILM COATED ORAL at 09:39

## 2017-02-26 RX ADMIN — CARVEDILOL SCH MG: 6.25 TABLET, FILM COATED ORAL at 21:54

## 2017-02-26 RX ADMIN — POLYETHYLENE GLYCOL 3350 SCH G: 17 POWDER, FOR SOLUTION ORAL at 09:40

## 2017-02-26 RX ADMIN — PHYTONADIONE SCH MG: 10 INJECTION, EMULSION INTRAMUSCULAR; INTRAVENOUS; SUBCUTANEOUS at 09:40

## 2017-02-26 RX ADMIN — CARVEDILOL SCH MG: 6.25 TABLET, FILM COATED ORAL at 09:39

## 2017-02-26 RX ADMIN — IPRATROPIUM BROMIDE AND ALBUTEROL SULFATE SCH AMP: .5; 3 SOLUTION RESPIRATORY (INHALATION) at 17:59

## 2017-02-26 RX ADMIN — ACETAMINOPHEN PRN MG: 325 TABLET ORAL at 09:39

## 2017-02-26 RX ADMIN — ATORVASTATIN CALCIUM SCH MG: 20 TABLET, FILM COATED ORAL at 21:54

## 2017-02-26 RX ADMIN — IPRATROPIUM BROMIDE AND ALBUTEROL SULFATE SCH AMP: .5; 3 SOLUTION RESPIRATORY (INHALATION) at 06:20

## 2017-02-26 RX ADMIN — IPRATROPIUM BROMIDE AND ALBUTEROL SULFATE SCH AMP: .5; 3 SOLUTION RESPIRATORY (INHALATION) at 12:50

## 2017-02-26 RX ADMIN — ASPIRIN 325 MG ORAL TABLET SCH MG: 325 PILL ORAL at 09:40

## 2017-02-26 RX ADMIN — VORICONAZOLE SCH MG: 200 TABLET, FILM COATED ORAL at 21:54

## 2017-02-26 RX ADMIN — ACETAMINOPHEN PRN MG: 325 TABLET ORAL at 18:58

## 2017-02-26 NOTE — PN
Progress Note (short form)





- Note


Progress Note: 


Patient seen and examined





Awake, alert 


denies any complaints








 


 


 Last Vital Signs











Temp Pulse Resp BP Pulse Ox


 


 98.8 F   84   20   148/56   92 L


 


 02/26/17 14:00  02/26/17 14:00  02/26/17 14:00  02/26/17 14:00  02/26/17 10:00

















HEENT: BOGDAN, EOM Intact


Oropharynx: No thrush, No mucositis


Cor: RSR, No murmurs, No gallops


Lungs: Clear to auscultation (ant.)


Abd: Soft, Normal bowel sounds, T tube+


LT. > Rt. UE swelling


 


 


 Abnormal Lab Results











  02/26/17 02/26/17





  06:20 06:20


 


RBC  3.68 L 


 


Hgb  10.8 L 


 


Hct  31.7 L 


 


Plt Count  533 H 


 


MPV  7.3 L 


 


Monocytes %  10.6 H 


 


Eosinophils %  5.1 H 


 


Basophils %  2.8 H D 


 


INR   1.45 H


 


PTT (Actin FS)   37.1 H


 


Fibrinogen   < 100.0 L*











 Current Medications





Acetaminophen (Tylenol -)  650 mg PO Q4H PRN


   PRN Reason: FEVER OR PAIN


   Last Admin: 02/26/17 18:58 Dose:  650 mg


Acetaminophen (Ofirmev Injection -)  1,000 mg IVPB Q6H PRN


   PRN Reason: FEVER


Albuterol Sulfate (Ventolin 0.083% Nebulizer Soln -)  1 amp NEB Q4H PRN


   PRN Reason: SHORT OF BREATH/WHEEZING


Albuterol/Ipratropium (Duoneb -)  1 amp NEB QIDR [facility]


   Last Admin: 02/26/17 17:59 Dose:  1 amp


Artificial Tears (Artificial Tears)  1 drop OU TID PRN


   PRN Reason: DRY EYES


Aspirin (Asa -)  325 mg PO DAILY [facility]


Atorvastatin Calcium (Lipitor -)  20 mg PO HS [facility]


   Last Admin: 02/26/17 21:54 Dose:  20 mg


Bisacodyl (Dulcolax Suppository -)  10 mg RC PRN PRN


   PRN Reason: CONSTIPATION


Carvedilol (Coreg -)  6.25 mg PO BID [facility]


   Last Admin: 02/26/17 21:54 Dose:  6.25 mg


Finasteride (Proscar -)  5 mg PO DAILY [facility]


   Last Admin: 02/26/17 09:39 Dose:  5 mg


Losartan Potassium (Cozaar -)  50 mg PO DAILY [facility]


   Last Admin: 02/26/17 09:39 Dose:  50 mg


Phytonadione (Aqua Mephyton Injection -)  5 mg SQ DAILY [facility]


   Stop: 02/28/17 10:01


   Last Admin: 02/26/17 09:40 Dose:  5 mg


Polyethylene Glycol (Miralax (For Daily Use) -)  17 gm PO BID [facility]


   Last Admin: 02/26/17 21:55 Dose:  Not Given


Voriconazole (Vfend (Restricted To Id))  200 mg PO BID [facility]


   Last Admin: 02/26/17 21:54 Dose:  200 mg


























A/P








 Respiratory failure--on ventimask


Fevers- continuing on antibiotic therapy


Coagulopathy- underlying sepsis, liver disease, 


Hypofibrinogenemia- 


Cerebral infarction-ASA per Neurology





Difficult problem:


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 

Biliary or pulmonary infection, hypofibrinogenemia and now with multiple 

cerebral infarcts. 





monitor parameters and transfuse ffp/cryo precipitate if bleeding





f/u factor V/VIII/X





rt. IJ thrombosis---holding anticoagulation due to ongoing coagulopathy

## 2017-02-26 NOTE — PN
Progress Note (short form)





- Note


Progress Note: 


c/o overall generalized fatigue. denies CP, SOB,fever,chills, N/V/C/D


noted to be desaturating  this morning requiring face mask to maintain spO2 >90%





 


 Current Medications











Generic Name Dose Route Start Last Admin





  Trade Name Freq  PRN Reason Stop Dose Admin


 


Acetaminophen  650 mg 02/24/17 16:27 02/26/17 09:39





  Tylenol -  PO   650 mg





  Q4H PRN   Administration





  FEVER OR PAIN   


 


Acetaminophen  1,000 mg 02/24/17 16:27  





  Ofirmev Injection -  IVPB   





  Q6H PRN   





  FEVER   


 


Albuterol Sulfate  1 amp 02/25/17 11:04  





  Ventolin 0.083% Nebulizer Soln -  NEB   





  Q4H PRN   





  SHORT OF BREATH/WHEEZING   


 


Albuterol/Ipratropium  1 amp 02/25/17 12:00 02/26/17 06:20





  Duoneb -  NEB   1 amp





  QIDR [facility]   Administration


 


Artificial Tears  1 drop 02/24/17 16:27  





  Artificial Tears  OU   





  TID PRN   





  DRY EYES   


 


Aspirin  325 mg 02/25/17 10:00 02/26/17 09:40





  Asa -  NGT   325 mg





  DAILY [facility]   Administration


 


Atorvastatin Calcium  20 mg 02/24/17 22:00 02/25/17 21:20





  Lipitor -  PO   20 mg





  HS [facility]   Administration


 


Bisacodyl  10 mg 02/24/17 16:27  





  Dulcolax Suppository -  RC   





  PRN PRN   





  CONSTIPATION   


 


Carvedilol  6.25 mg 02/24/17 22:00 02/26/17 09:39





  Coreg -  NGT   6.25 mg





  BID [facility]   Administration


 


Finasteride  5 mg 02/25/17 10:00 02/26/17 09:39





  Proscar -  PO   5 mg





  DAILY [facility]   Administration


 


Losartan Potassium  50 mg 02/26/17 10:00 02/26/17 09:39





  Cozaar -  PO   50 mg





  DAILY [facility]   Administration


 


Phytonadione  5 mg 02/26/17 10:00 02/26/17 09:40





  Aqua Mephyton Injection -  SQ 02/28/17 10:01  5 mg





  DAILY [facility]   Administration


 


Polyethylene Glycol  17 gm 02/24/17 22:00 02/26/17 09:40





  Miralax (For Daily Use) -  NGT   17 g





  BID [facility]   Administration


 


Voriconazole  200 mg 02/25/17 12:30 02/26/17 09:39





  Vfend (Restricted To Id)  PO   200 mg





  BID [facility]   Administration











 Last Vital Signs











Temp Pulse Resp BP Pulse Ox


 


 97.2 F L  78   20   132/76   97 


 


 02/26/17 06:00  02/26/17 06:00  02/26/17 06:00  02/26/17 06:00  02/26/17 01:49











General NAD A&O x3


CV S1 S2 RRR no murmur/rub/gallop


Lungs Coarse breath sounds anteriorly  no wheezing/rales/rhonchi


Abdomen RUQ drain in place. no erythema or swelling around insertion site. 

abdomen is soft ND/NT. normoactive bowel sounds


Extremities 1+ pitting edema B/L UE/LE


 





 CBCD











WBC  8.3 K/mm3 (4.0-10.0)   02/26/17  06:20    


 


RBC  3.68 M/mm3 (4.00-5.60)  L  02/26/17  06:20    


 


Hgb  10.8 GM/dL (11.7-16.9)  L  02/26/17  06:20    


 


Hct  31.7 % (35.4-49)  L  02/26/17  06:20    


 


MCV  86.0 fl (80-96)   02/26/17  06:20    


 


MCHC  34.2 g/dl (32.0-35.9)   02/26/17  06:20    


 


RDW  15.1 % (11.9-15.9)   02/26/17  06:20    


 


Plt Count  533 K/MM3 (134-434)  H  02/26/17  06:20    


 


MPV  7.3 fl (7.5-11.1)  L  02/26/17  06:20    








 CMP











Sodium  147 mmol/L (136-145)  H  02/25/17  05:45    


 


Potassium  4.0 mmol/L (3.5-5.1)   02/25/17  05:45    


 


Chloride  106 mmol/L ()   02/25/17  05:45    


 


Carbon Dioxide  32 mmol/L (21-32)   02/25/17  05:45    


 


Anion Gap  9  (8-16)   02/25/17  05:45    


 


BUN  19 mg/dL (7-18)  H D 02/25/17  05:45    


 


Creatinine  0.9 mg/dL (0.7-1.3)   02/25/17  05:45    


 


Creat Clearance w eGFR  > 60  (>60)   02/25/17  05:45    


 


Calcium  8.1 mg/dL (8.5-10.1)  L  02/25/17  05:45    


 


Total Bilirubin  0.6 mg/dL (0.2-1.0)   02/25/17  05:45    


 


AST  34 U/L (15-37)   02/25/17  05:45    


 


ALT  30 U/L (12-78)   02/25/17  05:45    


 


Alkaline Phosphatase  290 U/L ()  H D 02/25/17  05:45    


 


Total Protein  5.2 g/dl (6.4-8.2)  L  02/25/17  05:45    


 


Albumin  2.3 g/dl (3.4-5.0)  L  02/25/17  05:45    











ASSESSMENT AND PLAN:


86yo M with PMH [place], dyslipidemia, COPD, TIA and prostate and RCC presented to 

the ER and was admitted for further evaluation of their emergent condition


1. Sepsis due to Acute cholecystitis-afebrile. clinically improved. off abx. 

afebrile. no leukocytosis. decrease ROGELIO drain output. Plan is for 

cholecystectomy in 6 weeks.


2. Acute posthemorrhagic anemia in the setting of upper GI bleed- no repeated 

episodes. s/p 1 unit PRBC this hospitalization.Hgb stable. no indication for txn


3. DIC- likely in the setting of sepsis. received 8 units of cryo this 

admission. no change in fibrinogen. started on vitamin k. check u/s B/L UE to r/

o dvt. hematology on board


4. thrombophilia- possible due to antifungal? hematology on board.


5. 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 awaiting isolate. voriconazole po


6. Acute hypoxic respiratory failure- continues to desaturate. check CXR, some 

congestion on last one on 2/23. will see may need some lasix. supplemental 

oxygen to maintain spo2 >90%. bipap QHS. chest PT. incentive spirometer. 


7. urinary retention- multiple attempts to remove muñiz with retention. flomax 

on hold due to delayed excretion with voriconazole. on proscar. will need to f/

u with urology on discharge


8. HTN- improved. cont losartan 


9. CVA- subacute seen on CT scan from 2/18. neuro consulted. was only able to 

sit at edge of bed with PT. will need TRAVIS when medically stable


10. DVT ppx- SCD


11. PT. will need TRAVIS on discharge due to profound deconditioning








Visit type





- Emergency Visit


Emergency Visit: Yes


ED Registration Date: 02/13/17


Care time: The patient presented to the Emergency Department on the above date 

and was hospitalized for further evaluation of their emergent condition.





- New Patient


This patient is new to me today: No





- Critical Care


Critical Care patient: No





- Discharge Referral


Referred to [facility] No

## 2017-02-26 NOTE — PN
Progress Note (short form)





- Note


Progress Note: 


PULMONARY





c/o generalized weakness. Denies shortness of breath or chest pain. +minimal 

cough with clear sputum. No fevers or chills.





 Last Vital Signs











Temp Pulse Resp BP Pulse Ox


 


 98.3 F   95 H  20   165/70   92 L


 


 02/26/17 10:00  02/26/17 10:00  02/26/17 10:00  02/26/17 10:00  02/26/17 10:00











Gen:  NAD at rest


Heart:  RRR


Lung:  scattered rhonchi


Abd: soft, nontender


Ext: no edema





 CBC, BMP





 02/26/17 06:20 





 02/25/17 05:45 





Active Medications





Acetaminophen (Tylenol -)  650 mg PO Q4H PRN


   PRN Reason: FEVER OR PAIN


   Last Admin: 02/26/17 09:39 Dose:  650 mg


Acetaminophen (Ofirmev Injection -)  1,000 mg IVPB Q6H PRN


   PRN Reason: FEVER


Albuterol Sulfate (Ventolin 0.083% Nebulizer Soln -)  1 amp NEB Q4H PRN


   PRN Reason: SHORT OF BREATH/WHEEZING


Albuterol/Ipratropium (Duoneb -)  1 amp NEB QIDR [facility]


   Last Admin: 02/26/17 06:20 Dose:  1 amp


Artificial Tears (Artificial Tears)  1 drop OU TID PRN


   PRN Reason: DRY EYES


Aspirin (Asa -)  325 mg NGT DAILY [facility]


   Last Admin: 02/26/17 09:40 Dose:  325 mg


Atorvastatin Calcium (Lipitor -)  20 mg PO HS [facility]


   Last Admin: 02/25/17 21:20 Dose:  20 mg


Bisacodyl (Dulcolax Suppository -)  10 mg RC PRN PRN


   PRN Reason: CONSTIPATION


Carvedilol (Coreg -)  6.25 mg NGT BID [facility]


   Last Admin: 02/26/17 09:39 Dose:  6.25 mg


Finasteride (Proscar -)  5 mg PO DAILY [facility]


   Last Admin: 02/26/17 09:39 Dose:  5 mg


Losartan Potassium (Cozaar -)  50 mg PO DAILY [facility]


   Last Admin: 02/26/17 09:39 Dose:  50 mg


Phytonadione (Aqua Mephyton Injection -)  5 mg SQ DAILY [facility]


   Stop: 02/28/17 10:01


   Last Admin: 02/26/17 09:40 Dose:  5 mg


Polyethylene Glycol (Miralax (For Daily Use) -)  17 gm NGT BID [facility]


   Last Admin: 02/26/17 09:40 Dose:  17 g


Voriconazole (Vfend (Restricted To Id))  200 mg PO BID [facility]


   Last Admin: 02/26/17 09:39 Dose:  200 mg








A/P


Acute Cholecystitis


s/p Cholecystostomy Placement


Profound Septic Shock improved


s/p Acute Hypoxic Respiratory Failure


Acute Kidney Injury improving


Lactic Acidosis resolved


Demand Ischemia


RCC s/p Left Nephrectomy


COPD


HTN


Hyperlipidemia





-  f/u doppler studies


-  antibiotics/antifungal per ID


-  monitor cholecystostomy drainage


-  O2 to keep SpO2 >90%


-  PO as tolerated


-  rehab/PT


-  DVT/GI prophylaxis

## 2017-02-26 NOTE — PN
Progress Note, Physician


History of Present Illness: 


Denies chest pain or dyspnea, feels weak and debilitated.





- Current Medication List


Current Medications: 


Active Medications





Acetaminophen (Tylenol -)  650 mg PO Q4H PRN


   PRN Reason: FEVER OR PAIN


   Last Admin: 02/26/17 09:39 Dose:  650 mg


Acetaminophen (Ofirmev Injection -)  1,000 mg IVPB Q6H PRN


   PRN Reason: FEVER


Albuterol Sulfate (Ventolin 0.083% Nebulizer Soln -)  1 amp NEB Q4H PRN


   PRN Reason: SHORT OF BREATH/WHEEZING


Albuterol/Ipratropium (Duoneb -)  1 amp NEB QIDR [facility]


   Last Admin: 02/26/17 06:20 Dose:  1 amp


Artificial Tears (Artificial Tears)  1 drop OU TID PRN


   PRN Reason: DRY EYES


Aspirin (Asa -)  325 mg NGT DAILY [facility]


   Last Admin: 02/26/17 09:40 Dose:  325 mg


Atorvastatin Calcium (Lipitor -)  20 mg PO HS [facility]


   Last Admin: 02/25/17 21:20 Dose:  20 mg


Bisacodyl (Dulcolax Suppository -)  10 mg RC PRN PRN


   PRN Reason: CONSTIPATION


Carvedilol (Coreg -)  6.25 mg NGT BID [facility]


   Last Admin: 02/26/17 09:39 Dose:  6.25 mg


Finasteride (Proscar -)  5 mg PO DAILY [facility]


   Last Admin: 02/26/17 09:39 Dose:  5 mg


Losartan Potassium (Cozaar -)  50 mg PO DAILY [facility]


   Last Admin: 02/26/17 09:39 Dose:  50 mg


Phytonadione (Aqua Mephyton Injection -)  5 mg SQ DAILY [facility]


   Stop: 02/28/17 10:01


   Last Admin: 02/26/17 09:40 Dose:  5 mg


Polyethylene Glycol (Miralax (For Daily Use) -)  17 gm NGT BID [facility]


   Last Admin: 02/26/17 09:40 Dose:  17 g


Voriconazole (Vfend (Restricted To Id))  200 mg PO BID [facility]


   Last Admin: 02/26/17 09:39 Dose:  200 mg











- Objective


Vital Signs: 


 Vital Signs











Temperature  97.2 F L  02/26/17 06:00


 


Pulse Rate  78   02/26/17 06:00


 


Respiratory Rate  20   02/26/17 06:00


 


Blood Pressure  132/76   02/26/17 06:00


 


O2 Sat by Pulse Oximetry (%)  97   02/26/17 01:49











Constitutional: Yes: No Distress, Calm


Neck: Yes: Supple


Cardiovascular: Yes: Regular Rate and Rhythm


Respiratory: Yes: Regular, Diminished, On Nasal O2


Gastrointestinal: Yes: Normal Bowel Sounds, Soft, Abdomen, Obese


Edema: Yes


Edema: LLE: 1+, RLE: 1+


Labs: 


 CBC, BMP





 02/26/17 06:20 





 02/25/17 05:45 





 INR, PTT











INR  1.45  (0.82-1.09)  H  02/26/17  06:20    


 


Fibrinogen  < 100.0 mg/dL (238-498)  L*  02/26/17  06:20    














- ....Imaging


Chest X-ray: Report Reviewed (Decreased congestion)





Problem List





- Problems


(1) Acute respiratory failure with hypoxia


Code(s): J96.01 - ACUTE RESPIRATORY FAILURE WITH HYPOXIA





(2) COPD (chronic obstructive pulmonary disease)


Code(s): J44.9 - CHRONIC OBSTRUCTIVE PULMONARY DISEASE, UNSPECIFIED   Qualifiers

: 


     COPD type: unspecified COPD        Qualified Code(s): J44.9 - Chronic 

obstructive pulmonary disease, unspecified  





(3) Cholecystitis, acute


Code(s): K81.0 - ACUTE CHOLECYSTITIS





(4) DIC (disseminated intravascular coagulation)


Code(s): D65 - DISSEMINATED INTRAVASCULAR COAGULATION





(5) Demand ischemia


Code(s): I24.8 - OTHER FORMS OF ACUTE ISCHEMIC HEART DISEASE





(6) Diastolic CHF


Code(s): I50.30 - UNSPECIFIED DIASTOLIC (CONGESTIVE) HEART FAILURE   Qualifiers

: 


     Congestive heart failure chronicity: chronic        Qualified Code(s): 

I50.32 - Chronic diastolic (congestive) heart failure  





(7) Gram-negative bacteremia


Code(s): R78.81 - BACTEREMIA





(8) HLD (hyperlipidemia)


Code(s): E78.5 - HYPERLIPIDEMIA, UNSPECIFIED   Qualifiers: 


     Hyperlipidemia type: pure hypercholesterolemia        Qualified Code(s): 

E78.0 - Pure hypercholesterolemia  





(9) HTN (hypertension)


Code(s): I10 - ESSENTIAL (PRIMARY) HYPERTENSION   Qualifiers: 


     Hypertension type: essential hypertension        Qualified Code(s): I10 - 

Essential (primary) hypertension  





(10) Cholecystostomy care


Code(s): Z43.4 - ENCOUNTER FOR ATTN TO OTH ARTIF OPENINGS OF DIGESTIVE TRACT








Assessment/Plan


1. Acute cholecystitis post cholecystostomy drain


2. Post septic shock and Acute Hypoxic Respiratory Failure


3. Demand ischemia


4. SANGITA resolved


5. History of TIA


6. History of COPD


7. History of diverticular disease


8. History of colitis


9. History of renal carcinoma post left nephrectomy


10. History of prostate carcinoma


11. HTN


12. Hyperlipidemia


13. Low grade DIC


14. Anemia





PLAN:


1. Wean FIO2 to maintain saO2, diuresis as needed


2. Continue Carvedilol 6.25 bid and Losartan 50 qd as hemodynamics tolerate, 

continue  qd, Lipitor 20 qhs


3. Monitor CBC and transfuse as needed maintaining Hgb > 8.0, trial of vit K


4. Antifungal course as per ID service


5. DVT/GI prophylaxis


6. OOB to chair, PT->SNF

## 2017-02-27 LAB
DEPRECATED RDW RBC AUTO: 14.7 % (ref 11.9–15.9)
MCH RBC QN AUTO: 29.9 PG (ref 25.7–33.7)
MCHC RBC AUTO-ENTMCNC: 34.5 G/DL (ref 32–35.9)
MCV RBC: 86.6 FL (ref 80–96)
PLATELET # BLD AUTO: 561 K/MM3 (ref 134–434)
PMV BLD: 7.1 FL (ref 7.5–11.1)
WBC # BLD AUTO: 7.4 K/MM3 (ref 4–10)

## 2017-02-27 RX ADMIN — LOSARTAN POTASSIUM SCH MG: 50 TABLET, FILM COATED ORAL at 09:38

## 2017-02-27 RX ADMIN — VORICONAZOLE SCH MG: 200 TABLET, FILM COATED ORAL at 09:38

## 2017-02-27 RX ADMIN — VORICONAZOLE SCH MG: 200 TABLET, FILM COATED ORAL at 21:54

## 2017-02-27 RX ADMIN — ASPIRIN 325 MG ORAL TABLET SCH MG: 325 PILL ORAL at 09:38

## 2017-02-27 RX ADMIN — POLYETHYLENE GLYCOL 3350 SCH: 17 POWDER, FOR SOLUTION ORAL at 21:47

## 2017-02-27 RX ADMIN — CARVEDILOL SCH MG: 6.25 TABLET, FILM COATED ORAL at 09:38

## 2017-02-27 RX ADMIN — IPRATROPIUM BROMIDE AND ALBUTEROL SULFATE SCH AMP: .5; 3 SOLUTION RESPIRATORY (INHALATION) at 11:46

## 2017-02-27 RX ADMIN — IPRATROPIUM BROMIDE AND ALBUTEROL SULFATE SCH AMP: .5; 3 SOLUTION RESPIRATORY (INHALATION) at 06:30

## 2017-02-27 RX ADMIN — Medication PRN MG: at 22:37

## 2017-02-27 RX ADMIN — POLYETHYLENE GLYCOL 3350 SCH: 17 POWDER, FOR SOLUTION ORAL at 09:39

## 2017-02-27 RX ADMIN — CARVEDILOL SCH MG: 6.25 TABLET, FILM COATED ORAL at 21:54

## 2017-02-27 RX ADMIN — ACETAMINOPHEN PRN MG: 325 TABLET ORAL at 08:25

## 2017-02-27 RX ADMIN — IPRATROPIUM BROMIDE AND ALBUTEROL SULFATE SCH AMP: .5; 3 SOLUTION RESPIRATORY (INHALATION) at 18:25

## 2017-02-27 RX ADMIN — ATORVASTATIN CALCIUM SCH MG: 20 TABLET, FILM COATED ORAL at 21:54

## 2017-02-27 RX ADMIN — FINASTERIDE SCH MG: 5 TABLET, FILM COATED ORAL at 09:38

## 2017-02-27 RX ADMIN — PHYTONADIONE SCH MG: 10 INJECTION, EMULSION INTRAMUSCULAR; INTRAVENOUS; SUBCUTANEOUS at 09:38

## 2017-02-27 NOTE — PN
Progress Note (short form)





- Note


Progress Note: 


awake and alert





c/o sneezing





 Vital Signs











 Period  Temp  Pulse  Resp  BP Sys/Yun  Pulse Ox


 


 Last 24 Hr  98.1 F-98.8 F  79-95  18-20  143-165/56-73  92-96








no pharyngitis


cor-rrr


lungs decreased bs at bases


abd soft,+RUQ GB drain


ext +edema arms and legs





 CBC, BMP





 02/27/17 05:55 





 02/25/17 05:45 





a/p


bronchiectasis with mold in sputum- to continue voriconozole po for now, f/u 

sputum cultures


d/w Dr Rowan


acute cholycystitis with cholysytotomy tube


s/p ecoli bacteremia


MRSA colonization

## 2017-02-27 NOTE — MSN
Progress Note (SOAP)





- Subjective


Chief Complaint: 


RUQ pain


History of Present Illness: 


Patient was examined at beside.  He stated that he feels "Absolutely awful"  

When asked to specify he stated that he has a cough, headache, throat pain, 

stuffy nose, and ear pain.  He says that he feels as though he has a head cold.

  He also feels that his cough is constant.  He stated that is producing clear 

sputum.  He denies any abdominal pain, NVD, Const., loss of appetite, chest pain

, SOB or dysuria.  He is currently on nasal cannuli.  It as explained to the PT 

that he had multiple issues, and his recovery would be slow. The patient was 

also told that he has a clot in the right internal jugular and possibly one in 

his deep veins of the leg, as well as the issues with treating the clots.





- Current Medications


Current Medications: 


Active Medications





Acetaminophen (Tylenol -)  650 mg PO Q4H PRN


   PRN Reason: FEVER OR PAIN


   Last Admin: 02/27/17 08:25 Dose:  650 mg


Acetaminophen (Ofirmev Injection -)  1,000 mg IVPB Q6H PRN


   PRN Reason: FEVER


Albuterol Sulfate (Ventolin 0.083% Nebulizer Soln -)  1 amp NEB Q4H PRN


   PRN Reason: SHORT OF BREATH/WHEEZING


Albuterol/Ipratropium (Duoneb -)  1 amp NEB QIDR [facility]


   Last Admin: 02/27/17 11:46 Dose:  1 amp


Artificial Tears (Artificial Tears)  1 drop OU TID PRN


   PRN Reason: DRY EYES


Aspirin (Asa -)  325 mg PO DAILY [facility]


   Last Admin: 02/27/17 09:38 Dose:  325 mg


Atorvastatin Calcium (Lipitor -)  20 mg PO HS [facility]


   Last Admin: 02/26/17 21:54 Dose:  20 mg


Bisacodyl (Dulcolax Suppository -)  10 mg RC PRN PRN


   PRN Reason: CONSTIPATION


Carvedilol (Coreg -)  6.25 mg PO BID [facility]


   Last Admin: 02/27/17 09:38 Dose:  6.25 mg


Finasteride (Proscar -)  5 mg PO DAILY [facility]


   Last Admin: 02/27/17 09:38 Dose:  5 mg


Guaifenesin (Robitussin Dm -)  10 ml PO Q6H PRN


   PRN Reason: COUGH


Losartan Potassium (Cozaar -)  50 mg PO DAILY [facility]


   Last Admin: 02/27/17 09:38 Dose:  50 mg


Phytonadione (Aqua Mephyton Injection -)  5 mg SQ DAILY [facility]


   Stop: 02/28/17 10:01


   Last Admin: 02/27/17 09:38 Dose:  5 mg


Polyethylene Glycol (Miralax (For Daily Use) -)  17 gm PO BID [facility]


   Last Admin: 02/27/17 09:39 Dose:  Not Given


Voriconazole (Vfend (Restricted To Id))  200 mg PO BID [facility]


   Last Admin: 02/27/17 09:38 Dose:  200 mg











- Objective


Vital Signs: 


 Vital Signs











Temperature  98.6 F   02/27/17 10:00


 


Pulse Rate  97 H  02/27/17 11:45


 


Respiratory Rate  18   02/27/17 10:00


 


Blood Pressure  150/70   02/27/17 10:00


 


O2 Sat by Pulse Oximetry (%)  95   02/27/17 11:45











Constitutional: Yes: Well Nourished, Calm, Mild Distress


Eyes: Yes: WNL, Conjunctiva Clear, EOM Intact


HENT: Yes: Atraumatic, Normocephalic, Hoarseness, Rhinnorhea, Other (ear pain )


Neck: Yes: WNL, Supple, Trachea Midline, Tenderness


Cardiovascular: Yes: WNL, Regular Rate and Rhythm, S1, S2.  No: Gallop, Murmur, 

Rub


Respiratory: Yes: WNL, Regular, On Nasal O2 (2L), Other (diminished lung sounds)


Gastrointestinal: Yes: Normal Bowel Sounds, Soft


Genitourinary: Yes: WNL


Musculoskeletal: Yes: Muscle Weakness


Extremities: Yes: WNL


Peripheral Pulses WNL: Yes


Peripheral Pulses: Left Radial: 2+, Right Radial: 2+, Left Doralis Pedis: 2+, 

Right Dorsalis Pedis: 2+


Edema: No


Integumentary: Yes: WNL


Wound/Incision: Yes: Clean/Dry, Well Approximated


Neurological: Yes: WNL, Alert, Oriented


Psychiatric: Yes: WNL, Alert, Oriented





Labs


Lab Results: 


 CBC, BMP





 02/27/17 05:55 





 02/25/17 05:45 











Assessment/Plan


86 YO M w/ hx of COPD, [place], HTN, HLD, TIA, prostate CA, renal CA now s/p left 

nephrectomy. He came to the ED with Right Upper Quadrant pain and sepsis 

secondary to acute cholecystitis s/p percutaenous drain.





Respiratory: Acute hypoxic respiratory failure


-O2 nasal cannuli


-incentive paolo


-Lasix IV





Sepsis secondary to acute cholecystitis 


-off abx


-Decreased output from drain


-chlecystectomy in 6 weeks





Thrombosis of Right Jugular vein


-decreased fibrinogen


-GI bleed


-Consult Heme


-consult vasc. surg





Posthemmoragic bleed


-PPI


-montior CBC/HCt





Aspirgilosis


-Treated with voriconazole

## 2017-02-27 NOTE — PN
Progress Note, Physician


History of Present Illness: 


pulmonary





alert,comfortable,+cough,-sob,-cp





- Current Medication List


Current Medications: 


Active Medications





Acetaminophen (Tylenol -)  650 mg PO Q4H PRN


   PRN Reason: FEVER OR PAIN


   Last Admin: 02/27/17 08:25 Dose:  650 mg


Acetaminophen (Ofirmev Injection -)  1,000 mg IVPB Q6H PRN


   PRN Reason: FEVER


Albuterol Sulfate (Ventolin 0.083% Nebulizer Soln -)  1 amp NEB Q4H PRN


   PRN Reason: SHORT OF BREATH/WHEEZING


Albuterol/Ipratropium (Duoneb -)  1 amp NEB QIDR [facility]


   Last Admin: 02/27/17 11:46 Dose:  1 amp


Artificial Tears (Artificial Tears)  1 drop OU TID PRN


   PRN Reason: DRY EYES


Aspirin (Asa -)  325 mg PO DAILY [facility]


   Last Admin: 02/27/17 09:38 Dose:  325 mg


Atorvastatin Calcium (Lipitor -)  20 mg PO HS [facility]


   Last Admin: 02/26/17 21:54 Dose:  20 mg


Bisacodyl (Dulcolax Suppository -)  10 mg RC PRN PRN


   PRN Reason: CONSTIPATION


Carvedilol (Coreg -)  6.25 mg PO BID [facility]


   Last Admin: 02/27/17 09:38 Dose:  6.25 mg


Finasteride (Proscar -)  5 mg PO DAILY [facility]


   Last Admin: 02/27/17 09:38 Dose:  5 mg


Guaifenesin (Robitussin Dm -)  10 ml PO Q6H PRN


   PRN Reason: COUGH


Losartan Potassium (Cozaar -)  50 mg PO DAILY [facility]


   Last Admin: 02/27/17 09:38 Dose:  50 mg


Phytonadione (Aqua Mephyton Injection -)  5 mg SQ DAILY [facility]


   Stop: 02/28/17 10:01


   Last Admin: 02/27/17 09:38 Dose:  5 mg


Polyethylene Glycol (Miralax (For Daily Use) -)  17 gm PO BID [facility]


   Last Admin: 02/27/17 09:39 Dose:  Not Given


Voriconazole (Vfend (Restricted To Id))  200 mg PO BID [facility]


   Last Admin: 02/27/17 09:38 Dose:  200 mg











- Objective


Vital Signs: 


 Vital Signs











Temperature  98.6 F   02/27/17 10:00


 


Pulse Rate  97 H  02/27/17 11:45


 


Respiratory Rate  18   02/27/17 10:00


 


Blood Pressure  150/70   02/27/17 10:00


 


O2 Sat by Pulse Oximetry (%)  95   02/27/17 11:45











Constitutional: Yes: Well Nourished, Calm


Eyes: Yes: WNL


HENT: Yes: WNL


Neck: Yes: WNL


Cardiovascular: Yes: Regular Rate and Rhythm, S1, S2


Respiratory: Yes: Diminished


Gastrointestinal: Yes: Normal Bowel Sounds, Soft


Extremities: Yes: WNL


Edema: No


Labs: 


 CBC, BMP





 02/27/17 05:55 





 02/25/17 05:45 





 INR, PTT











INR  1.45  (0.82-1.09)  H  02/26/17  06:20    


 


Fibrinogen  < 100.0 mg/dL (238-498)  L*  02/27/17  05:55    














Problem List





- Problems


(1) SANGITA (acute kidney injury)


Code(s): N17.9 - ACUTE KIDNEY FAILURE, UNSPECIFIED





(2) Acute respiratory failure with hypoxia


Code(s): J96.01 - ACUTE RESPIRATORY FAILURE WITH HYPOXIA





(3) COPD (chronic obstructive pulmonary disease)


Code(s): J44.9 - CHRONIC OBSTRUCTIVE PULMONARY DISEASE, UNSPECIFIED   Qualifiers

: 


     COPD type: unspecified COPD        Qualified Code(s): J44.9 - Chronic 

obstructive pulmonary disease, unspecified  





(4) Cholecystitis, acute


Code(s): K81.0 - ACUTE CHOLECYSTITIS





(5) Cholecystostomy care


Code(s): Z43.4 - ENCOUNTER FOR ATTN TO OTH ARTIF OPENINGS OF DIGESTIVE TRACT





(6) Fever


Code(s): R50.9 - FEVER, UNSPECIFIED   Qualifiers: 


     Fever type: other     Qualified Code(s): R50.81 - Fever presenting with 

conditions classified elsewhere  








(8) HLD (hyperlipidemia)


Code(s): E78.5 - HYPERLIPIDEMIA, UNSPECIFIED   Qualifiers: 


     Hyperlipidemia type: pure hypercholesterolemia        Qualified Code(s): 

E78.0 - Pure hypercholesterolemia  





(9) HTN (hypertension)


Code(s): I10 - ESSENTIAL (PRIMARY) HYPERTENSION   Qualifiers: 


     Hypertension type: essential hypertension        Qualified Code(s): I10 - 

Essential (primary) hypertension  





(10) Leukocytosis


Code(s): D72.829 - ELEVATED WHITE BLOOD CELL COUNT, UNSPECIFIED   Qualifiers: 


     Leukocytosis type: unspecified     Qualified Code(s): D72.829 - Elevated 

white blood cell count, unspecified  





(11) Septic shock


Code(s): A41.9 - SEPSIS, UNSPECIFIED ORGANISM


R65.21 - SEVERE SEPSIS WITH SEPTIC SHOCK





(12) Sleep apnea


Code(s): G47.30 - SLEEP APNEA, UNSPECIFIED   





(13) Kidney malignancy


Code(s): C64.9 - MALIGNANT NEOPLASM OF UNSP KIDNEY, EXCEPT RENAL PELVIS   

Qualifiers: 


     Laterality: left     Qualified Code(s): C64.2 - Malignant neoplasm of left 

kidney, except renal pelvis  





(14) Prostate CA


Code(s): C61 - MALIGNANT NEOPLASM OF PROSTATE








Assessment/Plan


A/P


Acute Cholecystitis


s/p Cholecystostomy Placement


Profound Septic Shock improved


s/p Acute Hypoxic Respiratory Failure


Acute Kidney Injury improving


Lactic Acidosis resolved


Demand Ischemia


RCC s/p Left Nephrectomy


COPD


HTN


Hyperlipidemia


r internal jugular thrombosis








-  antibiotics/antifungal per ID


-  monitor cholecystostomy drainage


-  O2 to keep SpO2 >90%


-  PO as tolerated


-  rehab/PT


-  DVT/GI prophylaxis





DR INFANTE

## 2017-02-27 NOTE — PN
Progress Note, Physician


Chief Complaint: 


Events noted


Now on 4S, awake and alert.


Generalized weakness


History of Present Illness: 


Patient was seen and examined. Chart was reviewed


Post cholecystostomy 


Intermittently on CPAP


Denies chest pain or palpitation.


Generalized weakness. Denies SOB





- Current Medication List


Current Medications: 


Active Medications





Acetaminophen (Tylenol -)  650 mg PO Q4H PRN


   PRN Reason: FEVER OR PAIN


   Last Admin: 02/27/17 08:25 Dose:  650 mg


Acetaminophen (Ofirmev Injection -)  1,000 mg IVPB Q6H PRN


   PRN Reason: FEVER


Albuterol Sulfate (Ventolin 0.083% Nebulizer Soln -)  1 amp NEB Q4H PRN


   PRN Reason: SHORT OF BREATH/WHEEZING


Albuterol/Ipratropium (Duoneb -)  1 amp [facility]B [facility]IDR [facility]


   Last Admin: 02/27/17 06:30 Dose:  1 amp


Artificial Tears (Artificial Tears)  1 drop OU TID PRN


   PRN Reason: DRY EYES


Aspirin (Asa -)  325 mg PO DAILY [facility]


Atorvastatin Calcium (Lipitor -)  20 mg PO HS [facility]


   Last Admin: 02/26/17 21:54 Dose:  20 mg


Bisacodyl (Dulcolax Suppository -)  10 mg RC PRN PRN


   PRN Reason: CONSTIPATION


Carvedilol (Coreg -)  6.25 mg PO BID [facility]


   Last Admin: 02/26/17 21:54 Dose:  6.25 mg


Finasteride (Proscar -)  5 mg PO DAILY [facility]


   Last Admin: 02/26/17 09:39 Dose:  5 mg


Losartan Potassium (Cozaar -)  50 mg PO DAILY [facility]


   Last Admin: 02/26/17 09:39 Dose:  50 mg


Phytonadione (Aqua Mephyton Injection -)  5 mg SQ DAILY [facility]


   Stop: 02/28/17 10:01


   Last Admin: 02/26/17 09:40 Dose:  5 mg


Polyethylene Glycol (Miralax (For Daily Use) -)  17 gm PO BID [facility]


   Last Admin: 02/26/17 21:55 Dose:  Not Given


Voriconazole (Vfend (Restricted To Id))  200 mg PO BID [facility]


   Last Admin: 02/26/17 21:54 Dose:  200 mg











- Objective


Vital Signs: 


 Vital Signs











Temperature  98.7 F   02/27/17 06:00


 


Pulse Rate  80   02/27/17 06:00


 


Respiratory Rate  18   02/27/17 06:00


 


Blood Pressure  159/70   02/27/17 06:00


 


O2 Sat by Pulse Oximetry (%)  96   02/27/17 02:35











Neck: Yes: Supple


Cardiovascular: Yes: Regular Rate and Rhythm, S1, S2


Respiratory: Yes: Diminished


Gastrointestinal: Yes: Normal Bowel Sounds, Soft.  No: Tenderness


Edema: No


Additional Findings/Remarks: 











- Review of Systems


Constitutional: denies: Fever.  denies: Chills


Cardiovascular: denies: Chest Pain, Palpitations, Shortness of Breath


Respiratory: denies: Cough, Hemoptysis, Orthopnea, PND


Gastrointestinal: (+) Abdominal Pain now resolved.  denies: Diarrhea, Melena, 

Nausea, Rectal Bleeding, Vomiting


Genitourinary: denies: Dysuria


Neurological: denies: Dizziness, Headache, Seizure, Syncope











Labs: 


 CBC, BMP





 02/27/17 05:55 





 02/25/17 05:45 





 INR, PTT











INR  1.45  (0.82-1.09)  H  02/26/17  06:20    


 


Fibrinogen  < 100.0 mg/dL (238-498)  L*  02/27/17  05:55    














Problem List





- Problems


(1) COPD (chronic obstructive pulmonary disease)


Code(s): J44.9 - CHRONIC OBSTRUCTIVE PULMONARY DISEASE, UNSPECIFIED   Qualifiers

: 


     COPD type: unspecified COPD        Qualified Code(s): J44.9 - Chronic 

obstructive pulmonary disease, unspecified  





(2) Cholecystitis, acute


Code(s): K81.0 - ACUTE CHOLECYSTITIS





(3) Fever


Code(s): R50.9 - FEVER, UNSPECIFIED   Qualifiers: 


     Fever type: other     Qualified Code(s): R50.81 - Fever presenting with 

conditions classified elsewhere  





(4) HLD (hyperlipidemia)


Code(s): E78.5 - HYPERLIPIDEMIA, UNSPECIFIED   Qualifiers: 


     Hyperlipidemia type: pure hypercholesterolemia        Qualified Code(s): 

E78.0 - Pure hypercholesterolemia  





(5) HTN (hypertension)


Code(s): I10 - ESSENTIAL (PRIMARY) HYPERTENSION   Qualifiers: 


     Hypertension type: essential hypertension        Qualified Code(s): I10 - 

Essential (primary) hypertension  





(6) Leukocytosis


Code(s): D72.829 - ELEVATED WHITE BLOOD CELL COUNT, UNSPECIFIED   Qualifiers: 


     Leukocytosis type: unspecified     Qualified Code(s): D72.829 - Elevated 

white blood cell count, unspecified  





(7) Colitis


Code(s): K52.9 - NONINFECTIVE GASTROENTERITIS AND COLITIS, UNSPECIFIED





(8) Diverticulitis


Code(s): K57.92 - DVTRCLI OF INTEST, PART UNSP, W/O PERF OR ABSCESS W/O BLEED   





(9) Kidney malignancy


Code(s): C64.9 - MALIGNANT NEOPLASM OF UNSP KIDNEY, EXCEPT RENAL PELVIS   

Qualifiers: 


     Laterality: left     Qualified Code(s): C64.2 - Malignant neoplasm of left 

kidney, except renal pelvis  





(10) Prostate CA


Code(s): C61 - MALIGNANT NEOPLASM OF PROSTATE





(11) Acute respiratory failure with hypoxia


Code(s): J96.01 - ACUTE RESPIRATORY FAILURE WITH HYPOXIA





(12) Cholecystostomy care


Code(s): Z43.4 - ENCOUNTER FOR ATTN TO OTH ARTIF OPENINGS OF DIGESTIVE TRACT





(13) DIC (disseminated intravascular coagulation)


Code(s): D65 - DISSEMINATED INTRAVASCULAR COAGULATION





(14) Hypokalemia


Code(s): E87.6 - HYPOKALEMIA





(15) Septic shock


Code(s): A41.9 - SEPSIS, UNSPECIFIED ORGANISM


R65.21 - SEVERE SEPSIS WITH SEPTIC SHOCK








Assessment/Plan


1. Post acute hypoxic respiratory failure referable to septic shock (E. coli 

bacteremia) 


2. Acute cholecystitis post cholecystostomy drain


3. Post demand ischemia


4. Hyperglycemia


5. Hypokalemia


6. History of TIA 


7. History of COPD


8. History of diverticular disease


9. History of colitis


10. History of renal carcinoma post left nephrectomy


11. History of prostate carcinoma


12. HTN


13. Hyperlipidemia


14. DIC





PLAN:


1. Continue pulmonary management - CPAP as needed


2. Continue Carvedilol, Losartan, ASA and Lipitor 


3. Monitor CBC and transfuse as needed maintaining Hgb > 8.0


4. Antibiotic and antifungal as per ID service


5. Hematology input regarding DIC noted


6. Eventually will need physical therapy





Further plans are to follow


Robin Vazquez MD

## 2017-02-27 NOTE — PN
Teaching Attending Note


Name of Resident: Buster Ramirez


ATTENDING PHYSICIAN STATEMENT





I saw and evaluated the patient.


I reviewed the resident's note and discussed the case with the resident.


I agree with the resident's findings and plan as documented.








SUBJECTIVE:








OBJECTIVE:


 Last Vital Signs











Temp Pulse Resp BP Pulse Ox


 


 98.6 F   97 H  18   150/70   95 


 


 02/27/17 10:00  02/27/17 11:45  02/27/17 10:00  02/27/17 10:00  02/27/17 11:45








General NAD A&O x3


CV S1 S2 RRR no murmur/rub/gallop


Lungs decrease breath sounds bases.  no wheezing/rales/rhonchi


Abdomen RUQ drain in place. abdomen is soft ND/NT. normoactive bowel sounds


Extremities 1+ pitting edema B/L UE/LE


 





ASSESSMENT AND PLAN:


86yo M with PMH [place], dyslipidemia, COPD, TIA and prostate and RCC presented to 

the ER and was admitted for further evaluation of their emergent condition


1. Sepsis due to Acute cholecystitis-afebrile. clinically improved. off abx. 

afebrile. no leukocytosis. decrease ROGELIO drain output. Plan is for 

cholecystectomy in 6 weeks.


2. Acute posthemorrhagic anemia in the setting of upper GI bleed- no repeated 

episodes. s/p 1 unit PRBC this hospitalization.Hgb stable. no indication for txn


3. DIC- likely in the setting of sepsis. received 8 units of cryo this 

admission. no change in fibrinogen. started on vitamin k. hematology on board


4. R IJ thrombus- unable to anticoagulate with low fibrinogen at this time. 

vas surgery consult if thrombectomy is warranted in this case


5. thrombophilia- possible due to antifungal? hematology on board.


6. 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 awaiting isolate. voriconazole po


7. Acute hypoxic respiratory failure- continues to desaturate. mild pleural 

effusion on CXR yesterday. lasix IV x1. supplemental oxygen to maintain spo2 >90

%. bipap QHS. chest PT. incentive spirometer. 


8. urinary retention- multiple attempts to remove muñiz with retention. flomax 

on hold due to delayed excretion with voriconazole. on proscar. will need to f/

u with urology on discharge


9. HTN- improved. cont losartan 


10. CVA- subacute seen on CT scan from 2/18. neuro consulted. was only able to 

sit at edge of bed with PT. will need TRAVIS when medically stable


11. DVT ppx- SCD


12. PT. will need TRAVIS on discharge due to profound deconditioning

## 2017-02-27 NOTE — PN
Physical Exam: 


SUBJECTIVE: Patient seen and examined at bedside. Feels like he has a cold 

today. Says he has a cough with clear sputum.  He denies fevers, chills, nausea

, vomiting. Is using bipap at night, and was told he could bring in cpap from 

home but says he is ok with using bipap here. He denies abd pain, is having BMs.








OBJECTIVE:





 


 Vital Signs











Temperature  98.6 F   02/27/17 10:00


 


Pulse Rate  97 H  02/27/17 11:45


 


Respiratory Rate  18   02/27/17 10:00


 


Blood Pressure  150/70   02/27/17 10:00


 


O2 Sat by Pulse Oximetry (%)  95   02/27/17 11:45














GENERAL: AAOx3, no acute distress


HEAD: Normal with no signs of trauma.


EYES: Reactive to light.


ENT: moist mucous membranes.


NECK: Trachea midline


LUNGS: Auscultated anteriorly. coarse breath sounds b/l 


HEART: Distant heart sounds, Regular rate and rhythm, S1, S2 without murmur, 

rub or gallop.


ABDOMEN: Not tender to palpation. normoactive bowel sounds. Abd drain in place.


EXTREMITIES: 2+ pulses, warm, well-perfused, no edema in LE, edematous b/l UE, 

improving. 4/5 UE strength. 4/5 LE strength.


NEUROLOGICAL:  gait not observed. No focal neuro deficits noted.


SKIN: Warm, dry, normal turgor, no rashes or lesions noted














 Laboratory Results - last 24 hr











  02/25/17 02/25/17 02/27/17





  05:59 06:08 05:55


 


WBC    7.4


 


RBC    3.67 L


 


Hgb    11.0 L


 


Hct    31.8 L


 


MCV    86.6


 


MCHC    34.5


 


RDW    14.7


 


Plt Count    561 H


 


MPV    7.1 L


 


Fibrinogen   


 


POC Glucometer  109  109 














  02/27/17





  05:55


 


WBC 


 


RBC 


 


Hgb 


 


Hct 


 


MCV 


 


MCHC 


 


RDW 


 


Plt Count 


 


MPV 


 


Fibrinogen  < 100.0 L*


 


POC Glucometer 








 Microbiology





02/20/17 18:00   Blood - Central Line   Blood Culture - Final


                            NO GROWTH AFTER 5 DAYS INCUBATION


02/20/17 18:00   Blood - Central Line   Blood Culture - Final


                            NO GROWTH AFTER 5 DAYS INCUBATION


02/21/17 11:35   Sputum - Endotrachea Suction/Ventilator   Gram Stain - Final


02/21/17 11:35   Sputum - Endotrachea Suction/Ventilator   Sputum Culture - 

Final


                            Yeast Like Organism


02/23/17 10:00   Nares - Mrsa Screen - Right   MRSA Screen - Final


                            NO MRSA ISOLATED


02/23/17 10:00   Nares - Mrsa Screen - Left   MRSA Screen - Final


                            NO MRSA ISOLATED


02/21/17 12:15   Urine - Urine [place]   Urine Culture - Final


                            NO GROWTH OBTAINED


02/13/17 22:46   Sputum - Endotrachea Suction/Ventilator   Fungus Mold 

Identification - Preliminary


02/13/17 22:41   Blood - Central Line   Blood Culture - Final


                            NO GROWTH AFTER 5 DAYS INCUBATION


02/12/17 21:40   Blood - Peripheral Venous   Blood Culture - Final


                            NO GROWTH AFTER 5 DAYS INCUBATION


02/12/17 21:40   Blood - Peripheral Venous   Blood Culture - Final


                            NO GROWTH AFTER 5 DAYS INCUBATION


02/13/17 17:30   Body Fluid - Other   Gram Stain - Final


02/13/17 17:30   Body Fluid - Other   Body Fluid Culture - Final


                            Enterobacter [place]iae


                            Enterococcus Faecium


02/13/17 17:30   Body Fluid - Other   Anaerobic Culture - Final


                            NO ANAEROBES WERE ISOLATED


02/13/17 22:46   Sputum - Endotrachea Suction/Ventilator   Gram Stain - Final


02/13/17 22:46   Sputum - Endotrachea Suction/Ventilator   Sputum Culture - 

Final


                            Fungal Isolate: Mold


                            Mr S Aureus


02/13/17 22:41   Blood - Central Line   Blood Culture - Final


                            Escherichia Coli


02/12/17 21:40   Urine - Urine Clean Catch   Urine Culture - Final


                            NO GROWTH OBTAINED








Active Medications











Generic Name Dose Route Start Last Admin





  Trade Name Freq  PRN Reason Stop Dose Admin


 


Acetaminophen  650 mg 02/24/17 16:27 02/27/17 08:25





  Tylenol -  PO   650 mg





  Q4H PRN   Administration





  FEVER OR PAIN   


 


Acetaminophen  1,000 mg 02/24/17 16:27  





  Ofirmev Injection -  IVPB   





  Q6H PRN   





  FEVER   


 


Albuterol Sulfate  1 amp 02/25/17 11:04  





  Ventolin 0.083% Nebulizer Soln -  NEB   





  Q4H PRN   





  SHORT OF BREATH/WHEEZING   


 


Albuterol/Ipratropium  1 amp 02/25/17 12:00 02/27/17 06:30





  Duoneb -  NEB   1 amp





  QIDR LAUREN   Administration


 


Artificial Tears  1 drop 02/24/17 16:27  





  Artificial Tears  OU   





  TID PRN   





  DRY EYES   


 


Aspirin  325 mg 02/26/17 21:36 02/27/17 09:38





  Asa -  PO   325 mg





  DAILY LAUREN   Administration


 


Atorvastatin Calcium  20 mg 02/24/17 22:00 02/26/17 21:54





  Lipitor -  PO   20 mg





  HS LAUREN   Administration


 


Bisacodyl  10 mg 02/24/17 16:27  





  Dulcolax Suppository -  RC   





  PRN PRN   





  CONSTIPATION   


 


Carvedilol  6.25 mg 02/26/17 21:37 02/27/17 09:38





  Coreg -  PO   6.25 mg





  BID LAUREN   Administration


 


Finasteride  5 mg 02/25/17 10:00 02/27/17 09:38





  Proscar -  PO   5 mg





  DAILY LAUREN   Administration


 


Furosemide  40 mg 02/27/17 11:45  





  Lasix Injection -  IVPB 02/27/17 11:46  





  ONCE ONE   


 


Guaifenesin  10 ml 02/27/17 11:26  





  Robitussin Dm -  PO   





  Q6H PRN   





  COUGH   


 


Losartan Potassium  50 mg 02/26/17 10:00 02/27/17 09:38





  Cozaar -  PO   50 mg





  DAILY LAUREN   Administration


 


Phytonadione  5 mg 02/26/17 10:00 02/27/17 09:38





  Aqua Mephyton Injection -  SQ 02/28/17 10:01  5 mg





  DAILY LAUREN   Administration


 


Polyethylene Glycol  17 gm 02/26/17 21:37 02/27/17 09:39





  Miralax (For Daily Use) -  PO   Not Given





  BID LAUREN   


 


Voriconazole  200 mg 02/25/17 12:30 02/27/17 09:38





  Vfend (Restricted To Id)  PO   200 mg





  BID LAUREN   Administration











ASSESSMENT/PLAN:


84 y/o M w/ PMH of [place], HTN, HLD, prostate ca, TIA, Kidney cancer s/p 

nephrectomy, COPD presented to ER with abdominal pain (RUQ) for 3 days. 

Admitted for acute cholecystitis. Perc cholecystostomy placed on 2/13/17. Pt 

developed SOB after procedure, was intubated, had central line placed, on 

pressors and sedated and in ICU for septic shock. Extubated 2/22/17.





-Septic shock secondary to Acute Cholecystitis from cholelithiasis


   -improved; s/p perc cholecystostomy 2/13


   -afebrile; WBC: 7.4


   -Cholangiogram via cholecystostomy - adequately positioned and widely patent 

cholecystostomy tube w/patent cystic duct and CBD.


   -off abx


   -Voriconazole PO for mold


      -awaiting mold identification


   -Will need to f/u with surgery as outpatient in 6 weeks from cholecystostomy.





-Right internal jugular venous thrombosis


   -as seen on duplex U/S of UE


   -Vascular surgery consulted - Dr. Reed Ramirez





-Diastolic CHF:


   -ECHO: 2/14/17 - EF: 63%, LV nl size, LVSF nl, no regional wall abnl noted, 

LV impaired relaxation, mild MR, mod TR, mild AS, no pericardial effusion.


   -not on fluids currently.


   -CXR, pleural effusions still present, no sig change from previous cxr


   -lasix 40 mg IV onec








-SANGITA secondary to septic shock


   -improved


   -monitor urine output, BUN/Cr





-DIC secondary to septic shock


   -s/p cyroprecipitate 2 units. May require additional unit today as 

fibrinogen is <100 today.


   -fibrinogen goal >100


   -thrombocytosis, will monitor


   -pt on asa 325 and heparin bid, monitor platelets





-Hyperglycemia


   -Monitor sugars


   -ISS, BGMs


   -A1C: 6.5





-Dry eyes


   -Artifical tears tid PRN for dry eyes





-HTN


   -coreg 3.125 mg bid





-[place]


   -bipap at night or use home cpap if available





-Hx of TIA


   -asa 325 mg





-HLD


   -lipitor 20mg qhs





-insomnia


   -held melatonin 5 mg po qhs prn





-BPH


   -hold flomax 0.8 mg po qd as it interferes with voriconazole


   -start finasteride 5 mg po qd





-DVT ppx


   -SCDs


   -Heparin held in light of low fibrinogen





-GI ppx


   -famotidine 20 mg iv bid





-FEN


   -no fluids for now.


   -Dysphagia minced


      -As per nurse, pt is choking on thin liquids, will change to nectar 

thickened liquids for now.





-Dispo:


   -Monitor on floors


   -will need physical therapy.  Once ready, will likely require TRAVIS and pt is 

agreeable.





Problem List





- Problems


(1) COPD (chronic obstructive pulmonary disease)


Code(s): J44.9 - CHRONIC OBSTRUCTIVE PULMONARY DISEASE, UNSPECIFIED   Qualifiers

: 


     COPD type: unspecified COPD        Qualified Code(s): J44.9 - Chronic 

obstructive pulmonary disease, unspecified  





(2) Cholecystitis, acute


Code(s): K81.0 - ACUTE CHOLECYSTITIS





(3) Fever


Code(s): R50.9 - FEVER, UNSPECIFIED   Qualifiers: 


     Fever type: other     Qualified Code(s): R50.81 - Fever presenting with 

conditions classified elsewhere  





(4) Leukocytosis


Code(s): D72.829 - ELEVATED WHITE BLOOD CELL COUNT, UNSPECIFIED   Qualifiers: 


     Leukocytosis type: unspecified     Qualified Code(s): D72.829 - Elevated 

white blood cell count, unspecified  





(5) Sleep apnea


Code(s): G47.30 - SLEEP APNEA, UNSPECIFIED   





(6) Acute urinary retention


Code(s): R33.8 - OTHER RETENTION OF URINE





(7) Insomnia


Code(s): G47.00 - INSOMNIA, UNSPECIFIED   





(8) HTN (hypertension)


Code(s): I10 - ESSENTIAL (PRIMARY) HYPERTENSION   Qualifiers: 


     Hypertension type: essential hypertension        Qualified Code(s): I10 - 

Essential (primary) hypertension  





(9) HLD (hyperlipidemia)


Code(s): E78.5 - HYPERLIPIDEMIA, UNSPECIFIED   Qualifiers: 


     Hyperlipidemia type: pure hypercholesterolemia        Qualified Code(s): 

E78.0 - Pure hypercholesterolemia  





(10) BPH (benign prostatic hyperplasia)


Code(s): N40.0 - BENIGN PROSTATIC HYPERPLASIA WITHOUT LOWER URINRY TRACT SYMP   





(11) Septic shock


Code(s): A41.9 - SEPSIS, UNSPECIFIED ORGANISM


R65.21 - SEVERE SEPSIS WITH SEPTIC SHOCK





(12) Acute respiratory failure with hypoxia


Code(s): J96.01 - ACUTE RESPIRATORY FAILURE WITH HYPOXIA





(13) SANGITA (acute kidney injury)


Code(s): N17.9 - ACUTE KIDNEY FAILURE, UNSPECIFIED





(14) Transaminitis


Code(s): R74.0 - NONSPEC ELEV OF LEVELS OF TRANSAMNS & LACTIC ACID DEHYDRGNSE





(15) Upper GI bleed


Code(s): K92.2 - GASTROINTESTINAL HEMORRHAGE, UNSPECIFIED





(16) Elevated troponin


Code(s): R79.89 - OTHER SPECIFIED ABNORMAL FINDINGS OF BLOOD CHEMISTRY





(17) Demand ischemia


Code(s): I24.8 - OTHER FORMS OF ACUTE ISCHEMIC HEART DISEASE





(18) DIC (disseminated intravascular coagulation)


Code(s): D65 - DISSEMINATED INTRAVASCULAR COAGULATION





(19) Hypophosphatemia


Code(s): E83.39 - OTHER DISORDERS OF PHOSPHORUS METABOLISM





(20) Hypokalemia


Code(s): E87.6 - HYPOKALEMIA





(21) Anemia due to GI blood loss


Code(s): D50.0 - IRON DEFICIENCY ANEMIA SECONDARY TO BLOOD LOSS (CHRONIC)





(22) Diastolic CHF


Code(s): I50.30 - UNSPECIFIED DIASTOLIC (CONGESTIVE) HEART FAILURE   Qualifiers

: 


     Congestive heart failure chronicity: chronic        Qualified Code(s): 

I50.32 - Chronic diastolic (congestive) heart failure  





(23) Dry eyes


Code(s): H04.123 - DRY EYE SYNDROME OF BILATERAL LACRIMAL GLANDS








Visit type





- Emergency Visit


Emergency Visit: Yes


ED Registration Date: 02/13/17


Care time: The patient presented to the Emergency Department on the above date 

and was hospitalized for further evaluation of their emergent condition.





- New Patient


This patient is new to me today: No





- Critical Care


Critical Care patient: No

## 2017-02-28 LAB
APTT BLD: 34.6 SECONDS (ref 26.9–34.4)
DEPRECATED RDW RBC AUTO: 14.7 % (ref 11.9–15.9)
FIBRINOGEN PPP-MCNC: < 100 MG/DL (ref 238–498)
INR BLD: 1.45 (ref 0.82–1.09)
MCH RBC QN AUTO: 29.8 PG (ref 25.7–33.7)
MCHC RBC AUTO-ENTMCNC: 34.2 G/DL (ref 32–35.9)
MCV RBC: 87 FL (ref 80–96)
PLATELET # BLD AUTO: 570 K/MM3 (ref 134–434)
PMV BLD: 7.2 FL (ref 7.5–11.1)
PT PNL PPP: 16.1 SEC (ref 9.98–11.88)
WBC # BLD AUTO: 8.6 K/MM3 (ref 4–10)

## 2017-02-28 RX ADMIN — LOSARTAN POTASSIUM SCH MG: 50 TABLET, FILM COATED ORAL at 09:57

## 2017-02-28 RX ADMIN — VORICONAZOLE SCH MG: 200 TABLET, FILM COATED ORAL at 22:19

## 2017-02-28 RX ADMIN — IPRATROPIUM BROMIDE AND ALBUTEROL SULFATE SCH AMP: .5; 3 SOLUTION RESPIRATORY (INHALATION) at 00:00

## 2017-02-28 RX ADMIN — ASPIRIN 325 MG ORAL TABLET SCH MG: 325 PILL ORAL at 09:57

## 2017-02-28 RX ADMIN — POLYETHYLENE GLYCOL 3350 SCH: 17 POWDER, FOR SOLUTION ORAL at 09:58

## 2017-02-28 RX ADMIN — CARVEDILOL SCH MG: 6.25 TABLET, FILM COATED ORAL at 22:19

## 2017-02-28 RX ADMIN — VORICONAZOLE SCH MG: 200 TABLET, FILM COATED ORAL at 09:58

## 2017-02-28 RX ADMIN — POLYETHYLENE GLYCOL 3350 SCH: 17 POWDER, FOR SOLUTION ORAL at 23:03

## 2017-02-28 RX ADMIN — FINASTERIDE SCH MG: 5 TABLET, FILM COATED ORAL at 09:57

## 2017-02-28 RX ADMIN — IPRATROPIUM BROMIDE AND ALBUTEROL SULFATE SCH AMP: .5; 3 SOLUTION RESPIRATORY (INHALATION) at 06:31

## 2017-02-28 RX ADMIN — ATORVASTATIN CALCIUM SCH MG: 20 TABLET, FILM COATED ORAL at 22:19

## 2017-02-28 RX ADMIN — IPRATROPIUM BROMIDE AND ALBUTEROL SULFATE SCH AMP: .5; 3 SOLUTION RESPIRATORY (INHALATION) at 17:21

## 2017-02-28 RX ADMIN — IPRATROPIUM BROMIDE AND ALBUTEROL SULFATE SCH AMP: .5; 3 SOLUTION RESPIRATORY (INHALATION) at 11:40

## 2017-02-28 RX ADMIN — CARVEDILOL SCH MG: 6.25 TABLET, FILM COATED ORAL at 09:57

## 2017-02-28 RX ADMIN — PHYTONADIONE SCH MG: 10 INJECTION, EMULSION INTRAMUSCULAR; INTRAVENOUS; SUBCUTANEOUS at 09:57

## 2017-02-28 RX ADMIN — ACETAMINOPHEN PRN MG: 325 TABLET ORAL at 19:49

## 2017-02-28 RX ADMIN — Medication PRN MG: at 22:19

## 2017-02-28 NOTE — PN
Progress Note, SLP





- Note


Progress Note: 


Cognitively improving. Reports coughing x 2 days. Pt on chopped diet (family 

brought his dentures back home) and nectar thick liquid.





Swallowing reassessed. Cyst palpated, anterior to larynx and mobile. PMD aware. 

Swallow reflex still labored in onset and reduced in velocity of laryngeal 

movement with reduced excursion. Intermittent responsive cough with thin 

liquid. Tolerance of thick liquid? h/o chronic cough.





REC: MBS to r/o aspiration and upgrade diet, if appropriate.

## 2017-02-28 NOTE — PN
Physical Exam: 


SUBJECTIVE: Patient seen and examined at bedside. Pt feels well today but still 

has some headaches.  He denies N/V/F/C, sob, CP, abd pain.  Used bipap in place 

of cpap last night.








OBJECTIVE:





 


 Vital Signs











Temperature  98.2 F   02/28/17 10:00


 


Pulse Rate  82   02/28/17 10:00


 


Respiratory Rate  18   02/28/17 10:00


 


Blood Pressure  148/63   02/28/17 10:00


 


O2 Sat by Pulse Oximetry (%)  96   02/28/17 10:00














GENERAL: AAOx3, no acute distress


HEAD: Normal with no signs of trauma.


EYES: Reactive to light.


ENT: moist mucous membranes.


NECK: Trachea midline


LUNGS: Auscultated anteriorly. coarse breath sounds b/l 


HEART: Distant heart sounds, Regular rate and rhythm, S1, S2 without murmur, 

rub or gallop.


ABDOMEN: Not tender to palpation. normoactive bowel sounds. Abd drain in place.


EXTREMITIES: 2+ pulses, warm, well-perfused, no edema in LE, edematous b/l UE, 

improving. 4/5 UE strength. 4/5 LE strength.


NEUROLOGICAL:  gait not observed. No focal neuro deficits noted.


SKIN: Warm, dry, normal turgor, no rashes or lesions noted











 Laboratory Results - last 24 hr











  02/28/17 02/28/17





  05:35 05:35


 


WBC  8.6 


 


RBC  3.68 L 


 


Hgb  11.0 L 


 


Hct  32.1 L 


 


MCV  87.0 


 


MCHC  34.2 


 


RDW  14.7 


 


Plt Count  570 H 


 


MPV  7.2 L 


 


INR   1.45 H


 


PTT (Actin FS)   34.6 H


 


Fibrinogen   < 100.0 L*








Active Medications











Generic Name Dose Route Start Last Admin





  Trade Name Freq  PRN Reason Stop Dose Admin


 


Acetaminophen  650 mg 02/24/17 16:27 02/27/17 08:25





  Tylenol -  PO   650 mg





  Q4H PRN   Administration





  FEVER OR PAIN   


 


Acetaminophen  1,000 mg 02/24/17 16:27 02/27/17 17:30





  Ofirmev Injection -  IVPB   1,000 mg





  Q6H PRN   Administration





  FEVER   


 


Albuterol Sulfate  1 amp 02/25/17 11:04  





  Ventolin 0.083% Nebulizer Soln -  NEB   





  Q4H PRN   





  SHORT OF BREATH/WHEEZING   


 


Albuterol/Ipratropium  1 amp 02/25/17 12:00 02/28/17 11:40





  Duoneb -  NEB   1 amp





  QIDR LAUREN   Administration


 


Artificial Tears  1 drop 02/24/17 16:27  





  Artificial Tears  OU   





  TID PRN   





  DRY EYES   


 


Aspirin  325 mg 02/26/17 21:36 02/28/17 09:57





  Asa -  PO   325 mg





  DAILY LAUREN   Administration


 


Atorvastatin Calcium  20 mg 02/24/17 22:00 02/27/17 21:54





  Lipitor -  PO   20 mg





  HS LAUREN   Administration


 


Bisacodyl  10 mg 02/24/17 16:27  





  Dulcolax Suppository -  RC   





  PRN PRN   





  CONSTIPATION   


 


Carvedilol  6.25 mg 02/26/17 21:37 02/28/17 09:57





  Coreg -  PO   6.25 mg





  BID LAUREN   Administration


 


Finasteride  5 mg 02/25/17 10:00 02/28/17 09:57





  Proscar -  PO   5 mg





  DAILY LAUREN   Administration


 


Guaifenesin  10 ml 02/27/17 11:26  





  Robitussin Dm -  PO   





  Q6H PRN   





  COUGH   


 


Losartan Potassium  50 mg 02/26/17 10:00 02/28/17 09:57





  Cozaar -  PO   50 mg





  DAILY LAUREN   Administration


 


Melatonin  5 mg 02/27/17 22:08 02/27/17 22:37





  Melatonin  PO   5 mg





  HS PRN   Administration





  INSOMNIA   


 


Polyethylene Glycol  17 gm 02/26/17 21:37 02/28/17 09:58





  Miralax (For Daily Use) -  PO   Not Given





  BID LAUREN   


 


Voriconazole  200 mg 02/25/17 12:30 02/28/17 09:58





  Vfend (Restricted To Id)  PO   200 mg





  BID LAUREN   Administration











ASSESSMENT/PLAN:


84 y/o M w/ PMH of [place], HTN, HLD, prostate ca, TIA, Kidney cancer s/p 

nephrectomy, COPD presented to ER with abdominal pain (RUQ) for 3 days. 

Admitted for acute cholecystitis. Perc cholecystostomy placed on 2/13/17. Pt 

developed SOB after procedure, was intubated, had central line placed, on 

pressors and sedated and in ICU for septic shock. Extubated 2/22/17.





-Septic shock secondary to Acute Cholecystitis from cholelithiasis


   -improved; s/p perc cholecystostomy 2/13


   -afebrile; WBC: 8.6


   -Cholangiogram via cholecystostomy - adequately positioned and widely patent 

cholecystostomy tube w/patent cystic duct and CBD.


   -off abx


   -Voriconazole PO for mold


      -awaiting mold identification


   -Will need to f/u with surgery as outpatient in 6 weeks from cholecystostomy.





-Right internal jugular venous thrombosis


   -as seen on duplex U/S of UE


   -Vascular surgery consulted


      -Ultrasound repeat in 1 week from original recommended





-Diastolic CHF:


   -ECHO: 2/14/17 - EF: 63%, LV nl size, LVSF nl, no regional wall abnl noted, 

LV impaired relaxation, mild MR, mod TR, mild AS, no pericardial effusion.


   -not on fluids currently.


   -CXR, pleural effusions still present, no sig change from previous cxr


   -lasix 40 mg IV onec








-SANGITA secondary to septic shock


   -improved


   -monitor urine output, BUN/Cr





-DIC secondary to septic shock


   -s/p cyroprecipitate 2 units. May require additional unit today as 

fibrinogen is <100 today.


   -fibrinogen goal >100


   -thrombocytosis, will monitor


   -pt on asa 325 and heparin bid, monitor platelets





-Hyperglycemia


   -Monitor sugars


   -ISS, BGMs


   -A1C: 6.5





-Dry eyes


   -Artifical tears tid PRN for dry eyes





-HTN


   -coreg 3.125 mg bid





-[place]


   -bipap at night or use home cpap if available





-Hx of TIA


   -asa 325 mg





-HLD


   -lipitor 20mg qhs





-insomnia


   -held melatonin 5 mg po qhs prn





-BPH


   -hold flomax 0.8 mg po qd as it interferes with voriconazole


   -start finasteride 5 mg po qd





-DVT ppx


   -SCDs


   -Heparin held in light of low fibrinogen





-GI ppx


   -famotidine 20 mg iv bid





-FEN


   -no fluids for now.


   -Dysphagia minced


      -As per nurse, pt is choking on thin liquids, will change to nectar 

thickened liquids for now.





-Dispo:


   -Monitor on floors


   -will need physical therapy.  Once ready, will likely require TRAVIS and pt is 

agreeable.





Problem List





- Problems


(1) COPD (chronic obstructive pulmonary disease)


Code(s): J44.9 - CHRONIC OBSTRUCTIVE PULMONARY DISEASE, UNSPECIFIED   Qualifiers

: 


     COPD type: unspecified COPD        Qualified Code(s): J44.9 - Chronic 

obstructive pulmonary disease, unspecified  





(2) Cholecystitis, acute


Code(s): K81.0 - ACUTE CHOLECYSTITIS





(3) Fever


Code(s): R50.9 - FEVER, UNSPECIFIED   Qualifiers: 


     Fever type: other     Qualified Code(s): R50.81 - Fever presenting with 

conditions classified elsewhere  





(4) Leukocytosis


Code(s): D72.829 - ELEVATED WHITE BLOOD CELL COUNT, UNSPECIFIED   Qualifiers: 


     Leukocytosis type: unspecified     Qualified Code(s): D72.829 - Elevated 

white blood cell count, unspecified  





(5) Sleep apnea


Code(s): G47.30 - SLEEP APNEA, UNSPECIFIED   





(6) Acute urinary retention


Code(s): R33.8 - OTHER RETENTION OF URINE





(7) Insomnia


Code(s): G47.00 - INSOMNIA, UNSPECIFIED   





(8) HTN (hypertension)


Code(s): I10 - ESSENTIAL (PRIMARY) HYPERTENSION   Qualifiers: 


     Hypertension type: essential hypertension        Qualified Code(s): I10 - 

Essential (primary) hypertension  





(9) HLD (hyperlipidemia)


Code(s): E78.5 - HYPERLIPIDEMIA, UNSPECIFIED   Qualifiers: 


     Hyperlipidemia type: pure hypercholesterolemia        Qualified Code(s): 

E78.0 - Pure hypercholesterolemia  





(10) BPH (benign prostatic hyperplasia)


Code(s): N40.0 - BENIGN PROSTATIC HYPERPLASIA WITHOUT LOWER URINRY TRACT SYMP   





(11) Septic shock


Code(s): A41.9 - SEPSIS, UNSPECIFIED ORGANISM


R65.21 - SEVERE SEPSIS WITH SEPTIC SHOCK





(12) Acute respiratory failure with hypoxia


Code(s): J96.01 - ACUTE RESPIRATORY FAILURE WITH HYPOXIA





(13) SANGITA (acute kidney injury)


Code(s): N17.9 - ACUTE KIDNEY FAILURE, UNSPECIFIED





(14) Transaminitis


Code(s): R74.0 - NONSPEC ELEV OF LEVELS OF TRANSAMNS & LACTIC ACID DEHYDRGNSE





(15) Upper GI bleed


Code(s): K92.2 - GASTROINTESTINAL HEMORRHAGE, UNSPECIFIED





(16) Elevated troponin


Code(s): R79.89 - OTHER SPECIFIED ABNORMAL FINDINGS OF BLOOD CHEMISTRY





(17) Demand ischemia


Code(s): I24.8 - OTHER FORMS OF ACUTE ISCHEMIC HEART DISEASE





(18) DIC (disseminated intravascular coagulation)


Code(s): D65 - DISSEMINATED INTRAVASCULAR COAGULATION





(19) Hypophosphatemia


Code(s): E83.39 - OTHER DISORDERS OF PHOSPHORUS METABOLISM





(20) Hypokalemia


Code(s): E87.6 - HYPOKALEMIA





(21) Anemia due to GI blood loss


Code(s): D50.0 - IRON DEFICIENCY ANEMIA SECONDARY TO BLOOD LOSS (CHRONIC)





(22) Diastolic CHF


Code(s): I50.30 - UNSPECIFIED DIASTOLIC (CONGESTIVE) HEART FAILURE   Qualifiers

: 


     Congestive heart failure chronicity: chronic        Qualified Code(s): 

I50.32 - Chronic diastolic (congestive) heart failure  





(23) Dry eyes


Code(s): H04.123 - DRY EYE SYNDROME OF BILATERAL LACRIMAL GLANDS








Visit type





- Emergency Visit


Emergency Visit: Yes


ED Registration Date: 02/13/17


Care time: The patient presented to the Emergency Department on the above date 

and was hospitalized for further evaluation of their emergent condition.





- New Patient


This patient is new to me today: No





- Critical Care


Critical Care patient: No

## 2017-02-28 NOTE — MSN
Progress Note (SOAP)





- Subjective


Chief Complaint: 


RUQ pain


History of Present Illness: 


Patient was interviewed at bedside.  Patient is feeling much better today. He 

states that his cold has disappated, however his throat still hurts.  He had no 

events over night.  He is not sleeping well but that is his baseline.  His 

weakness is improving but he feels that he still lacks a great deal of 

coordination.  Patients mood has improved gradually throughout his stay, and 

seems less depressed about his circumstance.  





- Current Medications


Current Medications: 


Active Medications





Acetaminophen (Tylenol -)  650 mg PO Q4H PRN


   PRN Reason: FEVER OR PAIN


   Last Admin: 02/27/17 08:25 Dose:  650 mg


Acetaminophen (Ofirmev Injection -)  1,000 mg IVPB Q6H PRN


   PRN Reason: FEVER


   Last Admin: 02/27/17 17:30 Dose:  1,000 mg


Albuterol Sulfate (Ventolin 0.083% Nebulizer Soln -)  1 amp NEB Q4H PRN


   PRN Reason: SHORT OF BREATH/WHEEZING


Albuterol/Ipratropium (Duoneb -)  1 amp NEB QIDR [facility]


   Last Admin: 02/28/17 06:31 Dose:  1 amp


Artificial Tears (Artificial Tears)  1 drop OU TID PRN


   PRN Reason: DRY EYES


Aspirin (Asa -)  325 mg PO DAILY [facility]


   Last Admin: 02/27/17 09:38 Dose:  325 mg


Atorvastatin Calcium (Lipitor -)  20 mg PO HS [facility]


   Last Admin: 02/27/17 21:54 Dose:  20 mg


Bisacodyl (Dulcolax Suppository -)  10 mg RC PRN PRN


   PRN Reason: CONSTIPATION


Carvedilol (Coreg -)  6.25 mg PO BID [facility]


   Last Admin: 02/27/17 21:54 Dose:  6.25 mg


Finasteride (Proscar -)  5 mg PO DAILY [facility]


   Last Admin: 02/27/17 09:38 Dose:  5 mg


Guaifenesin (Robitussin Dm -)  10 ml PO Q6H PRN


   PRN Reason: COUGH


Losartan Potassium (Cozaar -)  50 mg PO DAILY [facility]


   Last Admin: 02/27/17 09:38 Dose:  50 mg


Melatonin (Melatonin)  5 mg PO HS PRN


   PRN Reason: INSOMNIA


   Last Admin: 02/27/17 22:37 Dose:  5 mg


Phytonadione (Aqua Mephyton Injection -)  5 mg SQ DAILY [facility]


   Stop: 02/28/17 10:01


   Last Admin: 02/27/17 09:38 Dose:  5 mg


Polyethylene Glycol (Miralax (For Daily Use) -)  17 gm PO BID [facility]


   Last Admin: 02/27/17 21:47 Dose:  Not Given


Voriconazole (Vfend (Restricted To Id))  200 mg PO BID [facility]


   Last Admin: 02/27/17 21:54 Dose:  200 mg











- Objective


Vital Signs: 


 Vital Signs











Temperature  98.9 F   02/28/17 06:00


 


Pulse Rate  84   02/28/17 06:00


 


Respiratory Rate  18   02/28/17 06:00


 


Blood Pressure  146/66   02/28/17 06:00


 


O2 Sat by Pulse Oximetry (%)  94 L  02/27/17 21:00











Constitutional: Yes: Well Nourished, No Distress, Calm


Eyes: Yes: WNL, Conjunctiva Clear, EOM Intact


HENT: Yes: WNL, Atraumatic, Normocephalic


Neck: Yes: WNL, Supple, Trachea Midline


Cardiovascular: Yes: WNL, Regular Rate and Rhythm, S1, S2


Respiratory: Yes: WNL, Regular, CTA Bilaterally


Gastrointestinal: Yes: WNL, Normal Bowel Sounds, Soft


Musculoskeletal: Yes: WNL


Peripheral Pulses WNL: Yes


Peripheral Pulses: Left Radial: 2+, Right Radial: 2+, Left Doralis Pedis: 2+, 

Right Dorsalis Pedis: 2+


Edema: No


Integumentary: Yes: WNL


Wound/Incision: Yes: Clean/Dry, Well Approximated


Neurological: Yes: WNL, Alert, Oriented


Psychiatric: Yes: Alert, Oriented





Labs


Lab Results: 


 CBC, BMP





 02/28/17 05:35 





 02/25/17 05:45 











Assessment/Plan


86 YO M w/ hx of COPD, [place], HTN, HLD, TIA, prostate CA, renal CA now s/p left 

nephrectomy. He came to the ED with Right Upper Quadrant pain and sepsis 

secondary to acute cholecystitis s/p percutaenous drain.





Respiratory: Acute hypoxic respiratory failure


-O2 nasal cannuli


-incentive paolo


-Lasix IV





Sepsis secondary to acute cholecystitis 


-off abx


-Decreased output from drain


-chlecystectomy in 6 weeks





Thrombosis of Right Jugular vein


-decreased fibrinogen


-GI bleed


-Consult Heme


-consult vasc. surg





Posthemmoragic bleed


-PPI


-montior CBC/HCt





Aspirgilosis


-Treated with voriconazole

## 2017-02-28 NOTE — PN
Teaching Attending Note


Name of Resident: Buster Ramirez


ATTENDING PHYSICIAN STATEMENT





I saw and evaluated the patient.


I reviewed the resident's note and discussed the case with the resident.


I agree with the resident's findings and plan as documented.








SUBJECTIVE:


seen and evaluated at the bedside





OBJECTIVE:


AAOx3, resting comfortably; biliary drain in place; muñiz in place





ASSESSMENT AND PLAN:


84 y/o M w/ PMH of [place], HTN, HLD, prostate ca, TIA, Renal Cell carcinoma s/p 

nephrectomy, COPD  admitted for sepsis due to acute cholecystitis





Sepsis


-s/p drainage by IR


-pt currently intubated off sedation


-off levophed and dopamine 


-has MRSA and mold in sputum culture; E. coli in blood culture; enterobacter 

and enterococcus in billiary drain culture


-completed imipenem/vanco


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 

bronchiectasis and left sided cavitary lesion which goes along mold in sputum 

culture (likely aspergillous)





Acute blood loss anemia due to upper GI Bleed


-Hb trended down from 13 to 11 after hematemesis but has been stable since


-no need for endoscopy as per GI attending 





Thrombocytopenia


-now trending up


-was likely due to DIC from septic shock


-infection now improving which is likely why platelets now trending up but 

still has decreased fibrinogen


-s/p 


-follow up heme consult





Urinary retention


-

## 2017-02-28 NOTE — PN
Progress Note, Physician


History of Present Illness: 


pulmonary





alert,feeling better,-cp,-abd pain





- Current Medication List


Current Medications: 


Active Medications





Acetaminophen (Tylenol -)  650 mg PO Q4H PRN


   PRN Reason: FEVER OR PAIN


   Last Admin: 02/27/17 08:25 Dose:  650 mg


Acetaminophen (Ofirmev Injection -)  1,000 mg IVPB Q6H PRN


   PRN Reason: FEVER


   Last Admin: 02/27/17 17:30 Dose:  1,000 mg


Albuterol Sulfate (Ventolin 0.083% Nebulizer Soln -)  1 amp NEB Q4H PRN


   PRN Reason: SHORT OF BREATH/WHEEZING


Albuterol/Ipratropium (Duoneb -)  1 amp NEB QIDR [facility]


   Last Admin: 02/28/17 11:40 Dose:  1 amp


Artificial Tears (Artificial Tears)  1 drop OU TID PRN


   PRN Reason: DRY EYES


Aspirin (Asa -)  325 mg PO DAILY [facility]


   Last Admin: 02/28/17 09:57 Dose:  325 mg


Atorvastatin Calcium (Lipitor -)  20 mg PO HS [facility]


   Last Admin: 02/27/17 21:54 Dose:  20 mg


Bisacodyl (Dulcolax Suppository -)  10 mg RC PRN PRN


   PRN Reason: CONSTIPATION


Carvedilol (Coreg -)  6.25 mg PO BID [facility]


   Last Admin: 02/28/17 09:57 Dose:  6.25 mg


Finasteride (Proscar -)  5 mg PO DAILY [facility]


   Last Admin: 02/28/17 09:57 Dose:  5 mg


Guaifenesin (Robitussin Dm -)  10 ml PO Q6H PRN


   PRN Reason: COUGH


Losartan Potassium (Cozaar -)  50 mg PO DAILY [facility]


   Last Admin: 02/28/17 09:57 Dose:  50 mg


Melatonin (Melatonin)  5 mg PO HS PRN


   PRN Reason: INSOMNIA


   Last Admin: 02/27/17 22:37 Dose:  5 mg


Polyethylene Glycol (Miralax (For Daily Use) -)  17 gm PO BID [facility]


   Last Admin: 02/28/17 09:58 Dose:  Not Given


Voriconazole (Vfend (Restricted To Id))  200 mg PO BID [facility]


   Last Admin: 02/28/17 09:58 Dose:  200 mg











- Objective


Vital Signs: 


 Vital Signs











Temperature  98.2 F   02/28/17 10:00


 


Pulse Rate  82   02/28/17 10:00


 


Respiratory Rate  18   02/28/17 10:00


 


Blood Pressure  148/63   02/28/17 10:00


 


O2 Sat by Pulse Oximetry (%)  96   02/28/17 10:00











Constitutional: Yes: Well Nourished, Calm


Eyes: Yes: WNL


HENT: Yes: WNL


Neck: Yes: WNL


Cardiovascular: Yes: Regular Rate and Rhythm, S1, S2


Respiratory: Yes: Diminished


Gastrointestinal: Yes: Normal Bowel Sounds, Soft


Extremities: Yes: WNL


Edema: No


Labs: 


 CBC, BMP





 02/28/17 05:35 





 





Problem List





- Problems


(1) SANGITA (acute kidney injury)


Code(s): N17.9 - ACUTE KIDNEY FAILURE, UNSPECIFIED





(2) Acute respiratory failure with hypoxia


Code(s): J96.01 - ACUTE RESPIRATORY FAILURE WITH HYPOXIA





(3) COPD (chronic obstructive pulmonary disease)


Code(s): J44.9 - CHRONIC OBSTRUCTIVE PULMONARY DISEASE, UNSPECIFIED   Qualifiers

: 


     COPD type: unspecified COPD        Qualified Code(s): J44.9 - Chronic 

obstructive pulmonary disease, unspecified  





(4) Cholecystitis, acute


Code(s): K81.0 - ACUTE CHOLECYSTITIS





(5) Cholecystostomy care


Code(s): Z43.4 - ENCOUNTER FOR ATTN TO OTH ARTIF OPENINGS OF DIGESTIVE TRACT





(6) Fever


Code(s): R50.9 - FEVER, UNSPECIFIED   Qualifiers: 


     Fever type: other     Qualified Code(s): R50.81 - Fever presenting with 

conditions classified elsewhere  








(8) HLD (hyperlipidemia)


Code(s): E78.5 - HYPERLIPIDEMIA, UNSPECIFIED   Qualifiers: 


     Hyperlipidemia type: pure hypercholesterolemia        Qualified Code(s): 

E78.0 - Pure hypercholesterolemia  





(9) HTN (hypertension)


Code(s): I10 - ESSENTIAL (PRIMARY) HYPERTENSION   Qualifiers: 


     Hypertension type: essential hypertension        Qualified Code(s): I10 - 

Essential (primary) hypertension  





(10) Leukocytosis


Code(s): D72.829 - ELEVATED WHITE BLOOD CELL COUNT, UNSPECIFIED   Qualifiers: 


     Leukocytosis type: unspecified     Qualified Code(s): D72.829 - Elevated 

white blood cell count, unspecified  





(11) Septic shock


Code(s): A41.9 - SEPSIS, UNSPECIFIED ORGANISM


R65.21 - SEVERE SEPSIS WITH SEPTIC SHOCK





(12) Sleep apnea


Code(s): G47.30 - SLEEP APNEA, UNSPECIFIED   





(13) Kidney malignancy


Code(s): C64.9 - MALIGNANT NEOPLASM OF UNSP KIDNEY, EXCEPT RENAL PELVIS   

Qualifiers: 


     Laterality: left     Qualified Code(s): C64.2 - Malignant neoplasm of left 

kidney, except renal pelvis  





(14) Prostate CA


Code(s): C61 - MALIGNANT NEOPLASM OF PROSTATE








Assessment/Plan


A/P


Acute Cholecystitis


s/p Cholecystostomy Placement


Profound Septic Shock improved


s/p Acute Hypoxic Respiratory Failure


Acute Kidney Injury improving


Lactic Acidosis resolved


Demand Ischemia


RCC s/p Left Nephrectomy


COPD


HTN


Hyperlipidemia


r internal jugular thrombosis








-  antibiotics/antifungal per ID


-  monitor cholecystostomy drainage


-  O2 to keep SpO2 >90%


-  PO as tolerated


-  rehab/PT


-  DVT/GI prophylaxis





DR INFANTE

## 2017-02-28 NOTE — PN
Progress Note (short form)





- Note


Progress Note: 


Chief Complaint: Events noted, notes reviewed, denies any chest pain, reports 

persistent dyspnea but improved





History of Present Illness: 


Seen and examined on telemetry. Events noted, notes reviewed, denies any chest 

pain, reports persistent dyspnea but improved





Medications: 





 Current Medications





Acetaminophen (Tylenol -)  650 mg PO Q4H PRN


   PRN Reason: FEVER OR PAIN


   Last Admin: 02/27/17 08:25 Dose:  650 mg


Acetaminophen (Ofirmev Injection -)  1,000 mg IVPB Q6H PRN


   PRN Reason: FEVER


   Last Admin: 02/27/17 17:30 Dose:  1,000 mg


Albuterol Sulfate (Ventolin 0.083% Nebulizer Soln -)  1 amp NEB Q4H PRN


   PRN Reason: SHORT OF BREATH/WHEEZING


Albuterol/Ipratropium (Duoneb -)  1 amp NEB QIDR [facility]


   Last Admin: 02/28/17 06:31 Dose:  1 amp


Artificial Tears (Artificial Tears)  1 drop OU TID PRN


   PRN Reason: DRY EYES


Aspirin (Asa -)  325 mg PO DAILY [facility]


   Last Admin: 02/27/17 09:38 Dose:  325 mg


Atorvastatin Calcium (Lipitor -)  20 mg PO HS [facility]


   Last Admin: 02/27/17 21:54 Dose:  20 mg


Bisacodyl (Dulcolax Suppository -)  10 mg RC PRN PRN


   PRN Reason: CONSTIPATION


Carvedilol (Coreg -)  6.25 mg PO BID [facility]


   Last Admin: 02/27/17 21:54 Dose:  6.25 mg


Finasteride (Proscar -)  5 mg PO DAILY [facility]


   Last Admin: 02/27/17 09:38 Dose:  5 mg


Guaifenesin (Robitussin Dm -)  10 ml PO Q6H PRN


   PRN Reason: COUGH


Losartan Potassium (Cozaar -)  50 mg PO DAILY [facility]


   Last Admin: 02/27/17 09:38 Dose:  50 mg


Melatonin (Melatonin)  5 mg PO HS PRN


   PRN Reason: INSOMNIA


   Last Admin: 02/27/17 22:37 Dose:  5 mg


Phytonadione (Aqua Mephyton Injection -)  5 mg SQ DAILY [facility]


   Stop: 02/28/17 10:01


   Last Admin: 02/27/17 09:38 Dose:  5 mg


Polyethylene Glycol (Miralax (For Daily Use) -)  17 gm PO BID [facility]


   Last Admin: 02/27/17 21:47 Dose:  Not Given


Voriconazole (Vfend (Restricted To Id))  200 mg PO BID [facility]


   Last Admin: 02/27/17 21:54 Dose:  200 mg








 Review of Systems 


Constitutional: denies: Fever or Chills


Cardiovascular: As noted above


Respiratory: reports: Cough with no Sputum Production


Gastrointestinal: denies: Nausea, Vomiting, Diarrhea, Constipation or Abdominal 

Pain


Genitourinary: denies: Dysuria


Neurological: denies: Dizziness or Headache





Vital Signs: 





 Last Vital Signs











Temp Pulse Resp BP Pulse Ox


 


 98.9 F   84   18   146/66   94 L


 


 02/28/17 06:00  02/28/17 06:00  02/28/17 06:00  02/28/17 06:00  02/27/17 21:00











Neck: Supple Negative JVD No Bruit


Respiratory: Bilateral Scattered Rhonchi Diminished Breath sounds at the Bases


Cardiovascular: S1 S2 Regular Rate and Rhythm


Gastrointestinal: Soft Benign Normal Bowel Sounds, Tenderness


Ext: No Edema





Labs: 





 CBC, BMP





 02/28/17 05:35 





 02/25/17 05:45 








 Assessment/Plan 





ASSESSMENT:





1. Post acute respiratory failure related to pneumonia/probable aspiration 

pneumonia complicated by


2. Hypotension related to sepsis syndrome/septic shock, resolved


3. Acute cholecystitis post cholecystostomy drain


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


5. Recent upper GI bleed, resolved 


6. Hypertension


7. Hypercholesterolemia


8. History of TIA


9. History of COPD


10. Acute renal insufficiency, resolved


11. History of diverticular disease


12. History of colitis


13. History of renal carcinoma post nephrectomy


14. History of prostate carcinoma





PLAN:





1. Continue Carvedilol, hemodynamics permitting


2. Continue Losartan, hemodynamics permitting 


3. Continue ASA


4. Continue Lipitor


5. Monitor CBC and transfuse as needed maintaining Hg > 8.0


6. F/U BMP


7. Ambulate














Viji Emanuel MD

## 2017-03-01 VITALS — SYSTOLIC BLOOD PRESSURE: 166 MMHG | TEMPERATURE: 98.6 F | DIASTOLIC BLOOD PRESSURE: 74 MMHG | HEART RATE: 87 BPM

## 2017-03-01 LAB
ANION GAP SERPL CALC-SCNC: 7 MMOL/L (ref 8–16)
APTT BLD: 33.7 SECONDS (ref 26.9–34.4)
BASOPHILS # BLD: 1.7 % (ref 0–2)
CALCIUM SERPL-MCNC: 8.5 MG/DL (ref 8.5–10.1)
CO2 SERPL-SCNC: 29 MMOL/L (ref 21–32)
CREAT SERPL-MCNC: 0.8 MG/DL (ref 0.7–1.3)
DEPRECATED RDW RBC AUTO: 15.3 % (ref 11.9–15.9)
EOSINOPHIL # BLD: 6.6 % (ref 0–4.5)
FIBRINOGEN PPP-MCNC: < 100 MG/DL (ref 238–498)
GLUCOSE SERPL-MCNC: 94 MG/DL (ref 74–106)
INR BLD: 1.43 (ref 0.82–1.09)
MCH RBC QN AUTO: 29.4 PG (ref 25.7–33.7)
MCHC RBC AUTO-ENTMCNC: 33.6 G/DL (ref 32–35.9)
MCV RBC: 87.4 FL (ref 80–96)
NEUTROPHILS # BLD: 60.1 % (ref 42.8–82.8)
PLATELET # BLD AUTO: 481 K/MM3 (ref 134–434)
PMV BLD: 8.2 FL (ref 7.5–11.1)
PT PNL PPP: 15.9 SEC (ref 9.98–11.88)
WBC # BLD AUTO: 8.9 K/MM3 (ref 4–10)

## 2017-03-01 RX ADMIN — POLYETHYLENE GLYCOL 3350 SCH: 17 POWDER, FOR SOLUTION ORAL at 10:49

## 2017-03-01 RX ADMIN — CARVEDILOL SCH MG: 6.25 TABLET, FILM COATED ORAL at 10:49

## 2017-03-01 RX ADMIN — FINASTERIDE SCH MG: 5 TABLET, FILM COATED ORAL at 10:49

## 2017-03-01 RX ADMIN — ACETAMINOPHEN PRN MG: 325 TABLET ORAL at 14:18

## 2017-03-01 RX ADMIN — IPRATROPIUM BROMIDE AND ALBUTEROL SULFATE SCH AMP: .5; 3 SOLUTION RESPIRATORY (INHALATION) at 00:07

## 2017-03-01 RX ADMIN — ASPIRIN 325 MG ORAL TABLET SCH MG: 325 PILL ORAL at 10:49

## 2017-03-01 RX ADMIN — VORICONAZOLE SCH MG: 200 TABLET, FILM COATED ORAL at 10:49

## 2017-03-01 RX ADMIN — IPRATROPIUM BROMIDE AND ALBUTEROL SULFATE SCH AMP: .5; 3 SOLUTION RESPIRATORY (INHALATION) at 06:41

## 2017-03-01 RX ADMIN — IPRATROPIUM BROMIDE AND ALBUTEROL SULFATE SCH AMP: .5; 3 SOLUTION RESPIRATORY (INHALATION) at 11:10

## 2017-03-01 RX ADMIN — ACETAMINOPHEN PRN MG: 325 TABLET ORAL at 06:43

## 2017-03-01 RX ADMIN — LOSARTAN POTASSIUM SCH MG: 50 TABLET, FILM COATED ORAL at 10:49

## 2017-03-01 NOTE — PN
Progress Note, SLP





- Note


Progress Note: 


MBS reviewed with staff and pt. and swallowing exercises reviewed. Printed 

directions provided for improved carryover.








 Selected Entries











  02/28/17 02/28/17 02/28/17





  02:00 06:00 10:00


 


Supper   


 


Temperature 98.2 F 98.9 F 98.2 F














  02/28/17 02/28/17 02/28/17





  14:00 18:00 22:00


 


Supper  75% 75%


 


Temperature 98.2 F 98.8 F 98.3 F














  03/01/17 03/01/17 03/01/17





  02:00 06:00 10:00


 


Supper   


 


Temperature 97.7 F 98.2 F 98.3 F








 Laboratory Tests











  02/28/17 03/01/17





  05:35 06:00


 


WBC  8.6  8.9








Pt tolerating modified diet.

## 2017-03-01 NOTE — PN
Progress Note, Physician


History of Present Illness: 


Denies chest pain or dyspnea, feels weak and debilitated.





- Current Medication List


Current Medications: 


Active Medications





Acetaminophen (Tylenol -)  650 mg PO Q4H PRN


   PRN Reason: FEVER OR PAIN


   Last Admin: 03/01/17 06:43 Dose:  650 mg


Acetaminophen (Ofirmev Injection -)  1,000 mg IVPB Q6H PRN


   PRN Reason: FEVER


   Last Admin: 02/27/17 17:30 Dose:  1,000 mg


Albuterol Sulfate (Ventolin 0.083% Nebulizer Soln -)  1 amp NEB Q4H PRN


   PRN Reason: SHORT OF BREATH/WHEEZING


Albuterol/Ipratropium (Duoneb -)  1 amp NEB QIDR [facility]


   Last Admin: 03/01/17 11:10 Dose:  1 amp


Artificial Tears (Artificial Tears)  1 drop OU TID PRN


   PRN Reason: DRY EYES


Aspirin (Asa -)  325 mg PO DAILY [facility]


   Last Admin: 03/01/17 10:49 Dose:  325 mg


Atorvastatin Calcium (Lipitor -)  20 mg PO HS [facility]


   Last Admin: 02/28/17 22:19 Dose:  20 mg


Bisacodyl (Dulcolax Suppository -)  10 mg RC PRN PRN


   PRN Reason: CONSTIPATION


Carvedilol (Coreg -)  6.25 mg PO BID [facility]


   Last Admin: 03/01/17 10:49 Dose:  6.25 mg


Finasteride (Proscar -)  5 mg PO DAILY [facility]


   Last Admin: 03/01/17 10:49 Dose:  5 mg


Guaifenesin (Robitussin Dm -)  10 ml PO Q6H PRN


   PRN Reason: COUGH


Losartan Potassium (Cozaar -)  50 mg PO DAILY [facility]


   Last Admin: 03/01/17 10:49 Dose:  50 mg


Melatonin (Melatonin)  5 mg PO HS PRN


   PRN Reason: INSOMNIA


   Last Admin: 02/28/17 22:19 Dose:  5 mg


Polyethylene Glycol (Miralax (For Daily Use) -)  17 gm PO BID [facility]


   Last Admin: 03/01/17 10:49 Dose:  Not Given


Voriconazole (Vfend (Restricted To Id))  200 mg PO BID [facility]


   Last Admin: 03/01/17 10:49 Dose:  200 mg











- Objective


Vital Signs: 


 Vital Signs











Temperature  98.3 F   03/01/17 10:00


 


Pulse Rate  82   03/01/17 10:00


 


Respiratory Rate  20   03/01/17 10:00


 


Blood Pressure  158/77   03/01/17 10:00


 


O2 Sat by Pulse Oximetry (%)  94 L  03/01/17 09:00











Constitutional: Yes: No Distress, Calm


Neck: Yes: Supple


Cardiovascular: Yes: Regular Rate and Rhythm


Respiratory: Yes: Regular, Diminished, On Nasal O2


Gastrointestinal: Yes: Normal Bowel Sounds, Soft, Other (Biliary drain in)


Edema: No


Labs: 


 CBC, BMP





 03/01/17 06:00 





 03/01/17 06:00 





 INR, PTT











INR  1.43  (0.82-1.09)  H  03/01/17  06:00    


 


Fibrinogen  < 100.0 mg/dL (238-498)  L*  03/01/17  06:00    














Problem List





- Problems


(1) Acute respiratory failure with hypoxia


Code(s): J96.01 - ACUTE RESPIRATORY FAILURE WITH HYPOXIA





(2) COPD (chronic obstructive pulmonary disease)


Code(s): J44.9 - CHRONIC OBSTRUCTIVE PULMONARY DISEASE, UNSPECIFIED   Qualifiers

: 


     COPD type: unspecified COPD        Qualified Code(s): J44.9 - Chronic 

obstructive pulmonary disease, unspecified  





(3) Cholecystitis, acute


Code(s): K81.0 - ACUTE CHOLECYSTITIS





(4) DIC (disseminated intravascular coagulation)


Code(s): D65 - DISSEMINATED INTRAVASCULAR COAGULATION





(5) Demand ischemia


Code(s): I24.8 - OTHER FORMS OF ACUTE ISCHEMIC HEART DISEASE





(6) Diastolic CHF


Code(s): I50.30 - UNSPECIFIED DIASTOLIC (CONGESTIVE) HEART FAILURE   Qualifiers

: 


     Congestive heart failure chronicity: chronic        Qualified Code(s): 

I50.32 - Chronic diastolic (congestive) heart failure  





(7) Gram-negative bacteremia


Code(s): R78.81 - BACTEREMIA





(8) HLD (hyperlipidemia)


Code(s): E78.5 - HYPERLIPIDEMIA, UNSPECIFIED   Qualifiers: 


     Hyperlipidemia type: pure hypercholesterolemia        Qualified Code(s): 

E78.0 - Pure hypercholesterolemia  





(9) HTN (hypertension)


Code(s): I10 - ESSENTIAL (PRIMARY) HYPERTENSION   Qualifiers: 


     Hypertension type: essential hypertension        Qualified Code(s): I10 - 

Essential (primary) hypertension  





(10) Cholecystostomy care


Code(s): Z43.4 - ENCOUNTER FOR ATTN TO OT ARTIF OPENINGS OF DIGESTIVE TRACT








Assessment/Plan


1. Post acute respiratory failure related to pneumonia/probable aspiration 

pneumonia complicated by


2. Hypotension related to sepsis syndrome/septic shock, resolved


3. Acute cholecystitis post cholecystostomy drain


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


5. Recent upper GI bleed, resolved 


6. Hypertension


7. Hypercholesterolemia


8. History of TIA


9. History of COPD


10. Acute renal insufficiency, resolved


11. History of diverticular disease


12. History of colitis


13. History of renal carcinoma post nephrectomy


14. History of prostate carcinoma


15. RIJ Thrombus





PLAN:





1. Continue Carvedilol 6.25 bid hemodynamics permitting


2. Continue Losartan 50 qd hemodynamics permitting 


3. Continue  qd


4. Continue Lipitor 20 qhs


5. Monitor CBC and transfuse as needed maintaining Hg > 8.0


6. Mobilize, PT->SNF, a/c held for now, recheck RIJ clot in 1 week

## 2017-03-01 NOTE — PN
Progress Note (short form)





- Note


Progress Note: 


awake and alert


mental status improving





 Vital Signs











 Period  Temp  Pulse  Resp  BP Sys/Yun  Pulse Ox


 


 Last 24 Hr  97.7 F-98.8 F  75-86  18-20  142-158/62-77  91-94








cor-rrr


lungs decreased bs at bases


abd soft, +biliary drain


ext trace edema











 


 CBC, BMP





 03/01/17 06:00 





 03/01/17 06:00 








Microbiology





02/13/17 22:46   Sputum - Endotrachea Suction/Ventilator   Fungus Mold 

Identification - Preliminary


                            Aspergillus Terreus








a/p


bronchiectasis with mold in sputum- to continue voriconozole po -plan total 4 

weeks, f/u aspergillus galactomannan


d/w Dr Rowan- should f/u with Dr Rowan in the office after discharge [phone]


acute cholycystitis with cholysytotomy tube-stable off antibiotics 


s/p ecoli bacteremia


MRSA colonization





d/w Dr Ramirez (resident)

## 2017-03-01 NOTE — PN
Progress Note, Physician


History of Present Illness: 


pulmonay





alert,no c/o abd pain,mild cough,-sob





- Current Medication List


Current Medications: 


Active Medications





Acetaminophen (Tylenol -)  650 mg PO Q4H PRN


   PRN Reason: FEVER OR PAIN


   Last Admin: 03/01/17 06:43 Dose:  650 mg


Acetaminophen (Ofirmev Injection -)  1,000 mg IVPB Q6H PRN


   PRN Reason: FEVER


   Last Admin: 02/27/17 17:30 Dose:  1,000 mg


Albuterol Sulfate (Ventolin 0.083% Nebulizer Soln -)  1 amp NEB Q4H PRN


   PRN Reason: SHORT OF BREATH/WHEEZING


Albuterol/Ipratropium (Duoneb -)  1 amp NEB QIDR [facility]


   Last Admin: 03/01/17 11:10 Dose:  1 amp


Artificial Tears (Artificial Tears)  1 drop OU TID PRN


   PRN Reason: DRY EYES


Aspirin (Asa -)  325 mg PO DAILY [facility]


   Last Admin: 03/01/17 10:49 Dose:  325 mg


Atorvastatin Calcium (Lipitor -)  20 mg PO HS [facility]


   Last Admin: 02/28/17 22:19 Dose:  20 mg


Bisacodyl (Dulcolax Suppository -)  10 mg RC PRN PRN


   PRN Reason: CONSTIPATION


Carvedilol (Coreg -)  6.25 mg PO BID [facility]


   Last Admin: 03/01/17 10:49 Dose:  6.25 mg


Finasteride (Proscar -)  5 mg PO DAILY [facility]


   Last Admin: 03/01/17 10:49 Dose:  5 mg


Guaifenesin (Robitussin Dm -)  10 ml PO Q6H PRN


   PRN Reason: COUGH


Losartan Potassium (Cozaar -)  50 mg PO DAILY [facility]


   Last Admin: 03/01/17 10:49 Dose:  50 mg


Melatonin (Melatonin)  5 mg PO HS PRN


   PRN Reason: INSOMNIA


   Last Admin: 02/28/17 22:19 Dose:  5 mg


Polyethylene Glycol (Miralax (For Daily Use) -)  17 gm PO BID [facility]


   Last Admin: 03/01/17 10:49 Dose:  Not Given


Voriconazole (Vfend (Restricted To Id))  200 mg PO BID [facility]


   Last Admin: 03/01/17 10:49 Dose:  200 mg











- Objective


Vital Signs: 


 Vital Signs











Temperature  98.3 F   03/01/17 10:00


 


Pulse Rate  82   03/01/17 10:00


 


Respiratory Rate  20   03/01/17 10:00


 


Blood Pressure  158/77   03/01/17 10:00


 


O2 Sat by Pulse Oximetry (%)  94 L  03/01/17 09:00











Constitutional: Yes: Well Nourished, Calm


Eyes: Yes: WNL


HENT: Yes: WNL


Neck: Yes: WNL


Cardiovascular: Yes: Regular Rate and Rhythm, S1, S2


Respiratory: Yes: Diminished


Gastrointestinal: Yes: Normal Bowel Sounds, Soft


Extremities: Yes: WNL


Edema: No


Labs: 


 CBC, BMP





 03/01/17 06:00 





 03/01/17 06:00 





 INR, PTT











INR  1.43  (0.82-1.09)  H  03/01/17  06:00    


 


Fibrinogen  < 100.0 mg/dL (238-498)  L*  03/01/17  06:00    














Problem List





- Problems


(1) SANGITA (acute kidney injury)


Code(s): N17.9 - ACUTE KIDNEY FAILURE, UNSPECIFIED





(2) Acute respiratory failure with hypoxia


Code(s): J96.01 - ACUTE RESPIRATORY FAILURE WITH HYPOXIA





(3) COPD (chronic obstructive pulmonary disease)


Code(s): J44.9 - CHRONIC OBSTRUCTIVE PULMONARY DISEASE, UNSPECIFIED   Qualifiers

: 


     COPD type: unspecified COPD        Qualified Code(s): J44.9 - Chronic 

obstructive pulmonary disease, unspecified  





(4) Cholecystitis, acute


Code(s): K81.0 - ACUTE CHOLECYSTITIS





(5) Cholecystostomy care


Code(s): Z43.4 - ENCOUNTER FOR ATTN TO OTH ARTIF OPENINGS OF DIGESTIVE TRACT





(6) Fever


Code(s): R50.9 - FEVER, UNSPECIFIED   Qualifiers: 


     Fever type: other     Qualified Code(s): R50.81 - Fever presenting with 

conditions classified elsewhere  








(8) HLD (hyperlipidemia)


Code(s): E78.5 - HYPERLIPIDEMIA, UNSPECIFIED   Qualifiers: 


     Hyperlipidemia type: pure hypercholesterolemia        Qualified Code(s): 

E78.0 - Pure hypercholesterolemia  





(9) HTN (hypertension)


Code(s): I10 - ESSENTIAL (PRIMARY) HYPERTENSION   Qualifiers: 


     Hypertension type: essential hypertension        Qualified Code(s): I10 - 

Essential (primary) hypertension  





(10) Leukocytosis


Code(s): D72.829 - ELEVATED WHITE BLOOD CELL COUNT, UNSPECIFIED   Qualifiers: 


     Leukocytosis type: unspecified     Qualified Code(s): D72.829 - Elevated 

white blood cell count, unspecified  





(11) Septic shock


Code(s): A41.9 - SEPSIS, UNSPECIFIED ORGANISM


R65.21 - SEVERE SEPSIS WITH SEPTIC SHOCK





(12) Sleep apnea


Code(s): G47.30 - SLEEP APNEA, UNSPECIFIED   





(13) Kidney malignancy


Code(s): C64.9 - MALIGNANT NEOPLASM OF UNSP KIDNEY, EXCEPT RENAL PELVIS   

Qualifiers: 


     Laterality: left     Qualified Code(s): C64.2 - Malignant neoplasm of left 

kidney, except renal pelvis  





(14) Prostate CA


Code(s): C61 - MALIGNANT NEOPLASM OF PROSTATE








Assessment/Plan


A/P


Acute Cholecystitis improved


s/p Cholecystostomy Placement


Profound Septic Shock improved


s/p Acute Hypoxic Respiratory Failure


Acute Kidney Injury improved


Lactic Acidosis resolved


Demand Ischemia


RCC s/p Left Nephrectomy


COPD


HTN


Hyperlipidemia


r internal jugular thrombosis








- antifungal per ID


-  monitor cholecystostomy drainage


-  O2 to keep SpO2 >90%


-  PO as tolerated


-  rehab/PT


-  DVT/GI prophylaxis





DR INFANTE

## 2017-03-01 NOTE — DS
Physical Exam: 


SUBJECTIVE: Patient seen and examined at bedside. Pt continues to feel better. 

Has some HA and cough but is improving. Pt is ready to go to and looking 

forward to rehab.








OBJECTIVE:





 


 Vital Signs











Temperature  98.6 F   03/01/17 13:55


 


Pulse Rate  87   03/01/17 13:55


 


Respiratory Rate  18   03/01/17 13:55


 


Blood Pressure  166/74   03/01/17 13:55


 


O2 Sat by Pulse Oximetry (%)  94 L  03/01/17 09:00











PHYSICAL EXAM





GENERAL: AAOx3, no acute distress


HEAD: Normal with no signs of trauma.


EYES: Reactive to light.


ENT: moist mucous membranes.


NECK: Trachea midline


LUNGS: Auscultated anteriorly. coarse breath sounds b/l 


HEART: Distant heart sounds, Regular rate and rhythm, S1, S2 without murmur, 

rub or gallop.


ABDOMEN: Not tender to palpation. normoactive bowel sounds. Abd drain in place.


EXTREMITIES: 2+ pulses, warm, well-perfused, no edema in LE, edematous b/l UE, 

improving. 4/5 UE strength. 4/5 LE strength.


NEUROLOGICAL:  gait not observed. No focal neuro deficits noted.


SKIN: Warm, dry, normal turgor, no rashes or lesions noted








LABS


 Laboratory Results - last 24 hr











  03/01/17 03/01/17 03/01/17





  06:00 06:00 06:00


 


WBC  8.9  


 


RBC  3.64 L  


 


Hgb  10.7 L  


 


Hct  31.8 L  


 


MCV  87.4  


 


MCHC  33.6  


 


RDW  15.3  


 


Plt Count  481 H  


 


MPV  8.2  D  


 


Neutrophils %  60.1  


 


Lymphocytes %  20.7  


 


Monocytes %  10.9 H  


 


Eosinophils %  6.6 H  


 


Basophils %  1.7  


 


INR    1.43 H


 


PTT (Actin FS)    33.7


 


Fibrinogen    < 100.0 L*


 


Sodium   143 


 


Potassium   4.1 


 


Chloride   107 


 


Carbon Dioxide   29 


 


Anion Gap   7 L 


 


BUN   17 


 


Creatinine   0.8 


 


Random Glucose   94 


 


Calcium   8.5 











HOSPITAL COURSE:





Date of Admission:02/13/17





Date of Discharge: 03/01/17





86 y/o M w/PMH of [place], HTN, HLD, prostate ca, TIA, L kidney ca s/p nephrectomy, 

COPD w/chronic cough presented to ER with RUQ x 3 days. Pain worse w/food, 8/10

, aggravated w/movement. Pt found to have acute amy on abd CT.  Pt had 

percutaneous cholecystostomy placed soon after getting admitted on 2/13/17. Pt 

had rigors after procedure, he began desaturating, using accessory muscles 

after being brought back to room. Pt was placed on bipap and ABG showed 

acidosis and pt was transferred to ICU, intubated and sedated. Pt also became 

hypotensive and required central line access and was started on pressors. Pt 

likely developed septic shock secondary to acute amy. BCx grew E. Coli, SpCx 

grew aspergillus terreus and MRSA, Culture for cholecystostomy grew enterbacter 

asburiae and enterococcus faecium. Pt was seen by ID and pt was treated with 

vanco, ertapenem, voriconazole. Flomax was held due to interference with 

voriconazole. U/S abd 2/14 showed thick walled gallbladder, cholelithiasis, no 

evidence of significant biliary duct dilatation. Pt also presented w/distened 

abd on presentation and once in ICU pt had OG tube placed with drained 1300 ml 

of coffee ground fluid w/no further coffee ground drainage after that initial 

time. GI saw pt and pt started on PPI drip and pt was transfused with one unit 

PRBC. Pt's H/H remained stable afterwards and no more bleeds were noted from GI 

tract.  CT of abdomen was done which showed solitary lung cavitation on L apex, 

b/l infiltrates, and b/l pleural effusions.  Abd Xr on 2/14 showed no evidence 

of obstruction. Pt was eventually weaned off pressors and was extubated 2/22/ 17. Head CT was done initially (2/18/17) at time of chest CT which showed 

subacute vs chronic L sided strokes x2. Repeat Head Ct was done (2/21/17) which 

did not show any evolution of strokes suggesting old infarcts. Echo (2/14) 

showed impaired LV relaxation, mod TR and was done due to elevated trops which 

were most likely secondary to demand ischemia. Cholangiogram done on 2/21/17 

showed adequately positioned and widely patent cholecystostomy tube with patent 

cystic duct and CBD.


Pt also had SANGITA secondary to septic shock which improved s/p sepsis resolution.

  Pt also had DIC picture secondary to septic shock. Transfused with 10 units 

cryo but fibrinogen continued to stay <100 but no active bleeds were witnessed 

despite low fibrinogen levels. Seen by heme/onc in the hospital.  On 2/26/17 pt 

had duplex U/S of UE done which showed Right internal jugular venous 

thrombosis. Vascular surgery consulted and recommended to get U/S repeated in 1 

week to check for propagation of clot, otherwise if stable no intervention 

needed.  Since pt's flomax was stopped pt had difficulty urinating when muñiz 

was removed and muñiz was placed back in.  Pt is to f/u with urologist once 

voriconazole is stopped for muñiz removal and to start flomax after 

voriconazole stopped.  Pt to see surgery (Dr. Terry) in 4 weeks after being 

discharged (6 weeks since having cholecystostomy placed) to remove drain and to 

schedule surgery at that time.  Pt to see Dr. Butts to f/u with DIC work up. 

Pt is to be discharged to [facility] for rehab. Pt is to see Dr. Rowan before 

voriconazole is stopped.  Pt to f/u with PCP Dr. Landin in 1-2 weeks as well.


Minutes to complete discharge: 45





Discharge Summary


Reason For Visit: ACUTE CHOLECYSTITIS LEUKOCYTOSIS FEVER


Current Active Problems





SANGITA (acute kidney injury) (Acute) 


Acute respiratory failure with hypoxia (Acute) 


Anemia due to GI blood loss (Acute) 


Cholecystitis, acute (Acute) 


Cholecystostomy care (Acute) 


DIC (disseminated intravascular coagulation) (Acute) 


Demand ischemia (Acute) 


Diastolic CHF (Acute) 


Dry eyes (Acute) 


Elevated troponin (Acute) 


Fever (Acute) 


Fungal infection (Acute) 


Gram-negative bacteremia (Acute) 


Hypokalemia (Acute) 


Hypophosphatemia (Acute) 


Insomnia (Acute) 


Leukocytosis (Acute) 


Septic shock (Acute) 


Transaminitis (Acute) 


Upper GI bleed (Acute) 


BPH (benign prostatic hyperplasia) (Chronic) 


COPD (chronic obstructive pulmonary disease) (Chronic) 


HLD (hyperlipidemia) (Chronic) 


HTN (hypertension) (Chronic) 


Sleep apnea (Chronic) 








Condition: Stable





- Instructions


Diet, Activity, Other Instructions: 


You will need to follow up with Dr. Terry (the surgeon) in 4 weeks to assess 

your abdominal drain and removal of the drain.  You will most likely need your 

gallbladder removed afterwards and the surgeon will work with you to schedule 

that after the drain has been removed.





You will also need to get an ultrasound done of your upper extremity to assess 

for propagation of the clot in your Right Internal jugular vein.  If there is 

no change in the clot as was seen here, no further work up will be needed.  If 

there is a change you will need to see a vascular surgeon (Dr. Reed Ramirez -

vascular surgeon - had seen you here for your clot assessment).





You will be discharged to a rehab facility to help you regain your strength. 

There you will need to use your cpap at night.





You also had a fungal infection in your lungs for which you are taking a 

medication called Voriconazole. You will need to continue to take this 

medication for 16 more days beginning tomorrow.  You are to take 200 mg of 

Voriconazole twice a day. Because your flomax interferes with this medication, 

your flomax has been stopped while you are on this medication. You may resume 

taking it once you are off the voriconazole.


You will also need to follow up with the infectious disease doctor, Dr. Rowan, 

in 1-2 weeks, before finishing the voriconazole if possible.





Because you were having trouble urinating when your catheter was removed, you 

will need to keep the urinary catheter in and you can follow up with a 

Urologist in 1-2 weeks to assess your urinary function and need for catheter.  

You can see your regular urologist or see Dr. Marc if it is easier for you 

get to his office. 





You will also need to follow up with the hematologist due to abnormal 

fibrinogen levels in your blood.  Please see Dr. Butts, who you had seen in 

the hospital in approximately 1-2 weeks as well.





You will also need to see Dr. Landin, your primary care physician, in 1-2 

weeks.


Referrals: 


Truong Rowan MD [Staff Physician] -  (1-2 weeks)


Álvaro Landin MD [Staff Physician] -  (1- 2 weeks)


Beckie Brown MD [Staff Physician] -  (1-2 weeks)


Tony Terry MD [Staff Physician] - 1 Month (4 weeks)


Tony Marc MD [Staff Physician] - 


Disposition: SKILLED NURSING FACILITY





- Home Medications


Comprehensive Discharge Medication List: 


Ambulatory Orders





Amlodipine Besylate [Norvasc -] 10 mg PO DAILY 11/06/14 


Ca Cmb No.1/Vit D3/B-6/FA/B12 [Vitamin D3 1,000 Unit Tablet] 1 tab PO DAILY 11/ 06/14 


Carvedilol 12.5 mg PO BID 11/06/14 


Vitamin E 400 unit PO DAILY 11/06/14 


Losartan Potassium [Cozaar] 50 mg PO DAILY 11/07/14 


Atorvastatin Ca [Lipitor] 10 mg PO HS #30 tablet 01/21/15 


Aspirin [ASA -] 325 mg PO DAILY@0800  tablet 12/09/15 


Cinnamon Bark [Cinnamon] 500 mg PO DAILY 02/12/17 


Finasteride 0 mg PO DAILY 02/12/17 


Ranitidine [Zantac -] 150 mg PO DAILY 02/13/17 


Albuterol 0.083% Nebulizer Sol [Ventolin 0.083% Nebulizer Soln -] 1 amp NEB Q4H 

PRN #0 amp 03/01/17 


Atorvastatin Ca [Lipitor] 20 mg PO HS  tablet 03/01/17 


Voriconazole 200 mg PO BID #32 tablet 03/01/17 











Problem List





- Problems


(1) COPD (chronic obstructive pulmonary disease)


Code(s): J44.9 - CHRONIC OBSTRUCTIVE PULMONARY DISEASE, UNSPECIFIED   Qualifiers

: 


     COPD type: unspecified COPD        Qualified Code(s): J44.9 - Chronic 

obstructive pulmonary disease, unspecified  





(2) Cholecystitis, acute


Code(s): K81.0 - ACUTE CHOLECYSTITIS





(3) Fever


Code(s): R50.9 - FEVER, UNSPECIFIED   Qualifiers: 


     Fever type: other     Qualified Code(s): R50.81 - Fever presenting with 

conditions classified elsewhere  





(4) Leukocytosis


Code(s): D72.829 - ELEVATED WHITE BLOOD CELL COUNT, UNSPECIFIED   Qualifiers: 


     Leukocytosis type: unspecified     Qualified Code(s): D72.829 - Elevated 

white blood cell count, unspecified  





(5) Sleep apnea


Code(s): G47.30 - SLEEP APNEA, UNSPECIFIED   





(6) Acute urinary retention


Code(s): R33.8 - OTHER RETENTION OF URINE





(7) Insomnia


Code(s): G47.00 - INSOMNIA, UNSPECIFIED   





(8) HTN (hypertension)


Code(s): I10 - ESSENTIAL (PRIMARY) HYPERTENSION   Qualifiers: 


     Hypertension type: essential hypertension        Qualified Code(s): I10 - 

Essential (primary) hypertension  





(9) HLD (hyperlipidemia)


Code(s): E78.5 - HYPERLIPIDEMIA, UNSPECIFIED   Qualifiers: 


     Hyperlipidemia type: pure hypercholesterolemia        Qualified Code(s): 

E78.0 - Pure hypercholesterolemia  





(10) BPH (benign prostatic hyperplasia)


Code(s): N40.0 - BENIGN PROSTATIC HYPERPLASIA WITHOUT LOWER URINRY TRACT SYMP   





(11) Septic shock


Code(s): A41.9 - SEPSIS, UNSPECIFIED ORGANISM


R65.21 - SEVERE SEPSIS WITH SEPTIC SHOCK





(12) Acute respiratory failure with hypoxia


Code(s): J96.01 - ACUTE RESPIRATORY FAILURE WITH HYPOXIA





(13) SANGITA (acute kidney injury)


Code(s): N17.9 - ACUTE KIDNEY FAILURE, UNSPECIFIED





(14) Transaminitis


Code(s): R74.0 - NONSPEC ELEV OF LEVELS OF TRANSAMNS & LACTIC ACID DEHYDRGNSE





(15) Upper GI bleed


Code(s): K92.2 - GASTROINTESTINAL HEMORRHAGE, UNSPECIFIED





(16) Elevated troponin


Code(s): R79.89 - OTHER SPECIFIED ABNORMAL FINDINGS OF BLOOD CHEMISTRY





(17) Demand ischemia


Code(s): I24.8 - OTHER FORMS OF ACUTE ISCHEMIC HEART DISEASE





(18) DIC (disseminated intravascular coagulation)


Code(s): D65 - DISSEMINATED INTRAVASCULAR COAGULATION





(19) Hypophosphatemia


Code(s): E83.39 - OTHER DISORDERS OF PHOSPHORUS METABOLISM





(20) Hypokalemia


Code(s): E87.6 - HYPOKALEMIA





(21) Anemia due to GI blood loss


Code(s): D50.0 - IRON DEFICIENCY ANEMIA SECONDARY TO BLOOD LOSS (CHRONIC)





(22) Diastolic CHF


Code(s): I50.30 - UNSPECIFIED DIASTOLIC (CONGESTIVE) HEART FAILURE   Qualifiers

: 


     Congestive heart failure chronicity: chronic        Qualified Code(s): 

I50.32 - Chronic diastolic (congestive) heart failure  





(23) Dry eyes


Code(s): H04.123 - DRY EYE SYNDROME OF BILATERAL LACRIMAL GLANDS





This patient is new to me today: No


Emergency Visit: Yes


ED Registration Date: 02/13/17


Care time: The patient presented to the Emergency Department on the above date 

and was hospitalized for further evaluation of their emergent condition.


Critical Care patient: No





- Discharge Referral


Referred to [facility] No

## 2017-03-04 LAB — GALACTOMANNAN AG BAL QL: 0.08

## 2017-03-21 ENCOUNTER — HOSPITAL ENCOUNTER (INPATIENT)
Dept: HOSPITAL 74 - JER | Age: 82
LOS: 8 days | Discharge: NURSING, REHAB OR LONG-TERM CARE | DRG: 920 | End: 2017-03-29
Attending: FAMILY MEDICINE | Admitting: FAMILY MEDICINE
Payer: COMMERCIAL

## 2017-03-21 VITALS — BODY MASS INDEX: 24.5 KG/M2

## 2017-03-21 DIAGNOSIS — Z43.4: ICD-10-CM

## 2017-03-21 DIAGNOSIS — K81.0: ICD-10-CM

## 2017-03-21 DIAGNOSIS — Z85.528: ICD-10-CM

## 2017-03-21 DIAGNOSIS — Z86.73: ICD-10-CM

## 2017-03-21 DIAGNOSIS — N39.0: ICD-10-CM

## 2017-03-21 DIAGNOSIS — I50.32: ICD-10-CM

## 2017-03-21 DIAGNOSIS — M13.862: ICD-10-CM

## 2017-03-21 DIAGNOSIS — K55.9: ICD-10-CM

## 2017-03-21 DIAGNOSIS — K57.90: ICD-10-CM

## 2017-03-21 DIAGNOSIS — I11.0: ICD-10-CM

## 2017-03-21 DIAGNOSIS — N40.0: ICD-10-CM

## 2017-03-21 DIAGNOSIS — K63.5: ICD-10-CM

## 2017-03-21 DIAGNOSIS — E78.5: ICD-10-CM

## 2017-03-21 DIAGNOSIS — J42: ICD-10-CM

## 2017-03-21 DIAGNOSIS — Z85.46: ICD-10-CM

## 2017-03-21 DIAGNOSIS — K56.41: ICD-10-CM

## 2017-03-21 DIAGNOSIS — G47.39: ICD-10-CM

## 2017-03-21 DIAGNOSIS — F41.9: ICD-10-CM

## 2017-03-21 DIAGNOSIS — M13.861: ICD-10-CM

## 2017-03-21 DIAGNOSIS — T85.590A: Primary | ICD-10-CM

## 2017-03-21 DIAGNOSIS — R33.8: ICD-10-CM

## 2017-03-21 DIAGNOSIS — Z90.5: ICD-10-CM

## 2017-03-21 DIAGNOSIS — R05: ICD-10-CM

## 2017-03-21 LAB
ALBUMIN SERPL-MCNC: 2.8 G/DL (ref 3.4–5)
ALP SERPL-CCNC: 115 U/L (ref 45–117)
ALT SERPL-CCNC: 27 U/L (ref 12–78)
ANION GAP SERPL CALC-SCNC: 9 MMOL/L (ref 8–16)
APPEARANCE UR: CLEAR
AST SERPL-CCNC: 20 U/L (ref 15–37)
BASOPHILS # BLD: 1.3 % (ref 0–2)
BILIRUB SERPL-MCNC: 0.5 MG/DL (ref 0.2–1)
BILIRUB UR STRIP.AUTO-MCNC: NEGATIVE MG/DL
CALCIUM SERPL-MCNC: 8.9 MG/DL (ref 8.5–10.1)
CO2 SERPL-SCNC: 29 MMOL/L (ref 21–32)
COLOR UR: YELLOW
CREAT SERPL-MCNC: 0.9 MG/DL (ref 0.7–1.3)
DEPRECATED RDW RBC AUTO: 15.2 % (ref 11.9–15.9)
EOSINOPHIL # BLD: 1.5 % (ref 0–4.5)
GLUCOSE SERPL-MCNC: 143 MG/DL (ref 74–106)
INR BLD: 1.44 (ref 0.82–1.09)
KETONES UR QL STRIP: NEGATIVE
LEUKOCYTE ESTERASE UR QL STRIP.AUTO: (no result)
MCH RBC QN AUTO: 29.6 PG (ref 25.7–33.7)
MCHC RBC AUTO-ENTMCNC: 33.5 G/DL (ref 32–35.9)
MCV RBC: 88.5 FL (ref 80–96)
MUCOUS THREADS URNS QL MICRO: (no result)
NEUTROPHILS # BLD: 79.2 % (ref 42.8–82.8)
NITRITE UR QL STRIP: POSITIVE
PH UR: 5 [PH] (ref 5–8)
PLATELET # BLD AUTO: 331 K/MM3 (ref 134–434)
PMV BLD: 7.8 FL (ref 7.5–11.1)
PROT SERPL-MCNC: 6.3 G/DL (ref 6.4–8.2)
PROT UR QL STRIP: (no result)
PROT UR QL STRIP: NEGATIVE
PT PNL PPP: 16 SEC (ref 9.98–11.88)
RBC # BLD AUTO: 9 /HPF (ref 0–3)
RBC # UR STRIP: NEGATIVE /UL
SP GR UR: 1.02 (ref 1–1.03)
UROBILINOGEN UR STRIP-MCNC: NEGATIVE E.U./DL (ref 0.2–1)
WBC # BLD AUTO: 13.6 K/MM3 (ref 4–10)
WBC # UR AUTO: 5 /HPF (ref 3–5)

## 2017-03-21 PROCEDURE — C1729 CATH, DRAINAGE: HCPCS

## 2017-03-21 PROCEDURE — C1769 GUIDE WIRE: HCPCS

## 2017-03-21 PROCEDURE — A4358 URINARY LEG OR ABDOMEN BAG: HCPCS

## 2017-03-21 NOTE — PDOC
[id number]


No Limitations





- History of Present Illness


Initial Comments: 





03/21/17 17:05


The patient is a 85 year old male brought via EMS from [facility] and 

presenting with his wife, with a significant past medical history of prostate CA

, kidney CA, TIA (2002), COPD, HTN, HLD, colon polyps, ischemic colitis and 

diverticulosis, who presents to the emergency department with right sided pain 

for the past week. He reports that he was here a few weeks ago and had his 

gallbladder drained. He currently has a nephrostomy tube on the right side, 

where he currently has the pain. He describes his pain as moderate, without 

radiation. He notes that taking a deep breath and certain movements exacerbates 

his pain 





The patient denies chest pain, shortness of breath, headache and dizziness. 

Denies fever, chills, nausea, vomit, diarrhea and constipation. Denies dysuria, 

frequency, urgency and hematuria. 





Allergies: None 


Past surgical history: Neck fusion, appendectomy, inguinal hernia, left knee 

surgery 


Social history: No alcohol, tobacco or drug use reported 


PMD - Dr. Landin 


Surgeon - Dr. Damon 





<Jacob Yoder - Last Filed: 03/21/17 17:05>





<iMke Beverly - Last Filed: 05/07/17 09:56>





- General


Chief Complaint: Pain


Stated Complaint: ABD


Time Seen by Provider: 03/21/17 16:08





Past History





<Jacob Yodre - Last Filed: 03/21/17 17:05>





- Past Medical History


Anemia: No (PLACED ON IRON PREOP)


Asthma: No


Cancer: Yes (prostate cancer,kidney cancer)


Cardiac Disorders: No


CVA: Yes (TIA,2002)


COPD: Yes (mild,USES ALBUTEROL AS NEEDED)


CHF: No


Dementia: No


Diabetes: No


GI Disorders: Yes (COLON POLYPS, ISCHEMIC COLITIS, DIVERTICULOSIS)


 Disorders: Yes (PROSTATE CA)


HTN: Yes


Hypercholesterolemia: Yes


Liver Disease: No


Suicide Attempt (Hx): No


Seizures: No


Thyroid Disease: No





- Surgical History


Abdominal Surgery: Yes (BILATERAL  INGUINAL HERNIA)


Appendectomy: Yes


Cardiac Surgery: No


Cholecystectomy: No


Lung Surgery: No


Neurologic Surgery: Yes (NECK FUSION)


Orthopedic Surgery: Yes (NECK SURGERY, L knee sx 12/8)





- Immunization History


Immunization Up to Date: Yes





- Psycho/Social/Smoking Cessation Hx


Anxiety: No


Suicidal Ideation: No


Smoking Status: Yes


Smoking History: Former smoker


Have you smoked in the past 12 months: No


Number of Cigarettes Smoked Daily: 0


If you are a former smoker, when did you quit?: 35 years ago


Information on smoking cessation initiated: No


Hx Alcohol Use: No


Drug/Substance Use Hx: No


Substance Use Type: Alcohol


Hx Substance Use Treatment: No





<Mike Beverly - Last Filed: 05/07/17 09:56>





- Past Medical History


Allergies/Adverse Reactions: 


 Allergies











Allergy/AdvReac Type Severity Reaction Status Date / Time


 


No Known Drug Allergies Allergy   Verified 02/12/17 21:20











Home Medications: 


Ambulatory Orders





Losartan Potassium [Cozaar] 50 mg PO DAILY 11/07/14 


Cinnamon Bark [Cinnamon] 500 mg PO DAILY 02/12/17 


Ranitidine [Zantac -] 150 mg PO DAILY 02/13/17 


Albuterol 0.083% Nebulizer Sol [Ventolin 0.083% Nebulizer Soln -] 1 amp NEB Q4H 

PRN #0 amp 03/01/17 


Atorvastatin Ca [Lipitor] 20 mg PO HS  tablet 03/01/17 


Carvedilol [Coreg -] 12.5 mg PO BID  tablet 03/21/17 


Aspirin [ASA -] 325 mg PO DAILY@0800  tablet 03/29/17 


Cephalexin Monohydrate [Keflex -] 500 mg PO BID #14 capsule 03/29/17 


Finasteride [Proscar -] 5 mg PO DAILY  tablet 03/29/17 


Metronidazole [Flagyl -] 500 mg PO TID  tablet 03/29/17 


Polyethylene Glycol 3350 [Miralax 119 gm Btl -] 17 gm PO TID  bottle 03/29/17 


Tamsulosin HCl [Flomax -] 0.4 mg PO DAILY #30 cap.er.24h 03/29/17 











**Review of Systems





- Review of Systems


Able to Perform ROS?: Yes


Comments:: 





03/21/17 17:06


GENERAL/CONSTITUTIONAL: No fever or chills. No weakness.


HEAD, EYES, EARS, NOSE AND THROAT: No change in vision. No ear pain or 

discharge. No sore throat.


CARDIOVASCULAR: No chest pain or shortness of breath


RESPIRATORY: No cough, wheezing, or hemoptysis.


GASTROINTESTINAL: No nausea, vomiting, diarrhea or constipation.


GENITOURINARY: No dysuria, frequency, or change in urination.


MUSCULOSKELETAL: +Right sided rib pain. No neck or back pain.


SKIN: No rash


NEUROLOGIC: No headache, vertigo, loss of consciousness, or change in strength/

sensation.


ENDOCRINE: No increased thirst. No abnormal weight change


HEMATOLOGIC/LYMPHATIC: No anemia, easy bleeding, or history of blood clots.


ALLERGIC/IMMUNOLOGIC: No hives or skin allergy.





<Jacob Yoder - Last Filed: 03/21/17 17:05>





*Physical Exam





- Vital Signs


 Last Vital Signs











Temp Pulse Resp BP Pulse Ox


 


 98.2 F   84   20   160/80   98 


 


 03/21/17 16:40  03/21/17 16:40  03/21/17 16:40  03/21/17 16:40  03/21/17 16:40














- Physical Exam


Comments: 





03/21/17 17:06


GENERAL: Awake, alert, and fully oriented, in no acute distress


HEAD: No signs of trauma, normocephalic, atraumatic 


EYES: PERRLA, EOMI, sclera anicteric, conjunctiva clear


ENT: Auricles normal inspection, hearing grossly normal, nares patent, 

oropharynx clear without


exudates. Moist mucosa


NECK: Normal ROM, supple, no lymphadenopathy, JVD, or masses


LUNGS: No distress, speaks full sentences, clear to auscultation bilaterally 


HEART: Regular rate and rhythm, normal S1 and S2, no murmurs, rubs or gallops, 

peripheral pulses normal and equal bilaterally. 


ABDOMEN: +Right sided nephrostomy tube. Soft, nontender, normoactive bowel 

sounds.  No guarding, no rebound.  No masses


EXTREMITIES: Normal inspection, Normal range of motion, no edema.  No clubbing 

or cyanosis. 


NEUROLOGICAL: Cranial nerves II through XII grossly intact.  Normal speech, 

normal gait, no focal sensorimotor deficits 


SKIN: Warm, Dry, normal turgor, no rashes or lesions noted. 








<Jacob Yoder - Last Filed: 03/21/17 17:05>





ED Treatment Course





- LABORATORY


CBC & Chemistry Diagram: 


 03/21/17 16:30





 03/21/17 16:30





- ADDITIONAL ORDERS


Additional order review: 


 











  03/21/17





  16:30


 


RBC  4.25


 


MCV  88.5


 


MCHC  33.5


 


RDW  15.2


 


MPV  7.8


 


Neutrophils %  79.2  D


 


Lymphocytes %  11.0  D


 


Monocytes %  7.0


 


Eosinophils %  1.5


 


Basophils %  1.3














<Jacob Yoder - Last Filed: 03/21/17 17:05>





- LABORATORY


CBC & Chemistry Diagram: 


 03/28/17 06:30





 03/29/17 06:00





<Mike Beverly - Last Filed: 05/07/17 09:56>





*DC/Admit/Observation/Transfer





- Attestations


Scribe Attestion: 





03/21/17 17:06


Documentation prepared by Jacob Yoder, acting as medical scribe for 

Mike Beverly MD





<Jacob Yoder - Last Filed: 03/21/17 17:05>





- Attestations


Physician Attestion: 





03/21/17 16:08








I, Dr. Mike Beverly, attest that this document has been prepared under my 

direction and personally reviewed by me in its entirety.   I further attest, 

that it accurately reflects all work, treatment, procedures and medical decision

-making performed by me.  





<Mike Beverly - Last Filed: 05/07/17 09:56>


Diagnosis at time of Disposition: 


UTI (urinary tract infection)


Qualifiers:


 Urinary tract infection type: site unspecified Hematuria presence: without 

hematuria Qualified Code(s): N39.0 - Urinary tract infection, site not specified





Abdominal pain


Qualifiers:


 Abdominal location: right upper quadrant Qualified Code(s): R10.11 - Right 

upper quadrant pain





- Discharge Dispostion


Disposition: SKILLED NURSING FACILITY


Condition at time of disposition: Improved





- Prescriptions





- Referrals

## 2017-03-21 NOTE — PDOC
*Physical Exam





- Vital Signs


 Last Vital Signs











Temp Pulse Resp BP Pulse Ox


 


 98.2 F   84   20   160/80   98 


 


 03/21/17 16:40  03/21/17 16:40  03/21/17 16:40  03/21/17 16:40  03/21/17 16:40














<ErmiasisabelJacob - Last Filed: 03/21/17 21:10>





- Vital Signs


 Last Vital Signs











Temp Pulse Resp BP Pulse Ox


 


 98.2 F   84   20   160/80   98 


 


 03/21/17 16:40  03/21/17 16:40  03/21/17 16:40  03/21/17 16:40  03/21/17 16:40














<Delmy Mcgowan - Last Filed: 03/21/17 22:11>





ED Treatment Course





- LABORATORY


CBC & Chemistry Diagram: 


 03/21/17 16:30





 03/21/17 16:30





- ADDITIONAL ORDERS


Additional order review: 


 Laboratory  Results











  03/21/17 03/21/17 03/21/17





  16:30 16:30 16:30


 


INR    1.44 H


 


Sodium  139  


 


Potassium  4.7  


 


Chloride  101  


 


Carbon Dioxide  29  


 


Anion Gap  9  


 


BUN  22 H D  


 


Creatinine  0.9  


 


Creat Clearance w eGFR  > 60  


 


Random Glucose  143 H D  


 


Calcium  8.9  


 


Total Bilirubin  0.5  


 


AST  20  D  


 


ALT  27  


 


Alkaline Phosphatase  115  D  


 


Total Protein  6.3 L D  


 


Albumin  2.8 L D  


 


Urine Color   Yellow 


 


Urine Appearance   Clear 


 


Urine pH   5.0 


 


Ur Specific Gravity   1.025 


 


Urine Protein   1+ H 


 


Urine Glucose (UA)   Negative 


 


Urine Ketones   Negative 


 


Urine Blood   Negative 


 


Urine Nitrite   Positive 


 


Urine Bilirubin   Negative 


 


Urine Urobilinogen   Negative 


 


Ur Leukocyte Esterase   Trace H 


 


Urine RBC   9 


 


Urine WBC   5 


 


Ur Epithelial Cells   Rare 


 


Urine Mucus   Rare 








 











  03/21/17





  16:30


 


RBC  4.25


 


MCV  88.5


 


MCHC  33.5


 


RDW  15.2


 


MPV  7.8


 


Neutrophils %  79.2  D


 


Lymphocytes %  11.0  D


 


Monocytes %  7.0


 


Eosinophils %  1.5


 


Basophils %  1.3














- RADIOLOGY


Radiology Studies Ordered: 














 Category Date Time Status


 


 CHEST X-RAY PORTABLE* [RAD] Stat Radiology  03/21/17 20:42 Taken














- Medications


Given in the ED: 


ED Medications














Discontinued Medications














Generic Name Dose Route Start Last Admin





  Trade Name Freq  PRN Reason Stop Dose Admin


 


Ceftriaxone Sodium 1 gm/  50 mls @ 100 mls/hr 03/21/17 18:00 03/21/17 18:10





  Dextrose  IVPB 03/21/17 18:29  100 mls/hr





  ONCE ONE   Administration














<Jacob Szymanski - Last Filed: 03/21/17 21:10>





- LABORATORY


CBC & Chemistry Diagram: 


 03/21/17 16:30





 03/21/17 16:30





- ADDITIONAL ORDERS


Additional order review: 


 Laboratory  Results











  03/21/17 03/21/17 03/21/17





  16:30 16:30 16:30


 


INR    1.44 H


 


Sodium  139  


 


Potassium  4.7  


 


Chloride  101  


 


Carbon Dioxide  29  


 


Anion Gap  9  


 


BUN  22 H D  


 


Creatinine  0.9  


 


Creat Clearance w eGFR  > 60  


 


Random Glucose  143 H D  


 


Calcium  8.9  


 


Total Bilirubin  0.5  


 


AST  20  D  


 


ALT  27  


 


Alkaline Phosphatase  115  D  


 


Total Protein  6.3 L D  


 


Albumin  2.8 L D  


 


Urine Color   Yellow 


 


Urine Appearance   Clear 


 


Urine pH   5.0 


 


Ur Specific Gravity   1.025 


 


Urine Protein   1+ H 


 


Urine Glucose (UA)   Negative 


 


Urine Ketones   Negative 


 


Urine Blood   Negative 


 


Urine Nitrite   Positive 


 


Urine Bilirubin   Negative 


 


Urine Urobilinogen   Negative 


 


Ur Leukocyte Esterase   Trace H 


 


Urine RBC   9 


 


Urine WBC   5 


 


Ur Epithelial Cells   Rare 


 


Urine Mucus   Rare 








 











  03/21/17





  16:30


 


RBC  4.25


 


MCV  88.5


 


MCHC  33.5


 


RDW  15.2


 


MPV  7.8


 


Neutrophils %  79.2  D


 


Lymphocytes %  11.0  D


 


Monocytes %  7.0


 


Eosinophils %  1.5


 


Basophils %  1.3














- Medications


Given in the ED: 


ED Medications














Discontinued Medications














Generic Name Dose Route Start Last Admin





  Trade Name Freq  PRN Reason Stop Dose Admin


 


Ceftriaxone Sodium 1 gm/  50 mls @ 100 mls/hr 03/21/17 18:00 03/21/17 18:10





  Dextrose  IVPB 03/21/17 18:29  100 mls/hr





  ONCE ONE   Administration














<Delmy Mcgowan - Last Filed: 03/21/17 22:11>





Medical Decision Making





- Medical Decision Making


03/21/17 20:36


Paged Dr. Álvaro Landin (via answering service) at 20:36


Awaiting call back





03/21/17 20:54


Second call was made to Dr. Landin (via answering service) at 20:54


Awaiting call back





03/21/17 21:10


Third call was made to Dr. Landin (via answering service) at 21:10 and no 

response





03/21/17 21:23


Dr. Kentrell Ruff covering for Dr. Landin responded back to the call at 21:23





03/21/17 21:26


Paged Dr. Carlos A Baumann (via answering service) at 21:26


Awaiting call back





03/21/17 21:50


Second call was made to Dr. Baumann (via answering service) at 21:50


Awaiting call back





03/21/17 22:10


Patient's case discussed with Dr. Baumann at 22:10 





<Delmy Mcgowan - Last Filed: 03/21/17 22:11>





*DC/Admit/Observation/Transfer





- Discharge Dispostion


Admit: Yes





<Jacob Szymanski - Last Filed: 03/21/17 21:10>





<Delmy Mcgowan - Last Filed: 03/21/17 22:11>


Diagnosis at time of Disposition: 


UTI (urinary tract infection)


Qualifiers:


 Urinary tract infection type: site unspecified Hematuria presence: without 

hematuria Qualified Code(s): N39.0 - Urinary tract infection, site not specified





Abdominal pain


Qualifiers:


 Abdominal location: right upper quadrant Qualified Code(s): R10.11 - Right 

upper quadrant pain





- Referrals


Referrals: 


Álvaro Landin MD [Primary Care Provider] - 





- Patient Instructions





- Post Discharge Activity

## 2017-03-22 LAB
ALBUMIN SERPL-MCNC: 2.3 G/DL (ref 3.4–5)
ALP SERPL-CCNC: 269 U/L (ref 45–117)
ALT SERPL-CCNC: 140 U/L (ref 12–78)
AMYLASE SERPL-CCNC: 52 U/L (ref 25–115)
ANION GAP SERPL CALC-SCNC: 10 MMOL/L (ref 8–16)
AST SERPL-CCNC: 332 U/L (ref 15–37)
BASOPHILS # BLD: 1.3 % (ref 0–2)
BILIRUB SERPL-MCNC: 0.9 MG/DL (ref 0.2–1)
CALCIUM SERPL-MCNC: 8.1 MG/DL (ref 8.5–10.1)
CO2 SERPL-SCNC: 25 MMOL/L (ref 21–32)
CREAT SERPL-MCNC: 0.9 MG/DL (ref 0.7–1.3)
DEPRECATED RDW RBC AUTO: 15.3 % (ref 11.9–15.9)
EOSINOPHIL # BLD: 2.4 % (ref 0–4.5)
GLUCOSE SERPL-MCNC: 138 MG/DL (ref 74–106)
INR BLD: 1.48 (ref 0.82–1.09)
MCH RBC QN AUTO: 29.3 PG (ref 25.7–33.7)
MCHC RBC AUTO-ENTMCNC: 33.2 G/DL (ref 32–35.9)
MCV RBC: 88.4 FL (ref 80–96)
NEUTROPHILS # BLD: 74.6 % (ref 42.8–82.8)
PLATELET # BLD AUTO: 267 K/MM3 (ref 134–434)
PMV BLD: 8 FL (ref 7.5–11.1)
PROT SERPL-MCNC: 5.6 G/DL (ref 6.4–8.2)
PT PNL PPP: 16.4 SEC (ref 9.98–11.88)
WBC # BLD AUTO: 12.8 K/MM3 (ref 4–10)

## 2017-03-22 PROCEDURE — BF121ZZ FLUOROSCOPY OF GALLBLADDER USING LOW OSMOLAR CONTRAST: ICD-10-PCS | Performed by: RADIOLOGY

## 2017-03-22 PROCEDURE — 0T2BX0Z CHANGE DRAINAGE DEVICE IN BLADDER, EXTERNAL APPROACH: ICD-10-PCS | Performed by: RADIOLOGY

## 2017-03-22 RX ADMIN — PANTOPRAZOLE SODIUM SCH MG: 40 TABLET, DELAYED RELEASE ORAL at 12:51

## 2017-03-22 RX ADMIN — HEPARIN SODIUM SCH UNIT: 5000 INJECTION, SOLUTION INTRAVENOUS; SUBCUTANEOUS at 12:52

## 2017-03-22 RX ADMIN — POLYETHYLENE GLYCOL 3350 SCH: 17 POWDER, FOR SOLUTION ORAL at 21:47

## 2017-03-22 RX ADMIN — HEPARIN SODIUM SCH UNIT: 5000 INJECTION, SOLUTION INTRAVENOUS; SUBCUTANEOUS at 00:59

## 2017-03-22 RX ADMIN — INSULIN ASPART SCH: 100 INJECTION, SOLUTION INTRAVENOUS; SUBCUTANEOUS at 12:53

## 2017-03-22 RX ADMIN — ASPIRIN 325 MG ORAL TABLET SCH MG: 325 PILL ORAL at 08:30

## 2017-03-22 RX ADMIN — ACETAMINOPHEN PRN MG: 325 TABLET ORAL at 21:43

## 2017-03-22 RX ADMIN — INSULIN ASPART SCH: 100 INJECTION, SOLUTION INTRAVENOUS; SUBCUTANEOUS at 16:44

## 2017-03-22 RX ADMIN — RANITIDINE SCH MG: 150 TABLET ORAL at 12:51

## 2017-03-22 RX ADMIN — POTASSIUM CHLORIDE, DEXTROSE MONOHYDRATE AND SODIUM CHLORIDE SCH MLS/HR: 150; 5; 450 INJECTION, SOLUTION INTRAVENOUS at 01:00

## 2017-03-22 RX ADMIN — ACETAMINOPHEN PRN MG: 325 TABLET ORAL at 13:00

## 2017-03-22 RX ADMIN — INSULIN ASPART SCH: 100 INJECTION, SOLUTION INTRAVENOUS; SUBCUTANEOUS at 07:04

## 2017-03-22 RX ADMIN — LOSARTAN POTASSIUM SCH MG: 50 TABLET, FILM COATED ORAL at 12:51

## 2017-03-22 RX ADMIN — ACETAMINOPHEN PRN MG: 325 TABLET ORAL at 01:00

## 2017-03-22 RX ADMIN — ALBUTEROL SULFATE PRN AMP: 2.5 SOLUTION RESPIRATORY (INHALATION) at 07:14

## 2017-03-22 RX ADMIN — ATORVASTATIN CALCIUM SCH MG: 20 TABLET, FILM COATED ORAL at 21:36

## 2017-03-22 RX ADMIN — POTASSIUM CHLORIDE, DEXTROSE MONOHYDRATE AND SODIUM CHLORIDE SCH MLS/HR: 150; 5; 450 INJECTION, SOLUTION INTRAVENOUS at 17:46

## 2017-03-22 RX ADMIN — PANTOPRAZOLE SODIUM SCH MG: 40 TABLET, DELAYED RELEASE ORAL at 01:00

## 2017-03-22 RX ADMIN — HEPARIN SODIUM SCH UNIT: 5000 INJECTION, SOLUTION INTRAVENOUS; SUBCUTANEOUS at 21:36

## 2017-03-22 RX ADMIN — CARVEDILOL SCH MG: 12.5 TABLET, FILM COATED ORAL at 21:36

## 2017-03-22 RX ADMIN — PIPERACILLIN SODIUM,TAZOBACTAM SODIUM SCH GM: 3; .375 INJECTION, POWDER, FOR SOLUTION INTRAVENOUS at 17:43

## 2017-03-22 RX ADMIN — INSULIN ASPART SCH: 100 INJECTION, SOLUTION INTRAVENOUS; SUBCUTANEOUS at 21:41

## 2017-03-22 RX ADMIN — PIPERACILLIN SODIUM,TAZOBACTAM SODIUM SCH GM: 3; .375 INJECTION, POWDER, FOR SOLUTION INTRAVENOUS at 13:04

## 2017-03-22 RX ADMIN — CARVEDILOL SCH MG: 12.5 TABLET, FILM COATED ORAL at 12:51

## 2017-03-22 NOTE — PN
Teaching Attending Note


Name of Resident: Mitchell Hays


ATTENDING PHYSICIAN STATEMENT





I saw and evaluated the patient.


I reviewed the resident's note and discussed the case with the resident.


I agree with the resident's findings and plan as documented.





GERI MESSER MD

## 2017-03-22 NOTE — PN
Progress Note (short form)





- Note


Progress Note: 


Full consult dictated


85M admitted last month w/ prolonged hospitalization secondary to severe 

acalculous cholecystits / became  bacteremic.


Had cholecystostomy tube placed. At that time, he had a cholangiogram revealing 

a normal caliber CBD with limited evaluation of the CBD at the level of the 

ampulla however prompt draining of contrast from the CBD to small bowel was 

noted as well as normal caliber intrahepatics.


Now had had pain for 1 week in the RUQ.  Was draining pus from the 

cholecystostomy tube. Also, CT scan revealed a small rim enhancing fluid 

collection along the inferior aspect of the right hepatic lobe.  The 

cholecystostomy tube was changed, fluid was described as turbid and pussy and 

cholangiogram again revealed a nornal appearing CBD (reviewed with Dr. Watson)





On exam:


TTP RUQ, cholecystostomy drain in place with brown bile and some purulent fluid








Imp:


Smoldering cholecystitis with clogged cholecystostomy tube.  Given that there 

is an adjacent fluid collection, there was likely seepage of pus/infected bile 

from the gallbladder itself when the cholecystostomy tube was clogged


Cholangiogram revealed normal appering cbd


Discussed fluid collection with Dr. Watson. Too small to drain at this point





Plan:


IV Abx: ID is following


Surgical follow-up regarding timing of cholecystectomy


If worsening clinically despite replacement of cholecystostomy, then repeat 

imaging would need to be performed to make sure the perihepatic fluid 

collection hasn't become an abscess large enough to drain


Surgery ordered MRCP.  Given normal cholangiogram and obvious gallbladder 

findings, deferring ERCP at this time

## 2017-03-22 NOTE — CONS
DATE OF CONSULTATION:

 

DATE OF DICTATION:  2017

 

GASTROINTESTINAL CONSULTATION 

 

REQUESTING PHYSICIAN:  Carlos A Baumann M.D.  

 

HISTORY OF PRESENT ILLNESS:  The patient is an 85-year-old male admitted from

rehabilitation through [facility] emergency room for 
evaluation of

1 week's worth of worsening right upper quadrant pain.  He was taken to the 
emergency

room when the pain became more severe.  He was noted to have yesterday a white 
blood

count of 13.6, AST of 20, ALT of 27, alkaline phosphatase 115, total bilirubin 
0.5. 

This morning his AST was 332 with an ALT of 140, alkaline phosphatase of 269, 
total

bilirubin of 0.9.  Amylase and lipase were normal.  He had a CT scan of the 
abdomen

and pelvis that revealed a 3.3 x 2.5 x 1.4 rim enhancing fluid collection along 
the

inferior border of the right hepatic lobe ventrally adjacent to the gallbladder

fossa, which the collection appeared to be immediately inferior to the

cholecystostomy tube tract, also there was interval resolution of mild right 
sided

hydronephrosis.  There was diffuse gallbladder wall thickening and percutaneous

cholecystostomy tube was in place.  Of note, he appeared to be draining pus 
from the

cholecystostomy tube.  He underwent replacement of his cholecystostomy yesterday
,

today in the morning I did speak with Dr. Severo Watson, the interventional

radiologist.  He did describe purulent fluid and debris draining from the 
gallbladder

once the drain was exchanged, and he did tell me that the cholangiogram 
revealed a

normal appearing common bile duct.  He also had a normal appearing common bile 
duct

last month.  As you recall, he was admitted last month for evaluation of fevers 
and

lethargy, abdominal pain.  He was noted to have distended thick walled 
gallbladder

and suspected to have acute cholecystitis.  He underwent percutaneous drainage 
of the

gallbladder as surgery felt he was too sick for surgery at that point.  He had

complications of bacteremia as well and had a prolonged hospitalization.  At 
that

time, a cholangiogram was performed through the cholecystostomy tube that 
revealed a

normal caliber common bile duct and intrahepatic duct and prompt excretion of

contrast through the common bile duct; however, there was limited evaluation of 
the

bile duct at the level of the ampulla.   That was performed by Dr. Severo Watson as

well. 

 

PAST MEDICAL HISTORY:  Includes hypertension, COPD.  He uses CPAP at night 
secondary

to obstructive sleep apnea, hyperlipidemia, TIA, left renal cancer status post

resection, prostate cancer, E. coli sepsis in the summer of  after a 
prostate

biopsy epididymitis, history of adenomatous colon polyps, diverticulosis, 
history of

ischemia colitis, small bowel obstruction 2015, BPH, cholecystitis 
in

2017 with cholecystostomy tube placement, and E. coli bacteremia again 
in

2017.  

 

MEDICATION:  Prior to admission, include carvedilol, vitamin E, calcium, 
losartan,

aspirin 325 mg once daily, finasteride, ranitidine 150 mg daily, voriconazole,

atorvastatin, and albuterol.  

 

SOCIAL HISTORY:  He was born in [place], .  He is a retired

furniture salesman.  Drinks beer on occasions.  Stopped smoking in .  No 
history

of intravenous drug abuse or illicit drugs.  

 

FAMILY HISTORY:  Father had Parkinson disease,  of heart disease at age 67.
  His

mother  at age 66 of heart disease.  She had a history of diabetes as well. 

Eight siblings, 1 had breast cancer, another had uterine cancer, another had 
ovarian

cancer, and a brother had lung cancer.  No reported history of GI malignancies.
  

 

ALLERGIES:  No known drug allergies per the chart.  

 

REVIEW OF SYSTEMS:  He denied any nausea or vomiting.  He did complain of right 
upper

quadrant pain.  He complained of a pleuritic chest pain on the right side upon 
deep

inspiration.  There has been no gross melena, rectal bleeding, diarrhea.  He 
does

complain of constipation, which is chronic in nature as well.  

 

PHYSICAL EXAMINATION:

General:  The patient is found sitting up in his chair, appears to be in no 
apparent

distress.  

Vital signs:  He is afebrile, pulse 70, blood pressure 119/58.  

HEENT:  Sclerae are anicteric.  

Neck:  Supple.  

Cardiovascular:  Heart regular rate and rhythm.  No murmurs were appreciated.  

Lungs:  Revealed some fine crackles at the left base.    

Abdomen:   Nondistended.  He did have cholecystostomy tube in the right upper

quadrant, and he had normoactive bowel sounds.  No hepatosplenomegaly was

appreciated.  No masses were palpated.  No hernias were noted.  He did have

tenderness to palpation in the right upper quadrant.  There was no guarding or

rebound.  The cholecystostomy tube was draining clear brown bile with some 
purulent

material noted mixed in as well.  

Extremities:  No lower extremity edema.  

 

LABORATORY EVALUATION:  White blood count of 12.8, hemoglobin 11.1, hematocrit 
33.6,

platelets 267.  INR 1.48.  Sodium 137, potassium 4.3, chloride 102, bicarbonate 
of

25, BUN 18, creatinine 0.9, glucose of 138, AST of 332, ALT of 140, alkaline

phosphatase of 269, total bilaterally 0.9, and albumin of 2.3.  Amylase and 
lipase

were within normal range.  

 

RADIOLOGY REPORTS:  As noted in the history of present illness.

 

IMPRESSION:  This is an 85-year-old male with smoldering cholecystitis, has been

exacerbated by clogged cholecystostomy tube.  Given that there is an adjacent 
fluid

collection, there is likely seepage of pus/infected bile from the gallbladder 
itself

with the cholecystostomy tube became clogged.  Current cholangiogram revealed 
normal

appearing common bile duct, and I did discuss the fluid collection with Dr. Watson,

who felt it was too small to drain at this point.

 

PLAN:  IV antibiotics, ID is following.  Surgical followup regarding timing of

cholecystectomy.  If worsening clinically despite replacement of a 
cholecystostomy,

then repeat imaging would need to be performed to make sure the perihepatic 
fluid

collection has become an abscess large enough to drain.  Surgery has ordered an 
MRCP.

 Given normal cholangiogram and obvious gallbladder findings, deferred ERCP at 
this

time.  Other recommendations pending the above and the patient's clinical 
course. 

 

I thank you for the consultative opportunity.  

 

 

LEONARDO FERNÁNDEZ DO [id number]

DD: 2017 15:39

DT: 2017 19:24

Job #:  [id number]

MTDD

## 2017-03-22 NOTE — PN
Progress Note (short form)





- Note


Progress Note: 


surgery





pt seen and examined this morning.  85m well known to service requiring 

percutaneous cholecystostomy 1 month ago for acalculus cholecystitis.  At that 

time pt had bump in lfts and cholangiogram showed normal caliber cbd without 

good evaluation of distal cbd with prompt flow of contrast to duodenum with 

open cystic duct.  Pt was critically ill at the time with a prolonged hospital 

course.





Pt presents now with abd pain, and purulent material draining from 

cholecystostomy tube.  Ct last night shows tube in place with small collection 

lateral to gb and dilated cbd.  This am lfts went up.  Pt went for tube study 

showing occluded tube which was changed.  cholangiogram shows patent cystic duct

, dilated cbd not visualizing distal cbd well, and contrast flowing into 

duodenum.





this moring abd was soft, nt





Plan- chronic acalculus cholecytitis. malfunctioning cholecystostomy tube now 

replaced.  agree with abx for possible spillage and new collection.  would not 

consider cholecystectomy as in patient.  Elevated lfts, dilated cbd per GI.  

Should be ok to start diet in am if patient remains well.

## 2017-03-22 NOTE — CONS
DATE OF CONSULTATION:

 

DATE OF DICTATION:  03/22/2017

 

REQUESTING PHYSICIAN:  Carlos A Baumann MD 

 

HISTORY OF PRESENT ILLNESS:  This is an 85-year-old man who I know well from his last

admission who was in the hospital from February 13 to March 1, when he presented with

fever and acute right upper quadrant pain.  He had acute cholecystitis.  He had a

percutaneous gallbladder catheter placed on the 13th.  He then went into septic shock

with respiratory failure.  He was in the ICU on pressors.  He was noted to have MRSA

and aspergillus in his sputum.  He had E. coli in blood cultures and Enterobacter and

enterococcus in his biliary fluid.  He was treated for 12 days with IV antibiotics. 

He was successfully extubated.  The plan was to treat him for a total of 4 weeks with

voriconazole given the fact he had some bronchiectasis on chest x-ray and CT scan. 

He was discharged to the nursing home on the 1st with Montana catheter for urinary

retention, and a right upper quadrant gallbladder tube.  He reports that he was

walking with a walker at the nursing home.  About 1 week ago, he started having right

upper quadrant pain that continued to progress until he was transferred to the

hospital.  He denies any fevers and chills.  He notes that he gags when he tries to

eat.  This has been going on for the last 1 month.  He has also not had a bowel

movement for the last 2 days. 

 

PAST MEDICAL HISTORY:  Notable for sleep apnea, hypertension, hyperlipidemia,

prostate cancer, kidney cancer, and COPD.  He has been using CPAP at home. He is

status post TIA in 2002.  He has a history of ischemic colitis, diverticulosis, and

hypercholesterolemia. 

 

SURGICAL HISTORY:  Notable for bilateral inguinal hernia repair, appendectomy, neck

surgery, left knee MAKOplasty in 2005.  He is status post left nephrectomy for renal

cell cancer.  He is also status post the right upper quadrant gallbladder tube, which

was placed on February 13, 2017.  

 

ALLERGIES:  He has no known drug allergies.  

 

MEDICATIONS:  His current medications from the nursing home include voriconazole,

Zantac, Cozaar, finasteride, cinnamon, Coreg, atorvastatin, and Ventolin nebulizers. 

 

 

FAMILY HISTORY:  Notable for Parkinson's disease and diabetes.

 

SOCIAL HISTORY:  He is .  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  Prior to

the last admission, he was living at home with his family.  There is no history of

any recent travel.  He is a former smoker.  

 

REVIEW OF SYSTEMS:  As per HPI.  He notes that he has not had a bowel movement in 2

days.  He has gagging when he eats and persistent worsening right upper quadrant

pain.  

 

PHYSICAL EXAMINATION: 

Vital signs:  His temperature is 98.6, he has been afebrile since admission, pulse of

93, blood pressure 148/75, respiratory rate 20.  

HEENT:  He is normocephalic.  His eyes are anicteric.  

Neck:  Supple.  

Lungs:  Clear to auscultation.   

Heart:  Regular rate and rhythm.  

Abdomen:  Soft.  He has pain near the tube in his right upper quadrant.  The biliary

tube is draining purulent material.  He has a Montana catheter in place draining clear

urine.

Extremities:  Without edema.  

 

LABORATORIES:  Notable for a white count of 12.8, hemoglobin 11.1, platelets are 267.

 INR is 1.4.  BUN 18, creatinine 0.9.  On admission yesterday, his liver enzymes were

normal, and this morning his AST is now 332, , with alkaline phosphatase at

269.  From his prior admission, his beta-glucan was negative and his aspergillus

galactomannan antigen was negative.  He had imaging done in the emergency room

including a chest CTA that showed no PE and a chronic right basilar opacity,

unchanged from February 21 of this year.  CT scan of the abdomen and pelvis showed he

has gallbladder dilatation, and he has interval dilatation of the gallbladder lumen

with diffuse gallbladder wall thickening as well he has a fluid collection adjacent

to the gallbladder fossa.  

 

SUMMARY:  This is an 85-year-old man who I suspect has a right upper quadrant

abscess, chronic cholecystitis, who now has had percutaneous cholecystotomy tube in

place for about 6 weeks.  

 

I would suggest we continue Zosyn at this time while we await surgery and IR

evaluation.  I would follow up his cultures.  He has completed 4 weeks of

voriconazole with negative serologies, will DC.  Would consider resuming Flomax with

the goal of removing his Montana catheter.  Would treat his constipation.  He has MRSA

colonizations, so I would continue contact isolation.   Further recommendations to

follow.

 

 

SHELLI [id number]

DD: 03/22/2017 10:55

DT: 03/22/2017 13:07

Job #:  [id number]

## 2017-03-22 NOTE — EKG
Test Reason : 

Blood Pressure : ***/*** mmHG

Vent. Rate : 090 BPM     Atrial Rate : 090 BPM

   P-R Int : 000 ms          QRS Dur : 108 ms

    QT Int : 384 ms       P-R-T Axes : 032 -51 004 degrees

   QTc Int : 469 ms

 

SINUS RHYTHM WITH PREMATURE ATRIAL COMPLEXES

LEFT ANTERIOR FASCICULAR BLOCK

MINIMAL VOLTAGE CRITERIA FOR LVH, MAY BE NORMAL VARIANT

ABNORMAL ECG

WHEN COMPARED WITH ECG OF 21-MAR-2017 16:40,

PREMATURE ATRIAL COMPLEXES ARE NOW PRESENT

RIGHT BUNDLE BRANCH BLOCK IS NO LONGER PRESENT

MINIMAL CRITERIA FOR SEPTAL INFARCT ARE NO LONGER PRESENT

Confirmed by APOLINAR CARBALLO, YAMINI ([id number]) on 3/22/2017 12:44:44 PM

 

Referred By: EBEN OSEGUERA           Confirmed By:YAMINI JIANG MD

## 2017-03-22 NOTE — CONSULT
Consultation: 


REQUESTING PROVIDER:





CONSULT REQUEST: We have been asked to medically evaluate this patient for 

COPD. 





HISTORY OF PRESENT ILLNESS:


85 year old male with a significant past medical history of sleep apnea, HTN, 

HLD, prostate cancer, TIA,kidney cancer status-post nephrectomy, COPD with 

chronic cough, and recent hospitalization for septic shock, respiratory failure 

and DIC 2/2 acute cholecystitis requiring ICU and intubation. He was discharged 

with cholecystomy tube to [facility] for TRAVIS. He presented yesterday to ED for 

worsening RUQ pain. He describes 6/10 RUQ pain that radiated to his back. Pain 

was worse with movement and deep inspiration and no alleviating factors.He also 

endorses constipation for past three days which bothers him.  He states that 

his beathing is fine Denies CP, HA, SOB, palpitations,n/v.








REVIEW OF SYSTEMS:


CONSTITUTIONAL: 


Absent:  fever, chills, diaphoresis, generalized weakness, malaise, loss of 

appetite, weight change


HEENT: 


Absent:  rhinorrhea, nasal congestion, throat pain, throat swelling, difficulty 

swallowing, mouth swelling, ear pain, eye pain, visual changes


CARDIOVASCULAR: 


Absent: chest pain, syncope, palpitations, irregular heart rate, lightheadedness

, peripheral edema


RESPIRATORY: 


Absent: cough, shortness of breath, dyspnea with exertion, orthopnea, wheezing, 

stridor, hemoptysis


GASTROINTESTINAL:(+)constipation,


Absent: abdominal pain, abdominal distension, nausea, vomiting, diarrhea,  

melena, hematochezia


GENITOURINARY: 


Absent: dysuria, frequency, urgency, hesitancy, hematuria, flank pain, genital 

pain


MUSCULOSKELETAL: (+) back pain


Absent: myalgia, arthralgia, joint swelling,, neck pain


SKIN: 


Absent: rash, itching, pallor


HEMATOLOGIC/IMMUNOLOGIC: 


Absent: easy bleeding, easy bruising, lymphadenopathy, frequent infections


ENDOCRINE:


Absent: unexplained weight gain, unexplained weight loss, heat intolerance, 

cold intolerance


NEUROLOGIC: 


Absent: headache, focal weakness or paresthesias, dizziness, unsteady gait, 

seizure, mental status changes, bladder or bowel incontinence


PSYCHIATRIC: 


Absent: anxiety, depression, suicidal or homicidal ideation, hallucinations.





PHYSICAL EXAMINATION





 Vital Signs - 24 hr











  03/21/17 03/22/17 03/22/17





  23:05 01:53 06:00


 


Temperature 98.3 F 98.7 F 98.6 F


 


Pulse Rate  100 H 93 H


 


Pulse Rate [ 92 H  





Left Radial]   


 


Respiratory 20 20 20





Rate   


 


Blood Pressure  151/77 148/75


 


Blood Pressure 170/65  





[Left Arm]   


 


O2 Sat by Pulse 98  





Oximetry (%)   














GENERAL: AAO x3 , in  mild distress.


HEAD: NC/ AT


EYES: PERRLA, EOMI, sclera anicteric, conjunctiva clear. No lid lag.


EARS, NOSE, THROAT:  oropharynx with exudates. Dry mucous membranes.


NECK: supple NO lymphadenopathy, JVD, or masses.


LUNGS:  Slightly diminished breath sounds bilaterally. No wheezes, and no 

crackles. No accessory muscle use.


HEART:RRR, normal S1 and S2 without murmur, rub or gallop.


ABDOMEN: Soft, RUQ tenderness. cholecystostomy tube in place draining bilus/

purulent fluid. not distended, normoactive bowel sounds, 


MUSCULOSKELETAL: RUQ tenderness on palpation and movement.  No CVA tenderness.


UPPER EXTREMITIES: 2+ pulses, warm, well-perfused. No cyanosis. No clubbing.  

No peripheral edema.


LOWER EXTREMITIES: 2+ pulses, warm, well-perfused. No calf tenderness. No 

peripheral edema. 


NEUROLOGICAL: Normal speech. gait not observed


PSYCHIATRIC: Cooperative. Good eye contact. Appropriate mood and affect.


SKIN: Warm, dry, normal turgor, no rashes or lesions noted. 











 Laboratory Results - last 24 hr











  03/22/17 03/22/17 03/22/17





  06:45 06:45 06:45


 


WBC  12.8 H  


 


RBC  3.80 L  


 


Hgb  11.1 L D  


 


Hct  33.6 L  


 


MCV  88.4  


 


MCHC  33.2  


 


RDW  15.3  


 


Plt Count  267  


 


MPV  8.0  


 


Neutrophils %  74.6  


 


Lymphocytes %  14.0  D  


 


Monocytes %  7.7  


 


Eosinophils %  2.4  


 


Basophils %  1.3  


 


INR   1.48 H 


 


Sodium    137


 


Potassium    4.3


 


Chloride    102


 


Carbon Dioxide    25


 


Anion Gap    10


 


BUN    18


 


Creatinine    0.9


 


Creat Clearance w eGFR    > 60


 


POC Glucometer   


 


Random Glucose    138 H


 


Calcium    8.1 L


 


Total Bilirubin    0.9  D


 


AST    332 H D


 


ALT    140 H D


 


Alkaline Phosphatase    269 H D


 


Total Protein    5.6 L


 


Albumin    2.3 L


 


Total Amylase    52  D


 


Lipase    228














  03/22/17





  07:03


 


WBC 


 


RBC 


 


Hgb 


 


Hct 


 


MCV 


 


MCHC 


 


RDW 


 


Plt Count 


 


MPV 


 


Neutrophils % 


 


Lymphocytes % 


 


Monocytes % 


 


Eosinophils % 


 


Basophils % 


 


INR 


 


Sodium 


 


Potassium 


 


Chloride 


 


Carbon Dioxide 


 


Anion Gap 


 


BUN 


 


Creatinine 


 


Creat Clearance w eGFR 


 


POC Glucometer  148


 


Random Glucose 


 


Calcium 


 


Total Bilirubin 


 


AST 


 


ALT 


 


Alkaline Phosphatase 


 


Total Protein 


 


Albumin 


 


Total Amylase 


 


Lipase 








Active Medications











Generic Name Dose Route Start Last Admin





  Trade Name Freq  PRN Reason Stop Dose Admin


 


Acetaminophen  650 mg 03/21/17 23:48 03/22/17 01:00





  Tylenol -  PO   650 mg





  Q4H PRN   Administration





  FEVER OR PAIN   


 


Albuterol Sulfate  1 amp 03/21/17 23:45 03/22/17 07:14





  Ventolin 0.083% Nebulizer Soln -  NEB   1 amp





  Q4H PRN   Administration





  SHORT OF BREATH/WHEEZING   


 


Aspirin  325 mg 03/22/17 08:00 03/22/17 08:30





  Asa -  PO   325 mg





  DAILY@0800 [facility]   Administration


 


Atorvastatin Calcium  20 mg 03/22/17 22:00  





  Lipitor -  PO   





  [facility]   


 


Carvedilol  12.5 mg 03/22/17 10:00  





  Coreg -  PO   





  [facility]   


 


Ceftriaxone Sodium  1 gm 03/22/17 10:00  





  Rocephin 1gm Ivpb (Pre-Docked)  IVPB   





  [facility]   


 


Heparin Sodium (Porcine)  5,000 unit 03/21/17 23:45 03/22/17 00:59





  Heparin -  SQ   5,000 unit





  BID [facility]   Administration


 


Potassium Chloride/Dextrose/Sod Cl  1,000 mls @ 75 mls/hr 03/21/17 23:45 03/22/ 17 01:00





  D5-1/2ns+20 Meq Kcl -  IV   75 mls/hr





  ASDIR [facility]   Administration


 


Insulin Aspart  1 vial 03/22/17 07:00 03/22/17 07:04





  Novolog Vial Sliding Scale -  SQ   Not Given





  [facility]   





  Protocol   


 


Losartan Potassium  50 mg 03/22/17 10:00  





  Cozaar -  PO   





  [facility]   


 


Pantoprazole Sodium  40 mg 03/21/17 23:45 03/22/17 01:00





  Protonix -  PO   40 mg





  BID [facility]   Administration


 


Ranitidine HCl  150 mg 03/22/17 10:00  





  Zantac -  PO   





  [facility]   


 


Voriconazole  200 mg 03/22/17 10:00  





  Vfend (Restricted To Id)  PO   





  [facility]   











ASSESSMENT/PLAN:


85 year old male with a significant past medical history of sleep apnea, HTN, 

HLD, prostate cancer, TIA,kidney cancer status-post nephrectomy, COPD with 

chronic cough, and recent hospitalization for septic shock and DIC 2/2 acute 

cholecystitis requiring ICU and intubation presents admitted for intractable 

pain.














Dispo: We will continue to follow the patient. Thank you for this consultative 

opportunity.











Problem List





- Problems


(1) COPD (chronic obstructive pulmonary disease)


Assessment/Plan: 


* Breathing is at baseline. 


* Continue supplemental O2 via NC maintain SpO2 >90%


* Incentive spirometry.Q1H


* Continue Albuterol Q4H PRN








(2) Abdominal pain


Assessment/Plan: 


* Possible abcess vs. obstruction of Cholecystectomy tube.


* cholecystostomy draining purulent fluid. 


* Seen by ID  and Zosyn continued


* Consulted Surgery and IR for possible drainage. 








(3) HTN (hypertension)








(4) HLD (hyperlipidemia)








(5) BPH (benign prostatic hyperplasia)











Visit type





- Emergency Visit


Emergency Visit: Yes


ED Registration Date: 03/21/17


Care time: The patient presented to the Emergency Department on the above date 

and was hospitalized for further evaluation of their emergent condition.





- New Patient


This patient is new to me today: Yes


Date on this admission: 03/22/17





- Critical Care


Critical Care patient: No

## 2017-03-22 NOTE — HP
Admitting History and Physical





- Admission


History of Present Illness: 


 85 year old male brought via EMS from [facility] and presenting with 

his wife, with a significant past medical history of prostate CA, kidney CA, 

TIA (2002), COPD, HTN, HLD, colon polyps, ischemic colitis and diverticulosis 

and cholecystitis , who presents to the emergency department with right sided 

pain for the past week. He reports that he was here a few weeks ago and had his 

gallbladder drained. He currently has a T - tube on the right side, where he 

currently has the pain. He describes his pain as moderate, without radiation. 

He notes that taking a deep breath and certain movements exacerbates his pain 








- Past Medical History


CNS: Yes: TIA


Cardiovascular: Yes: CHF, HTN, Hyperlipdemia


Pulmonary: Yes: COPD, Pneumonia (fungal)


Gastrointestinal: Yes: Diverticulitis, Other (cholecystitis)


Renal/: Yes: Cancer (Prostate/KIDNEY)


Psych: Yes: Anxiety


Rheumatology: Yes: Other (arthritis knees)





- Past Surgical History


Past Surgical History: Yes: Appendectomy, Colonoscopy (last 1 yrs ago, lynn 

2 polyps. h/o ischemic colitis 3,2 and ?years ago), Hernia Repair (b/l), 

Nephrectomy (left)


Additional Past Surgical History: 


t- tube placement





- Smoking History


Smoking history: Never smoked


Have you smoked in the past 12 months: No


Aproximately how many cigarettes per day: 0


If you are a former smoker, when did you quit?: 35 years ago





- Alcohol/Substance Use


Hx Alcohol Use: No





- Social History


ADL: Independent


Occupation: retired salesman


History of Recent Travel: No





Home Medications





- Allergies


Allergies/Adverse Reactions: 


 Allergies











Allergy/AdvReac Type Severity Reaction Status Date / Time


 


No Known Drug Allergies Allergy   Verified 02/12/17 21:20














- Home Medications


Home Medications: 


Ambulatory Orders





Losartan Potassium [Cozaar] 50 mg PO DAILY 11/07/14 


Cinnamon Bark [Cinnamon] 500 mg PO DAILY 02/12/17 


Finasteride 0 mg PO DAILY 02/12/17 


Ranitidine [Zantac -] 150 mg PO DAILY 02/13/17 


Albuterol 0.083% Nebulizer Sol [Ventolin 0.083% Nebulizer Soln -] 1 amp NEB Q4H 

PRN #0 amp 03/01/17 


Atorvastatin Ca [Lipitor] 20 mg PO HS  tablet 03/01/17 


Voriconazole 200 mg PO BID #32 tablet 03/01/17 


Carvedilol [Coreg -] 12.5 mg PO BID  tablet 03/21/17 











Review of Systems





- Review of Systems


Cardiovascular: denies: Chest Pain, Shortness of Breath


Respiratory: denies: Cough, Hemoptysis, SOB on Exertion


Gastrointestinal: reports: Abdominal Pain.  denies: Diarrhea, Rectal Bleeding


Genitourinary: reports: No Symptoms


Neurological: reports: No Symptoms





Physical Examination


Vital Signs: 


 Vital Signs











Temperature  98.6 F   03/22/17 06:00


 


Pulse Rate  93 H  03/22/17 06:00


 


Respiratory Rate  20   03/22/17 06:00


 


Blood Pressure  148/75   03/22/17 06:00


 


O2 Sat by Pulse Oximetry (%)  98   03/21/17 23:05











Cardiovascular: Yes: S1, S2


Respiratory: Yes: Regular, CTA Bilaterally


Gastrointestinal: Yes: Normal Bowel Sounds, Soft, Tenderness (ruq)





Imaging





- Results


Cat Scan: Report Reviewed





Problem List





- Problems


(1) Cholecystitis, acute


Assessment/Plan: 


WITH POSSIBLE ABSCESS 


IV ABX


ID AND SURGICAL CONSULT


D/W DR LOPEZ --HE WILL EVALUATE T-TUBE


Code(s): K81.0 - ACUTE CHOLECYSTITIS





(2) Abdominal pain


Assessment/Plan: 


DUE TO ABOVE


Code(s): R10.9 - UNSPECIFIED ABDOMINAL PAIN   Qualifiers: 


     Abdominal location: right upper quadrant     Qualified Code(s): R10.11 - 

Right upper quadrant pain  





(3) Cholecystostomy care


Assessment/Plan: 


IR F/U


Code(s): Z43.4 - ENCOUNTER FOR ATTN TO OTH ARTIF OPENINGS OF DIGESTIVE TRACT





(4) Diastolic CHF


Assessment/Plan: 


STABLE AT THIS TIME


MONITOR


Code(s): I50.30 - UNSPECIFIED DIASTOLIC (CONGESTIVE) HEART FAILURE   Qualifiers

: 


     Congestive heart failure chronicity: chronic        Qualified Code(s): 

I50.32 - Chronic diastolic (congestive) heart failure  





(5) Fungal infection


Assessment/Plan: 


Orders





03/22/17 10:00


Voriconazole [Vfend (Restricted To Id)]   200 mg PO BID 








PER ID








(6) COPD (chronic obstructive pulmonary disease)


Assessment/Plan: 


NEBS


PULM


Code(s): J44.9 - CHRONIC OBSTRUCTIVE PULMONARY DISEASE, UNSPECIFIED   Qualifiers

: 


     COPD type: unspecified COPD        Qualified Code(s): J44.9 - Chronic 

obstructive pulmonary disease, unspecified

## 2017-03-22 NOTE — PN
Progress Note (short form)





- Note


Progress Note: 


ID consult dictated





imp/reccd


85 year old man s/p acute cholycysitits with septic shock/respiratory failure 

last month


came to Ed with RUQ pain and fevers 2/12, he underwent percuanteous drainage of 

the Gallbladder 2/13


he developed septic shock and respiratory failure with DIC


blood cultures grew E coli, biliary fluid grew enterobacter/enterococcus, 

sputum with mrsa and aspergilus tereus


he was discharged on 3/1 to the NH for rehab with muñiz and biliary drain


he has been walking with walker at the NH


he developed RUQ pain one week ago at the NH


gagging when he eats


no BM for 2 days





ct scan in ED


no PE, right basilar opacity unchanged from 2/21


GB dilatation with ?abscess





biliary drain with purulent drainage!





suspect RUQ abscess, cholycystitis


continue zosyn


surgery and IR to evaluate


f/u cultures





has completed 4 weeks of voriconazole, will d/c





consider resuming flomax with goal of removing muñiz catheter





constipation





MRSA colonization- continue contact isolation

## 2017-03-22 NOTE — EKG
Test Reason : 

Blood Pressure : ***/*** mmHG

Vent. Rate : 090 BPM     Atrial Rate : 090 BPM

   P-R Int : 176 ms          QRS Dur : 108 ms

    QT Int : 384 ms       P-R-T Axes : 028 -50 012 degrees

   QTc Int : 469 ms

 

NORMAL SINUS RHYTHM WITH SINUS ARRHYTHMIA

RIGHT BUNDLE BRANCH BLOCK

LEFT ANTERIOR FASCICULAR BLOCK

*** BIFASCICULAR BLOCK ***

VOLTAGE CRITERIA FOR LEFT VENTRICULAR HYPERTROPHY

CANNOT RULE OUT SEPTAL INFARCT , AGE UNDETERMINED

ABNORMAL ECG

WHEN COMPARED WITH ECG OF 13-FEB-2017 21:42,

RIGHT BUNDLE BRANCH BLOCK IS NOW PRESENT

MINIMAL CRITERIA FOR SEPTAL INFARCT ARE NOW PRESENT

Confirmed by APOLINAR CARBALLO, YAMINI ([id number]) on 3/22/2017 12:43:58 PM

 

Referred By:             Confirmed By:YAMINI JIANG MD

## 2017-03-23 LAB
ALBUMIN SERPL-MCNC: 2.5 G/DL (ref 3.4–5)
ALP SERPL-CCNC: 272 U/L (ref 45–117)
ALT SERPL-CCNC: 89 U/L (ref 12–78)
ANION GAP SERPL CALC-SCNC: 8 MMOL/L (ref 8–16)
AST SERPL-CCNC: 58 U/L (ref 15–37)
BASOPHILS # BLD: 0.4 % (ref 0–2)
BILIRUB SERPL-MCNC: 0.6 MG/DL (ref 0.2–1)
CALCIUM SERPL-MCNC: 8.6 MG/DL (ref 8.5–10.1)
CO2 SERPL-SCNC: 29 MMOL/L (ref 21–32)
CREAT SERPL-MCNC: 1.2 MG/DL (ref 0.7–1.3)
CRP SERPL-MCNC: 15.4 MG/DL (ref 0–0.3)
DEPRECATED RDW RBC AUTO: 14.9 % (ref 11.9–15.9)
EOSINOPHIL # BLD: 7.2 % (ref 0–4.5)
GLUCOSE SERPL-MCNC: 192 MG/DL (ref 74–106)
MCH RBC QN AUTO: 29.8 PG (ref 25.7–33.7)
MCHC RBC AUTO-ENTMCNC: 33.9 G/DL (ref 32–35.9)
MCV RBC: 87.7 FL (ref 80–96)
NEUTROPHILS # BLD: 71.3 % (ref 42.8–82.8)
PLATELET # BLD AUTO: 350 K/MM3 (ref 134–434)
PMV BLD: 7.5 FL (ref 7.5–11.1)
PROT SERPL-MCNC: 6.1 G/DL (ref 6.4–8.2)
WBC # BLD AUTO: 8.9 K/MM3 (ref 4–10)

## 2017-03-23 RX ADMIN — ACETAMINOPHEN PRN MG: 325 TABLET ORAL at 05:52

## 2017-03-23 RX ADMIN — POLYETHYLENE GLYCOL 3350 SCH: 17 POWDER, FOR SOLUTION ORAL at 22:58

## 2017-03-23 RX ADMIN — INSULIN ASPART SCH: 100 INJECTION, SOLUTION INTRAVENOUS; SUBCUTANEOUS at 22:49

## 2017-03-23 RX ADMIN — PIPERACILLIN SODIUM,TAZOBACTAM SODIUM SCH GM: 3; .375 INJECTION, POWDER, FOR SOLUTION INTRAVENOUS at 18:19

## 2017-03-23 RX ADMIN — ALBUTEROL SULFATE PRN AMP: 2.5 SOLUTION RESPIRATORY (INHALATION) at 00:00

## 2017-03-23 RX ADMIN — CARVEDILOL SCH MG: 12.5 TABLET, FILM COATED ORAL at 10:39

## 2017-03-23 RX ADMIN — CARVEDILOL SCH MG: 12.5 TABLET, FILM COATED ORAL at 22:45

## 2017-03-23 RX ADMIN — RANITIDINE SCH MG: 150 TABLET ORAL at 10:39

## 2017-03-23 RX ADMIN — INSULIN ASPART SCH UNITS: 100 INJECTION, SOLUTION INTRAVENOUS; SUBCUTANEOUS at 18:04

## 2017-03-23 RX ADMIN — INSULIN ASPART SCH: 100 INJECTION, SOLUTION INTRAVENOUS; SUBCUTANEOUS at 13:44

## 2017-03-23 RX ADMIN — HEPARIN SODIUM SCH UNIT: 5000 INJECTION, SOLUTION INTRAVENOUS; SUBCUTANEOUS at 22:45

## 2017-03-23 RX ADMIN — PIPERACILLIN SODIUM,TAZOBACTAM SODIUM SCH GM: 3; .375 INJECTION, POWDER, FOR SOLUTION INTRAVENOUS at 02:30

## 2017-03-23 RX ADMIN — LOSARTAN POTASSIUM SCH MG: 50 TABLET, FILM COATED ORAL at 10:39

## 2017-03-23 RX ADMIN — ASPIRIN 325 MG ORAL TABLET SCH MG: 325 PILL ORAL at 08:50

## 2017-03-23 RX ADMIN — PIPERACILLIN SODIUM,TAZOBACTAM SODIUM SCH GM: 3; .375 INJECTION, POWDER, FOR SOLUTION INTRAVENOUS at 10:39

## 2017-03-23 RX ADMIN — ALBUTEROL SULFATE PRN AMP: 2.5 SOLUTION RESPIRATORY (INHALATION) at 11:29

## 2017-03-23 RX ADMIN — HEPARIN SODIUM SCH UNIT: 5000 INJECTION, SOLUTION INTRAVENOUS; SUBCUTANEOUS at 10:39

## 2017-03-23 RX ADMIN — POTASSIUM CHLORIDE, DEXTROSE MONOHYDRATE AND SODIUM CHLORIDE SCH: 150; 5; 450 INJECTION, SOLUTION INTRAVENOUS at 01:03

## 2017-03-23 RX ADMIN — POLYETHYLENE GLYCOL 3350 SCH GM: 17 POWDER, FOR SOLUTION ORAL at 10:39

## 2017-03-23 RX ADMIN — ATORVASTATIN CALCIUM SCH MG: 20 TABLET, FILM COATED ORAL at 22:45

## 2017-03-23 RX ADMIN — INSULIN ASPART SCH: 100 INJECTION, SOLUTION INTRAVENOUS; SUBCUTANEOUS at 06:35

## 2017-03-23 NOTE — PN
Progress Note (short form)





- Note


Progress Note: 


Reports feeling better today.  


No abdominal pain. 


No CP or SOB or cough. 


Afebrile.





 Intake & Output











 03/20/17 03/21/17 03/22/17 03/23/17





 23:59 23:59 23:59 23:59


 


Intake Total   1244 50


 


Output Total   1800 740


 


Balance   -556 -690


 


Weight  152 lb 4 oz  








 Last Vital Signs











Temp Pulse Resp BP Pulse Ox


 


 98.6 F   83   20   162/72   92 L


 


 03/23/17 05:57  03/23/17 05:57  03/23/17 05:57  03/23/17 05:57  03/22/17 21:00








Active Medications





Acetaminophen (Tylenol -)  650 mg PO Q4H PRN


   PRN Reason: FEVER OR PAIN


   Last Admin: 03/23/17 05:52 Dose:  650 mg


Albuterol Sulfate (Ventolin 0.083% Nebulizer Soln -)  1 amp NEB Q4H PRN


   PRN Reason: SHORT OF BREATH/WHEEZING


   Last Admin: 03/23/17 00:00 Dose:  1 amp


Aspirin (Asa -)  325 mg PO DAILY@0800 [facility]


   Last Admin: 03/23/17 08:50 Dose:  325 mg


Atorvastatin Calcium (Lipitor -)  20 mg PO HS [facility]


   Last Admin: 03/22/17 21:36 Dose:  20 mg


Carvedilol (Coreg -)  12.5 mg PO BID [facility]


   Last Admin: 03/22/17 21:36 Dose:  12.5 mg


Heparin Sodium (Porcine) (Heparin -)  5,000 unit SQ BID [facility]


   Last Admin: 03/22/17 21:36 Dose:  5,000 unit


Potassium Chloride/Dextrose/Sod Cl (D5-1/2ns+20 Meq Kcl -)  1,000 mls @ 75 mls/

hr IV ASDIR [facility]


   Last Admin: 03/23/17 01:03 Dose:  Not Given


Insulin Aspart (Novolog Vial Sliding Scale -)  1 vial SQ ACHS [facility]


   PRN Reason: Protocol


   Last Admin: 03/23/17 06:35 Dose:  Not Given


Losartan Potassium (Cozaar -)  50 mg PO DAILY [facility]


   Last Admin: 03/22/17 12:51 Dose:  50 mg


Piperacillin Sod/Tazobactam Sod (Zosyn 3.375gm Ivpb (Pre-Docked))  3.375 gm 

IVPB Q8H-IV [facility]


   PRN Reason: Protocol


   Last Admin: 03/23/17 02:30 Dose:  3.375 gm


Polyethylene Glycol (Miralax (For Daily Use) -)  17 gm PO BID [facility]


   Last Admin: 03/22/17 21:47 Dose:  Not Given


Ranitidine HCl (Zantac -)  150 mg PO DAILY [facility]


   Last Admin: 03/22/17 12:51 Dose:  150 mg








GENERAL: AAO x3 , NAD 


HEAD: NC/ AT


EYES: (-) icterus, conjunctiva clear. 


EARS, NOSE, THROAT:  oropharynx with exudates. Dry mucous membranes.


NECK: supple NO lymphadenopathy, JVD, or masses.


LUNGS:  Clear 


HEART:RRR, normal S1 and S2 without murmur, rub or gallop.


ABDOMEN: Soft, RUQ tenderness. cholecystostomy tube in place draining bilius 

fluid. not distended, normoactive bowel sounds, 


MUSCULOSKELETAL: RUQ tenderness on palpation and movement.  No CVA tenderness.


UPPER EXTREMITIES: 2+ pulses, warm, well-perfused. No cyanosis. No clubbing.  

No peripheral edema.


LOWER EXTREMITIES: 2+ pulses, warm, well-perfused. No calf tenderness. No 

peripheral edema. 


NEUROLOGICAL: Non-focal 


PSYCHIATRIC: Cooperative. Appropriate mood and affect.


SKIN: Warm, dry, normal turgor, no rashes or lesions noted. 











 


 Laboratory Results - last 24 hr











  03/22/17 03/22/17 03/22/17





  12:49 16:43 21:18


 


POC Glucometer  180  177  151














  03/23/17





  05:40


 


POC Glucometer  150











ASSESSMENT/PLAN:


Cholecystitis 


S/P recent prolonged illness (Acute respiratory failure / septic shock / DIC) 


Sleep apnea


HTN


HLD


Prostate cancer


TIA


Kidney cancer status-post nephrectomy


COPD with chronic bronchitis 


BPH








PLAN:


ABX 


Monitor drainage 


BD TX PRN 


Workup per GI/surgery 


Hold on PAP therapy for now 


O2 as needed


VTE prophylaxis 





Dr [place]

## 2017-03-23 NOTE — PN
Progress Note (short form)





- Note


Progress Note: 


called Dr alvarez at [phone] and his office says he is aware of phone call he 

will call [phone]





Problem List





- Problems


(1) Abdominal pain


Code(s): R10.9 - UNSPECIFIED ABDOMINAL PAIN   Qualifiers: 


     Abdominal location: right upper quadrant     Qualified Code(s): R10.11 - 

Right upper quadrant pain  





(2) Cholecystitis, acute


Code(s): K81.0 - ACUTE CHOLECYSTITIS





(3) BPH (benign prostatic hyperplasia)


Code(s): N40.0 - BENIGN PROSTATIC HYPERPLASIA WITHOUT LOWER URINRY TRACT SYMP   





(4) HLD (hyperlipidemia)


Code(s): E78.5 - HYPERLIPIDEMIA, UNSPECIFIED   Qualifiers: 


     Hyperlipidemia type: pure hypercholesterolemia        Qualified Code(s): 

E78.00 - Pure hypercholesterolemia, unspecified; E78.0 - Pure 

hypercholesterolemia  





(5) HTN (hypertension)


Code(s): I10 - ESSENTIAL (PRIMARY) HYPERTENSION   Qualifiers: 


     Hypertension type: essential hypertension        Qualified Code(s): I10 - 

Essential (primary) hypertension

## 2017-03-23 NOTE — PN
Progress Note, Physician


Chief Complaint: 


patient is better today


less pain will have eat


soft diet


no more abdominal pain


no more pleurtic pain





- Current Medication List


Current Medications: 


Active Medications





Acetaminophen (Tylenol -)  650 mg PO Q4H PRN


   PRN Reason: FEVER OR PAIN


   Last Admin: 03/23/17 05:52 Dose:  650 mg


Albuterol Sulfate (Ventolin 0.083% Nebulizer Soln -)  1 amp NEB Q4H PRN


   PRN Reason: SHORT OF BREATH/WHEEZING


   Last Admin: 03/23/17 11:29 Dose:  1 amp


Aspirin (Asa -)  325 mg PO DAILY@0800 Counts include 234 beds at the [facility]


   Last Admin: 03/23/17 08:50 Dose:  325 mg


Atorvastatin Calcium (Lipitor -)  20 mg PO HS Counts include 234 beds at the [facility]


   Last Admin: 03/22/17 21:36 Dose:  20 mg


Carvedilol (Coreg -)  12.5 mg PO BID Counts include 234 beds at the [facility]


   Last Admin: 03/23/17 10:39 Dose:  12.5 mg


Finasteride (Proscar -)  5 mg PO DAILY Counts include 234 beds at the [facility]


Heparin Sodium (Porcine) (Heparin -)  5,000 unit SQ BID Counts include 234 beds at the [facility]


   Last Admin: 03/23/17 10:39 Dose:  5,000 unit


Potassium Chloride/Dextrose/Sod Cl (D5-1/2ns+20 Meq Kcl -)  1,000 mls @ 75 mls/

hr IV ASDIR Counts include 234 beds at the [facility]


   Last Admin: 03/23/17 01:03 Dose:  Not Given


Insulin Aspart (Novolog Vial Sliding Scale -)  1 vial SQ ACHS Counts include 234 beds at the [facility]


   PRN Reason: Protocol


   Last Admin: 03/23/17 06:35 Dose:  Not Given


Losartan Potassium (Cozaar -)  50 mg PO DAILY Counts include 234 beds at the [facility]


   Last Admin: 03/23/17 10:39 Dose:  50 mg


Piperacillin Sod/Tazobactam Sod (Zosyn 3.375gm Ivpb (Pre-Docked))  3.375 gm 

IVPB Q8H-IV Counts include 234 beds at the [facility]


   PRN Reason: Protocol


   Last Admin: 03/23/17 10:39 Dose:  3.375 gm


Polyethylene Glycol (Miralax (For Daily Use) -)  17 gm PO BID Counts include 234 beds at the [facility]


   Last Admin: 03/23/17 10:39 Dose:  17 gm


Ranitidine HCl (Zantac -)  150 mg PO DAILY Counts include 234 beds at the [facility]


   Last Admin: 03/23/17 10:39 Dose:  150 mg











- Objective


Vital Signs: 


 Vital Signs











Temperature  98.6 F   03/23/17 05:57


 


Pulse Rate  83   03/23/17 05:57


 


Respiratory Rate  20   03/23/17 05:57


 


Blood Pressure  162/72   03/23/17 05:57


 


O2 Sat by Pulse Oximetry (%)  92 L  03/22/17 21:00











Constitutional: Yes: Calm


Cardiovascular: Yes: Regular Rate and Rhythm, S1, S2


Respiratory: Yes: CTA Bilaterally


Gastrointestinal: Yes: Soft, Other (bilary tube no tenderness)


Genitourinary: Yes: Muñiz Present


Edema: No


Neurological: Yes: Alert, Oriented


Labs: 


 CBC, BMP





 03/23/17 12:40 





 INR, PTT











INR  1.48  (0.82-1.09)  H  03/22/17  06:45    














Problem List





- Problems


(1) Abdominal pain


Assessment/Plan: 


mrcp done awaiting report


Code(s): R10.9 - UNSPECIFIED ABDOMINAL PAIN   Qualifiers: 


     Abdominal location: right upper quadrant     Qualified Code(s): R10.11 - 

Right upper quadrant pain  





(2) Cholecystitis, acute


Assessment/Plan: 


iv abx


clogged bilary tube now replaced


Code(s): K81.0 - ACUTE CHOLECYSTITIS





(3) BPH (benign prostatic hyperplasia)


Assessment/Plan: 


urolgy eval


finasteride


has muñiz in place


Code(s): N40.0 - BENIGN PROSTATIC HYPERPLASIA WITHOUT LOWER URINRY TRACT SYMP   





(4) HLD (hyperlipidemia)


Assessment/Plan: 


same meds


Code(s): E78.5 - HYPERLIPIDEMIA, UNSPECIFIED   Qualifiers: 


     Hyperlipidemia type: pure hypercholesterolemia        Qualified Code(s): 

E78.00 - Pure hypercholesterolemia, unspecified; E78.0 - Pure 

hypercholesterolemia  





(5) HTN (hypertension)


Assessment/Plan: 


same meds


Code(s): I10 - ESSENTIAL (PRIMARY) HYPERTENSION   Qualifiers: 


     Hypertension type: essential hypertension        Qualified Code(s): I10 - 

Essential (primary) hypertension

## 2017-03-23 NOTE — PN
Progress Note, Physician


Chief Complaint: 


ID








Asymptomatic





Zosyn





Afebrile no chills





- Current Medication List


Current Medications: 


Active Medications





Acetaminophen (Tylenol -)  650 mg PO Q4H PRN


   PRN Reason: FEVER OR PAIN


   Last Admin: 03/23/17 05:52 Dose:  650 mg


Albuterol Sulfate (Ventolin 0.083% Nebulizer Soln -)  1 amp NEB Q4H PRN


   PRN Reason: SHORT OF BREATH/WHEEZING


   Last Admin: 03/23/17 11:29 Dose:  1 amp


Aspirin (Asa -)  325 mg PO DAILY@0800 [facility]


   Last Admin: 03/23/17 08:50 Dose:  325 mg


Atorvastatin Calcium (Lipitor -)  20 mg PO HS [facility]


   Last Admin: 03/22/17 21:36 Dose:  20 mg


Carvedilol (Coreg -)  12.5 mg PO BID [facility]


   Last Admin: 03/23/17 10:39 Dose:  12.5 mg


Finasteride (Proscar -)  5 mg PO DAILY [facility]


Heparin Sodium (Porcine) (Heparin -)  5,000 unit SQ BID [facility]


   Last Admin: 03/23/17 10:39 Dose:  5,000 unit


Insulin Aspart (Novolog Vial Sliding Scale -)  1 vial SQ ACHS [facility]


   PRN Reason: Protocol


   Last Admin: 03/23/17 06:35 Dose:  Not Given


Losartan Potassium (Cozaar -)  50 mg PO DAILY [facility]


   Last Admin: 03/23/17 10:39 Dose:  50 mg


Piperacillin Sod/Tazobactam Sod (Zosyn 3.375gm Ivpb (Pre-Docked))  3.375 gm 

IVPB Q8H-IV [facility]


   PRN Reason: Protocol


   Last Admin: 03/23/17 10:39 Dose:  3.375 gm


Polyethylene Glycol (Miralax (For Daily Use) -)  17 gm PO BID [facility]


   Last Admin: 03/23/17 10:39 Dose:  17 gm


Ranitidine HCl (Zantac -)  150 mg PO DAILY [facility]


   Last Admin: 03/23/17 10:39 Dose:  150 mg











- Objective


Vital Signs: 


 Vital Signs











Temperature  98.6 F   03/23/17 05:57


 


Pulse Rate  83   03/23/17 05:57


 


Respiratory Rate  20   03/23/17 05:57


 


Blood Pressure  162/72   03/23/17 05:57


 


O2 Sat by Pulse Oximetry (%)  92 L  03/22/17 21:00











Constitutional: Yes: Well Nourished, No Distress


HENT: Yes: WNL, Atraumatic


Neck: Yes: WNL, Supple


Respiratory: Yes: WNL, Regular, CTA Bilaterally


Gastrointestinal: Yes: WNL, Normal Bowel Sounds, Soft.  No: Tenderness


Genitourinary: Yes: Other (muñiz chronic)


Edema: No


Labs: 


 CBC, BMP





 03/23/17 12:40 





 INR, PTT











INR  1.48  (0.82-1.09)  H  03/22/17  06:45    














Assessment/Plan


 Laboratory Tests











  03/22/17 03/23/17





  06:45 12:40


 


WBC   8.9  D


 


Hgb   12.0


 


Hct   35.4


 


Plt Count   350  D


 


Creatinine  0.9 


 


Alkaline Phosphatase  269 H D 








 Laboratory Tests











  03/22/17





  06:45


 


AST  332 H D


 


ALT  140 H D


 


Alkaline Phosphatase  269 H D








Assessment  No evidence for cholangitis/ liver abscess Afebrile


                        Muñiz colonized with GNB  ? of liver collection





Advise


Remove muñiz


Await biliary culture


Continue Zosyn pending culture and consider something orally if we are 

concerned about


evolving liver abscess





Anum CARBALLO

## 2017-03-23 NOTE — PN
GI Progress Note


Subjective: 


No acute events


States feeling better today (improved RUQ pain)








- Objective


Vital Signs: 


 Vital Signs











Temperature  98.6 F   03/23/17 05:57


 


Pulse Rate  83   03/23/17 05:57


 


Respiratory Rate  20   03/23/17 05:57


 


Blood Pressure  162/72   03/23/17 05:57


 


O2 Sat by Pulse Oximetry (%)  92 L  03/22/17 21:00











Constitutional: Calm


Eyes: No: Sclera Icterus


Cardiovascular: Yes: Regular Rate and Rhythm


Respiratory: Yes: Rales (left base)


Gastrointestinal Inspection: Yes: Other (Cholecystostomy tube in place in RUQ, 

draining dark brown bile. about 175cc in cholecystostomy tube bag).  No: 

Distention


...Auscultate: Yes: Normoactive Bowel Sounds


...Palpate: No: Tenderness


Edema: No


Neurological: Yes: Alert, Oriented


Labs: 





No Repeat labs as of yet





 











Problem List





- Problems


(1) Cholecystitis, acute


Assessment/Plan: 


With clogged cholecystostomy tube complicated by perihepatic fluid collection


IV Abx per ID


Surgery following


I ordered a CBC and CMP for today


Unclear regarding surgical note describing dilated CBD.  As I documented in my 

note yesterday, I reviewed the cholangiogram performed during the 

cholecystostomy tube exchange with Dr. Watson and he told me it looked normal.

  


MRCP has been perfomred, will await official read


Diet per surgery.








Code(s): K81.0 - ACUTE CHOLECYSTITIS

## 2017-03-24 RX ADMIN — INSULIN ASPART SCH: 100 INJECTION, SOLUTION INTRAVENOUS; SUBCUTANEOUS at 06:39

## 2017-03-24 RX ADMIN — HEPARIN SODIUM SCH UNIT: 5000 INJECTION, SOLUTION INTRAVENOUS; SUBCUTANEOUS at 21:41

## 2017-03-24 RX ADMIN — CARVEDILOL SCH MG: 12.5 TABLET, FILM COATED ORAL at 21:40

## 2017-03-24 RX ADMIN — LOSARTAN POTASSIUM SCH MG: 50 TABLET, FILM COATED ORAL at 09:05

## 2017-03-24 RX ADMIN — INSULIN ASPART SCH: 100 INJECTION, SOLUTION INTRAVENOUS; SUBCUTANEOUS at 21:47

## 2017-03-24 RX ADMIN — RANITIDINE SCH MG: 150 TABLET ORAL at 09:04

## 2017-03-24 RX ADMIN — ACETAMINOPHEN PRN MG: 325 TABLET ORAL at 11:12

## 2017-03-24 RX ADMIN — POLYETHYLENE GLYCOL 3350 SCH GM: 17 POWDER, FOR SOLUTION ORAL at 09:05

## 2017-03-24 RX ADMIN — INSULIN ASPART SCH: 100 INJECTION, SOLUTION INTRAVENOUS; SUBCUTANEOUS at 17:53

## 2017-03-24 RX ADMIN — ATORVASTATIN CALCIUM SCH MG: 20 TABLET, FILM COATED ORAL at 21:41

## 2017-03-24 RX ADMIN — PIPERACILLIN SODIUM,TAZOBACTAM SODIUM SCH GM: 3; .375 INJECTION, POWDER, FOR SOLUTION INTRAVENOUS at 09:04

## 2017-03-24 RX ADMIN — PIPERACILLIN SODIUM,TAZOBACTAM SODIUM SCH GM: 3; .375 INJECTION, POWDER, FOR SOLUTION INTRAVENOUS at 01:15

## 2017-03-24 RX ADMIN — CARVEDILOL SCH MG: 12.5 TABLET, FILM COATED ORAL at 09:04

## 2017-03-24 RX ADMIN — PIPERACILLIN SODIUM,TAZOBACTAM SODIUM SCH GM: 3; .375 INJECTION, POWDER, FOR SOLUTION INTRAVENOUS at 18:04

## 2017-03-24 RX ADMIN — Medication PRN MG: at 22:24

## 2017-03-24 RX ADMIN — HEPARIN SODIUM SCH UNIT: 5000 INJECTION, SOLUTION INTRAVENOUS; SUBCUTANEOUS at 09:05

## 2017-03-24 RX ADMIN — POLYETHYLENE GLYCOL 3350 SCH GM: 17 POWDER, FOR SOLUTION ORAL at 21:46

## 2017-03-24 RX ADMIN — FINASTERIDE SCH MG: 5 TABLET, FILM COATED ORAL at 09:04

## 2017-03-24 RX ADMIN — ASPIRIN 325 MG ORAL TABLET SCH MG: 325 PILL ORAL at 09:04

## 2017-03-24 NOTE — CONSULT
Admitting History and Physical





- Primary Care Physician


PCP: Luciana Celis





- Admission


History of Present Illness: 


Per EMR:


"ASSESSMENT/PLAN:


Cholecystitis 


S/P recent prolonged illness (Acute respiratory failure / septic shock / DIC) 


Sleep apnea


HTN


HLD


Prostate cancer


TIA


Kidney cancer status-post nephrectomy


COPD with chronic bronchitis 


BPH"








Referred for swallowing evaluation, due c/o coughing mealtime. Pt is on soft,

regular diet/thin liquid.


History Source: Patient, Medical Record


Limitations to Obtaining History: No Limitations





- Past Medical History


CNS: Yes: TIA


Cardiovascular: Yes: CHF, HTN, Hyperlipdemia


Pulmonary: Yes: COPD, Pneumonia (fungal)


Gastrointestinal: Yes: Diverticulitis, Other (cholecystitis)


Renal/: Yes: Cancer (Prostate/KIDNEY)


Psych: Yes: Anxiety


Rheumatology: Yes: Other (arthritis knees)





- Past Surgical History


Past Surgical History: Yes: Appendectomy, Colonoscopy (last 1 yrs ago, hayleyy 

2 polyps. h/o ischemic colitis 3,2 and ?years ago), Hernia Repair (b/l), 

Nephrectomy (left)


Additional Past Surgical History: 


t- tube placement





- Smoking History


Smoking history: Never smoked


Have you smoked in the past 12 months: No


Aproximately how many cigarettes per day: 0


If you are a former smoker, when did you quit?: 35 years ago





- Alcohol/Substance Use


Hx Alcohol Use: No





- Social History


ADL: Independent


Occupation: retired salesman


History of Recent Travel: No





History





- Admission


Reason For Visit: URINARY TRACT INFECTION





- Diagnostics


X-ray: Report Reviewed


CT Scan: Report Reviewed (multiple brain infarcts on previous admission.)


Modified Barium Swallow: Report Reviewed (2/28/17 Aspiration on thin liquid sec 

to weak laryngeal swallow, with residue spilling out of pyriform sinuses into 

airway. Ground diet/nectatr thick liquid/swallowing rehab recommended at that 

time.)





- General


Mental Status: Alert and Oriented, Awake and Alert, Able to Follow Commands


Attention: Intact


Ability to Follow Directions: Good


Head/Neck Control: WFL





- Hearing


Hearing: Functional


Hearing: Impaired


Hearing Aide: Yes


With Patient: No





Speech Evaluation





- Communication


Primary Language: ENGLISH


Communication: Yes: Within Normal Limits


Oral Expression Ability: Yes: No Impairment





- Speech Production


Able to Make Needs Known: Yes: WNL


Intelligibility: Yes: WNL





- Speech Characteristics


Voice Loudness: Normal


Voice Pitch: Yes: Normal


Voice Phonatory-based Quality: Yes: Normal


Speech Pattern: Normal


Speech Clarity: < 100%


Nasal Resonance: Normal


Articulation: Yes: Imprecise (slight)





- Language/Auditory Comprehension


Follows: Yes: 1 Stage Simple Commands


Observation: Able to respond to yes/no queries: Yes





- Language/Verbal Expression


Able to Respond to Simple Queries: Yes: WNL


Able to Communicate Wants and Needs: Yes: WNL


Functional Communication Status: Yes: WNL





- Memory/Perception


Long term Memory: Yes: WNL


Short Term Memory: Yes: WNL





- Swallow Evaluation/Bedside Assessment


Current Nutritional Intake: Soft, Thin Liquids


Oral Secretions: Yes: WFL


Dentition: Yes: Adequate, Missing Teeth


Facial Symmetry at Rest: Symmetrical


Facial Symmetry on Retraction: Symmetrical


Facial Movement: Controlled


Sensation: Normal


Against Resistance Opening: Normal


Against Resistance Closing: Normal


Pucker Lips: Normal


Smile: Normal


Lingual Movement: Normal


Lingual Speed of Movement: Reduced (slight)


Lingual Movement Strgth Against Opposition: Reduced (slight)


Lingual Movement Characteristics: Normal


Laryngeal Movement: Able to Palpate, Labored,delay initiation (slight)


Rate of Intake: WFL


Bolus Size: WFL


Labial Seal: WFL


Chewing: WFL


Oral Prep Time: WFL


A-P Transit: WFL


Pocketing: None


Timing of Swallow: Delayed (slight)


Coughing/Throat Clear: No (reported not observed)


Change in Voice: No





Recommendations





- Speech Evaluation, Impression/Plan


Impression: r/o aspiration with c/o coughing while eating. Pt is on upgraded 

diet, as compaired to rec of [facility] 2/17 and at [facility].





- Disposition


Discharge to: Skilled Nursing Facility





- Dysphagia Impressions/Plan


Dysphagia Impressions: Ongoing Evaluation


*Silent aspiration: cannot be R/O at bedside


Recommendations: Modified Barium Swallow (ordered)

## 2017-03-24 NOTE — PN
Progress Note, Physician


Chief Complaint: 


patient today complaining of right sided pleuritic pain


also complaining of coughing when he eats or drinks


cannot sleep at night








- Current Medication List


Current Medications: 


Active Medications





Acetaminophen (Tylenol -)  650 mg PO Q4H PRN


   PRN Reason: FEVER OR PAIN


   Last Admin: 03/24/17 11:12 Dose:  650 mg


Albuterol Sulfate (Ventolin 0.083% Nebulizer Soln -)  1 amp NEB Q4H PRN


   PRN Reason: SHORT OF BREATH/WHEEZING


   Last Admin: 03/23/17 11:29 Dose:  1 amp


Aspirin (Asa -)  325 mg PO DAILY@0800 [facility]


   Last Admin: 03/24/17 09:04 Dose:  325 mg


Atorvastatin Calcium (Lipitor -)  20 mg PO HS [facility]


   Last Admin: 03/23/17 22:45 Dose:  20 mg


Carvedilol (Coreg -)  12.5 mg PO BID [facility]


   Last Admin: 03/24/17 09:04 Dose:  12.5 mg


Finasteride (Proscar -)  5 mg PO DAILY [facility]


   Last Admin: 03/24/17 09:04 Dose:  5 mg


Guaifenesin (Robitussin -)  10 ml PO Q6H PRN


   PRN Reason: COUGH


Heparin Sodium (Porcine) (Heparin -)  5,000 unit SQ BID [facility]


   Last Admin: 03/24/17 09:05 Dose:  5,000 unit


Insulin Aspart (Novolog Vial Sliding Scale -)  1 vial SQ ACHS [facility]


   PRN Reason: Protocol


   Last Admin: 03/24/17 06:39 Dose:  Not Given


Losartan Potassium (Cozaar -)  50 mg PO DAILY [facility]


   Last Admin: 03/24/17 09:05 Dose:  50 mg


Melatonin (Melatonin)  5 mg PO HS PRN


   PRN Reason: INSOMNIA


Piperacillin Sod/Tazobactam Sod (Zosyn 3.375gm Ivpb (Pre-Docked))  3.375 gm 

IVPB Q8H-IV [facility]


   PRN Reason: Protocol


   Last Admin: 03/24/17 09:04 Dose:  3.375 gm


Polyethylene Glycol (Miralax (For Daily Use) -)  17 gm PO BID [facility]


   Last Admin: 03/24/17 09:05 Dose:  17 gm


Ranitidine HCl (Zantac -)  150 mg PO DAILY [facility]


   Last Admin: 03/24/17 09:04 Dose:  150 mg











- Objective


Vital Signs: 


 Vital Signs











Temperature  98.3 F   03/24/17 06:00


 


Pulse Rate  77   03/24/17 06:00


 


Respiratory Rate  20   03/24/17 06:00


 


Blood Pressure  180/80   03/24/17 06:00


 


O2 Sat by Pulse Oximetry (%)  99   03/24/17 06:30











Constitutional: Yes: Calm


Neck: Yes: Trachea Midline


Cardiovascular: Yes: Regular Rate and Rhythm, S1, S2


Respiratory: Yes: CTA Bilaterally, Other (bilary drain no tenderness around 

that area  but point tenderness on lateral chest wall)


Gastrointestinal: Yes: Normal Bowel Sounds, Soft


Edema: No


Neurological: Yes: Alert, Oriented


Labs: 


 CBC, BMP





 03/23/17 12:40 





 03/23/17 12:40 





 INR, PTT











INR  1.48  (0.82-1.09)  H  03/22/17  06:45    














Problem List





- Problems


(1) Abdominal pain


Assessment/Plan: 


mrcp done gallbladder wall thickening with edema


adrenal nodule nad renal pole lesion noted will evaluate with MRI


on iv abx


s/p bilary drain change now functioning


Code(s): R10.9 - UNSPECIFIED ABDOMINAL PAIN   Qualifiers: 


     Abdominal location: right upper quadrant     Qualified Code(s): R10.11 - 

Right upper quadrant pain  





(2) Cholecystitis, acute


Assessment/Plan: 


iv abx


clogged bilary tube now replaced


awiting cultures


Code(s): K81.0 - ACUTE CHOLECYSTITIS





(3) BPH (benign prostatic hyperplasia)


Assessment/Plan: 


urolgy eval 


finasteride


has muñiz in place


Code(s): N40.0 - BENIGN PROSTATIC HYPERPLASIA WITHOUT LOWER URINRY TRACT SYMP   





(4) HLD (hyperlipidemia)


Assessment/Plan: 


same meds


Code(s): E78.5 - HYPERLIPIDEMIA, UNSPECIFIED   Qualifiers: 


     Hyperlipidemia type: pure hypercholesterolemia        Qualified Code(s): 

E78.00 - Pure hypercholesterolemia, unspecified; E78.0 - Pure 

hypercholesterolemia  





(5) HTN (hypertension)


Assessment/Plan: 


same meds


Code(s): I10 - ESSENTIAL (PRIMARY) HYPERTENSION   Qualifiers: 


     Hypertension type: essential hypertension        Qualified Code(s): I10 - 

Essential (primary) hypertension  





(6) Coughing


Assessment/Plan: 


r/o aspiration


cxr


[facility]


og danielle


Code(s): R05 - COUGH

## 2017-03-24 NOTE — PN
Progress Note, Physician


History of Present Illness: 


PULMONARY





ALERT,C/O COUGH WHEN EATING OR DRINKING





- Current Medication List


Current Medications: 


Active Medications





Acetaminophen (Tylenol -)  650 mg PO Q4H PRN


   PRN Reason: FEVER OR PAIN


   Last Admin: 03/24/17 11:12 Dose:  650 mg


Albuterol Sulfate (Ventolin 0.083% Nebulizer Soln -)  1 amp NEB Q4H PRN


   PRN Reason: SHORT OF BREATH/WHEEZING


   Last Admin: 03/23/17 11:29 Dose:  1 amp


Aspirin (Asa -)  325 mg PO DAILY@0800 [facility]


   Last Admin: 03/24/17 09:04 Dose:  325 mg


Atorvastatin Calcium (Lipitor -)  20 mg PO HS [facility]


   Last Admin: 03/23/17 22:45 Dose:  20 mg


Carvedilol (Coreg -)  12.5 mg PO BID [facility]


   Last Admin: 03/24/17 09:04 Dose:  12.5 mg


Finasteride (Proscar -)  5 mg PO DAILY [facility]


   Last Admin: 03/24/17 09:04 Dose:  5 mg


Guaifenesin (Robitussin -)  10 ml PO Q6H PRN


   PRN Reason: COUGH


Heparin Sodium (Porcine) (Heparin -)  5,000 unit SQ BID [facility]


   Last Admin: 03/24/17 09:05 Dose:  5,000 unit


Insulin Aspart (Novolog Vial Sliding Scale -)  1 vial SQ ACHS [facility]


   PRN Reason: Protocol


   Last Admin: 03/24/17 06:39 Dose:  Not Given


Losartan Potassium (Cozaar -)  50 mg PO DAILY [facility]


   Last Admin: 03/24/17 09:05 Dose:  50 mg


Melatonin (Melatonin)  5 mg PO HS PRN


   PRN Reason: INSOMNIA


Piperacillin Sod/Tazobactam Sod (Zosyn 3.375gm Ivpb (Pre-Docked))  3.375 gm 

IVPB Q8H-IV [facility]


   PRN Reason: Protocol


   Last Admin: 03/24/17 09:04 Dose:  3.375 gm


Polyethylene Glycol (Miralax (For Daily Use) -)  17 gm PO BID [facility]


   Last Admin: 03/24/17 09:05 Dose:  17 gm


Ranitidine HCl (Zantac -)  150 mg PO DAILY [facility]


   Last Admin: 03/24/17 09:04 Dose:  150 mg











- Objective


Vital Signs: 


 Vital Signs











Temperature  98.3 F   03/24/17 06:00


 


Pulse Rate  77   03/24/17 06:00


 


Respiratory Rate  20   03/24/17 06:00


 


Blood Pressure  180/80   03/24/17 06:00


 


O2 Sat by Pulse Oximetry (%)  99   03/24/17 06:30











Constitutional: Yes: Well Nourished, Calm


Eyes: Yes: WNL


HENT: Yes: WNL


Neck: Yes: WNL


Cardiovascular: Yes: Regular Rate and Rhythm, S1, S2


Respiratory: Yes: Diminished


Gastrointestinal: Yes: Normal Bowel Sounds, Soft


Extremities: Yes: WNL


Edema: No


Labs: 


 CBC, BMP





 03/23/17 12:40 





 03/23/17 12:40 





 INR, PTT











INR  1.48  (0.82-1.09)  H  03/22/17  06:45    














Problem List





- Problems


(1) Abdominal pain


Code(s): R10.9 - UNSPECIFIED ABDOMINAL PAIN   Qualifiers: 


     Abdominal location: right upper quadrant     Qualified Code(s): R10.11 - 

Right upper quadrant pain  





(2) Coughing


Code(s): R05 - COUGH





(3) Cholecystitis, acute


Code(s): K81.0 - ACUTE CHOLECYSTITIS





(4) Cholecystostomy care


Code(s): Z43.4 - ENCOUNTER FOR ATTN TO OTH ARTIF OPENINGS OF DIGESTIVE TRACT





(5) Diastolic CHF


Code(s): I50.30 - UNSPECIFIED DIASTOLIC (CONGESTIVE) HEART FAILURE   Qualifiers

: 


     Congestive heart failure chronicity: chronic        Qualified Code(s): 

I50.32 - Chronic diastolic (congestive) heart failure  





(6) HLD (hyperlipidemia)


Code(s): E78.5 - HYPERLIPIDEMIA, UNSPECIFIED   Qualifiers: 


     Hyperlipidemia type: pure hypercholesterolemia        Qualified Code(s): 

E78.00 - Pure hypercholesterolemia, unspecified; E78.0 - Pure 

hypercholesterolemia  





(7) HTN (hypertension)


Code(s): I10 - ESSENTIAL (PRIMARY) HYPERTENSION   Qualifiers: 


     Hypertension type: essential hypertension        Qualified Code(s): I10 - 

Essential (primary) hypertension  








Assessment/Plan


ASSESSMENT/PLAN:


Cholecystitis 


S/P recent prolonged illness (Acute respiratory failure / septic shock / DIC) 


Sleep apnea


HTN


HLD


Prostate cancer


TIA


Kidney cancer status-post nephrectomy


COPD with chronic bronchitis 


BPH


Cough








PLAN:


ABX 


Monitor drainage 


BD TX PRN 


Workup per GI/surgery 


swallowing evaluation


O2 as needed


VTE prophylaxis 








DR INFANTE

## 2017-03-24 NOTE — CONSULT
Consult


Consult Specialty:: urology


Referred by:: medicine


Reason for Consultation:: urinary retention





- History of Present Illness


Chief Complaint: urinary retention


History of Present Illness: 


85 year old male who is on active surveilance for prostate cancer. He is noted 

to be in urinary retention here, though he is also fecally impacted.  A muñiz 

cath is in place. Apparently he self catheterizes at home occasionally, however 

he hasn't needed to more recently. 





- History Source


History Provided By: Patient, Medical Record





- Past Medical History


CNS: Yes: TIA


Cardio/Vascular: Yes: CHF, HTN, Hyperlipdemia


Pulmonary: Yes: COPD, Pneumonia (fungal)


Gastrointestinal: Yes: Diverticulitis, Other (cholecystitis)


Renal/: Yes: Cancer (Prostate/KIDNEY), Neurogenic Bladder


Psych: Yes: Anxiety


Rheumatology: Yes: Other (arthritis knees)





- Past Surgical History


Past Surgical History: Yes: Appendectomy, Colonoscopy (last 1 yrs ago, konik[place] 

2 polyps. h/o ischemic colitis 3,2 and ?years ago), Hernia Repair (b/l), 

Nephrectomy (left)





- Alcohol/Substance Use


Hx Alcohol Use: No





- Smoking History


Smoking history: Never smoked


Have you smoked in the past 12 months: No


Aproximately how many cigarettes per day: 0


If you are a former smoker, when did you quit?: 35 years ago





- Social History


ADL: Independent


Occupation: retired salesman


History of Recent Travel: No





Home Medications





- Allergies


Allergies/Adverse Reactions: 


 Allergies











Allergy/AdvReac Type Severity Reaction Status Date / Time


 


No Known Drug Allergies Allergy   Verified 02/12/17 21:20














- Home Medications


Home Medications: 


Ambulatory Orders





Losartan Potassium [Cozaar] 50 mg PO DAILY 11/07/14 


Cinnamon Bark [Cinnamon] 500 mg PO DAILY 02/12/17 


Finasteride 0 mg PO DAILY 02/12/17 


Ranitidine [Zantac -] 150 mg PO DAILY 02/13/17 


Albuterol 0.083% Nebulizer Sol [Ventolin 0.083% Nebulizer Soln -] 1 amp NEB Q4H 

PRN #0 amp 03/01/17 


Atorvastatin Ca [Lipitor] 20 mg PO HS  tablet 03/01/17 


Voriconazole 200 mg PO BID #32 tablet 03/01/17 


Carvedilol [Coreg -] 12.5 mg PO BID  tablet 03/21/17 











Physical Exam


Vital Signs: 


 Vital Signs











Temperature  98.3 F   03/24/17 06:00


 


Pulse Rate  77   03/24/17 06:00


 


Respiratory Rate  20   03/24/17 13:00


 


Blood Pressure  180/80   03/24/17 06:00


 


O2 Sat by Pulse Oximetry (%)  99   03/24/17 13:00











Renal/: Yes: Muñiz Present.  No: Bladder Distention, CVA Tenderness - Left, 

CVA Tenderness - Right, Hematuria, Incontinence


Labs: 


 CBC, BMP





 03/23/17 12:40 





 03/23/17 12:40 











Problem List





- Problems


(1) Urinary retention with incomplete bladder emptying


Assessment/Plan: 


maintain muñiz for now.  Can give him a trial of void closer to discharge. 

flomax/. 


Code(s): R33.9 - RETENTION OF URINE, UNSPECIFIED

## 2017-03-24 NOTE — PN
GI Progress Note


Subjective: 


GI NOte: Jenna Altamirano swallowing evaluation appreciated. No aspiration. Denies 

abdominal pain. Cholecystostomy tube drainage only about 100cc today. MRCP 

fortunately reveals no CBD stones. 





- Objective


Vital Signs: 


 Vital Signs











Temperature  98.3 F   03/24/17 06:00


 


Pulse Rate  77   03/24/17 06:00


 


Respiratory Rate  20   03/24/17 13:00


 


Blood Pressure  180/80   03/24/17 06:00


 


O2 Sat by Pulse Oximetry (%)  99   03/24/17 13:00








CBC,CMP











WBC  8.9 K/mm3 (4.0-10.0)  D 03/23/17  12:40    


 


RBC  4.03 M/mm3 (4.00-5.60)   03/23/17  12:40    


 


Hgb  12.0 GM/dL (11.7-16.9)   03/23/17  12:40    


 


Hct  35.4 % (35.4-49)   03/23/17  12:40    


 


MCV  87.7 fl (80-96)   03/23/17  12:40    


 


MCHC  33.9 g/dl (32.0-35.9)   03/23/17  12:40    


 


RDW  14.9 % (11.9-15.9)   03/23/17  12:40    


 


Plt Count  350 K/MM3 (134-434)  D 03/23/17  12:40    


 


MPV  7.5 fl (7.5-11.1)   03/23/17  12:40    


 


Neutrophils %  71.3 % (42.8-82.8)   03/23/17  12:40    


 


Lymphocytes %  13.0 % (8-40)   03/23/17  12:40    


 


Monocytes %  8.1 % (3.8-10.2)   03/23/17  12:40    


 


Eosinophils %  7.2 % (0-4.5)  H D 03/23/17  12:40    


 


Basophils %  0.4 % (0-2.0)   03/23/17  12:40    


 


ESR  40 mm/hr (0-20)  H  03/24/17  06:30    


 


Sodium  141 mmol/L (136-145)   03/23/17  12:40    


 


Potassium  4.4 mmol/L (3.5-5.1)   03/23/17  12:40    


 


Chloride  104 mmol/L ()   03/23/17  12:40    


 


Carbon Dioxide  29 mmol/L (21-32)   03/23/17  12:40    


 


Anion Gap  8  (8-16)   03/23/17  12:40    


 


BUN  15 mg/dL (7-18)   03/23/17  12:40    


 


Creatinine  1.2 mg/dL (0.7-1.3)  D 03/23/17  12:40    


 


Creat Clearance w eGFR  57.54  (>60)   03/23/17  12:40    


 


POC Glucometer  159 UNITS (())   03/24/17  13:30    


 


Random Glucose  192 mg/dL ()  H D 03/23/17  12:40    


 


Calcium  8.6 mg/dL (8.5-10.1)   03/23/17  12:40    


 


Total Bilirubin  0.6 mg/dL (0.2-1.0)  D 03/23/17  12:40    


 


AST  58 U/L (15-37)  H D 03/23/17  12:40    


 


ALT  89 U/L (12-78)  H D 03/23/17  12:40    


 


Alkaline Phosphatase  272 U/L ()  H  03/23/17  12:40    


 


C-Reactive Protein  15.4 MG/DL (0.00-0.3)  H D 03/23/17  12:40    


 


Total Protein  6.1 g/dl (6.4-8.2)  L  03/23/17  12:40    


 


Albumin  2.5 g/dl (3.4-5.0)  L  03/23/17  12:40    


 


Total Amylase  52 U/L ()  D 03/22/17  06:45    


 


Lipase  228 U/L ()   03/22/17  06:45    











Constitutional: No Distress


Gastrointestinal Inspection: Yes: Other (cholecystostomy tube in place)


...Auscultate: Yes: Normoactive Bowel Sounds


...Palpate: Yes: Soft, Other (nontender)


Labs: 


 CBC, BMP





 03/23/17 12:40 





 03/23/17 12:40 





 INR, PTT











INR  1.48  (0.82-1.09)  H  03/22/17  06:45    














Assessment/Plan


Cholecystitis being managed by percutaneous drain. Continue antibiotics.

## 2017-03-25 LAB
ALBUMIN SERPL-MCNC: 2.4 G/DL (ref 3.4–5)
ALP SERPL-CCNC: 185 U/L (ref 45–117)
ALT SERPL-CCNC: 38 U/L (ref 12–78)
ANION GAP SERPL CALC-SCNC: 11 MMOL/L (ref 8–16)
AST SERPL-CCNC: 17 U/L (ref 15–37)
BASOPHILS # BLD: 1.3 % (ref 0–2)
BILIRUB SERPL-MCNC: 0.3 MG/DL (ref 0.2–1)
CALCIUM SERPL-MCNC: 8.3 MG/DL (ref 8.5–10.1)
CO2 SERPL-SCNC: 23 MMOL/L (ref 21–32)
CREAT SERPL-MCNC: 0.8 MG/DL (ref 0.7–1.3)
DEPRECATED RDW RBC AUTO: 14.5 % (ref 11.9–15.9)
EOSINOPHIL # BLD: 7.9 % (ref 0–4.5)
GLUCOSE SERPL-MCNC: 122 MG/DL (ref 74–106)
MCH RBC QN AUTO: 29.5 PG (ref 25.7–33.7)
MCHC RBC AUTO-ENTMCNC: 33.7 G/DL (ref 32–35.9)
MCV RBC: 87.5 FL (ref 80–96)
NEUTROPHILS # BLD: 58.6 % (ref 42.8–82.8)
PLATELET # BLD AUTO: 330 K/MM3 (ref 134–434)
PMV BLD: 7.2 FL (ref 7.5–11.1)
PROT SERPL-MCNC: 5.9 G/DL (ref 6.4–8.2)
WBC # BLD AUTO: 8.5 K/MM3 (ref 4–10)

## 2017-03-25 RX ADMIN — POLYETHYLENE GLYCOL 3350 SCH GM: 17 POWDER, FOR SOLUTION ORAL at 22:56

## 2017-03-25 RX ADMIN — INSULIN ASPART SCH: 100 INJECTION, SOLUTION INTRAVENOUS; SUBCUTANEOUS at 13:06

## 2017-03-25 RX ADMIN — INSULIN ASPART SCH: 100 INJECTION, SOLUTION INTRAVENOUS; SUBCUTANEOUS at 18:10

## 2017-03-25 RX ADMIN — POLYETHYLENE GLYCOL 3350 SCH GM: 17 POWDER, FOR SOLUTION ORAL at 10:17

## 2017-03-25 RX ADMIN — PIPERACILLIN SODIUM,TAZOBACTAM SODIUM SCH GM: 3; .375 INJECTION, POWDER, FOR SOLUTION INTRAVENOUS at 18:51

## 2017-03-25 RX ADMIN — RANITIDINE SCH MG: 150 TABLET ORAL at 10:16

## 2017-03-25 RX ADMIN — CARVEDILOL SCH MG: 12.5 TABLET, FILM COATED ORAL at 22:55

## 2017-03-25 RX ADMIN — HEPARIN SODIUM SCH UNIT: 5000 INJECTION, SOLUTION INTRAVENOUS; SUBCUTANEOUS at 22:56

## 2017-03-25 RX ADMIN — INSULIN ASPART SCH: 100 INJECTION, SOLUTION INTRAVENOUS; SUBCUTANEOUS at 22:56

## 2017-03-25 RX ADMIN — ALBUTEROL SULFATE PRN AMP: 2.5 SOLUTION RESPIRATORY (INHALATION) at 10:40

## 2017-03-25 RX ADMIN — Medication PRN MG: at 22:58

## 2017-03-25 RX ADMIN — FINASTERIDE SCH MG: 5 TABLET, FILM COATED ORAL at 10:15

## 2017-03-25 RX ADMIN — INSULIN ASPART SCH: 100 INJECTION, SOLUTION INTRAVENOUS; SUBCUTANEOUS at 06:05

## 2017-03-25 RX ADMIN — LOSARTAN POTASSIUM SCH MG: 50 TABLET, FILM COATED ORAL at 10:15

## 2017-03-25 RX ADMIN — CARVEDILOL SCH MG: 12.5 TABLET, FILM COATED ORAL at 10:15

## 2017-03-25 RX ADMIN — PIPERACILLIN SODIUM,TAZOBACTAM SODIUM SCH GM: 3; .375 INJECTION, POWDER, FOR SOLUTION INTRAVENOUS at 10:18

## 2017-03-25 RX ADMIN — PIPERACILLIN SODIUM,TAZOBACTAM SODIUM SCH GM: 3; .375 INJECTION, POWDER, FOR SOLUTION INTRAVENOUS at 01:15

## 2017-03-25 RX ADMIN — ASPIRIN 325 MG ORAL TABLET SCH MG: 325 PILL ORAL at 10:15

## 2017-03-25 RX ADMIN — ACETAMINOPHEN PRN MG: 325 TABLET ORAL at 10:17

## 2017-03-25 RX ADMIN — ATORVASTATIN CALCIUM SCH MG: 20 TABLET, FILM COATED ORAL at 22:56

## 2017-03-25 RX ADMIN — TAMSULOSIN HYDROCHLORIDE SCH MG: 0.4 CAPSULE ORAL at 10:15

## 2017-03-25 RX ADMIN — ACETAMINOPHEN PRN MG: 325 TABLET ORAL at 18:59

## 2017-03-25 RX ADMIN — HEPARIN SODIUM SCH UNIT: 5000 INJECTION, SOLUTION INTRAVENOUS; SUBCUTANEOUS at 10:15

## 2017-03-25 NOTE — PN
Progress Note (short form)





- Note


Progress Note: 


ID





Zosyn





Preceeding note reviewed


 Selected Entries











  03/25/17 03/25/17





  05:54 10:00


 


Temperature 98.5 F 


 


Pulse Rate  81


 


Respiratory  18





Rate  


 


Blood Pressure  184/77








Cholecystomy  tube


Microbiology





03/21/17 19:30   Urine - Urine Clean Catch   Urine Culture - Final


                              Pseudomonas Aeruginosa





 Laboratory Tests











  03/25/17 03/25/17





  06:00 06:00


 


WBC  8.5 


 


Hgb  11.7 


 


Hct  34.7 L 


 


Plt Count  330 


 


AST   17  D


 


ALT   38  D


 


Alkaline Phosphatase   185 H D








Assessment ? Infected Collection CRP ESR low 


Plan                Continue antibiotic


Anum CARBALLO

## 2017-03-25 NOTE — PN
GI Progress Note


Subjective: 


No acute events


Complains of pain along the right upper flank below the ribs, when he takes a 

deep breath and with position change


15cc drainage from cholecystostomy overnight





- Objective


Vital Signs: 


 Vital Signs











Temperature  98.5 F   03/25/17 05:54


 


Pulse Rate  73   03/25/17 05:54


 


Respiratory Rate  20   03/25/17 05:54


 


Blood Pressure  195/79   03/25/17 05:54


 


O2 Sat by Pulse Oximetry (%)  98   03/24/17 21:00











Constitutional: Well Nourished


Cardiovascular: Yes: Regular Rate and Rhythm


Respiratory: Yes: Rhonchi (bases b/l)


Gastrointestinal Inspection: Yes: Other.  No: Distention


...Auscultate: Yes: Normoactive Bowel Sounds


Edema: No


Neurological: Yes: Alert, Oriented


Labs: 


 CBC, BMP





 03/25/17 06:00 





 03/25/17 06:00 





 INR, PTT











INR  1.48  (0.82-1.09)  H  03/22/17  06:45    








 Hepatic Panel











Total Bilirubin  0.3 mg/dL (0.2-1.0)  D 03/25/17  06:00    


 


AST  17 U/L (15-37)  D 03/25/17  06:00    


 


ALT  38 U/L (12-78)  D 03/25/17  06:00    


 


Alkaline Phosphatase  185 U/L ()  H D 03/25/17  06:00    


 


Albumin  2.4 g/dl (3.4-5.0)  L  03/25/17  06:00    














Problem List





- Problems


(1) Cholecystitis, acute


Assessment/Plan: 


Being treated by cholecystostomy


Sequela of small abscess along  peripheral aspect of right liver lobe


? if right flank pain from drain / right lung atelectasis or from previously 

described abscess


repeat CT scan of the abomen to reassess


IV Abx per ID


Code(s): K81.0 - ACUTE CHOLECYSTITIS

## 2017-03-25 NOTE — PN
Progress Note, Physician


Chief Complaint: 


C/O R CHEST WALL PAIN ON DEEP INSPIRATION -> REFUSES STRONGER PAIN Rx





- Current Medication List


Current Medications: 


Active Medications





Acetaminophen (Tylenol -)  650 mg PO Q4H PRN


   PRN Reason: FEVER OR PAIN


   Last Admin: 03/25/17 10:17 Dose:  650 mg


Albuterol Sulfate (Ventolin 0.083% Nebulizer Soln -)  1 amp NEB Q4H PRN


   PRN Reason: SHORT OF BREATH/WHEEZING


   Last Admin: 03/25/17 10:40 Dose:  1 amp


Aspirin (Asa -)  325 mg PO DAILY@0800 [facility]


   Last Admin: 03/25/17 10:15 Dose:  325 mg


Atorvastatin Calcium (Lipitor -)  20 mg PO HS [facility]


   Last Admin: 03/24/17 21:41 Dose:  20 mg


Carvedilol (Coreg -)  12.5 mg PO BID [facility]


   Last Admin: 03/25/17 10:15 Dose:  12.5 mg


Finasteride (Proscar -)  5 mg PO DAILY [facility]


   Last Admin: 03/25/17 10:15 Dose:  5 mg


Guaifenesin (Robitussin -)  10 ml PO Q6H PRN


   PRN Reason: COUGH


Heparin Sodium (Porcine) (Heparin -)  5,000 unit SQ BID [facility]


   Last Admin: 03/25/17 10:15 Dose:  5,000 unit


Insulin Aspart (Novolog Vial Sliding Scale -)  1 vial SQ ACHS [facility]


   PRN Reason: Protocol


   Last Admin: 03/25/17 06:05 Dose:  Not Given


Losartan Potassium (Cozaar -)  50 mg PO DAILY [facility]


   Last Admin: 03/25/17 10:15 Dose:  50 mg


Melatonin (Melatonin)  5 mg PO HS PRN


   PRN Reason: INSOMNIA


   Last Admin: 03/24/17 22:24 Dose:  5 mg


Piperacillin Sod/Tazobactam Sod (Zosyn 3.375gm Ivpb (Pre-Docked))  3.375 gm 

IVPB Q8H-IV [facility]


   PRN Reason: Protocol


   Last Admin: 03/25/17 10:18 Dose:  3.375 gm


Polyethylene Glycol (Miralax (For Daily Use) -)  17 gm PO BID [facility]


   Last Admin: 03/25/17 10:17 Dose:  17 gm


Ranitidine HCl (Zantac -)  150 mg PO DAILY [facility]


   Last Admin: 03/25/17 10:16 Dose:  150 mg


Tamsulosin HCl (Flomax -)  0.4 mg PO DAILY@0830 [facility]


   Last Admin: 03/25/17 10:15 Dose:  0.4 mg











- Objective


Vital Signs: 


 Vital Signs











Temperature  98.5 F   03/25/17 05:54


 


Pulse Rate  82   03/25/17 10:40


 


Respiratory Rate  18   03/25/17 10:00


 


Blood Pressure  184/77   03/25/17 10:00


 


O2 Sat by Pulse Oximetry (%)  97   03/25/17 10:40











Cardiovascular: Yes: WNL


Respiratory: Yes: WNL


Gastrointestinal: Yes: WNL


Edema: No


Labs: 


 CBC, BMP





 03/25/17 06:00 





 03/25/17 06:00 





 INR, PTT











INR  1.48  (0.82-1.09)  H  03/22/17  06:45    














Problem List





- Problems


(1) Abdominal pain


Code(s): R10.9 - UNSPECIFIED ABDOMINAL PAIN   Qualifiers: 


     Abdominal location: right upper quadrant     Qualified Code(s): R10.11 - 

Right upper quadrant pain  





(2) UTI (urinary tract infection)


Code(s): N39.0 - URINARY TRACT INFECTION, SITE NOT SPECIFIED   Qualifiers: 


     Urinary tract infection type: site unspecified     Hematuria presence: 

without hematuria     Qualified Code(s): N39.0 - Urinary tract infection, site 

not specified  





(3) Urinary retention with incomplete bladder emptying


Code(s): R33.9 - RETENTION OF URINE, UNSPECIFIED





(4) Cholecystitis, acute


Code(s): K81.0 - ACUTE CHOLECYSTITIS





(5) COPD (chronic obstructive pulmonary disease)


Code(s): J44.9 - CHRONIC OBSTRUCTIVE PULMONARY DISEASE, UNSPECIFIED   Qualifiers

: 


     COPD type: unspecified COPD        Qualified Code(s): J44.9 - Chronic 

obstructive pulmonary disease, unspecified  





(6) HTN (hypertension)


Code(s): I10 - ESSENTIAL (PRIMARY) HYPERTENSION   Qualifiers: 


     Hypertension type: essential hypertension        Qualified Code(s): I10 - 

Essential (primary) hypertension  








Assessment/Plan


(1) Abdominal pain


Assessment/Plan: 


mrcp done gallbladder wall thickening with edema


adrenal nodule nad renal pole lesion noted will evaluate with MRI


on iv abx


s/p bilary drain change now functioning


Code(s): R10.9 - UNSPECIFIED ABDOMINAL PAIN   Qualifiers: 


     Abdominal location: right upper quadrant     Qualified Code(s): R10.11 - 

Right upper quadrant pain  





(2) Cholecystitis, acute


Assessment/Plan: 


iv abx


clogged bilary tube now replaced


awiting cultures -> +isidro


Code(s): K81.0 - ACUTE CHOLECYSTITIS





(3) BPH (benign prostatic hyperplasia)


Assessment/Plan: 


urolgy eval -> maintain muñiz for now.  Can give him a trial of void closer to 

discharge. 


finasteride


Code(s): N40.0 - BENIGN PROSTATIC HYPERPLASIA WITHOUT LOWER URINRY TRACT SYMP   





(4) HLD (hyperlipidemia)


Assessment/Plan: 


same meds


Code(s): E78.5 - HYPERLIPIDEMIA, UNSPECIFIED   Qualifiers: 


     Hyperlipidemia type: pure hypercholesterolemia        Qualified Code(s): 

E78.00 - Pure hypercholesterolemia, unspecified; E78.0 - Pure 

hypercholesterolemia  





(5) HTN (hypertension)


Assessment/Plan: 


same meds


Code(s): I10 - ESSENTIAL (PRIMARY) HYPERTENSION   Qualifiers: 


     Hypertension type: essential hypertension        Qualified Code(s): I10 - 

Essential (primary) hypertension  





(6) Coughing


Assessment/Plan: 


r/o aspiration


repeat cxr improved


MBS -> IMPROVED. NO ASPIRATION


og nj eval 


Code(s): R05 - COUGH








FM

## 2017-03-25 NOTE — PN
Progress Note, Physician


History of Present Illness: 


PULMONARY








ALERT,FEELING BETTER,LESS COUGH





- Current Medication List


Current Medications: 


Active Medications





Acetaminophen (Tylenol -)  650 mg PO Q4H PRN


   PRN Reason: FEVER OR PAIN


   Last Admin: 03/25/17 10:17 Dose:  650 mg


Albuterol Sulfate (Ventolin 0.083% Nebulizer Soln -)  1 amp NEB Q4H PRN


   PRN Reason: SHORT OF BREATH/WHEEZING


   Last Admin: 03/25/17 10:40 Dose:  1 amp


Aspirin (Asa -)  325 mg PO DAILY@0800 [facility]


   Last Admin: 03/25/17 10:15 Dose:  325 mg


Atorvastatin Calcium (Lipitor -)  20 mg PO HS [facility]


   Last Admin: 03/24/17 21:41 Dose:  20 mg


Carvedilol (Coreg -)  12.5 mg PO BID [facility]


   Last Admin: 03/25/17 10:15 Dose:  12.5 mg


Finasteride (Proscar -)  5 mg PO DAILY [facility]


   Last Admin: 03/25/17 10:15 Dose:  5 mg


Guaifenesin (Robitussin -)  10 ml PO Q6H PRN


   PRN Reason: COUGH


Heparin Sodium (Porcine) (Heparin -)  5,000 unit SQ BID [facility]


   Last Admin: 03/25/17 10:15 Dose:  5,000 unit


Insulin Aspart (Novolog Vial Sliding Scale -)  1 vial SQ ACHS [facility]


   PRN Reason: Protocol


   Last Admin: 03/25/17 13:06 Dose:  Not Given


Losartan Potassium (Cozaar -)  50 mg PO DAILY [facility]


   Last Admin: 03/25/17 10:15 Dose:  50 mg


Melatonin (Melatonin)  5 mg PO HS PRN


   PRN Reason: INSOMNIA


   Last Admin: 03/24/17 22:24 Dose:  5 mg


Piperacillin Sod/Tazobactam Sod (Zosyn 3.375gm Ivpb (Pre-Docked))  3.375 gm 

IVPB Q8H-IV [facility]


   PRN Reason: Protocol


   Last Admin: 03/25/17 10:18 Dose:  3.375 gm


Polyethylene Glycol (Miralax (For Daily Use) -)  17 gm PO BID [facility]


   Last Admin: 03/25/17 10:17 Dose:  17 gm


Ranitidine HCl (Zantac -)  150 mg PO DAILY [facility]


   Last Admin: 03/25/17 10:16 Dose:  150 mg


Tamsulosin HCl (Flomax -)  0.4 mg PO DAILY@0830 [facility]


   Last Admin: 03/25/17 10:15 Dose:  0.4 mg











- Objective


Vital Signs: 


 Vital Signs











Temperature  98.3 F   03/25/17 14:40


 


Pulse Rate  78   03/25/17 14:40


 


Respiratory Rate  20   03/25/17 14:40


 


Blood Pressure  152/66   03/25/17 14:40


 


O2 Sat by Pulse Oximetry (%)  97   03/25/17 10:40











Constitutional: Yes: Well Nourished, Calm


Eyes: Yes: WNL


HENT: Yes: WNL


Neck: Yes: WNL


Cardiovascular: Yes: Regular Rate and Rhythm, S1, S2


Respiratory: Yes: CTA Bilaterally


Gastrointestinal: Yes: Normal Bowel Sounds, Soft


Extremities: Yes: WNL


Edema: No


Labs: 


 CBC, BMP





 03/25/17 06:00 





 03/25/17 06:00 





 INR, PTT











INR  1.48  (0.82-1.09)  H  03/22/17  06:45    














Problem List





- Problems


(1) Abdominal pain


Code(s): R10.9 - UNSPECIFIED ABDOMINAL PAIN   Qualifiers: 


     Abdominal location: right upper quadrant     Qualified Code(s): R10.11 - 

Right upper quadrant pain  





(2) Coughing


Code(s): R05 - COUGH





(3) Cholecystitis, acute


Code(s): K81.0 - ACUTE CHOLECYSTITIS





(4) Cholecystostomy care


Code(s): Z43.4 - ENCOUNTER FOR ATTN TO OTH ARTIF OPENINGS OF DIGESTIVE TRACT





(5) Diastolic CHF


Code(s): I50.30 - UNSPECIFIED DIASTOLIC (CONGESTIVE) HEART FAILURE   Qualifiers

: 


     Congestive heart failure chronicity: chronic        Qualified Code(s): 

I50.32 - Chronic diastolic (congestive) heart failure  





(6) HLD (hyperlipidemia)


Code(s): E78.5 - HYPERLIPIDEMIA, UNSPECIFIED   Qualifiers: 


     Hyperlipidemia type: pure hypercholesterolemia        Qualified Code(s): 

E78.00 - Pure hypercholesterolemia, unspecified; E78.0 - Pure 

hypercholesterolemia  





(7) HTN (hypertension)


Code(s): I10 - ESSENTIAL (PRIMARY) HYPERTENSION   Qualifiers: 


     Hypertension type: essential hypertension        Qualified Code(s): I10 - 

Essential (primary) hypertension  








Assessment/Plan


ASSESSMENT/PLAN:


Cholecystitis 


S/P recent prolonged illness (Acute respiratory failure / septic shock / DIC) 


Sleep apnea


HTN


HLD


Prostate cancer


TIA


Kidney cancer status-post nephrectomy


COPD with chronic bronchitis 


BPH


Cough








PLAN:


ABX 


Monitor drainage 


BD TX PRN 


Workup per GI/surgery 


O2 as needed


VTE prophylaxis 








DR INFANTE

## 2017-03-26 LAB
ALBUMIN SERPL-MCNC: 2.5 G/DL (ref 3.4–5)
ALP SERPL-CCNC: 154 U/L (ref 45–117)
ALT SERPL-CCNC: 32 U/L (ref 12–78)
ANION GAP SERPL CALC-SCNC: 9 MMOL/L (ref 8–16)
AST SERPL-CCNC: 16 U/L (ref 15–37)
BASOPHILS # BLD: 1.1 % (ref 0–2)
BILIRUB SERPL-MCNC: 0.4 MG/DL (ref 0.2–1)
CALCIUM SERPL-MCNC: 8.2 MG/DL (ref 8.5–10.1)
CO2 SERPL-SCNC: 25 MMOL/L (ref 21–32)
CREAT SERPL-MCNC: 0.8 MG/DL (ref 0.7–1.3)
DEPRECATED RDW RBC AUTO: 14.7 % (ref 11.9–15.9)
EOSINOPHIL # BLD: 6.5 % (ref 0–4.5)
GLUCOSE SERPL-MCNC: 110 MG/DL (ref 74–106)
MCH RBC QN AUTO: 29.7 PG (ref 25.7–33.7)
MCHC RBC AUTO-ENTMCNC: 34.6 G/DL (ref 32–35.9)
MCV RBC: 85.9 FL (ref 80–96)
NEUTROPHILS # BLD: 58.8 % (ref 42.8–82.8)
PLATELET # BLD AUTO: 355 K/MM3 (ref 134–434)
PMV BLD: 7.2 FL (ref 7.5–11.1)
PROT SERPL-MCNC: 5.8 G/DL (ref 6.4–8.2)
WBC # BLD AUTO: 7.5 K/MM3 (ref 4–10)

## 2017-03-26 RX ADMIN — PIPERACILLIN SODIUM,TAZOBACTAM SODIUM SCH GM: 3; .375 INJECTION, POWDER, FOR SOLUTION INTRAVENOUS at 02:13

## 2017-03-26 RX ADMIN — PIPERACILLIN SODIUM,TAZOBACTAM SODIUM SCH GM: 3; .375 INJECTION, POWDER, FOR SOLUTION INTRAVENOUS at 09:15

## 2017-03-26 RX ADMIN — ACETAMINOPHEN PRN MG: 325 TABLET ORAL at 08:44

## 2017-03-26 RX ADMIN — INSULIN ASPART SCH: 100 INJECTION, SOLUTION INTRAVENOUS; SUBCUTANEOUS at 17:58

## 2017-03-26 RX ADMIN — HEPARIN SODIUM SCH UNIT: 5000 INJECTION, SOLUTION INTRAVENOUS; SUBCUTANEOUS at 09:18

## 2017-03-26 RX ADMIN — CARVEDILOL SCH: 12.5 TABLET, FILM COATED ORAL at 12:00

## 2017-03-26 RX ADMIN — LOSARTAN POTASSIUM SCH MG: 50 TABLET, FILM COATED ORAL at 08:44

## 2017-03-26 RX ADMIN — INSULIN ASPART SCH: 100 INJECTION, SOLUTION INTRAVENOUS; SUBCUTANEOUS at 06:44

## 2017-03-26 RX ADMIN — ATORVASTATIN CALCIUM SCH MG: 20 TABLET, FILM COATED ORAL at 22:27

## 2017-03-26 RX ADMIN — INSULIN ASPART SCH: 100 INJECTION, SOLUTION INTRAVENOUS; SUBCUTANEOUS at 22:29

## 2017-03-26 RX ADMIN — ASPIRIN 325 MG ORAL TABLET SCH MG: 325 PILL ORAL at 08:44

## 2017-03-26 RX ADMIN — RANITIDINE SCH MG: 150 TABLET ORAL at 12:04

## 2017-03-26 RX ADMIN — HEPARIN SODIUM SCH UNIT: 5000 INJECTION, SOLUTION INTRAVENOUS; SUBCUTANEOUS at 22:27

## 2017-03-26 RX ADMIN — TAMSULOSIN HYDROCHLORIDE SCH MG: 0.4 CAPSULE ORAL at 08:45

## 2017-03-26 RX ADMIN — POLYETHYLENE GLYCOL 3350 SCH GM: 17 POWDER, FOR SOLUTION ORAL at 12:04

## 2017-03-26 RX ADMIN — Medication PRN MG: at 22:28

## 2017-03-26 RX ADMIN — POLYETHYLENE GLYCOL 3350 SCH: 17 POWDER, FOR SOLUTION ORAL at 22:27

## 2017-03-26 RX ADMIN — CARVEDILOL SCH MG: 12.5 TABLET, FILM COATED ORAL at 22:27

## 2017-03-26 RX ADMIN — FINASTERIDE SCH MG: 5 TABLET, FILM COATED ORAL at 12:04

## 2017-03-26 RX ADMIN — LOSARTAN POTASSIUM SCH: 50 TABLET, FILM COATED ORAL at 12:01

## 2017-03-26 RX ADMIN — INSULIN ASPART SCH: 100 INJECTION, SOLUTION INTRAVENOUS; SUBCUTANEOUS at 12:01

## 2017-03-26 RX ADMIN — CARVEDILOL SCH MG: 12.5 TABLET, FILM COATED ORAL at 08:45

## 2017-03-26 RX ADMIN — ACETAMINOPHEN PRN MG: 325 TABLET ORAL at 14:28

## 2017-03-26 RX ADMIN — PIPERACILLIN SODIUM,TAZOBACTAM SODIUM SCH GM: 3; .375 INJECTION, POWDER, FOR SOLUTION INTRAVENOUS at 18:45

## 2017-03-26 NOTE — PN
Progress Note, Physician


History of Present Illness: 


pulmonary





alert,no c/o sob,less cough,+ pain ruq





- Current Medication List


Current Medications: 


Active Medications





Acetaminophen (Tylenol -)  650 mg PO Q4H PRN


   PRN Reason: FEVER OR PAIN


   Last Admin: 03/26/17 14:28 Dose:  650 mg


Albuterol Sulfate (Ventolin 0.083% Nebulizer Soln -)  1 amp NEB Q4H PRN


   PRN Reason: SHORT OF BREATH/WHEEZING


   Last Admin: 03/25/17 10:40 Dose:  1 amp


Aspirin (Asa -)  325 mg PO DAILY@0800 [facility]


   Last Admin: 03/26/17 08:44 Dose:  325 mg


Atorvastatin Calcium (Lipitor -)  20 mg PO HS [facility]


   Last Admin: 03/25/17 22:56 Dose:  20 mg


Carvedilol (Coreg -)  12.5 mg PO BID [facility]


   Last Admin: 03/26/17 12:00 Dose:  Not Given


Finasteride (Proscar -)  5 mg PO DAILY [facility]


   Last Admin: 03/26/17 12:04 Dose:  5 mg


Guaifenesin (Robitussin -)  10 ml PO Q6H PRN


   PRN Reason: COUGH


Heparin Sodium (Porcine) (Heparin -)  5,000 unit SQ BID [facility]


   Last Admin: 03/26/17 09:18 Dose:  5,000 unit


Insulin Aspart (Novolog Vial Sliding Scale -)  1 vial SQ ACHS [facility]


   PRN Reason: Protocol


   Last Admin: 03/26/17 12:01 Dose:  Not Given


Losartan Potassium (Cozaar -)  50 mg PO DAILY [facility]


   Last Admin: 03/26/17 12:01 Dose:  Not Given


Melatonin (Melatonin)  5 mg PO HS PRN


   PRN Reason: INSOMNIA


   Last Admin: 03/25/17 22:58 Dose:  5 mg


Piperacillin Sod/Tazobactam Sod (Zosyn 3.375gm Ivpb (Pre-Docked))  3.375 gm 

IVPB Q8H-IV [facility]


   PRN Reason: Protocol


   Last Admin: 03/26/17 09:15 Dose:  3.375 gm


Polyethylene Glycol (Miralax (For Daily Use) -)  17 gm PO BID [facility]


   Last Admin: 03/26/17 12:04 Dose:  17 gm


Ranitidine HCl (Zantac -)  150 mg PO DAILY [facility]


   Last Admin: 03/26/17 12:04 Dose:  150 mg


Tamsulosin HCl (Flomax -)  0.4 mg PO DAILY@0830 [facility]


   Last Admin: 03/26/17 08:45 Dose:  0.4 mg











- Objective


Vital Signs: 


 Vital Signs











Temperature  98.6 F   03/26/17 14:30


 


Pulse Rate  76   03/26/17 14:30


 


Respiratory Rate  18   03/26/17 14:30


 


Blood Pressure  182/76   03/26/17 14:30


 


O2 Sat by Pulse Oximetry (%)  95   03/25/17 17:18











Constitutional: Yes: Well Nourished, Calm


Eyes: Yes: WNL


HENT: Yes: WNL


Neck: Yes: Supple


Cardiovascular: Yes: Regular Rate and Rhythm, S1, S2


Respiratory: Yes: Diminished


Gastrointestinal: Yes: Normal Bowel Sounds, Soft


Extremities: Yes: WNL


Edema: No


Labs: 


 CBC, BMP





 03/26/17 06:30 





 03/26/17 06:30 





 INR, PTT











INR  1.48  (0.82-1.09)  H  03/22/17  06:45    














Problem List





- Problems


(1) Abdominal pain


Code(s): R10.9 - UNSPECIFIED ABDOMINAL PAIN   Qualifiers: 


     Abdominal location: right upper quadrant     Qualified Code(s): R10.11 - 

Right upper quadrant pain  





(2) Coughing


Code(s): R05 - COUGH





(3) Cholecystitis, acute


Code(s): K81.0 - ACUTE CHOLECYSTITIS





(4) Cholecystostomy care


Code(s): Z43.4 - ENCOUNTER FOR ATTN TO OTH ARTIF OPENINGS OF DIGESTIVE TRACT





(5) Diastolic CHF


Code(s): I50.30 - UNSPECIFIED DIASTOLIC (CONGESTIVE) HEART FAILURE   Qualifiers

: 


     Congestive heart failure chronicity: chronic        Qualified Code(s): 

I50.32 - Chronic diastolic (congestive) heart failure  





(6) HLD (hyperlipidemia)


Code(s): E78.5 - HYPERLIPIDEMIA, UNSPECIFIED   Qualifiers: 


     Hyperlipidemia type: pure hypercholesterolemia        Qualified Code(s): 

E78.00 - Pure hypercholesterolemia, unspecified; E78.0 - Pure 

hypercholesterolemia  





(7) HTN (hypertension)


Code(s): I10 - ESSENTIAL (PRIMARY) HYPERTENSION   Qualifiers: 


     Hypertension type: essential hypertension        Qualified Code(s): I10 - 

Essential (primary) hypertension  








Assessment/Plan


ASSESSMENT/PLAN:


Cholecystitis 


S/P recent prolonged illness (Acute respiratory failure / septic shock / DIC) 


Sleep apnea


HTN


HLD


Prostate cancer


TIA


Kidney cancer status-post nephrectomy


COPD with chronic bronchitis 


BPH


Cough improved








PLAN:


ABX 


Monitor drainage 


BD TX PRN 


Workup per GI/surgery 


O2 as needed


VTE prophylaxis 








DR INFANTE

## 2017-03-26 NOTE — PN
Progress Note, Physician


Chief Complaint: 


GETTING CT





- Current Medication List


Current Medications: 


Active Medications





Acetaminophen (Tylenol -)  650 mg PO Q4H PRN


   PRN Reason: FEVER OR PAIN


   Last Admin: 03/26/17 08:44 Dose:  650 mg


Albuterol Sulfate (Ventolin 0.083% Nebulizer Soln -)  1 amp NEB Q4H PRN


   PRN Reason: SHORT OF BREATH/WHEEZING


   Last Admin: 03/25/17 10:40 Dose:  1 amp


Aspirin (Asa -)  325 mg PO DAILY@0800 [facility]


   Last Admin: 03/26/17 08:44 Dose:  325 mg


Atorvastatin Calcium (Lipitor -)  20 mg PO HS [facility]


   Last Admin: 03/25/17 22:56 Dose:  20 mg


Carvedilol (Coreg -)  12.5 mg PO BID [facility]


   Last Admin: 03/26/17 08:45 Dose:  12.5 mg


Finasteride (Proscar -)  5 mg PO DAILY [facility]


   Last Admin: 03/25/17 10:15 Dose:  5 mg


Guaifenesin (Robitussin -)  10 ml PO Q6H PRN


   PRN Reason: COUGH


Heparin Sodium (Porcine) (Heparin -)  5,000 unit SQ BID [facility]


   Last Admin: 03/26/17 09:18 Dose:  5,000 unit


Insulin Aspart (Novolog Vial Sliding Scale -)  1 vial SQ ACHS [facility]


   PRN Reason: Protocol


   Last Admin: 03/26/17 06:44 Dose:  Not Given


Losartan Potassium (Cozaar -)  50 mg PO DAILY [facility]


   Last Admin: 03/26/17 08:44 Dose:  50 mg


Melatonin (Melatonin)  5 mg PO HS PRN


   PRN Reason: INSOMNIA


   Last Admin: 03/25/17 22:58 Dose:  5 mg


Piperacillin Sod/Tazobactam Sod (Zosyn 3.375gm Ivpb (Pre-Docked))  3.375 gm 

IVPB Q8H-IV [facility]


   PRN Reason: Protocol


   Last Admin: 03/26/17 09:15 Dose:  3.375 gm


Polyethylene Glycol (Miralax (For Daily Use) -)  17 gm PO BID [facility]


   Last Admin: 03/25/17 22:56 Dose:  17 gm


Ranitidine HCl (Zantac -)  150 mg PO DAILY [facility]


   Last Admin: 03/25/17 10:16 Dose:  150 mg


Tamsulosin HCl (Flomax -)  0.4 mg PO DAILY@0830 [facility]


   Last Admin: 03/26/17 08:45 Dose:  0.4 mg











- Objective


Vital Signs: 


 Vital Signs











Temperature  97.4 F L  03/26/17 06:00


 


Pulse Rate  77   03/26/17 06:00


 


Respiratory Rate  20   03/26/17 06:00


 


Blood Pressure  147/67   03/26/17 06:00


 


O2 Sat by Pulse Oximetry (%)  95   03/25/17 17:18











Cardiovascular: Yes: WNL


Respiratory: Yes: WNL


Gastrointestinal: Yes: WNL


Edema: No


Labs: 


 CBC, BMP





 03/26/17 06:30 





 03/26/17 06:30 





 INR, PTT











INR  1.48  (0.82-1.09)  H  03/22/17  06:45    














Problem List





- Problems


(1) Abdominal pain


Code(s): R10.9 - UNSPECIFIED ABDOMINAL PAIN   Qualifiers: 


     Abdominal location: right upper quadrant     Qualified Code(s): R10.11 - 

Right upper quadrant pain  





(2) UTI (urinary tract infection)


Code(s): N39.0 - URINARY TRACT INFECTION, SITE NOT SPECIFIED   Qualifiers: 


     Urinary tract infection type: site unspecified     Hematuria presence: 

without hematuria        Qualified Code(s): N39.0 - Urinary tract infection, 

site not specified  





(3) Urinary retention with incomplete bladder emptying


Code(s): R33.9 - RETENTION OF URINE, UNSPECIFIED





(4) Cholecystitis, acute


Code(s): K81.0 - ACUTE CHOLECYSTITIS





(5) COPD (chronic obstructive pulmonary disease)


Code(s): J44.9 - CHRONIC OBSTRUCTIVE PULMONARY DISEASE, UNSPECIFIED   Qualifiers

: 


     COPD type: unspecified COPD        Qualified Code(s): J44.9 - Chronic 

obstructive pulmonary disease, unspecified  





(6) HTN (hypertension)


Code(s): I10 - ESSENTIAL (PRIMARY) HYPERTENSION   Qualifiers: 


     Hypertension type: essential hypertension        Qualified Code(s): I10 - 

Essential (primary) hypertension  








Assessment/Plan


(1) Abdominal pain


Assessment/Plan: 


mrcp done gallbladder wall thickening with edema


adrenal nodule nad renal pole lesion noted will evaluate with MRI


on iv abx


bilary drain


Code(s): R10.9 - UNSPECIFIED ABDOMINAL PAIN   Qualifiers: 


     Abdominal location: right upper quadrant     Qualified Code(s): R10.11 - 

Right upper quadrant pain  





(2) Cholecystitis, acute


Assessment/Plan: 


iv abx


clogged bilary tube now replaced


awiting cultures -> +isidro


Code(s): K81.0 - ACUTE CHOLECYSTITIS





(3) BPH (benign prostatic hyperplasia)


Assessment/Plan: 


urolgy eval -> maintain muñiz for now.  Can give him a trial of void closer to 

discharge. 


finasteride


Code(s): N40.0 - BENIGN PROSTATIC HYPERPLASIA WITHOUT LOWER URINRY TRACT SYMP   





(4) HLD (hyperlipidemia)


Assessment/Plan: 


same meds


Code(s): E78.5 - HYPERLIPIDEMIA, UNSPECIFIED   Qualifiers: 


     Hyperlipidemia type: pure hypercholesterolemia        Qualified Code(s): 

E78.00 - Pure hypercholesterolemia, unspecified; E78.0 - Pure 

hypercholesterolemia  





(5) HTN (hypertension)


Assessment/Plan: 


same meds


Code(s): I10 - ESSENTIAL (PRIMARY) HYPERTENSION   Qualifiers: 


     Hypertension type: essential hypertension        Qualified Code(s): I10 - 

Essential (primary) hypertension  





(6) Coughing


Assessment/Plan: 


r/o aspiration


repeat cxr improved


MBS -> IMPROVED. NO ASPIRATION


og nj eval 


Code(s): R05 - COUGH








FM

## 2017-03-27 LAB
ALBUMIN SERPL-MCNC: 2.4 G/DL (ref 3.4–5)
ALP SERPL-CCNC: 136 U/L (ref 45–117)
ALT SERPL-CCNC: 28 U/L (ref 12–78)
ANION GAP SERPL CALC-SCNC: 8 MMOL/L (ref 8–16)
AST SERPL-CCNC: 20 U/L (ref 15–37)
BASOPHILS # BLD: 2 % (ref 0–2)
BILIRUB SERPL-MCNC: 0.4 MG/DL (ref 0.2–1)
CALCIUM SERPL-MCNC: 8.3 MG/DL (ref 8.5–10.1)
CO2 SERPL-SCNC: 28 MMOL/L (ref 21–32)
CREAT SERPL-MCNC: 0.8 MG/DL (ref 0.7–1.3)
DEPRECATED RDW RBC AUTO: 14.8 % (ref 11.9–15.9)
EOSINOPHIL # BLD: 8.4 % (ref 0–4.5)
GLUCOSE SERPL-MCNC: 111 MG/DL (ref 74–106)
MCH RBC QN AUTO: 29.7 PG (ref 25.7–33.7)
MCHC RBC AUTO-ENTMCNC: 34.1 G/DL (ref 32–35.9)
MCV RBC: 87 FL (ref 80–96)
NEUTROPHILS # BLD: 54.9 % (ref 42.8–82.8)
PLATELET # BLD AUTO: 362 K/MM3 (ref 134–434)
PMV BLD: 7.2 FL (ref 7.5–11.1)
PROT SERPL-MCNC: 5.3 G/DL (ref 6.4–8.2)
WBC # BLD AUTO: 7.2 K/MM3 (ref 4–10)

## 2017-03-27 RX ADMIN — POLYETHYLENE GLYCOL 3350 SCH: 17 POWDER, FOR SOLUTION ORAL at 21:14

## 2017-03-27 RX ADMIN — ATORVASTATIN CALCIUM SCH MG: 20 TABLET, FILM COATED ORAL at 21:07

## 2017-03-27 RX ADMIN — INSULIN ASPART SCH: 100 INJECTION, SOLUTION INTRAVENOUS; SUBCUTANEOUS at 17:39

## 2017-03-27 RX ADMIN — CARVEDILOL SCH MG: 12.5 TABLET, FILM COATED ORAL at 21:07

## 2017-03-27 RX ADMIN — ACETAMINOPHEN PRN MG: 325 TABLET ORAL at 10:28

## 2017-03-27 RX ADMIN — TAMSULOSIN HYDROCHLORIDE SCH MG: 0.4 CAPSULE ORAL at 10:13

## 2017-03-27 RX ADMIN — PIPERACILLIN SODIUM,TAZOBACTAM SODIUM SCH GM: 3; .375 INJECTION, POWDER, FOR SOLUTION INTRAVENOUS at 10:14

## 2017-03-27 RX ADMIN — PIPERACILLIN SODIUM,TAZOBACTAM SODIUM SCH GM: 3; .375 INJECTION, POWDER, FOR SOLUTION INTRAVENOUS at 01:33

## 2017-03-27 RX ADMIN — LOSARTAN POTASSIUM SCH MG: 50 TABLET, FILM COATED ORAL at 10:14

## 2017-03-27 RX ADMIN — INSULIN ASPART SCH: 100 INJECTION, SOLUTION INTRAVENOUS; SUBCUTANEOUS at 21:10

## 2017-03-27 RX ADMIN — ASPIRIN 325 MG ORAL TABLET SCH MG: 325 PILL ORAL at 10:13

## 2017-03-27 RX ADMIN — CARVEDILOL SCH MG: 12.5 TABLET, FILM COATED ORAL at 10:14

## 2017-03-27 RX ADMIN — POLYETHYLENE GLYCOL 3350 SCH GM: 17 POWDER, FOR SOLUTION ORAL at 10:27

## 2017-03-27 RX ADMIN — FINASTERIDE SCH MG: 5 TABLET, FILM COATED ORAL at 10:14

## 2017-03-27 RX ADMIN — HEPARIN SODIUM SCH UNIT: 5000 INJECTION, SOLUTION INTRAVENOUS; SUBCUTANEOUS at 21:07

## 2017-03-27 RX ADMIN — PIPERACILLIN SODIUM,TAZOBACTAM SODIUM SCH GM: 3; .375 INJECTION, POWDER, FOR SOLUTION INTRAVENOUS at 17:34

## 2017-03-27 RX ADMIN — INSULIN ASPART SCH: 100 INJECTION, SOLUTION INTRAVENOUS; SUBCUTANEOUS at 12:46

## 2017-03-27 RX ADMIN — RANITIDINE SCH MG: 150 TABLET ORAL at 10:14

## 2017-03-27 RX ADMIN — HEPARIN SODIUM SCH UNIT: 5000 INJECTION, SOLUTION INTRAVENOUS; SUBCUTANEOUS at 10:14

## 2017-03-27 NOTE — PN
Progress Note, Physician


Chief Complaint: 


still with pain on right lateral rib wall behind the tube





- Current Medication List


Current Medications: 


Active Medications





Acetaminophen (Tylenol -)  650 mg PO Q4H PRN


   PRN Reason: FEVER OR PAIN


   Last Admin: 03/27/17 10:28 Dose:  650 mg


Aspirin (Asa -)  325 mg PO DAILY@0800 [facility]


   Last Admin: 03/27/17 10:13 Dose:  325 mg


Atorvastatin Calcium (Lipitor -)  20 mg PO HS [facility]


   Last Admin: 03/26/17 22:27 Dose:  20 mg


Carvedilol (Coreg -)  12.5 mg PO BID [facility]


   Last Admin: 03/27/17 10:14 Dose:  12.5 mg


Finasteride (Proscar -)  5 mg PO DAILY [facility]


   Last Admin: 03/27/17 10:14 Dose:  5 mg


Guaifenesin (Robitussin -)  10 ml PO Q6H PRN


   PRN Reason: COUGH


Heparin Sodium (Porcine) (Heparin -)  5,000 unit SQ BID [facility]


   Last Admin: 03/27/17 10:14 Dose:  5,000 unit


Insulin Aspart (Novolog Vial Sliding Scale -)  1 vial SQ ACHS [facility]


   PRN Reason: Protocol


   Last Admin: 03/27/17 12:46 Dose:  Not Given


Losartan Potassium (Cozaar -)  50 mg PO DAILY [facility]


   Last Admin: 03/27/17 10:14 Dose:  50 mg


Melatonin (Melatonin)  5 mg PO HS PRN


   PRN Reason: INSOMNIA


   Last Admin: 03/26/17 22:28 Dose:  5 mg


Piperacillin Sod/Tazobactam Sod (Zosyn 3.375gm Ivpb (Pre-Docked))  3.375 gm 

IVPB Q8H-IV [facility]


   PRN Reason: Protocol


   Last Admin: 03/27/17 10:14 Dose:  3.375 gm


Polyethylene Glycol (Miralax (For Daily Use) -)  17 gm PO BID [facility]


   Last Admin: 03/27/17 10:27 Dose:  17 gm


Ranitidine HCl (Zantac -)  150 mg PO DAILY [facility]


   Last Admin: 03/27/17 10:14 Dose:  150 mg


Tamsulosin HCl (Flomax -)  0.4 mg PO DAILY@0830 [facility]


   Last Admin: 03/27/17 10:13 Dose:  0.4 mg











- Objective


Vital Signs: 


 Vital Signs











Temperature  97.5 F L  03/27/17 06:19


 


Pulse Rate  77   03/27/17 11:45


 


Respiratory Rate  20   03/27/17 06:19


 


Blood Pressure  162/92   03/27/17 06:19


 


O2 Sat by Pulse Oximetry (%)  99   03/27/17 11:45











Constitutional: Yes: Calm


Neck: Yes: Trachea Midline


Cardiovascular: Yes: Regular Rate and Rhythm, S1, S2


Respiratory: Yes: CTA Bilaterally, Diminished (at bases)


Gastrointestinal: Yes: Normal Bowel Sounds, Soft


Extremities: Yes: Other (sky stocking)


Edema: Yes


Neurological: Yes: Alert, Oriented


Labs: 


 CBC, BMP





 03/27/17 06:30 





 03/27/17 06:30 





 INR, PTT











INR  1.48  (0.82-1.09)  H  03/22/17  06:45    














Problem List





- Problems


(1) Abdominal pain


Assessment/Plan: 


ct scan ordered to r.o abscess awaitnng report


patient has pain in area around the drain


mrcp done gallbladder wall thickening with edema


adrenal nodule nad renal pole lesion noted will evaluate with MRI


on iv abx


s/p bilary drain change now functioning


Code(s): R10.9 - UNSPECIFIED ABDOMINAL PAIN   Qualifiers: 


     Abdominal location: right upper quadrant     Qualified Code(s): R10.11 - 

Right upper quadrant pain  





(2) Cholecystitis, acute


Assessment/Plan: 


iv abx


clogged bilary tube now replaced


awiting cultures


Microbiology





03/22/17 16:20   Bile   Gram Stain - Final


03/22/17 16:20   Bile   Anaerobic Culture - Final


                              Escherichia Coli


                              NO ANAEROBES WERE ISOLATED


03/21/17 19:30   Urine - Urine Clean Catch   Urine Culture - Final


                              Pseudomonas Aeruginosa








Code(s): K81.0 - ACUTE CHOLECYSTITIS





(3) BPH (benign prostatic hyperplasia)


Assessment/Plan: 


urolgy eval noted


finasteride and floamx


trial of void





Code(s): N40.0 - BENIGN PROSTATIC HYPERPLASIA WITHOUT LOWER URINRY TRACT SYMP   





(4) HLD (hyperlipidemia)


Assessment/Plan: 


same meds


Code(s): E78.5 - HYPERLIPIDEMIA, UNSPECIFIED   Qualifiers: 


     Hyperlipidemia type: pure hypercholesterolemia        Qualified Code(s): 

E78.00 - Pure hypercholesterolemia, unspecified; E78.0 - Pure 

hypercholesterolemia  





(5) HTN (hypertension)


Assessment/Plan: 


same meds


Code(s): I10 - ESSENTIAL (PRIMARY) HYPERTENSION   Qualifiers: 


     Hypertension type: essential hypertension        Qualified Code(s): I10 - 

Essential (primary) hypertension  





(6) Coughing


Assessment/Plan: 


diet adjusted


r/o aspiration


cxr


MBS noted


og danielle noted


Code(s): R05 - COUGH





(7) UTI (urinary tract infection)


Assessment/Plan: 


Microbiology





03/21/17 19:30   Urine - Urine Clean Catch   Urine Culture - Final


                              Pseudomonas Aeruginosa





on zosyn


Code(s): N39.0 - URINARY TRACT INFECTION, SITE NOT SPECIFIED   Qualifiers: 


     Urinary tract infection type: site unspecified     Hematuria presence: 

without hematuria        Qualified Code(s): N39.0 - Urinary tract infection, 

site not specified

## 2017-03-27 NOTE — PN
Progress Note (short form)





- Note


Progress Note: 


continues to have RUQ discomfort





 Vital Signs











 Period  Temp  Pulse  Resp  BP Sys/Yun  Pulse Ox


 


 Last 24 Hr  97.5 F-98.6 F  65-76  18-20  151-182/67-92  95-95








cor-rrr


lungs decreased bs at bases


abd soft, RUQ drain, some discomfort at the drain site


+biliary drainage


ext no edema


+muñiz





 CBC, BMP





 03/27/17 06:30 





 03/27/17 06:30 





 Microbiology





03/21/17 16:00   Blood - Peripheral Venous   Blood Culture - Final


                            NO GROWTH AFTER 5 DAYS INCUBATION


03/21/17 16:00   Blood - Peripheral Venous   Blood Culture - Final


                            NO GROWTH AFTER 5 DAYS INCUBATION


03/22/17 16:20   Bile   Gram Stain - Final


03/22/17 16:20   Bile   Body Fluid Culture - Final


                            Escherichia Coli


03/22/17 16:20   Bile   Anaerobic Culture - Final


                            NO ANAEROBES WERE ISOLATED


03/21/17 19:30   Urine - Urine Clean Catch   Urine Culture - Final


                            Pseudomonas Aeruginosa





a/p


continued RUQ pain- awaiting results repeat CT scan (called radiology to 

expedite reading)


lfts normalizing


chronic cholycystitis, ?ruq abscess- continue zosyn, f/u culture


consider d/c muñiz

## 2017-03-27 NOTE — PN
Progress Note, SLP





- Note


Progress Note: 


85 year old male seen as a f/up to MBS with recommendation of regular soft and 

thin liquids.  Pt is consuming approximately 50% to 90% of meal at times. Pt 

requires verbal prompts for reminders during meals.  Continue current diet as 

stated. Observe standard aspiration precautions.  Results given to Charge RN 

and to pcp via chart.

## 2017-03-27 NOTE — PN
Progress Note (short form)





- Note


Progress Note: 


PULMONARY





Still with RUQ pain. No fevers recorded. No nausea or vomiting.





 Last Vital Signs











Temp Pulse Resp BP Pulse Ox


 


 97.5 F L  65   20   162/92   95 


 


 03/27/17 06:19  03/27/17 06:19  03/27/17 06:19  03/27/17 06:19  03/27/17 01:41








Gen:  NAD at rest


Heart:  RRR


Lung: decreased breath sounds at the bases


Abd: RUQ tenderness, +cholecystostomy with bilious drainage


Ext: no edema





 CBC, BMP





 03/27/17 06:30 





 03/27/17 06:30 





Active Medications





Acetaminophen (Tylenol -)  650 mg PO Q4H PRN


   PRN Reason: FEVER OR PAIN


   Last Admin: 03/27/17 10:28 Dose:  650 mg


Aspirin (Asa -)  325 mg PO DAILY@0800 [facility]


   Last Admin: 03/27/17 10:13 Dose:  325 mg


Atorvastatin Calcium (Lipitor -)  20 mg PO HS [facility]


   Last Admin: 03/26/17 22:27 Dose:  20 mg


Carvedilol (Coreg -)  12.5 mg PO BID [facility]


   Last Admin: 03/27/17 10:14 Dose:  12.5 mg


Finasteride (Proscar -)  5 mg PO DAILY [facility]


   Last Admin: 03/27/17 10:14 Dose:  5 mg


Guaifenesin (Robitussin -)  10 ml PO Q6H PRN


   PRN Reason: COUGH


Heparin Sodium (Porcine) (Heparin -)  5,000 unit SQ BID [facility]


   Last Admin: 03/27/17 10:14 Dose:  5,000 unit


Insulin Aspart (Novolog Vial Sliding Scale -)  1 vial SQ ACHS [facility]


   PRN Reason: Protocol


   Last Admin: 03/26/17 22:29 Dose:  Not Given


Losartan Potassium (Cozaar -)  50 mg PO DAILY [facility]


   Last Admin: 03/27/17 10:14 Dose:  50 mg


Melatonin (Melatonin)  5 mg PO HS PRN


   PRN Reason: INSOMNIA


   Last Admin: 03/26/17 22:28 Dose:  5 mg


Piperacillin Sod/Tazobactam Sod (Zosyn 3.375gm Ivpb (Pre-Docked))  3.375 gm 

IVPB Q8H-IV [facility]


   PRN Reason: Protocol


   Last Admin: 03/27/17 10:14 Dose:  3.375 gm


Polyethylene Glycol (Miralax (For Daily Use) -)  17 gm PO BID [facility]


   Last Admin: 03/27/17 10:27 Dose:  17 gm


Ranitidine HCl (Zantac -)  150 mg PO DAILY [facility]


   Last Admin: 03/27/17 10:14 Dose:  150 mg


Tamsulosin HCl (Flomax -)  0.4 mg PO DAILY@0830 [facility]


   Last Admin: 03/27/17 10:13 Dose:  0.4 mg





A/P


Chronic Cholecystitis 


r/o Intra-abdominal Abscess


Sleep apnea


HTN


HLD


COPD with chronic bronchitis 


BPH





-  continue antibiotics


-  f/u CT A/P


-  pain control


-  incentive spirometry


-  GI f/u


-  DVT prophylaxis

## 2017-03-27 NOTE — PN
GI Progress Note


Subjective: 


CT scan from yesterday shows persistent fluid collection and rectosigmoid fecal 

impaction


Had 3 BM's after the CT scan


No abdominal pain


+ pain on right flank











- Objective


Vital Signs: 


 Vital Signs











Temperature  98.0 F   03/27/17 14:28


 


Pulse Rate  71   03/27/17 14:28


 


Respiratory Rate  16   03/27/17 14:28


 


Blood Pressure  136/69   03/27/17 14:28


 


O2 Sat by Pulse Oximetry (%)  99   03/27/17 11:45











Constitutional: Calm


Eyes: No: Sclera Icterus


Cardiovascular: Yes: Regular Rate and Rhythm


Respiratory: Yes: Diminished (at bases b/l)


Gastrointestinal Inspection: No: Distention


...Auscultate: Yes: Normoactive Bowel Sounds


...Palpate: No: Tenderness


...Rectal Exam: Yes: Other (formed but soft light brown stool coming out of 

rectum. Rectal exam revealed copious soft light brown stool that was not 

amenable to manual disimpaction. Mineral oil enema was administered however it 

seaped out of the anus.)


Edema: No


Neurological: Yes: Alert, Oriented


Labs: 


 CBC, BMP





 03/27/17 06:30 





 03/27/17 06:30 





 INR, PTT











INR  1.48  (0.82-1.09)  H  03/22/17  06:45    














Problem List





- Problems


(1) Cholecystitis, acute


Assessment/Plan: 


with perihepatic fluid collection 


Clogged cholecystostomy tube replaced


Plan per ID / surgery








Code(s): K81.0 - ACUTE CHOLECYSTITIS

## 2017-03-27 NOTE — PN
Progress Note (short form)





- Note


Progress Note: 


small subcapsular fluid collection noted on CT scan will need draining


fecal impaction mineral oil enema


GI follow up








Problem List





- Problems


(1) Abdominal pain


Code(s): R10.9 - UNSPECIFIED ABDOMINAL PAIN   Qualifiers: 


     Abdominal location: right upper quadrant     Qualified Code(s): R10.11 - 

Right upper quadrant pain  





(2) Cholecystitis, acute


Code(s): K81.0 - ACUTE CHOLECYSTITIS





(3) BPH (benign prostatic hyperplasia)


Code(s): N40.0 - BENIGN PROSTATIC HYPERPLASIA WITHOUT LOWER URINRY TRACT SYMP   





(4) HLD (hyperlipidemia)


Code(s): E78.5 - HYPERLIPIDEMIA, UNSPECIFIED   Qualifiers: 


     Hyperlipidemia type: pure hypercholesterolemia        Qualified Code(s): 

E78.00 - Pure hypercholesterolemia, unspecified; E78.0 - Pure 

hypercholesterolemia  





(5) HTN (hypertension)


Code(s): I10 - ESSENTIAL (PRIMARY) HYPERTENSION   Qualifiers: 


     Hypertension type: essential hypertension        Qualified Code(s): I10 - 

Essential (primary) hypertension  





(6) Coughing


Code(s): R05 - COUGH





(7) UTI (urinary tract infection)


Code(s): N39.0 - URINARY TRACT INFECTION, SITE NOT SPECIFIED   Qualifiers: 


     Urinary tract infection type: site unspecified     Hematuria presence: 

without hematuria        Qualified Code(s): N39.0 - Urinary tract infection, 

site not specified

## 2017-03-28 LAB
ALBUMIN SERPL-MCNC: 2.6 G/DL (ref 3.4–5)
ALP SERPL-CCNC: 127 U/L (ref 45–117)
ALT SERPL-CCNC: 29 U/L (ref 12–78)
ANION GAP SERPL CALC-SCNC: 10 MMOL/L (ref 8–16)
AST SERPL-CCNC: 20 U/L (ref 15–37)
BASOPHILS # BLD: 1.9 % (ref 0–2)
BILIRUB SERPL-MCNC: 0.4 MG/DL (ref 0.2–1)
CALCIUM SERPL-MCNC: 8.3 MG/DL (ref 8.5–10.1)
CO2 SERPL-SCNC: 28 MMOL/L (ref 21–32)
CREAT SERPL-MCNC: 0.8 MG/DL (ref 0.7–1.3)
DEPRECATED RDW RBC AUTO: 14.9 % (ref 11.9–15.9)
EOSINOPHIL # BLD: 7.6 % (ref 0–4.5)
GLUCOSE SERPL-MCNC: 117 MG/DL (ref 74–106)
MCH RBC QN AUTO: 29.9 PG (ref 25.7–33.7)
MCHC RBC AUTO-ENTMCNC: 34.2 G/DL (ref 32–35.9)
MCV RBC: 87.3 FL (ref 80–96)
NEUTROPHILS # BLD: 57.2 % (ref 42.8–82.8)
PLATELET # BLD AUTO: 331 K/MM3 (ref 134–434)
PMV BLD: 7.1 FL (ref 7.5–11.1)
PROT SERPL-MCNC: 5.8 G/DL (ref 6.4–8.2)
WBC # BLD AUTO: 8.5 K/MM3 (ref 4–10)

## 2017-03-28 RX ADMIN — INSULIN ASPART SCH: 100 INJECTION, SOLUTION INTRAVENOUS; SUBCUTANEOUS at 21:45

## 2017-03-28 RX ADMIN — POLYETHYLENE GLYCOL 3350 SCH GRAMS: 17 POWDER, FOR SOLUTION ORAL at 15:00

## 2017-03-28 RX ADMIN — HEPARIN SODIUM SCH UNIT: 5000 INJECTION, SOLUTION INTRAVENOUS; SUBCUTANEOUS at 10:47

## 2017-03-28 RX ADMIN — INSULIN ASPART SCH: 100 INJECTION, SOLUTION INTRAVENOUS; SUBCUTANEOUS at 07:50

## 2017-03-28 RX ADMIN — LOSARTAN POTASSIUM SCH MG: 50 TABLET, FILM COATED ORAL at 10:43

## 2017-03-28 RX ADMIN — PIPERACILLIN SODIUM,TAZOBACTAM SODIUM SCH GM: 3; .375 INJECTION, POWDER, FOR SOLUTION INTRAVENOUS at 10:48

## 2017-03-28 RX ADMIN — PIPERACILLIN SODIUM,TAZOBACTAM SODIUM SCH GM: 3; .375 INJECTION, POWDER, FOR SOLUTION INTRAVENOUS at 01:19

## 2017-03-28 RX ADMIN — ATORVASTATIN CALCIUM SCH MG: 20 TABLET, FILM COATED ORAL at 21:41

## 2017-03-28 RX ADMIN — ASPIRIN 325 MG ORAL TABLET SCH MG: 325 PILL ORAL at 10:47

## 2017-03-28 RX ADMIN — INSULIN ASPART SCH: 100 INJECTION, SOLUTION INTRAVENOUS; SUBCUTANEOUS at 13:40

## 2017-03-28 RX ADMIN — ACETAMINOPHEN PRN MG: 325 TABLET ORAL at 10:45

## 2017-03-28 RX ADMIN — CARVEDILOL SCH MG: 12.5 TABLET, FILM COATED ORAL at 21:41

## 2017-03-28 RX ADMIN — CARVEDILOL SCH MG: 12.5 TABLET, FILM COATED ORAL at 10:42

## 2017-03-28 RX ADMIN — POLYETHYLENE GLYCOL 3350 SCH: 17 POWDER, FOR SOLUTION ORAL at 21:45

## 2017-03-28 RX ADMIN — INSULIN ASPART SCH: 100 INJECTION, SOLUTION INTRAVENOUS; SUBCUTANEOUS at 07:49

## 2017-03-28 RX ADMIN — Medication PRN MG: at 22:22

## 2017-03-28 RX ADMIN — RANITIDINE SCH MG: 150 TABLET ORAL at 10:47

## 2017-03-28 RX ADMIN — INSULIN ASPART SCH: 100 INJECTION, SOLUTION INTRAVENOUS; SUBCUTANEOUS at 17:45

## 2017-03-28 RX ADMIN — HEPARIN SODIUM SCH UNIT: 5000 INJECTION, SOLUTION INTRAVENOUS; SUBCUTANEOUS at 21:42

## 2017-03-28 RX ADMIN — FINASTERIDE SCH MG: 5 TABLET, FILM COATED ORAL at 10:45

## 2017-03-28 RX ADMIN — PIPERACILLIN SODIUM,TAZOBACTAM SODIUM SCH GM: 3; .375 INJECTION, POWDER, FOR SOLUTION INTRAVENOUS at 17:45

## 2017-03-28 RX ADMIN — TAMSULOSIN HYDROCHLORIDE SCH MG: 0.4 CAPSULE ORAL at 10:47

## 2017-03-28 RX ADMIN — POLYETHYLENE GLYCOL 3350 SCH: 17 POWDER, FOR SOLUTION ORAL at 10:49

## 2017-03-28 NOTE — PN
Progress Note (short form)





- Note


Progress Note: 


surgery





second ct reviewed.  now improved.  collection smaller.  drain in place.





Plan- need for abx per id.  will follow as outpt to consider cholecystectomy.  





[phone].

## 2017-03-28 NOTE — PN
Progress Note, Physician


Chief Complaint: 


having BM


still RUQ adn right sided flank pain








- Current Medication List


Current Medications: 


Active Medications





Acetaminophen (Tylenol -)  650 mg PO Q4H PRN


   PRN Reason: FEVER OR PAIN


   Last Admin: 03/27/17 10:28 Dose:  650 mg


Aspirin (Asa -)  325 mg PO DAILY@0800 [facility]


   Last Admin: 03/27/17 10:13 Dose:  325 mg


Atorvastatin Calcium (Lipitor -)  20 mg PO HS [facility]


   Last Admin: 03/27/17 21:07 Dose:  20 mg


Carvedilol (Coreg -)  12.5 mg PO BID [facility]


   Last Admin: 03/27/17 21:07 Dose:  12.5 mg


Finasteride (Proscar -)  5 mg PO DAILY [facility]


   Last Admin: 03/27/17 10:14 Dose:  5 mg


Guaifenesin (Robitussin -)  10 ml PO Q6H PRN


   PRN Reason: COUGH


Heparin Sodium (Porcine) (Heparin -)  5,000 unit SQ BID [facility]


   Last Admin: 03/27/17 21:07 Dose:  5,000 unit


Insulin Aspart (Novolog Vial Sliding Scale -)  1 vial SQ ACHS [facility]


   PRN Reason: Protocol


   Last Admin: 03/28/17 07:50 Dose:  Not Given


Losartan Potassium (Cozaar -)  50 mg PO DAILY [facility]


   Last Admin: 03/27/17 10:14 Dose:  50 mg


Melatonin (Melatonin)  5 mg PO HS PRN


   PRN Reason: INSOMNIA


   Last Admin: 03/26/17 22:28 Dose:  5 mg


Piperacillin Sod/Tazobactam Sod (Zosyn 3.375gm Ivpb (Pre-Docked))  3.375 gm 

IVPB Q8H-IV [facility]


   PRN Reason: Protocol


   Last Admin: 03/28/17 01:19 Dose:  3.375 gm


Polyethylene Glycol (Miralax (For Daily Use) -)  17 gm PO BID [facility]


   Last Admin: 03/27/17 21:14 Dose:  Not Given


Ranitidine HCl (Zantac -)  150 mg PO DAILY [facility]


   Last Admin: 03/27/17 10:14 Dose:  150 mg


Tamsulosin HCl (Flomax -)  0.4 mg PO DAILY@0830 [facility]


   Last Admin: 03/27/17 10:13 Dose:  0.4 mg











- Objective


Vital Signs: 


 Vital Signs











Temperature  97.7 F   03/28/17 05:00


 


Pulse Rate  79   03/28/17 05:00


 


Respiratory Rate  18   03/28/17 05:00


 


Blood Pressure  165/92   03/28/17 05:00


 


O2 Sat by Pulse Oximetry (%)  98   03/27/17 21:00











Constitutional: Yes: Calm


Neck: Yes: Trachea Midline


Cardiovascular: Yes: Regular Rate and Rhythm, S1, S2


Respiratory: Yes: CTA Bilaterally


Gastrointestinal: Yes: Soft, Tenderness (right postero-lateral chest wall/flank)

, Other (drain)


Edema: No


Neurological: Yes: Alert, Oriented


Labs: 


 CBC, BMP





 03/28/17 06:30 





 03/28/17 06:30 





 INR, PTT











INR  1.48  (0.82-1.09)  H  03/22/17  06:45    














Problem List





- Problems


(1) Abdominal pain


Assessment/Plan: 


ct scan noted inflammed GB and small subscapular fluid


Surgical FU discuss possible GB removal


ID fu as well


Code(s): R10.9 - UNSPECIFIED ABDOMINAL PAIN   Qualifiers: 


        Qualified Code(s): R10.11 - Right upper quadrant pain  





(2) Cholecystitis, acute


Assessment/Plan: 


see 1


Code(s): K81.0 - ACUTE CHOLECYSTITIS





(3) BPH (benign prostatic hyperplasia)


Assessment/Plan: 


urolgy eval noted


finasteride and floamx


trial of void





Code(s): N40.0 - BENIGN PROSTATIC HYPERPLASIA WITHOUT LOWER URINRY TRACT SYMP   





(4) HLD (hyperlipidemia)


Assessment/Plan: 


same meds


Code(s): E78.5 - HYPERLIPIDEMIA, UNSPECIFIED   Qualifiers: 


        Qualified Code(s): E78.00 - Pure hypercholesterolemia, unspecified; 

E78.0 - Pure hypercholesterolemia  





(5) HTN (hypertension)


Assessment/Plan: 


same meds


Code(s): I10 - ESSENTIAL (PRIMARY) HYPERTENSION   Qualifiers: 


        Qualified Code(s): I10 - Essential (primary) hypertension  





(6) Coughing


Assessment/Plan: 


diet adjusted


r/o aspiration


cxr


MBS noted


og danielle noted


Code(s): R05 - COUGH





(7) UTI (urinary tract infection)


Assessment/Plan: 


Microbiology





03/21/17 19:30   Urine - Urine Clean Catch   Urine Culture - Final


                              Pseudomonas Aeruginosa





on zosyn


Code(s): N39.0 - URINARY TRACT INFECTION, SITE NOT SPECIFIED   Qualifiers: 


        Qualified Code(s): N39.0 - Urinary tract infection, site not specified  








Assessment/Plan


fecal impaction seen by GI regimen ordered


got golytle and enema now miralax tid

## 2017-03-28 NOTE — PN
GI Progress Note


Subjective: 


No acute events


Pleuritic pain at right flank


Choking with liquids/meals per nurse: evaluated by S/S and had MBS. Improved 

swallowing and unrevealing esophageal stage


Had 2 BM's yesterday








- Objective


Vital Signs: 


 Vital Signs











Temperature  97.7 F   03/28/17 05:00


 


Pulse Rate  79   03/28/17 05:00


 


Respiratory Rate  18   03/28/17 05:00


 


Blood Pressure  165/92   03/28/17 05:00


 


O2 Sat by Pulse Oximetry (%)  98   03/27/17 21:00











Constitutional: Calm


Eyes: No: Sclera Icterus


Cardiovascular: Yes: Regular Rate and Rhythm


Respiratory: Yes: Diminished (at bases b/l)


Gastrointestinal Inspection: No: Distention


...Auscultate: Yes: Normoactive Bowel Sounds


...Palpate: No: Tenderness


Edema: No


Labs: 


 CBC, BMP





 03/28/17 06:30 





 03/28/17 06:30 





 INR, PTT











INR  1.48  (0.82-1.09)  H  03/22/17  06:45    








 Hepatic Panel











Total Bilirubin  0.4 mg/dL (0.2-1.0)   03/28/17  06:30    


 


AST  20 U/L (15-37)   03/28/17  06:30    


 


ALT  29 U/L (12-78)   03/28/17  06:30    


 


Alkaline Phosphatase  127 U/L ()  H  03/28/17  06:30    


 


Albumin  2.6 g/dl (3.4-5.0)  L  03/28/17  06:30    














Problem List





- Problems


(1) Cholecystitis, acute


Assessment/Plan: 


Management of cholecystostomy tube per surgery


Abx per ID


Fluid collection seems to have gotten smaller. Unclear if drainable





Code(s): K81.0 - ACUTE CHOLECYSTITIS





(2) Fecal impaction


Assessment/Plan: 


MiraLAX 17g PO TID


Code(s): K56.41 - FECAL IMPACTION

## 2017-03-28 NOTE — PN
Progress Note (short form)





- Note


Progress Note: 


continues to have RUQ discomfort








 Vital Signs











 Period  Temp  Pulse  Resp  BP Sys/Yun  Pulse Ox


 


 Last 24 Hr  97.7 F-98.6 F  63-91  16-20  136-165/61-92  93-99








cor-rrr


lungs clear


abd soft, RUQ drain


ext no edema





+muñiz





 CBC, BMP





 03/28/17 06:30 





 03/28/17 06:30 











a/p


continued RUQ pain- 


lfts normal


chronic cholycystitis, ?ruq abscess- continue zosyn, await surgical f/u for 

possible gallbladder surgery?


consider d/c muñiz 


continue zosyn  day #7

## 2017-03-28 NOTE — PN
Progress Note (short form)





- Note


Progress Note: 


PULMONARY





Still with RUQ pain. No fevers recorded. No nausea or vomiting. No shortness of 

breath. CT A/P showing small subcapsular collection.





 Last Vital Signs











Temp Pulse Resp BP Pulse Ox


 


 97.7 F   79   18   165/92   98 


 


 03/28/17 05:00  03/28/17 05:00  03/28/17 05:00  03/28/17 05:00  03/27/17 21:00











Gen:  NAD at rest


Heart:  RRR


Lung: decreased breath sounds at the bases


Abd: RUQ tenderness, +cholecystostomy with bilious drainage


Ext: no edema





 CBC, BMP





 03/28/17 06:30 





 03/28/17 06:30 





Active Medications





Acetaminophen (Tylenol -)  650 mg PO Q4H PRN


   PRN Reason: FEVER OR PAIN


   Last Admin: 03/27/17 10:28 Dose:  650 mg


Aspirin (Asa -)  325 mg PO DAILY@0800 [facility]


   Last Admin: 03/27/17 10:13 Dose:  325 mg


Atorvastatin Calcium (Lipitor -)  20 mg PO HS [facility]


   Last Admin: 03/27/17 21:07 Dose:  20 mg


Carvedilol (Coreg -)  12.5 mg PO BID [facility]


   Last Admin: 03/27/17 21:07 Dose:  12.5 mg


Finasteride (Proscar -)  5 mg PO DAILY [facility]


   Last Admin: 03/27/17 10:14 Dose:  5 mg


Guaifenesin (Robitussin -)  10 ml PO Q6H PRN


   PRN Reason: COUGH


Heparin Sodium (Porcine) (Heparin -)  5,000 unit SQ BID [facility]


   Last Admin: 03/27/17 21:07 Dose:  5,000 unit


Insulin Aspart (Novolog Vial Sliding Scale -)  1 vial SQ ACHS LAUREN


   PRN Reason: Protocol


   Last Admin: 03/28/17 07:50 Dose:  Not Given


Losartan Potassium (Cozaar -)  50 mg PO DAILY [facility]


   Last Admin: 03/27/17 10:14 Dose:  50 mg


Melatonin (Melatonin)  5 mg PO HS PRN


   PRN Reason: INSOMNIA


   Last Admin: 03/26/17 22:28 Dose:  5 mg


Piperacillin Sod/Tazobactam Sod (Zosyn 3.375gm Ivpb (Pre-Docked))  3.375 gm 

IVPB Q8H-IV LAUREN


   PRN Reason: Protocol


   Last Admin: 03/28/17 01:19 Dose:  3.375 gm


Polyethylene Glycol (Miralax (For Daily Use) -)  17 gm PO BID [facility]


   Last Admin: 03/27/17 21:14 Dose:  Not Given


Ranitidine HCl (Zantac -)  150 mg PO DAILY [facility]


   Last Admin: 03/27/17 10:14 Dose:  150 mg


Tamsulosin HCl (Flomax -)  0.4 mg PO DAILY@0830 [facility]


   Last Admin: 03/27/17 10:13 Dose:  0.4 mg








A/P


Chronic Cholecystitis 


r/o Intra-abdominal Abscess


Sleep apnea


HTN


HLD


COPD with chronic bronchitis 


BPH





-  continue antibiotics


-  ID/surgery input on whether small collection should be drained


-  pain control


-  incentive spirometry


-  DVT prophylaxis

## 2017-03-29 VITALS — SYSTOLIC BLOOD PRESSURE: 174 MMHG | DIASTOLIC BLOOD PRESSURE: 74 MMHG | TEMPERATURE: 98.6 F

## 2017-03-29 VITALS — HEART RATE: 87 BPM

## 2017-03-29 LAB
ALBUMIN SERPL-MCNC: 2.8 G/DL (ref 3.4–5)
ALP SERPL-CCNC: 125 U/L (ref 45–117)
ALT SERPL-CCNC: 30 U/L (ref 12–78)
ANION GAP SERPL CALC-SCNC: 9 MMOL/L (ref 8–16)
AST SERPL-CCNC: 20 U/L (ref 15–37)
BILIRUB SERPL-MCNC: 0.4 MG/DL (ref 0.2–1)
CALCIUM SERPL-MCNC: 8.9 MG/DL (ref 8.5–10.1)
CO2 SERPL-SCNC: 29 MMOL/L (ref 21–32)
CREAT SERPL-MCNC: 0.9 MG/DL (ref 0.7–1.3)
GLUCOSE SERPL-MCNC: 125 MG/DL (ref 74–106)
PROT SERPL-MCNC: 6.3 G/DL (ref 6.4–8.2)

## 2017-03-29 RX ADMIN — ASPIRIN 325 MG ORAL TABLET SCH MG: 325 PILL ORAL at 09:25

## 2017-03-29 RX ADMIN — INSULIN ASPART SCH: 100 INJECTION, SOLUTION INTRAVENOUS; SUBCUTANEOUS at 11:39

## 2017-03-29 RX ADMIN — FINASTERIDE SCH MG: 5 TABLET, FILM COATED ORAL at 09:24

## 2017-03-29 RX ADMIN — PIPERACILLIN SODIUM,TAZOBACTAM SODIUM SCH GM: 3; .375 INJECTION, POWDER, FOR SOLUTION INTRAVENOUS at 01:11

## 2017-03-29 RX ADMIN — INSULIN ASPART SCH: 100 INJECTION, SOLUTION INTRAVENOUS; SUBCUTANEOUS at 06:01

## 2017-03-29 RX ADMIN — RANITIDINE SCH MG: 150 TABLET ORAL at 09:25

## 2017-03-29 RX ADMIN — TAMSULOSIN HYDROCHLORIDE SCH MG: 0.4 CAPSULE ORAL at 09:25

## 2017-03-29 RX ADMIN — HEPARIN SODIUM SCH UNIT: 5000 INJECTION, SOLUTION INTRAVENOUS; SUBCUTANEOUS at 09:25

## 2017-03-29 RX ADMIN — LOSARTAN POTASSIUM SCH MG: 50 TABLET, FILM COATED ORAL at 09:25

## 2017-03-29 RX ADMIN — ACETAMINOPHEN PRN MG: 325 TABLET ORAL at 09:24

## 2017-03-29 RX ADMIN — POLYETHYLENE GLYCOL 3350 SCH GRAMS: 17 POWDER, FOR SOLUTION ORAL at 06:01

## 2017-03-29 RX ADMIN — CARVEDILOL SCH MG: 12.5 TABLET, FILM COATED ORAL at 09:25

## 2017-03-29 NOTE — PN
Progress Note (short form)





- Note


Progress Note: 


continues to have RUQ discomfort but less


eating well








 Vital Signs











 Period  Temp  Pulse  Resp  BP Sys/Yun  Pulse Ox


 


 Last 24 Hr  97.7 F-98.7 F  64-91  18-20  141-165/52-74  93-95








cor-rrr


lungs clear


abd soft,nt


+biliary drain


ext no edema


+muñiz





 CBC, BMP





 03/28/17 06:30 





 03/29/17 06:00 





a/p


surgery note reviewed- no plans for surgery yet


agree with change to po keflex/flagyl


consider d/c muñiz





please call back if needed

## 2017-03-29 NOTE — PN
Progress Note (short form)





- Note


Progress Note: 


surgery





pt seen and examined.  tolerating diet.





afebrile





abd- soft, nt, drain in place and draining bile





Plan- patient to be discharge to snf.  still unable to walk after last 

hospitalization.  New Ct much better than January. 


\


would wait at least 1 more month before plans for surgery.  This will give more 

time for patient to recover and be optimized as well as allow the gb to be less 

inflamed.





f/u in 1 month





[phone]

## 2017-03-29 NOTE — DS
Physical Examination


Vital Signs: 


 Vital Signs











Temperature  97.7 F   03/29/17 05:00


 


Pulse Rate  76   03/29/17 05:00


 


Respiratory Rate  18   03/29/17 05:00


 


Blood Pressure  165/71   03/29/17 05:00


 


O2 Sat by Pulse Oximetry (%)  95   03/28/17 21:00











Cardiovascular: Yes: Regular Rate and Rhythm


Respiratory: Yes: Regular, CTA Bilaterally


Gastrointestinal: Yes: Normal Bowel Sounds, Soft


Labs: 


 CBC, BMP





 03/28/17 06:30 











Discharge Summary


Reason For Visit: URINARY TRACT INFECTION


Current Active Problems





Abdominal pain (Acute) 


Coughing (Acute) 


Fecal impaction (Acute) 


UTI (urinary tract infection) (Acute) 


Urinary retention with incomplete bladder emptying (Acute) 








Hospital Course: 


85 year old male brought via EMS from [facility] and presenting with 

his wife, with a significant past medical history of prostate CA, kidney CA, 

TIA (2002), COPD, HTN, HLD, colon polyps, ischemic colitis and diverticulosis 

and cholecystitis , who presents to the emergency department with right sided 

pain for the past week. He reports that he was here a few weeks ago and had his 

gallbladder drained. He currently has a T - tube on the right side, where he 

currently has the pain. He describes his pain as moderate, without radiation. 

He notes that taking a deep breath and certain movements exacerbates his pain 








- Past Medical History


CNS: Yes: TIA


Cardiovascular: Yes: CHF, HTN, Hyperlipdemia


Pulmonary: Yes: COPD, Pneumonia (fungal)


Gastrointestinal: Yes: Diverticulitis, Other (cholecystitis)


Renal/: Yes: Cancer (Prostate/KIDNEY)


Psych: Yes: Anxiety


Rheumatology: Yes: Other (arthritis knees)





- Past Surgical History


Past Surgical History: Yes: Appendectomy, Colonoscopy (last 1 yrs ago, lynn 

2 polyps. h/o ischemic colitis 3,2 and ?years ago), Hernia Repair (b/l), 

Nephrectomy (left)


Additional Past Surgical History: 


t- tube placement











- Problems


(1) Abdominal pain


Assessment/Plan: 


ct scan noted inflammed GB and small subscapular fluid


Surgical FU discuss possible GB removal


ID fu as well--D/W DR PACHECO--KEFLEX AND FLAGYL X 1 WEEK


Code(s): R10.9 - UNSPECIFIED ABDOMINAL PAIN   Qualifiers: 


        Qualified Code(s): R10.11 - Right upper quadrant pain  





(2) Cholecystitis, acute


Assessment/Plan: 


see 1


Code(s): K81.0 - ACUTE CHOLECYSTITIS





(3) BPH (benign prostatic hyperplasia)


Assessment/Plan: 


urolgy lolis noted


finasteride and floamx


trial of void





Code(s): N40.0 - BENIGN PROSTATIC HYPERPLASIA WITHOUT LOWER URINRY TRACT SYMP   





(4) HLD (hyperlipidemia)


Assessment/Plan: 


same meds


Code(s): E78.5 - HYPERLIPIDEMIA, UNSPECIFIED   Qualifiers: 


        Qualified Code(s): E78.00 - Pure hypercholesterolemia, unspecified; 

E78.0 - Pure hypercholesterolemia  





(5) HTN (hypertension)


Assessment/Plan: 


same meds


Code(s): I10 - ESSENTIAL (PRIMARY) HYPERTENSION   Qualifiers: 


        Qualified Code(s): I10 - Essential (primary) hypertension  





(6) Coughing


Assessment/Plan: 


diet adjusted


r/o aspiration


cxr


MBS noted


og danielle noted


Code(s): R05 - COUGH





(7) UTI (urinary tract infection)


Assessment/Plan: 


Microbiology





03/21/17 19:30   Urine - Urine Clean Catch   Urine Culture - Final


                              Pseudomonas Aeruginosa





on zosyn--


Code(s): N39.0 - URINARY TRACT INFECTION, SITE NOT SPECIFIED   Qualifiers: 


        Qualified Code(s): N39.0 - Urinary tract infection, site not specified  








Assessment/Plan


fecal impaction seen by GI regimen ordered


got golytle and enema now miralax tid





Condition: Improved





- Instructions


Referrals: 


Álvaro Landin MD [Primary Care Provider] - 


Disposition: SKILLED NURSING FACILITY





- Home Medications


Comprehensive Discharge Medication List: 


Ambulatory Orders





Losartan Potassium [Cozaar] 50 mg PO DAILY 11/07/14 


Cinnamon Bark [Cinnamon] 500 mg PO DAILY 02/12/17 


Ranitidine [Zantac -] 150 mg PO DAILY 02/13/17 


Albuterol 0.083% Nebulizer Sol [Ventolin 0.083% Nebulizer Soln -] 1 amp NEB Q4H 

PRN #0 amp 03/01/17 


Atorvastatin Ca [Lipitor] 20 mg PO HS  tablet 03/01/17 


Carvedilol [Coreg -] 12.5 mg PO BID  tablet 03/21/17 


Aspirin [ASA -] 325 mg PO DAILY@0800  tablet 03/29/17 


Cephalexin Monohydrate [Keflex -] 500 mg PO BID #14 capsule 03/29/17 


Finasteride [Proscar -] 5 mg PO DAILY  tablet 03/29/17 


Metronidazole [Flagyl -] 500 mg PO TID  tablet 03/29/17 


Polyethylene Glycol 3350 [Miralax 119 gm Btl -] 17 gm PO TID  bottle 03/29/17 


Tamsulosin HCl [Flomax -] 0.4 mg PO DAILY #30 cap.er.24h 03/29/17

## 2017-03-29 NOTE — PN
Progress Note (short form)





- Note


Progress Note: 


PULMONARY





VSS/AFEBRILE


ANICTERIC


DISTANT BUT CLEAR BREATH SOUNDS


S1S2


RUQ DISCOMFORT


NO EDEMA





LABS/MEDS/NOTES/IMAGING/MICRO REVIEWED





AGREE WITH SURGEON REGARDING CONTINUING ABS AS COLLECTION APPEARS SMALLER


WOULD SUGGEST TRANSFER BACK TO SNF TO CONTINUE TREATMENT


CHOLECYSTECTOMY WHEN TREATMENT IS COMPLETED








GERI MESSER MD

## 2017-05-25 ENCOUNTER — HOSPITAL ENCOUNTER (OUTPATIENT)
Dept: HOSPITAL 74 - JRADIR | Age: 82
Discharge: HOME | End: 2017-05-25
Attending: SURGERY
Payer: COMMERCIAL

## 2017-05-25 DIAGNOSIS — Z48.03: ICD-10-CM

## 2017-05-25 DIAGNOSIS — K80.20: Primary | ICD-10-CM

## 2017-05-25 PROCEDURE — C1729 CATH, DRAINAGE: HCPCS

## 2017-05-25 PROCEDURE — A4358 URINARY LEG OR ABDOMEN BAG: HCPCS

## 2017-05-25 PROCEDURE — 0FPB3DZ REMOVAL OF INTRALUMINAL DEVICE FROM HEPATOBILIARY DUCT, PERCUTANEOUS APPROACH: ICD-10-PCS | Performed by: RADIOLOGY

## 2017-05-25 PROCEDURE — C1769 GUIDE WIRE: HCPCS

## 2017-05-25 PROCEDURE — 0F793DZ DILATION OF COMMON BILE DUCT WITH INTRALUMINAL DEVICE, PERCUTANEOUS APPROACH: ICD-10-PCS | Performed by: RADIOLOGY

## 2017-05-27 ENCOUNTER — HOSPITAL ENCOUNTER (EMERGENCY)
Dept: HOSPITAL 74 - JER | Age: 82
Discharge: HOME | End: 2017-05-27
Payer: COMMERCIAL

## 2017-05-27 VITALS — DIASTOLIC BLOOD PRESSURE: 59 MMHG | TEMPERATURE: 97.6 F | HEART RATE: 86 BPM | SYSTOLIC BLOOD PRESSURE: 107 MMHG

## 2017-05-27 VITALS — BODY MASS INDEX: 21.2 KG/M2

## 2017-05-27 DIAGNOSIS — Z86.73: ICD-10-CM

## 2017-05-27 DIAGNOSIS — Z85.46: ICD-10-CM

## 2017-05-27 DIAGNOSIS — I10: ICD-10-CM

## 2017-05-27 DIAGNOSIS — K81.1: Primary | ICD-10-CM

## 2017-05-27 DIAGNOSIS — Z85.528: ICD-10-CM

## 2017-05-27 DIAGNOSIS — J44.9: ICD-10-CM

## 2017-05-27 DIAGNOSIS — E78.00: ICD-10-CM

## 2017-05-27 LAB
ALBUMIN SERPL-MCNC: 3.8 G/DL (ref 3.4–5)
ALP SERPL-CCNC: 68 U/L (ref 45–117)
ALT SERPL-CCNC: 26 U/L (ref 12–78)
ANION GAP SERPL CALC-SCNC: 7 MMOL/L (ref 8–16)
AST SERPL-CCNC: 15 U/L (ref 15–37)
BASOPHILS # BLD: 1.3 % (ref 0–2)
BILIRUB SERPL-MCNC: 0.4 MG/DL (ref 0.2–1)
CALCIUM SERPL-MCNC: 9.5 MG/DL (ref 8.5–10.1)
CO2 SERPL-SCNC: 28 MMOL/L (ref 21–32)
COCKROFT - GAULT: 40.51
CREAT SERPL-MCNC: 1.3 MG/DL (ref 0.7–1.3)
DEPRECATED RDW RBC AUTO: 15 % (ref 11.9–15.9)
EOSINOPHIL # BLD: 8.9 % (ref 0–4.5)
GLUCOSE SERPL-MCNC: 127 MG/DL (ref 74–106)
MCH RBC QN AUTO: 29.9 PG (ref 25.7–33.7)
MCHC RBC AUTO-ENTMCNC: 33.6 G/DL (ref 32–35.9)
MCV RBC: 88.9 FL (ref 80–96)
NEUTROPHILS # BLD: 54.5 % (ref 42.8–82.8)
PLATELET # BLD AUTO: 266 K/MM3 (ref 134–434)
PMV BLD: 7.5 FL (ref 7.5–11.1)
PROT SERPL-MCNC: 7.2 G/DL (ref 6.4–8.2)
WBC # BLD AUTO: 7.6 K/MM3 (ref 4–10)

## 2017-05-27 NOTE — PDOC
*Physical Exam





- Vital Signs


 Last Vital Signs











Temp Pulse Resp BP Pulse Ox


 


 97.6 F   86   18   107/59   100 


 


 05/27/17 12:45  05/27/17 12:45  05/27/17 12:45  05/27/17 12:45  05/27/17 12:45














- Physical Exam


General Appearance: Yes: Nourished, Appropriately Dressed


Neck: positive: Trachea midline


Respiratory/Chest: positive: Lungs Clear, Normal Breath Sounds


Cardiovascular: positive: Regular Rhythm, Regular Rate, S1, S2.  negative: Edema

, JVD


Gastrointestinal/Abdominal: positive: Normal Bowel Sounds, Other (right sided 

drain with serosanginous drainage)


Musculoskeletal: positive: Normal Inspection.  negative: CVA Tenderness


Integumentary: positive: Normal Color, Dry, Warm


Neurologic: positive: Fully Oriented, Alert, Normal Mood/Affect





ED Treatment Course





- LABORATORY


CBC & Chemistry Diagram: 


 05/27/17 14:35





 05/27/17 14:35





Medical Decision Making





- Medical Decision Making


05/27/17 14:24


84 yo male s/p cholecystits with gb stent and drain, here 2 days following 

drain exchange by IR. concerns for blood in drainage tube. no n/v no f/c pain 

mild right side . no worse followign procedure. no other complaitns. no cp no 

sob.


on exam serosanginous drainage in bag, no abd tenderness. skin surrounding 

drain cdi no erythema.


plan : will d/w IR.


basic bloo work eval for anemia, signs of infection and LFT. likely dc home. 





05/27/17 15:19


lab normal. dc home d/w surgeon will follow up [facility] Hester in 2 weeks. 





*DC/Admit/Observation/Transfer


Diagnosis at time of Disposition: 


 Chronic cholecystitis, Post-op pain





- Discharge Dispostion


Disposition: HOME


Condition at time of disposition: Stable





- Referrals


Referrals: 


Álvaro Landin MD [Primary Care Provider] - 


Tony Terry MD [Staff Physician] - 





- Patient Instructions


Printed Discharge Instructions:  DI for Cholecystitis


Additional Instructions: 


Follow-up with Dr. Neri as directed





Drink lots of fluids 





Dr. Damon states concerned should be regarding fever, pain to abdomen, 

swelling, dm red blood or clots, or obstruction due to being otherwise this 

drainage should resolve gradually over periood of  time uncertain.








- Post Discharge Activity

## 2017-05-27 NOTE — PDOC
History of Present Illness





- General


Stated Complaint: BLOODY DRAINAGE


Time Seen by Provider: 05/27/17 13:26


History Source: Patient


Exam Limitations: No Limitations





- History of Present Illness


Initial Comments: 





05/27/17 13:46


Patient brought to emergency department by family, with advice from visiting 

nurse. Patient is post procedure via interventional radiology for a replacement 

of a gallbladder stent 2 days ago, Thursday. Since that time patient has had a 

serosanguineous colored drainage, no clots, no dm blood. Family was 

concerned about the discolored drainage 


Occurred: reports: just prior to arrival


Severity: reports: mild


Pain Location: reports: abdomen





Past History





- Travel


Traveled outside of the country in the last 30 days: No


Close contact w/someone who was outside of country & ill: No





- Past Medical History


Allergies/Adverse Reactions: 


 Allergies











Allergy/AdvReac Type Severity Reaction Status Date / Time


 


No Known Drug Allergies Allergy   Verified 05/27/17 12:50











Home Medications: 


Ambulatory Orders





Losartan Potassium [Cozaar] 50 mg PO DAILY 11/07/14 


Cinnamon Bark [Cinnamon] 500 mg PO DAILY 02/12/17 


Ranitidine [Zantac -] 150 mg PO DAILY 02/13/17 


Albuterol 0.083% Nebulizer Sol [Ventolin 0.083% Nebulizer Soln -] 1 amp NEB Q4H 

PRN #0 amp 03/01/17 


Atorvastatin Ca [Lipitor] 20 mg PO HS  tablet 03/01/17 


Carvedilol [Coreg -] 12.5 mg PO BID  tablet 03/21/17 


Aspirin [ASA -] 325 mg PO DAILY@0800  tablet 03/29/17 


Cephalexin Monohydrate [Keflex -] 500 mg PO BID #14 capsule 03/29/17 


Finasteride [Proscar -] 5 mg PO DAILY  tablet 03/29/17 


Metronidazole [Flagyl -] 500 mg PO TID  tablet 03/29/17 


Polyethylene Glycol 3350 [Miralax 119 gm Btl -] 17 gm PO TID  bottle 03/29/17 


Tamsulosin HCl [Flomax -] 0.4 mg PO DAILY #30 cap.er.24h 03/29/17 








Anemia: No (PLACED ON IRON PREOP)


Asthma: No


Cancer: Yes (prostate cancer,kidney cancer)


Cardiac Disorders: No


CVA: Yes (TIA,2002)


COPD: Yes (mild,USES ALBUTEROL AS NEEDED)


CHF: No


Dementia: No


Diabetes: No


GI Disorders: Yes (COLON POLYPS, ISCHEMIC COLITIS, DIVERTICULOSIS)


 Disorders: Yes (PROSTATE CA)


HTN: Yes


Hypercholesterolemia: Yes


Liver Disease: No


Suicide Attempt (Hx): No


Seizures: No


Thyroid Disease: No





- Surgical History


Abdominal Surgery: Yes (BILATERAL  INGUINAL HERNIA)


Appendectomy: Yes


Cardiac Surgery: No


Cholecystectomy: No


Lung Surgery: No


Neurologic Surgery: Yes (NECK FUSION)


Orthopedic Surgery: Yes (NECK SURGERY, L knee sx 12/8)





- Immunization History


Immunization Up to Date: Yes





- Psycho/Social/Smoking Cessation Hx


Anxiety: No


Suicidal Ideation: No


Smoking Status: Yes


Smoking History: Former smoker


Have you smoked in the past 12 months: No


Number of Cigarettes Smoked Daily: 0


If you are a former smoker, when did you quit?: 35 years ago


Information on smoking cessation initiated: No


Hx Alcohol Use: No


Drug/Substance Use Hx: No


Substance Use Type: Alcohol


Hx Substance Use Treatment: No





Trauma Specific PMHX





- Complaint Specific PMHX


Back Injury: No


Neck Injury: No





**Review of Systems





- Review of Systems


Able to Perform ROS?: Yes


Is the patient limited English proficient: Yes


Constitutional: Yes: Symptoms Reported, See HPI, Malaise


HEENTM: Yes: Symptoms Reported


Respiratory: Yes: See HPI.  No: Symptoms reported


ABD/GI: Yes: See HPI.  No: Symptoms Reported


: No: Symptoms Reported


Musculoskeletal: Yes: Symptoms Reported


Integumentary: Yes: Symptoms Reported





*Physical Exam





- Vital Signs


 Last Vital Signs











Temp Pulse Resp BP Pulse Ox


 


 97.6 F   86   18   107/59   100 


 


 05/27/17 12:45  05/27/17 12:45  05/27/17 12:45  05/27/17 12:45  05/27/17 12:45














- Physical Exam


General Appearance: Yes: Nourished, Appropriately Dressed.  No: Apparent 

Distress


HEENT: positive: ELIS, Normal ENT Inspection, TMs Normal, Pharynx Normal


Neck: positive: Supple.  negative: Tender


Respiratory/Chest: positive: Lungs Clear, Normal Breath Sounds


Gastrointestinal/Abdominal: positive: Tender (patient complains of some mild 

tenderness to his right flank however states has chest occurred, and is not 

associated with movement, fevers, belching, or any nauseousness.), Soft, Other (

patient with an intact catheter occluding from right upper quadrant consistent 

with history of gallbladder stent/drainage tube. Attached with a leg bag that's 

draining a serosanguineous fluid with some mucoid substance.).  negative: 

Guarding, Rebound, Tenderness


Musculoskeletal: negative: CVA Tenderness


Extremity: positive: Normal Capillary Refill, Normal Inspection, Normal Range 

of Motion


Integumentary: positive: Dry, Warm, Pale, Swelling


Neurologic: positive: CNs II-XII NML intact, Fully Oriented, Alert, Normal Mood/

Affect, Normal Response, Motor Strength 5/5





ED Treatment Course





- LABORATORY


CBC & Chemistry Diagram: 


 05/27/17 14:35





 05/27/17 14:35





Progress Note





- Progress Note


Progress Note: 


Discuss case with Dr. Damon who was the radiologist who replace this catheter/

stent. Reports that these symptoms are probable normal postoperative/procedural 

and due to the fact patient is afebrile, there is no dm blood, no 

nauseousness, and bowel movements have been normal that is not suspicious by 

history of any significant complications. States if serosanguineous fluid 

continues after approximately one to 2 weeks may consider repeating laboratory 

testing. 


Dr. Au invalid patient and deemed laboratory work necessary therefore CBC 

and chemistries obtained, were indeed within normal limits therefore patient 

ready for discharged. Viewed with patient and family need to follow-up with Dr. VALENTIN Renteria as scheduled, and signs and symptoms of distress  including fevers, 

abdominal distention, bleeding from stent or cessation of drainage would 

require immediate evaluation otherwise will follow-up with Dr. Landin as 

planned





*DC/Admit/Observation/Transfer


Diagnosis at time of Disposition: 


 Chronic cholecystitis, Post-op pain





- Discharge Dispostion


Disposition: HOME


Condition at time of disposition: Stable


Admit: No





- Referrals


Referrals: 


Álvaro Landin MD [Primary Care Provider] - 


Tony Terry MD [Staff Physician] - 





- Patient Instructions


Printed Discharge Instructions:  DI for Cholecystitis


Additional Instructions: 


Follow-up with Dr. Neri as directed





Drink lots of fluids 





Dr. Damon states concerned should be regarding fever, pain to abdomen, 

swelling, dm red blood or clots, or obstruction due to being otherwise this 

drainage should resolve gradually over periood of  time uncertain.

## 2017-08-18 NOTE — HP
DATE OF ADMISSION:  08/23/2017 

 

Patient to be admitted on August 23 for anticipated laparoscopic cholecystectomy.

 

ADMITTING DIAGNOSIS:  Acute cholecystitis with previous history of cholecystostomy

tube.

 

BRIEF HISTORY:  This is an 85-year-old male who was seen in February of 2017 by Dr. Zhu.  Please reference his consultation from February 13, 2017, for acute

cholecystitis.  At that time, he was severely ill and required a percutaneous

cholecystostomy tube.  He had a protracted hospital course and a protracted course in

rehab.  At this point, the patient has been optimized and wished to proceed with

surgery, knowing that he will have significant risk due to his multiple medical

problems.  

 

His multiple medical problems include hypertension, hyperlipidemia, COPD, sleep

apnea, TIA, prostate cancer, and renal cancer.  His only abdominal surgery includes

an appendectomy and a nephrectomy.  

 

He has no known drug allergies.

 

He denies taking medications of blood thinner type, with the exception of aspirin.

 

ASSESSMENT:  The patient will be optimized by his medical doctor, Dr. Landin, and

his cardiologist, Dr. Vazquez.  He will present for surgery.  He will get Ancef

prophylaxis prior to incision, and the patient assumes the risks of surgery.  Risks

and benefits of surgery were explained to the patient in detail.  His daughter was

present.  These are including but not limited to the possibility of conversion to

open, possible injury to viscera, possibility of common bile duct injury, possibility

of cystic duct stump leak, possibility of infection, possibility of blood loss

requiring a blood transfusion, plus a multitude of medical problems including but not

limited to cardiac, neurologic, pulmonary, and vascular complications, and even

death.  The patient understands these risks and is agreeable to surgery.  He was also

offered to remove his percutaneous cholecystostomy tube and not have surgery.  He

declined that option.

 

 

DO JOSE ZIMMERMAN/[id number]

DD: 07/26/2017 16:34

DT: 07/27/2017 21:30

Job #:  [id number]

## 2017-08-23 ENCOUNTER — HOSPITAL ENCOUNTER (OUTPATIENT)
Dept: HOSPITAL 74 - JASUSAT | Age: 82
LOS: 1 days | Discharge: HOME | End: 2017-08-24
Attending: SURGERY
Payer: COMMERCIAL

## 2017-08-23 VITALS — BODY MASS INDEX: 23 KG/M2

## 2017-08-23 DIAGNOSIS — C64.9: ICD-10-CM

## 2017-08-23 DIAGNOSIS — E78.5: ICD-10-CM

## 2017-08-23 DIAGNOSIS — Z90.89: ICD-10-CM

## 2017-08-23 DIAGNOSIS — Z86.73: ICD-10-CM

## 2017-08-23 DIAGNOSIS — Z90.5: ICD-10-CM

## 2017-08-23 DIAGNOSIS — G47.30: ICD-10-CM

## 2017-08-23 DIAGNOSIS — C61: ICD-10-CM

## 2017-08-23 DIAGNOSIS — K80.12: Primary | ICD-10-CM

## 2017-08-23 DIAGNOSIS — I10: ICD-10-CM

## 2017-08-23 DIAGNOSIS — J44.9: ICD-10-CM

## 2017-08-23 PROCEDURE — BF03YZZ PLAIN RADIOGRAPHY OF GALLBLADDER AND BILE DUCTS USING OTHER CONTRAST: ICD-10-PCS | Performed by: SURGERY

## 2017-08-23 PROCEDURE — 0FT44ZZ RESECTION OF GALLBLADDER, PERCUTANEOUS ENDOSCOPIC APPROACH: ICD-10-PCS | Performed by: SURGERY

## 2017-08-23 PROCEDURE — 0DNW4ZZ RELEASE PERITONEUM, PERCUTANEOUS ENDOSCOPIC APPROACH: ICD-10-PCS | Performed by: SURGERY

## 2017-08-23 PROCEDURE — 0WPG40Z REMOVAL OF DRAINAGE DEVICE FROM PERITONEAL CAVITY, PERCUTANEOUS ENDOSCOPIC APPROACH: ICD-10-PCS | Performed by: SURGERY

## 2017-08-23 RX ADMIN — CARVEDILOL SCH MG: 12.5 TABLET, FILM COATED ORAL at 22:22

## 2017-08-23 RX ADMIN — HYDROMORPHONE HYDROCHLORIDE PRN MG: 1 INJECTION, SOLUTION INTRAMUSCULAR; INTRAVENOUS; SUBCUTANEOUS at 15:15

## 2017-08-23 RX ADMIN — HYDROMORPHONE HYDROCHLORIDE PRN MG: 1 INJECTION, SOLUTION INTRAMUSCULAR; INTRAVENOUS; SUBCUTANEOUS at 15:25

## 2017-08-23 NOTE — OP
DATE OF OPERATION:  08/23/2017

 

PREOPERATIVE DIAGNOSIS:  Acute cholecystitis, chronic cholecystitis, 
cholelithiasis,

with history of cholecystostomy tube.  

 

POSTOPERATIVE DIAGNOSIS:  Acute cholecystitis, chronic cholecystitis, 
cholelithiasis,

with history of cholecystostomy tube.  

 

PROCEDURE:  Laparoscopic cholecystectomy.  Extensive lysis of adhesions.  
Removal of

foreign body.  Intraoperative cholangiogram utilizing the firefly technology 
lavage. 

  

SURGEON:  Tony Terry D.O. 

 

FIRST ASSISTANT:  Mauri Kellogg M.D. 

 

ANESTHESIOLOGIST:  Ariel Holloway M.D. (general)

 

SPECIMEN:  Gallbladder.  

 

BLOOD LOSS:  50 mL.

 

DRAINS:  None.

 

COMPLICATIONS:  None.  

 

DISPOSITION:  Recovery room in stable condition.  

 

BRIEF HISTORY:  This is an 85-year-old male who required emergent percutaneous

cholecystostomy tube in February of 2017.  He presents now for elective surgery 
to

remove his gallbladder.  His cholecystostomy tube is still in place.  



DESCRIPTION OF PROCEDURE:   The patient was placed in supine position.  After 
general

anesthesia was initiated, the abdomen was prepped and draped in sterile 
fashion.  The

patient received Ancef antibiotic prophylaxis.  At this point a transverse 
incision

was made intraumbilical with scalpel used to go through skin and subcutaneous 
tissue.

The fascia was then lifted with Kocher clamp.  The Veress needle was inserted, 
and

pneumoperitoneum was created.  Next, an 11-mm trocar was placed, followed by

insertion of a 10-mm, 0-degree laparoscope.  Next, an additional 11-mm trocar 
was

placed and two 5-mm trocars were placed in right upper quadrant.  At this point,

attention was turned to the gallbladder.  There were extensive adhesions 
between the

gallbladder and omentum as well as the liver and the abdominal wall.  These were

taken down by extensive, time-consuming meticulous sharp dissection.  At this 
point,

the gallbladder is noted to be pale, chronically thickened.  The fundus was at 
the

cephalad and the infundibulum was retracted laterally.  The peritoneal peel was

carefully dissected down exposing almost no cysteic duct and essentially a 
junction

of the gallbladder to the common bile duct.  At this point, the Ligasure device 
was

used to divide the cystic artery.  The Endo ALETA multifire vascular load was 
used to

divide the bottom portion of the gallbladder, just above its junction with the 
common

bile duct.  At this point, the gallbladder was liberated from the liver bed 
using a

combination of sharp dissection and electrocautery.  The cholecystostomy tube 
was

removed with the gallbladder, and it was placed in a specimen bag and sent to

pathology marked as specimen.  A significant skin incision lengthening as well 
as

fascial dilatation was required in order to deliver such a chronically thickened

gallbladder that was full of containing a very large stone.  Next, a vigorous 
lavage

was done, all return was clear.  Of note, the firefly technology was used to 
evaluate

the gallbladder, cystic duct, common bile duct in order to verify anatomy prior 
to

any division of structures.  At this point, trocars were removed under direct

visualization and no bleeding was noted.  The fascia, the infraumbilical as 
well as

the subxiphoid trocar sites were closed with multiple interrupted 0 Vicryl 
sutures. 

The 4 skin incisions were closed with Biosyn and Dermabond dressing was placed. 

Overall the patient tolerated the procedure well.  There were no complications. 

Blood loss was approximately 50 mL.  The patient went to recovery room in stable

condition to be observed overnight as per plan.  

 

 

DO JOSE ZIMMERMAN/[id number]

DD: 08/23/2017 13:12

DT: 08/23/2017 20:43

Job #:  [id number]

MTDD

## 2017-08-23 NOTE — OP
Operative Note





- Note:


Operative Date: 08/23/17


Pre-Operative Diagnosis: acute cholecystitis, chronic cholecysitis, 

cholelithiasis, history percutaneous choelcystostomy tube


Operation: laparoscopic cholecystectomy, removal of foreign body, 

intraoperative cholangiogram, exstensive lysis of adhesions, lavage


Findings: 


chronically thickened, densely adhesed gb with cholecystostomy tube in place


Post-Operative Diagnosis: Same as Pre-op


Surgeon: Tony Terry


Assistant: Mauri Kellogg


Anesthesiologist/CRNA: Ariel Holloway


Anesthesia: General


Specimens Removed: gb


Estimated Blood Loss (mls): 50

## 2017-08-24 VITALS — SYSTOLIC BLOOD PRESSURE: 144 MMHG | DIASTOLIC BLOOD PRESSURE: 67 MMHG

## 2017-08-24 VITALS — TEMPERATURE: 98.1 F

## 2017-08-24 VITALS — HEART RATE: 66 BPM

## 2017-08-24 RX ADMIN — CARVEDILOL SCH MG: 12.5 TABLET, FILM COATED ORAL at 09:15

## 2017-08-24 NOTE — PATH
Surgical Pathology Report



Patient Name:  ANTONIA ALEXANDER

Accession #:  [id number]

Med. Rec. #:  [id number]                                                        

   /Age/Gender:  1932 (Age: 85) / M

Account:  [id number]                                                          

             Location: [facility] MED/SURG

Taken:  2017

Received:  2017

Reported:  2017

Physicians:  Tony Terry M.D.

  



Specimen(s) Received

 GALLBLADDER WITH CHOLECYSTOMY TUBE 





Clinical History

Acute cholecystitis







Final Diagnosis

GALLBLADDER, CHOLECYSTECTOMY:

MARKED ACUTE AND CHRONIC CHOLECYSTITIS, CHOLELITHIASIS.





***Electronically Signed***

Alexander Finkelstein, M.D.





Gross Description

Received in formalin, labeled "gallbladder with cholecystomy tube" is a 7.5 x 4

x 2.5 cm gallbladder with a 0.1 cm in length portion of cystic duct attached.

The outer surface with a focal defect is tan-red and focally hemorrhagic. The

lumen contains a single tan-brown gallstone 3.5 cm in largest dimension. The

mucosa is tan-red and congested. The wall of the gallbladder is ~0.3 cm in

thickness. Representative sections are submitted in one cassette.  

AF/2017



final/2017

## 2017-08-24 NOTE — PN
Progress Note (short form)





- Note


Progress Note: 


Anesthesia Post op


Pt seen and examined


S:alert and awake


O:


 Vital Signs











Temperature  98.1 F   08/24/17 06:00


 


Pulse Rate  87   08/24/17 06:00


 


Respiratory Rate  20   08/24/17 06:00


 


Blood Pressure  144/67   08/24/17 06:00


 


O2 Sat by Pulse Oximetry (%)  96   08/23/17 21:00








A/P:s/p lap pam


Doing well post op


Continue current care


Tony Wong

## 2017-10-12 ENCOUNTER — HOSPITAL ENCOUNTER (INPATIENT)
Dept: HOSPITAL 74 - JER | Age: 82
LOS: 4 days | Discharge: HOME HEALTH | DRG: 872 | End: 2017-10-16
Attending: INTERNAL MEDICINE | Admitting: INTERNAL MEDICINE
Payer: COMMERCIAL

## 2017-10-12 VITALS — BODY MASS INDEX: 22.1 KG/M2

## 2017-10-12 DIAGNOSIS — I10: ICD-10-CM

## 2017-10-12 DIAGNOSIS — D72.829: ICD-10-CM

## 2017-10-12 DIAGNOSIS — S22.41XA: ICD-10-CM

## 2017-10-12 DIAGNOSIS — N39.0: ICD-10-CM

## 2017-10-12 DIAGNOSIS — I25.10: ICD-10-CM

## 2017-10-12 DIAGNOSIS — Z85.53: ICD-10-CM

## 2017-10-12 DIAGNOSIS — N40.0: ICD-10-CM

## 2017-10-12 DIAGNOSIS — F41.9: ICD-10-CM

## 2017-10-12 DIAGNOSIS — R74.0: ICD-10-CM

## 2017-10-12 DIAGNOSIS — B96.20: ICD-10-CM

## 2017-10-12 DIAGNOSIS — Z85.46: ICD-10-CM

## 2017-10-12 DIAGNOSIS — B37.0: ICD-10-CM

## 2017-10-12 DIAGNOSIS — A41.9: Primary | ICD-10-CM

## 2017-10-12 DIAGNOSIS — N31.9: ICD-10-CM
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
## 2017-10-12 DIAGNOSIS — R07.9: ICD-10-CM

## 2017-10-12 DIAGNOSIS — E78.5: ICD-10-CM
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
## 2017-10-12 DIAGNOSIS — J98.11: ICD-10-CM
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
## 2017-10-12 DIAGNOSIS — J44.9: ICD-10-CM

## 2017-10-12 LAB
ALBUMIN SERPL-MCNC: 3.9 G/DL (ref 3.4–5)
ALP SERPL-CCNC: 133 U/L (ref 45–117)
ALT SERPL-CCNC: 138 U/L (ref 12–78)
ANION GAP SERPL CALC-SCNC: 14 MMOL/L (ref 8–16)
APPEARANCE UR: (no result)
APTT BLD: 35.9 SECONDS (ref 26.9–34.4)
ART PUNCT SITE: (no result)
ARTERIAL PATENCY WRIST A: POSITIVE
AST SERPL-CCNC: 112 U/L (ref 15–37)
BACTERIA #/AREA URNS HPF: (no result) /HPF
BASE EXCESS BLDA CALC-SCNC: -1 MEQ/L (ref -2–2)
BASOPHILS # BLD: 0.6 % (ref 0–2)
BILIRUB SERPL-MCNC: 0.7 MG/DL (ref 0.2–1)
BILIRUB UR STRIP.AUTO-MCNC: NEGATIVE MG/DL
CALCIUM SERPL-MCNC: 9 MG/DL (ref 8.5–10.1)
CK SERPL-CCNC: 114 IU/L (ref 39–308)
CO2 SERPL-SCNC: 22 MMOL/L (ref 21–32)
COLOR UR: YELLOW
CREAT SERPL-MCNC: 1.1 MG/DL (ref 0.7–1.3)
DEPRECATED RDW RBC AUTO: 14.7 % (ref 11.9–15.9)
EOSINOPHIL # BLD: 0.7 % (ref 0–4.5)
GLUCOSE SERPL-MCNC: 98 MG/DL (ref 74–106)
HCO3 BLDA-SCNC: 21.9 MEQ/L (ref 22–26)
INR BLD: 1.35 (ref 0.82–1.09)
INR BLD: 1.5 (ref 0.82–1.09)
KETONES UR QL STRIP: NEGATIVE
LEUKOCYTE ESTERASE UR QL STRIP.AUTO: (no result)
LPM/O2%: (no result)
MCH RBC QN AUTO: 30.1 PG (ref 25.7–33.7)
MCHC RBC AUTO-ENTMCNC: 33.1 G/DL (ref 32–35.9)
MCV RBC: 90.9 FL (ref 80–96)
METHGB MFR BLD: 0.5 % (ref 0.4–1.5)
MUCOUS THREADS URNS QL MICRO: (no result)
NEUTROPHILS # BLD: 79.5 % (ref 42.8–82.8)
NITRITE UR QL STRIP: POSITIVE
PEEP SETTING VENT: 0 CMH2O
PH BLDV: 7.38 [PH] (ref 7.32–7.42)
PH UR: 5 [PH] (ref 5–8)
PLATELET # BLD AUTO: 312 K/MM3 (ref 134–434)
PMV BLD: 8 FL (ref 7.5–11.1)
PO2 BLDA: 58.9 MMHG (ref 68–100)
PROT SERPL-MCNC: 7.8 G/DL (ref 6.4–8.2)
PROT UR QL STRIP: NEGATIVE
PROT UR QL STRIP: NEGATIVE
PT PNL PPP: 15.3 SEC (ref 9.98–11.88)
PT PNL PPP: 16.9 SEC (ref 9.98–11.88)
PT. ON O2?: NO
RBC # BLD AUTO: 1 /HPF (ref 0–3)
RBC # UR STRIP: (no result) /UL
SAO2 % BLDA: 91.9 % (ref 90–98.9)
SAO2 % BLDV: 23.2 MEQ/L (ref 19–25)
SP GR UR: 1.01 (ref 1–1.02)
TROPONIN I SERPL-MCNC: < 0.02 NG/ML (ref 0–0.05)
TYPE OF O2: (no result)
UROBILINOGEN UR STRIP-MCNC: NEGATIVE MG/DL (ref 0.2–1)
WBC # BLD AUTO: 13.6 K/MM3 (ref 4–10)
WBC # UR AUTO: 38 /HPF (ref 3–5)

## 2017-10-12 PROCEDURE — G0008 ADMIN INFLUENZA VIRUS VAC: HCPCS

## 2017-10-12 RX ADMIN — SODIUM CHLORIDE SCH MLS/HR: 9 INJECTION, SOLUTION INTRAVENOUS at 21:00

## 2017-10-12 RX ADMIN — ATORVASTATIN CALCIUM SCH MG: 20 TABLET, FILM COATED ORAL at 21:15

## 2017-10-12 RX ADMIN — LEVOFLOXACIN ONE MLS/HR: 5 INJECTION, SOLUTION INTRAVENOUS at 11:30

## 2017-10-12 NOTE — PN
Teaching Attending Note


Name of Resident: Adrian Bagley


ATTENDING PHYSICIAN STATEMENT





I saw and evaluated the patient.


I reviewed the resident's note and discussed the case with the resident.


I agree with the resident's findings and plan as documented.








SUBJECTIVE: This is an 85 year old man with a history of HTN, hyperlipidemia, 

CAD, COPD, TIA, prostate cancer, renal cancer, anxiety who fell while walking 

to the bathroom this morning. His wife heard him fall and found him lying on 

his side. She was unable to get him up so she called EMS. He was on the floor 

for about one hour. He denies having dizziness, chest pain, palpitations, nausea

, head trauma, loss of consciousness. He has pain in the right side of his 

back. He has had a cough x 2 weeks. He has not had fevers, SOB. He saw his 

cardiologist yesterday.








OBJECTIVE:


 Vital Signs











 Period  Temp  Pulse  Resp  BP Sys/Yun  Pulse Ox


 


 Last 24 Hr  99.8 F-102.2 F    20-20  100-150/55-87  95-98








HEART: S1S2, RRR


LUNGS: Clear


ABDOMEN: Soft, non-tender, non=distended, normal BS


EXTREMITIES: No edema


BACK: Ecchymosis lateral right back








ASSESSMENT AND PLAN:


This is an 85 year old man with a history of HTN, hyperlipidemia, CAD, COPD, TIA

, prostate cancer, neurogenic bladder, renal cancer, anxiety who presented to 

the ER with right sided back pain after a fall. 





1. Sepsis secondary to UTI


   - Previously grew Pseudomonas, E. coli in urine


   - Continue Levaquin


   - IV fluid


   - Follow up urine culture, blood cultures


2. Rib fractures


   - Oxycodone as needed for pain


   - Incentive spirometer


3. s/p fall


4. Oral candidiasis


   - Diflucan PO


5. Hepatic transaminitis


   - Likely secondary to sepsis


   - Monitor LFTs


6. HTN


   - Continue Coreg, Cozaar, Norvasc


7. Hyperlipidemia


   - Continue Lipitor


8. CAD


   - Continue aspirin, Coreg, Lipitor


9. COPD


   - Stable


10. Anxiety


11. History of prostate cancer


   - Continue Proscar


12. Neurogenic bladder


   - Continue Flomax


13. History of renal cancer

## 2017-10-12 NOTE — PDOC
History of Present Illness





- General


Chief Complaint: Injury


Stated Complaint: FALL


Time Seen by Provider: 10/12/17 07:52


History Source: Patient


Exam Limitations: No Limitations





- History of Present Illness


Initial Comments: 


10/12/17 10:02


85-year-old male presents to the ED with complaints of right back pain after 

falling today. Patient states was walking when he lost his balance stating his 

knees buckled causing him to fall backwards and striking a piece of furniture. 

The wife who heard him fall came immediately to his side and found him alert 

and conversing complaining of pain to the right back. Patient was sitting on 

his buttocks upon her arrival. Patient states did not hit his head and has no 

complaints of anything except for right back pain. Patient is currently on 

aspirin 325 mg. 








Occurred: reports: just prior to arrival


Severity: reports: moderate


Pain Location: reports: back


Method of Injury: Yes: fall


Modifying Factors: improves with: None


Associated Symptoms (Fall): other (back pain)





Past History





- Travel


Traveled outside of the country in the last 30 days: No


Close contact w/someone who was outside of country & ill: No





- Past Medical History


Allergies/Adverse Reactions: 


 Allergies











Allergy/AdvReac Type Severity Reaction Status Date / Time


 


No Known Drug Allergies Allergy   Verified 10/12/17 07:56











Home Medications: 


Ambulatory Orders





Amlodipine Besylate [Norvasc -] 5 mg PO DAILY 10/12/17 


Aspirin [ASA -] 81 mg PO DAILY 10/12/17 


Atorvastatin Ca [Lipitor] 20 mg PO HS 10/12/17 


Carvedilol [Coreg] 12.5 mg PO DAILY 10/12/17 


Cholecalciferol (Vitamin D3) [Vitamin D3] 0 unit PO DAILY 10/12/17 


Finasteride 5 mg PO DAILY 10/12/17 


Losartan Potassium 50 mg PO DAILY 10/12/17 


Meclizine HCl [Antivert -] 25 mg PO PRN PRN 10/12/17 


Melatonin 0 mg PO HS 10/12/17 


Omega-3 Fatty Acids/Fish Oil [Fish Oil 1,000 mg Softgel] 1 each PO DAILY 10/12/

17 


Ranitidine [Zantac -] 150 mg PO DAILY 10/12/17 


Tamsulosin HCl [Flomax] 0.4 mg PO DAILY 10/12/17 








Anemia: No


Asthma: No


Cancer: Yes (prostate cancer,kidney cancer)


Cardiac Disorders: No


CVA: Yes (TIA,2002)


COPD: Yes (mild,USES ALBUTEROL AS NEEDED)


CHF: No


Dementia: No


Diabetes: No


GI Disorders: Yes (COLON POLYPS, ISCHEMIC COLITIS, DIVERTICULOSIS)


 Disorders: Yes (PROSTATE CA)


HTN: Yes


Hypercholesterolemia: Yes


Liver Disease: No


Seizures: No


Thyroid Disease: No





- Surgical History


Abdominal Surgery: Yes (BILATERAL  INGUINAL HERNIA)


Appendectomy: Yes


Cardiac Surgery: No


Cholecystectomy: Yes (08/23/2017)


Lung Surgery: No


Neurologic Surgery: Yes (Cervical FUSION)


Orthopedic Surgery: Yes (NECK SURGERY, L knee sx 12/8)





- Immunization History


Immunization Up to Date: Yes





- Suicide/Smoking/Psychosocial Hx


Smoking Status: Yes


Smoking History: Former smoker


Have you smoked in the past 12 months: No


Number of Cigarettes Smoked Daily: 0


If you are a former smoker, when did you quit?: 1982


Information on smoking cessation initiated: No


Hx Alcohol Use: No


Drug/Substance Use Hx: No


Substance Use Type: None


Hx Substance Use Treatment: No


Patient Lives Alone: No


Lives with/in: spouse/SO





Trauma Specific PMHX





- Complaint Specific PMHX


Back Injury: No


Neck Injury: No





**Review of Systems





- Review of Systems


Able to Perform ROS?: No


Is the patient limited English proficient: No


Constitutional: No: Symptoms Reported


HEENTM: No: Symptoms Reported


Respiratory: No: Symptoms reported


Cardiac (ROS): No: Symptoms Reported


ABD/GI: No: Symptoms Reported


: No: Symptoms Reported


Musculoskeletal: Yes: Back Pain


Integumentary: Yes: Bruising


Neurological: No: Symptoms reported


Endocrine: No: Symptoms Reported


Hematologic/Lymphatic: No: Symptoms Reported





*Physical Exam





- Vital Signs


 Last Vital Signs











Temp Pulse Resp BP Pulse Ox


 


 102.2 F H  103 H  20   133/87   98 


 


 10/12/17 08:34  10/12/17 07:38  10/12/17 07:38  10/12/17 07:38  10/12/17 07:38














- Physical Exam


General Appearance: Yes: Nourished, Appropriately Dressed.  No: Apparent 

Distress


HEENT: positive: EOMI, ELIS.  negative: Pale Conjunctivae


Neck: positive: Supple


Respiratory/Chest: positive: Lungs Clear, Normal Breath Sounds.  negative: 

Chest Tender, Respiratory Distress, Accessory Muscle Use


Cardiovascular: positive: Regular Rhythm, Tachycardia.  negative: Murmur


Gastrointestinal/Abdominal: positive: Soft.  negative: Tenderness


Musculoskeletal: negative: CVA Tenderness, Vertebral Tenderness


Extremity: positive: Normal Capillary Refill.  negative: Pedal Edema


Integumentary: positive: Warm, Moist, Ecchymosis (to right posterior 7-8th ribs 

lateral of MSL measuring 4 x 5cm. No crepitus. No asymetry of lung expansion)


Neurologic: positive: Normal Mood/Affect, Motor Strength 5/5 (moving all 

extremeties actively on stretcher)





**Heart Score/ECG Review





- ECG Intrepretation


Rhythm: Regular Rhythm (normal sinus rhythmat 97. Right bundle-branch block 

noted. left anterior fascicular block. unchanged from March 2017 (EKG x2))





ED Treatment Course





- LABORATORY


CBC & Chemistry Diagram: 


 10/13/17 05:35





 10/13/17 05:35





- ADDITIONAL ORDERS


Additional order review: 


 Laboratory  Results











  10/12/17





  08:53


 


VBG pH  7.38


 


POC VBG pCO2  40.3


 


POC VBG pO2  49.0 H D


 


Mixed VBG HCO3  23.2














- RADIOLOGY


Radiology Studies Ordered: 














 Category Date Time Status


 


 CHEST X-RAY PORTABLE* [RAD] Stat Radiology  10/12/17 08:31 Ordered


 


 RIBS RIGHT SIDE [RAD] Stat Radiology  10/12/17 08:31 Ordered














- Medications


Given in the ED: 


ED Medications














Discontinued Medications














Generic Name Dose Route Start Last Admin





  Trade Name Freq  PRN Reason Stop Dose Admin


 


Acetaminophen  1,000 mg 10/12/17 08:32 10/12/17 09:15





  Ofirmev Injection -  IVPB 10/12/17 08:33  1,000 mg





  ONCE ONE   Administration


 


Sodium Chloride  1,000 mls @ 1,000 mls/hr 10/12/17 08:31 10/12/17 09:15





  Normal Saline -  IV 10/12/17 09:30  1,000 mls/hr





  ASDIR STA   Administration














Medical Decision Making





- Medical Decision Making


10/12/17 10:03


Patient status post mechanical fall striking the right side of his back. 

Patient on exam had ecchymosis to the right back with no crepitus or asymmetry 

of lung expansion. Patient incidentally felt warm and was found to be 

tachycardic upon arrival at 103 with an oral temperature of 99.8. A rectal temp 

was requested and was found to be 102.2. Septic workup was initiated a low with 

influenza rib x-ray and IV Tylenol





10/12/17 14:08


 Laboratory Tests











  10/12/17 10/12/17 10/12/17





  08:53 09:29 09:29


 


WBC   13.6 H D 


 


Hgb   13.2 


 


Hct   39.8 


 


Plt Count   312 


 


Neutrophils %   79.5  D 


 


PT with INR    15.30 H


 


INR    1.35 H


 


PTT (Actin FS)    35.9 H


 


VBG pH  7.38  


 


POC VBG pCO2  40.3  


 


POC VBG pO2  49.0 H D  


 


Mixed VBG HCO3  23.2  


 


Lactic Acid   


 


Urine Blood   


 


Urine Nitrite   


 


Ur Leukocyte Esterase   


 


Urine RBC   


 


Urine WBC   














  10/12/17 10/12/17 10/12/17





  09:29 09:29 12:35


 


WBC   


 


Hgb   


 


Hct   


 


Plt Count   


 


Neutrophils %   


 


PT with INR   


 


INR   


 


PTT (Actin FS)   


 


VBG pH   


 


POC VBG pCO2   


 


POC VBG pO2   


 


Mixed VBG HCO3   


 


Lactic Acid   2.0  1.0


 


Urine Blood  1+ H  


 


Urine Nitrite  Positive  


 


Ur Leukocyte Esterase  1+ H  


 


Urine RBC  1  


 


Urine WBC  38  





Case discussed with Dr. Olivares and patient will be admitted to [facility] inpatient. 

Patient was given Levaquin secondary to UTI and history of pseudomonas  Which 

was sensitive to Levaquin. Patient also received second bag of IV fluid 

secondary to elevated lactic acid at 9:29.








10/12/17 14:35


 Laboratory Tests











  10/12/17





  13:36


 


Sodium  138


 


Potassium  4.4


 


Chloride  102


 


Carbon Dioxide  22  D


 


Anion Gap  14


 


BUN  26 H


 


Creatinine  1.1


 


AST  112 H D


 


ALT  138 H D


 


Alkaline Phosphatase  133 H D


 


Creatine Kinase  114


 


Troponin I  < 0.02  D








Pt ordered for u/s to assess liver. 





10/12/17 14:38


Acute displaced fractures seen involving the right eighth and ninth ribs along 

the posterior lateral border. In comparison to his chest study from March 2017 

interval development of  mild right lower lobe discoid atelectasis is noted. 

Patient's fever now 101.1 rectally. Patient will given 1 dose of Motrin 400 mg 

versus Tylenol secondary to elevation of LFTs.





*DC/Admit/Observation/Transfer


Diagnosis at time of Disposition: 


Sepsis


Qualifiers:


 Sepsis type: sepsis due to unspecified organism Qualified Code(s): A41.9 - 

Sepsis, unspecified organism





UTI (urinary tract infection)


Qualifiers:


 Urinary tract infection type: site unspecified Hematuria presence: without 

hematuria Qualified Code(s): N39.0 - Urinary tract infection, site not specified





Closed traumatic displaced fracture of rib


Qualifiers:


 Encounter type: initial encounter Laterality: right Qualified Code(s): 

S22.31XA - Fracture of one rib, right side, initial encounter for closed 

fracture





- Discharge Dispostion


Admit: Yes

## 2017-10-12 NOTE — HP
CHIEF COMPLAINT: fall on his back 





PCP: 





HISTORY OF PRESENT ILLNESS:


85 Year old white male with PMH of prostate cancer, renal cancer S/p left 

nephroectomy, TIA(2002), COPD, HTN, HLD, colonic polyp, ischemic colitis, 

diverticulosis and cholecyctectomy, pneumonia (fungal ),DIC, who presented to 

[facility] today after he fell on his back. Patient wake up to use the 

bathroom when he felt his knee collapsed and he fall and hit his back and his 

wife call the ambulance for him. he did not hit his head, denies dizziness, loc

, vertigo, lightheadedness. had h/o mechanical fall 2 years ago. Patient denies 

headache, blurry vision, N/V/D/C, denies chest pain, but has chronic productive 

cough(copd) and sob, he denies any abdominal pain, denies any urinary 

difficulty or any burning sensation. denies hematuria, nocturnuria.


ER course was notable for:


(1)  pan cx pending 


(2)CXR no acute pathology , Rib Xray negative for Fx


(3)2L boluses , Levofloxacin 750 IV ,





Recent Travel: denies 





PAST MEDICAL HISTORY: as per HPI





PAST SURGICAL HISTORY: 


Appendectomy , colonoscopy 1 years ago  with  (2 polyps), hernia 

repair B/L , Left nephroectomy, T-tube placement, Left Knee partial replacement 

2015, cholecystectomy.





Social History:


Smoking: quit 30 years ago, smoke 2 pack for 40 years 


Alcohol: previous heavy drinker quit 20 years ago


Drugs: denies





Family History: significant for cancer , HTN, Cardiac disease.


Allergies





No Known Drug Allergies Allergy (Verified 10/12/17 07:56)


 








HOME MEDICATIONS:


 Home Medications











 Medication  Instructions  Recorded


 


Amlodipine Besylate [Norvasc -] 5 mg PO DAILY 10/12/17


 


Aspirin [ASA -] 81 mg PO DAILY 10/12/17


 


Atorvastatin Ca [Lipitor] 20 mg PO HS 10/12/17


 


Carvedilol [Coreg] 12.5 mg PO DAILY 10/12/17


 


Cholecalciferol (Vitamin D3) 0 unit PO DAILY 10/12/17





[Vitamin D3]  


 


Finasteride 5 mg PO DAILY 10/12/17


 


Losartan Potassium 50 mg PO DAILY 10/12/17


 


Meclizine HCl [Antivert -] 25 mg PO PRN PRN 10/12/17


 


Melatonin 0 mg PO HS 10/12/17


 


Omega-3 Fatty Acids/Fish Oil [Fish 1 each PO DAILY 10/12/17





Oil 1,000 mg Softgel]  


 


Ranitidine [Zantac -] 150 mg PO DAILY 10/12/17


 


Tamsulosin HCl [Flomax] 0.4 mg PO DAILY 10/12/17








REVIEW OF SYSTEMS


CONSTITUTIONAL: 


Absent:  fever, chills, diaphoresis, generalized weakness, malaise, loss of 

appetite, weight change


HEENT: 


Absent:  rhinorrhea, nasal congestion, throat pain, throat swelling, difficulty 

swallowing, mouth swelling, ear pain, eye pain, visual changes


CARDIOVASCULAR: 


Absent: chest pain, syncope, palpitations, irregular heart rate, lightheadedness

, peripheral edema


RESPIRATORY: 


Absent: +cough, shortness of breath, dyspnea with exertion, orthopnea, wheezing

, stridor, hemoptysis


GASTROINTESTINAL:


Absent: abdominal pain, abdominal distension, nausea, vomiting, diarrhea, 

constipation, melena, hematochezia


GENITOURINARY: 


Absent: dysuria, frequency, urgency, hesitancy, hematuria, flank pain, genital 

pain


MUSCULOSKELETAL: 


Absent: myalgia, arthralgia, joint swelling,+ back pain, neck pain


SKIN: 


Absent: rash, itching, pallor


HEMATOLOGIC/IMMUNOLOGIC: 


Absent: easy bleeding, easy bruising, lymphadenopathy, frequent infections


ENDOCRINE:


Absent: unexplained weight gain, unexplained weight loss, heat intolerance, 

cold intolerance


NEUROLOGIC: 


Absent: headache, focal weakness or paresthesias, dizziness, + unsteady gait 

uing walker , seizure, mental status changes, bladder or bowel incontinence


PSYCHIATRIC: 


Absent: anxiety, depression, suicidal or homicidal ideation, hallucinations.








PHYSICAL EXAMINATION


 Vital Signs - 24 hr











  10/12/17 10/12/17 10/12/17





  07:38 08:34 09:30


 


Temperature 99.8 F H 102.2 F H 100.2 F H


 


Pulse Rate 103 H  


 


Respiratory 20  





Rate   


 


Blood Pressure 133/87  


 


Blood Pressure   





[Right Arm]   


 


O2 Sat by Pulse 98  





Oximetry (%)   














  10/12/17





  11:00


 


Temperature 100.4 F H


 


Pulse Rate 


 


Respiratory 





Rate 


 


Blood Pressure 


 


Blood Pressure 100/55





[Right Arm] 


 


O2 Sat by Pulse 





Oximetry (%) 











GENERAL: The patient is awake, alert, and fully oriented, in no acute distress.


HEAD: Normal with no signs of trauma.


EYES: conjunctiva clear. No ptosis. 


ENT:  moist mucous membranes.


LUNGS: Breath sounds equal, clear to auscultation bilaterally, no wheezes, no 

crackles, no 


accessory muscle use. right side echymoses 4X4 cm 


HEART: Regular rate and rhythm, S1, S2 without murmur, rub or gallop.


ABDOMEN: Soft, nontender, nondistended, normoactive bowel sounds, no guarding, 

no 


rebound, 


EXTREMITIES: 2+ pulses, warm, well-perfused, no edema, normal range of motion 

in 4 EXT 


NEUROLOGICAL: good mentation 


PSYCH: Normal mood, normal affect.


SKIN: Warm, dry, no rashes or lesions noted





 Laboratory Results - last 24 hr











  10/12/17 10/12/17 10/12/17





  08:53 09:29 09:29


 


WBC   13.6 H D 


 


RBC   4.38 


 


Hgb   13.2 


 


Hct   39.8 


 


MCV   90.9 


 


MCH   30.1 


 


MCHC   33.1 


 


RDW   14.7 


 


Plt Count   312 


 


MPV   8.0 


 


Neutrophils %   79.5  D 


 


Lymphocytes %   10.3  D 


 


Monocytes %   8.9 


 


Eosinophils %   0.7  D 


 


Basophils %   0.6 


 


PT with INR    15.30 H


 


INR    1.35 H


 


PTT (Actin FS)    35.9 H


 


VBG pH  7.38  


 


POC VBG pCO2  40.3  


 


POC VBG pO2  49.0 H D  


 


Mixed VBG HCO3  23.2  


 


Lactic Acid   


 


Urine Color   


 


Urine Appearance   


 


Urine pH   


 


Urine Protein   


 


Urine Glucose (UA)   


 


Urine Ketones   


 


Urine Blood   


 


Urine Nitrite   


 


Urine Bilirubin   


 


Urine Urobilinogen   


 


Urine RBC   


 


Urine WBC   


 


Ur Epithelial Cells   


 


Urine Bacteria   


 


Urine Mucus   


 


Blood Type   


 


Antibody Screen   














  10/12/17 10/12/17 10/12/17





  09:29 09:29 09:29


 


WBC   


 


RBC   


 


Hgb   


 


Hct   


 


MCV   


 


MCH   


 


MCHC   


 


RDW   


 


Plt Count   


 


MPV   


 


Neutrophils %   


 


Lymphocytes %   


 


Monocytes %   


 


Eosinophils %   


 


Basophils %   


 


PT with INR   


 


INR   


 


PTT (Actin FS)   


 


VBG pH   


 


POC VBG pCO2   


 


POC VBG pO2   


 


Mixed VBG HCO3   


 


Lactic Acid   2.0 


 


Urine Color  Yellow  


 


Urine Appearance  Slcloudy  


 


Urine pH  5.0  


 


Urine Protein  Negative  


 


Urine Glucose (UA)  Negative  


 


Urine Ketones  Negative  


 


Urine Blood  1+ H  


 


Urine Nitrite  Positive  


 


Urine Bilirubin  Negative  


 


Urine Urobilinogen  Negative  


 


Urine RBC  1  


 


Urine WBC  38  


 


Ur Epithelial Cells  Rare  


 


Urine Bacteria  Moderate  


 


Urine Mucus  Rare  


 


Blood Type    A POSITIVE


 


Antibody Screen    Negative











ASSESSMENT/PLAN:


85 Year old white male with PMH of prostate cancer, renal cancer S/p left 

nephrectomy, TIA(2002), COPD, HTN, HLD, colonic polyp, ischemic colitis, 

diverticulosis and cholecyctectomy, pneumonia (fungal ),DIC, who presented to 

[facility] today after he fell on his back. in the ED was found to have 

urosepsis and was admitted for further evaluation and treatment. 





#  Sepsis 2/2 UTI


* fever 102, Leuckocytosis 13.5, with UTI 


* UA : Nitrite +, LE +, WBC 38 , moderate bacteria 


* 2 L boluses started in ED, 


* Levofloxacin 750 IVBP started in ED 


* Blood Cx , urine cx, 


* ID consulted 


* Continue IV fluids @ 100 CC /hr


* start abx ceftriaxone  1 gm IVBP daily 


* 


* 


# Fall, likely 2/2 sepsis vs mechanical fall vs vertigo vs pre-syncopy 


* denies vertigo and LOC or any seizure symptoms 


* IV fluids 


* CXR no acute pathology 


* Rib Xray :8th-9th rib frx 


* Pain control Oxy 5 mg PO Q 4hr 


* Insentive spirometry


* Orthostatics BP 


* Monitor clinically 


* 


# Transaminities, likely 2/2 meds vs Alchoholic vs sepsis 


* US liver 


* Repeat CMP  in AM 


* hold statins 


#BPH


* Continue proscar, flomax





# HTN,


* /55 


* Hold home meds 


* monitor BP 


* restart meds if hypertensive 


* 


# FEN


* F: NS 2 L bolus in ED , continue NS @ 100 cc/hr 


* E: Monitor 


* N: low sodium diet 


* 


# Proph 


* DVT: Hold   , start SCDS 


* GI: no need for now 


* PT eval for deconditioning 


* 


#Dispo


* Admit to med-surg 


* 





Visit type





- Emergency Visit


Emergency Visit: Yes


ED Registration Date: 10/12/17


Care time: The patient presented to the Emergency Department on the above date 

and was hospitalized for further evaluation of their emergent condition.





- New Patient


This patient is new to me today: Yes


Date on this admission: 10/15/17





- Critical Care


Critical Care patient: No

## 2017-10-12 NOTE — HP
CHIEF COMPLAINT: s/p fall





PCP: Dr. Landin





HISTORY OF PRESENT ILLNESS:


85 yr old man BIBEMS for unwitnessed fall at 4 am this morning. He was walking 

to the bathroom when his wife heard him fall. He denies feeling lightheaded/

dizzy/chest pain/sob prior to fall. denies head trauma, or LOC. wife heard him 

and went over immediately to find him on his side, she was unable to get him up 

and called the neighbhoors and 911, pt was on the floor for apprx 1 hr. she 

denies seizure-like activity, loc, slurred speech, confusion or blood anywhere 

on the floor or on the patient.


Patient had been having a nonproductive cough for past 2 weeks, with no sick 

contacts, or fevers. He did not eat or drink as per his usual self due to poor 

appetite and trouble swallowing yesterday. also has intermittent diarrhea/

constipation, last bm without melena/hematochezia 2 days ago. 


denies chest pain,palpitations,sob,n/v.


He was seen by his cardiologist yesterday for routine f/u and was told 

everything was normal. 





ER course was notable for:


(1) rib xray - with acute fractures in 8th and 9th ribs


(2) sepsis protocol intiated


(3) levofloxacin





Recent Travel: none, dc'd from nursing home 3 months ago





chart reviewed for further information.


 Past Medical History





CNS                              TIA (2002)


Cardio/Vascular                  CHF,HTN,Hyperlipdemia


Pulmonary                        COPD,Pneumonia


Gastrointestinal                 Diverticulitis


Renal/                         Cancer,Neurogenic Bladder


Psych                            Anxiety














 Past Surgical History





Past Surgical History            Appendectomy,Colonoscopy,Hernia Repair,


                                 Nephrectomy. recent colecystectomy 8/23/2017 

by dr. alvarez











Social History:


Smoking:former





Allergies





No Known Drug Allergies Allergy (Verified 10/12/17 07:56)


 








HOME MEDICATIONS:


 Home Medications











 Medication  Instructions  Recorded


 


Amlodipine Besylate [Norvasc -] 5 mg PO DAILY 10/12/17


 


Aspirin [ASA -] 81 mg PO DAILY 10/12/17


 


Atorvastatin Ca [Lipitor] 20 mg PO HS 10/12/17


 


Carvedilol [Coreg] 12.5 mg PO DAILY 10/12/17


 


Cholecalciferol (Vitamin D3) 0 unit PO DAILY 10/12/17





[Vitamin D3]  


 


Finasteride 5 mg PO DAILY 10/12/17


 


Losartan Potassium 50 mg PO DAILY 10/12/17


 


Meclizine HCl [Antivert -] 25 mg PO PRN PRN 10/12/17


 


Melatonin 0 mg PO HS 10/12/17


 


Omega-3 Fatty Acids/Fish Oil [Fish 1 each PO DAILY 10/12/17





Oil 1,000 mg Softgel]  


 


Ranitidine [Zantac -] 150 mg PO DAILY 10/12/17


 


Tamsulosin HCl [Flomax] 0.4 mg PO DAILY 10/12/17








REVIEW OF SYSTEMS


CONSTITUTIONAL: 


Present: chill - in ED, loss of appetite for 1 day


Absent:  fever,  diaphoresis, generalized weakness, malaise,  weight change


HEENT: 


Present: difficulty swallowing since yesterday


Absent:  rhinorrhea, nasal congestion, throat pain, throat swelling, mouth 

swelling, ear pain, eye pain, visual changes


CARDIOVASCULAR: 


Absent: chest pain, syncope, palpitations, irregular heart rate, lightheadedness

, peripheral edema


RESPIRATORY: 


Present: cough, 


Absent: shortness of breath, dyspnea with exertion, orthopnea, wheezing, stridor

, hemoptysis


GASTROINTESTINAL:


Present: diarrhea, constipation


Absent: abdominal pain, abdominal distension, nausea, vomiting, , melena, 

hematochezia


GENITOURINARY: 


Absent: dysuria, frequency, urgency, hesitancy, hematuria, flank pain, genital 

pain


MUSCULOSKELETAL: 


Absent: myalgia, arthralgia, joint swelling, back pain, neck pain


SKIN: 


Absent: rash, itching, pallor


ENDOCRINE:


Absent: unexplained weight gain, unexplained weight loss, heat intolerance, 

cold intolerance


NEUROLOGIC: 


Absent: headache, focal weakness or paresthesias, dizziness, unsteady gait, 

seizure, mental status changes, bladder or bowel incontinence


PSYCHIATRIC: 


Absent: anxiety, depression, suicidal or homicidal ideation, hallucinations.








PHYSICAL EXAMINATION


 Vital Signs - 24 hr











  10/12/17 10/12/17 10/12/17





  07:38 08:34 09:30


 


Temperature 99.8 F H 102.2 F H 100.2 F H


 


Pulse Rate 103 H  


 


Pulse Rate [   





Right Radial]   


 


Respiratory 20  





Rate   


 


Blood Pressure 133/87  


 


Blood Pressure   





[Right Arm]   


 


O2 Sat by Pulse 98  





Oximetry (%)   














  10/12/17 10/12/17 10/12/17





  11:00 14:36 16:57


 


Temperature 100.4 F H 101.1 F H 100.8 F H


 


Pulse Rate   


 


Pulse Rate [   93 H





Right Radial]   


 


Respiratory   20





Rate   


 


Blood Pressure   


 


Blood Pressure 100/55  150/57





[Right Arm]   


 


O2 Sat by Pulse   95





Oximetry (%)   











GENERAL: Awake, alert, and fully oriented, in no acute distress.


HEAD: Normal with no signs of trauma.


EYES: Pupils equal, round and reactive to light, extraocular movements intact, 

sclera anicteric, conjunctiva clear. No lid lag.


EARS, NOSE, THROAT: oropharynx clear without exudates. Moist mucous membranes.


NECK: Normal range of motion, supple without lymphadenopathy, JVD, or masses.


LUNGS: Breath sounds equal, clear to auscultation bilaterally. No wheezes, and 

no crackles. No accessory muscle use.


HEART: Regular rate and rhythm, normal S1 and S2 without murmur, rub or gallop.


ABDOMEN: Soft, nontender, not distended, normoactive bowel sounds, no guarding, 

no rebound, no masses.  No hepatomegaly or  splenomegaly. 


MUSCULOSKELETAL: Normal range of motion at all joints. No bony deformities or 

tenderness. No CVA tenderness.


UPPER EXTREMITIES: 2+ radial pulses, warm, well-perfused. No cyanosis. No 

clubbing. No peripheral edema.


LOWER EXTREMITIES: 2+ DP pulses, warm, well-perfused. No calf tenderness. No 

peripheral edema. 


NEUROLOGICAL:  Cranial nerves II-XII intact. Normal speech. facial symmetry, 5/

5 handgrip/hip extension/dorsi and plantar flexion b/l.


PSYCHIATRIC: Cooperative. Good eye contact. Appropriate mood and affect.


SKIN: Warm, dry, normal turgor. right mid chest laterally circular raised black-

blue ecchymosis without surrounding erythema, fluctuance, overlying skin 

intact. 





 Laboratory Results - last 24 hr











  10/12/17 10/12/17 10/12/17





  08:53 09:29 09:29


 


WBC   13.6 H D 


 


RBC   4.38 


 


Hgb   13.2 


 


Hct   39.8 


 


MCV   90.9 


 


MCH   30.1 


 


MCHC   33.1 


 


RDW   14.7 


 


Plt Count   312 


 


MPV   8.0 


 


Neutrophils %   79.5  D 


 


Lymphocytes %   10.3  D 


 


Monocytes %   8.9 


 


Eosinophils %   0.7  D 


 


Basophils %   0.6 


 


PT with INR    15.30 H


 


INR    1.35 H


 


PTT (Actin FS)    35.9 H


 


Puncture Site   


 


ABG pH   


 


ABG pCO2 at Pt Temp   


 


ABG pO2 at Pt Temp   


 


ABG HCO3   


 


ABG O2 Sat (Measured)   


 


ABG O2 Content   


 


ABG Base Excess   


 


Sb Test   


 


VBG pH  7.38  


 


POC VBG pCO2  40.3  


 


POC VBG pO2  49.0 H D  


 


Mixed VBG HCO3  23.2  


 


Carboxyhemoglobin   


 


Methemoglobin   


 


O2 Delivery Device   


 


Oxygen Flow Rate   


 


PEEP   


 


Sodium   


 


Potassium   


 


Chloride   


 


Carbon Dioxide   


 


Anion Gap   


 


BUN   


 


Creatinine   


 


Creat Clearance w eGFR   


 


Random Glucose   


 


Lactic Acid   


 


Calcium   


 


Total Bilirubin   


 


AST   


 


ALT   


 


Alkaline Phosphatase   


 


Creatine Kinase   


 


Troponin I   


 


Total Protein   


 


Albumin   


 


Urine Color   


 


Urine Appearance   


 


Urine pH   


 


Ur Specific Gravity   


 


Urine Protein   


 


Urine Glucose (UA)   


 


Urine Ketones   


 


Urine Blood   


 


Urine Nitrite   


 


Urine Bilirubin   


 


Urine Urobilinogen   


 


Ur Leukocyte Esterase   


 


Urine RBC   


 


Urine WBC   


 


Ur Epithelial Cells   


 


Urine Bacteria   


 


Urine Mucus   


 


Blood Type   


 


Antibody Screen   














  10/12/17 10/12/17 10/12/17





  09:29 09:29 09:29


 


WBC   


 


RBC   


 


Hgb   


 


Hct   


 


MCV   


 


MCH   


 


MCHC   


 


RDW   


 


Plt Count   


 


MPV   


 


Neutrophils %   


 


Lymphocytes %   


 


Monocytes %   


 


Eosinophils %   


 


Basophils %   


 


PT with INR   


 


INR   


 


PTT (Actin FS)   


 


Puncture Site   


 


ABG pH   


 


ABG pCO2 at Pt Temp   


 


ABG pO2 at Pt Temp   


 


ABG HCO3   


 


ABG O2 Sat (Measured)   


 


ABG O2 Content   


 


ABG Base Excess   


 


Sb Test   


 


VBG pH   


 


POC VBG pCO2   


 


POC VBG pO2   


 


Mixed VBG HCO3   


 


Carboxyhemoglobin   


 


Methemoglobin   


 


O2 Delivery Device   


 


Oxygen Flow Rate   


 


PEEP   


 


Sodium   


 


Potassium   


 


Chloride   


 


Carbon Dioxide   


 


Anion Gap   


 


BUN   


 


Creatinine   


 


Creat Clearance w eGFR   


 


Random Glucose   


 


Lactic Acid   2.0 


 


Calcium   


 


Total Bilirubin   


 


AST   


 


ALT   


 


Alkaline Phosphatase   


 


Creatine Kinase   


 


Troponin I   


 


Total Protein   


 


Albumin   


 


Urine Color  Yellow  


 


Urine Appearance  Slcloudy  


 


Urine pH  5.0  


 


Ur Specific Gravity  1.015  


 


Urine Protein  Negative  


 


Urine Glucose (UA)  Negative  


 


Urine Ketones  Negative  


 


Urine Blood  1+ H  


 


Urine Nitrite  Positive  


 


Urine Bilirubin  Negative  


 


Urine Urobilinogen  Negative  


 


Ur Leukocyte Esterase  1+ H  


 


Urine RBC  1  


 


Urine WBC  38  


 


Ur Epithelial Cells  Rare  


 


Urine Bacteria  Moderate  


 


Urine Mucus  Rare  


 


Blood Type    A POSITIVE


 


Antibody Screen    Negative














  10/12/17 10/12/17 10/12/17





  12:35 13:36 16:25


 


WBC   


 


RBC   


 


Hgb   


 


Hct   


 


MCV   


 


MCH   


 


MCHC   


 


RDW   


 


Plt Count   


 


MPV   


 


Neutrophils %   


 


Lymphocytes %   


 


Monocytes %   


 


Eosinophils %   


 


Basophils %   


 


PT with INR   


 


INR   


 


PTT (Actin FS)   


 


Puncture Site    Right radial


 


ABG pH    7.45


 


ABG pCO2 at Pt Temp    31.8 L D


 


ABG pO2 at Pt Temp    58.9 L D


 


ABG HCO3    21.9 L


 


ABG O2 Sat (Measured)    91.9


 


ABG O2 Content    14.5 L


 


ABG Base Excess    -1.0


 


Sb Test    Positive


 


VBG pH   


 


POC VBG pCO2   


 


POC VBG pO2   


 


Mixed VBG HCO3   


 


Carboxyhemoglobin    2.0


 


Methemoglobin    0.5


 


O2 Delivery Device    R/a


 


Oxygen Flow Rate    21%


 


PEEP    0.0


 


Sodium   138 


 


Potassium   4.4 


 


Chloride   102 


 


Carbon Dioxide   22  D 


 


Anion Gap   14 


 


BUN   26 H 


 


Creatinine   1.1 


 


Creat Clearance w eGFR   > 60 


 


Random Glucose   98  D 


 


Lactic Acid  1.0  


 


Calcium   9.0 


 


Total Bilirubin   0.7  D 


 


AST   112 H D 


 


ALT   138 H D 


 


Alkaline Phosphatase   133 H D 


 


Creatine Kinase   114 


 


Troponin I   < 0.02  D 


 


Total Protein   7.8 


 


Albumin   3.9 


 


Urine Color   


 


Urine Appearance   


 


Urine pH   


 


Ur Specific Gravity   


 


Urine Protein   


 


Urine Glucose (UA)   


 


Urine Ketones   


 


Urine Blood   


 


Urine Nitrite   


 


Urine Bilirubin   


 


Urine Urobilinogen   


 


Ur Leukocyte Esterase   


 


Urine RBC   


 


Urine WBC   


 


Ur Epithelial Cells   


 


Urine Bacteria   


 


Urine Mucus   


 


Blood Type   


 


Antibody Screen   








Active Medications





Atorvastatin Calcium (Lipitor -)  20 mg PO HS [facility]


Finasteride (Proscar -)  5 mg PO DAILY [facility]


Fluconazole (Diflucan -)  50 mg PO DAILY [facility]


   Stop: 10/18/17 10:01


Ceftriaxone Sodium (Rocephin 1gm Ivpb (Pre-Docked))  50 mls @ 100 mls/hr IVPB 

DAILY [facility]


Sodium Chloride (Normal Saline -)  1,000 mls @ 100 mls/hr IV ASDIR [facility]


Meclizine HCl (Antivert -)  25 mg PO DAILY PRN


   PRN Reason: dizziness


Melatonin (Melatonin)  5 mg PO HS [facility]


Oxycodone HCl (Roxicodone -)  5 mg PO Q4H PRN


   PRN Reason: PAIN


Ranitidine HCl (Zantac -)  150 mg PO DAILY [facility]


Tamsulosin HCl (Flomax -)  0.4 mg PO DAILY [facility]








ASSESSMENT/PLAN:


85 yr old man with multiple co-morbidities presents s/p fall with acute rib 

fractures found to have sepsis likely secondary to UTI. 


- pt has a hx of aspergillosis, mrsa pna


- UTI with pseudo and e.coli in the past





# Sepsis (tachy, fever, u/a with cbc/ positive nitrit and leuk) likely due to 

UTI, chest xray without infiltrate


- ceftriaxine 1gm IV


- IVF NS @100cc/hr, monitor closely for fluid overload


- tylenol given in ED then given ibuprofen due to transaminitis for fever


- ucx and bld cx drawn


- ID consult dr. jasmine





#Acute rib fractures s/p fall (fall likely due to vasovagal, as pt had just 

gotten up from bed to go to the bathroom)


- monitor for growth of ecchymosis


- incentive spirometer


- nasal cannula 2L prn


- pain control with oxycodone 5mg po q4hr


- physical therapy for gait eval/deconditioning





#Thrush


- diflucan 100mg today then 50mg po for 6 days to treat for 7 days





#Transaminitis - likely from sepsis vs retained cholelithiasis


- trend in the morning


- pt recently had a cholecystectomy, u/s eval with mildly dilated CBD, if 

uptrending lft's consider GI consult for ercp





#HTN


- BP in /55, hold anti-htns for now, monitor BP, if elevated consider 

restarting home meds





#BPH


 - proscar, flomax





#continue home medications: ranitidine, melatonin, meclizine, lipitor. 


hold asa 325mg po due to acute ecchymosis





#DVT; scds, if no growth of ecchomysis, consider medical anticoagulation


diet: low sodium

















Visit type





- Emergency Visit


Emergency Visit: Yes


ED Registration Date: 10/12/17


Care time: The patient presented to the Emergency Department on the above date 

and was hospitalized for further evaluation of their emergent condition.





- New Patient


This patient is new to me today: Yes


Date on this admission: 10/12/17





- Critical Care


Critical Care patient: No

## 2017-10-12 NOTE — EKG
Test Reason : 

Blood Pressure : ***/*** mmHG

Vent. Rate : 097 BPM     Atrial Rate : 097 BPM

   P-R Int : 188 ms          QRS Dur : 128 ms

    QT Int : 380 ms       P-R-T Axes : 056 -50 058 degrees

   QTc Int : 482 ms

 

NORMAL SINUS RHYTHM

RIGHT BUNDLE BRANCH BLOCK

LEFT ANTERIOR FASCICULAR BLOCK

*** BIFASCICULAR BLOCK ***

SEPTAL INFARCT , AGE UNDETERMINED

ABNORMAL ECG

WHEN COMPARED WITH ECG OF 22-MAR-2017 09:18,

PREMATURE ATRIAL COMPLEXES ARE NO LONGER PRESENT

RIGHT BUNDLE BRANCH BLOCK IS NOW PRESENT

SEPTAL INFARCT IS NOW PRESENT

Confirmed by JONO ALMARAZ MD ([id number]) on 10/12/2017 11:53:07 AM

 

Referred By:             Confirmed By:JONO ALMARAZ MD

## 2017-10-13 LAB
ALBUMIN SERPL-MCNC: 2.8 G/DL (ref 3.4–5)
ALP SERPL-CCNC: 97 U/L (ref 45–117)
ALT SERPL-CCNC: 83 U/L (ref 12–78)
ANION GAP SERPL CALC-SCNC: 9 MMOL/L (ref 8–16)
AST SERPL-CCNC: 53 U/L (ref 15–37)
BASOPHILS # BLD: 0.7 % (ref 0–2)
BILIRUB SERPL-MCNC: 0.6 MG/DL (ref 0.2–1)
CALCIUM SERPL-MCNC: 8.4 MG/DL (ref 8.5–10.1)
CO2 SERPL-SCNC: 24 MMOL/L (ref 21–32)
CREAT SERPL-MCNC: 0.9 MG/DL (ref 0.7–1.3)
DEPRECATED RDW RBC AUTO: 14.5 % (ref 11.9–15.9)
EOSINOPHIL # BLD: 1.9 % (ref 0–4.5)
GLUCOSE SERPL-MCNC: 116 MG/DL (ref 74–106)
MAGNESIUM SERPL-MCNC: 2.1 MG/DL (ref 1.8–2.4)
MCH RBC QN AUTO: 31 PG (ref 25.7–33.7)
MCHC RBC AUTO-ENTMCNC: 34.7 G/DL (ref 32–35.9)
MCV RBC: 89.3 FL (ref 80–96)
NEUTROPHILS # BLD: 68.9 % (ref 42.8–82.8)
PHOSPHATE SERPL-MCNC: 2.4 MG/DL (ref 2.5–4.9)
PLATELET # BLD AUTO: 240 K/MM3 (ref 134–434)
PMV BLD: 7.7 FL (ref 7.5–11.1)
PROT SERPL-MCNC: 6.1 G/DL (ref 6.4–8.2)
WBC # BLD AUTO: 7.5 K/MM3 (ref 4–10)

## 2017-10-13 RX ADMIN — Medication SCH MG: at 00:00

## 2017-10-13 RX ADMIN — Medication SCH MG: at 21:12

## 2017-10-13 RX ADMIN — NYSTATIN SCH UNITS: 100000 SUSPENSION ORAL at 17:30

## 2017-10-13 RX ADMIN — NYSTATIN SCH UNITS: 100000 SUSPENSION ORAL at 12:55

## 2017-10-13 RX ADMIN — LOSARTAN POTASSIUM SCH MG: 50 TABLET, FILM COATED ORAL at 12:55

## 2017-10-13 RX ADMIN — LEVOFLOXACIN ONE: 5 INJECTION, SOLUTION INTRAVENOUS at 08:01

## 2017-10-13 RX ADMIN — LEVOFLOXACIN SCH MLS/HR: 5 INJECTION, SOLUTION INTRAVENOUS at 10:52

## 2017-10-13 RX ADMIN — ATORVASTATIN CALCIUM SCH MG: 20 TABLET, FILM COATED ORAL at 21:12

## 2017-10-13 RX ADMIN — SODIUM CHLORIDE SCH MLS/HR: 9 INJECTION, SOLUTION INTRAVENOUS at 06:47

## 2017-10-13 RX ADMIN — AMLODIPINE BESYLATE SCH MG: 10 TABLET ORAL at 12:55

## 2017-10-13 RX ADMIN — CARVEDILOL SCH MG: 12.5 TABLET, FILM COATED ORAL at 21:12

## 2017-10-13 RX ADMIN — DEXTROMETHORPHAN HYDROBROMIDE AND PROMETHAZINE HYDROCHLORIDE PRN ML: 15; 6.25 SOLUTION ORAL at 21:15

## 2017-10-13 RX ADMIN — DEXTROMETHORPHAN HYDROBROMIDE AND PROMETHAZINE HYDROCHLORIDE PRN ML: 15; 6.25 SOLUTION ORAL at 16:05

## 2017-10-13 RX ADMIN — RANITIDINE SCH MG: 150 TABLET ORAL at 10:04

## 2017-10-13 NOTE — CONS
DATE OF CONSULTATION:

 

DATE OF DICTATION:  10/13/2017

 

INFECTIOUS DISEASE CONSULTATION

 

HISTORY OF PRESENT ILLNESS:  The patient is an 85-year-old male evaluated for

possible sepsis.  He was admitted to the hospital after falling at home.  He had a

mechanical fall and reports sustaining trauma to his right chest area.  He was found

to have displaced fractures of right eighth and ninth ribs.  He was complaining of

back pain as well.  In the emergency room, his temperature was 102.2, white blood

cell count of 13.6.  He was also noted to have elevated liver enzymes.  Cultures were

obtained.  He was empirically treated with Levaquin for possible urinary tract

infection/sepsis secondary to UTI.

 

He did report an episode of dysuria.  Denies any hematuria.  No complaints of

suprapubic or flank pain.  No reported recent febrile illness.

 

Patient has had several hospitalizations earlier this year for acute cholecystitis. 

He required a cholecystostomy tube and ultimately underwent a cholecystectomy.  He

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  He has

since discontinued this medication.  No further details are available.

 

At the present time, he is awake and alert.  He has no focal complaint other than

some left knee weakness.  He denies any high-grade fever or shaking chills.

 

PAST MEDICAL HISTORY:  Positive for prostate cancer, no history of prostate surgery

or seed implantation; history of renal cell carcinoma, status post left nephrectomy;

TIA; COPD; diverticulosis.

 

PAST SURGICAL HISTORY:  Status post inguinal hernia repair, appendectomy,

cholecystectomy, cervical spine fusion, left nephrectomy.

 

ALLERGIES:  No known allergies.

 

MEDICATIONS:  Include Flomax, Antivert, Zantac, Lipitor, oxycodone, melatonin,

Proscar.

 

SOCIAL HISTORY:  Lives at home with his wife.  Former smoker.

 

REVIEW OF SYSTEMS:

Neurologic:  No loss of consciousness, seizure activity, focal weakness.  No head

trauma.

Cardiac:  Negative chest pain or palpitations.

Respiratory:  Negative cough or sputum production.

Gastrointestinal:  Negative vomiting or diarrhea.

Genitourinary:  As per HPI.

 

LABORATORY DATA:  White count on admission 13.6, presently 7.5; hematocrit 34.5;

platelet count 240.  Creatinine 0.9.  Urinalysis 38 white cells.  Cultures pending. 

Chest x-ray negative for acute infiltrate.  Sonogram of the liver shows mildly

dilated common bile duct.

 

PHYSICAL EXAMINATION:

General:  He is awake and alert.  He is not acutely toxic appearing.

Vital Signs:  Temperature 97.9, maximum temperature 102.2, blood pressure 147/72,

pulse 72 regular, respirations 20 per minute.

HEENT:  Sclerae are anicteric.

Heart:  Sounds S1, S2.

Lungs:  Diminished breath sounds at the right base, otherwise clear.  There is an

ecchymotic area present on the right posterior thorax.

Abdomen:  Soft.  No tenderness elicited.  No suprapubic tenderness.

Extremities:  Negative for edema.  There is a surgical scar over the left knee, which

is well healed.

 

IMPRESSION:

1.  Urinary tract infection/possible sepsis secondary to urinary tract infection.

2.  Fever and leukocytosis.

3.  Status post fall with right rib fractures.

4.  Elevated liver enzymes, unclear etiology, possible sepsis versus biliary

tract/common bile duct pathology.

 

PLAN:  Pending cultures, empiric antibiotic coverage with Levaquin.  Previous urine

culture had grown a quinolone-sensitive pseudomonas.  Will await culture results. 

Follow up liver enzymes.  Physical therapy.  Will follow.

 

Thank you for the kind referral.

 

 

CARMITA TALBERT M.D.

 

[id number]

DD: 10/13/2017 09:56

DT: 10/13/2017 11:01

Job #:  [id number]

## 2017-10-13 NOTE — PN
Teaching Attending Note


Name of Resident: Adrian Bagley


ATTENDING PHYSICIAN STATEMENT





I saw and evaluated the patient.


I reviewed the resident's note and discussed the case with the resident.


I agree with the resident's findings and plan as documented.








SUBJECTIVE: Patient complains of a chronic cough which is aggravating his right 

rib pain.








OBJECTIVE:


 Vital Signs











 Period  Temp  Pulse  Resp  BP Sys/Yun  Pulse Ox


 


 Last 24 Hr  97.9 F-101.1 F  72-93  20-20  129-150/55-88  92-95








HEENT: Oropharynx clear


HEART: S1S2, RRR


LUNGS: Clear


ABDOMEN: Soft, non-tender, non-distended, normal BS


EXTREMITIES: No edema








 Current Medications











Generic Name Dose Route Start Last Admin





  Trade Name Freq  PRN Reason Stop Dose Admin


 


Atorvastatin Calcium  20 mg 10/12/17 22:00 10/12/17 21:15





  Lipitor -  PO   20 mg





  HS LAUREN   Administration


 


Finasteride  5 mg 10/13/17 10:00 10/13/17 10:04





  Proscar -  PO   5 mg





  DAILY LAUREN   Administration


 


Sodium Chloride  1,000 mls @ 100 mls/hr 10/12/17 16:45 10/13/17 06:47





  Normal Saline -  IV   100 mls/hr





  ASDIR LAUREN   Administration


 


Levofloxacin  100 mls @ 100 mls/hr 10/13/17 10:00 10/13/17 10:52





  Levaquin 500 Mg Premixed Ivpb -  IVPB   100 mls/hr





  DAILY LAUREN   Administration


 


Meclizine HCl  25 mg 10/12/17 16:50  





  Antivert -  PO   





  DAILY PRN   





  dizziness   


 


Melatonin  5 mg 10/12/17 22:00 10/13/17 00:00





  Melatonin  PO   5 mg





  HS LAUREN   Administration


 


Oxycodone HCl  5 mg 10/12/17 15:42  





  Roxicodone -  PO   





  Q4H PRN   





  PAIN   


 


Ranitidine HCl  150 mg 10/13/17 10:00 10/13/17 10:04





  Zantac -  PO   150 mg





  DAILY [facility]UR[facility]   Administration


 


Tamsulosin HCl  0.4 mg 10/13/17 10:00 10/13/17 10:04





  Flomax -  PO   0.4 mg





  DAILY [facility]   Administration














ASSESSMENT AND PLAN:


This is an 85 year old man with a history of HTN, hyperlipidemia, CAD, COPD, TIA

, prostate cancer, neurogenic bladder, renal cancer, anxiety who presented to 

the ER with right sided back pain after a fall. 





1. Sepsis secondary to UTI


   - Now afebrile, WBC and tachycardia improved


   - Previously grew Pseudomonas, E. coli in urine


   - Continue Levaquin, IV fluid


   - Follow up urine culture, blood cultures


2. Rib fractures


   - Continue oxycodone as needed for pain


   - Incentive spirometer


3. s/p fall


4. Hepatic transaminitis


   - Likely secondary to sepsis


   - Improving


5. HTN


   - Continue Coreg, Cozaar, Norvasc


6. Hyperlipidemia


   - Continue Lipitor


7. CAD


   - Continue Coreg, Lipitor


   - Aspirin held secondary to fall with rib fractures


8. COPD


   - Stable


9. Anxiety


10. History of prostate cancer


   - Continue Proscar


11. Neurogenic bladder


   - Continue Flomax


12. History of renal cancer

## 2017-10-13 NOTE — PN
Physical Exam: 


SUBJECTIVE: Patient seen and examined at bedside. he is doing better, pain 

under control on oxy, no acute events overnight, no new complains.Denies fever, 

chills, N/V/D/C, no abdominal pain, no urinary symptoms.








OBJECTIVE:





 Vital Signs











 Period  Temp  Pulse  Resp  BP Sys/Uyn  Pulse Ox


 


 Last 24 Hr  97.9 F-100.8 F  72-93  20-20  129-150/55-88  92-95











GENERAL: The patient is awake, alert, and fully oriented, in no acute distress.


HEAD: Normal with no signs of trauma.


EYES: conjunctiva clear. No ptosis. 


ENT:  moist mucous membranes.


LUNGS: Breath sounds equal, clear to auscultation bilaterally, no wheezes, no 

crackles, no 


accessory muscle use.  on the back right side echymoses 4X4 cm 


HEART: Regular rate and rhythm, S1, S2 without murmur, rub or gallop.


ABDOMEN: Soft, nontender, nondistended, normoactive bowel sounds, no guarding, 

no 


rebound, 


EXTREMITIES:  warm, well-perfused, no edema, normal range of motion in 4 EXT 


NEUROLOGICAL: good mentation 


PSYCH: Normal mood, normal affect.


SKIN: Warm, dry, no rashes or lesions noted














 Laboratory Results - last 24 hr











  10/12/17 10/12/17 10/13/17





  16:25 21:00 05:35


 


WBC    7.5  D


 


RBC    3.86 L


 


Hgb    12.0


 


Hct    34.5 L


 


MCV    89.3


 


MCH    31.0


 


MCHC    34.7


 


RDW    14.5


 


Plt Count    240  D


 


MPV    7.7


 


Neutrophils %    68.9


 


Lymphocytes %    15.0  D


 


Monocytes %    13.5 H


 


Eosinophils %    1.9  D


 


Basophils %    0.7


 


PT with INR   16.90 H 


 


INR   1.50 H 


 


Puncture Site  Right radial  


 


ABG pH  7.45  


 


ABG pCO2 at Pt Temp  31.8 L D  


 


ABG pO2 at Pt Temp  58.9 L D  


 


ABG HCO3  21.9 L  


 


ABG O2 Sat (Measured)  91.9  


 


ABG O2 Content  14.5 L  


 


ABG Base Excess  -1.0  


 


Sb Test  Positive  


 


Carboxyhemoglobin  2.0  


 


Methemoglobin  0.5  


 


O2 Delivery Device  R/a  


 


Oxygen Flow Rate  21%  


 


PEEP  0.0  


 


Sodium   


 


Potassium   


 


Chloride   


 


Carbon Dioxide   


 


Anion Gap   


 


BUN   


 


Creatinine   


 


Creat Clearance w eGFR   


 


Random Glucose   


 


Calcium   


 


Phosphorus   


 


Magnesium   


 


Total Bilirubin   


 


AST   


 


ALT   


 


Alkaline Phosphatase   


 


Total Protein   


 


Albumin   














  10/13/17 10/13/17





  05:35 10:40


 


WBC  


 


RBC  


 


Hgb  


 


Hct  


 


MCV  


 


MCH  


 


MCHC  


 


RDW  


 


Plt Count  


 


MPV  


 


Neutrophils %  


 


Lymphocytes %  


 


Monocytes %  


 


Eosinophils %  


 


Basophils %  


 


PT with INR  


 


INR  


 


Puncture Site  


 


ABG pH  


 


ABG pCO2 at Pt Temp  


 


ABG pO2 at Pt Temp  


 


ABG HCO3  


 


ABG O2 Sat (Measured)  


 


ABG O2 Content  


 


ABG Base Excess  


 


Sb Test  


 


Carboxyhemoglobin  


 


Methemoglobin  


 


O2 Delivery Device  


 


Oxygen Flow Rate  


 


PEEP  


 


Sodium  142 


 


Potassium  3.8 


 


Chloride  109 H 


 


Carbon Dioxide  24 


 


Anion Gap  9 


 


BUN  18  D 


 


Creatinine  0.9 


 


Creat Clearance w eGFR  > 60 


 


Random Glucose  116 H 


 


Calcium  8.4 L 


 


Phosphorus  2.4 L  Cancelled


 


Magnesium  2.1  Cancelled


 


Total Bilirubin  0.6 


 


AST  53 H D 


 


ALT  83 H D 


 


Alkaline Phosphatase  97  D 


 


Total Protein  6.1 L D 


 


Albumin  2.8 L D 








Active Medications











Generic Name Dose Route Start Last Admin





  Trade Name Freq  PRN Reason Stop Dose Admin


 


Amlodipine Besylate  10 mg 10/13/17 12:00  





  Norvasc -  PO   





  DAILY LAUREN   


 


Atorvastatin Calcium  20 mg 10/12/17 22:00 10/12/17 21:15





  Lipitor -  PO   20 mg





  HS LAUREN   Administration


 


Carvedilol  12.5 mg 10/13/17 22:00  





  Coreg -  PO   





  BID LAUREN   


 


Finasteride  5 mg 10/13/17 10:00 10/13/17 10:04





  Proscar -  PO   5 mg





  DAILY LAUREN   Administration


 


Levofloxacin  100 mls @ 100 mls/hr 10/13/17 10:00 10/13/17 10:52





  Levaquin 500 Mg Premixed Ivpb -  IVPB   100 mls/hr





  DAILY LAUREN   Administration


 


Losartan Potassium  50 mg 10/13/17 12:00  





  Cozaar -  PO   





  DAILY LAUREN   


 


Meclizine HCl  25 mg 10/12/17 16:50  





  Antivert -  PO   





  DAILY PRN   





  dizziness   


 


Melatonin  5 mg 10/12/17 22:00 10/13/17 00:00





  Melatonin  PO   5 mg





  HS LAUREN   Administration


 


Nystatin  500,000 units 10/13/17 12:00  





  Nystatin Oral Suspension -  PO   





  Q6HPO LAUREN   


 


Oxycodone HCl  5 mg 10/12/17 15:42  





  Roxicodone -  PO   





  Q4H PRN   





  PAIN   


 


Promethazine HCl/Dextromethorphan  5 ml 10/13/17 11:57  





  Phenergan-Dm Syrup -  PO   





  Q4H PRN   





  COUGH   


 


Ranitidine HCl  150 mg 10/13/17 10:00 10/13/17 10:04





  Zantac -  PO   150 mg





  DAILY LAUREN   Administration


 


Tamsulosin HCl  0.4 mg 10/13/17 10:00 10/13/17 10:04





  Flomax -  PO   0.4 mg





  DAILY LAUREN   Administration











ASSESSMENT/PLAN:


85 Year old white male with PMH of prostate cancer, renal cancer S/p left 

nephrectomy, TIA(2002), COPD, HTN, HLD, colonic polyp, ischemic colitis, 

diverticulosis and cholecyctectomy, pneumonia (fungal ),DIC, who presented to 

[facility] today after he fell on his back. in the ED was found to have 

urosepsis and was admitted for further evaluation and treatment. 





#  Sepsis 2/2 UTI


* fever 102, Leuckocytosis 13.5, with UTI 


* UA : Nitrite +, LE +, WBC 38 , moderate bacteria 


* 2 L boluses started in ED, 


* Levofloxacin 750 IVBP started in ED 


* Blood Cx , urine cx, 


* ID consulted 


* DC IV fluids 


* start abx ceftriaxone  1 gm IVBP daily, switch to Levaquin 500 BID by ID 




* 


* 


# Fall, likely 2/2 sepsis vs mechanical fall vs vertigo vs pre-syncopy 


* denies vertigo and LOC or any seizure symptoms 


* IV fluids on  admission 


* CXR no acute pathology 


* Rib Xray :8th-9th rib frx 


* Pain control Oxy 5 mg PO Q 4hr 


* Incentive spirometry


* Orthodontics BP negative  


* Monitor clinically 


* O2 as needed, Duo-neb as needed


# Transaminities, likely 2/2 meds vs Alchoholic vs sepsis , improved


* US liver 


* Repeat CMP  in AM 


* hold statins 


#BPH


*  continue proscar, flomax





# Oral thrush , improved 


* Nystatine 500, 000 units oral suspensions PO Q6hr





# HTN,


* /78 


* continue home meds, Norvasc 10 po daily , Coreg 12.5, Cozaar 50 PO daily  .


* monitor BP 


* 


# FEN


* F: NS 2 L bolus in ED , DC fluids 


* E: Monitor 


* N: low sodium diet 


* 


# Proph 


* DVT: Hold   , start SCDS , consider AC if there s no bleeding 


* GI: no need for now 


* PT eval for deconditioning 


* 


#Dispo


* Admit to med-surg 


* 








Visit type





- Emergency Visit


Emergency Visit: Yes


ED Registration Date: 10/12/17


Care time: The patient presented to the Emergency Department on the above date 

and was hospitalized for further evaluation of their emergent condition.





- New Patient


This patient is new to me today: No





- Critical Care


Critical Care patient: No





- Discharge Referral


Referred to [facility] No

## 2017-10-13 NOTE — CON.PULM
Consult


Consult Specialty:: PULMONARY


Referred by:: GHISLAINE


Reason for Consultation:: CHRONIC COUGH





- History of Present Illness


Chief Complaint: FALL/RIB FX/CHRONIC COUGH


History of Present Illness: 














    85 Year old white male well known by me for many years, with PMH of 

prostate cancer, renal cancer S/p left nephrectomy, TIA(2002), COPD, HTN, HLD, 

colonic polyp, ischemic colitis, diverticulosis and cholecystotomy requiring 

cholesystotomy which has been removed approximately 2-3 months ago, pneumonia (

fungal ),DIC, who presented to the hospital today after he fell on his back. 

Patient wake up to use the bathroom when he felt his knee collapsed and he fall 

and hit his back and his wife call the ambulance for him. He did not hit his 

head, denies dizziness, loc, vertigo, lightheadedness. had h/o mechanical fall 

2 years ago. Patient denies headache, blurry vision, N/V/D/C, denies chest pain

, but has chronic productive cough(copd) and sob, he denies any abdominal pain, 

denies any urinary difficulty or any burning sensation. denies hematuria.





- History Source


History Provided By: Patient, Medical Record


Limitations to Obtaining History: No Limitations





- Past Medical History


CNS: Yes: TIA.  No: Alzheimer's


Cardio/Vascular: Yes: CHF, HTN, Hyperlipdemia.  No: AFIB


Pulmonary: Yes: COPD, Pneumonia (fungal), Other (chronic cough)


Gastrointestinal: Yes: Diverticulitis, Other (cholecystitis)


Renal/: Yes: Cancer (Prostate/KIDNEY), Neurogenic Bladder


Psych: Yes: Anxiety


Rheumatology: Yes: Other (arthritis knees)





- Past Surgical History


Past Surgical History: Yes: Appendectomy, Colonoscopy (last 1 yrs ago, lynn 

2 polyps. h/o ischemic colitis 3,2 and ?years ago), Hernia Repair (b/l), 

Nephrectomy (left)





- Alcohol/Substance Use


Hx Alcohol Use: No





- Smoking History


Smoking history: Former smoker


Have you smoked in the past 12 months: No


Aproximately how many cigarettes per day: 0


If you are a former smoker, when did you quit?: 1982





- Social History


Usual Living Arrangement: With Spouse


ADL: Independent


Occupation: retired salesman


Place of Birth: [place]


History of Recent Travel: No





Home Medications





- Allergies


Allergies/Adverse Reactions: 


 Allergies











Allergy/AdvReac Type Severity Reaction Status Date / Time


 


No Known Drug Allergies Allergy   Verified 10/12/17 07:56














- Home Medications


Home Medications: 


Ambulatory Orders





Amlodipine Besylate [Norvasc -] 5 mg PO DAILY 10/12/17 


Aspirin [ASA -] 81 mg PO DAILY 10/12/17 


Atorvastatin Ca [Lipitor] 20 mg PO HS 10/12/17 


Carvedilol [Coreg] 12.5 mg PO DAILY 10/12/17 


Cholecalciferol (Vitamin D3) [Vitamin D3] 0 unit PO DAILY 10/12/17 


Finasteride 5 mg PO DAILY 10/12/17 


Losartan Potassium 50 mg PO DAILY 10/12/17 


Meclizine HCl [Antivert -] 25 mg PO PRN PRN 10/12/17 


Melatonin 0 mg PO HS 10/12/17 


Omega-3 Fatty Acids/Fish Oil [Fish Oil 1,000 mg Softgel] 1 each PO DAILY 10/12/

17 


Ranitidine [Zantac -] 150 mg PO DAILY 10/12/17 


Tamsulosin HCl [Flomax] 0.4 mg PO DAILY 10/12/17 











Family Disease History





- Family Disease History


Family History: Unremarkable





Review of Systems





- Review of Systems


Respiratory: reports: Cough


Musculoskeletal: reports: Other (right sided rib pain)





Physical Exam


Vital Sings: 


 Vital Signs











Temperature  97.9 F   10/13/17 06:00


 


Pulse Rate  72   10/13/17 06:00


 


Respiratory Rate  20   10/13/17 09:00


 


Blood Pressure  147/72   10/13/17 06:00


 


O2 Sat by Pulse Oximetry (%)  92 L  10/13/17 09:00











Constitutional: Yes: Calm


Eyes: Yes: EOM Intact


HENT: Yes: Normocephalic


Neck: Yes: Trachea Midline


Cardiovascular: Yes: Regular Rate and Rhythm, S1, S2


Respiratory: Yes: CTA Bilaterally


Gastrointestinal: Yes: Soft


Edema: No


Neurological: Yes: Alert


Psychiatric: Yes: Alert


Labs: 


 CBC, BMP





 10/13/17 05:35 





 10/13/17 05:35 





 ABG Results











ABG pH  7.45  (7.35-7.45)   10/12/17  16:25    


 


ABG pCO2 at Pt Temp  31.8 mmHg (35-45)  L D 10/12/17  16:25    


 


ABG pO2 at Pt Temp  58.9 mmHg ()  L D 10/12/17  16:25    


 


ABG HCO3  21.9 meq/L (22-26)  L  10/12/17  16:25    


 


ABG O2 Sat (Measured)  91.9 % (90-98.9)   10/12/17  16:25    


 


ABG O2 Content  14.5 % vol (15-22)  L  10/12/17  16:25    


 


ABG Base Excess  -1.0 meq/l (-2-2)   10/12/17  16:25    








REST REVIEWED





Problem List





- Problems


(1) Closed traumatic displaced fracture of rib


Code(s): S22.39XA - FRACTURE OF ONE RIB, UNSP SIDE, INIT FOR CLOS FX   

Qualifiers: 


     Encounter type: initial encounter     Laterality: right     Qualified Code(

s): S22.31XA - Fracture of one rib, right side, initial encounter for closed 

fracture; S22.31XA - Fracture of one rib, right side, initial encounter for 

closed fracture  





(2) UTI (urinary tract infection)


Code(s): N39.0 - URINARY TRACT INFECTION, SITE NOT SPECIFIED   Qualifiers: 


     Urinary tract infection type: site unspecified     Hematuria presence: 

without hematuria        Qualified Code(s): N39.0 - Urinary tract infection, 

site not specified; N39.0 - Urinary tract infection, site not specified; R31.9 

- Hematuria, unspecified; R31.9 - Hematuria, unspecified  





(3) Chest pain


Code(s): R07.9 - CHEST PAIN, UNSPECIFIED   








Assessment/Plan


FALL WITH RIB FRACTURE


CHRONIC COUGH AGGRAVATES PAIN


MULTIPLE CO-MORBID FACTORS AS LISTED








ANALGESIA/COUGH SUPPRESSANT


CONTINUE HOME MEDS


INCENTIVE ALANNAH/NEBS AS NEEDED/O2 PRN





THANK YOU





WILL FOLLOW Franky MESSER MD

## 2017-10-13 NOTE — PN
Progress Note (short form)





- Note


Progress Note: 


ID consult dictated


UTI/ Possible sepsis secondary to UTI


Fever/ leukocytosis


S/P fall 


Elevated LFTs   ? Sepsis


Pending c/s empiric levaquin

## 2017-10-14 LAB
ALBUMIN SERPL-MCNC: 2.8 G/DL (ref 3.4–5)
ALP SERPL-CCNC: 104 U/L (ref 45–117)
ALT SERPL-CCNC: 57 U/L (ref 12–78)
ANION GAP SERPL CALC-SCNC: 7 MMOL/L (ref 8–16)
AST SERPL-CCNC: 28 U/L (ref 15–37)
BILIRUB SERPL-MCNC: 0.5 MG/DL (ref 0.2–1)
CALCIUM SERPL-MCNC: 8.3 MG/DL (ref 8.5–10.1)
CO2 SERPL-SCNC: 26 MMOL/L (ref 21–32)
CREAT SERPL-MCNC: 1 MG/DL (ref 0.7–1.3)
DEPRECATED RDW RBC AUTO: 14.3 % (ref 11.9–15.9)
GLUCOSE SERPL-MCNC: 138 MG/DL (ref 74–106)
MCH RBC QN AUTO: 30.1 PG (ref 25.7–33.7)
MCHC RBC AUTO-ENTMCNC: 33.5 G/DL (ref 32–35.9)
MCV RBC: 89.8 FL (ref 80–96)
PLATELET # BLD AUTO: 262 K/MM3 (ref 134–434)
PMV BLD: 7.5 FL (ref 7.5–11.1)
PROT SERPL-MCNC: 6 G/DL (ref 6.4–8.2)
WBC # BLD AUTO: 7.9 K/MM3 (ref 4–10)

## 2017-10-14 RX ADMIN — SIMETHICONE CHEW TAB 80 MG PRN MG: 80 TABLET ORAL at 17:26

## 2017-10-14 RX ADMIN — CEFUROXIME AXETIL SCH MG: 500 TABLET ORAL at 14:03

## 2017-10-14 RX ADMIN — RANITIDINE SCH: 150 TABLET ORAL at 09:25

## 2017-10-14 RX ADMIN — LEVOFLOXACIN SCH MLS/HR: 5 INJECTION, SOLUTION INTRAVENOUS at 09:25

## 2017-10-14 RX ADMIN — FINASTERIDE SCH MG: 5 TABLET, FILM COATED ORAL at 21:57

## 2017-10-14 RX ADMIN — TAMSULOSIN HYDROCHLORIDE SCH MG: 0.4 CAPSULE ORAL at 21:57

## 2017-10-14 RX ADMIN — ATORVASTATIN CALCIUM SCH MG: 20 TABLET, FILM COATED ORAL at 21:57

## 2017-10-14 RX ADMIN — LOSARTAN POTASSIUM SCH MG: 50 TABLET, FILM COATED ORAL at 09:25

## 2017-10-14 RX ADMIN — NYSTATIN SCH UNITS: 100000 SUSPENSION ORAL at 06:29

## 2017-10-14 RX ADMIN — NYSTATIN SCH UNITS: 100000 SUSPENSION ORAL at 17:26

## 2017-10-14 RX ADMIN — NYSTATIN SCH UNITS: 100000 SUSPENSION ORAL at 11:56

## 2017-10-14 RX ADMIN — DEXTROMETHORPHAN HYDROBROMIDE AND PROMETHAZINE HYDROCHLORIDE PRN ML: 15; 6.25 SOLUTION ORAL at 17:40

## 2017-10-14 RX ADMIN — CARVEDILOL SCH MG: 12.5 TABLET, FILM COATED ORAL at 21:57

## 2017-10-14 RX ADMIN — Medication SCH MG: at 21:57

## 2017-10-14 RX ADMIN — CEFUROXIME AXETIL SCH MG: 500 TABLET ORAL at 21:57

## 2017-10-14 RX ADMIN — AMLODIPINE BESYLATE SCH MG: 10 TABLET ORAL at 09:25

## 2017-10-14 RX ADMIN — NYSTATIN SCH: 100000 SUSPENSION ORAL at 02:17

## 2017-10-14 RX ADMIN — CARVEDILOL SCH MG: 12.5 TABLET, FILM COATED ORAL at 09:25

## 2017-10-14 NOTE — PN
Physical Exam: 


SUBJECTIVE: Patient seen and examined. Patient complains of gas pains in his 

abdomen.








OBJECTIVE:





 Vital Signs











 Period  Temp  Pulse  Resp  BP Sys/Yun  Pulse Ox


 


 Last 24 Hr  97.3 F-98.7 F  60-90  16-20  134-161/50-81  94











GENERAL: The patient is awake, alert, and fully oriented, in no acute distress.


LUNGS: Breath sounds equal, clear to auscultation bilaterally, no wheezes, no 

crackles, no accessory muscle use. 


HEART: Regular rate and rhythm, S1, S2 without murmur, rub or gallop.


ABDOMEN: Soft, nontender, nondistended, normoactive bowel sounds, no guarding, 

no rebound, no hepatosplenomegaly, no masses.


EXTREMITIES: 2+ pulses, warm, well-perfused, no edema. 











 Laboratory Results - last 24 hr











  10/14/17 10/14/17





  06:30 06:30


 


WBC  7.9 


 


RBC  4.00 


 


Hgb  12.0 


 


Hct  36.0 


 


MCV  89.8 


 


MCH  30.1 


 


MCHC  33.5 


 


RDW  14.3 


 


Plt Count  262 


 


MPV  7.5 


 


Sodium   139


 


Potassium   3.7


 


Chloride   106


 


Carbon Dioxide   26


 


Anion Gap   7 L


 


BUN   20 H


 


Creatinine   1.0


 


Creat Clearance w eGFR   > 60


 


Random Glucose   138 H


 


Calcium   8.3 L


 


Total Bilirubin   0.5


 


AST   28  D


 


ALT   57  D


 


Alkaline Phosphatase   104


 


Total Protein   6.0 L


 


Albumin   2.8 L








Active Medications











Generic Name Dose Route Start Last Admin





  Trade Name Freq  PRN Reason Stop Dose Admin


 


Amlodipine Besylate  10 mg 10/13/17 12:00 10/14/17 09:25





  Norvasc -  PO   10 mg





  DAILY LAUREN   Administration


 


Atorvastatin Calcium  20 mg 10/12/17 22:00 10/13/17 21:12





  Lipitor -  PO   20 mg





  HS LAUREN   Administration


 


Carvedilol  12.5 mg 10/13/17 22:00 10/14/17 09:25





  Coreg -  PO   12.5 mg





  BID LAUREN   Administration


 


Cefuroxime Axetil  500 mg 10/14/17 12:15  





  Ceftin -  PO   





  BID LAUREN   


 


Finasteride  5 mg 10/14/17 22:00  





  Proscar -  PO   





  HS LAUREN   


 


Losartan Potassium  50 mg 10/13/17 12:00 10/14/17 09:25





  Cozaar -  PO   50 mg





  DAILY LAUREN   Administration


 


Meclizine HCl  25 mg 10/12/17 16:50  





  Antivert -  PO   





  DAILY PRN   





  dizziness   


 


Melatonin  5 mg 10/12/17 22:00 10/13/17 21:12





  Melatonin  PO   5 mg





  HS LAUREN   Administration


 


Nystatin  500,000 units 10/13/17 12:00 10/14/17 11:56





  Nystatin Oral Suspension -  PO   500,000 units





  Q6HPO LAUREN   Administration


 


Oxycodone HCl  5 mg 10/12/17 15:42 10/14/17 06:33





  Roxicodone -  PO   5 mg





  Q4H PRN   Administration





  PAIN   


 


Promethazine HCl/Dextromethorphan  5 ml 10/13/17 11:57 10/13/17 21:15





  Phenergan-Dm Syrup -  PO   5 ml





  Q4H PRN   Administration





  COUGH   


 


Ranitidine HCl  150 mg 10/13/17 10:00 10/14/17 09:25





  Zantac -  PO   Not Given





  DAILY LAUREN   


 


Tamsulosin HCl  0.4 mg 10/14/17 22:00  





  Flomax -  PO   





  HS LAUREN   











ASSESSMENT/PLAN:


This is an 85 year old man with a history of HTN, hyperlipidemia, CAD, COPD, TIA

, prostate cancer, neurogenic bladder, renal cancer, anxiety who presented to 

the ER with right sided back pain after a fall. 





1. Sepsis secondary to E.coli UTI


   - Improved


   - Levaquin changed to Ceftin


   - Blood cultures negative


2. Oral candidiasis


   - Continue Nystatin


3. Rib fractures


   - Continue oxycodone as needed for pain


   - Continue incentive spirometer


4. s/p fall


5. Hepatic transaminitis


   - Likely secondary to sepsis


   - Improved


6. HTN


   - Continue Coreg, Cozaar, Norvasc


7. Hyperlipidemia


   - Continue Lipitor


8. CAD


   - Continue Coreg, Lipitor


   - Restart aspirin


9. COPD


   - Stable


10. Anxiety


11. History of prostate cancer


   - Continue Proscar


12. Neurogenic bladder


   - Continue Flomax


13. History of renal cancer


14. Continue physical therapy - walked 20 feet today with rolling walker and 

was unsteady





Visit type





- Emergency Visit


Emergency Visit: Yes


ED Registration Date: 10/12/17


Care time: The patient presented to the Emergency Department on the above date 

and was hospitalized for further evaluation of their emergent condition.





- New Patient


This patient is new to me today: No





- Critical Care


Critical Care patient: No





- Discharge Referral


Referred to [facility] No

## 2017-10-14 NOTE — PN
Progress Note, Physician


History of Present Illness: 


C/O "gas  pain"


No c/o chest pain/ dyspnea/ cough


No dysuria


Temps, WBC down


LFTs now normal





- Current Medication List


Current Medications: 


Active Medications





Amlodipine Besylate (Norvasc -)  10 mg PO DAILY [facility]


   Last Admin: 10/14/17 09:25 Dose:  10 mg


Atorvastatin Calcium (Lipitor -)  20 mg PO [facility]


   Last Admin: 10/13/17 21:12 Dose:  20 mg


Carvedilol (Coreg -)  12.5 mg PO BID [facility]


   Last Admin: 10/14/17 09:25 Dose:  12.5 mg


Finasteride (Proscar -)  5 mg PO [facility]


Levofloxacin (Levaquin 500 Mg Premixed Ivpb -)  100 mls @ 100 mls/hr IVPB DAILY 

[facility]


   Last Admin: 10/14/17 09:25 Dose:  100 mls/hr


Losartan Potassium (Cozaar -)  50 mg PO DAILY [facility]


   Last Admin: 10/14/17 09:25 Dose:  50 mg


Meclizine HCl (Antivert -)  25 mg PO DAILY PRN


   PRN Reason: dizziness


Melatonin (Melatonin)  5 mg PO [facility]


   Last Admin: 10/13/17 21:12 Dose:  5 mg


Nystatin (Nystatin Oral Suspension -)  500,000 units PO Q6HPO [facility]


   Last Admin: 10/14/17 11:56 Dose:  500,000 units


Oxycodone HCl (Roxicodone -)  5 mg PO Q4H PRN


   PRN Reason: PAIN


   Last Admin: 10/14/17 06:33 Dose:  5 mg


Promethazine HCl/Dextromethorphan (Phenergan-Dm Syrup -)  5 ml PO Q4H PRN


   PRN Reason: COUGH


   Last Admin: 10/13/17 21:15 Dose:  5 ml


Ranitidine HCl (Zantac -)  150 mg PO DAILY [facility]


   Last Admin: 10/14/17 09:25 Dose:  Not Given


Tamsulosin HCl (Flomax -)  0.4 mg PO [facility]











- Objective


Vital Signs: 


 Vital Signs











Temperature  98.3 F   10/14/17 10:00


 


Pulse Rate  60   10/14/17 10:00


 


Respiratory Rate  20   10/14/17 10:00


 


Blood Pressure  134/50   10/14/17 10:00


 


O2 Sat by Pulse Oximetry (%)  94 L  10/13/17 20:31











Constitutional: Yes: No Distress


Eyes: Yes: Conjunctiva Clear


Cardiovascular: Yes: Regular Rate and Rhythm, S1, S2


Respiratory: Yes: Diminished


Gastrointestinal: Yes: Normal Bowel Sounds, Soft.  No: Tenderness


Musculoskeletal: Yes: Other (+ tenderness, ecchymosis R posterior thorax)


Extremities: Yes: Shortened


Labs: 


 CBC, BMP





 10/14/17 06:30 





 10/14/17 06:30 





 INR, PTT











INR  1.50  (0.82-1.09)  H  10/12/17  21:00    














Assessment/Plan


UTI- E coli


Fever/ leukocytosis- resolved


Elevated LFTs- resolved


S/P fall with rib fractures





Substitute ceftin 500mg po bid x 7d

## 2017-10-15 RX ADMIN — SIMETHICONE CHEW TAB 80 MG PRN MG: 80 TABLET ORAL at 05:49

## 2017-10-15 RX ADMIN — DEXTROMETHORPHAN HYDROBROMIDE AND PROMETHAZINE HYDROCHLORIDE PRN ML: 15; 6.25 SOLUTION ORAL at 06:16

## 2017-10-15 RX ADMIN — NYSTATIN SCH UNITS: 100000 SUSPENSION ORAL at 05:40

## 2017-10-15 RX ADMIN — DEXTROMETHORPHAN HYDROBROMIDE AND PROMETHAZINE HYDROCHLORIDE PRN ML: 15; 6.25 SOLUTION ORAL at 21:21

## 2017-10-15 RX ADMIN — TAMSULOSIN HYDROCHLORIDE SCH MG: 0.4 CAPSULE ORAL at 21:24

## 2017-10-15 RX ADMIN — ASPIRIN 81 MG SCH MG: 81 TABLET ORAL at 09:17

## 2017-10-15 RX ADMIN — CARVEDILOL SCH MG: 12.5 TABLET, FILM COATED ORAL at 21:21

## 2017-10-15 RX ADMIN — DEXTROMETHORPHAN HYDROBROMIDE AND PROMETHAZINE HYDROCHLORIDE PRN ML: 15; 6.25 SOLUTION ORAL at 16:32

## 2017-10-15 RX ADMIN — LOSARTAN POTASSIUM SCH MG: 50 TABLET, FILM COATED ORAL at 09:18

## 2017-10-15 RX ADMIN — CEFUROXIME AXETIL SCH MG: 500 TABLET ORAL at 09:18

## 2017-10-15 RX ADMIN — NYSTATIN SCH UNITS: 100000 SUSPENSION ORAL at 12:39

## 2017-10-15 RX ADMIN — ATORVASTATIN CALCIUM SCH MG: 20 TABLET, FILM COATED ORAL at 21:21

## 2017-10-15 RX ADMIN — Medication SCH MG: at 21:22

## 2017-10-15 RX ADMIN — AMLODIPINE BESYLATE SCH MG: 10 TABLET ORAL at 09:18

## 2017-10-15 RX ADMIN — CEFUROXIME AXETIL SCH MG: 500 TABLET ORAL at 21:21

## 2017-10-15 RX ADMIN — CARVEDILOL SCH MG: 12.5 TABLET, FILM COATED ORAL at 09:17

## 2017-10-15 RX ADMIN — FINASTERIDE SCH MG: 5 TABLET, FILM COATED ORAL at 21:21

## 2017-10-15 RX ADMIN — NYSTATIN SCH UNITS: 100000 SUSPENSION ORAL at 17:44

## 2017-10-15 RX ADMIN — RANITIDINE SCH: 150 TABLET ORAL at 09:18

## 2017-10-15 RX ADMIN — NYSTATIN SCH UNITS: 100000 SUSPENSION ORAL at 00:46

## 2017-10-15 NOTE — PN
Progress Note (short form)





- Note


Progress Note: 


Awake and alert watching TV.  Had severe rib cage pain this AM that has mostly 

been relieved by oxycodone. 


No SOB. 


Some dry cough. 





 Intake & Output











 10/12/17 10/13/17 10/14/17 10/15/17





 23:59 23:59 23:59 23:59


 


Intake Total 200 1700 400 100


 


Output Total  800 100 200


 


Balance 200 900 300 -100


 


Weight 154 lb 8 oz 153 lb 1.6 oz 152 lb 151 lb 6 oz








 Last Vital Signs











Temp Pulse Resp BP Pulse Ox


 


 99.8 F H  78   20   128/57   94 L


 


 10/15/17 06:02  10/15/17 06:02  10/15/17 06:02  10/15/17 06:02  10/14/17 22:00








Active Medications





Amlodipine Besylate (Norvasc -)  10 mg PO DAILY [facility]


   Last Admin: 10/15/17 09:18 Dose:  10 mg


Aspirin (Asa -)  81 mg PO DAILY [facility]


   Last Admin: 10/15/17 09:17 Dose:  81 mg


Atorvastatin Calcium (Lipitor -)  20 mg PO HS [facility]


   Last Admin: 10/14/17 21:57 Dose:  20 mg


Carvedilol (Coreg -)  12.5 mg PO BID [facility]


   Last Admin: 10/15/17 09:17 Dose:  12.5 mg


Cefuroxime Axetil (Ceftin -)  500 mg PO BID [facility]


   Last Admin: 10/15/17 09:18 Dose:  500 mg


Finasteride (Proscar -)  5 mg PO HS [facility]


   Last Admin: 10/14/17 21:57 Dose:  5 mg


Losartan Potassium (Cozaar -)  50 mg PO DAILY [facility]


   Last Admin: 10/15/17 09:18 Dose:  50 mg


Meclizine HCl (Antivert -)  25 mg PO DAILY PRN


   PRN Reason: dizziness


Melatonin (Melatonin)  5 mg PO HS [facility]


   Last Admin: 10/14/17 21:57 Dose:  5 mg


Nystatin (Nystatin Oral Suspension -)  500,000 units PO Q6HPO [facility]


   Last Admin: 10/15/17 05:40 Dose:  500,000 units


Oxycodone HCl (Roxicodone -)  5 mg PO Q4H PRN


   PRN Reason: PAIN


   Last Admin: 10/15/17 05:40 Dose:  5 mg


Promethazine HCl/Dextromethorphan (Phenergan-Dm Syrup -)  5 ml PO Q4H PRN


   PRN Reason: COUGH


   Last Admin: 10/15/17 06:16 Dose:  5 ml


Ranitidine HCl (Zantac -)  150 mg PO DAILY [facility]


   Last Admin: 10/15/17 09:18 Dose:  Not Given


Simethicone (Mylicon -)  80 mg PO Q4H PRN


   PRN Reason: INDIGESTION


   Last Admin: 10/15/17 05:49 Dose:  80 mg


Tamsulosin HCl (Flomax -)  0.4 mg PO HS [facility]


   Last Admin: 10/14/17 21:57 Dose:  0.4 mg








Constitutional: Yes: NAD 


Eyes: Yes: EOM Intact


HENT: Yes: Normocephalic


Neck: Yes: Trachea Midline


Cardiovascular: Yes: Regular Rate and Rhythm, S1, S2


Respiratory: Yes: Few scattered rhonchi 


Gastrointestinal: Yes: Soft


Edema: No


Neurological: Yes: Alert


Psychiatric: Yes: Alert


Labs: 


 


Problem List





- Problems


(1) Closed traumatic displaced fracture of rib


Code(s): S22.39XA - FRACTURE OF ONE RIB, UNSP SIDE, INIT FOR CLOS FX   

Qualifiers: 


     Encounter type: initial encounter     Laterality: right     Qualified Code(

s): S22.31XA - Fracture of one rib, right side, initial encounter for closed 

fracture; S22.31XA - Fracture of one rib, right side, initial encounter for 

closed fracture  





(2) UTI (urinary tract infection)


Code(s): N39.0 - URINARY TRACT INFECTION, SITE NOT SPECIFIED   Qualifiers: 


     Urinary tract infection type: site unspecified     Hematuria presence: 

without hematuria        Qualified Code(s): N39.0 - Urinary tract infection, 

site not specified; N39.0 - Urinary tract infection, site not specified; R31.9 

- Hematuria, unspecified; R31.9 - Hematuria, unspecified  





(3) Chest pain


Code(s): R07.9 - CHEST PAIN, UNSPECIFIED   








Assessment/Plan


FALL WITH RIB FRACTURE


CHRONIC COUGH AGGRAVATES PAIN


MULTIPLE CO-MORBID FACTORS AS LISTED





ANALGESIA/COUGH SUPPRESSANT


CONTINUE HOME MEDS


INCENTIVE SPIROMETRY 


BD TX 


O2 AS NEEDED 





Dr [place]

## 2017-10-15 NOTE — PN
Physical Exam: 


SUBJECTIVE: Patient seen and examined. Abdominal pain resolved with 

Simethicone. No new complaints.








OBJECTIVE:





 Vital Signs











 Period  Temp  Pulse  Resp  BP Sys/Yun  Pulse Ox


 


 Last 24 Hr  97.9 F-99.8 F  70-79  16-20  128-168/57-72  94








GENERAL: The patient is awake, alert, and fully oriented, in no acute distress.


LUNGS: Breath sounds equal, clear to auscultation bilaterally, no wheezes, no 

crackles, no accessory muscle use. 


HEART: Regular rate and rhythm, S1, S2 without murmur, rub or gallop.


ABDOMEN: Soft, nontender, nondistended, normoactive bowel sounds, no guarding, 

no rebound, no hepatosplenomegaly, no masses.


BACK: Ecchymosis lateral right back.


EXTREMITIES: 2+ pulses, warm, well-perfused, no edema. 








Active Medications











Generic Name Dose Route Start Last Admin





  Trade Name Freq  PRN Reason Stop Dose Admin


 


Amlodipine Besylate  10 mg 10/13/17 12:00 10/15/17 09:18





  Norvasc -  PO   10 mg





  DAILY LAUREN   Administration


 


Aspirin  81 mg 10/15/17 10:00 10/15/17 09:17





  Asa -  PO   81 mg





  DAILY LAUREN   Administration


 


Atorvastatin Calcium  20 mg 10/12/17 22:00 10/14/17 21:57





  Lipitor -  PO   20 mg





  HS LAUREN   Administration


 


Carvedilol  12.5 mg 10/13/17 22:00 10/15/17 09:17





  Coreg -  PO   12.5 mg





  BID LAUREN   Administration


 


Cefuroxime Axetil  500 mg 10/14/17 12:15 10/15/17 09:18





  Ceftin -  PO   500 mg





  BID LAUREN   Administration


 


Finasteride  5 mg 10/14/17 22:00 10/14/17 21:57





  Proscar -  PO   5 mg





  HS LAUREN   Administration


 


Losartan Potassium  50 mg 10/13/17 12:00 10/15/17 09:18





  Cozaar -  PO   50 mg





  DAILY LAUREN   Administration


 


Meclizine HCl  25 mg 10/12/17 16:50  





  Antivert -  PO   





  DAILY PRN   





  dizziness   


 


Melatonin  5 mg 10/12/17 22:00 10/14/17 21:57





  Melatonin  PO   5 mg





  HS LAUREN   Administration


 


Nystatin  500,000 units 10/13/17 12:00 10/15/17 05:40





  Nystatin Oral Suspension -  PO   500,000 units





  Q6HPO LAUREN   Administration


 


Oxycodone HCl  5 mg 10/12/17 15:42 10/15/17 05:40





  Roxicodone -  PO   5 mg





  Q4H PRN   Administration





  PAIN   


 


Promethazine HCl/Dextromethorphan  5 ml 10/13/17 11:57 10/15/17 06:16





  Phenergan-Dm Syrup -  PO   5 ml





  Q4H PRN   Administration





  COUGH   


 


Ranitidine HCl  150 mg 10/13/17 10:00 10/15/17 09:18





  Zantac -  PO   Not Given





  DAILY LAUREN   


 


Simethicone  80 mg 10/14/17 15:08 10/15/17 05:49





  Mylicon -  PO   80 mg





  Q4H PRN   Administration





  INDIGESTION   


 


Tamsulosin HCl  0.4 mg 10/14/17 22:00 10/14/17 21:57





  Flomax -  PO   0.4 mg





  HS LAUREN   Administration











ASSESSMENT/PLAN:


This is an 85 year old man with a history of HTN, hyperlipidemia, CAD, COPD, TIA

, prostate cancer, neurogenic bladder, renal cancer, anxiety who presented to 

the ER with right sided back pain after a fall. 





1. Sepsis secondary to E.coli UTI


   - Improved


   - Continue Ceftin


   - Blood cultures negative


2. Oral candidiasis


   - Continue Nystatin


3. Rib fractures


   - Continue oxycodone as needed for pain


   - Continue incentive spirometer


4. s/p fall


5. Hepatic transaminitis


   - Likely secondary to sepsis


   - Improved


6. HTN


   - Continue Coreg, Cozaar, Norvasc


7. Hyperlipidemia


   - Continue Lipitor


8. CAD


   - Continue aspirin, Coreg, Lipitor


9. COPD


   - Stable


10. Anxiety


11. History of prostate cancer


   - Continue Proscar


12. Neurogenic bladder


   - Continue Flomax


13. History of renal cancer


14. Disposition


   -Continue physical therapy 


      - Improving - walked 80 feet today


   - Expect discharge tomorrow





Visit type





- Emergency Visit


Emergency Visit: Yes


ED Registration Date: 10/12/17


Care time: The patient presented to the Emergency Department on the above date 

and was hospitalized for further evaluation of their emergent condition.





- New Patient


This patient is new to me today: No





- Critical Care


Critical Care patient: No





- Discharge Referral


Referred to [facility] No

## 2017-10-16 VITALS — SYSTOLIC BLOOD PRESSURE: 111 MMHG | TEMPERATURE: 98.3 F | DIASTOLIC BLOOD PRESSURE: 43 MMHG | HEART RATE: 66 BPM

## 2017-10-16 RX ADMIN — ASPIRIN 81 MG SCH MG: 81 TABLET ORAL at 09:00

## 2017-10-16 RX ADMIN — AMLODIPINE BESYLATE SCH MG: 10 TABLET ORAL at 09:01

## 2017-10-16 RX ADMIN — LOSARTAN POTASSIUM SCH MG: 50 TABLET, FILM COATED ORAL at 09:00

## 2017-10-16 RX ADMIN — RANITIDINE SCH MG: 150 TABLET ORAL at 09:01

## 2017-10-16 RX ADMIN — NYSTATIN SCH UNITS: 100000 SUSPENSION ORAL at 06:33

## 2017-10-16 RX ADMIN — CEFUROXIME AXETIL SCH MG: 500 TABLET ORAL at 09:01

## 2017-10-16 RX ADMIN — NYSTATIN SCH UNITS: 100000 SUSPENSION ORAL at 00:21

## 2017-10-16 RX ADMIN — NYSTATIN SCH UNITS: 100000 SUSPENSION ORAL at 12:25

## 2017-10-16 RX ADMIN — CARVEDILOL SCH MG: 12.5 TABLET, FILM COATED ORAL at 09:00

## 2017-10-16 NOTE — PN
Teaching Attending Note


Name of Resident: Adrian Bagley


ATTENDING PHYSICIAN STATEMENT





I saw and evaluated the patient.


I reviewed the resident's note and discussed the case with the resident.


I agree with the resident's findings and plan as documented.








SUBJECTIVE:








OBJECTIVE:


 Vital Signs











 Period  Temp  Pulse  Resp  BP Sys/Yun  Pulse Ox


 


 Last 24 Hr  97.3 F-98.8 F  63-74  16-22  /32-60  92-94

















 Current Medications











Generic Name Dose Route Start Last Admin





  Trade Name Freq  PRN Reason Stop Dose Admin


 


Amlodipine Besylate  10 mg 10/13/17 12:00 10/16/17 09:01





  Norvasc -  PO   10 mg





  DAILY LAUREN   Administration


 


Artificial Tears  1 drop 10/15/17 19:32 10/15/17 21:22





  Artificial Tears  OU   1 drop





  TID PRN   Administration





  PAIN   


 


Aspirin  81 mg 10/15/17 10:00 10/16/17 09:00





  Asa -  PO   81 mg





  DAILY LAUREN   Administration


 


Atorvastatin Calcium  20 mg 10/12/17 22:00 10/15/17 21:21





  Lipitor -  PO   20 mg





  HS LAUREN   Administration


 


Carvedilol  12.5 mg 10/13/17 22:00 10/16/17 09:00





  Coreg -  PO   12.5 mg





  BID LAUREN   Administration


 


Cefuroxime Axetil  500 mg 10/14/17 12:15 10/16/17 09:01





  Ceftin -  PO   500 mg





  BID LAUREN   Administration


 


Finasteride  5 mg 10/14/17 22:00 10/15/17 21:21





  Proscar -  PO   5 mg





  HS LAUREN   Administration


 


Losartan Potassium  50 mg 10/13/17 12:00 10/16/17 09:00





  Cozaar -  PO   50 mg





  DAILY LAUREN   Administration


 


Meclizine HCl  25 mg 10/12/17 16:50  





  Antivert -  PO   





  DAILY PRN   





  dizziness   


 


Melatonin  5 mg 10/12/17 22:00 10/15/17 21:22





  Melatonin  PO   5 mg





  HS LAUREN   Administration


 


Nystatin  500,000 units 10/13/17 12:00 10/16/17 06:33





  Nystatin Oral Suspension -  PO   500,000 units





  Q6HPO LAUREN   Administration


 


Oxycodone HCl  5 mg 10/12/17 15:42 10/16/17 08:53





  Roxicodone -  PO   5 mg





  Q4H PRN   Administration





  PAIN   


 


Promethazine HCl/Dextromethorphan  5 ml 10/13/17 11:57 10/15/17 21:21





  Phenergan-Dm Syrup -  PO   5 ml





  Q4H PRN   Administration





  COUGH   


 


Ranitidine HCl  150 mg 10/13/17 10:00 10/16/17 09:01





  Zantac -  PO   150 mg





  DAILY LAUREN   Administration


 


Simethicone  80 mg 10/14/17 15:08 10/15/17 05:49





  Mylicon -  PO   80 mg





  Q4H PRN   Administration





  INDIGESTION   


 


Tamsulosin HCl  0.4 mg 10/14/17 22:00 10/15/17 21:24





  Flomax -  PO   0.4 mg





  HS LAUREN   Administration














ASSESSMENT AND PLAN:

## 2017-10-16 NOTE — DS
Physical Exam: 


SUBJECTIVE: Patient seen and examined at bedside, no new compalins , patient is 

stable and ready to discharged home.








OBJECTIVE:





 Vital Signs











 Period  Temp  Pulse  Resp  BP Sys/Yun  Pulse Ox


 


 Last 24 Hr  97.3 F-98.8 F  63-74  16-22  /32-60  92-94








PHYSICAL EXAM





GENERAL: The patient is awake, alert, and fully oriented, in no acute distress.


HEAD: Normal with no signs of trauma.


EYES: conjunctiva clear. No ptosis. 


ENT:  moist mucous membranes.


LUNGS: Breath sounds equal, clear to auscultation bilaterally, no wheezes, no 

crackles, no 


accessory muscle use.  on the back right side echymoses 4X4 cm 


HEART: Regular rate and rhythm, S1, S2 without murmur, rub or gallop.


ABDOMEN: Soft, nontender, nondistended, normoactive bowel sounds, no guarding, 

no 


rebound, 


EXTREMITIES:  warm, well-perfused, no edema, normal range of motion in 4 EXT 


NEUROLOGICAL: good mentation 


PSYCH: Normal mood, normal affect.


SKIN: Warm, dry, no rashes or lesions noted


LABS





HOSPITAL COURSE:





Date of Admission:10/12/17





Date of Discharge: 10/16/17








This is an 85 year old man with a history of HTN, hyperlipidemia, CAD, COPD, TIA

, prostate cancer, neurogenic bladder, renal cancer, anxiety who presented to 

the ER with right sided back pain after a fall. 


he was found to have sepsis secondary to E.coli UTI, Improvedon  Ceftin, Blood 

cultures negative.


Interm of  Oral candidiasis continue Nystatin


he was found to have Rib fractures , pain controlled with oxycodone as needed,

and  continue incentive spirometer to avoid any lung collapse or atelectasis.





In term of  Hepatic transaminitis, Likely secondary to sepsis, Improved


In term of  HTN Continue Coreg, Cozaar, Norvasc


In term of Hyperlipidemia,Continue Lipitor





In term of CAD Continue aspirin, Coreg, Lipitor


For  COPD Stable 


he has a historyof  Anxiety,and a history of prostate cancer on  Proscar





In term  Neurogenic bladder on  Flomax


patient had a history of renal cancer can f/u as out patient .


he worked with  physical therapy  Improving - walked 80 feet today


patient will be  discharge home with visiting nurse for rehab.


Minutes to complete discharge: 40





Discharge Summary


Reason For Visit: SEPSIS


Current Active Problems





Chest pain (Acute) 


Closed traumatic displaced fracture of rib (Acute) 


Sepsis (Acute) 


UTI (urinary tract infection) (Acute) 








Condition: Stable





- Instructions


Diet, Activity, Other Instructions: 


Please take your medicine as prescribed -DR. MALDONADO ORDERED THAT PT. CAN RESUME 

TAKING ASPIRIN  325 MG PO DAILY AND COREG TO 12.5 MG PO BID AS PT . HAS BEEN ON 

THESE MEDS PRIOR TO ADMISSION. 


Please resume all your home medicine 


you were found to have urinary tract infection and will be treated with 

antibiotics for another 4 days as out patient (total 7 days including the 

antiobiotics you got in the hospital)


Please see your primary care physician Dr. Landin in one week 





Please use fall precautions 


you have 2 ribs fracture after you fell down that you need to use pain killer(

oxycodone 5 mg every 8 hours ) as needed. you can use Tylenol 650 mg over the 

counter as well for pain.


Please resume your daily activity as tolerated. 


You will be discharged home with visiting nurse for rehab at home.





If you develop fever, chills or your symptoms worsen please came back to the 

emergency room ASAP. 





Referrals: 


Álvaro Landin MD [Primary Care Provider] - 1 Week


Disposition: VNS/HOME HEALTH CARE





- Home Medications


Comprehensive Discharge Medication List: 


Ambulatory Orders





Amlodipine Besylate [Norvasc -] 5 mg PO DAILY 10/12/17 


Aspirin [ASA -] 81 mg PO DAILY 10/12/17 


Atorvastatin Ca [Lipitor] 20 mg PO HS 10/12/17 


Carvedilol [Coreg] 12.5 mg PO DAILY 10/12/17 


Cholecalciferol (Vitamin D3) [Vitamin D3] 0 unit PO DAILY 10/12/17 


Finasteride 5 mg PO DAILY 10/12/17 


Losartan Potassium 50 mg PO DAILY 10/12/17 


Meclizine HCl [Antivert -] 25 mg PO PRN PRN 10/12/17 


Melatonin 0 mg PO HS 10/12/17 


Omega-3 Fatty Acids/Fish Oil [Fish Oil 1,000 mg Softgel] 1 each PO DAILY 10/12/

17 


Ranitidine [Zantac -] 150 mg PO DAILY 10/12/17 


Tamsulosin HCl [Flomax] 0.4 mg PO DAILY 10/12/17 








This patient is new to me today: No


Emergency Visit: Yes


ED Registration Date: 10/12/17


Care time: The patient presented to the Emergency Department on the above date 

and was hospitalized for further evaluation of their emergent condition.


Critical Care patient: No





- Discharge Referral


Referred to [facility] No

## 2017-10-16 NOTE — PN
Progress Note, Physician


History of Present Illness: 


PULMONARY





ALERT,OOB-CHAIR,LESS CHEST DISCOMFORT,-SOB





- Current Medication List


Current Medications: 


Active Medications





Amlodipine Besylate (Norvasc -)  10 mg PO DAILY [facility]


   Last Admin: 10/16/17 09:01 Dose:  10 mg


Artificial Tears (Artificial Tears)  1 drop OU TID PRN


   PRN Reason: PAIN


   Last Admin: 10/15/17 21:22 Dose:  1 drop


Aspirin (Asa -)  81 mg PO DAILY [facility]


   Last Admin: 10/16/17 09:00 Dose:  81 mg


Atorvastatin Calcium (Lipitor -)  20 mg PO HS [facility]


   Last Admin: 10/15/17 21:21 Dose:  20 mg


Carvedilol (Coreg -)  12.5 mg PO BID [facility]


   Last Admin: 10/16/17 09:00 Dose:  12.5 mg


Cefuroxime Axetil (Ceftin -)  500 mg PO BID [facility]


   Last Admin: 10/16/17 09:01 Dose:  500 mg


Docusate Sodium (Colace -)  100 mg PO BID PRN


   PRN Reason: CONSTIPATION


Finasteride (Proscar -)  5 mg PO HS [facility]


   Last Admin: 10/15/17 21:21 Dose:  5 mg


Losartan Potassium (Cozaar -)  50 mg PO DAILY [facility]


   Last Admin: 10/16/17 09:00 Dose:  50 mg


Meclizine HCl (Antivert -)  25 mg PO DAILY PRN


   PRN Reason: dizziness


Melatonin (Melatonin)  5 mg PO HS [facility]


   Last Admin: 10/15/17 21:22 Dose:  5 mg


Nystatin (Nystatin Oral Suspension -)  500,000 units PO Q6HPO [facility]


   Last Admin: 10/16/17 12:25 Dose:  500,000 units


Oxycodone HCl (Roxicodone -)  5 mg PO Q4H PRN


   PRN Reason: PAIN


   Last Admin: 10/16/17 08:53 Dose:  5 mg


Polyethylene Glycol (Miralax (For Daily Use) -)  17 gm PO DAILY [facility]


Promethazine HCl/Dextromethorphan (Phenergan-Dm Syrup -)  5 ml PO Q4H PRN


   PRN Reason: COUGH


   Last Admin: 10/15/17 21:21 Dose:  5 ml


Ranitidine HCl (Zantac -)  150 mg PO DAILY [facility]


   Last Admin: 10/16/17 09:01 Dose:  150 mg


Simethicone (Mylicon -)  80 mg PO Q4H PRN


   PRN Reason: INDIGESTION


   Last Admin: 10/15/17 05:49 Dose:  80 mg


Tamsulosin HCl (Flomax -)  0.4 mg PO HS LAUREN


   Last Admin: 10/15/17 21:24 Dose:  0.4 mg











- Objective


Vital Signs: 


 Vital Signs











Temperature  98 F   10/16/17 08:29


 


Pulse Rate  72   10/16/17 09:00


 


Respiratory Rate  20   10/16/17 09:00


 


Blood Pressure  139/58   10/16/17 09:00


 


O2 Sat by Pulse Oximetry (%)  92 L  10/16/17 11:30











Constitutional: Yes: Well Nourished, Calm


Eyes: Yes: WNL


HENT: Yes: WNL


Neck: Yes: WNL


Cardiovascular: Yes: Regular Rate and Rhythm, S1, S2


Respiratory: Yes: Diminished


Gastrointestinal: Yes: Normal Bowel Sounds, Soft


Extremities: Yes: WNL


Edema: No


Labs: 


 CBC, BMP





 





Assessment/Plan


Problem List





- Problems


(1) Closed traumatic displaced fracture of rib


Code(s): S22.39XA - FRACTURE OF ONE RIB, UNSP SIDE, INIT FOR CLOS FX   

Qualifiers: 


     Encounter type: initial encounter     Laterality: right     Qualified Code(

s): S22.31XA - Fracture of one rib, right side, initial encounter for closed 

fracture; S22.31XA - Fracture of one rib, right side, initial encounter for 

closed fracture  





(2) UTI (urinary tract infection)


Code(s): N39.0 - URINARY TRACT INFECTION, SITE NOT SPECIFIED   Qualifiers: 


     Urinary tract infection type: site unspecified     Hematuria presence: 

without hematuria        Qualified Code(s): N39.0 - Urinary tract infection, 

site not specified; N39.0 - Urinary tract infection, site not specified; R31.9 

- Hematuria, unspecified; R31.9 - Hematuria, unspecified  





(3) Chest pain


Code(s): R07.9 - CHEST PAIN, UNSPECIFIED   








Assessment/Plan


FALL WITH RIB FRACTURE


CHRONIC COUGH AGGRAVATES PAIN


MULTIPLE CO-MORBID FACTORS AS LISTED





ANALGESIA/COUGH SUPPRESSANT


CONTINUE HOME MEDS


INCENTIVE SPIROMETRY 


BD TX 


O2 AS NEEDED 





DR INFANTE

## 2017-10-21 ENCOUNTER — HOSPITAL ENCOUNTER (INPATIENT)
Dept: HOSPITAL 74 - JER | Age: 82
LOS: 6 days | Discharge: HOME | DRG: 871 | End: 2017-10-27
Attending: INTERNAL MEDICINE | Admitting: INTERNAL MEDICINE
Payer: COMMERCIAL

## 2017-10-21 VITALS — BODY MASS INDEX: 21.3 KG/M2

## 2017-10-21 DIAGNOSIS — K59.09: ICD-10-CM

## 2017-10-21 DIAGNOSIS — E78.5: ICD-10-CM

## 2017-10-21 DIAGNOSIS — Z86.73: ICD-10-CM

## 2017-10-21 DIAGNOSIS — Z85.51: ICD-10-CM

## 2017-10-21 DIAGNOSIS — K55.8: ICD-10-CM

## 2017-10-21 DIAGNOSIS — F41.8: ICD-10-CM

## 2017-10-21 DIAGNOSIS — G47.09: ICD-10-CM

## 2017-10-21 DIAGNOSIS — I11.9: ICD-10-CM

## 2017-10-21 DIAGNOSIS — M17.10: ICD-10-CM

## 2017-10-21 DIAGNOSIS — K26.9: ICD-10-CM

## 2017-10-21 DIAGNOSIS — J18.9: ICD-10-CM

## 2017-10-21 DIAGNOSIS — K80.30: ICD-10-CM

## 2017-10-21 DIAGNOSIS — F02.80: ICD-10-CM

## 2017-10-21 DIAGNOSIS — A41.9: Primary | ICD-10-CM

## 2017-10-21 DIAGNOSIS — N40.0: ICD-10-CM

## 2017-10-21 DIAGNOSIS — R10.11: ICD-10-CM

## 2017-10-21 DIAGNOSIS — Z87.891: ICD-10-CM

## 2017-10-21 DIAGNOSIS — N31.8: ICD-10-CM

## 2017-10-21 DIAGNOSIS — Z90.5: ICD-10-CM

## 2017-10-21 DIAGNOSIS — Z85.53: ICD-10-CM

## 2017-10-21 DIAGNOSIS — G30.8: ICD-10-CM

## 2017-10-21 DIAGNOSIS — R74.0: ICD-10-CM

## 2017-10-21 DIAGNOSIS — I07.1: ICD-10-CM

## 2017-10-21 DIAGNOSIS — E83.42: ICD-10-CM

## 2017-10-21 DIAGNOSIS — D72.828: ICD-10-CM

## 2017-10-21 DIAGNOSIS — J44.9: ICD-10-CM

## 2017-10-21 DIAGNOSIS — E87.6: ICD-10-CM

## 2017-10-21 DIAGNOSIS — I48.91: ICD-10-CM

## 2017-10-21 LAB
ALBUMIN SERPL-MCNC: 3.2 G/DL (ref 3.4–5)
ALP SERPL-CCNC: 471 U/L (ref 45–117)
ALT SERPL-CCNC: 249 U/L (ref 12–78)
ANION GAP SERPL CALC-SCNC: 9 MMOL/L (ref 8–16)
AST SERPL-CCNC: 125 U/L (ref 15–37)
BASOPHILS # BLD: 1.2 % (ref 0–2)
BILIRUB SERPL-MCNC: 0.9 MG/DL (ref 0.2–1)
CALCIUM SERPL-MCNC: 8.9 MG/DL (ref 8.5–10.1)
CK SERPL-CCNC: 42 IU/L (ref 39–308)
CO2 SERPL-SCNC: 25 MMOL/L (ref 21–32)
CREAT SERPL-MCNC: 0.9 MG/DL (ref 0.7–1.3)
DEPRECATED RDW RBC AUTO: 14.2 % (ref 11.9–15.9)
EOSINOPHIL # BLD: 1.2 % (ref 0–4.5)
GLUCOSE SERPL-MCNC: 157 MG/DL (ref 74–106)
MCH RBC QN AUTO: 30.1 PG (ref 25.7–33.7)
MCHC RBC AUTO-ENTMCNC: 34 G/DL (ref 32–35.9)
MCV RBC: 88.5 FL (ref 80–96)
NEUTROPHILS # BLD: 80.1 % (ref 42.8–82.8)
PLATELET # BLD AUTO: 486 K/MM3 (ref 134–434)
PMV BLD: 7.1 FL (ref 7.5–11.1)
PROT SERPL-MCNC: 7 G/DL (ref 6.4–8.2)
TROPONIN I SERPL-MCNC: < 0.02 NG/ML (ref 0–0.05)
WBC # BLD AUTO: 19.9 K/MM3 (ref 4–10)

## 2017-10-21 NOTE — PDOC
Attending Attestation





- Resident


Resident Name: Maurice Talamantes





- ED Attending Attestation


I have performed the following: I have examined & evaluated the patient, The 

case was reviewed & discussed with the resident, I agree w/resident's findings 

& plan, Exceptions are as noted





- HPI


HPI: 





10/21/17 22:23


85-year-old male with history of hernia repairs, appendectomy, lap 

cholecystectomy, history of obstruction and ischemic colitis presents with 

constipation for 6 days with increasing pain and now nausea/vomiting today. No 

fever.





- Physicial Exam


PE: 





10/21/17 22:24


vitals as noted


Dry mucosa


Abdomen is soft and slightly distended. Tender with guarding in the left abdomen

, positive rebound. Bowel sounds are hyperactive.


No palpable incarcerated hernias. Healed laparoscopic site incisional scars.





- Medical Decision Making


10/21/17 22:25


Patient seen and evaluated with the resident. I agree with the overall 

evaluation, assessment, and management with the following summary of visit:





85-year-old male with several days of constipation and now worsening diffuse 

abdominal pain associated with nausea/vomiting, peritoneal findings on exam all 

consistent and concerning for obstruction, rule out colitis/ischemia.


Labs, urinalysis


Chest x-ray, abdominal x-ray followed by CT of the abdomen and pelvis


IV fluids, antiemetic, pain control


Admission, PMD is Dr. Jasso, Dr. Bond is his gastroenterologist, Dr. Terry has been his surgeon in the past.





10/22/17 01:06


+ leukocytosis, no bands. elevated transaminases similar to past. Cr wnl.


awaiting CTAP. on my prelim review of xrays, no free air or volvulus, + dilated 

bowel loops. 


had 2 large nonbloody BMs in ED. 





<Alfonso Ba - Last Filed: 10/22/17 01:06>





- Medical Decision Making





10/22/17 05:45


THIS IS A PRELIMINARY REPORT FROM IMAGING ON CALL


DATE OF SERVICE: 2017-10-22 04:50:12


IMAGES: [id number]


EXAM: CT abdomen and pelvis without contrast


HISTORY:Rule out obstruction


COMPARISON: None.


FINDINGS:


Positive for colitis. There is abnormal thickening of the walls of the left 

colon and sigmoid colon.


There is mild associated pericolonic inflammation. This could be infectious or 

inflammatory in


nature but it is an atypical location for ischemic colitis and this should be 

considered.


There is sigmoid diverticulosis. I believe that the sigmoid wall thickening is 

related to the colitis


rather than diverticulitis.


The jejunal loops are dilated in the left upper quadrant. These bowel loops are 

mildly but


abnormally dilated. Recommend close followup to determine if this represents 

simply an adynamic


ileus or early obstruction.





Pt will be admitted for colitis and pneumonia





<Halima Cortez - Last Filed: 10/22/17 05:46>

## 2017-10-21 NOTE — PDOC
History of Present Illness





- General


History Source: Patient, Family


Exam Limitations: No Limitations





- History of Present Illness


Initial Comments: 


10/21/17 21:30


Patient is an 85 year old male with history of COPD, renal CA, prostate CA, 

ischemic colitis, bowel obstruction, diverticulitis and constipation c/o 

abdominal pain, nausea, vomiting and not being able to have a bowel movement 

for 6 days.  Patient was discharged from the hospital 5 days ago and his last 

BW was the day before discharge.  Endorses taking Miralax one time yesterday 

and one time today without relief. Denies fever, chills, shortness of breath 

and chest pain. Patient endorses multiple abdominal surgeries including 

cholecystectomy and hernia repair. 





Daughter: Dorina ([phone])





PCP: Dr. Jamil











<Maurice Talamantes - Last Filed: 10/22/17 00:08>





<Tristan Lynch - Last Filed: 10/22/17 05:43>





- General


Chief Complaint: Constipation


Stated Complaint: ABD PAIN/VOMITING


Time Seen by Provider: 10/21/17 20:44





Past History





- Past Medical History


Anemia: No


Asthma: No


Cancer: Yes (prostate cancer,kidney cancer)


Cardiac Disorders: No


CVA: Yes (TIA,2002)


COPD: Yes (mild,USES ALBUTEROL AS NEEDED)


CHF: No


Dementia: No


Diabetes: No


GI Disorders: Yes (COLON POLYPS, ISCHEMIC COLITIS, DIVERTICULOSIS)


 Disorders: Yes (PROSTATE CA)


HTN: Yes


Hypercholesterolemia: Yes


Liver Disease: No


Seizures: No


Thyroid Disease: No





- Surgical History


Abdominal Surgery: Yes (BILATERAL  INGUINAL HERNIA)


Appendectomy: Yes


Cardiac Surgery: No


Cholecystectomy: Yes (08/23/2017)


Lung Surgery: No


Neurologic Surgery: Yes (Cervical FUSION)


Orthopedic Surgery: Yes (NECK SURGERY, L knee sx 12/8)





- Immunization History


Immunization Up to Date: Yes





- Suicide/Smoking/Psychosocial Hx


Smoking Status: Yes


Smoking History: Never smoked


Have you smoked in the past 12 months: No


Number of Cigarettes Smoked Daily: 0


If you are a former smoker, when did you quit?: 1982


Information on smoking cessation initiated: No


Hx Alcohol Use: No


Drug/Substance Use Hx: No


Substance Use Type: None


Hx Substance Use Treatment: No





<Maurice Talamantes - Last Filed: 10/22/17 00:08>





<Tristan Lynch - Last Filed: 10/22/17 05:43>





- Past Medical History


Allergies/Adverse Reactions: 


 Allergies











Allergy/AdvReac Type Severity Reaction Status Date / Time


 


No Known Drug Allergies Allergy   Verified 10/21/17 20:46











Home Medications: 


Ambulatory Orders





Aspirin [ASA -] 325 mg PO DAILY 10/12/17 


Atorvastatin Ca [Lipitor] 20 mg PO HS 10/12/17 


Carvedilol [Coreg] 12.5 mg PO BID 10/12/17 


Cholecalciferol (Vitamin D3) [Vitamin D3] 0 unit PO DAILY 10/12/17 


Finasteride 5 mg PO DAILY 10/12/17 


Losartan Potassium 50 mg PO DAILY 10/12/17 


Meclizine HCl [Antivert -] 25 mg PO PRN PRN 10/12/17 


Melatonin 0 mg PO HS 10/12/17 


Omega-3 Fatty Acids/Fish Oil [Fish Oil 1,000 mg Softgel] 1 each PO DAILY 10/12/

17 


Ranitidine [Zantac -] 150 mg PO DAILY 10/12/17 


Tamsulosin HCl [Flomax] 0.4 mg PO DAILY 10/12/17 


Amlodipine Besylate [Norvasc -] 10 mg PO DAILY #30 tab 10/16/17 


Cefuroxime Axetil [Ceftin -] 500 mg PO BID #9 tablet 10/16/17 


Nystatin Oral Suspension - [Nystatin Oral Susp 821947 Units/5 ML -] 500,000 

units PO Q6HPO #1 units 10/16/17 


Oxycodone HCl [Roxicodone -] 5 mg PO Q4H PRN #10 tablet MDD 15 10/16/17 


Polyethylene Glycol 3350 [Miralax 119 gm Btl -] 17 gm PO DAILY #30 bottle 10/16/

17 


Polyvinyl Alcohol [Artificial Tears] 1 drop OU TID PRN #1 bottle 10/16/17 


Simethicone [Mylicon -] 80 mg PO Q4H PRN #20 tab.chew 10/16/17 











*Physical Exam





- Vital Signs


 Last Vital Signs











Temp Pulse Resp BP Pulse Ox


 


    83   18   154/73   94 L


 


    10/21/17 20:46  10/21/17 20:46  10/21/17 20:46  10/21/17 20:46














<Maurice Talamantes - Last Filed: 10/22/17 00:08>





- Vital Signs


 Last Vital Signs











Temp Pulse Resp BP Pulse Ox


 


    83   18   154/73   94 L


 


    10/21/17 20:46  10/21/17 20:46  10/21/17 20:46  10/21/17 20:46














<Tristan Lynch - Last Filed: 10/22/17 05:43>





ED Treatment Course





- LABORATORY


CBC & Chemistry Diagram: 


 10/21/17 21:52





 10/21/17 22:30





<Maurice Talamantes - Last Filed: 10/22/17 00:08>





- LABORATORY


CBC & Chemistry Diagram: 


 10/21/17 21:52





 10/21/17 22:30





- ADDITIONAL ORDERS


Additional order review: 


 Laboratory  Results











  10/22/17 10/21/17 10/21/17





  03:00 22:30 21:52


 


Sodium   135 L  Cancelled


 


Potassium   4.3  Cancelled


 


Chloride   101  Cancelled


 


Carbon Dioxide   25  Cancelled


 


Anion Gap   9  Cancelled


 


BUN   22 H  Cancelled


 


Creatinine   0.9  Cancelled


 


Creat Clearance w eGFR   > 60  Cancelled


 


Random Glucose   157 H  Cancelled


 


Lactic Acid   


 


Calcium   8.9  Cancelled


 


Total Bilirubin   0.9  D  Cancelled


 


AST   125 H D  Cancelled


 


ALT   249 H D  Cancelled


 


Alkaline Phosphatase   471 H D  Cancelled


 


Creatine Kinase   42 


 


Troponin I   < 0.02 


 


Total Protein   7.0  Cancelled


 


Albumin   3.2 L  Cancelled


 


Lipase  142  














  10/21/17





  21:50


 


Sodium 


 


Potassium 


 


Chloride 


 


Carbon Dioxide 


 


Anion Gap 


 


BUN 


 


Creatinine 


 


Creat Clearance w eGFR 


 


Random Glucose 


 


Lactic Acid  1.7


 


Calcium 


 


Total Bilirubin 


 


AST 


 


ALT 


 


Alkaline Phosphatase 


 


Creatine Kinase 


 


Troponin I 


 


Total Protein 


 


Albumin 


 


Lipase 








 











  10/21/17





  21:52


 


RBC  4.35


 


MCV  88.5


 


MCHC  34.0


 


RDW  14.2


 


MPV  7.1 L


 


Neutrophils %  80.1


 


Lymphocytes %  9.1  D


 


Monocytes %  8.4


 


Eosinophils %  1.2


 


Basophils %  1.2














- Medications


Given in the ED: 


ED Medications














Discontinued Medications














Generic Name Dose Route Start Last Admin





  Trade Name Freq  PRN Reason Stop Dose Admin


 


Piperacillin/Tazobactam/Dextrose  100 mls @ 200 mls/hr 10/21/17 23:46 10/22/17 

00:02





  Zosyn 4.5gm Ivpb (Premix)  IVPB 10/22/17 00:15  200 mls/hr





  ONCE ONE   Administration





  Protocol   


 


Morphine Sulfate  4 mg 10/21/17 22:30 10/21/17 23:50





  Morphine Injection -  IVPUSH 10/21/17 22:31  Not Given





  ONCE ONE   


 


Ondansetron HCl  4 mg 10/21/17 21:59 10/21/17 22:18





  Zofran Injection  IVPUSH 10/21/17 22:00  4 mg





  ONCE ONE   Administration














<Tristan Lynch - Last Filed: 10/22/17 05:43>





Medical Decision Making





- Medical Decision Making





10/22/17 05:42


Received a call from imaging and call to discuss CT findings. Definite colitis 

noted on ct with a left lower lobe infiltrate as well as concerns for early 

illeus vs obstruction.  We will admit the patient to the hospital for further 

management of his symptoms.





<Tristan Lynch - Last Filed: 10/22/17 05:43>





*DC/Admit/Observation/Transfer





<Maurice Talamantes - Last Filed: 10/22/17 00:08>





<Tristan Lynch - Last Filed: 10/22/17 05:43>


Diagnosis at time of Disposition: 


 Colitis





Pneumonia


Qualifiers:


 Pneumonia type: due to unspecified organism Laterality: unspecified laterality 

Lung location: unspecified part of lung Qualified Code(s): J18.9 - Pneumonia, 

unspecified organism; J18.9 - Pneumonia, unspecified organism





- Discharge Dispostion


Condition at time of disposition: Guarded

## 2017-10-22 LAB
ALBUMIN SERPL-MCNC: 3.4 G/DL (ref 3.4–5)
ALP SERPL-CCNC: 465 U/L (ref 45–117)
ALT SERPL-CCNC: 258 U/L (ref 12–78)
ANION GAP SERPL CALC-SCNC: 15 MMOL/L (ref 8–16)
AST SERPL-CCNC: 134 U/L (ref 15–37)
BILIRUB CONJ SERPL-MCNC: 0.3 MG/DL (ref 0–0.2)
BILIRUB SERPL-MCNC: 0.8 MG/DL (ref 0.2–1)
CALCIUM SERPL-MCNC: 8.9 MG/DL (ref 8.5–10.1)
CO2 SERPL-SCNC: 20 MMOL/L (ref 21–32)
CREAT SERPL-MCNC: 0.9 MG/DL (ref 0.7–1.3)
GLUCOSE SERPL-MCNC: 153 MG/DL (ref 74–106)
PROT SERPL-MCNC: 6.9 G/DL (ref 6.4–8.2)

## 2017-10-22 RX ADMIN — CARVEDILOL SCH MG: 12.5 TABLET, FILM COATED ORAL at 22:40

## 2017-10-22 RX ADMIN — TAMSULOSIN HYDROCHLORIDE SCH MG: 0.4 CAPSULE ORAL at 11:26

## 2017-10-22 RX ADMIN — METRONIDAZOLE SCH MLS/HR: 500 INJECTION, SOLUTION INTRAVENOUS at 17:23

## 2017-10-22 RX ADMIN — SODIUM CHLORIDE SCH MLS/HR: 9 INJECTION, SOLUTION INTRAVENOUS at 06:36

## 2017-10-22 RX ADMIN — Medication SCH MG: at 22:41

## 2017-10-22 RX ADMIN — AMLODIPINE BESYLATE SCH MG: 10 TABLET ORAL at 11:28

## 2017-10-22 RX ADMIN — VITAMIN D, TAB 1000IU (100/BT) SCH UNIT: 25 TAB at 11:27

## 2017-10-22 RX ADMIN — CARVEDILOL SCH MG: 12.5 TABLET, FILM COATED ORAL at 11:27

## 2017-10-22 RX ADMIN — SODIUM CHLORIDE SCH MLS/HR: 9 INJECTION, SOLUTION INTRAVENOUS at 17:25

## 2017-10-22 RX ADMIN — LOSARTAN POTASSIUM SCH MG: 50 TABLET, FILM COATED ORAL at 11:29

## 2017-10-22 RX ADMIN — ASPIRIN 325 MG ORAL TABLET SCH MG: 325 PILL ORAL at 11:27

## 2017-10-22 RX ADMIN — METRONIDAZOLE SCH MLS/HR: 500 INJECTION, SOLUTION INTRAVENOUS at 12:18

## 2017-10-22 RX ADMIN — ATORVASTATIN CALCIUM SCH MG: 20 TABLET, FILM COATED ORAL at 22:40

## 2017-10-22 RX ADMIN — FINASTERIDE SCH MG: 5 TABLET, FILM COATED ORAL at 11:27

## 2017-10-22 RX ADMIN — RANITIDINE SCH MG: 150 TABLET ORAL at 11:27

## 2017-10-22 RX ADMIN — CEFTRIAXONE SCH MLS/HR: 1 INJECTION, SOLUTION INTRAVENOUS at 11:27

## 2017-10-22 RX ADMIN — OMEGA-3-ACID ETHYL ESTERS SCH GM: 1 CAPSULE, LIQUID FILLED ORAL at 14:59

## 2017-10-22 NOTE — PN
Teaching Attending Note


Name of Resident: Opal Olivares


ATTENDING PHYSICIAN STATEMENT





I saw and evaluated the patient.


Chart, data, imaging reviewed. 


I reviewed the resident's note and discussed the case with the resident.


I agree with the resident's findings and plan as documented with modifications 

below.








SUBJECTIVE:


85 year old male with history of COPD, renal CA, prostate CA, ischemic colitis, 

bowel obstruction, diverticulitis and constipation c/o abdominal pain, nausea, 

vomiting.  Pt discharged from hospital about 6 days ago after  being treated 

for UTI. Reports constipation for the past week. Took Miralax around the time 

when he came to ER and watery BM is reported. Reports cough, however, scant 

whitish sputum production. Denied any shortness of breath or chest pain. 











OBJECTIVE:


 Last Vital Signs











Temp Pulse Resp BP Pulse Ox


 


    83   18   154/73   94 L


 


    10/21/17 20:46  10/21/17 20:46  10/21/17 20:46  10/21/17 20:46





General- appears comfortable, NAD 


HEENT - poor dentition  


NEck supple, no masses appreciated 


CV- s1+S2+ RRR  


Chest - bibasilar crackles appreciated 


Abdomen -soft, nontender, no masses appreciated 


skin- no rashes appreciated 




















 Abnormal Lab Results











  10/21/17 10/21/17





  21:52 22:30


 


WBC  19.9 H D 


 


Plt Count  486 H D 


 


MPV  7.1 L 


 


Sodium   135 L


 


BUN   22 H


 


Random Glucose   157 H


 


AST   125 H D


 


ALT   249 H D


 


Alkaline Phosphatase   471 H D


 


Albumin   3.2 L














ASSESSMENT AND PLAN:


#Abdominal pain with evidence of colitis in left sided colon on CT of abdomen 

and pelvis. Differential diagnosis includes infectious etiology, C difficile 

colitis must be ruled out in light of recent antibiotic use and leukocytosis. 

Abdominal pain may have alse been 2/2 to constipation as patient states that he 

did not have a BM in the past week. Less likely ischemic colitis as there is no 

relation to food, and pain was relieved with defecation. 


-blood culture x2 


-stool  culture 


-stool ova and parasites 


-stool leukocytes 


-stool for C Diff PCR 


-lactate level 


-ciprofloxacin 400mg IV q12hrs 


-metronidazole 500mg IV q8hrs 


-EKG to ensure qtc is not prolonged 


-UA 





#Transaminitis - recurrent 


-avoid hepatoxic meds 


-liver U/S 


-tylenol level 


-etoh level 





-Restart home medications for chronic medical problems 





#DVT ppx 


-heparin sc 





#Diet 


-pureed diet

## 2017-10-22 NOTE — HP
CHIEF COMPLAINT: constipation





PCP: Dr. Landin


Gi: Dr. Rebolledo


Surgeon for recent lap amy: Dr. Terry





HISTORY OF PRESENT ILLNESS:


85 yr old man with recent hospitalization for acute right rib fractures 

presents with 6 days of constipation and poor po intake. Since being discharged 

from the hospital he has not had a bowel movement. a/w abdominal pain, nausea 

and nonbloody vomiting. 


Abdominal pain was mild, diffuse, improved with defecation.


He took miralax last night and prior to coming to the ED and had 2 nonbloody 

loose-watery bowel movements. he has not had a good appetite and doesn't feel 

like eating. says he completed his antibiotic course for his UTI


denies chest pain, palpitations, sob, cough, headache, trouble swallowing.








ER course was notable for:


(1)CT showing colitis





Recent Travel: none, recent hospitalization at [facility]





PAST MEDICAL HISTORY:


history of obstruction and ischemic colitis, CHF, COPD, TIA, bladder ca, renal 

cell ca, HTN, HLD, fungal pna, DIC, obstructive sleep apnea on cpap





PAST SURGICAL HISTORY:


left nephrectomy, cholecystectomy, history of hernia repair, left knee 

replacement


colonoscopy by dr. rebolledo last year





Social History:


Smoking: former, quit >10 yrs ago


Alcohol: quit >20 yrs


Drugs: denies





Family History: NC


Allergies





No Known Drug Allergies Allergy (Verified 10/21/17 20:46)


 








HOME MEDICATIONS:


 Home Medications











 Medication  Instructions  Recorded


 


Aspirin [ASA -] 325 mg PO DAILY 10/12/17


 


Atorvastatin Ca [Lipitor] 20 mg PO HS 10/12/17


 


Carvedilol [Coreg] 12.5 mg PO BID 10/12/17


 


Cholecalciferol (Vitamin D3) 0 unit PO DAILY 10/12/17





[Vitamin D3]  


 


Finasteride 5 mg PO DAILY 10/12/17


 


Losartan Potassium 50 mg PO DAILY 10/12/17


 


Meclizine HCl [Antivert -] 25 mg PO PRN PRN 10/12/17


 


Melatonin 0 mg PO HS 10/12/17


 


Omega-3 Fatty Acids/Fish Oil [Fish 1 each PO DAILY 10/12/17





Oil 1,000 mg Softgel]  


 


Ranitidine [Zantac -] 150 mg PO DAILY 10/12/17


 


Tamsulosin HCl [Flomax] 0.4 mg PO DAILY 10/12/17


 


Amlodipine Besylate [Norvasc -] 10 mg PO DAILY #30 tab 10/16/17


 


Cefuroxime Axetil [Ceftin -] 500 mg PO BID #9 tablet 10/16/17


 


Nystatin Oral Suspension - 500,000 units PO Q6HPO #1 units 10/16/17





[Nystatin Oral Susp 614318 Units/5  





ML -]  


 


Oxycodone HCl [Roxicodone -] 5 mg PO Q4H PRN #10 tablet MDD 15 10/16/17


 


Polyethylene Glycol 3350 [Miralax 17 gm PO DAILY #30 bottle 10/16/17





119 gm Btl -]  


 


Polyvinyl Alcohol [Artificial 1 drop OU TID PRN #1 bottle 10/16/17





Tears]  


 


Simethicone [Mylicon -] 80 mg PO Q4H PRN #20 tab.chew 10/16/17








REVIEW OF SYSTEMS


CONSTITUTIONAL: 


Present: loss of appetite, 


Absent:  fever, chills, diaphoresis, generalized weakness, malaise, weight 

change


HEENT: 


Absent:  rhinorrhea, nasal congestion, throat pain, throat swelling, difficulty 

swallowing, mouth swelling,


CARDIOVASCULAR: 


Absent: chest pain, syncope, palpitations, irregular heart rate, lightheadedness

, peripheral edema


RESPIRATORY: 


Present: chronic cough with scant white sputum


Absent: shortness of breath, dyspnea with exertion, wheezing,  hemoptysis


GASTROINTESTINAL:


Present: abdominal pain, abdominal distension, nausea, vomiting,constipation,


Absent:  diarrhea,  melena, hematochezia


GENITOURINARY: 


Absent: dysuria, frequency, urgency, hesitancy, hematuria, flank pain,


MUSCULOSKELETAL: 


Absent: myalgia, arthralgia, joint swelling, back pain, neck pain


SKIN: 


Absent: rash, itching, pallor


HEMATOLOGIC/IMMUNOLOGIC: 


Absent: easy bleeding, easy bruising, lymphadenopathy, frequent infections


ENDOCRINE:


Absent: unexplained weight gain, unexplained weight loss, heat intolerance, 

cold intolerance


NEUROLOGIC: 


Present: unsteady gait,


Absent: headache, focal weakness or paresthesias, dizziness,  seizure, mental 

status changes, bladder or bowel incontinence











PHYSICAL EXAMINATION


 Vital Signs - 24 hr











  10/21/17





  20:46


 


Pulse Rate 83


 


Respiratory 18





Rate 


 


Blood Pressure 154/73


 


O2 Sat by Pulse 94 L





Oximetry (%) 











GENERAL: Awake, alert, and fully oriented, in no acute distress.


HEAD: Normal with no signs of trauma.


EYES: Pupils equal, round and reactive to light, extraocular movements intact, 

sclera anicteric, conjunctiva clear. No lid lag.


EARS, NOSE, THROAT:oropharynx clear without exudates. dry mucous membranes.


NECK: Normal range of motion, supple without lymphadenopathy, JVD, or masses.


LUNGS: Breath sounds equal, b/l basilar crackles. purpura in right lower 

lateral ribs, ttp.


HEART: Regular rate and rhythm, normal S1 and S2 without murmur, rub or gallop.


ABDOMEN: Soft, nontender, not distended, normoactive bowel sounds, no guarding, 

no rebound, no masses.  No hepatomegaly or  splenomegaly. 


MUSCULOSKELETAL: Normal range of motion at all joints. No bony deformities or 

tenderness. No CVA tenderness.


UPPER EXTREMITIES: 2+ pulses, warm, well-perfused. No cyanosis. No clubbing. No 

peripheral edema.


LOWER EXTREMITIES: 2+ pulses, warm, well-perfused. No calf tenderness. No 

peripheral edema. 


NEUROLOGICAL:  Cranial nerves II-XII intact. Normal speech. facial symmetry, 

normal speech. 4/5 handgrip b/l.


 Laboratory Results - last 24 hr











  10/21/17 10/21/17 10/21/17





  21:50 21:52 21:52


 


WBC   19.9 H D 


 


RBC   4.35 


 


Hgb   13.1 


 


Hct   38.5 


 


MCV   88.5 


 


MCH   30.1 


 


MCHC   34.0 


 


RDW   14.2 


 


Plt Count   486 H D 


 


MPV   7.1 L 


 


Neutrophils %   80.1 


 


Lymphocytes %   9.1  D 


 


Monocytes %   8.4 


 


Eosinophils %   1.2 


 


Basophils %   1.2 


 


Sodium    Cancelled


 


Potassium    Cancelled


 


Chloride    Cancelled


 


Carbon Dioxide    Cancelled


 


Anion Gap    Cancelled


 


BUN    Cancelled


 


Creatinine    Cancelled


 


Creat Clearance w eGFR    Cancelled


 


Random Glucose    Cancelled


 


Lactic Acid  1.7  


 


Calcium    Cancelled


 


Total Bilirubin    Cancelled


 


AST    Cancelled


 


ALT    Cancelled


 


Alkaline Phosphatase    Cancelled


 


Creatine Kinase   


 


Troponin I   


 


Total Protein    Cancelled


 


Albumin    Cancelled


 


Lipase   














  10/21/17 10/22/17





  22:30 03:00


 


WBC  


 


RBC  


 


Hgb  


 


Hct  


 


MCV  


 


MCH  


 


MCHC  


 


RDW  


 


Plt Count  


 


MPV  


 


Neutrophils %  


 


Lymphocytes %  


 


Monocytes %  


 


Eosinophils %  


 


Basophils %  


 


Sodium  135 L 


 


Potassium  4.3 


 


Chloride  101 


 


Carbon Dioxide  25 


 


Anion Gap  9 


 


BUN  22 H 


 


Creatinine  0.9 


 


Creat Clearance w eGFR  > 60 


 


Random Glucose  157 H 


 


Lactic Acid  


 


Calcium  8.9 


 


Total Bilirubin  0.9  D 


 


AST  125 H D 


 


ALT  249 H D 


 


Alkaline Phosphatase  471 H D 


 


Creatine Kinase  42 


 


Troponin I  < 0.02 


 


Total Protein  7.0 


 


Albumin  3.2 L 


 


Lipase   142











ASSESSMENT/PLAN:


85 yr old man with multiple co-morbidities with recent hospitalization to treat 

UTI presents with constipation that resolved in the ED found to have colitis on 

CT scan.





#Abdominal pain  with colitis noted on CT scan


- r/o infectious cause as patient has recently been treated with abx


-- c.diff ag+toxin, stool cx, stool wbc, stool ova/parasites


- treat colitis with rocephin 1gm IVPB daily  + flagyl 500mg IVPB Q8hr


- NPO except for meds





#Leucocytosis


- likely due to colitis





#Transaminitis - recurrent, patient had elevated LFT's during previous 

admission for sepsis which trended down as patient responded to abx, likely 

reactive to acute process


-avoid hepatoxic meds 


-liver U/S to re-evalute CBD, consider MRCP if continue to uptrend





#constipation resolved in ED


- hold miralax 





#HTN


   - losartan 50 mg po qd, coreg 12.5 mg po bid, amlodipine 10 mg po qd





#HLD


-  lipitor 20mg PO qhs





#insomnia


   -melatonin 5 mg po qhs prn





#BPH


   -flomax 0.4 mg po qd and finasteride 5 mg po qd


continue zantac, Vit D3, mylicon, meclizine


continue oxycodone 5mg po prn for pain


#DVT ppx -heparin sc 





#Diet: start with clear liquid diet





Visit type





- Emergency Visit


Emergency Visit: Yes


ED Registration Date: 10/22/17


Care time: The patient presented to the Emergency Department on the above date 

and was hospitalized for further evaluation of their emergent condition.





- New Patient


This patient is new to me today: Yes


Date on this admission: 10/22/17





- Critical Care


Critical Care patient: No

## 2017-10-22 NOTE — PDOC
*Physical Exam





- Vital Signs


 Last Vital Signs











Temp Pulse Resp BP Pulse Ox


 


    83   18   154/73   94 L


 


    10/21/17 20:46  10/21/17 20:46  10/21/17 20:46  10/21/17 20:46














<Halima Cortez - Last Filed: 10/22/17 05:45>





- Vital Signs


 Last Vital Signs











Temp Pulse Resp BP Pulse Ox


 


    83   18   154/73   94 L


 


    10/21/17 20:46  10/21/17 20:46  10/21/17 20:46  10/21/17 20:46














- Physical Exam


Comments: 





10/22/17 04:43


General Appearance: Nourished. No Apparent Distress


HEENT: EOMI, ELIS. 


Respiratory/Chest: Lungs Clear, Normal Breath Sounds. No Crackles, Rales, 

Rhonchi, Wheezing


Cardiovascular: Regular Rhythm, Regular Rate. No Murmur, Gallops, Rubs


Gastrointestinal/Abdominal: Normal Bowel Sounds, Soft. Mild diffuse tenderness 

to palpation.  No Guarding, Rebound,


Musculoskeletal: No CVA Tenderness


Extremity: Normal Capillary Refill


Integumentary: Normal Color, Dry, Warm


Neurologic: Fully Oriented, Alert, Normal Mood/Affect, Normal Response, 





<Tristan Lynch - Last Filed: 10/22/17 05:50>





ED Treatment Course





- LABORATORY


CBC & Chemistry Diagram: 


 10/21/17 21:52





 10/21/17 22:30





- ADDITIONAL ORDERS


Additional order review: 


 Laboratory  Results











  10/22/17 10/21/17 10/21/17





  03:00 22:30 21:52


 


Sodium   135 L  Cancelled


 


Potassium   4.3  Cancelled


 


Chloride   101  Cancelled


 


Carbon Dioxide   25  Cancelled


 


Anion Gap   9  Cancelled


 


BUN   22 H  Cancelled


 


Creatinine   0.9  Cancelled


 


Creat Clearance w eGFR   > 60  Cancelled


 


Random Glucose   157 H  Cancelled


 


Lactic Acid   


 


Calcium   8.9  Cancelled


 


Total Bilirubin   0.9  D  Cancelled


 


AST   125 H D  Cancelled


 


ALT   249 H D  Cancelled


 


Alkaline Phosphatase   471 H D  Cancelled


 


Creatine Kinase   42 


 


Troponin I   < 0.02 


 


Total Protein   7.0  Cancelled


 


Albumin   3.2 L  Cancelled


 


Lipase  142  














  10/21/17





  21:50


 


Sodium 


 


Potassium 


 


Chloride 


 


Carbon Dioxide 


 


Anion Gap 


 


BUN 


 


Creatinine 


 


Creat Clearance w eGFR 


 


Random Glucose 


 


Lactic Acid  1.7


 


Calcium 


 


Total Bilirubin 


 


AST 


 


ALT 


 


Alkaline Phosphatase 


 


Creatine Kinase 


 


Troponin I 


 


Total Protein 


 


Albumin 


 


Lipase 








 











  10/21/17





  21:52


 


RBC  4.35


 


MCV  88.5


 


MCHC  34.0


 


RDW  14.2


 


MPV  7.1 L


 


Neutrophils %  80.1


 


Lymphocytes %  9.1  D


 


Monocytes %  8.4


 


Eosinophils %  1.2


 


Basophils %  1.2














- Medications


Given in the ED: 


ED Medications














Discontinued Medications














Generic Name Dose Route Start Last Admin





  Trade Name Jose Alfredo  PRN Reason Stop Dose Admin


 


Piperacillin/Tazobactam/Dextrose  100 mls @ 200 mls/hr 10/21/17 23:46 10/22/17 

00:02





  Zosyn 4.5gm Ivpb (Premix)  IVPB 10/22/17 00:15  200 mls/hr





  ONCE ONE   Administration





  Protocol   


 


Morphine Sulfate  4 mg 10/21/17 22:30 10/21/17 23:50





  Morphine Injection -  IVPUSH 10/21/17 22:31  Not Given





  ONCE ONE   


 


Ondansetron HCl  4 mg 10/21/17 21:59 10/21/17 22:18





  Zofran Injection  IVPUSH 10/21/17 22:00  4 mg





  ONCE ONE   Administration














<Halima Cortez - Last Filed: 10/22/17 05:45>





- LABORATORY


CBC & Chemistry Diagram: 


 10/21/17 21:52





 10/21/17 22:30





- ADDITIONAL ORDERS


Additional order review: 


 Laboratory  Results











  10/22/17 10/21/17 10/21/17





  03:00 22:30 21:52


 


Sodium   135 L  Cancelled


 


Potassium   4.3  Cancelled


 


Chloride   101  Cancelled


 


Carbon Dioxide   25  Cancelled


 


Anion Gap   9  Cancelled


 


BUN   22 H  Cancelled


 


Creatinine   0.9  Cancelled


 


Creat Clearance w eGFR   > 60  Cancelled


 


Random Glucose   157 H  Cancelled


 


Lactic Acid   


 


Calcium   8.9  Cancelled


 


Total Bilirubin   0.9  D  Cancelled


 


AST   125 H D  Cancelled


 


ALT   249 H D  Cancelled


 


Alkaline Phosphatase   471 H D  Cancelled


 


Creatine Kinase   42 


 


Troponin I   < 0.02 


 


Total Protein   7.0  Cancelled


 


Albumin   3.2 L  Cancelled


 


Lipase  142  














  10/21/17





  21:50


 


Sodium 


 


Potassium 


 


Chloride 


 


Carbon Dioxide 


 


Anion Gap 


 


BUN 


 


Creatinine 


 


Creat Clearance w eGFR 


 


Random Glucose 


 


Lactic Acid  1.7


 


Calcium 


 


Total Bilirubin 


 


AST 


 


ALT 


 


Alkaline Phosphatase 


 


Creatine Kinase 


 


Troponin I 


 


Total Protein 


 


Albumin 


 


Lipase 








 











  10/21/17





  21:52


 


RBC  4.35


 


MCV  88.5


 


MCHC  34.0


 


RDW  14.2


 


MPV  7.1 L


 


Neutrophils %  80.1


 


Lymphocytes %  9.1  D


 


Monocytes %  8.4


 


Eosinophils %  1.2


 


Basophils %  1.2














- Medications


Given in the ED: 


ED Medications














Discontinued Medications














Generic Name Dose Route Start Last Admin





  Trade Name Jose Alfredo  PRN Reason Stop Dose Admin


 


Piperacillin/Tazobactam/Dextrose  100 mls @ 200 mls/hr 10/21/17 23:46 10/22/17 

00:02





  Zosyn 4.5gm Ivpb (Premix)  IVPB 10/22/17 00:15  200 mls/hr





  ONCE ONE   Administration





  Protocol   


 


Morphine Sulfate  4 mg 10/21/17 22:30 10/21/17 23:50





  Morphine Injection -  IVPUSH 10/21/17 22:31  Not Given





  ONCE ONE   


 


Ondansetron HCl  4 mg 10/21/17 21:59 10/21/17 22:18





  Zofran Injection  IVPUSH 10/21/17 22:00  4 mg





  ONCE ONE   Administration














<Tristan Lynch - Last Filed: 10/22/17 05:50>





Progress Note





- Progress Note


Progress Note: 


Received sign out from Dr. Talamantes.  Patient is an 85 year old male with 

history of COPD, renal CA, prostate CA, ischemic colitis, bowel obstruction, 

diverticulitis and constipation c/o abdominal pain, nausea, vomiting and not 

being able to have a bowel movement for 6 days.  Pending CT abdomen pelvis.  

Will require admission for colitis after ct.





<Tristan Lynch - Last Filed: 10/22/17 05:50>





Medical Decision Making





- Medical Decision Making





10/22/17 05:45


THIS IS A PRELIMINARY REPORT FROM IMAGING ON CALL


DATE OF SERVICE: 2017-10-22 04:50:12


IMAGES: [phone]


EXAM: CT abdomen and pelvis without contrast


HISTORY:Rule out obstruction


COMPARISON: None.


FINDINGS:


Positive for colitis. There is abnormal thickening of the walls of the left 

colon and sigmoid colon.


There is mild associated pericolonic inflammation. This could be infectious or 

inflammatory in


nature but it is an atypical location for ischemic colitis and this should be 

considered.


There is sigmoid diverticulosis. I believe that the sigmoid wall thickening is 

related to the colitis


rather than diverticulitis.


The jejunal loops are dilated in the left upper quadrant. These bowel loops are 

mildly but


abnormally dilated. Recommend close followup to determine if this represents 

simply an adynamic


ileus or early obstruction.








<Halima Cortez - Last Filed: 10/22/17 05:45>





- Medical Decision Making


10/22/17 05:42


Received a call from imaging and call to discuss CT findings. Definite colitis 

noted on ct with a left lower lobe infiltrate as well as concerns for early 

illeus vs obstruction.  We will admit the patient to the hospital for further 

management of his symptoms.





10/22/17 05:49


Discussed the case with the hospitalist team who accepted the patient for 

admission.





<Tristan Lynch - Last Filed: 10/22/17 05:50>





*DC/Admit/Observation/Transfer





<Hlaima Cortez - Last Filed: 10/22/17 05:45>





- Discharge Dispostion


Admit: Yes





<Tristan Lynch - Last Filed: 10/22/17 05:50>


Diagnosis at time of Disposition: 


 Colitis





Pneumonia


Qualifiers:


 Pneumonia type: due to unspecified organism Laterality: unspecified laterality 

Lung location: unspecified part of lung Qualified Code(s): J18.9 - Pneumonia, 

unspecified organism





- Discharge Dispostion


Condition at time of disposition: Guarded

## 2017-10-23 LAB
ALBUMIN SERPL-MCNC: 2.6 G/DL (ref 3.4–5)
ALP SERPL-CCNC: 277 U/L (ref 45–117)
ALT SERPL-CCNC: 112 U/L (ref 12–78)
ANION GAP SERPL CALC-SCNC: 7 MMOL/L (ref 8–16)
AST SERPL-CCNC: 29 U/L (ref 15–37)
BASOPHILS # BLD: 1.1 % (ref 0–2)
BILIRUB CONJ SERPL-MCNC: 0.2 MG/DL (ref 0–0.2)
BILIRUB SERPL-MCNC: 0.5 MG/DL (ref 0.2–1)
CALCIUM SERPL-MCNC: 8 MG/DL (ref 8.5–10.1)
CO2 SERPL-SCNC: 26 MMOL/L (ref 21–32)
CREAT SERPL-MCNC: 0.8 MG/DL (ref 0.7–1.3)
DEPRECATED RDW RBC AUTO: 14.2 % (ref 11.9–15.9)
EOSINOPHIL # BLD: 7.8 % (ref 0–4.5)
GLUCOSE SERPL-MCNC: 113 MG/DL (ref 74–106)
MCH RBC QN AUTO: 30.1 PG (ref 25.7–33.7)
MCHC RBC AUTO-ENTMCNC: 34.4 G/DL (ref 32–35.9)
MCV RBC: 87.4 FL (ref 80–96)
NEUTROPHILS # BLD: 54.8 % (ref 42.8–82.8)
PLATELET # BLD AUTO: 382 K/MM3 (ref 134–434)
PMV BLD: 6.7 FL (ref 7.5–11.1)
PROT SERPL-MCNC: 5.6 G/DL (ref 6.4–8.2)
WBC # BLD AUTO: 8.7 K/MM3 (ref 4–10)

## 2017-10-23 RX ADMIN — OMEGA-3-ACID ETHYL ESTERS SCH GM: 1 CAPSULE, LIQUID FILLED ORAL at 11:00

## 2017-10-23 RX ADMIN — ATORVASTATIN CALCIUM SCH MG: 20 TABLET, FILM COATED ORAL at 21:35

## 2017-10-23 RX ADMIN — METRONIDAZOLE SCH MLS/HR: 500 INJECTION, SOLUTION INTRAVENOUS at 10:59

## 2017-10-23 RX ADMIN — VITAMIN D, TAB 1000IU (100/BT) SCH UNIT: 25 TAB at 11:00

## 2017-10-23 RX ADMIN — Medication SCH MG: at 23:05

## 2017-10-23 RX ADMIN — CARVEDILOL SCH MG: 12.5 TABLET, FILM COATED ORAL at 11:00

## 2017-10-23 RX ADMIN — FINASTERIDE SCH MG: 5 TABLET, FILM COATED ORAL at 11:00

## 2017-10-23 RX ADMIN — METRONIDAZOLE SCH MLS/HR: 500 INJECTION, SOLUTION INTRAVENOUS at 01:53

## 2017-10-23 RX ADMIN — RANITIDINE SCH MG: 150 TABLET ORAL at 11:00

## 2017-10-23 RX ADMIN — CEFTRIAXONE SCH MLS/HR: 1 INJECTION, SOLUTION INTRAVENOUS at 10:59

## 2017-10-23 RX ADMIN — LOSARTAN POTASSIUM SCH MG: 50 TABLET, FILM COATED ORAL at 11:00

## 2017-10-23 RX ADMIN — CARVEDILOL SCH MG: 12.5 TABLET, FILM COATED ORAL at 21:34

## 2017-10-23 RX ADMIN — METRONIDAZOLE SCH MLS/HR: 500 INJECTION, SOLUTION INTRAVENOUS at 18:12

## 2017-10-23 RX ADMIN — ASPIRIN 325 MG ORAL TABLET SCH MG: 325 PILL ORAL at 11:00

## 2017-10-23 RX ADMIN — SODIUM CHLORIDE SCH: 9 INJECTION, SOLUTION INTRAVENOUS at 07:30

## 2017-10-23 RX ADMIN — AMLODIPINE BESYLATE SCH MG: 10 TABLET ORAL at 11:00

## 2017-10-23 RX ADMIN — TAMSULOSIN HYDROCHLORIDE SCH MG: 0.4 CAPSULE ORAL at 11:00

## 2017-10-23 NOTE — PN
Progress Note (short form)





- Note


Progress Note: 


Subjective:


has no abd pain , no diarrhea , denies SOB . no cough . 


no fever or chills 





Objective:








Vital Signs:


 Last Vital Signs











Temp Pulse Resp BP Pulse Ox


 


 98.4 F   70   16   144/58   92 L


 


 10/23/17 15:07  10/23/17 15:07  10/23/17 15:07  10/23/17 15:07  10/23/17 09:00








 Laboratory Results - last 24 hr











  10/23/17 10/23/17





  08:36 08:36


 


WBC  8.7  D 


 


RBC  3.93 L 


 


Hgb  11.8 


 


Hct  34.3 L 


 


MCV  87.4 


 


MCH  30.1 


 


MCHC  34.4 


 


RDW  14.2 


 


Plt Count  382  D 


 


MPV  6.7 L 


 


Neutrophils %  54.8  D 


 


Lymphocytes %  27.2  D 


 


Monocytes %  9.1 


 


Eosinophils %  7.8 H D 


 


Basophils %  1.1 


 


Sodium   142


 


Potassium   3.4 L D


 


Chloride   109 H


 


Carbon Dioxide   26  D


 


Anion Gap   7 L


 


BUN   13  D


 


Creatinine   0.8


 


Random Glucose   113 H D


 


Calcium   8.0 L


 


Total Bilirubin   0.5  D


 


Direct Bilirubin   0.2  D


 


AST   29  D


 


ALT   112 H D


 


Alkaline Phosphatase   277 H D


 


Total Protein   5.6 L


 


Albumin   2.6 L D











Physical Exam:





NAD , AAOX3 


CV: RRR


lUng s: CTAB 


ABD : soft,slightly distended , ND ,NL BS 


Ext : no edema 








Assessment/Plan:








pleasant 84 y/o man with h/o HTN, hyperlipidemia, CAD, COPD, TIA, prostate 

cancer, neurogenic bladder, renal cancer, anxiety , recent CCY , and recent 

admission for sepsis form UTI and other medical problems who presented with 

diarrhea and abd apin and was found to have COlitis 





1- Sepsis from L sided colitis. 





-  stool cx pending. but C diff Neg 


-  cont CTX and flagyl 


-  cont gentle hydration 


- if abd remains distended dianna get KUB in am . tolerating liquid diet 





2-  elevated LFTS . recent CCY 





- MRCP due to dilated CBD on recent US 


- monitor , could be from sepsis 





3- H/o CAD, TIA , HTN, cont home meds. meds need to be confirmed as there is 

discrepancy between dc list and meds in EMR  





4- DVT PX 





PT 





Visit type





- Emergency Visit


Emergency Visit: Yes


ED Registration Date: 10/22/17


Care time: The patient presented to the Emergency Department on the above date 

and was hospitalized for further evaluation of their emergent condition.





- New Patient


This patient is new to me today: No





- Critical Care


Critical Care patient: No

## 2017-10-24 LAB
ALBUMIN SERPL-MCNC: 2.6 G/DL (ref 3.4–5)
ALP SERPL-CCNC: 239 U/L (ref 45–117)
ALT SERPL-CCNC: 85 U/L (ref 12–78)
ANION GAP SERPL CALC-SCNC: 13 MMOL/L (ref 8–16)
AST SERPL-CCNC: 22 U/L (ref 15–37)
BASOPHILS # BLD: 1.2 % (ref 0–2)
BILIRUB SERPL-MCNC: 0.5 MG/DL (ref 0.2–1)
CALCIUM SERPL-MCNC: 7.8 MG/DL (ref 8.5–10.1)
CO2 SERPL-SCNC: 22 MMOL/L (ref 21–32)
CREAT SERPL-MCNC: 0.7 MG/DL (ref 0.7–1.3)
DEPRECATED RDW RBC AUTO: 13.9 % (ref 11.9–15.9)
EOSINOPHIL # BLD: 9.7 % (ref 0–4.5)
GLUCOSE SERPL-MCNC: 94 MG/DL (ref 74–106)
MCH RBC QN AUTO: 29.9 PG (ref 25.7–33.7)
MCHC RBC AUTO-ENTMCNC: 34.4 G/DL (ref 32–35.9)
MCV RBC: 86.7 FL (ref 80–96)
NEUTROPHILS # BLD: 48.5 % (ref 42.8–82.8)
PLATELET # BLD AUTO: 405 K/MM3 (ref 134–434)
PMV BLD: 6.9 FL (ref 7.5–11.1)
PROT SERPL-MCNC: 5.7 G/DL (ref 6.4–8.2)
WBC # BLD AUTO: 7.7 K/MM3 (ref 4–10)

## 2017-10-24 RX ADMIN — FINASTERIDE SCH: 5 TABLET, FILM COATED ORAL at 15:36

## 2017-10-24 RX ADMIN — SODIUM CHLORIDE SCH MLS/HR: 9 INJECTION, SOLUTION INTRAVENOUS at 05:01

## 2017-10-24 RX ADMIN — METRONIDAZOLE SCH MLS/HR: 500 INJECTION, SOLUTION INTRAVENOUS at 17:52

## 2017-10-24 RX ADMIN — SODIUM CHLORIDE SCH: 9 INJECTION, SOLUTION INTRAVENOUS at 08:55

## 2017-10-24 RX ADMIN — OMEGA-3-ACID ETHYL ESTERS SCH GM: 1 CAPSULE, LIQUID FILLED ORAL at 09:24

## 2017-10-24 RX ADMIN — FINASTERIDE SCH MG: 5 TABLET, FILM COATED ORAL at 21:47

## 2017-10-24 RX ADMIN — LOSARTAN POTASSIUM SCH MG: 50 TABLET, FILM COATED ORAL at 09:24

## 2017-10-24 RX ADMIN — TAMSULOSIN HYDROCHLORIDE SCH MG: 0.4 CAPSULE ORAL at 21:47

## 2017-10-24 RX ADMIN — RANITIDINE SCH: 150 TABLET ORAL at 15:36

## 2017-10-24 RX ADMIN — AMLODIPINE BESYLATE SCH MG: 10 TABLET ORAL at 09:16

## 2017-10-24 RX ADMIN — METRONIDAZOLE SCH MLS/HR: 500 INJECTION, SOLUTION INTRAVENOUS at 01:15

## 2017-10-24 RX ADMIN — METRONIDAZOLE SCH MLS/HR: 500 INJECTION, SOLUTION INTRAVENOUS at 09:11

## 2017-10-24 RX ADMIN — DEXTROSE AND SODIUM CHLORIDE SCH MLS/HR: 5; 450 INJECTION, SOLUTION INTRAVENOUS at 21:46

## 2017-10-24 RX ADMIN — VITAMIN D, TAB 1000IU (100/BT) SCH UNIT: 25 TAB at 09:16

## 2017-10-24 RX ADMIN — CEFTRIAXONE SCH MLS/HR: 1 INJECTION, SOLUTION INTRAVENOUS at 09:14

## 2017-10-24 RX ADMIN — CARVEDILOL SCH MG: 12.5 TABLET, FILM COATED ORAL at 21:47

## 2017-10-24 RX ADMIN — ATORVASTATIN CALCIUM SCH MG: 20 TABLET, FILM COATED ORAL at 21:46

## 2017-10-24 RX ADMIN — TAMSULOSIN HYDROCHLORIDE SCH: 0.4 CAPSULE ORAL at 15:36

## 2017-10-24 RX ADMIN — CARVEDILOL SCH MG: 12.5 TABLET, FILM COATED ORAL at 09:15

## 2017-10-24 RX ADMIN — Medication SCH MG: at 21:47

## 2017-10-24 NOTE — PN
Teaching Attending Note


Name of Resident: eKsha Haynes


ATTENDING PHYSICIAN STATEMENT





I saw and evaluated the patient.


I reviewed the resident's note and discussed the case with the resident.


I agree with the resident's findings and plan as documented.








SUBJECTIVE:


no abd pain, no diarrhea today 





OBJECTIVE:


NAD , AAOX3 


CV: RRR


lUngs: R base crackles  


ABD : soft,ND NT ,NL BS 


Ext : no edema 








Assessment/Plan:








pleasant 84 y/o man with h/o HTN, hyperlipidemia, CAD, COPD, TIA, prostate 

cancer, neurogenic bladder, renal cancer, anxiety , recent CCY , and recent 

admission for sepsis form UTI and other medical problems who presented with 

diarrhea and abd apin and was found to have COlitis 





1- Sepsis from . now with choledolithiasis , need to revisit the source of 

sepsis . ? ascending cholangitis . stool cx and c diff neg 


MRI  reviewed. decline in LFTS indicates relief of obstruction 





- cont CTx and flagyl 


- consult GI , ? ERCP 


- LFTS have improved . Monitor . 





2- H/o CAD, TIA , HTN, cont home meds. meds need to be confirmed as there is 

discrepancy between dc list and meds in EMR  





4- DVT PX 





PT

## 2017-10-24 NOTE — CON.GI
Consult


Consult Specialty:: Gastroenterology


Referred by:: Dr. Surendra Concepcion


Reason for Consultation:: CBD stones





- History of Present Illness


Chief Complaint: Severe sharp colicky abdominal pains with nausea and diarrhea


History of Present Illness: 


85M is admitted with c/o inability to move his bowels for several days 

associated with severe colicky upper abdominal pain that radiates into his 

back. MRCP reveals distal CBD stones.  He had a lap choly and intraoperative 

cholangiogram and removal of a cholecystostomy tube on 17. He had a 

cholecystostomy tube placed for gm negative sepsis on 17. Since admission 

he has developed diarrhea. He has a h/o ischemic colitis but his diarrhea is 

not bloody. He last had a colonoscopy in 3/14 when a tubulovillous sigmoid 

adenoma was removed and diverticulosis was found. 





- History Source


History Provided By: Patient


Limitations to Obtaining History: No Limitations





- Past Medical History


CNS: Yes: Alzheimer's, TIA


Cardio/Vascular: Yes: AFIB, HTN, Hyperlipdemia, Murmur (tricuspid regurgitation)


Pulmonary: Yes: COPD, Pneumonia (fungal), Other (chronic cough)


Gastrointestinal: Yes: Diverticulitis, Other (cholecystitis, ischemic colitis, 3

/14 sigmoid adenoma removed, SBO 12/15)


Hepatobiliary: Yes: Cholelithiasis, Cholecystitis ( cholecystostomy tube,  lap choly), Choledocholithiasis


Renal/: Yes: Cancer (Prostate/KIDNEY), Neurogenic Bladder


Psych: Yes: Anxiety


Rheumatology: Yes: Other (arthritis knees)





- Past Surgical History


Past Surgical History: Yes: Appendectomy, Cataract Removal, Colonoscopy (last 1 

yrs ago, lynn 2 polyps. h/o ischemic colitis 3,2 and ?years ago), Hernia 

Repair (b/l), Joint Replacement (left knee makoplasty), Laminectomy (cervical 

fusion), Nephrectomy (left)





- Alcohol/Substance Use


Hx Alcohol Use: Yes (rare)


History of Substance Use: reports: None





- Smoking History


Smoking history: Former smoker


Have you smoked in the past 12 months: No


Aproximately how many cigarettes per day: 0


If you are a former smoker, when did you quit?: 





- Social History


Usual Living Arrangement: With Spouse


ADL: Independent


Occupation: retired electronics [employer]


[facility] Birth[facility]


History of Recent Travel: No





Home Medications





- Allergies


Allergies/Adverse Reactions: 


 Allergies











Allergy/AdvReac Type Severity Reaction Status Date / Time


 


No Known Drug Allergies Allergy   Verified 10/21/17 20:46














- Home Medications


Home Medications: 


Ambulatory Orders





Atorvastatin Ca [Lipitor] 20 mg PO HS 10/12/17 


Finasteride 5 mg PO DAILY 10/12/17 


Losartan Potassium 50 mg PO DAILY 10/12/17 


Tamsulosin HCl [Flomax] 0.4 mg PO DAILY 10/12/17 


Polyethylene Glycol 3350 [Miralax 119 gm Btl -] 17 gm PO DAILY #30 bottle 10/16/

17 


Cefuroxime Axetil [Cefuroxime] 500 mg PO BID 10/24/17 











Family Disease History





- Family Disease History


Family Disease History: Diabetes: Mother ( 70 diabetic complications), Other

: Father ( 70 of Parkinsions ), Brother (3 siblings with cancer ? types)





Review of Systems





- Review of Systems


Constitutional: reports: Weakness


Eyes: reports: No Symptoms


HENT: reports: No Symptoms


Neck: reports: No Symptoms


Cardiovascular: reports: No Symptoms


Respiratory: reports: No Symptoms


Gastrointestinal: reports: Abdominal Pain, Constipation, Diarrhea, Nausea


Genitourinary: reports: No Symptoms


Musculoskeletal: reports: Joint Pain





Physical Exam-GI


Vital Signs: 


 Vital Signs











Temperature  98.4 F   10/24/17 14:49


 


Pulse Rate  80   10/24/17 14:49


 


Respiratory Rate  16   10/24/17 14:49


 


Blood Pressure  160/85   10/24/17 14:49


 


O2 Sat by Pulse Oximetry (%)  92 L  10/23/17 21:00








CBC,CMP











WBC  7.7 K/mm3 (4.0-10.0)   10/24/17  07:25    


 


RBC  3.81 M/mm3 (4.00-5.60)  L  10/24/17  07:25    


 


Hgb  11.4 GM/dL (11.7-16.9)  L  10/24/17  07:25    


 


Hct  33.1 % (35.4-49)  L  10/24/17  07:25    


 


MCV  86.7 fl (80-96)   10/24/17  07:25    


 


MCH  29.9 pg (25.7-33.7)   10/24/17  07:25    


 


MCHC  34.4 g/dl (32.0-35.9)   10/24/17  07:25    


 


RDW  13.9 % (11.9-15.9)   10/24/17  07:25    


 


Plt Count  405 K/MM3 (134-434)   10/24/17  07:25    


 


MPV  6.9 fl (7.5-11.1)  L  10/24/17  07:25    


 


Neutrophils %  48.5 % (42.8-82.8)   10/24/17  07:25    


 


Lymphocytes %  29.8 % (8-40)   10/24/17  07:25    


 


Monocytes %  10.8 % (3.8-10.2)  H  10/24/17  07:25    


 


Eosinophils %  9.7 % (0-4.5)  H  10/24/17  07:25    


 


Basophils %  1.2 % (0-2.0)   10/24/17  07:25    


 


Sodium  144 mmol/L (136-145)   10/24/17  07:25    


 


Potassium  3.6 mmol/L (3.5-5.1)   10/24/17  07:25    


 


Chloride  109 mmol/L ()  H  10/24/17  07:25    


 


Carbon Dioxide  22 mmol/L (21-32)   10/24/17  07:25    


 


Anion Gap  13  (8-16)   10/24/17  07:25    


 


BUN  7 mg/dL (7-18)  D 10/24/17  07:25    


 


Creatinine  0.7 mg/dL (0.7-1.3)   10/24/17  07:25    


 


Creat Clearance w eGFR  > 60  (>60)   10/24/17  07:25    


 


Random Glucose  94 mg/dL ()   10/24/17  07:25    


 


Lactic Acid  1.7 mmol/L (0.4-2.0)   10/21/17  21:50    


 


Calcium  7.8 mg/dL (8.5-10.1)  L  10/24/17  07:25    


 


Total Bilirubin  0.5 mg/dL (0.2-1.0)   10/24/17  07:25    


 


Direct Bilirubin  0.2 mg/dL (0.0-0.2)  D 10/23/17  08:36    


 


AST  22 U/L (15-37)  D 10/24/17  07:25    


 


ALT  85 U/L (12-78)  H D 10/24/17  07:25    


 


Alkaline Phosphatase  239 U/L ()  H  10/24/17  07:25    


 


Creatine Kinase  42 IU/L ()   10/21/17  22:30    


 


Troponin I  < 0.02 ng/ml (0.00-0.05)   10/21/17  22:30    


 


Total Protein  5.7 g/dl (6.4-8.2)  L  10/24/17  07:25    


 


Albumin  2.6 g/dl (3.4-5.0)  L  10/24/17  07:25    


 


Lipase  142 U/L ()   10/22/17  03:00    








 Current Medications











Generic Name Dose Route Start Last Admin





  Trade Name Freq  PRN Reason Stop Dose Admin


 


Amlodipine Besylate  10 mg 10/22/17 10:00 10/24/17 09:16





  Norvasc -  PO   10 mg





  DAILY LAUREN   Administration


 


Artificial Tears  1 drop 10/22/17 06:37 10/23/17 21:33





  Artificial Tears  OU   1 drop





  TID PRN   Administration





  PAIN   


 


Aspirin  81 mg 10/23/17 19:17 10/24/17 09:16





  Ecotrin -  PO   81 mg





  DAILY LAUREN   Administration


 


Atorvastatin Calcium  20 mg 10/22/17 22:00 10/23/17 21:35





  Lipitor -  PO   20 mg





  HS LAUREN   Administration


 


Carvedilol  12.5 mg 10/22/17 10:00 10/24/17 09:15





  Coreg -  PO   12.5 mg





  BID LAUREN   Administration


 


Cholecalciferol  2,000 unit 10/22/17 10:00 10/24/17 09:16





  Vitamin D3 -  PO   2,000 unit





  DAILY LAUREN   Administration


 


Finasteride  5 mg 10/24/17 22:00  





  Proscar -  PO   





  HS LAUREN   


 


Heparin Sodium (Porcine)  5,000 unit 10/24/17 22:00  





  Heparin -  SQ   





  TID LAUREN   


 


Metronidazole  100 mls @ 100 mls/hr 10/22/17 10:00 10/24/17 17:52





  Flagyl 500mg Premixed Ivpb -  IVPB   100 mls/hr





  Q8H-IV LAUREN   Administration


 


CEFTRIAXONE 1 G/50 ML PREMIX  50 mls @ 100 mls/hr 10/22/17 10:00 10/24/17 09:14





  Ceftriaxone 1 Gm-D5w Bag  IVPB   100 mls/hr





  DAILY LAUREN   Administration


 


Losartan Potassium  50 mg 10/22/17 10:00 10/24/17 09:24





  Cozaar -  PO   50 mg





  DAILY LAUREN   Administration


 


Meclizine HCl  25 mg 10/22/17 06:37  





  Antivert -  PO   





  Q8H PRN   





  dizziness   


 


Melatonin  5 mg 10/22/17 22:00 10/23/17 23:05





  Melatonin  PO   5 mg





  HS LAUREN   Administration


 


Omega-3-Acid Ethyl Esters  1 gm 10/22/17 10:00 10/24/17 09:24





  Lovaza -  PO   1 gm





  DAILY LAUREN   Administration


 


Oxycodone HCl  5 mg 10/22/17 06:37  





  Roxicodone -  PO   





  Q4H PRN   





  PAIN   


 


Ranitidine HCl  150 mg 10/24/17 22:00  





  Zantac -  PO   





  HS LAUREN   


 


Simethicone  80 mg 10/22/17 06:37  





  Mylicon -  PO   





  Q4H PRN   





  INDIGESTION   


 


Tamsulosin HCl  0.4 mg 10/24/17 22:00  





  Flomax -  PO   





  HS LAUREN   











Constitutional: Yes: No Distress


Eyes: Yes: Conjunctiva Clear


HENT: Yes: Atraumatic


Neck: Yes: Trachea Midline


Cardiovascular: Yes: Regular Rate and Rhythm, Murmur (2/6 NATALIE at LLSB)


Respiratory: Yes: CTA Bilaterally


Gastrointestinal Inspection: Yes: Scars (healed RIH,LIH, RLQ, laparoscopic and 

left flank incisions healed)


...Auscultate: Yes: Normoactive Bowel Sounds


...Palpate: Yes: Soft, Other (nontender)


...Rectal Exam: Yes: Guaiac Negative, Sphincter Tone Normal


Genitourinary: Yes: Other (2+ prostate, no masses, no hernias)


Extremities: Yes: WNL


Edema: No


Peripheral Pulses WNL: Yes


Labs: 


 CBC, BMP





 10/24/17 07:25 





 10/24/17 07:25 











Imaging





- Results


MRI: Report Reviewed (Nicky Segura Name: ANTONIA ALEXANDER  DEPARTMENT OF 

RADIOLOGY Phys: Regino Chan MD   : [id number]    Age: 85     Sex: M  [facility] Acct: [id number] Loc: [id number]  [address] [facility] Exam 

Date: 10/23/17 Status: [place] [address] Unit Number: [id number]  [id number]

[id number]      ACCESSION #:  [id number]    EXAM#:     TYPE/EXAM: RESULT:  1023-

0006 MRI/ABDOMEN MRI W/O CONTRAST /MRCP      MRI OF THE ABDOMEN WITHOUT IV 

GADOLINIUM WITH MRCP     HISTORY: 85-year-old male with CBD dilatation and 

elevated LFTs     TECHNIQUE: Multiplanar multisequential MRI of the abdomen 

without the intravenous administration  of contrast were performed on a high 

field 1.5 Kelsie GE magnet. Axial and coronal T2 fat-sat, axial  T1 (in and out 

of phase), axial T2 FIESTA, axial T1 fat sat - LAVA without contrast in 

addition to  coronal LAVA and axial diffusion weighted images were performed. 

Axial and coronal thin and thick  slab 3 D MRCP was performed.     Comparison 

is made to MRI of the abdomen dated 2017. Correlation is made with CT 

of the  abdomen and pelvis dated 2017     FINDINGS:     There is 

trace right-sided pleural effusion with improved atelectatic changes at the 

right lung  base when compared to prior MRI of 2017. Bilateral posterior 

dependent atelectasis seen.     The liver is normal in size with no evidence of 

abnormal loss of signal on out of phase imaging to  suggest fatty infiltration. 

Again seen is a 2.5 cm linear T2 hyperintensity in the periphery of the  right 

hepatic lobe corresponding to fluid density seen on CT scan unchanged since the 

prior studies.     The spleen is normal in size with no evidence of focal 

abnormal signal.     The pancreas is homogeneous in signal with no focal signal 

abnormality. The pancreatic duct is  nondilated.     The patient is status post 

cholecystectomy.     The MRCP sequences are nondiagnostic due to significant 

motion. On the axial T2 images filling  defects are seen in the distal CBD but 

with no intra or extrahepatic biliary ductal dilatation. CBD  measures 7 mm in 

diameter.     Again seen is 1.2 cm left adrenal gland nodule. The right adrenal 

gland is unremarkable.     The left kidney is not visualized. There is a 2.8 cm 

right upper renal pole T1 and T2 hyperintense  lesion, enlarged since the prior 

exam at which time it measured 2.0 cm. There is no hydronephrosis.     There 

are no abnormally dilated bowel loops. There is no evidence of abdominal 

lymphadenopathy or  abdominal ascites. There are small sliding hiatal hernia.  

   There is marked distal thoracic and abdominal aortic atherosclerotic 

disease.     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       IMPRESSION:     As 

compared to MRI of 2017, there is interval cholecystectomy.   

Choledocholithiasis seen with no biliary ductal dilatation. Correlate with LFTs 

and bilirubin  level.   2.5 cm right hepatic lobe linear fluid structure 

unchanged since the prior exam likely changes at  the site of previous biliary 

drainage catheter tube.   1.2 cm left adrenal gland nodule, unchanged.   2.8 cm 

right upper renal pole T1 and T2 hyperintense lesion, enlarged since the prior 

exam in  2017 2.0 cm. Correlation with MRI without and with IV gadolinium 

is recommended to exclude  enhancement.   Small sliding hiatal hernia.   Marked 

thoracic and aortic disease.   Trace right-sided pleural effusion with 

bilateral dependent atelectatic changes, improved since  the prior exam        

       Reported By: Severo Watson MD   10/24/17 154      Technologist: 

Micheal Richard  Transcribed Date/Time: 10/24/17 154  : 

Severo Watson  Printed Date/Time: [  rep prt dt last] [  rep prt tm last]   By: 

[  rep prt user last]      Signed by: Severo Watson    Signed on: 24-Oct-2017 

15:48)





Problem List





- Problems


(1) Abdominal pain


Assessment/Plan: 


Suspect biliary colic, See below


Code(s): R10.9 - UNSPECIFIED ABDOMINAL PAIN   Qualifiers: 


     Abdominal location: right upper quadrant     Qualified Code(s): R10.11 - 

Right upper quadrant pain; R10.11 - Right upper quadrant pain  





(2) Choledocholithiasis


Assessment/Plan: 


MRCP reveals multiple distal CBD stones to be the cause of colicky pain and 

vomiting. I have proposed ERCP to extract the stones before ascending 

cholangitis or biliary pancreatitis ensue. I have informed Antonia of the 

potential for such complications as perforation and hemorrhage leading to 

transfusion and surgery and for ERCP induced pancreatitis that can lead to 

severe pain, vomiting and renal and pulmonary failures. He has granted an 

informed consent and I will undertake the procedure in the AM so I have stopped 

heparin and aspirin. Continue antibiotics. Will prehydrate to minimize 

pancreatitis risk. 


Code(s): K80.50 - CALCULUS OF BILE DUCT W/O CHOLANGITIS OR CHOLECYST W/O OBST





(3) Constipation


Assessment/Plan: 


Believe that Antonia is having paradoxical diarrhea due to fecal impaction 

following a period of constipation so would not give Imodium. Agree with 

screening for C diff. however.   


Code(s): K59.00 - CONSTIPATION, UNSPECIFIED

## 2017-10-25 LAB
ALBUMIN SERPL-MCNC: 2.8 G/DL (ref 3.4–5)
ALP SERPL-CCNC: 208 U/L (ref 45–117)
ALT SERPL-CCNC: 66 U/L (ref 12–78)
AMYLASE SERPL-CCNC: 65 U/L (ref 25–115)
ANION GAP SERPL CALC-SCNC: 12 MMOL/L (ref 8–16)
AST SERPL-CCNC: 21 U/L (ref 15–37)
BASOPHILS # BLD: 1.1 % (ref 0–2)
BILIRUB SERPL-MCNC: 0.5 MG/DL (ref 0.2–1)
CALCIUM SERPL-MCNC: 7.9 MG/DL (ref 8.5–10.1)
CO2 SERPL-SCNC: 26 MMOL/L (ref 21–32)
CREAT SERPL-MCNC: 0.7 MG/DL (ref 0.7–1.3)
CRP SERPL-MCNC: 1.2 MG/DL (ref 0–0.3)
DEPRECATED RDW RBC AUTO: 13.8 % (ref 11.9–15.9)
DEPRECATED RDW RBC AUTO: 14 % (ref 11.9–15.9)
EOSINOPHIL # BLD: 9 % (ref 0–4.5)
GLUCOSE SERPL-MCNC: 139 MG/DL (ref 74–106)
INR BLD: 1.64 (ref 0.82–1.09)
MCH RBC QN AUTO: 30 PG (ref 25.7–33.7)
MCH RBC QN AUTO: 30.3 PG (ref 25.7–33.7)
MCHC RBC AUTO-ENTMCNC: 34 G/DL (ref 32–35.9)
MCHC RBC AUTO-ENTMCNC: 35.3 G/DL (ref 32–35.9)
MCV RBC: 85.9 FL (ref 80–96)
MCV RBC: 88.3 FL (ref 80–96)
NEUTROPHILS # BLD: 50.4 % (ref 42.8–82.8)
PLATELET # BLD AUTO: 415 K/MM3 (ref 134–434)
PLATELET # BLD AUTO: 512 K/MM3 (ref 134–434)
PMV BLD: 6.7 FL (ref 7.5–11.1)
PMV BLD: 7.1 FL (ref 7.5–11.1)
PROT SERPL-MCNC: 5.6 G/DL (ref 6.4–8.2)
PT PNL PPP: 18.5 SEC (ref 9.98–11.88)
WBC # BLD AUTO: 19.8 K/MM3 (ref 4–10)
WBC # BLD AUTO: 6.4 K/MM3 (ref 4–10)

## 2017-10-25 PROCEDURE — BF10YZZ FLUOROSCOPY OF BILE DUCTS USING OTHER CONTRAST: ICD-10-PCS | Performed by: INTERNAL MEDICINE

## 2017-10-25 PROCEDURE — 0FC98ZZ EXTIRPATION OF MATTER FROM COMMON BILE DUCT, VIA NATURAL OR ARTIFICIAL OPENING ENDOSCOPIC: ICD-10-PCS | Performed by: INTERNAL MEDICINE

## 2017-10-25 PROCEDURE — 0F7D8DZ DILATION OF PANCREATIC DUCT WITH INTRALUMINAL DEVICE, VIA NATURAL OR ARTIFICIAL OPENING ENDOSCOPIC: ICD-10-PCS | Performed by: INTERNAL MEDICINE

## 2017-10-25 RX ADMIN — METRONIDAZOLE SCH MLS/HR: 500 INJECTION, SOLUTION INTRAVENOUS at 03:09

## 2017-10-25 RX ADMIN — CARVEDILOL SCH MG: 12.5 TABLET, FILM COATED ORAL at 21:53

## 2017-10-25 RX ADMIN — VITAMIN D, TAB 1000IU (100/BT) SCH UNIT: 25 TAB at 13:42

## 2017-10-25 RX ADMIN — CEFTRIAXONE SCH: 1 INJECTION, SOLUTION INTRAVENOUS at 13:26

## 2017-10-25 RX ADMIN — ATORVASTATIN CALCIUM SCH MG: 20 TABLET, FILM COATED ORAL at 21:53

## 2017-10-25 RX ADMIN — CARVEDILOL SCH MG: 12.5 TABLET, FILM COATED ORAL at 09:58

## 2017-10-25 RX ADMIN — METRONIDAZOLE SCH MLS/HR: 500 INJECTION, SOLUTION INTRAVENOUS at 09:58

## 2017-10-25 RX ADMIN — POTASSIUM CHLORIDE SCH MLS/HR: 7.46 INJECTION, SOLUTION INTRAVENOUS at 15:56

## 2017-10-25 RX ADMIN — FINASTERIDE SCH MG: 5 TABLET, FILM COATED ORAL at 21:53

## 2017-10-25 RX ADMIN — AMLODIPINE BESYLATE SCH MG: 10 TABLET ORAL at 09:58

## 2017-10-25 RX ADMIN — DEXTROSE AND SODIUM CHLORIDE SCH MLS/HR: 5; 450 INJECTION, SOLUTION INTRAVENOUS at 05:50

## 2017-10-25 RX ADMIN — METRONIDAZOLE SCH MLS/HR: 500 INJECTION, SOLUTION INTRAVENOUS at 19:56

## 2017-10-25 RX ADMIN — TAMSULOSIN HYDROCHLORIDE SCH MG: 0.4 CAPSULE ORAL at 21:54

## 2017-10-25 RX ADMIN — OMEGA-3-ACID ETHYL ESTERS SCH GM: 1 CAPSULE, LIQUID FILLED ORAL at 13:42

## 2017-10-25 RX ADMIN — PANTOPRAZOLE SODIUM SCH MG: 40 TABLET, DELAYED RELEASE ORAL at 21:53

## 2017-10-25 RX ADMIN — POTASSIUM CHLORIDE SCH MLS/HR: 7.46 INJECTION, SOLUTION INTRAVENOUS at 16:59

## 2017-10-25 RX ADMIN — POTASSIUM CHLORIDE SCH MLS/HR: 7.46 INJECTION, SOLUTION INTRAVENOUS at 17:54

## 2017-10-25 RX ADMIN — Medication SCH MG: at 21:53

## 2017-10-25 RX ADMIN — LOSARTAN POTASSIUM SCH MG: 50 TABLET, FILM COATED ORAL at 13:41

## 2017-10-25 NOTE — PN
Physical Exam: 


SUBJECTIVE: Patient seen and examined.  Pt feeling well this morning upon exam.

  Pt reported 2 episodes of diarrhea yesterday, and none today.Pt denied sob, 

cough, chest pain, palpitations, abdominal pain.  No events overnight.





OBJECTIVE:


 Vital Signs - 24 hr











  10/24/17 10/24/17 10/24/17





  18:00 20:40 21:00


 


Temperature 97.6 F 98.1 F 98.1 F


 


Pulse Rate 86 69 69


 


Respiratory 18 16 20





Rate   


 


Blood Pressure 104/45 133/89 133/89


 


O2 Sat by Pulse   92 L





Oximetry (%)   














  10/25/17 10/25/17 10/25/17





  06:19 12:04 12:20


 


Temperature 97.6 F 98.5 F 


 


Pulse Rate 64 78 80


 


Respiratory 20 18 18





Rate   


 


Blood Pressure 126/52 153/54 137/60


 


O2 Sat by Pulse  99 99





Oximetry (%)   














  10/25/17 10/25/17





  12:33 12:45


 


Temperature  


 


Pulse Rate 74 75


 


Respiratory 18 18





Rate  


 


Blood Pressure 162/68 149/60


 


O2 Sat by Pulse 98 99





Oximetry (%)  








GENERAL: The patient is awake, alert, and fully oriented, in no acute distress.


HEAD: Normal with no signs of trauma.


EYES: Extraocular movements intact, sclera anicteric, conjunctiva clear. No 

ptosis. 


ENT: Moist mucous membranes.


NECK: Trachea midline, supple. 


LUNGS: Breath sounds equal, clear to auscultation bilaterally, no wheezes, no 

crackles, no accessory muscle use. 


HEART: Regular rate and rhythm, S1, S2 without murmur, rub or gallop.


ABDOMEN: Soft, nontender, nondistended, no guarding, no rebound, no masses.


EXTREMITIES: Warm, well-perfused, no edema. 


PSYCH: Normal mood, normal affect.


SKIN: Warm, dry, normal turgor, no rashes or lesions noted





 Laboratory Results - last 24 hr











  10/25/17 10/25/17 10/25/17





  07:45 07:45 07:45


 


WBC   6.4 


 


RBC   3.90 L 


 


Hgb   11.8 


 


Hct   33.5 L 


 


MCV   85.9 


 


MCH   30.3 


 


MCHC   35.3 


 


RDW   13.8 


 


Plt Count   415 


 


MPV   6.7 L 


 


Neutrophils %   50.4 


 


Lymphocytes %   29.2 


 


Monocytes %   10.3 H 


 


Eosinophils %   9.0 H 


 


Basophils %   1.1 


 


PT with INR    18.50 H


 


INR    1.64 H


 


Sodium  144  


 


Potassium  3.3 L  


 


Chloride  106  


 


Carbon Dioxide  26  


 


Anion Gap  12  


 


BUN  4 L D  


 


Creatinine  0.7  


 


Creat Clearance w eGFR  > 60  


 


Random Glucose  139 H D  


 


Calcium  7.9 L  


 


Total Bilirubin  0.5  


 


AST  21  


 


ALT  66  D  


 


Alkaline Phosphatase  208 H  


 


C-Reactive Protein  1.2 H D  


 


Total Protein  5.6 L  


 


Albumin  2.8 L  


 


Total Amylase  65  D  


 


Lipase  256  








Active Medications











Generic Name Dose Route Start Last Admin





  Trade Name Freq  PRN Reason Stop Dose Admin


 


Amlodipine Besylate  10 mg 10/22/17 10:00 10/25/17 09:58





  Norvasc -  PO   10 mg





  DAILY LAUREN   Administration


 


Artificial Tears  1 drop 10/22/17 06:37 10/23/17 21:33





  Artificial Tears  OU   1 drop





  TID PRN   Administration





  PAIN   


 


Atorvastatin Calcium  20 mg 10/22/17 22:00 10/24/17 21:46





  Lipitor -  PO   20 mg





  HS LAUREN   Administration


 


Carvedilol  12.5 mg 10/22/17 10:00 10/25/17 09:58





  Coreg -  PO   12.5 mg





  BID LAUREN   Administration


 


Cholecalciferol  2,000 unit 10/22/17 10:00 10/25/17 13:42





  Vitamin D3 -  PO   2,000 unit





  DAILY LAUREN   Administration


 


Finasteride  5 mg 10/24/17 22:00 10/24/17 21:47





  Proscar -  PO   5 mg





  HS LAUREN   Administration


 


Metronidazole  100 mls @ 100 mls/hr 10/22/17 10:00 10/25/17 09:58





  Flagyl 500mg Premixed Ivpb -  IVPB   100 mls/hr





  Q8H-IV LAUREN   Administration


 


CEFTRIAXONE 1 G/50 ML PREMIX  50 mls @ 100 mls/hr 10/22/17 10:00 10/25/17 13:26





  Ceftriaxone 1 Gm-D5w Bag  IVPB   Not Given





  DAILY LAUREN   


 


Dextrose/Sodium Chloride  1,000 mls @ 150 mls/hr 10/25/17 12:30 10/25/17 13:42





  D5-1/2ns -  IV 10/25/17 18:30  150 mls/hr





  ASDIR LAUREN   Administration


 


Dextrose/Sodium Chloride  1,000 mls @ 125 mls/hr 10/25/17 18:30  





  D5-1/2ns -  IV 10/26/17 00:30  





  ASDIR LAUREN   


 


Dextrose/Sodium Chloride  1,000 mls @ 100 mls/hr 10/26/17 00:30  





  D5-1/2ns -  IV 10/26/17 07:30  





  ASDIR LAUREN   


 


Dextrose/Sodium Chloride  1,000 mls @ 75 mls/hr 10/26/17 07:30  





  D5-1/2ns -  IV   





  ASDIR LAUREN   


 


Potassium Chloride  100 mls @ 100 mls/hr 10/25/17 14:00 10/25/17 15:56





  Potassium Chloride 10 Meq Premix Ivpb -  IVPB 10/25/17 16:59  100 mls/hr





  Q60M LAUREN   Administration


 


Losartan Potassium  50 mg 10/22/17 10:00 10/25/17 13:41





  Cozaar -  PO   50 mg





  DAILY LAUREN   Administration


 


Meclizine HCl  25 mg 10/22/17 06:37  





  Antivert -  PO   





  Q8H PRN   





  dizziness   


 


Melatonin  5 mg 10/22/17 22:00 10/24/17 21:47





  Melatonin  PO   5 mg





  HS LAUREN   Administration


 


Omega-3-Acid Ethyl Esters  1 gm 10/22/17 10:00 10/25/17 13:42





  Lovaza -  PO   1 gm





  DAILY LAUREN   Administration


 


Oxycodone HCl  5 mg 10/22/17 06:37  





  Roxicodone -  PO   





  Q4H PRN   





  PAIN   


 


Pantoprazole Sodium  40 mg 10/25/17 22:00  





  Protonix -  PO   





  BID LAUREN   


 


Simethicone  80 mg 10/22/17 06:37 10/24/17 21:46





  Mylicon -  PO   80 mg





  Q4H PRN   Administration





  INDIGESTION   


 


Tamsulosin HCl  0.4 mg 10/24/17 22:00 10/24/17 21:47





  Flomax -  PO   0.4 mg





  HS LAUREN   Administration








10/25/17 ERCP -> multiple CBD stones were removed.





ASSESSMENT/PLAN:


86yo M with PMH of obstruction and ischemic colitis, COPD, CHF, HTN, HLD, DIC, 

presents with diarrhea s/p constipation and found to have thickening of Left 

colon and sigmoid colon walls on CT.





1) abdominal pain


   - multiple CBD stones removed on ERCP


      - hold anticoagulation for 72 hrs per Dr. Bond


      - continue Protonix to treat ulcer


   - LFTs improving, continue to monitor


   - continue Ceftriaxone and Flagyl


   - diarrhea improving


   - continue Oxycodone 5mg po q4hr prn for pain





2) hypokalemia


   - KCl 10meq x 3 given


   - f/u on am labs





3) htn


   - continue Norvasc, Coreg, Cozaar





4) hld


   - continue Lipitor 





5) BPH


   - continue Flomax





6) FEN


   - Fluids: D5 1/2NS D/Zackary 2/2 bibasilar crackles


   - Electrolytes: hypokalemia noted, continue to monitor


   - Nutrition: clear liquid diet





7) Prophylaxis


   - GI ppx with Prontonix 40mg po BID


   - deconditioning ppx with PT consult





Visit type





- Emergency Visit


Emergency Visit: Yes


ED Registration Date: 10/22/17


Care time: The patient presented to the Emergency Department on the above date 

and was hospitalized for further evaluation of their emergent condition.





- New Patient


This patient is new to me today: No





- Critical Care


Critical Care patient: No

## 2017-10-25 NOTE — HOSP
Subjective





- Review of Symptoms


Subjective: 


86yo M with PMH of mild COPD experienced sob upon returning from ERCP.  Pt's O2 

sat was 70% on room air at 14:15.  Nurse applied 2L oxygen via nasal cannula.  

O2 sat improved to 83%.  Pt reports it feels like he has to breathe heavy, and 

that he has never felt these symptoms before.  Pt denies chest pain, 

palpitations, chest tenderness.  Stat EKG, CXR and ab xray ordered.  Venti mask 

50% initiated.  O2 sat improved to 94%.  Duoneb given.  Lasix 40mg IVpush 

given.  O2 sat improved to 96% and held steady.  cbc, trops ordered.  At 15:00, 

pt experienced hypotension to 42/29.  Bed placed in trendelenburg position and 

BP improved to 120/76 and held steady.  Bed was placed in flat position and BP 

held, with last reading of 142/60.   





General: Yes: Chills


HEENT: No: Head Aches, Sinus Congestion


Pulmonary: Yes: Dyspnea.  No: Pleuritic Chest Pain


Cardiovascular: No: Chest Pain, Palpitations, Edema


Gastrointestinal: No: Nausea, Vomiting, Abdominal Pain


Genitourinary: No: Dysuria, Hematuria


Musculoskeletal: Yes: No Symptoms


Neurological: No: Change in speech, Confusion





Physical Examination


Vital Signs: 


 Vital Signs











Temperature  98.5 F   10/25/17 12:04


 


Pulse Rate  75   10/25/17 12:45


 


Respiratory Rate  18   10/25/17 12:45


 


Blood Pressure  149/60   10/25/17 12:45


 


O2 Sat by Pulse Oximetry (%)  99   10/25/17 12:45











Constitutional: Yes: Well Nourished, Anxious


Eyes: Yes: Conjunctiva Clear, EOM Intact


HENT: Yes: Atraumatic, Normocephalic


Neck: Yes: Supple, Trachea Midline


Cardiovascular: Yes: Tachycardia (slight), S1, S2


Respiratory: Yes: CTA Bilaterally, Accessory Muscle Use (some belly breathing 

observed)


Gastrointestinal: Yes: Soft.  No: Tenderness


Renal/: No: [place] Present, Hematuria


Extremities: Yes: WNL


Edema: No


Integumentary: Yes: WNL


Neurological: Yes: Alert, Oriented


Labs: 


 CBC, BMP





 10/25/17 07:45 





 10/25/17 07:45 











Hospitalist Encounter


Assessment: 


Pt experienced sob upon returning from ERCP procedure.  


   - 2L O2 via nasal cannula


   - 50% venti mask


   - duoneb


   - lasix


   - ab Xray -> possible ileus -> not clinically correlated as pt had an 

episode of diarrhea after ab xray taken


   - CXR -> developing Right basilar consolidation


   - troponin (-)


   - cbc -> wbc 19.8 (up from 6.4 this am) 


   - zosyn


   - ID Consult





Pt experienced hypotension.


   - bed placed in trendelenburg position -> hypotension resolved.





Visit type





- Emergency Visit


Emergency Visit: Yes


ED Registration Date: 10/22/17


Care time: The patient presented to the Emergency Department on the above date 

and was hospitalized for further evaluation of their emergent condition.





- New Patient


This patient is new to me today: No





- Critical Care


Critical Care patient: No

## 2017-10-25 NOTE — PN
Teaching Attending Note


Name of Resident: Kesha Haynes


ATTENDING PHYSICIAN STATEMENT





I saw and evaluated the patient.


I reviewed the resident's note and discussed the case with the resident.


I agree with the resident's findings and plan as documented.








SUBJECTIVE:c/o diarrhea but improved since presentation. 2 loose BM yesterday. 

none today. denies CP, SOB, fever, chills, abdominal pain, N/V/C/D








OBJECTIVE:


 Last Vital Signs











Temp Pulse Resp BP Pulse Ox


 


 98.5 F   80   18   137/60   99 


 


 10/25/17 12:04  10/25/17 12:20  10/25/17 12:20  10/25/17 12:20  10/25/17 12:20








General NAD


CV S1 S2 +


lungs CTA B/L no wheezing/rales/rhonchi


ABdomen soft NT/ND negative perez sign. obese





ASSESSMENT AND PLAN:


84 yo M PMH HTN, hyperlipidemia, CAD, COPD, TIA, prostate cancer, neurogenic 

bladder, renal cancer, anxiety , recent CCY , and recent admission for sepsis 

form UTI and other medical problems who presented with diarrhea and abd apin 

and was found to have COlitis 


1. Sepsis due to colitis- clinically improved. tolerating diet. diarrhea 

improving. On ceftriaxone/Flagyl day 4. cdiff negative. 


2. Choleodocholithasis-seen on MRCP. transaminitis now trending down. NPO for 

ERCP today. will f/u report. on IVF to prevent pancreatitis. will lower IVF post

-procedure


3. CAD- cont home medication. coreg


4. HTN- controlled. cont norvasc


5. BPH- on flomax


6. DVT ppx- hep sq on hold for procedure

## 2017-10-25 NOTE — PN
Progress Note (short form)





- Note


Progress Note: 


GI Procedure Note: Please see scanned ERCP report. Multiple CBD stones and a 

nonbleeding postbulbar duodenal ulcer were found. After creating a 

sphincterotomy the stones were removed using an extractor balloon. Will need to 

watch for pancreatitis, treat ulcer with PPI, administer Vitamin K and avoid 

anticoagulants for 72 hours. 





Problem List





- Problems


(1) Abdominal pain


Code(s): R10.9 - UNSPECIFIED ABDOMINAL PAIN   Qualifiers: 


     Abdominal location: right upper quadrant     Qualified Code(s): R10.11 - 

Right upper quadrant pain; R10.11 - Right upper quadrant pain  





(2) Choledocholithiasis


Code(s): K80.50 - CALCULUS OF BILE DUCT W/O CHOLANGITIS OR CHOLECYST W/O OBST





(3) Constipation


Code(s): K59.00 - CONSTIPATION, UNSPECIFIED

## 2017-10-26 LAB
ALBUMIN SERPL-MCNC: 2.5 G/DL (ref 3.4–5)
ALP SERPL-CCNC: 160 U/L (ref 45–117)
ALT SERPL-CCNC: 45 U/L (ref 12–78)
AMYLASE SERPL-CCNC: 48 U/L (ref 25–115)
ANION GAP SERPL CALC-SCNC: 11 MMOL/L (ref 8–16)
AST SERPL-CCNC: 23 U/L (ref 15–37)
BASOPHILS # BLD: 0.8 % (ref 0–2)
BILIRUB CONJ SERPL-MCNC: 0.2 MG/DL (ref 0–0.2)
BILIRUB SERPL-MCNC: 0.5 MG/DL (ref 0.2–1)
CALCIUM SERPL-MCNC: 7.6 MG/DL (ref 8.5–10.1)
CO2 SERPL-SCNC: 25 MMOL/L (ref 21–32)
CREAT SERPL-MCNC: 1 MG/DL (ref 0.7–1.3)
CRP SERPL-MCNC: 3.4 MG/DL (ref 0–0.3)
DEPRECATED RDW RBC AUTO: 13.9 % (ref 11.9–15.9)
EOSINOPHIL # BLD: 3.8 % (ref 0–4.5)
GLUCOSE SERPL-MCNC: 141 MG/DL (ref 74–106)
INR BLD: 1.72 (ref 0.82–1.09)
MAGNESIUM SERPL-MCNC: 1.7 MG/DL (ref 1.8–2.4)
MCH RBC QN AUTO: 29.6 PG (ref 25.7–33.7)
MCHC RBC AUTO-ENTMCNC: 34.2 G/DL (ref 32–35.9)
MCV RBC: 86.5 FL (ref 80–96)
NEUTROPHILS # BLD: 69.4 % (ref 42.8–82.8)
PHOSPHATE SERPL-MCNC: 3.1 MG/DL (ref 2.5–4.9)
PLATELET # BLD AUTO: 333 K/MM3 (ref 134–434)
PMV BLD: 7 FL (ref 7.5–11.1)
PROT SERPL-MCNC: 5.1 G/DL (ref 6.4–8.2)
PT PNL PPP: 19.4 SEC (ref 9.98–11.88)
WBC # BLD AUTO: 13.2 K/MM3 (ref 4–10)

## 2017-10-26 RX ADMIN — LOSARTAN POTASSIUM SCH MG: 50 TABLET, FILM COATED ORAL at 09:12

## 2017-10-26 RX ADMIN — ATORVASTATIN CALCIUM SCH MG: 20 TABLET, FILM COATED ORAL at 21:38

## 2017-10-26 RX ADMIN — VITAMIN D, TAB 1000IU (100/BT) SCH UNIT: 25 TAB at 09:13

## 2017-10-26 RX ADMIN — FINASTERIDE SCH MG: 5 TABLET, FILM COATED ORAL at 21:38

## 2017-10-26 RX ADMIN — METRONIDAZOLE SCH MLS/HR: 500 INJECTION, SOLUTION INTRAVENOUS at 09:12

## 2017-10-26 RX ADMIN — OMEGA-3-ACID ETHYL ESTERS SCH GM: 1 CAPSULE, LIQUID FILLED ORAL at 09:13

## 2017-10-26 RX ADMIN — PANTOPRAZOLE SODIUM SCH MG: 40 TABLET, DELAYED RELEASE ORAL at 21:38

## 2017-10-26 RX ADMIN — AMLODIPINE BESYLATE SCH MG: 10 TABLET ORAL at 09:12

## 2017-10-26 RX ADMIN — PANTOPRAZOLE SODIUM SCH MG: 40 TABLET, DELAYED RELEASE ORAL at 09:12

## 2017-10-26 RX ADMIN — CARVEDILOL SCH MG: 12.5 TABLET, FILM COATED ORAL at 21:38

## 2017-10-26 RX ADMIN — METRONIDAZOLE SCH MLS/HR: 500 INJECTION, SOLUTION INTRAVENOUS at 17:51

## 2017-10-26 RX ADMIN — TAMSULOSIN HYDROCHLORIDE SCH MG: 0.4 CAPSULE ORAL at 21:38

## 2017-10-26 RX ADMIN — DEXTROSE AND SODIUM CHLORIDE SCH MLS/HR: 5; 450 INJECTION, SOLUTION INTRAVENOUS at 09:17

## 2017-10-26 RX ADMIN — CEFTRIAXONE SCH MLS/HR: 1 INJECTION, SOLUTION INTRAVENOUS at 10:08

## 2017-10-26 RX ADMIN — Medication SCH MG: at 21:38

## 2017-10-26 RX ADMIN — CARVEDILOL SCH MG: 12.5 TABLET, FILM COATED ORAL at 09:12

## 2017-10-26 RX ADMIN — METRONIDAZOLE SCH MLS/HR: 500 INJECTION, SOLUTION INTRAVENOUS at 01:17

## 2017-10-26 NOTE — EKG
Test Reason : 

Blood Pressure : ***/*** mmHG

Vent. Rate : 101 BPM     Atrial Rate : 101 BPM

   P-R Int : 168 ms          QRS Dur : 114 ms

    QT Int : 390 ms       P-R-T Axes : 041 -67 067 degrees

   QTc Int : 505 ms

 

SINUS TACHYCARDIA

PULMONARY DISEASE PATTERN

LEFT ANTERIOR FASCICULAR BLOCK

LEFT VENTRICULAR HYPERTROPHY WITH REPOLARIZATION ABNORMALITY

ABNORMAL ECG

WHEN COMPARED WITH ECG OF 12-OCT-2017 10:06,

RIGHT BUNDLE BRANCH BLOCK IS NO LONGER PRESENT

CRITERIA FOR SEPTAL INFARCT ARE NO LONGER PRESENT

Confirmed by JONO ALMARAZ MD ([id number]) on 10/26/2017 10:41:50 AM

 

Referred By: NORBERT WALLACE           Confirmed By:JONO ALMARAZ MD

## 2017-10-26 NOTE — CONS
DATE OF CONSULTATION:

 

DATE OF DICTATION:  October 26, 2017 

 

HISTORY OF PRESENT ILLNESS:  This is an 85-year-old male who I am asked to see for

evaluation of pneumonia seen on a recent chest x-ray while initially having been

admitted for severe colicky upper abdominal pain radiating to his back.  An MRCP

revealed distal common bile duct stones and the patient had previously had a

lap/cholecystectomy with an intraoperative cholangiogram and removal of a

cholecystotomy tube on August 23, 2017.  He had cholecystotomy tube placed for

Gram-negative sepsis on February 13, 2017.  Since his admission he had developed

diarrhea and has a history of ischemic colitis but noted that the diarrhea was

non-bloody.  He had a colonoscopy done in March 2014 at which time a tubulovillous

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  He was seen now in

consultation by Dr. Bond with the clinical opinion of abdominal pain possibly

related to common bile duct stones.  He was empirically placed on antibiotic therapy

on October 22, 2017 with ceftriaxone and metronidazole and clinically has improved. 

He had an ERCP performed in which common bile duct stones were removed.  He

apparently did not have blood cultures done on admission now.  At present he was

sitting in a chair noting that he feels markedly better and denied any shortness of

breath, fever, chills, pleuritic chest pain or sputum production.  

 

CURRENT MEDICATIONS:  Include tamsulosin, Cozaar, metronidazole, ceftriaxone, Lovaza,

Coreg, and Roxicodone.

 

ALLERGIES:  None known.

 

SOCIAL HISTORY:  He is a nonsmoker.  No history of alcohol use.  He is .  

 

FAMILY HISTORY:  Reviewed and noncontributory.

 

REVIEW OF SYSTEMS:  All systems reviewed and negative. 

 

PHYSICAL EXAMINATION:   

General:  He was a pleasant, well-nourished appearing male in no acute distress.

Vital Signs:  Temperature 98.4, pulse 60, blood pressure 133/59, and respirations 18.

 

Neck:  Supple.   

Lungs:  Clear to percussion and auscultation.

Heart:  S1, S2 regular rhythm.  No murmur. 

Abdomen:  Positive bowel sounds, soft, nontender without hepatosplenomegaly.  

Extremities:  Without clubbing, cyanosis, or edema.  

 

LABORATORY DATA:  The white count initially 19,000 is now 13.2 with hemoglobin of

11.8 and platelets of 333.  INR of 1.72.  BUN 7, creatinine 1.0, and bilirubin 0.2. 

AST 23 and alkaline phosphatase initially 465 is now 160.  CRP 1.2 and 3.4.  Amylase

48 and lipase 147.  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

terreus along with MRSA.  Chest x-ray reviewed shows a possible right lower lobe

infiltrate and/or atelectasis previously seen on an abdominal CT scan from admission.

 

 

ASSESSMENT:  Clinically the patient does not have pneumonia.  Clinically, the patient

does not currently have symptomatology to suggest an active pneumonia.  He has had

some atelectasis and/or infiltrate noted on x-ray but this was previously noted on

his admitting CT scan so it does not appear as though he has evolved into a new

infiltrate during his hospitalization.  In any case he has been on ceftriaxone for

now 6 days.  My recommendation would be to continue treatment through tomorrow and

discontinue metronidazole.  Most likely the leukocytosis that he came in with was

related to passing of a gallstone and this issue has been resolved with recent

endoscopic retrograde cholangiopancreatography.  He is colonized with Aspergillus and

sputum in the past along with Methicillin-resistant Staphylococcus aureus and I will

attempt to get a nares screening for Methicillin-resistant Staphylococcus aureus to

see if we can take him off of contact isolation. 

 

 

 

 

EUSEBIO DIETRICH M.D.

 

[id number]

DD: 10/26/2017 13:42

DT: 10/26/2017 15:13

Job #:  [id number]

## 2017-10-26 NOTE — PN
Progress Note, Physician


Chief Complaint: 


ID





Full noted dctated


Appears afebrile and in no distress





- Current Medication List


Current Medications: 


Active Medications





Amlodipine Besylate (Norvasc -)  10 mg PO DAILY [facility]


   Last Admin: 10/26/17 09:12 Dose:  10 mg


Artificial Tears (Artificial Tears)  1 drop OU TID PRN


   PRN Reason: PAIN


   Last Admin: 10/23/17 21:33 Dose:  1 drop


Atorvastatin Calcium (Lipitor -)  20 mg PO HS [facility]


   Last Admin: 10/25/17 21:53 Dose:  20 mg


Carvedilol (Coreg -)  12.5 mg PO BID [facility]


   Last Admin: 10/26/17 09:12 Dose:  12.5 mg


Cholecalciferol (Vitamin D3 -)  2,000 unit PO DAILY [facility]


   Last Admin: 10/26/17 09:13 Dose:  2,000 unit


Finasteride (Proscar -)  5 mg PO HS [facility]


   Last Admin: 10/25/17 21:53 Dose:  5 mg


Metronidazole (Flagyl 500mg Premixed Ivpb -)  100 mls @ 100 mls/hr IVPB Q8H-IV 

[facility]


   Last Admin: 10/26/17 09:12 Dose:  100 mls/hr


CEFTRIAXONE 1 G/50 ML PREMIX (Ceftriaxone 1 Gm-D5w Bag)  50 mls @ 100 mls/hr 

IVPB DAILY [facility]


   Last Admin: 10/26/17 10:08 Dose:  100 mls/hr


Dextrose/Sodium Chloride (D5-1/2ns -)  1,000 mls @ 75 mls/hr IV ASDIR [facility]


   Last Admin: 10/26/17 09:17 Dose:  75 mls/hr


Losartan Potassium (Cozaar -)  50 mg PO DAILY [facility]


   Last Admin: 10/26/17 09:12 Dose:  50 mg


Meclizine HCl (Antivert -)  25 mg PO Q8H PRN


   PRN Reason: dizziness


Melatonin (Melatonin)  5 mg PO HS [facility]


   Last Admin: 10/25/17 21:53 Dose:  5 mg


Omega-3-Acid Ethyl Esters (Lovaza -)  1 gm PO DAILY [facility]


   Last Admin: 10/26/17 09:13 Dose:  1 gm


Oxycodone HCl (Roxicodone -)  5 mg PO Q4H PRN


   PRN Reason: PAIN


Pantoprazole Sodium (Protonix -)  40 mg PO BID [facility]


   Last Admin: 10/26/17 09:12 Dose:  40 mg


Simethicone (Mylicon -)  80 mg PO Q4H PRN


   PRN Reason: INDIGESTION


   Last Admin: 10/24/17 21:46 Dose:  80 mg


Tamsulosin HCl (Flomax -)  0.4 mg PO HS [facility]


   Last Admin: 10/25/17 21:54 Dose:  0.4 mg











- Objective


Vital Signs: 


 Vital Signs











Temperature  98.4 F   10/26/17 09:00


 


Pulse Rate  60   10/26/17 09:00


 


Respiratory Rate  18   10/26/17 09:00


 


Blood Pressure  133/59   10/26/17 09:00


 


O2 Sat by Pulse Oximetry (%)  98   10/26/17 09:00











Constitutional: Yes: Well Nourished, No Distress


Neck: Yes: WNL, Supple


Cardiovascular: Yes: S1, S2


Respiratory: Yes: WNL, Regular, CTA Bilaterally


Gastrointestinal: Yes: WNL, Normal Bowel Sounds, Soft.  No: Tenderness


Edema: No


Labs: 


 CBC, BMP





 10/26/17 07:00 





 10/26/17 07:00 





 INR, PTT











INR  1.72  (0.82-1.09)  H  10/26/17  07:00    














Problem List





- Problems


(1) Pneumonia due to infectious organism


Code(s): J18.9 - PNEUMONIA, UNSPECIFIED ORGANISM   





(2) Choledocholithiasis


Code(s): K80.50 - CALCULUS OF BILE DUCT W/O CHOLANGITIS OR CHOLECYST W/O OBST








Assessment/Plan


Microbiology





10/22/17 07:12   Stool   Gram Stain - Final


10/22/17 07:12   Stool   Escherichia coli 0157 Culture - Final


                              NO GROWTH OF SALMONELLA OR SHIGELLA SPECIES 

OBTAINED


                              NO GROWTH OF CAMPYLOBACTER SPECIES OBTAINED


                              NO GROWTH OF YERSINIA SPECIES OBTAINED


                              NO GROWTH OF VIBRIO SPECIES OBTAINED


                              NO GROWTH OF E COLI 0157 OBTAINED





 Laboratory Tests











  03/24/17 03/25/17 10/25/17





  06:30 06:00 17:00


 


WBC    19.8 H D


 


Hct   


 


Plt Count   


 


ESR  40 H  


 


Creat Clearance w eGFR   


 


C-Reactive Protein   4.2 H D 














  10/26/17 10/26/17





  07:00 07:00


 


WBC   13.2 H D


 


Hct   34.5 L D


 


Plt Count   333  D


 


ESR  


 


Creat Clearance w eGFR  > 60 


 


C-Reactive Protein  








Assessment Clinically I do not feel patient has pneumonia. He idi have some RLL 

atalectasis ? infiltrate on his admitting CT scan.


He appears to be doing well on Ceftriaxone and metronidazole.  This is now day 

6 Rx which would treat PNA








Advise STOP metronidazole


Ceftriaxone can stop after tomorrow dose


MRSA darya Rowan MD

## 2017-10-26 NOTE — PN
Physical Exam: 


SUBJECTIVE: Patient seen and examined.  Pt reports his breathing is much better 

today, and that he feels much better today (compared to episode of sob yesterday

).  Pt denies diarrhea, fever, chills, nausea, vomiting, chest pain, abdominal 

pain.  No events overnight. 





OBJECTIVE:





 Vital Signs











 Period  Temp  Pulse  Resp  BP Sys/Yun  Pulse Ox


 


 Last 24 Hr  97.3 F-98.7 F  60-92  16-18  110-133/57-85  98








GENERAL: The patient is awake, alert, and fully oriented, in no acute distress.


HEAD: Normal with no signs of trauma.


EYES: Extraocular movements intact, sclera anicteric, conjunctiva clear. No 

ptosis. 


ENT: Moist mucous membranes.


NECK: Trachea midline, supple. 


LUNGS: Pt on O2 via nasal cannula.  Breath sounds equal, clear to auscultation 

bilaterally, no wheezes, no crackles, no accessory muscle use. 


HEART: Regular rate and rhythm, S1, S2 without murmur, rub or gallop.


ABDOMEN: Soft, nontender, nondistended.


EXTREMITIES: Warm, well-perfused, no edema. 


PSYCH: Normal mood, normal affect.


SKIN: Warm, dry, normal turgor, no rashes or lesions noted





 Laboratory Results - last 24 hr











  10/26/17 10/26/17 10/26/17





  07:00 07:00 07:00


 


WBC   13.2 H D 


 


RBC   3.99 L 


 


Hgb   11.8  D 


 


Hct   34.5 L D 


 


MCV   86.5 


 


MCH   29.6 


 


MCHC   34.2 


 


RDW   13.9 


 


Plt Count   333  D 


 


MPV   7.0 L 


 


Neutrophils %   69.4  D 


 


Lymphocytes %   20.0  D 


 


Monocytes %   6.0 


 


Eosinophils %   3.8 


 


Basophils %   0.8 


 


PT with INR    19.40 H


 


INR    1.72 H


 


Sodium  140  


 


Potassium  3.4 L  


 


Chloride  104  


 


Carbon Dioxide  25  


 


Anion Gap  11  


 


BUN  7  D  


 


Creatinine  1.0  D  


 


Creat Clearance w eGFR  > 60  


 


Random Glucose  141 H  


 


Calcium  7.6 L  


 


Phosphorus  3.1  D  


 


Magnesium  1.7 L  


 


Total Bilirubin  0.5  


 


Direct Bilirubin  0.2  


 


AST  23  


 


ALT  45  D  


 


Alkaline Phosphatase  160 H D  


 


C-Reactive Protein  3.4 H D  


 


Total Protein  5.1 L  


 


Albumin  2.5 L  


 


Total Amylase  48  D  


 


Lipase  147  








Active Medications











Generic Name Dose Route Start Last Admin





  Trade Name Freq  PRN Reason Stop Dose Admin


 


Acetaminophen  325 mg 10/26/17 17:59 10/26/17 18:23





  Tylenol -  PO   325 mg





  Q6H PRN   Administration





  FEVER OR PAIN   


 


Amlodipine Besylate  10 mg 10/22/17 10:00 10/26/17 09:12





  Norvasc -  PO   10 mg





  DAILY LAUREN   Administration


 


Artificial Tears  1 drop 10/22/17 06:37 10/23/17 21:33





  Artificial Tears  OU   1 drop





  TID PRN   Administration





  PAIN   


 


Atorvastatin Calcium  20 mg 10/22/17 22:00 10/26/17 21:38





  Lipitor -  PO   20 mg





  HS LAUREN   Administration


 


Carvedilol  12.5 mg 10/22/17 10:00 10/26/17 21:38





  Coreg -  PO   12.5 mg





  BID LAUREN   Administration


 


Cholecalciferol  2,000 unit 10/22/17 10:00 10/26/17 09:13





  Vitamin D3 -  PO   2,000 unit





  DAILY LAUREN   Administration


 


Finasteride  5 mg 10/24/17 22:00 10/26/17 21:38





  Proscar -  PO   5 mg





  HS LAUREN   Administration


 


Metronidazole  100 mls @ 100 mls/hr 10/22/17 10:00 10/26/17 17:51





  Flagyl 500mg Premixed Ivpb -  IVPB   100 mls/hr





  Q8H-IV LAUREN   Administration


 


CEFTRIAXONE 1 G/50 ML PREMIX  50 mls @ 100 mls/hr 10/22/17 10:00 10/26/17 10:08





  Ceftriaxone 1 Gm-D5w Bag  IVPB   100 mls/hr





  DAILY LAUREN   Administration


 


Dextrose/Sodium Chloride  1,000 mls @ 75 mls/hr 10/26/17 07:30 10/26/17 09:17





  D5-1/2ns -  IV   75 mls/hr





  ASDIR LAUREN   Administration


 


Losartan Potassium  50 mg 10/22/17 10:00 10/26/17 09:12





  Cozaar -  PO   50 mg





  DAILY LAUREN   Administration


 


Meclizine HCl  25 mg 10/22/17 06:37  





  Antivert -  PO   





  Q8H PRN   





  dizziness   


 


Melatonin  5 mg 10/22/17 22:00 10/26/17 21:38





  Melatonin  PO   5 mg





  HS LAUREN   Administration


 


Omega-3-Acid Ethyl Esters  1 gm 10/22/17 10:00 10/26/17 09:13





  Lovaza -  PO   1 gm





  DAILY LAUREN   Administration


 


Oxycodone HCl  5 mg 10/22/17 06:37  





  Roxicodone -  PO   





  Q4H PRN   





  PAIN   


 


Pantoprazole Sodium  40 mg 10/25/17 22:00 10/26/17 21:38





  Protonix -  PO   40 mg





  BID LAUREN   Administration


 


Simethicone  80 mg 10/22/17 06:37 10/24/17 21:46





  Mylicon -  PO   80 mg





  Q4H PRN   Administration





  INDIGESTION   


 


Tamsulosin HCl  0.4 mg 10/24/17 22:00 10/26/17 21:38





  Flomax -  PO   0.4 mg





  HS LAUREN   Administration











ASSESSMENT/PLAN:


84yo M with PMH of obstruction and ischemic colitis, COPD, CHF, HTN, HLD, DIC, 

TIA, prostate Ca, renal Ca, neurogenic bladder, anxiety, recent CCY, recent adm 

for sepsis from UTI adn other medical problems, presents with diarrhea and 

abdominal pain and was found to have sepsis from colitis vs cholangitis.





1) sepsis 2/2 colitis vs cholangitis - improved, s/p ERCP


   - ID (Dr. Rowan) recs appreciated:


      - D/C Flagyl per ID


      - continue Ceftriaxone for one more day per ID 


   - multiple CBD stones removed on ERCP


      - hold anticoagulation for 72 hrs per Dr. Bond


      - continue Protonix to treat ulcer


   - LFTs improving, continue to monitor


   - continue Oxycodone 5mg po q4hr prn for pain





2) hypokalemia and hypomagnesemia


   - Mg Sulfate 2g IVPB given


   - K-dur 20meq po given


   - f/u on am labs





3) leukocytosis


   - likely reactive to ERCP


   - continue to monitor





4) htn


   - continue Norvasc, Coreg, Cozaar





5) hld


   - continue Lipitor 





6) BPH


   - continue Flomax





7) FEN


   - Fluids: D5 1/2NS 


   - Electrolytes: hypokalemia and hypomagnesemia, continue to monitor


   - Nutrition: clear liquid diet





8) Prophylaxis


   - DVT ppx with SCDs


   - GI ppx with Prontonix 40mg po BID


   - deconditioning ppx with PT consult


      - pt ambulates with cane at home





Visit type





- Emergency Visit


Emergency Visit: Yes


ED Registration Date: 10/22/17


Care time: The patient presented to the Emergency Department on the above date 

and was hospitalized for further evaluation of their emergent condition.





- New Patient


This patient is new to me today: No





- Critical Care


Critical Care patient: No

## 2017-10-26 NOTE — PN
Teaching Attending Note


Name of Resident: Kesha Haynes


ATTENDING PHYSICIAN STATEMENT





I saw and evaluated the patient.


I reviewed the resident's note and discussed the case with the resident.


I agree with the resident's findings and plan as documented.








SUBJECTIVE:


no new complaints. denied any current shortness of breath. Tolerated ercp well. 





OBJECTIVE:


General NAD


CV S1 S2 +


lungs CTA B/L no wheezing/rales/rhonchi


ABdomen soft NT/ND, no masses appreciated 


skin- no rasges 





 Abnormal Lab Results











  10/26/17 10/26/17 10/26/17





  07:00 07:00 07:00


 


WBC   13.2 H D 


 


RBC   3.99 L 


 


Hct   34.5 L D 


 


MPV   7.0 L 


 


PT with INR    19.40 H


 


INR    1.72 H


 


Potassium  3.4 L  


 


Random Glucose  141 H  


 


Calcium  7.6 L  


 


Magnesium  1.7 L  


 


Alkaline Phosphatase  160 H D  


 


C-Reactive Protein  3.4 H D  


 


Total Protein  5.1 L  


 


Albumin  2.5 L  














ASSESSMENT AND PLAN:





ASSESSMENT AND PLAN:


86 yo M PMH HTN, hyperlipidemia, CAD, COPD, TIA, prostate cancer, neurogenic 

bladder, renal cancer, anxiety , recent CCY , and recent admission for sepsis 

form UTI and other medical problems who presented with diarrhea and abd apin 

and was found to have sepsis from colitis vs cholangitis. 





#Sepsis due to colitis vs cholangitis- much improved, s/p ercp with stone 

extraction and balloon sphincterotomy. 


-continue flagyl and ceftriaxone 


-f/u ID recs 





#CAD- cont home medication. coreg


# HTN- controlled. cont norvasc


#BPH- on flomax


#DVT ppx- continue hep sc for dvt ppx

## 2017-10-27 VITALS — TEMPERATURE: 98.4 F | DIASTOLIC BLOOD PRESSURE: 77 MMHG | SYSTOLIC BLOOD PRESSURE: 145 MMHG | HEART RATE: 75 BPM

## 2017-10-27 LAB
ALBUMIN SERPL-MCNC: 2.8 G/DL (ref 3.4–5)
ALP SERPL-CCNC: 172 U/L (ref 45–117)
ALT SERPL-CCNC: 41 U/L (ref 12–78)
ANION GAP SERPL CALC-SCNC: 7 MMOL/L (ref 8–16)
APTT BLD: 32.7 SECONDS (ref 26.9–34.4)
AST SERPL-CCNC: 20 U/L (ref 15–37)
BILIRUB SERPL-MCNC: 0.6 MG/DL (ref 0.2–1)
CALCIUM SERPL-MCNC: 8.1 MG/DL (ref 8.5–10.1)
CO2 SERPL-SCNC: 27 MMOL/L (ref 21–32)
CREAT SERPL-MCNC: 0.9 MG/DL (ref 0.7–1.3)
DEPRECATED RDW RBC AUTO: 13.7 % (ref 11.9–15.9)
GLUCOSE SERPL-MCNC: 131 MG/DL (ref 74–106)
INR BLD: 1.48 (ref 0.82–1.09)
MAGNESIUM SERPL-MCNC: 2.3 MG/DL (ref 1.8–2.4)
MCH RBC QN AUTO: 30.8 PG (ref 25.7–33.7)
MCHC RBC AUTO-ENTMCNC: 35.9 G/DL (ref 32–35.9)
MCV RBC: 85.8 FL (ref 80–96)
PHOSPHATE SERPL-MCNC: 2.8 MG/DL (ref 2.5–4.9)
PLATELET # BLD AUTO: 340 K/MM3 (ref 134–434)
PMV BLD: 7 FL (ref 7.5–11.1)
PROT SERPL-MCNC: 5.9 G/DL (ref 6.4–8.2)
PT PNL PPP: 16.7 SEC (ref 9.98–11.88)
WBC # BLD AUTO: 10 K/MM3 (ref 4–10)

## 2017-10-27 RX ADMIN — CARVEDILOL SCH MG: 12.5 TABLET, FILM COATED ORAL at 09:49

## 2017-10-27 RX ADMIN — AMLODIPINE BESYLATE SCH MG: 10 TABLET ORAL at 09:47

## 2017-10-27 RX ADMIN — LOSARTAN POTASSIUM SCH MG: 50 TABLET, FILM COATED ORAL at 09:47

## 2017-10-27 RX ADMIN — PANTOPRAZOLE SODIUM SCH MG: 40 TABLET, DELAYED RELEASE ORAL at 09:47

## 2017-10-27 RX ADMIN — VITAMIN D, TAB 1000IU (100/BT) SCH UNIT: 25 TAB at 09:48

## 2017-10-27 RX ADMIN — OMEGA-3-ACID ETHYL ESTERS SCH GM: 1 CAPSULE, LIQUID FILLED ORAL at 11:18

## 2017-10-27 RX ADMIN — CEFTRIAXONE SCH MLS/HR: 1 INJECTION, SOLUTION INTRAVENOUS at 09:48

## 2017-10-27 RX ADMIN — DEXTROSE AND SODIUM CHLORIDE SCH: 5; 450 INJECTION, SOLUTION INTRAVENOUS at 09:40

## 2017-10-27 RX ADMIN — DEXTROSE AND SODIUM CHLORIDE SCH MLS/HR: 5; 450 INJECTION, SOLUTION INTRAVENOUS at 04:10

## 2017-10-27 NOTE — PN
Teaching Attending Note


Name of Resident: Kesha Haynes


ATTENDING PHYSICIAN STATEMENT





I saw and evaluated the patient.


I reviewed the resident's note and discussed the case with the resident.


I agree with the resident's findings and plan as documented.








SUBJECTIVE:


No new complaints today. No fevers. Leukocytosis improved.





OBJECTIVE:


 Last Vital Signs











Temp Pulse Resp BP Pulse Ox


 


 98.4 F   75   18   145/77   97 


 


 10/27/17 10:00  10/27/17 10:00  10/27/17 10:00  10/27/17 10:00  10/27/17 09:00





general- nad, aaox3 


heent  moist oral mucosa 


neck -supple 


cv-s1+s2 RRR


chest- cta b/l 


abdomen- soft, nt 


ext- no PE  


SKin- no rashes 














ASSESSMENT AND PLAN:





84 yo M with multiple medical problems admitted for vague abdominal pain 

thought to have sepsis from colitis vs cholangitis s/p ERCP with stone 

extraction, balloon sphincterotomy. Completed course of antibiotics with 

ceftriaxone and metronidazole. 


ID recs to observe off antibiotics. Patient is at baseline and is clinically 

stable for discharge.

## 2017-10-27 NOTE — DS
Physical Exam: 


SUBJECTIVE: Patient seen and examined.  Pt feels well and wants to go home.  Pt 

has some intermittent diffuse muscular pain controlled by Tylenol 325mg.  Pt 

denies fever, chills, nausea, vomiting, diarrhea, constipation, abdominal pain, 

chest pain, palpitations, sob. 





OBJECTIVE:





 Vital Signs











 Period  Temp  Pulse  Resp  BP Sys/Yun  Pulse Ox


 


 Last 24 Hr  97.6 F-98.7 F  72-92  16-18  110-157/68-84  97








PHYSICAL EXAM





GENERAL: The patient is awake, alert, and fully oriented, in no acute distress.


HEAD: Normal with no signs of trauma.


EYES: Extraocular movements intact, sclera anicteric, conjunctiva clear. 


ENT: Moist mucous membranes.


NECK: Trachea midline, supple. 


LUNGS: Breath sounds equal, clear to auscultation bilaterally, no wheezes, no 

crackles, no accessory muscle use. 


HEART: Regular rate and rhythm, S1, S2 without murmur, rub or gallop.


ABDOMEN: Soft, nontender, nondistended.


EXTREMITIES: Warm, well-perfused, no edema. 


PSYCH: Normal mood, normal affect.


SKIN: Warm, dry, normal turgor, no rashes or lesions noted.





LABS


 Laboratory Results - last 24 hr











  10/27/17 10/27/17 10/27/17





  08:21 08:21 08:21


 


WBC  10.0  


 


RBC  4.19  


 


Hgb  12.9  


 


Hct  36.0  


 


MCV  85.8  


 


MCH  30.8  


 


MCHC  35.9  


 


RDW  13.7  


 


Plt Count  340  


 


MPV  7.0 L  


 


PT with INR   16.70 H 


 


INR   1.48 H 


 


PTT (Actin FS)   32.7 


 


Sodium    140


 


Potassium    4.1  D


 


Chloride    106


 


Carbon Dioxide    27


 


Anion Gap    7 L


 


BUN    7


 


Creatinine    0.9


 


Creat Clearance w eGFR    > 60


 


Random Glucose    131 H


 


Calcium    8.1 L


 


Phosphorus    2.8


 


Magnesium    2.3  D


 


Total Bilirubin    0.6


 


AST    20


 


ALT    41


 


Alkaline Phosphatase    172 H


 


Total Protein    5.9 L


 


Albumin    2.8 L








HOSPITAL COURSE:





Date of Admission:10/22/17





Date of Discharge: 10/27/17





84yo M with PMH of obstruction and ischemic colitis, CHF, COPD, TIA, bladder Ca

, renal cell Ca, HTN, HLD, fungal pna, presented with diarrhea and abdominal 

pain.  Pt was admitted for sepsis 2/2 colitis vs cholangitis.  Diarrhea and 

abdominal pain now resolved.  Pt had an MRCP which showed choledocholithiasis 

then he had an ERCP which removed several CBD stones.  Pt received a course of 

Flagyl and Ceftriaxone.  LFTs improved.  Leukocytosis seen yesterday likely 

reactive to ERCP, now resolved.  Hypokalemia and hypomagnesemia repleted.  Pt 

walked 120 feet with a walker with Physical Therapy.  Pt discharged home in 

stable condition with instructions to follow up with his PCP and GI.





Minutes to complete discharge: 35





Discharge Summary


Reason For Visit: COLITIS, PNEUMONIA


Current Active Problems





Choledocholithiasis (Acute) 


Colitis (Acute) 


Constipation (Acute) 


Pneumonia (Acute) 


Pneumonia due to infectious organism (Acute) 








Condition: Guarded





- Instructions


Diet, Activity, Other Instructions: 


Please resume your medications as prescribed.  We have made some adjustments to 

your home medications.


Please resume a low sodium diet.


If you have fever, chills, or worsening abdominal pain, please call your 

Primary Care Doctor or go to the nearest Emergency Room.





Please follow-up with your Primary Care Doctor (Dr. Landin) to address the 

mass in your Right kidney, the mass in your Left adrenal gland, and for post 

hospital check-up.


Please follow-up with your Gastrointestinal Doctor (Dr. Bond).  Dr. Bond 

requests that you wait another 10 days before taking any Aspirin as this can 

result in bleeding.


Referrals: 


Álvaro Lanidn MD [Staff Physician] - 1 Week


Jax Bond MD [Staff Physician] - 1 Week





- Home Medications


Comprehensive Discharge Medication List: 


Ambulatory Orders





Atorvastatin Ca [Lipitor] 20 mg PO HS 10/12/17 


Finasteride 5 mg PO DAILY 10/12/17 


Losartan Potassium 50 mg PO DAILY 10/12/17 


Tamsulosin HCl [Flomax] 0.4 mg PO DAILY 10/12/17 


Polyethylene Glycol 3350 [Miralax 119 gm Btl -] 17 gm PO DAILY #30 bottle 10/16/

17 


Cefuroxime Axetil [Cefuroxime] 500 mg PO BID 10/24/17 


Nystatin Oral Suspension - [Nystatin Oral Susp 852625 Units/5 ML -] 500,000 

units PO Q6H 10/24/17 


Oxycodone HCl [Roxicodone] 5 mg PO Q4H PRN 10/24/17 








This patient is new to me today: No


Emergency Visit: No


Critical Care patient: No





- Discharge Referral


Referred to [facility] No

## 2017-10-27 NOTE — PN
Progress Note (short form)





- Note


Progress Note: 


GI NOte: Looks well. pain free. LFTs have essentially normalilzed. No 

pancreatitis. 





PE: Abd: soft, BS normoactive, nontender





Impression; Choledocholithiasis resolved with ERCP, sphincterotomy and stone 

extractions





Plan: Advised to refrain from aspirin for another 10 days


        No GI objections to discharge. Discussed with Dr. Concepcion   





Problem List





- Problems


(1) Abdominal pain


Code(s): R10.9 - UNSPECIFIED ABDOMINAL PAIN   Qualifiers: 


     Abdominal location: right upper quadrant     Qualified Code(s): R10.11 - 

Right upper quadrant pain; R10.11 - Right upper quadrant pain  





(2) Choledocholithiasis


Code(s): K80.50 - CALCULUS OF BILE DUCT W/O CHOLANGITIS OR CHOLECYST W/O OBST





(3) Constipation


Code(s): K59.00 - CONSTIPATION, UNSPECIFIED

## 2017-10-27 NOTE — PN
Progress Note, Physician


History of Present Illness: 


OOB in chair


No c/o chest pain or dyspnea


No fever/ chills





- Current Medication List


Current Medications: 


Active Medications





Acetaminophen (Tylenol -)  325 mg PO Q6H PRN


   PRN Reason: FEVER OR PAIN


   Last Admin: 10/26/17 18:23 Dose:  325 mg


Amlodipine Besylate (Norvasc -)  10 mg PO DAILY [facility]


   Last Admin: 10/27/17 09:47 Dose:  10 mg


Artificial Tears (Artificial Tears)  1 drop OU TID PRN


   PRN Reason: PAIN


   Last Admin: 10/23/17 21:33 Dose:  1 drop


Atorvastatin Calcium (Lipitor -)  20 mg PO HS [facility]


   Last Admin: 10/26/17 21:38 Dose:  20 mg


Carvedilol (Coreg -)  12.5 mg PO BID [facility]


   Last Admin: 10/27/17 09:49 Dose:  12.5 mg


Cholecalciferol (Vitamin D3 -)  2,000 unit PO DAILY [facility]


   Last Admin: 10/27/17 09:48 Dose:  2,000 unit


Finasteride (Proscar -)  5 mg PO HS [facility]


   Last Admin: 10/26/17 21:38 Dose:  5 mg


CEFTRIAXONE 1 G/50 ML PREMIX (Ceftriaxone 1 Gm-D5w Bag)  50 mls @ 100 mls/hr 

IVPB DAILY [facility]


   Last Admin: 10/27/17 09:48 Dose:  100 mls/hr


Dextrose/Sodium Chloride (D5-1/2ns -)  1,000 mls @ 75 mls/hr IV ASDIR [facility]


   Last Admin: 10/27/17 09:40 Dose:  Not Given


Losartan Potassium (Cozaar -)  50 mg PO DAILY [facility]


   Last Admin: 10/27/17 09:47 Dose:  50 mg


Meclizine HCl (Antivert -)  25 mg PO Q8H PRN


   PRN Reason: dizziness


Melatonin (Melatonin)  5 mg PO HS [facility]


   Last Admin: 10/26/17 21:38 Dose:  5 mg


Omega-3-Acid Ethyl Esters (Lovaza -)  1 gm PO DAILY [facility]


   Last Admin: 10/27/17 11:18 Dose:  1 gm


Oxycodone HCl (Roxicodone -)  5 mg PO Q4H PRN


   PRN Reason: PAIN


   Last Admin: 10/27/17 09:48 Dose:  5 mg


Pantoprazole Sodium (Protonix -)  40 mg PO BID [facility]


   Last Admin: 10/27/17 09:47 Dose:  40 mg


Simethicone (Mylicon -)  80 mg PO Q4H PRN


   PRN Reason: INDIGESTION


   Last Admin: 10/24/17 21:46 Dose:  80 mg


Tamsulosin HCl (Flomax -)  0.4 mg PO HS [facility]


   Last Admin: 10/26/17 21:38 Dose:  0.4 mg











- Objective


Vital Signs: 


 Vital Signs











Temperature  98.4 F   10/27/17 10:00


 


Pulse Rate  75   10/27/17 10:00


 


Respiratory Rate  18   10/27/17 10:00


 


Blood Pressure  145/77   10/27/17 10:00


 


O2 Sat by Pulse Oximetry (%)  97   10/27/17 09:00











Constitutional: Yes: No Distress


Eyes: Yes: Conjunctiva Clear


Cardiovascular: Yes: Regular Rate and Rhythm, S1, S2


Respiratory: Yes: Other (Crepitations L >R base)


Gastrointestinal: Yes: Normal Bowel Sounds, Soft.  No: Tenderness


Edema: No


Labs: 


 CBC, BMP





 10/27/17 08:21 





 10/27/17 08:21 





 INR, PTT











INR  1.48  (0.82-1.09)  H  10/27/17  08:21    














Assessment/Plan


Fever/ leukocytosis- resolved


S/P ERCP


Hx nephrectomy


Observe off antibiotics

## 2019-04-04 ENCOUNTER — HOSPITAL ENCOUNTER (INPATIENT)
Dept: HOSPITAL 74 - JER | Age: 84
LOS: 6 days | Discharge: NURSING, REHAB OR LONG-TERM CARE | DRG: 190 | End: 2019-04-10
Attending: FAMILY MEDICINE | Admitting: FAMILY MEDICINE
Payer: COMMERCIAL

## 2019-04-04 VITALS — BODY MASS INDEX: 19.5 KG/M2

## 2019-04-04 DIAGNOSIS — Z87.891: ICD-10-CM

## 2019-04-04 DIAGNOSIS — I11.0: ICD-10-CM

## 2019-04-04 DIAGNOSIS — F02.80: ICD-10-CM

## 2019-04-04 DIAGNOSIS — E87.2: ICD-10-CM

## 2019-04-04 DIAGNOSIS — E86.0: ICD-10-CM

## 2019-04-04 DIAGNOSIS — I50.32: ICD-10-CM

## 2019-04-04 DIAGNOSIS — G47.00: ICD-10-CM

## 2019-04-04 DIAGNOSIS — E78.5: ICD-10-CM

## 2019-04-04 DIAGNOSIS — I36.1: ICD-10-CM

## 2019-04-04 DIAGNOSIS — I44.7: ICD-10-CM

## 2019-04-04 DIAGNOSIS — G47.30: ICD-10-CM

## 2019-04-04 DIAGNOSIS — Z85.46: ICD-10-CM

## 2019-04-04 DIAGNOSIS — J96.21: ICD-10-CM

## 2019-04-04 DIAGNOSIS — Z96.652: ICD-10-CM

## 2019-04-04 DIAGNOSIS — B35.1: ICD-10-CM

## 2019-04-04 DIAGNOSIS — Z80.51: ICD-10-CM

## 2019-04-04 DIAGNOSIS — Z86.73: ICD-10-CM

## 2019-04-04 DIAGNOSIS — J44.1: Primary | ICD-10-CM

## 2019-04-04 DIAGNOSIS — Z91.19: ICD-10-CM

## 2019-04-04 DIAGNOSIS — Z90.5: ICD-10-CM

## 2019-04-04 DIAGNOSIS — G30.9: ICD-10-CM

## 2019-04-04 DIAGNOSIS — R64: ICD-10-CM

## 2019-04-04 DIAGNOSIS — E43: ICD-10-CM

## 2019-04-04 DIAGNOSIS — L85.3: ICD-10-CM

## 2019-04-04 LAB
ALBUMIN SERPL-MCNC: 3.6 G/DL (ref 3.4–5)
ALP SERPL-CCNC: 118 U/L (ref 45–117)
ALT SERPL-CCNC: 24 U/L (ref 13–61)
ANION GAP SERPL CALC-SCNC: 6 MMOL/L (ref 8–16)
ARTERIAL BLOOD GAS PCO2: 44.7 MMHG (ref 35–45)
ARTERIAL PATENCY WRIST A: POSITIVE
AST SERPL-CCNC: 17 U/L (ref 15–37)
BASE EXCESS BLDA CALC-SCNC: 4.8 MEQ/L (ref -2–2)
BASOPHILS # BLD: 1.4 % (ref 0–2)
BILIRUB SERPL-MCNC: 0.6 MG/DL (ref 0.2–1)
BNP SERPL-MCNC: 158.6 PG/ML (ref 5–450)
BUN SERPL-MCNC: 24 MG/DL (ref 7–18)
CALCIUM SERPL-MCNC: 9 MG/DL (ref 8.5–10.1)
CHLORIDE SERPL-SCNC: 99 MMOL/L (ref 98–107)
CO2 SERPL-SCNC: 33 MMOL/L (ref 21–32)
COHGB MFR BLD: 1.4 % (ref 0–2)
CREAT SERPL-MCNC: 1 MG/DL (ref 0.55–1.3)
DEPRECATED RDW RBC AUTO: 15.1 % (ref 11.9–15.9)
EOSINOPHIL # BLD: 6.4 % (ref 0–4.5)
GLUCOSE SERPL-MCNC: 104 MG/DL (ref 74–106)
HCT VFR BLD CALC: 49.5 % (ref 35.4–49)
HGB BLD-MCNC: 16.5 GM/DL (ref 11.7–16.9)
LYMPHOCYTES # BLD: 22.8 % (ref 8–40)
MCH RBC QN AUTO: 29.8 PG (ref 25.7–33.7)
MCHC RBC AUTO-ENTMCNC: 33.5 G/DL (ref 32–35.9)
MCV RBC: 88.9 FL (ref 80–96)
MONOCYTES # BLD AUTO: 12.1 % (ref 3.8–10.2)
NEUTROPHILS # BLD: 57.3 % (ref 42.8–82.8)
PLATELET # BLD AUTO: 355 K/MM3 (ref 134–434)
PMV BLD: 7.6 FL (ref 7.5–11.1)
PO2 BLDA: 69.2 MMHG (ref 80–105)
POTASSIUM SERPLBLD-SCNC: 4.8 MMOL/L (ref 3.5–5.1)
PROT SERPL-MCNC: 7.8 G/DL (ref 6.4–8.2)
RBC # BLD AUTO: 5.56 M/MM3 (ref 4–5.6)
SAO2 % BLDA: 94 % (ref 95–98)
SODIUM SERPL-SCNC: 138 MMOL/L (ref 136–145)
WBC # BLD AUTO: 9.2 K/MM3 (ref 4–10)

## 2019-04-04 PROCEDURE — G0378 HOSPITAL OBSERVATION PER HR: HCPCS

## 2019-04-04 RX ADMIN — Medication PRN MG: at 23:22

## 2019-04-04 RX ADMIN — IPRATROPIUM BROMIDE AND ALBUTEROL SULFATE PRN AMP: .5; 3 SOLUTION RESPIRATORY (INHALATION) at 20:30

## 2019-04-04 RX ADMIN — ATORVASTATIN CALCIUM SCH MG: 20 TABLET, FILM COATED ORAL at 23:22

## 2019-04-04 NOTE — PDOC
History of Present Illness





- General


Chief Complaint: Respiratory


Stated Complaint: FAILURE TO THRIVE/cough


Time Seen by Provider: 04/04/19 16:38


History Source: Patient, Old Records, Primary Care Provider


Exam Limitations: No Limitations





- History of Present Illness


Initial Comments: 





HPI: 





86 y/o male BIBMES to [facility] ER from Dr. Spicer clinic for hypotension noted 

on triage vitals and generalized weakness. Dr. Landin called ahead and 

requested the pt be evaluated then admitted to Dr. Mccarty service. Pt states 

he has been short of breath for approx. 1-2 weeks after his COPD medication was 

changed. This has caused him to stop eating. He was presenting to the clinic 

for f/u. Endorses chronic nonproductive cough for the past year. Denies fevers 

or chills, or recent change in respiratory symptoms. Does not use home oxygen. 

Prescribed C-PAP for CAROLINA has not been using it for past 6 months because his 

wife says he is not snoring at night.





Pt states he would have been unaware of his hypotension if not informed. Denied 

weakness, lightheadedness, chest pain, or syncope. Takes Carvedalol, Amlodipine

, and Lorsartan daily. Denies recent change in medication regimen. States he 

takes his medications every day of the week without missing doses. Does not 

believe he could have accidentally taken extra medication because he lays out 

his own medications every morning. 





PCP: Dr. Landin





Medical Hx: 


- COPD w/ chronic cough


- CHF


- CAROL[place], prescribed C-PAP but not using for past 6 months


- HTN


- HTD


- TIA


- Prostate CA


- H/o Renal Cell CA s/p R nephrectomy











Past History





- Past Medical History


Allergies/Adverse Reactions: 


 Allergies











Allergy/AdvReac Type Severity Reaction Status Date / Time


 


No Known Drug Allergies Allergy   Verified 04/04/19 15:53











Home Medications: 


Ambulatory Orders





Atorvastatin Ca [Lipitor] 20 mg PO HS 10/12/17 


Finasteride 5 mg PO DAILY 10/12/17 


Losartan Potassium 50 mg PO DAILY 10/12/17 


Tamsulosin HCl [Flomax] 0.4 mg PO DAILY 10/12/17 


Nystatin Oral Suspension - [Nystatin Oral Susp 770578 Units/5 ML -] 500,000 

units PO Q6H 10/24/17 


Acetaminophen [Tylenol .Regular Strength -] 650 mg PO Q6H PRN #0 tablet 10/27/

17 


Amlodipine Besylate [Norvasc -] 10 mg PO DAILY  tablet 10/27/17 


Carvedilol [Coreg -] 12.5 mg PO BID  tablet 10/27/17 


Meclizine HCl [Antivert -] 25 mg PO Q8H PRN #0 tablet 10/27/17 


Melatonin 5 mg PO HS  tab 10/27/17 


Omega-3 Acid Ethyl Esters [Lovaza -] 1 gm PO DAILY  cap 10/27/17 


Pantoprazole Sodium [Protonix -] 40 mg PO BID #30 tab 10/27/17 


Polyvinyl Alcohol [Artificial Tears] 1 drop OU TID PRN #0 bottle 10/27/17 


Simethicone [Mylicon -] 80 mg PO Q4H PRN #0 tab.chew 10/27/17 








Anemia: No


Asthma: No


Cancer: Yes (prostate cancer,kidney cancer)


Cardiac Disorders: No


CVA: Yes (TIA,2002)


COPD: Yes (mild,USES ALBUTEROL AS NEEDED, bipap at night.)


CHF: No


Dementia: No


Diabetes: No


GI Disorders: Yes (COLON POLYPS, ISCHEMIC COLITIS, DIVERTICULOSIS)


 Disorders: Yes (PROSTATE CA)


HTN: Yes


Hypercholesterolemia: Yes


Liver Disease: No


Seizures: No


Thyroid Disease: No





- Surgical History


Abdominal Surgery: Yes (BILATERAL  INGUINAL HERNIA)


Appendectomy: Yes


Cardiac Surgery: No


Cholecystectomy: Yes (08/23/2017)


Lung Surgery: No


Neurologic Surgery: Yes (Cervical FUSION)


Orthopedic Surgery: Yes (NECK SURGERY, L knee sx 12/8)





- Immunization History


Immunization Up to Date: Yes





- Suicide/Smoking/Psychosocial Hx


Smoking Status: Yes


Smoking History: Current every day smoker


Have you smoked in the past 12 months: No


Number of Cigarettes Smoked Daily: 0


If you are a former smoker, when did you quit?: 1982


Information on smoking cessation initiated: No


Hx Alcohol Use: No


Drug/Substance Use Hx: No


Substance Use Type: None


Hx Substance Use Treatment: No





**Review of Systems





- Review of Systems


Able to Perform ROS?: Yes


Comments:: 





In addition to that documented in the HPI above, the additional ROS was obtained

:


Constitutional: Denies fevers, chills, or syncope


Head: Denies vision changes


ENMT: Denies sore throat


CV: Denies chest pain


Resp: Per HPI


GI: Denies vomiting or diarrhea


: Denies painful urination, increased urinary frequency, or hematuria


MSK: Denies recent trauma


Skin: Denies new rashes


Neuro: Denies new numbness or tingling or weakness


Endocrine: Denies polyuria


Heme: Denies bleeding or bruising











*Physical Exam





- Vital Signs


 Last Vital Signs











Temp Pulse Resp BP Pulse Ox


 


 98.1 F   78   20   148/64   92 L


 


 04/04/19 15:37  04/04/19 18:02  04/04/19 18:02  04/04/19 18:02  04/04/19 18:02














- Physical Exam


Comments: 





Constitutional: Nontoxic elderly male  in no acute distress or obvious 

discomfort. Found semi-fowlers in hospital bed. Alert and oriented x4. Answered 

all questions appropriately and completely. Speech was non-labored, non-

pressured.    





Head: Normocephalic. No obvious external signs of trauma. 





Eyes: Sclerae white. 





Ears: Hearing grossly intact.





Nose: No nasal discharge.





Throat: Oral cavity and pharynx normal. No inflammation, swelling, exudate, or 

lesions. 


 


Neck: Supple, trachea is midline. 





Cardiovascular / Chest: Regular rate and regular rhythm. No murmur, rubs, clicks

, or gallops. Peripheral pulses: radial pulses full.   


 


Respiratory: Breathing unlabored but mildly tachypneic. No retractions or 

abdominal breathing. Equal chest rise and fall. Decreased breath sounds in 

lower lobes bilaterally with trace wheeze in posterior lower left lobe. No 

wheezing or rhonchi. 


 


Gastrointestinal: abdomen is soft, non-tender, non-distended. 


 


Neuro: Alert and oriented. Moving all four extremities spontaneously. 


  


Skin: Warm, dry, and intact. 


 


Psych: Affect: appropriate.  Mood: normal.











ED Treatment Course





- LABORATORY


CBC & Chemistry Diagram: 


 04/04/19 17:20





 04/04/19 17:20





- ADDITIONAL ORDERS


Additional order review: 


 Laboratory  Results











  04/04/19 04/04/19 04/04/19





  18:16 17:20 17:18


 


Anticoagulation Therapy  No Result Required.  


 


Puncture Site  Left radial  


 


ABG pH  7.43  


 


ABG pCO2 at Pt Temp  44.7  


 


ABG pO2 at Pt Temp  69.2 L  


 


ABG HCO3  29.4 H  


 


ABG O2 Sat (Measured)  94.0 L  


 


ABG O2 Content  20.2  


 


ABG Base Excess  4.8 H  


 


Sb Test  Positive  


 


Carboxyhemoglobin    1.4


 


Methemoglobin    0.1


 


O2 Delivery Device  No Result Required.  


 


Oxygen Flow Rate  3l  


 


Vent Mode  No Result Required.  


 


Vent Rate  No Result Required.  


 


Mechanical Rate  No Result Required.  


 


Pressure Support Vent  No Result Required.  


 


Sodium   138 


 


Potassium   4.8 


 


Chloride   99 


 


Carbon Dioxide   33 H 


 


Anion Gap   6 L 


 


BUN   24 H 


 


Creatinine   1.0 


 


Creat Clearance w eGFR   70.68 


 


Random Glucose   104 


 


Calcium   9.0 


 


Total Bilirubin   0.6 


 


AST   17 


 


ALT   24 


 


Alkaline Phosphatase   118 H 


 


Creatine Kinase   70 


 


Troponin I   < 0.02 


 


B-Natriuretic Peptide   158.6 


 


Total Protein   7.8 


 


Albumin   3.6 








 











  04/04/19





  17:20


 


RBC  5.56


 


MCV  88.9


 


MCHC  33.5


 


RDW  15.1  D


 


MPV  7.6


 


Neutrophils %  57.3


 


Lymphocytes %  22.8


 


Monocytes %  12.1 H D


 


Eosinophils %  6.4 H


 


Basophils %  1.4














- RADIOLOGY


Radiology Studies Ordered: 














 Category Date Time Status


 


 CHEST PA & LAT [RAD] Stat Radiology  04/04/19 17:17 Ordered














Medical Decision Making





- Medical Decision Making


*Reviewed vital signs, nursing notes, and prior visit documentation (if 

available).





86 y/o male presenting from clinic with chronic cough and worsening SOB, as 

well as reported generalized weakness with poor PO intake. Noted to by 

hypotensive at clinic. Dr. Landin requests admission to Dr. Mccarty service. 

Arrived via EMS on 3 LPM with SPO2 in the low 90s. Hypotension not discovered 

on vitals trend in the department. Unsure of cause will monitor for additional 

episodes. No tachycardia. Pts room air saturation found to be in the mid 80s. 

Physical exam as described above. Suspect likely progression of COPD disease. 

Low suspicion for CHF. Will further evaluate with CXR, CBC, CMP, BNP, Troponin, 

EKG, and ABG.





EKG revealed a sinus rhythm rhythm with left bundle branch block. No ischemic 

changes. Unchanged from previous EKG dated 25 Oct 2017.Troponin not elevated. 

Low suspicion for ACS. BNP not elevated. Low suspicion for CHF. CBC 

unremarkable for anemia or leukocytosis. CMP unremarkable for significant 

electrolyte derangement. pH revealed chronic hypercapnia with metabolic 

compensation. Hypoxemic with low PaO2 on 3 LPM.





Microblog sent to [facility] Admitting, service covering for Dr. Baumann. Awaiting 

call back. ED Attending will f/u on pending CXR and admission. 








*DC/Admit/Observation/Transfer


Diagnosis at time of Disposition: 


 SOB (shortness of breath), Weakness, Requires oxygen therapy, Low oxygen 

saturation, Hypercarbia, Hypoxemia requiring supplemental oxygen








- Discharge Dispostion


Condition at time of disposition: Stable


Decision to Admit order: Yes





- Referrals


Referrals: 


Álvaro Landin MD [Primary Care Provider] - 





- Patient Instructions





- Post Discharge Activity

## 2019-04-04 NOTE — PDOC
Attending Attestation





- HPI


HPI: 


The patient is an 87 year old male, with a significant PMH of COPD, renal cancer

, prostate cancer, ischemic colitis, bowel obstruction, diverticulitis, and 

constipation, who presents to the emergency department today as per request of 

Dr. Landin for SOB and hypotension. Patient has been seeing Dr. Landin for 

unresolved SOB that has been ongoing for one month. He reports a recent change 

in medication for his COPD, but the SOB did not dissipate. Patient notes 

associated decreased PO intake secondary to his SOB. During evaluation today, 

Dr. Landin noticed his blood pressure was low, and advised that the patient 

come to the ED for further evaluation. Patient is asymptomatic at this time. 

Patients RA pulse ox is 84% in the ED.





The patient denies chest pain, headache and dizziness.


Denies fever, chills, nausea, vomit, diarrhea and constipation.


Denies dysuria, frequency, urgency and hematuria.





Allergies: NKA


Past surgical history: Hernia repair, appendectomy, lap cholecystectomy


Social history: No reported


PCP: Dr. Álvaro Taylor 





04/04/19 18:30








- Physicial Exam


PE: 


GENERAL: +Mildly cachectic-appearing in the face. Awake, alert, and fully 

oriented, in no acute distress


HEAD: No signs of trauma


EYES: PERRLA, EOMI, sclera anicteric, conjunctiva clear


ENT: Auricles normal inspection, hearing grossly normal, nares patent, 

oropharynx clear without exudates. Moist mucosa


NECK: Normal ROM, supple, no lymphadenopathy, JVD, or masses


LUNGS: +Diminished breath sounds bilaterally.  No wheezes, and no crackles.


HEART: Regular rate and rhythm, normal S1 and S2, no murmurs, rubs or gallops


ABDOMEN: Soft, nontender, normoactive bowel sounds.  No guarding, no rebound.  

No masses


EXTREMITIES: Normal range of motion, no edema.  No clubbing or cyanosis. No 

cords, erythema, or tenderness


NEUROLOGICAL: Cranial nerves II through XII grossly intact.  Normal speech, 

normal gait


SKIN: Warm, Dry, normal turgor, no rashes or lesions noted.





04/04/19 18:31








- Medical Decision Making


Documentation prepared by JAILYN Miller, acting as medical scribe for 

Reanna Curtis DO.





04/04/19 18:31








<Laura Hendrix - Last Filed: 04/04/19 18:30>





- Resident


Resident Name: Evaristo Benedict





- ED Attending Attestation


I have performed the following: I have examined & evaluated the patient, The 

case was reviewed & discussed with the resident, I agree w/resident's findings 

& plan, Exceptions are as noted





- Medical Decision Making





04/04/19 16:40








I, Dr. Reanna Curtis, DO, attest that this document has been prepared under 

my direction and personally reviewed by me in its entirety.   I further attest, 

that it accurately reflects all work, treatment, procedures and medical decision

-making performed by me.  


04/04/19 18:15


a/p: 88yo male sent from Dr. Landin for FTT, decreased po intake, sob/hypoxia

, diminished bs, weakness and hypotension in the office


-pt states he hasn't been eating well recently, denies abd pain, no n/v/d, 

states he just isn't hungry


-pt 84% on RA pulse ox


-diminished BS b/l


-concern for COPD exacerbation


-will give nebs, steroids


-will obtain labs, xray, ekg


-will most likely need admission for a COPD exacerbation


-will monitor and reassess


04/04/19 19:34


no elevated wbc


resident discussed the case with JACQUELYN French who accepts pt to service


pt hypoxic on RA, stable on 3L nc


04/04/19 19:35


chronic co2 retention on abg, with metabolic compensation





<Reanna Curtis - Last Filed: 04/04/19 19:35>





**Heart Score/ECG Review





- ECG Intrepretation


Comment:: 





04/04/19 18:19


sinus at 77, L axis, interventricular conduction delay, LVH, no acute st/t wave 

findings





<Reanna Curtis - Last Filed: 04/04/19 19:35>

## 2019-04-04 NOTE — HP
Admitting History and Physical





- Primary Care Physician


PCP: Álvaro Landin





- Admission


Chief Complaint: SOB, Generalized Weakness, Hypotension


History of Present Illness: 





This is a 86 y/o man with a significant medical history of COPD (no home O2), 

CAROLINA (non-compliant CPAP, 6 months ago), HTN, HLD, CHF, TIA, Prostate Ca. Who 

presents to the ED from PMDs office for hypotension, SOB, and generalized 

weakness. Patient reports having SOB x 2 weeks which he attributes to not 

taking a new COPD medication because it was defective. Patient denies fever, 

chills, dizziness, HA, CP, palpitations, AP, N/V/D, dysuria. 





History Source: Patient, Medical Record


Limitations to Obtaining History: No Limitations





- Past Medical History


CNS: Yes: Alzheimer's, TIA


Cardiovascular: Yes: AFIB, HTN, Hyperlipdemia, Murmur (tricuspid regurgitation)


Pulmonary: Yes: COPD, Pneumonia (fungal), Other (chronic cough)


Gastrointestinal: Yes: Diverticulitis, Other (cholecystitis, ischemic colitis, 3

/14 sigmoid adenoma removed, SBO 12/15)


Hepatobiliary: Yes: Cholelithiasis, Cholecystitis ( cholecystostomy tube,  lap choly), Choledocholithiasis


Renal/: Yes: Cancer (Prostate/KIDNEY), Neurogenic Bladder


Psych: Yes: Anxiety


Rheumatology: Yes: Other (arthritis knees)





- Past Surgical History


Past Surgical History: Yes: Appendectomy, Cataract Removal, Colonoscopy (last 1 

yrs ago, hayleyy 2 polyps. h/o ischemic colitis 3,2 and ?years ago), Hernia 

Repair (b/l), Joint Replacement (left knee makoplasty), Laminectomy (cervical 

fusion), Nephrectomy (left)





- Advance Directives


Advance Directives: Yes: Health Care Proxy





- Smoking History


Smoking history: Former smoker


Have you smoked in the past 12 months: No


Aproximately how many cigarettes per day: 0


If you are a former smoker, when did you quit?: 





- Alcohol/Substance Use


Hx Alcohol Use: No


History of Substance Use: reports: None





- Social History


Usual Living Arrangement: Yes: With Spouse


ADL: Independent


Occupation: retired electronics msalesman


History of Recent Travel: No





Home Medications





- Allergies


Allergies/Adverse Reactions: 


 Allergies











Allergy/AdvReac Type Severity Reaction Status Date / Time


 


No Known Drug Allergies Allergy   Verified 19 15:53














- Home Medications


Home Medications: 


Ambulatory Orders





Atorvastatin Ca [Lipitor] 20 mg PO HS 10/12/17 


Finasteride 5 mg PO DAILY 10/12/17 


Losartan Potassium 50 mg PO DAILY 10/12/17 


Tamsulosin HCl [Flomax] 0.4 mg PO DAILY 10/12/17 


Nystatin Oral Suspension - [Nystatin Oral Susp 834342 Units/5 ML -] 500,000 

units PO Q6H 10/24/17 


Acetaminophen [Tylenol .Regular Strength -] 650 mg PO Q6H PRN #0 tablet 10/27/

17 


Amlodipine Besylate [Norvasc -] 10 mg PO DAILY  tablet 10/27/17 


Carvedilol [Coreg -] 12.5 mg PO BID  tablet 10/27/17 


Meclizine HCl [Antivert -] 25 mg PO Q8H PRN #0 tablet 10/27/17 


Melatonin 5 mg PO HS  tab 10/27/17 


Omega-3 Acid Ethyl Esters [Lovaza -] 1 gm PO DAILY  cap 10/27/17 


Pantoprazole Sodium [Protonix -] 40 mg PO BID #30 tab 10/27/17 


Polyvinyl Alcohol [Artificial Tears] 1 drop OU TID PRN #0 bottle 10/27/17 


Simethicone [Mylicon -] 80 mg PO Q4H PRN #0 tab.chew 10/27/17 











Family Disease History





- Family Disease History


Family Disease History: Diabetes: Mother ( 70 diabetic complications), Other

: Father ( 70 of [place] ), Brother (3 siblings with cancer ? types)





Review of Systems





- Review of Systems


Constitutional: reports: Loss of Appetite, Weakness


Eyes: reports: No Symptoms


HENT: reports: No Symptoms


Neck: reports: No Symptoms


Cardiovascular: reports: Shortness of Breath


Respiratory: reports: Cough, Orthopnea, SOB


Gastrointestinal: reports: No Symptoms


Genitourinary: reports: No Symptoms


Breasts: reports: No Symptoms Reported


Musculoskeletal: reports: No Symptoms


Integumentary: reports: No Symptoms


Neurological: reports: Dizziness


Endocrine: reports: No Symptoms


Hematology/Lymphatic: reports: No Symptoms


Psychiatric: reports: No Symptoms





Physical Examination


Vital Signs: 


 Vital Signs











Temperature  98.1 F   19 15:37


 


Pulse Rate  78   19 18:02


 


Respiratory Rate  20   19 18:02


 


Blood Pressure  148/64   19 18:02


 


O2 Sat by Pulse Oximetry (%)  92 L  19 18:02











Constitutional: Yes: Mild Distress, Thin


Eyes: Yes: WNL, Conjunctiva Clear, EOM Intact, PERRL


HENT: Yes: WNL, Atraumatic, Normocephalic


Neck: Yes: WNL, Supple, Trachea Midline


Cardiovascular: Yes: Regular Rate and Rhythm, S1, S2


Respiratory: Yes: Diminished, On Nasal O2, Rhonchi, SOB, Tachypnea, Wheezes


Gastrointestinal: Yes: Normal Bowel Sounds, Soft.  No: Tenderness, Tenderness, 

Epigastrium, Vomiting


...Rectal Exam: Yes: Deferred


Renal/: Yes: WNL


Breast(s): Yes: WNL


Musculoskeletal: Yes: WNL


Extremities: Yes: WNL


Edema: Yes


Edema: LLE: 1+, RLE: 1+


Peripheral Pulses WNL: Yes


Neurological: Yes: Alert, Oriented, Cran Nerves II-XII Intact


...Motor Strength: LUE (4/5), LLE (4/5), RUE (4/5), RLE (4/5)


Psychiatric: Yes: Alert, Oriented


Labs: 


 CBC, BMP





 19 17:20 





 19 17:20 





 Laboratory Results - last 24 hr











  19





  17:18 17:20 17:20


 


WBC   9.2 


 


RBC   5.56 


 


Hgb   16.5 


 


Hct   49.5 H D 


 


MCV   88.9 


 


MCH   29.8 


 


MCHC   33.5 


 


RDW   15.1  D 


 


Plt Count   355 


 


MPV   7.6 


 


Absolute Neuts (auto)   5.3 


 


Neutrophils %   57.3 


 


Lymphocytes %   22.8 


 


Monocytes %   12.1 H D 


 


Eosinophils %   6.4 H 


 


Basophils %   1.4 


 


Nucleated RBC %   0 


 


Anticoagulation Therapy   


 


Puncture Site   


 


ABG pH   


 


ABG pCO2 at Pt Temp   


 


ABG pO2 at Pt Temp   


 


ABG HCO3   


 


ABG O2 Sat (Measured)   


 


ABG O2 Content   


 


ABG Base Excess   


 


Sb Test   


 


Carboxyhemoglobin  1.4  


 


Methemoglobin  0.1  


 


O2 Delivery Device   


 


Oxygen Flow Rate   


 


Vent Mode   


 


Vent Rate   


 


Mechanical Rate   


 


Pressure Support Vent   


 


Sodium    138


 


Potassium    4.8


 


Chloride    99


 


Carbon Dioxide    33 H


 


Anion Gap    6 L


 


BUN    24 H


 


Creatinine    1.0


 


Creat Clearance w eGFR    70.68


 


Random Glucose    104


 


Calcium    9.0


 


Total Bilirubin    0.6


 


AST    17


 


ALT    24


 


Alkaline Phosphatase    118 H


 


Creatine Kinase    70


 


Troponin I    < 0.02


 


B-Natriuretic Peptide    158.6


 


Total Protein    7.8


 


Albumin    3.6














  19





  18:16


 


WBC 


 


RBC 


 


Hgb 


 


Hct 


 


MCV 


 


MCH 


 


MCHC 


 


RDW 


 


Plt Count 


 


MPV 


 


Absolute Neuts (auto) 


 


Neutrophils % 


 


Lymphocytes % 


 


Monocytes % 


 


Eosinophils % 


 


Basophils % 


 


Nucleated RBC % 


 


Anticoagulation Therapy  No Result Required.


 


Puncture Site  Left radial


 


ABG pH  7.43


 


ABG pCO2 at Pt Temp  44.7


 


ABG pO2 at Pt Temp  69.2 L


 


ABG HCO3  29.4 H


 


ABG O2 Sat (Measured)  94.0 L


 


ABG O2 Content  20.2


 


ABG Base Excess  4.8 H


 


Sb Test  Positive


 


Carboxyhemoglobin 


 


Methemoglobin 


 


O2 Delivery Device  No Result Required.


 


Oxygen Flow Rate  3l


 


Vent Mode  No Result Required.


 


Vent Rate  No Result Required.


 


Mechanical Rate  No Result Required.


 


Pressure Support Vent  No Result Required.


 


Sodium 


 


Potassium 


 


Chloride 


 


Carbon Dioxide 


 


Anion Gap 


 


BUN 


 


Creatinine 


 


Creat Clearance w eGFR 


 


Random Glucose 


 


Calcium 


 


Total Bilirubin 


 


AST 


 


ALT 


 


Alkaline Phosphatase 


 


Creatine Kinase 


 


Troponin I 


 


B-Natriuretic Peptide 


 


Total Protein 


 


Albumin 








 Current Medications











Generic Name Dose Route Start Last Admin





  Trade Name Freq  PRN Reason Stop Dose Admin


 


Albuterol/Ipratropium  1 amp  19 20:27  19 20:30





  Duoneb -  NEB   1 amp





  Q6H PRN   Administration





  SHORTNESS OF BREATH   





     





     





     


 


Amlodipine Besylate  5 mg  19 10:00  





  Norvasc -  PO   





  DAILY LAUREN   





     





     





     





     


 


Artificial Tears  1 drop  19 21:04  





  Artificial Tears  OU   





  Q8H PRN   





  DRY EYES   





     





     





     


 


Atorvastatin Calcium  20 mg  19 22:00  19 23:22





  Lipitor -  PO   20 mg





  HS LAUREN   Administration





     





     





     





     


 


Budesonide/Formoterol Fumarate  2 puff  19 23:45  





  Symbicort 160/4.5mcg -  IH   





  BID LAUREN   





     





     





     





     


 


Fluticasone Propionate  2 spray  19 10:00  





  Flonase -  NS   





  DAILY LAUREN   





     





     





     





     


 


Furosemide  40 mg  19 10:00  





  Lasix -  PO   





  DAILY LAUREN   





     





     





     





     


 


Losartan Potassium  50 mg  19 10:00  





  Cozaar -  PO   





  DAILY LAUREN   





     





     





     





     


 


Melatonin  15 mg  19 22:00  19 23:22





  Melatonin  PO   15 mg





  HS PRN   Administration





  INSOMNIA   





     





     





     


 


Ranitidine HCl  150 mg  19 10:00  





  Zantac -  PO   





  DAILY Counts include 234 beds at the [facility]   





     





     





     





     


 


Tamsulosin HCl  0.4 mg  19 08:30  





  Flomax -  PO   





  DAILY@0830 Counts include 234 beds at the [facility]   





     





     





     





     








 Intake & Output











 19





 23:59 23:59 23:59 23:59


 


Intake Total   0 


 


Balance   0 


 


Weight   62.46 kg 














Imaging





- Results


Chest X-ray: Report Reviewed, Image Reviewed


EKG: Image Reviewed





Problem List





- Problems


(1) Acute respiratory failure with hypoxia


Assessment/Plan: 


Likely secondary to COPD Exacerbation vs Pneumonia


CURB65 2


ABG- Hypoxemia, Metabolic Alkalosis


Chest Xray- reviewed


Continue O2


Appreciate Pulmonology Consult


Duonebs


Continue home meds


Monitor CBC, BMP


Monitor vitals


Code(s): J96.01 - ACUTE RESPIRATORY FAILURE WITH HYPOXIA   





(2) Acute exacerbation of chronic obstructive pulmonary disease (COPD)


Assessment/Plan: 


Likely secondary to Drug Holiday vs Infectious


Duonebs


O2


Continue LABA


Appreciate Pulmonology consult


Monitor Spo2








Code(s): J44.1 - CHRONIC OBSTRUCTIVE PULMONARY DISEASE W (ACUTE) EXACERBATION   





(3) SOB (shortness of breath)


Assessment/Plan: 


See above





Code(s): R06.02 - SHORTNESS OF BREATH   





(4) Weakness


Assessment/Plan: 


Likely due to dehydration


Monitor BMP


PO fluids 1L


Fall Precautions





Code(s): R53.1 - WEAKNESS   





(5) Diastolic CHF


Assessment/Plan: 


Chest Xray reviewed


Continue home meds


Daily weights


Strict INOs





Code(s): I50.30 - UNSPECIFIED DIASTOLIC (CONGESTIVE) HEART FAILURE   


Qualifiers: 


 





(6) HLD (hyperlipidemia)


Assessment/Plan: 


stable


Continue Atorvastatin


Monitor LFTs





Code(s): E78.5 - HYPERLIPIDEMIA, UNSPECIFIED   


Qualifiers: 


 





(7) HTN (hypertension)


Assessment/Plan: 


stable


Monitor BP


Continue home meds with parameters


Monitor renal function


Code(s): I10 - ESSENTIAL (PRIMARY) HYPERTENSION   


Qualifiers: 


 





(8) Sleep apnea


Assessment/Plan: 


non-complaint


CPAP, HS


Code(s): G47.30 - SLEEP APNEA, UNSPECIFIED   





Assessment/Plan





This is a 86 y/o man with a PMHx of: COPD, [place], HTN, HLD, CHF, TIA, Prostate 

Ca. Placed in Observation for Acute on Chronic Respiratory Failure with 

Hypoxemia, Acute COPD Exacerbation for further evaluation of their emergent 

condition.





Plan:





See Problem List





FEN


Fluid Restriction 1L


Replete lytes prn


Low Na Diet





DVT ppx


OOB


SCDs


Continue Asa





Code Status: Full Code, HCP





Dispo: Observation














Visit type





- Emergency Visit


Emergency Visit: Yes


ED Registration Date: 19


Care time: The patient presented to the Emergency Department on the above date 

and was hospitalized for further evaluation of their emergent condition.





- New Patient


This patient is new to me today: Yes


Date on this admission: 19





- Critical Care


Critical Care patient: No

## 2019-04-05 LAB
ANION GAP SERPL CALC-SCNC: 8 MMOL/L (ref 8–16)
BASOPHILS # BLD: 1.4 % (ref 0–2)
BUN SERPL-MCNC: 19 MG/DL (ref 7–18)
CALCIUM SERPL-MCNC: 9.6 MG/DL (ref 8.5–10.1)
CHLORIDE SERPL-SCNC: 99 MMOL/L (ref 98–107)
CO2 SERPL-SCNC: 33 MMOL/L (ref 21–32)
CREAT SERPL-MCNC: 0.8 MG/DL (ref 0.55–1.3)
DEPRECATED RDW RBC AUTO: 14.9 % (ref 11.9–15.9)
EOSINOPHIL # BLD: 9.1 % (ref 0–4.5)
GLUCOSE SERPL-MCNC: 130 MG/DL (ref 74–106)
HCT VFR BLD CALC: 49.4 % (ref 35.4–49)
HGB BLD-MCNC: 16.7 GM/DL (ref 11.7–16.9)
LYMPHOCYTES # BLD: 28.8 % (ref 8–40)
MCH RBC QN AUTO: 29.7 PG (ref 25.7–33.7)
MCHC RBC AUTO-ENTMCNC: 33.9 G/DL (ref 32–35.9)
MCV RBC: 87.8 FL (ref 80–96)
MONOCYTES # BLD AUTO: 10.7 % (ref 3.8–10.2)
NEUTROPHILS # BLD: 50 % (ref 42.8–82.8)
PLATELET # BLD AUTO: 368 K/MM3 (ref 134–434)
PMV BLD: 7.6 FL (ref 7.5–11.1)
POTASSIUM SERPLBLD-SCNC: 4.2 MMOL/L (ref 3.5–5.1)
RBC # BLD AUTO: 5.63 M/MM3 (ref 4–5.6)
SODIUM SERPL-SCNC: 139 MMOL/L (ref 136–145)
WBC # BLD AUTO: 7.1 K/MM3 (ref 4–10)

## 2019-04-05 PROCEDURE — 3E0F7GC INTRODUCTION OF OTHER THERAPEUTIC SUBSTANCE INTO RESPIRATORY TRACT, VIA NATURAL OR ARTIFICIAL OPENING: ICD-10-PCS

## 2019-04-05 PROCEDURE — 5A09457 ASSISTANCE WITH RESPIRATORY VENTILATION, 24-96 CONSECUTIVE HOURS, CONTINUOUS POSITIVE AIRWAY PRESSURE: ICD-10-PCS

## 2019-04-05 RX ADMIN — CEFUROXIME AXETIL SCH MG: 250 TABLET, FILM COATED ORAL at 21:26

## 2019-04-05 RX ADMIN — METHYLPREDNISOLONE SODIUM SUCCINATE SCH: 40 INJECTION, POWDER, FOR SOLUTION INTRAMUSCULAR; INTRAVENOUS at 18:00

## 2019-04-05 RX ADMIN — FUROSEMIDE SCH MG: 40 TABLET ORAL at 11:12

## 2019-04-05 RX ADMIN — LOSARTAN POTASSIUM SCH MG: 50 TABLET, FILM COATED ORAL at 11:16

## 2019-04-05 RX ADMIN — BUDESONIDE AND FORMOTEROL FUMARATE DIHYDRATE SCH PUFF: 160; 4.5 AEROSOL RESPIRATORY (INHALATION) at 21:26

## 2019-04-05 RX ADMIN — METHYLPREDNISOLONE SODIUM SUCCINATE SCH MG: 40 INJECTION, POWDER, FOR SOLUTION INTRAMUSCULAR; INTRAVENOUS at 18:01

## 2019-04-05 RX ADMIN — BUDESONIDE AND FORMOTEROL FUMARATE DIHYDRATE SCH: 160; 4.5 AEROSOL RESPIRATORY (INHALATION) at 05:30

## 2019-04-05 RX ADMIN — FLUTICASONE PROPIONATE SCH: 50 SPRAY, METERED NASAL at 18:01

## 2019-04-05 RX ADMIN — BUDESONIDE AND FORMOTEROL FUMARATE DIHYDRATE SCH PUFF: 160; 4.5 AEROSOL RESPIRATORY (INHALATION) at 11:12

## 2019-04-05 RX ADMIN — TAMSULOSIN HYDROCHLORIDE SCH MG: 0.4 CAPSULE ORAL at 11:12

## 2019-04-05 RX ADMIN — ATORVASTATIN CALCIUM SCH MG: 20 TABLET, FILM COATED ORAL at 21:26

## 2019-04-05 RX ADMIN — AMLODIPINE BESYLATE SCH MG: 5 TABLET ORAL at 11:17

## 2019-04-05 RX ADMIN — RANITIDINE SCH MG: 150 TABLET ORAL at 11:12

## 2019-04-05 NOTE — CONSULT
Consult - text type





- Consultation


Consultation Note: 








NEUROLOGY CONSULTATION is greatly appreciated:





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 

COPD, prostatic Ca and Renal cell CA s/p nephrectomy.


Maintained on: Atorvastatin; Finasteride; Losartan; Tamsulosin; Nystatin Oral; 

amlodipine; Carvedilol; Meclizine; Melatonin; and Pantoprazole.


Pt describes 6 mos of progressive weakness with increasing SOB refractory to Rx 

for COPD. Has walked with a walker x 2 years. 14 steps to his apartment.


Left hand weak, dropping things. Left leg "must be lifted off the bed" with his 

arms to get up. Can't get off toilet x 3 mos.


30 lb weight loss x 3 weeks with no swallow x 3 days.





CK=70 IU





LUIS: No bruits. Cor reg. No head trauma


NEURO: MS: Normal. Speech sl dysarthric


           CN: No tongue atrophy. Reduced tongue ROMMEL's. Gag present


           Motor: No drift. Formal muscle testing show normal strength in the 

arms except Left finger extensors, APB, intrinsics- all 4-/5. Wrist ext. normal


                     Left ankle Dorsiflexion, inversion, eversion 4/5. 

Remainder groups all 5/5. Normal reflexes. Toes downgoing.


          Coord: No FTN dystaxia


          Sensory: Normal


          Gait: Gets OO Bed without help or obvious asymmetry. Sl flexed, 

shuffling, gait.





IMP: Focal weakness distally in the left hand and foot. Not in an obvious 

myotomal or peripheral nerve distribution.


       Possibly associated with respiratory weakness, decreased gag and extreme

, sudden weight loss.


       R/O vasculitis. R/o Motor neurone disease.





SUGGEST: MRI of brain (C-)


              Speech and swallowing evaluation.


              Check B12, TSH, ESR, CRP


              PM&R consultation, eval and EMG/NCS of the left arm and left leg.


              Out of Bed to chair and observed for all feeding.





Thank you very much,


Jens Shipman MD

## 2019-04-05 NOTE — EKG
Test Reason : 

Blood Pressure : ***/*** mmHG

Vent. Rate : 077 BPM     Atrial Rate : 077 BPM

   P-R Int : 176 ms          QRS Dur : 126 ms

    QT Int : 380 ms       P-R-T Axes : 053 -58 073 degrees

   QTc Int : 430 ms

 

SINUS RHYTHM WITH PREMATURE ATRIAL COMPLEXES

LEFT AXIS DEVIATION

LEFT VENTRICULAR HYPERTROPHY WITH QRS WIDENING AND REPOLARIZATION

ABNORMALITY

ABNORMAL ECG

WHEN COMPARED WITH ECG OF 25-OCT-2017 14:39,

PREMATURE ATRIAL COMPLEXES ARE NOW PRESENT

QT HAS SHORTENED

Confirmed by APOLINAR CARBALLO, YAMINI ([id number]) on 4/5/2019 12:22:23 PM

 

Referred By:             Confirmed By:YAMINI JIANG MD

## 2019-04-05 NOTE — CONS
DATE OF CONSULTATION:  04/05/2019

 

REFERRING PHYSICIAN:  Jessica Reynoso M.D. 

 

HISTORY OF PRESENT ILLNESS:  The patient is an 87-year-old white male with past

medical history of advanced COPD, currently not on home O2, with obstructive sleep

apnea, noncompliant with CPAP at home, diagnosed about 6 months ago, hypertension,

hyperlipidemia, diastolic heart failure, TIA,  prostate CA, admitted to [facility] with increasing shortness of breath, generalized weakness and

hypotension.  Patient apparently has been having shortness of breath for the past 2

weeks.  She attributes to taking a new inhaled bronchodilator which is not

functioning correctly.  He started developing cough, nonproductive, as well as chest

tightness and wheezing.  He subsequently presented to the emergency room.  In the ER,

he was treated with inhaled bronchodilators, supplemental O2, and transferred off the

floor for further management.  Patient has a history of smoking, quit greater than 30

years ago.  There is no history of occupational exposure to chemicals or fumes.  

 

PAST MEDICAL HISTORY:  Again includes COPD, obstructive sleep apnea noncompliant with

CPAP, hypertension, hyperlipidemia, CHF, diastolic CHF, TIA, prostate CA.  

 

REVIEW OF SYSTEMS:  Positive for shortness of breath.  Positive for cough.  No chest

pain.  No palpitations.  Positive wheezing.  No fever.  No chills.  No hemoptysis. 

No abdominal pain.  

 

CURRENT MEDICATIONS:  Include Symbicort _____ Flomax, Cozaar, DuoNeb, flonase,

Lipitor, melatonin.

 

PHYSICAL EXAMINATION:  

GENERAL:  The patient is an elderly white male, well developed, awake, alert, mildly

dyspneic.  He is currently sitting out of bed to chair.  Temperature is 97.9, blood

pressure 126/65, respiratory rate 20, O2 saturation is 93% on 3 L nasal cannula.  

HEENT:  Normocephalic, atraumatic.  

NECK:  Supple.  

HEART:  Regular S1, S2.

CHEST:  Scattered bilateral wheezes.    

ABDOMEN:  Soft, bowel sounds positive.  

EXTREMITIES:  No cyanosis, edema.  

 

LABORATORY:  Blood gas 7.43, pCO2 of 44, pO2 of 59, bicarbonate 29, saturation is 94

that was on 3 L.  WBC is 7.1, hemoglobin 15.7, hematocrit 49.4, with platelet count

of 368,000.  There are 50 polys, 28 lymphocytes, 10 monocytes, _____ eosinophils. 

BUN 19, creatinine 0.8.  BNP is 158.  Chest x-ray atelectasis density to the left

base, blunting left costophrenic angle, a nodular density in right upper lobe.  

 

IMPRESSION:  

1.  Acute hypoxemic respiratory failure secondary to likely chronic obstructive

pulmonary disease with exacerbation.  

2.  History of obstructive sleep apnea on CPAP.  

3.  Hypertension.

4.  Hyperlipidemia.  

 

PLAN:  IV steroids.  Inhaled bronchodilators.  Supplemental O2.  Antibiotics.  Chest

CT on BiPAP at night as well as p.r.n. take if in respiratory distress.  

 

 

TREVOR INFANTE M.D.

 

[id number]

DD: 04/05/2019 15:53

DT: 04/05/2019 16:15

Job #:  [id number]

## 2019-04-05 NOTE — PN
Progress Note (short form)





- Note


Progress Note: 





PULMONARY





CONSULTATION DICTATED 4/5/19








IMP ACUTE HYPOXEMIC RESPIRATORY FAILURE


     COPD EXACERBATION


     HTN


     CAROLINA 


     HLD








PLAN IV STEROIDS


        INHALED BRONCHODILATORS


        O2   


        ABX


        CHEST CT


        NIPPV AT NIGHT AND PRN


        CHECK AMBULATORY O2 SAT ON RA PRIOR TO DISCHARGE TO DETERMINE IF PT IS 

CANDIDATE FOR HOME O2








DR INFANTE


     





Problem List





- Problems


(1) Acute exacerbation of chronic obstructive pulmonary disease (COPD)


Code(s): J44.1 - CHRONIC OBSTRUCTIVE PULMONARY DISEASE W (ACUTE) EXACERBATION   





(2) Low oxygen saturation


Code(s): R79.81 - ABNORMAL BLOOD-GAS LEVEL   





(3) SOB (shortness of breath)


Code(s): R06.02 - SHORTNESS OF BREATH   





(4) Acute respiratory failure with hypoxia


Code(s): J96.01 - ACUTE RESPIRATORY FAILURE WITH HYPOXIA   





(5) HLD (hyperlipidemia)


Code(s): E78.5 - HYPERLIPIDEMIA, UNSPECIFIED   


Qualifiers: 


 





(6) Sleep apnea


Code(s): G47.30 - SLEEP APNEA, UNSPECIFIED

## 2019-04-05 NOTE — PN
Progress Note, Physician


Chief Complaint: 





patient seen in bed on nasal canula 


was sent in for low BP from PMD office


refused NIPPV last night





- Current Medication List


Current Medications: 


Active Medications





Albuterol/Ipratropium (Duoneb -)  1 amp NEB Q6H PRN


   PRN Reason: SHORTNESS OF BREATH


   Last Admin: 04/04/19 20:30 Dose:  1 amp


Amlodipine Besylate (Norvasc -)  5 mg PO DAILY [facility]


   Last Admin: 04/05/19 11:17 Dose:  5 mg


Artificial Tears (Artificial Tears)  1 drop OU Q8H PRN


   PRN Reason: DRY EYES


Atorvastatin Calcium (Lipitor -)  20 mg PO HS [facility]


   Last Admin: 04/04/19 23:22 Dose:  20 mg


Budesonide/Formoterol Fumarate (Symbicort 160/4.5mcg -)  2 puff IH BID [facility]


   Last Admin: 04/05/19 11:12 Dose:  2 puff


Fluticasone Propionate (Flonase -)  2 spray NS DAILY [facility]


Furosemide (Lasix -)  40 mg PO DAILY [facility]


   Last Admin: 04/05/19 11:12 Dose:  40 mg


Losartan Potassium (Cozaar -)  50 mg PO DAILY [facility]


   Last Admin: 04/05/19 11:16 Dose:  50 mg


Melatonin (Melatonin)  15 mg PO HS PRN


   PRN Reason: INSOMNIA


   Last Admin: 04/04/19 23:22 Dose:  15 mg


Ranitidine HCl (Zantac -)  150 mg PO DAILY [facility]


   Last Admin: 04/05/19 11:12 Dose:  150 mg


Tamsulosin HCl (Flomax -)  0.4 mg PO DAILY@0830 [facility]


   Last Admin: 04/05/19 11:12 Dose:  0.4 mg











- Objective


Vital Signs: 


 Vital Signs











Temperature  98.2 F   04/05/19 05:52


 


Pulse Rate  67   04/05/19 05:52


 


Respiratory Rate  20   04/05/19 05:52


 


Blood Pressure  143/80   04/05/19 05:52


 


O2 Sat by Pulse Oximetry (%)  93 L  04/05/19 08:54











Constitutional: Yes: Calm, Thin


Cardiovascular: Yes: Regular Rate and Rhythm, S1, S2


Respiratory: Yes: Diminished


Gastrointestinal: Yes: Normal Bowel Sounds, Soft


Edema: No


Labs: 


 CBC, BMP





 04/05/19 06:30 





 04/05/19 06:30 











Problem List





- Problems


(1) Hypoxemia requiring supplemental oxygen


Assessment/Plan: 


patient refused NIPPV 


pulm consult


ct chest


bronchodilators


Code(s): R09.02 - HYPOXEMIA; Z99.81 - DEPENDENCE ON SUPPLEMENTAL OXYGEN   





(2) Weakness


Assessment/Plan: 


PT


sterling evaluatio


Code(s): R53.1 - WEAKNESS   





(3) HTN (hypertension)


Assessment/Plan: 


norvasc


cozaar


Code(s): I10 - ESSENTIAL (PRIMARY) HYPERTENSION   


Qualifiers: 


 





(4) Insomnia


Assessment/Plan: 


melatonin


Code(s): G47.00 - INSOMNIA, UNSPECIFIED

## 2019-04-06 RX ADMIN — ATORVASTATIN CALCIUM SCH MG: 20 TABLET, FILM COATED ORAL at 22:17

## 2019-04-06 RX ADMIN — TAMSULOSIN HYDROCHLORIDE SCH MG: 0.4 CAPSULE ORAL at 10:36

## 2019-04-06 RX ADMIN — METHYLPREDNISOLONE SODIUM SUCCINATE SCH MG: 40 INJECTION, POWDER, FOR SOLUTION INTRAMUSCULAR; INTRAVENOUS at 17:41

## 2019-04-06 RX ADMIN — CEFUROXIME AXETIL SCH MG: 250 TABLET, FILM COATED ORAL at 22:17

## 2019-04-06 RX ADMIN — IPRATROPIUM BROMIDE AND ALBUTEROL SULFATE PRN AMP: .5; 3 SOLUTION RESPIRATORY (INHALATION) at 18:08

## 2019-04-06 RX ADMIN — METHYLPREDNISOLONE SODIUM SUCCINATE SCH MG: 40 INJECTION, POWDER, FOR SOLUTION INTRAMUSCULAR; INTRAVENOUS at 10:37

## 2019-04-06 RX ADMIN — BUDESONIDE AND FORMOTEROL FUMARATE DIHYDRATE SCH PUFF: 160; 4.5 AEROSOL RESPIRATORY (INHALATION) at 10:36

## 2019-04-06 RX ADMIN — FLUTICASONE PROPIONATE SCH SPRAYS: 50 SPRAY, METERED NASAL at 10:36

## 2019-04-06 RX ADMIN — Medication PRN MG: at 22:17

## 2019-04-06 RX ADMIN — FUROSEMIDE SCH MG: 40 TABLET ORAL at 10:36

## 2019-04-06 RX ADMIN — LOSARTAN POTASSIUM SCH MG: 50 TABLET, FILM COATED ORAL at 10:36

## 2019-04-06 RX ADMIN — CEFUROXIME AXETIL SCH MG: 250 TABLET, FILM COATED ORAL at 10:37

## 2019-04-06 RX ADMIN — BUDESONIDE AND FORMOTEROL FUMARATE DIHYDRATE SCH PUFF: 160; 4.5 AEROSOL RESPIRATORY (INHALATION) at 22:17

## 2019-04-06 RX ADMIN — RANITIDINE SCH MG: 150 TABLET ORAL at 10:37

## 2019-04-06 RX ADMIN — METHYLPREDNISOLONE SODIUM SUCCINATE SCH MG: 40 INJECTION, POWDER, FOR SOLUTION INTRAMUSCULAR; INTRAVENOUS at 01:34

## 2019-04-06 RX ADMIN — AMLODIPINE BESYLATE SCH MG: 5 TABLET ORAL at 10:37

## 2019-04-06 NOTE — PN
Progress Note, Physician


History of Present Illness: 





pulmonary





alert,comfortable,less dyspneic,less cough





- Current Medication List


Current Medications: 


Active Medications





Albuterol/Ipratropium (Duoneb -)  1 amp NEB Q6H PRN


   PRN Reason: SHORTNESS OF BREATH


   Last Admin: 04/04/19 20:30 Dose:  1 amp


Amlodipine Besylate (Norvasc -)  5 mg PO DAILY On license of [facility]


   Last Admin: 04/06/19 10:37 Dose:  5 mg


Artificial Tears (Artificial Tears)  1 drop OU Q8H PRN


   PRN Reason: DRY EYES


Atorvastatin Calcium (Lipitor -)  20 mg PO HS On license of [facility]


   Last Admin: 04/05/19 21:26 Dose:  20 mg


Budesonide/Formoterol Fumarate (Symbicort 160/4.5mcg -)  2 puff IH BID On license of [facility]


   Last Admin: 04/06/19 10:36 Dose:  2 puff


Cefuroxime Axetil (Ceftin -)  500 mg PO BID On license of [facility]


   Last Admin: 04/06/19 10:37 Dose:  500 mg


Fluticasone Propionate (Flonase -)  2 spray NS DAILY On license of [facility]


   Last Admin: 04/06/19 10:36 Dose:  2 sprays


Furosemide (Lasix -)  40 mg PO DAILY On license of [facility]


   Last Admin: 04/06/19 10:36 Dose:  40 mg


Losartan Potassium (Cozaar -)  50 mg PO DAILY On license of [facility]


   Last Admin: 04/06/19 10:36 Dose:  50 mg


Melatonin (Melatonin)  15 mg PO HS PRN


   PRN Reason: INSOMNIA


   Last Admin: 04/04/19 23:22 Dose:  15 mg


Methylprednisolone Sodium Succinate (Solu-Medrol -)  40 mg IVPUSH Q8H-IV On license of [facility]


   Last Admin: 04/06/19 10:37 Dose:  40 mg


Ranitidine HCl (Zantac -)  150 mg PO DAILY On license of [facility]


   Last Admin: 04/06/19 10:37 Dose:  150 mg


Tamsulosin HCl (Flomax -)  0.4 mg PO DAILY@0830 On license of [facility]


   Last Admin: 04/06/19 10:36 Dose:  0.4 mg











- Objective


Vital Signs: 


 Vital Signs











Temperature  98.0 F   04/06/19 09:40


 


Pulse Rate  100 H  04/06/19 09:40


 


Respiratory Rate  19   04/06/19 09:40


 


Blood Pressure  146/99   04/06/19 09:40


 


O2 Sat by Pulse Oximetry (%)  90 L  04/06/19 09:51











Constitutional: Yes: Well Nourished, Calm


Eyes: Yes: WNL


HENT: Yes: WNL


Neck: Yes: WNL


Cardiovascular: Yes: Regular Rate and Rhythm, S1, S2


Respiratory: Yes: Rhonchi (few rhonchi)


Gastrointestinal: Yes: Normal Bowel Sounds, Soft


Extremities: Yes: WNL


Edema: No


Labs: 


 





Problem List





- Problems


(1) Acute exacerbation of chronic obstructive pulmonary disease (COPD)


Code(s): J44.1 - CHRONIC OBSTRUCTIVE PULMONARY DISEASE W (ACUTE) EXACERBATION   





(2) Low oxygen saturation


Code(s): R79.81 - ABNORMAL BLOOD-GAS LEVEL   





(3) SOB (shortness of breath)


Code(s): R06.02 - SHORTNESS OF BREATH   





(4) Acute respiratory failure with hypoxia


Code(s): J96.01 - ACUTE RESPIRATORY FAILURE WITH HYPOXIA   





(5) HLD (hyperlipidemia)


Code(s): E78.5 - HYPERLIPIDEMIA, UNSPECIFIED   


Qualifiers: 


 





(6) Sleep apnea


Code(s): G47.30 - SLEEP APNEA, UNSPECIFIED   





Assessment/Plan





IMP ACUTE HYPOXEMIC RESPIRATORY FAILURE IMPROVING


     COPD EXACERBATION IMPROVING


     HTN


     [place] 


     HLD








PLAN IV STEROIDS


        INHALED BRONCHODILATORS


        O2   


        ABX


        NIPPV AT NIGHT AND PRN


        CHECK AMBULATORY O2 SAT ON RA PRIOR TO DISCHARGE TO DETERMINE IF PT IS 

CANDIDATE FOR HOME O2








DR INFANTE


     





 Problem List 





- Problems


(1) Acute exacerbation of chronic obstructive pulmonary disease (COPD)


Code(s): J44.1 - CHRONIC OBSTRUCTIVE PULMONARY DISEASE W (ACUTE) EXACERBATION   





(2) Low oxygen saturation


Code(s): R79.81 - ABNORMAL BLOOD-GAS LEVEL   





(3) SOB (shortness of breath)


Code(s): R06.02 - SHORTNESS OF BREATH   





(4) Acute respiratory failure with hypoxia


Code(s): J96.01 - ACUTE RESPIRATORY FAILURE WITH HYPOXIA   





(5) HLD (hyperlipidemia)


Code(s): E78.5 - HYPERLIPIDEMIA, UNSPECIFIED   


Qualifiers: 


 





(6) Sleep apnea


Code(s): G47.30 - SLEEP APNEA, UNSPECIFIED

## 2019-04-06 NOTE — CONSULT
Consult


Consult Specialty:: Podiatry


Reason for Consultation:: Multiple elongated fungus toe nails and dry skin.





- History of Present Illness


Chief Complaint: Painful elongated thickened toe nails in shoe gear and 

ambulation.


History of Present Illness: 


chronic fungus toe nails and dry skin








- History Source


History Provided By: Patient





- Past Medical History


CNS: Yes: Alzheimer's, TIA


Cardio/Vascular: Yes: AFIB, HTN, Hyperlipdemia, Murmur (tricuspid regurgitation)


Pulmonary: Yes: COPD, Pneumonia (fungal), Other (chronic cough)


Gastrointestinal: Yes: Diverticulitis, Other (cholecystitis, ischemic colitis, 3

/14 sigmoid adenoma removed, SBO 12/15)


Hepatobiliary: Yes: Cholelithiasis, Cholecystitis ( cholecystostomy tube,  lap choly), Choledocholithiasis


Renal/: Yes: Cancer (Prostate/KIDNEY), Neurogenic Bladder


Psych: Yes: Anxiety


Rheumatology: Yes: Other (arthritis knees)





- Past Surgical History


Past Surgical History: Yes: Appendectomy, Cataract Removal, Colonoscopy (last 1 

yrs ago, lynn 2 polyps. h/o ischemic colitis 3,2 and ?years ago), Hernia 

Repair (b/l), Joint Replacement (left knee makoplasty), Laminectomy (cervical 

fusion), Nephrectomy (left)





- Alcohol/Substance Use


Hx Alcohol Use: No


History of Substance Use: reports: None





- Smoking History


Smoking history: Former smoker


Have you smoked in the past 12 months: No


Aproximately how many cigarettes per day: 0


If you are a former smoker, when did you quit?: 





- Social History


Usual Living Arrangement: With Spouse


ADL: Independent


Occupation: retired [place] [employer]


History of Recent Travel: No





Home Medications





- Allergies


Allergies/Adverse Reactions: 


 Allergies











Allergy/AdvReac Type Severity Reaction Status Date / Time


 


No Known Drug Allergies Allergy   Verified 19 15:53














- Home Medications


Home Medications: 


Ambulatory Orders





Atorvastatin Ca [Lipitor] 20 mg PO HS 10/12/17 


Finasteride 5 mg PO DAILY 10/12/17 


Losartan Potassium 50 mg PO DAILY 10/12/17 


Tamsulosin HCl [Flomax] 0.4 mg PO DAILY 10/12/17 


Nystatin Oral Suspension - [Nystatin Oral Susp 736625 Units/5 ML -] 500,000 

units PO Q6H 10/24/17 


Acetaminophen [Tylenol .Regular Strength -] 650 mg PO Q6H PRN #0 tablet 10/27/

17 


Amlodipine Besylate [Norvasc -] 10 mg PO DAILY  tablet 10/27/17 


Carvedilol [Coreg -] 12.5 mg PO BID  tablet 10/27/17 


Meclizine HCl [Antivert -] 25 mg PO Q8H PRN #0 tablet 10/27/17 


Melatonin 5 mg PO HS  tab 10/27/17 


Omega-3 Acid Ethyl Esters [Lovaza -] 1 gm PO DAILY  cap 10/27/17 


Pantoprazole Sodium [Protonix -] 40 mg PO BID #30 tab 10/27/17 


Polyvinyl Alcohol [Artificial Tears] 1 drop OU TID PRN #0 bottle 10/27/17 


Simethicone [Mylicon -] 80 mg PO Q4H PRN #0 tab.chew 10/27/17 











Family Disease History





- Family Disease History


Family Disease History: Diabetes: Mother ( 70 diabetic complications), Other

: Father ( 70 of [place] ), Brother (3 siblings with cancer ? types)





Physical Exam


Vital Signs: 


 Vital Signs











Temperature  97.5 F L  19 06:00


 


Pulse Rate  86   19 06:00


 


Respiratory Rate  20   19 06:00


 


Blood Pressure  157/74   19 06:00


 


O2 Sat by Pulse Oximetry (%)  95   19 06:00











Extremities: Yes: Other (+tender dystrophic mycotic nails with subungual debris 

x 10, +xerosis b/l feet)


Labs: 


 CBC, BMP





 19 06:30 





 19 06:30 











Assessment/Plan


onychomycosis


pain


xerosis





ammonium lactate to feet and legs bid.  debride nails on monday with special 

clippers.  foot care [id number].  will follow.

## 2019-04-06 NOTE — PN
Progress Note, Physician





- Current Medication List


Current Medications: 


Active Medications





Albuterol/Ipratropium (Duoneb -)  1 amp NEB Q6H PRN


   PRN Reason: SHORTNESS OF BREATH


   Last Admin: 04/04/19 20:30 Dose:  1 amp


Amlodipine Besylate (Norvasc -)  5 mg PO DAILY [facility]


   Last Admin: 04/06/19 10:37 Dose:  5 mg


Artificial Tears (Artificial Tears)  1 drop OU Q8H PRN


   PRN Reason: DRY EYES


Atorvastatin Calcium (Lipitor -)  20 mg PO HS [facility]


   Last Admin: 04/05/19 21:26 Dose:  20 mg


Budesonide/Formoterol Fumarate (Symbicort 160/4.5mcg -)  2 puff IH BID [facility]


   Last Admin: 04/06/19 10:36 Dose:  2 puff


Cefuroxime Axetil (Ceftin -)  500 mg PO BID [facility]


   Last Admin: 04/06/19 10:37 Dose:  500 mg


Fluticasone Propionate (Flonase -)  2 spray NS DAILY [facility]


   Last Admin: 04/06/19 10:36 Dose:  2 sprays


Furosemide (Lasix -)  40 mg PO DAILY [facility]


   Last Admin: 04/06/19 10:36 Dose:  40 mg


Losartan Potassium (Cozaar -)  50 mg PO DAILY [facility]


   Last Admin: 04/06/19 10:36 Dose:  50 mg


Melatonin (Melatonin)  15 mg PO HS PRN


   PRN Reason: INSOMNIA


   Last Admin: 04/04/19 23:22 Dose:  15 mg


Methylprednisolone Sodium Succinate (Solu-Medrol -)  40 mg IVPUSH Q8H-IV [facility]


   Last Admin: 04/06/19 10:37 Dose:  40 mg


Ranitidine HCl (Zantac -)  150 mg PO DAILY [facility]


   Last Admin: 04/06/19 10:37 Dose:  150 mg


Tamsulosin HCl (Flomax -)  0.4 mg PO DAILY@0830 [facility]


   Last Admin: 04/06/19 10:36 Dose:  0.4 mg











- Objective


Vital Signs: 


 Vital Signs











Temperature  98.0 F   04/06/19 09:40


 


Pulse Rate  100 H  04/06/19 09:40


 


Respiratory Rate  19 04/06/19 09:40


 


Blood Pressure  146/99   04/06/19 09:40


 


O2 Sat by Pulse Oximetry (%)  90 L  04/06/19 09:51











Cardiovascular: Yes: S1, S2


Respiratory: Yes: Diminished, On Nasal O2


Gastrointestinal: Yes: Normal Bowel Sounds, Soft


Labs: 


 CBC, BMP





 04/05/19 06:30 





 04/05/19 06:30 











Assessment/Plan





- Problems


(1) Hypoxemia requiring supplemental oxygen


Assessment/Plan: 


patient refused NIPPV 


pulm consult


ct chest noted--post infla changes--f/u ct as outpatient


bronchodilators


Code(s): R09.02 - HYPOXEMIA; Z99.81 - DEPENDENCE ON SUPPLEMENTAL OXYGEN   





(2) Weakness


Assessment/Plan: 


PT


nutrition evaluation


Code(s): R53.1 - WEAKNESS   





(3) HTN (hypertension)


Assessment/Plan: 


norvasc


cozaar


Code(s): I10 - ESSENTIAL (PRIMARY) HYPERTENSION   


Qualifiers: 


 





(4) Insomnia


Assessment/Plan: 


melatonin


Code(s): G47.00 - INSOMNIA, UNSPECIFIED

## 2019-04-07 RX ADMIN — CEFUROXIME AXETIL SCH MG: 250 TABLET, FILM COATED ORAL at 10:30

## 2019-04-07 RX ADMIN — METHYLPREDNISOLONE SODIUM SUCCINATE SCH MG: 40 INJECTION, POWDER, FOR SOLUTION INTRAMUSCULAR; INTRAVENOUS at 17:27

## 2019-04-07 RX ADMIN — IPRATROPIUM BROMIDE AND ALBUTEROL SULFATE PRN AMP: .5; 3 SOLUTION RESPIRATORY (INHALATION) at 13:09

## 2019-04-07 RX ADMIN — Medication PRN MG: at 22:17

## 2019-04-07 RX ADMIN — FUROSEMIDE SCH MG: 40 TABLET ORAL at 10:30

## 2019-04-07 RX ADMIN — GUAIFENESIN AND CODEINE PHOSPHATE PRN ML: 10; 100 LIQUID ORAL at 14:29

## 2019-04-07 RX ADMIN — METHYLPREDNISOLONE SODIUM SUCCINATE SCH MG: 40 INJECTION, POWDER, FOR SOLUTION INTRAMUSCULAR; INTRAVENOUS at 10:30

## 2019-04-07 RX ADMIN — IPRATROPIUM BROMIDE AND ALBUTEROL SULFATE PRN AMP: .5; 3 SOLUTION RESPIRATORY (INHALATION) at 19:57

## 2019-04-07 RX ADMIN — ATORVASTATIN CALCIUM SCH MG: 20 TABLET, FILM COATED ORAL at 22:17

## 2019-04-07 RX ADMIN — RANITIDINE SCH MG: 150 TABLET ORAL at 10:30

## 2019-04-07 RX ADMIN — AMLODIPINE BESYLATE SCH MG: 5 TABLET ORAL at 10:30

## 2019-04-07 RX ADMIN — POLYETHYLENE GLYCOL 3350 PRN GRAMS: 17 POWDER, FOR SOLUTION ORAL at 17:50

## 2019-04-07 RX ADMIN — FLUTICASONE PROPIONATE SCH SPRAYS: 50 SPRAY, METERED NASAL at 10:32

## 2019-04-07 RX ADMIN — BUDESONIDE AND FORMOTEROL FUMARATE DIHYDRATE SCH PUFF: 160; 4.5 AEROSOL RESPIRATORY (INHALATION) at 10:31

## 2019-04-07 RX ADMIN — BUDESONIDE AND FORMOTEROL FUMARATE DIHYDRATE SCH PUFF: 160; 4.5 AEROSOL RESPIRATORY (INHALATION) at 22:18

## 2019-04-07 RX ADMIN — TAMSULOSIN HYDROCHLORIDE SCH MG: 0.4 CAPSULE ORAL at 10:30

## 2019-04-07 RX ADMIN — METHYLPREDNISOLONE SODIUM SUCCINATE SCH MG: 40 INJECTION, POWDER, FOR SOLUTION INTRAMUSCULAR; INTRAVENOUS at 01:42

## 2019-04-07 RX ADMIN — CEFUROXIME AXETIL SCH MG: 250 TABLET, FILM COATED ORAL at 22:17

## 2019-04-07 RX ADMIN — LOSARTAN POTASSIUM SCH MG: 50 TABLET, FILM COATED ORAL at 10:30

## 2019-04-07 NOTE — PN
Progress Note, Physician


Chief Complaint: 





AWAKE ALERT


EVENTS AND NOTES REVIEWED


+ COUGH 





- Current Medication List


Current Medications: 


Active Medications





Albuterol/Ipratropium (Duoneb -)  1 amp NEB Q6H PRN


   PRN Reason: SHORTNESS OF BREATH


   Last Admin: 04/06/19 18:08 Dose:  1 amp


Amlodipine Besylate (Norvasc -)  5 mg PO DAILY [facility]


   Last Admin: 04/07/19 10:30 Dose:  5 mg


Artificial Tears (Artificial Tears)  1 drop OU Q8H PRN


   PRN Reason: DRY EYES


Atorvastatin Calcium (Lipitor -)  20 mg PO HS [facility]


   Last Admin: 04/06/19 22:17 Dose:  20 mg


Budesonide/Formoterol Fumarate (Symbicort 160/4.5mcg -)  2 puff IH BID [facility]


   Last Admin: 04/07/19 10:31 Dose:  2 puff


Cefuroxime Axetil (Ceftin -)  500 mg PO BID [facility]


   Last Admin: 04/07/19 10:30 Dose:  500 mg


Fluticasone Propionate (Flonase -)  2 spray NS DAILY [facility]


   Last Admin: 04/07/19 10:32 Dose:  2 sprays


Furosemide (Lasix -)  40 mg PO DAILY [facility]


   Last Admin: 04/07/19 10:30 Dose:  40 mg


Losartan Potassium (Cozaar -)  50 mg PO DAILY [facility]


   Last Admin: 04/07/19 10:30 Dose:  50 mg


Melatonin (Melatonin)  15 mg PO HS PRN


   PRN Reason: INSOMNIA


   Last Admin: 04/06/19 22:17 Dose:  15 mg


Methylprednisolone Sodium Succinate (Solu-Medrol -)  40 mg IVPUSH Q8H-IV [facility]


   Last Admin: 04/07/19 10:30 Dose:  40 mg


Polyethylene Glycol (Miralax (For Daily Use) -)  17 gm PO DAILY PRN


   PRN Reason: CONSTIPATION


   Last Admin: 04/07/19 10:30 Dose:  17 grams


Ranitidine HCl (Zantac -)  150 mg PO DAILY [facility]


   Last Admin: 04/07/19 10:30 Dose:  150 mg


Tamsulosin HCl (Flomax -)  0.4 mg PO DAILY@0830 [facility]


   Last Admin: 04/07/19 10:30 Dose:  0.4 mg











- Objective


Vital Signs: 


 Vital Signs











Temperature  98.0 F   04/07/19 10:28


 


Pulse Rate  87   04/07/19 10:28


 


Respiratory Rate  19 04/07/19 10:28


 


Blood Pressure  139/68   04/07/19 10:28


 


O2 Sat by Pulse Oximetry (%)  94 L  04/06/19 21:00











Constitutional: Yes: Mild Distress


Eyes: Yes: WNL


HENT: Yes: WNL


Neck: Yes: WNL


Cardiovascular: Yes: Regular Rate and Rhythm


Respiratory: Yes: Cough, Rhonchi


Gastrointestinal: Yes: Soft


Genitourinary: Yes: Other


Edema: No


Neurological: Yes: Pre-Existing Deficit


...Motor Strength: LLE, RLE


Psychiatric: Yes: Other


Labs: 


 CBC, BMP





 04/05/19 06:30 





 04/05/19 06:30 











Problem List





- Problems


(1) Acute exacerbation of chronic obstructive pulmonary disease (COPD)


Code(s): J44.1 - CHRONIC OBSTRUCTIVE PULMONARY DISEASE W (ACUTE) EXACERBATION   





(2) Hypercarbia


Code(s): R06.89 - OTHER ABNORMALITIES OF BREATHING   





(3) Low oxygen saturation


Code(s): R79.81 - ABNORMAL BLOOD-GAS LEVEL   





(4) SOB (shortness of breath)


Code(s): R06.02 - SHORTNESS OF BREATH   





(5) Weakness


Code(s): R53.1 - WEAKNESS   





(6) HTN (hypertension)


Code(s): I10 - ESSENTIAL (PRIMARY) HYPERTENSION   


Qualifiers: 


 





(7) Pneumonia


Code(s): J18.9 - PNEUMONIA, UNSPECIFIED ORGANISM   


Qualifiers: 


   Pneumonia type: due to unspecified organism   Laterality: unspecified 

laterality   Lung location: unspecified part of lung   Qualified Code(s): J18.9 

- Pneumonia, unspecified organism   





Assessment/Plan





IV ABX


NEBS


02 SUPPORT


OOB TO CHAIR


PT EVAL


STEROIDS IV TAPER


INCENTIVE SPIROMETER


SNF PLACEMENT

## 2019-04-07 NOTE — PN
Progress Note, Physician


History of Present Illness: 





pulmonary








alert,c/o chest congestion,cough





- Current Medication List


Current Medications: 


Active Medications





Albuterol/Ipratropium (Duoneb -)  1 amp NEB Q6H PRN


   PRN Reason: SHORTNESS OF BREATH


   Last Admin: 04/07/19 13:09 Dose:  1 amp


Amlodipine Besylate (Norvasc -)  5 mg PO DAILY [facility]


   Last Admin: 04/07/19 10:30 Dose:  5 mg


Artificial Tears (Artificial Tears)  1 drop OU Q8H PRN


   PRN Reason: DRY EYES


Atorvastatin Calcium (Lipitor -)  20 mg PO HS [facility]


   Last Admin: 04/06/19 22:17 Dose:  20 mg


Budesonide/Formoterol Fumarate (Symbicort 160/4.5mcg -)  2 puff IH BID [facility]


   Last Admin: 04/07/19 10:31 Dose:  2 puff


Cefuroxime Axetil (Ceftin -)  500 mg PO BID [facility]


   Last Admin: 04/07/19 10:30 Dose:  500 mg


Fluticasone Propionate (Flonase -)  2 spray NS DAILY [facility]


   Last Admin: 04/07/19 10:32 Dose:  2 sprays


Furosemide (Lasix -)  40 mg PO DAILY [facility]


   Last Admin: 04/07/19 10:30 Dose:  40 mg


Losartan Potassium (Cozaar -)  50 mg PO DAILY [facility]


   Last Admin: 04/07/19 10:30 Dose:  50 mg


Melatonin (Melatonin)  15 mg PO HS PRN


   PRN Reason: INSOMNIA


   Last Admin: 04/06/19 22:17 Dose:  15 mg


Methylprednisolone Sodium Succinate (Solu-Medrol -)  40 mg IVPUSH Q8H-IV [facility]


   Last Admin: 04/07/19 10:30 Dose:  40 mg


Polyethylene Glycol (Miralax (For Daily Use) -)  17 gm PO DAILY PRN


   PRN Reason: CONSTIPATION


   Last Admin: 04/07/19 10:30 Dose:  17 grams


Ranitidine HCl (Zantac -)  150 mg PO DAILY [facility]


   Last Admin: 04/07/19 10:30 Dose:  150 mg


Tamsulosin HCl (Flomax -)  0.4 mg PO DAILY@0830 [facility]


   Last Admin: 04/07/19 10:30 Dose:  0.4 mg











- Objective


Vital Signs: 


 Vital Signs











Temperature  98.0 F   04/07/19 10:28


 


Pulse Rate  87   04/07/19 10:28


 


Respiratory Rate  19   04/07/19 10:28


 


Blood Pressure  139/68   04/07/19 10:28


 


O2 Sat by Pulse Oximetry (%)  94 L  04/06/19 21:00











Constitutional: Yes: Calm, Thin


Eyes: Yes: WNL


HENT: Yes: WNL


Neck: Yes: WNL


Cardiovascular: Yes: Regular Rate and Rhythm, S1, S2


Respiratory: Yes: Rhonchi (scattered rhonchi)


Gastrointestinal: Yes: Normal Bowel Sounds, Soft


Extremities: Yes: WNL


Edema: No


Labs: 


 CBC, BMP





 04/05/19 06:30 





 04/05/19 06:30 











Problem List





- Problems


(1) Acute exacerbation of chronic obstructive pulmonary disease (COPD)


Code(s): J44.1 - CHRONIC OBSTRUCTIVE PULMONARY DISEASE W (ACUTE) EXACERBATION   





(2) Low oxygen saturation


Code(s): R79.81 - ABNORMAL BLOOD-GAS LEVEL   





(3) SOB (shortness of breath)


Code(s): R06.02 - SHORTNESS OF BREATH   





(4) Acute respiratory failure with hypoxia


Code(s): J96.01 - ACUTE RESPIRATORY FAILURE WITH HYPOXIA   





(5) HLD (hyperlipidemia)


Code(s): E78.5 - HYPERLIPIDEMIA, UNSPECIFIED   


Qualifiers: 


 





(6) Sleep apnea


Code(s): G47.30 - SLEEP APNEA, UNSPECIFIED   





Assessment/Plan





IMP ACUTE HYPOXEMIC RESPIRATORY FAILURE IMPROVING


     COPD EXACERBATION IMPROVING


     HTN


     [place] 


     HLD








PLAN IV STEROIDS same dose


        INHALED BRONCHODILATORS


        O2   


        ABX


        NIPPV AT NIGHT AND PRN


       ANTI-TUSSIVES


       CHEST PT


        CHECK AMBULATORY O2 SAT ON RA PRIOR TO DISCHARGE TO DETERMINE IF PT IS 

CANDIDATE FOR HOME O2








DR INFANTE


     





 Problem List 





- Problems


(1) Acute exacerbation of chronic obstructive pulmonary disease (COPD)


Code(s): J44.1 - CHRONIC OBSTRUCTIVE PULMONARY DISEASE W (ACUTE) EXACERBATION   





(2) Low oxygen saturation


Code(s): R79.81 - ABNORMAL BLOOD-GAS LEVEL   





(3) SOB (shortness of breath)


Code(s): R06.02 - SHORTNESS OF BREATH   





(4) Acute respiratory failure with hypoxia


Code(s): J96.01 - ACUTE RESPIRATORY FAILURE WITH HYPOXIA   





(5) HLD (hyperlipidemia)


Code(s): E78.5 - HYPERLIPIDEMIA, UNSPECIFIED   


Qualifiers: 


 





(6) Sleep apnea


Code(s): G47.30 - SLEEP APNEA, UNSPECIFIED

## 2019-04-08 LAB
ANION GAP SERPL CALC-SCNC: 6 MMOL/L (ref 8–16)
BUN SERPL-MCNC: 39 MG/DL (ref 7–18)
CALCIUM SERPL-MCNC: 9.7 MG/DL (ref 8.5–10.1)
CHLORIDE SERPL-SCNC: 98 MMOL/L (ref 98–107)
CO2 SERPL-SCNC: 33 MMOL/L (ref 21–32)
CREAT SERPL-MCNC: 1 MG/DL (ref 0.55–1.3)
DEPRECATED RDW RBC AUTO: 14.6 % (ref 11.9–15.9)
GLUCOSE SERPL-MCNC: 220 MG/DL (ref 74–106)
HCT VFR BLD CALC: 49.7 % (ref 35.4–49)
HGB BLD-MCNC: 16.9 GM/DL (ref 11.7–16.9)
MCH RBC QN AUTO: 30.1 PG (ref 25.7–33.7)
MCHC RBC AUTO-ENTMCNC: 33.9 G/DL (ref 32–35.9)
MCV RBC: 88.8 FL (ref 80–96)
PLATELET # BLD AUTO: 424 K/MM3 (ref 134–434)
PMV BLD: 7.5 FL (ref 7.5–11.1)
POTASSIUM SERPLBLD-SCNC: 4.3 MMOL/L (ref 3.5–5.1)
RBC # BLD AUTO: 5.6 M/MM3 (ref 4–5.6)
SODIUM SERPL-SCNC: 137 MMOL/L (ref 136–145)
WBC # BLD AUTO: 18.8 K/MM3 (ref 4–10)

## 2019-04-08 RX ADMIN — IPRATROPIUM BROMIDE AND ALBUTEROL SULFATE SCH: .5; 3 SOLUTION RESPIRATORY (INHALATION) at 12:45

## 2019-04-08 RX ADMIN — ATORVASTATIN CALCIUM SCH MG: 20 TABLET, FILM COATED ORAL at 22:47

## 2019-04-08 RX ADMIN — CEFUROXIME AXETIL SCH MG: 250 TABLET, FILM COATED ORAL at 09:40

## 2019-04-08 RX ADMIN — RANITIDINE SCH MG: 150 TABLET ORAL at 09:40

## 2019-04-08 RX ADMIN — LOSARTAN POTASSIUM SCH MG: 50 TABLET, FILM COATED ORAL at 09:39

## 2019-04-08 RX ADMIN — BUDESONIDE AND FORMOTEROL FUMARATE DIHYDRATE SCH PUFF: 160; 4.5 AEROSOL RESPIRATORY (INHALATION) at 22:52

## 2019-04-08 RX ADMIN — FUROSEMIDE SCH MG: 40 TABLET ORAL at 09:39

## 2019-04-08 RX ADMIN — METHYLPREDNISOLONE SODIUM SUCCINATE SCH MG: 40 INJECTION, POWDER, FOR SOLUTION INTRAMUSCULAR; INTRAVENOUS at 17:27

## 2019-04-08 RX ADMIN — BUDESONIDE AND FORMOTEROL FUMARATE DIHYDRATE SCH PUFF: 160; 4.5 AEROSOL RESPIRATORY (INHALATION) at 09:41

## 2019-04-08 RX ADMIN — CEFUROXIME AXETIL SCH MG: 250 TABLET, FILM COATED ORAL at 22:46

## 2019-04-08 RX ADMIN — POLYETHYLENE GLYCOL 3350 PRN GRAMS: 17 POWDER, FOR SOLUTION ORAL at 14:46

## 2019-04-08 RX ADMIN — Medication PRN MG: at 22:51

## 2019-04-08 RX ADMIN — FLUTICASONE PROPIONATE SCH SPRAYS: 50 SPRAY, METERED NASAL at 09:41

## 2019-04-08 RX ADMIN — TAMSULOSIN HYDROCHLORIDE SCH MG: 0.4 CAPSULE ORAL at 09:40

## 2019-04-08 RX ADMIN — IPRATROPIUM BROMIDE AND ALBUTEROL SULFATE SCH AMP: .5; 3 SOLUTION RESPIRATORY (INHALATION) at 21:07

## 2019-04-08 RX ADMIN — METHYLPREDNISOLONE SODIUM SUCCINATE SCH MG: 40 INJECTION, POWDER, FOR SOLUTION INTRAMUSCULAR; INTRAVENOUS at 01:34

## 2019-04-08 RX ADMIN — METHYLPREDNISOLONE SODIUM SUCCINATE SCH MG: 40 INJECTION, POWDER, FOR SOLUTION INTRAMUSCULAR; INTRAVENOUS at 09:40

## 2019-04-08 RX ADMIN — AMLODIPINE BESYLATE SCH MG: 5 TABLET ORAL at 09:40

## 2019-04-08 RX ADMIN — IPRATROPIUM BROMIDE AND ALBUTEROL SULFATE SCH: .5; 3 SOLUTION RESPIRATORY (INHALATION) at 17:25

## 2019-04-08 NOTE — CONSULT
Admitting History and Physical





- Primary Care Physician


PCP: Jessica Reynoso





- Admission


History of Present Illness: 





This 86 yo with h/o HTN, CHF, COPD, prostatic Ca and Renal cell CA s/p 

nephrectomy, reports 6 mos of progressive weakness with increasing SOB.


30 lb weight loss x 3 weeks with no swallow x 3 days.





Neurology IMP: Focal weakness distally in the left hand and foot. Not in an 

obvious myotomal or peripheral nerve distribution.


       Possibly associated with respiratory weakness, decreased gag and extreme

, sudden weight loss.


       R/O vasculitis. R/o Motor neurone disease.





Known to me from Feb/March 2018 admissions. Initial MBS 2/28-with significant 

weakness in swallowing function, with spillage over BOT and silent aspiration 

on thin liquid. Improved function on 3/24 MBS, with no aspiration, penetration 

or stasis with stronger, more functional swallow.





 Selected Entries











  04/07/19 04/07/19 04/07/19





  06:00 10:28 11:40


 


Breakfast   50%


 


Diet Tolerated   Fair


 


Lunch   


 


Supper   


 


Temperature 97.6 F 98.0 F 














  04/07/19 04/07/19 04/07/19





  15:49 18:26 21:05


 


Breakfast   


 


Diet Tolerated Fair Fair Fair


 


Lunch 25%  


 


Supper  75% 


 


Temperature 97.7 F 97.5 F L 














  04/07/19 04/08/19 04/08/19





  21:09 05:50 09:48


 


Breakfast   75%


 


Diet Tolerated   Well


 


Lunch   


 


Supper   


 


Temperature 97.8 F 97.8 F 








 Laboratory Tests











  04/05/19 04/08/19





  06:30 05:10


 


WBC  7.1  18.8 H








On Steroids


On reg diet/thin liquid.


History Source: Patient, Medical Record


Limitations to Obtaining History: No Limitations, Clinical Condition





- Past Medical History


CNS: Yes: Alzheimer's, TIA


Cardiovascular: Yes: AFIB, HTN, Hyperlipdemia, Murmur (tricuspid regurgitation)


Pulmonary: Yes: COPD, Pneumonia (fungal), Other (chronic cough)


Gastrointestinal: Yes: Diverticulitis, Other (cholecystitis, ischemic colitis, 3

/14 sigmoid adenoma removed, SBO 12/15)


Hepatobiliary: Yes: Cholelithiasis, Cholecystitis (2/17 cholecystostomy tube, 8/ 17 lap choly), Choledocholithiasis


Renal/: Yes: Cancer (Prostate/KIDNEY), Neurogenic Bladder


Psych: Yes: Anxiety


Rheumatology: Yes: Other (arthritis knees)





- Past Surgical History


Past Surgical History: Yes: Appendectomy, Cataract Removal, Colonoscopy (last 1 

yrs ago, [place] 2 polyps. h/o ischemic colitis 3,2 and ?years ago), Hernia 

Repair (b/l), Joint Replacement (left knee makoplasty), Laminectomy (cervical 

fusion), Nephrectomy (left)





- Advance Directives


Advance Directives: Yes: Health Care Proxy





- Smoking History


Smoking history: Former smoker


Have you smoked in the past 12 months: No


Aproximately how many cigarettes per day: 0


If you are a former smoker, when did you quit?: 1982





- Alcohol/Substance Use


Hx Alcohol Use: No


History of Substance Use: reports: None





- Social History


ADL: Independent


Occupation: retired electronics [employer]


History of Recent Travel: No





History





- Admission


Reason For Visit: SOB,HYPERCAPNIA, HYPERCAPNIA,HYPOXEMIA REQ SUPPLEM





- Diagnostics


X-ray: Report Reviewed


CT Scan: Report Reviewed


Modified Barium Swallow: Report Reviewed (feb and march 2017)





- General


Mental Status: Alert and Oriented, Awake and Alert, Able to Follow Commands


Attention: Intact


Ability to Follow Directions: Good


Head/Neck Control: Fair





- Hearing


Hearing: Impaired, Both


Hearing Aide: Yes


With Patient: No





Speech Evaluation





- Communication


Primary Language: ENGLISH


Oral Expression Ability: Yes: Mild Impairment





- Speech Production


Able to Make Needs Known: Yes: Mildly Impaired


Intelligibility: Yes: Mildly Impaired





- Speech Characteristics


Voice Loudness: Mildly Soft/Quiet


Voice Pitch: Yes: Normal


Articulation: Yes: Imprecise (slight)


Voice, Other Observations: Yes: Inadequate Breath Support (for speech purposes. 

Accessory breathing.)





- Language/Auditory Comprehension


Follows: Yes: 1 Stage Simple Commands





- Language/Verbal Expression


Able to Respond to Simple Queries: Yes: WNL


Able to Communicate Wants and Needs: Yes: WNL


Functional Communication Status: Yes: WNL





- Swallow Evaluation/Bedside Assessment


Current Nutritional Intake: Regular, Thin Liquids


Oral Secretions: Yes: WFL


Dentition: Yes: Missing Teeth (dentures "at home")


Facial Symmetry at Rest: Symmetrical


Facial Symmetry on Retraction: Symmetrical


Pucker Lips: Normal


Smile: Normal


Lingual Movement: Symmetric


Lingual Movement Strgth Against Opposition: Reduced


Velopharyngeal Movement: Normal


Laryngeal Movement: Able to Palpate


Labial Seal: WFL


Chewing: WFL (fairly efficient with missing dentition)


A-P Transit: WFL


Pocketing: None


Timing of Swallow: WFL


Coughing/Throat Clear: No


Change in Voice: No





Recommendations





- Speech Evaluation, Impression/Plan


Impression: Pt reports significant weightloss with difficulty swallowing solods 

when home as he was very SOB. Solids christy stick and he would expectorate a lot 

of phlegm with it. He denies difficulty this weekend, however, verbal nursing 

report noted dysphagia.  H/o dysphagia 2018 when debilitated,;improved once 

stronger,





- Dysphagia Impressions/Plan


Swallowing Skills: Impaired


Dysphagia Impressions: Ongoing Evaluation


*Silent aspiration: cannot be R/O at bedside


Dysphagia Treatment Plan: Small Bites, Chin Tuck/Down, Clear Pocket Food, Safe 

Rate, 1/2 tsp. at a time, Elevate HOB during feed, Other (please obtain 

dentures from home.)





- Recommendations


Diet Consistency: Dysphagia Whole


Liquids: Thin Liquids


Supplement: Ensure, Magic Cup, Ensure Pudding

## 2019-04-08 NOTE — ECHO
______________________________________________________________________________



Name: ANTONIA ALEXANDER                                 Exam:Adult Echocardiogram

MRN: [id number]         Study Date: 2019 10:28 AM

Age: 87 yrs

______________________________________________________________________________



Reason For Study: LV Function

Height: 71 in        Weight: 134 lb        BSA: 1.8 m2



______________________________________________________________________________



MMode/2D Measurements & Calculations

IVSd: 1.2 cm                                            Ao root diam: 3.2 cm

LVIDd: 4.3 cm                                           LA dimension: 3.4 cm

LVIDs: 2.4 cm

LVPWd: 1.1 cm



_________________________________________________________

EDV(Teich): 82.1 ml                                     LVOT diam: 2.0 cm

ESV(Teich): 19.6 ml



_________________________________________________________

LAV (MOD-bp): 49.1 ml



Doppler Measurements & Calculations

MV E max ollie: 139.0 cm/sec                             Ao V2 max: 160.2 cm/sec

MV dec time: 0.17 sec                                  Ao max PG: 10.3 mmHg

                                                       AI P1/2t: 254.0 msec



                                                       DESTINY(V,D): 2.0 cm2

________________________________________________________

AI max ollie: 176.5 cm/sec                               LV V1 max P.3 mmHg

AI max P.5 mmHg                                   LV V1 max: 103.3 cm/sec



AI dec slope: 203.5 cm/sec2

________________________________________________________



MR max ollie: 427.2 cm/sec                               TR max ollie: 320.3 cm/sec

MR max P.8 mmHg                                   TR max P.1 mmHg

________________________________________________________



PA V2 max: 101.8 cm/sec                                Med Peak E' Ollie: 4.8 cm/sec

PA max P.1 mmHg                                    Med E/e': 29.0

                                                       Lat Peak E' Ollie: 8.6 cm/sec

                                                       Lat E/e': 16.2



______________________________________________________________________________



Procedure

A complete two-dimensional transthoracic echocardiogram was performed (2D, M-mode, Doppler and color 
flow

Doppler).

Left Ventricle

The left ventricle is normal in size. There is mild concentric left ventricular hypertrophy. Left nabila
tricular

systolic function is normal. Ejection Fraction = >70%. No regional wall motion abnormalities noted.

Right Ventricle

The right ventricle is normal size. The right ventricular systolic function is normal.

Atria

The left atrial size is normal. LA volume index is 27 ml/m2. Right atrial size is normal.

Mitral Valve

There is moderate mitral annular calcification. There is mild mitral regurgitation.

Tricuspid Valve

The tricuspid valve is normal in structure and function. There is moderate tricuspid regurgitation. P
ulmonary

artery systolic pressure is at least 49 mmHg if RA pressure is assumed 3 mmHg.

Aortic Valve

There is mild aortic sclerosis.;. No aortic regurgitation is present.

Pulmonic Valve

The pulmonic valve is not well visualized.

Great Vessels

The aortic root is normal size.

Pericardium/Pleura

There is no pericardial effusion.

______________________________________________________________________________





Interpretation Summary

The left ventricle is normal in size.

There is mild concentric left ventricular hypertrophy.

Left ventricular systolic function is normal.

No regional wall motion abnormalities noted.

Ejection Fraction = >70%.

The right ventricular systolic function is normal.

The left atrial size is normal.

Right atrial size is normal.

There is moderate mitral annular calcification.

There is mild mitral regurgitation.

There is moderate tricuspid regurgitation.

Pulmonary artery systolic pressure is at least 49 mmHg if RA pressure is assumed 3 mmHg

There is mild aortic sclerosis.

There is no pericardial effusion.



Previous study is not available for comparison





Robin Vazquez MD 2019 01:48 PM

## 2019-04-08 NOTE — PN
Progress Note (short form)





- Note


Progress Note: 





PULMONARY





Feels about the same. +nonproductive cough and short of breath.





 Vital Signs











 Period  Temp  Pulse  Resp  BP Sys/Yun  Pulse Ox


 


 Last 24 Hr  97.5 F-97.8 F    20-20  123-151/71-92  93-94








Gen:  mildly tachypneic with speaking


Heart: RRR


Lung: distant breath sounds


Abd: soft, nontender


Ext: no edema





 CBC, BMP





 04/08/19 05:10 





 04/08/19 05:10 





Active Medications





Albuterol/Ipratropium (Duoneb -)  1 amp NEB Q6H PRN


   PRN Reason: SHORTNESS OF BREATH


   Last Admin: 04/07/19 19:57 Dose:  1 amp


Amlodipine Besylate (Norvasc -)  5 mg PO DAILY [facility]


   Last Admin: 04/08/19 09:40 Dose:  5 mg


Artificial Tears (Artificial Tears)  1 drop OU Q8H PRN


   PRN Reason: DRY EYES


Atorvastatin Calcium (Lipitor -)  20 mg PO HS [facility]


   Last Admin: 04/07/19 22:17 Dose:  20 mg


Budesonide/Formoterol Fumarate (Symbicort 160/4.5mcg -)  2 puff IH BID [facility]


   Last Admin: 04/08/19 09:41 Dose:  2 puff


Cefuroxime Axetil (Ceftin -)  500 mg PO BID [facility]


   Last Admin: 04/08/19 09:40 Dose:  500 mg


Fluticasone Propionate (Flonase -)  2 spray NS DAILY [facility]


   Last Admin: 04/08/19 09:41 Dose:  2 sprays


Furosemide (Lasix -)  40 mg PO DAILY [facility]


   Last Admin: 04/08/19 09:39 Dose:  40 mg


Guaifenesin/Codeine Phosphate (Robitussin Ac -)  5 ml PO TID PRN


   PRN Reason: COUGH


   Last Admin: 04/07/19 14:29 Dose:  5 ml


Lactic Acid (Lac-Hydrin 12)  1 applic TP DAILY PRN


   PRN Reason: xerosis


   Last Admin: 04/07/19 22:18 Dose:  1 applic


Losartan Potassium (Cozaar -)  50 mg PO DAILY [facility]


   Last Admin: 04/08/19 09:39 Dose:  50 mg


Melatonin (Melatonin)  15 mg PO HS PRN


   PRN Reason: INSOMNIA


   Last Admin: 04/07/19 22:17 Dose:  15 mg


Methylprednisolone Sodium Succinate (Solu-Medrol -)  40 mg IVPUSH Q8H-IV [facility]


   Last Admin: 04/08/19 09:40 Dose:  40 mg


Polyethylene Glycol (Miralax (For Daily Use) -)  17 gm PO BID PRN


   PRN Reason: CONSTIPATION


   Last Admin: 04/07/19 17:50 Dose:  17 grams


Ranitidine HCl (Zantac -)  150 mg PO DAILY [facility]


   Last Admin: 04/08/19 09:40 Dose:  150 mg


Tamsulosin HCl (Flomax -)  0.4 mg PO DAILY@0830 [facility]


   Last Admin: 04/08/19 09:40 Dose:  0.4 mg





A/P


Acute COPD Exacerbation


HTN


Hyperlipidemia


Obstructive Sleep Apnea





-  continue medrol at current dose


-  inhaled bronchodilators standing and PRN


-  O2 to keep SpO2 >90%


-  on empiric antibiotics


-  DVT prophylaxis

## 2019-04-08 NOTE — PN
Progress Note, Physician


Chief Complaint: 





patient back from EMG nad MBS





- Current Medication List


Current Medications: 


Active Medications





Albuterol Sulfate (Ventolin 0.083% Nebulizer Soln -)  1 amp NEB Q4H PRN


   PRN Reason: SHORT OF BREATH/WHEEZING


Albuterol/Ipratropium (Duoneb -)  1 amp NEB RQID [facility]


   Last Admin: 04/08/19 12:45 Dose:  Not Given


Amlodipine Besylate (Norvasc -)  5 mg PO DAILY [facility]


   Last Admin: 04/08/19 09:40 Dose:  5 mg


Artificial Tears (Artificial Tears)  1 drop OU Q8H PRN


   PRN Reason: DRY EYES


Atorvastatin Calcium (Lipitor -)  20 mg PO HS [facility]


   Last Admin: 04/07/19 22:17 Dose:  20 mg


Budesonide/Formoterol Fumarate (Symbicort 160/4.5mcg -)  2 puff IH BID [facility]


   Last Admin: 04/08/19 09:41 Dose:  2 puff


Cefuroxime Axetil (Ceftin -)  500 mg PO BID [facility]


   Last Admin: 04/08/19 09:40 Dose:  500 mg


Fluticasone Propionate (Flonase -)  2 spray NS DAILY [facility]


   Last Admin: 04/08/19 09:41 Dose:  2 sprays


Furosemide (Lasix -)  40 mg PO DAILY [facility]


   Last Admin: 04/08/19 09:39 Dose:  40 mg


Guaifenesin/Codeine Phosphate (Robitussin Ac -)  5 ml PO TID PRN


   PRN Reason: COUGH


   Last Admin: 04/07/19 14:29 Dose:  5 ml


Lactic Acid (Lac-Hydrin 12)  1 applic TP DAILY PR[facility]


   PRN Reason: xerosis


   Last Admin: 04/07/19 22:18 Dose:  1 applic


Losartan Potassium (Cozaar -)  50 mg PO DAILY [facility]


   Last Admin: 04/08/19 09:39 Dose:  50 mg


Melatonin (Melatonin)  15 mg PO HS PRN


   PRN Reason: INSOMNIA


   Last Admin: 04/07/19 22:17 Dose:  15 mg


Methylprednisolone Sodium Succinate (Solu-Medrol -)  40 mg IVPUSH Q8H-IV [facility]


   Last Admin: 04/08/19 09:40 Dose:  40 mg


Polyethylene Glycol (Miralax (For Daily Use) -)  17 gm PO BID PRN


   PRN Reason: CONSTIPATION


   Last Admin: 04/08/19 14:46 Dose:  17 grams


Ranitidine HCl (Zantac -)  150 mg PO DAILY [facility]


   Last Admin: 04/08/19 09:40 Dose:  150 mg


Tamsulosin HCl (Flomax -)  0.4 mg PO DAILY@0830 [facility]


   Last Admin: 04/08/19 09:40 Dose:  0.4 mg











- Objective


Vital Signs: 


 Vital Signs











Temperature  97.8 F   04/08/19 05:50


 


Pulse Rate  98 H  04/08/19 05:50


 


Respiratory Rate  20   04/08/19 05:50


 


Blood Pressure  151/71   04/08/19 05:50


 


O2 Sat by Pulse Oximetry (%)  93 L  04/07/19 21:00











Constitutional: Yes: Calm


Cardiovascular: Yes: Regular Rate and Rhythm, S1, S2


Respiratory: Yes: Diminished, On Nasal O2


Gastrointestinal: Yes: Normal Bowel Sounds, Soft


Edema: No


Neurological: Yes: Alert, Oriented


Labs: 


 CBC, BMP





 04/08/19 05:10 





 04/08/19 05:10 











Problem List





- Problems


(1) Hypoxemia requiring supplemental oxygen


Assessment/Plan: 





pulm on board


ct chest bronchiectatic changes seen and lung nodules repeat chest ct in 3 

months


bronchodilators


po abx


Code(s): R09.02 - HYPOXEMIA; Z99.81 - DEPENDENCE ON SUPPLEMENTAL OXYGEN   





(2) Weakness


Assessment/Plan: 


[facility]


[facility] report pending


Code(s): R53.1 - WEAKNESS   





(3) HTN (hypertension)


Assessment/Plan: 


norvasc


cozaar


BP ocntrolled on above regimen


Code(s): I10 - ESSENTIAL (PRIMARY) HYPERTENSION   


Qualifiers: 


 





(4) Insomnia


Assessment/Plan: 


melatonin


Code(s): G47.00 - INSOMNIA, UNSPECIFIED

## 2019-04-08 NOTE — CONS
PHYSICAL MEDICINE AND REHABILITATION CONSULTATION

 

DATE OF CONSULTATION:  04/08/2019

 

HISTORY OF PRESENT ILLNESS:  The patient is an 87-year-old man with past medical

history of osteoarthritis including his knees, status post left knee MAKOplasty
, as

well as a cervical laminectomy and fusion.  He was admitted on April 4 with

hypotension, shortness of breath, and weakness.  Patient was diagnosed with 
acute

exacerbation of COPD, acute hypoxic respiratory failure.  He complained of left 
more

than right upper limb numbness and weakness.  He was also noted by Dr. Shipman 
to

have left lower extremity weakness and difficulty swallowing.  Patient was 
found not

to have any obvious myotomal or peripheral nerve distribution and 
electrodiagnostic

studies have been ordered for the left upper and left lower limb to assess for

vasculitis or motor neuron disease.  Patient also has difficulty swallowing and 
a

Speech and Swallow evaluation has been ordered.

 

On admission, blood work showed WBCs 9.2, hemoglobin 16.5, platelet count 355. 

Repeat on April 5 showed a WBC of 7.1, hemoglobin 16.7, and platelet count 368. 

Blood work done today showed elevated WBCs 18.8, hemoglobin stable 16.9, 
platelet

count 424.  Last chemistry done today showed sodium 137, potassium 4.3, 
elevated CO2

of 33, BUN elevated at 39 and creatinine 1.0.  Other tests including a B12 
level of

747 and TSH of 0.69.

 

Patient is on IV steroids for COPD exacerbation, as well as antibiotics.  The 
patient

himself complains of some numbness in all of the fingers of his left upper limb
, but

more of weakness.  He has noted buckling involving his left lower extremity,

additionally, but does walk with a walker as well as a left knee brace.  Per the

patient, the weakness in the left leg is chronic for 5 years.  He is starting 
to feel

some weakness of the right upper limb.

 

REVIEW OF PAST MEDICAL AND SURGICAL HISTORY:  COPD; obstructive sleep apnea 
syndrome;

hypertension; hyperlipidemia; congestive heart failure; TIA; prostate cancer; 
renal

cancer, status post left nephrectomy.  There is also mention of dementia, 
cervical

fusion, osteoarthritis of the knee status post left knee MAKOplasty.

 

SOCIAL HISTORY:  He lives with his wife in an apartment, but there are 14 steps 
which

present a difficulty for the patient to get into his apartment and out.  Current

function is he is at bedrest, but does not have a walker available.

 

REVIEW OF SYSTEMS:  No headache.  No lightheadedness, dizziness.  No blurry 
vision,

double vision or change in vision.  No nausea or vomiting, but he does have some

difficulty swallowing.  No chest pain or current shortness of breath, but he 
gets

dyspneic with exertion.  He has no current joint arthralgias, but weakness 
again the

left upper extremity distally more than proximally as well as the left lower

extremity proximally more than distally with numbness in the left hand.  The 
right

hand also has some slight weakness.  No fever or chills.  No bowel or bladder

incontinence or retention.

 

PHYSICAL EXAMINATION:

General:  On exam, thin, elderly man who is awake, alert, and conversant.  He 
is on

oxygen, but in no acute distress.

HEENT:  He is normocephalic and atraumatic.  His extraocular muscles appear 
intact.

Neck:  Supple with limited range, but no palpable spasm.

Extremities:  Without any pitting edema or calf tenderness.

Skin:  Healed scar from left knee surgery.  No other areas of breakdown.

Neuromuscular:  He is awake, alert, oriented x3.  His cranial nerves appear 
grossly

intact.  He does have weakness of the left hand particularly of the thenar more 
than

hypothenar muscles, but also on .  Better proximal strength in the shoulder

girdle, elbow flexors, elbow extensors.  His right hand also has some thenar 
weakness

at 4/5, but good intrinsic strength.  He has good proximal strength.  In the 
lower

extremities, better proximal strength in the right lower extremity than the left

where he only has 2/5 strength and hip and knee extensors are weak at 3/5.  He 
has

good dorsiflexion and plantarflexion bilaterally and better proximal strength 
on the

right side 4/5.  Normal sensation to light touch and no advanced 
osteodegenerative

changes.  Toes appear downgoing.

 

OVERALL IMPRESSION:

1.  Deficits in mobility and activities of daily living, multifactorial.

2.  Weakness involving his left distal upper limb more than the right-sided 
upper

limb and also in the left lower limb more proximally; uncertain etiology.

3.  Deconditioning.

4.  Acute respiratory failure with underlying chronic obstructive pulmonary 
disease

exacerbation.

5.  Complaints of difficulty swallowing.

6.  Elevated risk for DVT, due to immobility as well as history of cancer.

7.  Elevated risk for decubitus ulceration, due to immobility and weakness.

8.  History of knee osteoarthritis, status post left knee MAKOplasty.

9.  History of renal cancer, status post left nephrectomy.

10.  History of congestive heart failure.

11.  History of prostate cancer.

 

PLAN/SUGGESTION:

1.  Will proceed with EMG nerve conduction studies.  If patient is available 
around

11:45 this morning, we will attempt to bring the patient to the department.

2.  Out of bed to chair.

3.  Physical therapy for mobilization.

4.  Consider DVT prophylaxis with subcutaneous heparin until more mobile, unless

medically contraindicated, 5000 units subcutaneous q.12 hours.

5.  Skin precautions.  Avoid heel, sacral pressure.

6.  Bowel regimen and monitor for constipation.

7.  Neurologic followup.

8.  Steroid taper.

9.  Monitor WBCs, but probably due to steroids.

10.  Consider Dietary consultation.

11.  Speech and Swallow evaluation, per Neurology.

12.  Consider short-term rehab at a skilled nursing facility versus home with

homecare, based on his response to the above measures.

 

Thank you for this referral.

 

 

ARTIS CABA M.D.

 

[id number]

DD: 04/08/2019 09:21

DT: 04/08/2019 12:00

Job #:  [id number]

MTDD

## 2019-04-09 LAB
ALBUMIN SERPL-MCNC: 2.8 G/DL (ref 3.4–5)
ALP SERPL-CCNC: 70 U/L (ref 45–117)
ALT SERPL-CCNC: 46 U/L (ref 13–61)
ANION GAP SERPL CALC-SCNC: 5 MMOL/L (ref 8–16)
AST SERPL-CCNC: 21 U/L (ref 15–37)
BASOPHILS # BLD: 0.1 % (ref 0–2)
BILIRUB SERPL-MCNC: 0.8 MG/DL (ref 0.2–1)
BUN SERPL-MCNC: 39 MG/DL (ref 7–18)
CALCIUM SERPL-MCNC: 9.1 MG/DL (ref 8.5–10.1)
CHLORIDE SERPL-SCNC: 99 MMOL/L (ref 98–107)
CO2 SERPL-SCNC: 36 MMOL/L (ref 21–32)
CREAT SERPL-MCNC: 0.9 MG/DL (ref 0.55–1.3)
DEPRECATED RDW RBC AUTO: 14.8 % (ref 11.9–15.9)
EOSINOPHIL # BLD: 0 % (ref 0–4.5)
GLUCOSE SERPL-MCNC: 251 MG/DL (ref 74–106)
HCT VFR BLD CALC: 42.9 % (ref 35.4–49)
HGB BLD-MCNC: 14.6 GM/DL (ref 11.7–16.9)
LYMPHOCYTES # BLD: 8.3 % (ref 8–40)
MCH RBC QN AUTO: 29.8 PG (ref 25.7–33.7)
MCHC RBC AUTO-ENTMCNC: 34 G/DL (ref 32–35.9)
MCV RBC: 87.7 FL (ref 80–96)
MONOCYTES # BLD AUTO: 3.9 % (ref 3.8–10.2)
NEUTROPHILS # BLD: 87.7 % (ref 42.8–82.8)
PLATELET # BLD AUTO: 260 K/MM3 (ref 134–434)
PMV BLD: 7.6 FL (ref 7.5–11.1)
POTASSIUM SERPLBLD-SCNC: 4.5 MMOL/L (ref 3.5–5.1)
PROT SERPL-MCNC: 6 G/DL (ref 6.4–8.2)
RBC # BLD AUTO: 4.9 M/MM3 (ref 4–5.6)
SODIUM SERPL-SCNC: 140 MMOL/L (ref 136–145)
WBC # BLD AUTO: 11.3 K/MM3 (ref 4–10)

## 2019-04-09 RX ADMIN — AMLODIPINE BESYLATE SCH MG: 5 TABLET ORAL at 09:47

## 2019-04-09 RX ADMIN — CEFUROXIME AXETIL SCH MG: 250 TABLET, FILM COATED ORAL at 21:40

## 2019-04-09 RX ADMIN — FLUTICASONE PROPIONATE SCH SPRAYS: 50 SPRAY, METERED NASAL at 09:51

## 2019-04-09 RX ADMIN — IPRATROPIUM BROMIDE AND ALBUTEROL SULFATE SCH AMP: .5; 3 SOLUTION RESPIRATORY (INHALATION) at 20:43

## 2019-04-09 RX ADMIN — METHYLPREDNISOLONE SODIUM SUCCINATE SCH MG: 40 INJECTION, POWDER, FOR SOLUTION INTRAMUSCULAR; INTRAVENOUS at 21:40

## 2019-04-09 RX ADMIN — METHYLPREDNISOLONE SODIUM SUCCINATE SCH MG: 40 INJECTION, POWDER, FOR SOLUTION INTRAMUSCULAR; INTRAVENOUS at 01:02

## 2019-04-09 RX ADMIN — IPRATROPIUM BROMIDE AND ALBUTEROL SULFATE SCH AMP: .5; 3 SOLUTION RESPIRATORY (INHALATION) at 08:31

## 2019-04-09 RX ADMIN — BUDESONIDE AND FORMOTEROL FUMARATE DIHYDRATE SCH PUFF: 160; 4.5 AEROSOL RESPIRATORY (INHALATION) at 21:43

## 2019-04-09 RX ADMIN — CEFUROXIME AXETIL SCH MG: 250 TABLET, FILM COATED ORAL at 09:56

## 2019-04-09 RX ADMIN — IPRATROPIUM BROMIDE AND ALBUTEROL SULFATE SCH AMP: .5; 3 SOLUTION RESPIRATORY (INHALATION) at 15:04

## 2019-04-09 RX ADMIN — IPRATROPIUM BROMIDE AND ALBUTEROL SULFATE SCH AMP: .5; 3 SOLUTION RESPIRATORY (INHALATION) at 12:31

## 2019-04-09 RX ADMIN — METHYLPREDNISOLONE SODIUM SUCCINATE SCH MG: 40 INJECTION, POWDER, FOR SOLUTION INTRAMUSCULAR; INTRAVENOUS at 09:47

## 2019-04-09 RX ADMIN — RANITIDINE SCH MG: 150 TABLET ORAL at 09:47

## 2019-04-09 RX ADMIN — LOSARTAN POTASSIUM SCH MG: 50 TABLET, FILM COATED ORAL at 09:47

## 2019-04-09 RX ADMIN — ATORVASTATIN CALCIUM SCH MG: 20 TABLET, FILM COATED ORAL at 21:40

## 2019-04-09 RX ADMIN — BUDESONIDE AND FORMOTEROL FUMARATE DIHYDRATE SCH PUFF: 160; 4.5 AEROSOL RESPIRATORY (INHALATION) at 09:50

## 2019-04-09 RX ADMIN — Medication PRN MG: at 21:41

## 2019-04-09 RX ADMIN — TAMSULOSIN HYDROCHLORIDE SCH MG: 0.4 CAPSULE ORAL at 08:51

## 2019-04-09 NOTE — PN
Progress Note (short form)





- Note


Progress Note: 





PULMONARY





Breathing better today after bowel movement yesterday. +nonproductive cough. No 

fevers.





 Vital Signs











 Period  Temp  Pulse  Resp  BP Sys/Yun  Pulse Ox


 


 Last 24 Hr  97.2 F-98.8 F    20-21  118-144/59-76  95











Gen:  less tachypneic


Heart: RRR


Lung: distant breath sounds


Abd: soft, nontender


Ext: no edema





 CBC, BMP





 04/09/19 06:00 





 04/09/19 06:17 





Active Medications





Albuterol Sulfate (Ventolin 0.083% Nebulizer Soln -)  1 amp NEB Q4H PRN


   PRN Reason: SHORT OF BREATH/WHEEZING


Albuterol/Ipratropium (Duoneb -)  1 amp NEB RQID [facility]


   Last Admin: 04/09/19 08:31 Dose:  1 amp


Amlodipine Besylate (Norvasc -)  5 mg PO DAILY [facility]


   Last Admin: 04/09/19 09:47 Dose:  5 mg


Artificial Tears (Artificial Tears)  1 drop OU Q8H PRN


   PRN Reason: DRY EYES


Atorvastatin Calcium (Lipitor -)  20 mg PO HS [facility]


   Last Admin: 04/08/19 22:47 Dose:  20 mg


Budesonide/Formoterol Fumarate (Symbicort 160/4.5mcg -)  2 puff IH BID [facility]


   Last Admin: 04/09/19 09:50 Dose:  2 puff


Cefuroxime Axetil (Ceftin -)  500 mg PO BID [facility]


   Last Admin: 04/09/19 09:56 Dose:  500 mg


Fluticasone Propionate (Flonase -)  2 spray NS DAILY [facility]


   Last Admin: 04/09/19 09:51 Dose:  2 sprays


Guaifenesin/Codeine Phosphate (Robitussin Ac -)  5 ml PO TID PRN


   PRN Reason: COUGH


   Last Admin: 04/07/19 14:29 Dose:  5 ml


Lactic Acid (Lac-Hydrin 12)  1 applic TP DAILY [facility]


   PRN Reason: xerosis


   Last Admin: 04/07/19 22:18 Dose:  1 applic


Losartan Potassium (Cozaar -)  50 mg PO DAILY [facility]


   Last Admin: 04/09/19 09:47 Dose:  50 mg


Melatonin (Melatonin)  15 mg PO HS PRN


   PRN Reason: INSOMNIA


   Last Admin: 04/08/19 22:51 Dose:  15 mg


Methylprednisolone Sodium Succinate (Solu-Medrol -)  40 mg IVPUSH Q8H-IV [facility]


   Last Admin: 04/09/19 09:47 Dose:  40 mg


Polyethylene Glycol (Miralax (For Daily Use) -)  17 gm PO BID PRN


   PRN Reason: CONSTIPATION


   Last Admin: 04/08/19 14:46 Dose:  17 grams


Ranitidine HCl (Zantac -)  150 mg PO DAILY [facility]


   Last Admin: 04/09/19 09:47 Dose:  150 mg


Tamsulosin HCl (Flomax -)  0.4 mg PO DAILY@0830 [facility]


   Last Admin: 04/09/19 08:51 Dose:  0.4 mg








A/P


Acute COPD Exacerbation


HTN


Hyperlipidemia


Obstructive Sleep Apnea





-  will decrease medrol to q12h


-  inhaled bronchodilators standing and PRN


-  O2 to keep SpO2 >90%


-  on empiric antibiotics


-  DVT prophylaxis

## 2019-04-09 NOTE — PN
Progress Note, Physician


Chief Complaint: 





PATIENT IN BED


WIFE BEDSIDE


BREATHING BETTER





- Current Medication List


Current Medications: 


Active Medications





Albuterol Sulfate (Ventolin 0.083% Nebulizer Soln -)  1 amp NEB Q4H PRN


   PRN Reason: SHORT OF BREATH/WHEEZING


Albuterol/Ipratropium (Duoneb -)  1 amp NEB RQID [facility]


   Last Admin: 04/09/19 12:31 Dose:  1 amp


Amlodipine Besylate (Norvasc -)  5 mg PO DAILY [facility]


   Last Admin: 04/09/19 09:47 Dose:  5 mg


Artificial Tears (Artificial Tears)  1 drop OU Q8H PRN


   PRN Reason: DRY EYES


Atorvastatin Calcium (Lipitor -)  20 mg PO HS [facility]


   Last Admin: 04/08/19 22:47 Dose:  20 mg


Budesonide/Formoterol Fumarate (Symbicort 160/4.5mcg -)  2 puff IH BID [facility]


   Last Admin: 04/09/19 09:50 Dose:  2 puff


Cefuroxime Axetil (Ceftin -)  500 mg PO BID [facility]


   Last Admin: 04/09/19 09:56 Dose:  500 mg


Fluticasone Propionate (Flonase -)  2 spray NS DAILY [facility]


   Last Admin: 04/09/19 09:51 Dose:  2 sprays


Guaifenesin/Codeine Phosphate (Robitussin Ac -)  5 ml PO TID [facility]


   PRN Reason: COUGH


   Last Admin: 04/07/19 14:29 Dose:  5 ml


Lactic Acid (Lac-Hydrin 12)  1 applic TP DAILY [facility]


   PRN Reason: xerosis


   Last Admin: 04/07/19 22:18 Dose:  1 applic


Losartan Potassium (Cozaar -)  50 mg PO DAILY [facility]


   Last Admin: 04/09/19 09:47 Dose:  50 mg


Melatonin (Melatonin)  15 mg PO HS PRN


   PRN Reason: INSOMNIA


   Last Admin: 04/08/19 22:51 Dose:  15 mg


Methylprednisolone Sodium Succinate (Solu-Medrol -)  40 mg IVPUSH Q12H [facility]


Polyethylene Glycol (Miralax (For Daily Use) -)  17 gm PO BID [facility]


   PRN Reason: CONSTIPATION


   Last Admin: 04/08/19 14:46 Dose:  17 grams


Ranitidine HCl (Zantac -)  150 mg PO DAILY [facility]


   Last Admin: 04/09/19 09:47 Dose:  150 mg


Tamsulosin HCl (Flomax -)  0.4 mg PO DAILY@0830 [facility]


   Last Admin: 04/09/19 08:51 Dose:  0.4 mg











- Objective


Vital Signs: 


 Vital Signs











Temperature  98.7 F   04/09/19 14:32


 


Pulse Rate  111 H  04/09/19 14:32


 


Respiratory Rate  20   04/09/19 14:32


 


Blood Pressure  140/65   04/09/19 14:32


 


O2 Sat by Pulse Oximetry (%)  95   04/09/19 12:30











Constitutional: Yes: Mild Distress


Eyes: Yes: WNL


HENT: Yes: WNL


Neck: Yes: WNL


Cardiovascular: Yes: Regular Rate and Rhythm


Respiratory: Yes: Diminished, Wheezes


Gastrointestinal: Yes: WNL


Genitourinary: Yes: Incontinence


Musculoskeletal: Yes: Muscle Weakness


Edema: No


Peripheral Pulses WNL: Yes


Integumentary: Yes: WNL


Wound/Incision: Yes: Clean/Dry


Neurological: Yes: Unsteady Gait


...Motor Strength: LLE, RLE


Psychiatric: Yes: WNL


Labs: 


 CBC, BMP





 04/09/19 06:00 





 04/09/19 06:17 











Problem List





- Problems


(1) Acute exacerbation of chronic obstructive pulmonary disease (COPD)


Code(s): J44.1 - CHRONIC OBSTRUCTIVE PULMONARY DISEASE W (ACUTE) EXACERBATION   





(2) Hypercarbia


Code(s): R06.89 - OTHER ABNORMALITIES OF BREATHING   





(3) Low oxygen saturation


Code(s): R79.81 - ABNORMAL BLOOD-GAS LEVEL   





(4) SOB (shortness of breath)


Code(s): R06.02 - SHORTNESS OF BREATH   





(5) Weakness


Code(s): R53.1 - WEAKNESS   





(6) HTN (hypertension)


Code(s): I10 - ESSENTIAL (PRIMARY) HYPERTENSION   


Qualifiers: 


 





(7) Pneumonia


Code(s): J18.9 - PNEUMONIA, UNSPECIFIED ORGANISM   


Qualifiers: 


   Pneumonia type: due to unspecified organism   Laterality: unspecified 

laterality   Lung location: unspecified part of lung   Qualified Code(s): J18.9 

- Pneumonia, unspecified organism   





Assessment/Plan





IV ABX


NEBS


02 SUPPORT


OOB TO CHAIR


PT EVAL


STEROIDS IV TAPER


INCENTIVE SPIROMETER


SNF PLACEMENT

## 2019-04-09 NOTE — PN
Progress Note, SLP





- Note


Progress Note: 


 Selected Entries











  04/09/19 04/09/19 04/09/19





  01:48 06:00 09:26


 


Breakfast   75%


 


Diet Tolerated   Well


 


Temperature 97.8 F 97.6 F 














  04/09/19





  10:00


 


Breakfast 


 


Diet Tolerated 


 


Temperature 98.0 F








 Laboratory Tests











  04/08/19 04/09/19





  05:10 06:00


 


WBC  18.8 H  11.3 H











MBS reviewed.


On Dys ground, sips of thin liquid


OME and swallowing exercises introduced.

## 2019-04-10 VITALS — HEART RATE: 106 BPM | SYSTOLIC BLOOD PRESSURE: 150 MMHG | TEMPERATURE: 97.5 F | DIASTOLIC BLOOD PRESSURE: 82 MMHG

## 2019-04-10 RX ADMIN — IPRATROPIUM BROMIDE AND ALBUTEROL SULFATE SCH AMP: .5; 3 SOLUTION RESPIRATORY (INHALATION) at 11:45

## 2019-04-10 RX ADMIN — AMLODIPINE BESYLATE SCH MG: 5 TABLET ORAL at 09:39

## 2019-04-10 RX ADMIN — GUAIFENESIN AND CODEINE PHOSPHATE PRN ML: 10; 100 LIQUID ORAL at 15:28

## 2019-04-10 RX ADMIN — IPRATROPIUM BROMIDE AND ALBUTEROL SULFATE SCH AMP: .5; 3 SOLUTION RESPIRATORY (INHALATION) at 07:50

## 2019-04-10 RX ADMIN — CEFUROXIME AXETIL SCH MG: 250 TABLET, FILM COATED ORAL at 09:40

## 2019-04-10 RX ADMIN — FLUTICASONE PROPIONATE SCH SPRAYS: 50 SPRAY, METERED NASAL at 09:39

## 2019-04-10 RX ADMIN — TAMSULOSIN HYDROCHLORIDE SCH MG: 0.4 CAPSULE ORAL at 09:39

## 2019-04-10 RX ADMIN — LOSARTAN POTASSIUM SCH MG: 50 TABLET, FILM COATED ORAL at 09:39

## 2019-04-10 RX ADMIN — METHYLPREDNISOLONE SODIUM SUCCINATE SCH MG: 40 INJECTION, POWDER, FOR SOLUTION INTRAMUSCULAR; INTRAVENOUS at 09:39

## 2019-04-10 RX ADMIN — RANITIDINE SCH MG: 150 TABLET ORAL at 09:39

## 2019-04-10 RX ADMIN — BUDESONIDE AND FORMOTEROL FUMARATE DIHYDRATE SCH PUFF: 160; 4.5 AEROSOL RESPIRATORY (INHALATION) at 09:39

## 2019-04-10 RX ADMIN — IPRATROPIUM BROMIDE AND ALBUTEROL SULFATE SCH AMP: .5; 3 SOLUTION RESPIRATORY (INHALATION) at 16:16

## 2019-04-10 NOTE — PN
Progress Note, SLP





- Note


Progress Note: 


 Selected Entries











  04/09/19 04/09/19 04/09/19





  01:48 06:00 09:26


 


Breakfast   75%


 


Diet Tolerated   Well


 


Temperature 97.8 F 97.6 F 














  04/09/19





  10:00


 


Breakfast 


 


Diet Tolerated 


 


Temperature 98.0 F








 Laboratory Tests











  04/08/19 04/09/19





  05:10 06:00


 


WBC  18.8 H  11.3 H








 Selected Entries











  04/10/19 04/10/19





  05:27 10:14


 


Breakfast  100%


 


Diet Tolerated  Well


 


Temperature 97.6 F 








 Laboratory Tests











  04/08/19 04/09/19





  05:10 06:00


 


WBC  18.8 H  11.3 H











MBS reviewed.


On Dys ground, sips of thin liquid


OME and swallowing exercises introduced.


Pending possible d/c to [facility]. 


Suggest continued swallowing tx at [facility].

## 2019-04-10 NOTE — PN
Progress Note (short form)





- Note


Progress Note: 





PULMONARY





Breathing about the same. +nonproductive cough. No fevers.





 Vital Signs











 Period  Temp  Pulse  Resp  BP Sys/Yun  Pulse Ox


 


 Last 24 Hr  97.5 F-98 F    20-21  135-152/65-82  93-95











Gen:  less tachypneic


Heart: RRR


Lung: distant breath sounds


Abd: soft, nontender


Ext: no edema





 CBC, BMP





 04/09/19 06:00 





 04/09/19 06:17 





Active Medications





Albuterol Sulfate (Ventolin 0.083% Nebulizer Soln -)  1 amp NEB Q4H PRN


   PRN Reason: SHORT OF BREATH/WHEEZING


Albuterol/Ipratropium (Duoneb -)  1 amp NEB RQID [facility]


   Last Admin: 04/10/19 11:45 Dose:  1 amp


Amlodipine Besylate (Norvasc -)  5 mg PO DAILY [facility]


   Last Admin: 04/10/19 09:39 Dose:  5 mg


Artificial Tears (Artificial Tears)  1 drop OU Q8H PRN


   PRN Reason: DRY EYES


Atorvastatin Calcium (Lipitor -)  20 mg PO HS [facility]


   Last Admin: 04/09/19 21:40 Dose:  20 mg


Budesonide/Formoterol Fumarate (Symbicort 160/4.5mcg -)  2 puff IH BID [facility]


   Last Admin: 04/10/19 09:39 Dose:  2 puff


Cefuroxime Axetil (Ceftin -)  500 mg PO BID [facility]


   Last Admin: 04/10/19 09:40 Dose:  500 mg


Fluticasone Propionate (Flonase -)  2 spray NS DAILY [facility]


   Last Admin: 04/10/19 09:39 Dose:  2 sprays


Guaifenesin/Codeine Phosphate (Robitussin Ac -)  5 ml PO TID PRN


   PRN Reason: COUGH


   Last Admin: 04/10/19 15:28 Dose:  5 ml


Lactic Acid (Lac-Hydrin 12)  1 applic TP DAILY PRN


   PRN Reason: xerosis


   Last Admin: 04/07/19 22:18 Dose:  1 applic


Losartan Potassium (Cozaar -)  50 mg PO DAILY [facility]


   Last Admin: 04/10/19 09:39 Dose:  50 mg


Melatonin (Melatonin)  15 mg PO HS PRN


   PRN Reason: INSOMNIA


   Last Admin: 04/09/19 21:41 Dose:  15 mg


Methylprednisolone Sodium Succinate (Solu-Medrol -)  40 mg IVPUSH Q12H [facility]


   Last Admin: 04/10/19 09:39 Dose:  40 mg


Polyethylene Glycol (Miralax (For Daily Use) -)  17 gm PO BID PRN


   PRN Reason: CONSTIPATION


   Last Admin: 04/08/19 14:46 Dose:  17 grams


Ranitidine HCl (Zantac -)  150 mg PO DAILY [facility]


   Last Admin: 04/10/19 09:39 Dose:  150 mg


Tamsulosin HCl (Flomax -)  0.4 mg PO DAILY@0830 [facility]


   Last Admin: 04/10/19 09:39 Dose:  0.4 mg








A/P


Acute COPD Exacerbation


HTN


Hyperlipidemia


Obstructive Sleep Apnea





-  can change steroids to PO prednisone 40mg daily 


-  inhaled bronchodilators standing and PRN


-  O2 to keep SpO2 >90%


-  on empiric antibiotics


-  DVT prophylaxis


-  d/c planning in progress

## 2019-04-10 NOTE — DS
Physical Examination


Vital Signs: 


 Vital Signs











Temperature  97.6 F   04/10/19 05:27


 


Pulse Rate  88   04/10/19 05:27


 


Respiratory Rate  20   04/10/19 05:27


 


Blood Pressure  152/67   04/10/19 05:27


 


O2 Sat by Pulse Oximetry (%)  93 L  04/09/19 20:24











Findings/Remarks: 





Patient is an 88 y/o male with past medical history of COPD on home O2, CAROLINA (

non compliant with Cpap), HTN, HLD, CHF, TIA, Prostate CA.  Patient presented 

to ER with SOB, hypotension, and generalized weakness.  Patient states having 

SOB for 2 weeks which he attributes to not taking his new COPD medication.  


Labs: 


 CBC, BMP





 04/09/19 06:00 





 04/09/19 06:17 











Discharge Summary


Reason For Visit: SOB,HYPERCAPNIA, HYPERCAPNIA,HYPOXEMIA REQ SUPPLEM


Current Active Problems





Acute exacerbation of chronic obstructive pulmonary disease (COPD) (Acute)


Hypercarbia (Acute)


Hypoxemia requiring supplemental oxygen (Acute)


Low oxygen saturation (Acute)


Requires oxygen therapy (Acute)


SOB (shortness of breath) (Acute)


Weakness (Acute)








Hospital Course: 





see progress notes


 Laboratory Tests











  04/04/19 04/04/19 04/04/19





  17:18 17:20 17:20


 


WBC   9.2 


 


RBC   5.56 


 


Hgb   16.5 


 


Hct   49.5 H D 


 


MCV   88.9 


 


MCH   29.8 


 


MCHC   33.5 


 


RDW   15.1  D 


 


Plt Count   355 


 


MPV   7.6 


 


Absolute Neuts (auto)   5.3 


 


Neutrophils %   57.3 


 


Lymphocytes %   22.8 


 


Monocytes %   12.1 H D 


 


Eosinophils %   6.4 H 


 


Basophils %   1.4 


 


Nucleated RBC %   0 


 


Anticoagulation Therapy   


 


Puncture Site   


 


ABG pH   


 


ABG pCO2 at Pt Temp   


 


ABG pO2 at Pt Temp   


 


ABG HCO3   


 


ABG O2 Sat (Measured)   


 


ABG O2 Content   


 


ABG Base Excess   


 


Sb Test   


 


Carboxyhemoglobin  1.4  


 


Methemoglobin  0.1  


 


O2 Delivery Device   


 


Oxygen Flow Rate   


 


Vent Mode   


 


Vent Rate   


 


Mechanical Rate   


 


Pressure Support Vent   


 


Sodium    138


 


Potassium    4.8


 


Chloride    99


 


Carbon Dioxide    33 H


 


Anion Gap    6 L


 


BUN    24 H


 


Creatinine    1.0


 


Creat Clearance w eGFR    70.68


 


Random Glucose    104


 


Calcium    9.0


 


Total Bilirubin    0.6


 


AST    17


 


ALT    24


 


Alkaline Phosphatase    118 H


 


Creatine Kinase    70


 


Troponin I    < 0.02


 


B-Natriuretic Peptide    158.6


 


Total Protein    7.8


 


Albumin    3.6


 


Vitamin B12   


 


TSH   


 


RPR Titer   














  04/04/19 04/05/19 04/05/19





  18:16 06:30 06:30


 


WBC   7.1 


 


RBC   5.63 H 


 


Hgb   16.7 


 


Hct   49.4 H 


 


MCV   87.8 


 


MCH   29.7 


 


MCHC   33.9 


 


RDW   14.9 


 


Plt Count   368 


 


MPV   7.6 


 


Absolute Neuts (auto)   3.6 


 


Neutrophils %   50.0 


 


Lymphocytes %   28.8  D 


 


Monocytes %   10.7 H 


 


Eosinophils %   9.1 H 


 


Basophils %   1.4 


 


Nucleated RBC %   0 


 


Anticoagulation Therapy  No Result Required.  


 


Puncture Site  Left radial  


 


ABG pH  7.43  


 


ABG pCO2 at Pt Temp  44.7  


 


ABG pO2 at Pt Temp  69.2 L  


 


ABG HCO3  29.4 H  


 


ABG O2 Sat (Measured)  94.0 L  


 


ABG O2 Content  20.2  


 


ABG Base Excess  4.8 H  


 


Sb Test  Positive  


 


Carboxyhemoglobin   


 


Methemoglobin   


 


O2 Delivery Device  No Result Required.  


 


Oxygen Flow Rate  3l  


 


Vent Mode  No Result Required.  


 


Vent Rate  No Result Required.  


 


Mechanical Rate  No Result Required.  


 


Pressure Support Vent  No Result Required.  


 


Sodium    139


 


Potassium    4.2


 


Chloride    99


 


Carbon Dioxide    33 H


 


Anion Gap    8


 


BUN    19 H


 


Creatinine    0.8


 


Creat Clearance w eGFR    91.44


 


Random Glucose    130 H


 


Calcium    9.6


 


Total Bilirubin   


 


AST   


 


ALT   


 


Alkaline Phosphatase   


 


Creatine Kinase   


 


Troponin I   


 


B-Natriuretic Peptide   


 


Total Protein   


 


Albumin   


 


Vitamin B12   


 


TSH   


 


RPR Titer   














  04/06/19 04/06/19 04/08/19





  05:00 05:00 05:10


 


WBC    18.8 H


 


RBC    5.60


 


Hgb    16.9


 


Hct    49.7 H


 


MCV    88.8


 


MCH    30.1


 


MCHC    33.9


 


RDW    14.6


 


Plt Count    424


 


MPV    7.5


 


Absolute Neuts (auto)   


 


Neutrophils %   


 


Lymphocytes %   


 


Monocytes %   


 


Eosinophils %   


 


Basophils %   


 


Nucleated RBC %   


 


Anticoagulation Therapy   


 


Puncture Site   


 


ABG pH   


 


ABG pCO2 at Pt Temp   


 


ABG pO2 at Pt Temp   


 


ABG HCO3   


 


ABG O2 Sat (Measured)   


 


ABG O2 Content   


 


ABG Base Excess   


 


Sb Test   


 


Carboxyhemoglobin   


 


Methemoglobin   


 


O2 Delivery Device   


 


Oxygen Flow Rate   


 


Vent Mode   


 


Vent Rate   


 


Mechanical Rate   


 


Pressure Support Vent   


 


Sodium   


 


Potassium   


 


Chloride   


 


Carbon Dioxide   


 


Anion Gap   


 


BUN   


 


Creatinine   


 


Creat Clearance w eGFR   


 


Random Glucose   


 


Calcium   


 


Total Bilirubin   


 


AST   


 


ALT   


 


Alkaline Phosphatase   


 


Creatine Kinase   


 


Troponin I   


 


B-Natriuretic Peptide   


 


Total Protein   


 


Albumin   


 


Vitamin B12  747  


 


TSH  0.69  


 


RPR Titer   Nonreactive 














  04/08/19 04/09/19 04/09/19





  05:10 06:00 06:17


 


WBC   11.3 H 


 


RBC   4.90 


 


Hgb   14.6 


 


Hct   42.9 


 


MCV   87.7 


 


MCH   29.8 


 


MCHC   34.0 


 


RDW   14.8 


 


Plt Count   260  D 


 


MPV   7.6 


 


Absolute Neuts (auto)   9.9 H 


 


Neutrophils %   87.7 H D 


 


Lymphocytes %   8.3  D 


 


Monocytes %   3.9 


 


Eosinophils %   0.0  D 


 


Basophils %   0.1 


 


Nucleated RBC %   0 


 


Anticoagulation Therapy   


 


Puncture Site   


 


ABG pH   


 


ABG pCO2 at Pt Temp   


 


ABG pO2 at Pt Temp   


 


ABG HCO3   


 


ABG O2 Sat (Measured)   


 


ABG O2 Content   


 


ABG Base Excess   


 


Sb Test   


 


Carboxyhemoglobin   


 


Methemoglobin   


 


O2 Delivery Device   


 


Oxygen Flow Rate   


 


Vent Mode   


 


Vent Rate   


 


Mechanical Rate   


 


Pressure Support Vent   


 


Sodium  137   140


 


Potassium  4.3   4.5


 


Chloride  98   99


 


Carbon Dioxide  33 H   36 H


 


Anion Gap  6 L   5 L


 


BUN  39 H   39 H


 


Creatinine  1.0   0.9


 


Creat Clearance w eGFR  70.68   79.82


 


Random Glucose  220 H   251 H


 


Calcium  9.7   9.1


 


Total Bilirubin    0.8


 


AST    21


 


ALT    46


 


Alkaline Phosphatase    70


 


Creatine Kinase   


 


Troponin I   


 


B-Natriuretic Peptide   


 


Total Protein    6.0 L


 


Albumin    2.8 L


 


Vitamin B12   


 


TSH   


 


RPR Titer   








Active Medications











Generic Name Dose Route Start Last Admin





  Trade Name Freq  PRN Reason Stop Dose Admin


 


Albuterol Sulfate  1 amp  04/08/19 10:56  





  Ventolin 0.083% Nebulizer Soln -  NEB   





  Q4H PRN   





  SHORT OF BREATH/WHEEZING   





     





     





     


 


Albuterol/Ipratropium  1 amp  04/08/19 12:00  04/10/19 11:45





  Duoneb -  NEB   1 amp





  RQID LAUREN   Administration





     





     





     





     


 


Amlodipine Besylate  5 mg  04/05/19 10:00  04/10/19 09:39





  Norvasc -  PO   5 mg





  DAILY LAUREN   Administration





     





     





     





     


 


Artificial Tears  1 drop  04/04/19 21:04  





  Artificial Tears  OU   





  Q8H PRN   





  DRY EYES   





     





     





     


 


Atorvastatin Calcium  20 mg  04/04/19 22:00  04/09/19 21:40





  Lipitor -  PO   20 mg





  HS LAUREN   Administration





     





     





     





     


 


Budesonide/Formoterol Fumarate  2 puff  04/04/19 23:45  04/10/19 09:39





  Symbicort 160/4.5mcg -  IH   2 puff





  BID LAUREN   Administration





     





     





     





     


 


Cefuroxime Axetil  500 mg  04/05/19 22:00  04/10/19 09:40





  Ceftin -  PO   500 mg





  BID [facility]UREN   Administration





     





     





     





     


 


Fluticasone Propionate  2 spray  04/05/19 10:00  04/10/19 09:39





  Flonase -  NS   2 sprays





  DAILY LAUREN   Administration





     





     





     





     


 


Guaifenesin/Codeine Phosphate  5 ml  04/07/19 14:13  04/07/19 14:29





  Robitussin Ac -  PO   5 ml





  TID PRN   Administration





  COUGH   





     





     





     


 


Lactic Acid  1 applic  04/07/19 21:18  04/07/19 22:18





  Lac-Hydrin 12  TP   1 applic





  DAILY PRN   Administration





  xerosis   





     





     





     


 


Losartan Potassium  50 mg  04/05/19 10:00  04/10/19 09:39





  Cozaar -  PO   50 mg





  DAILY LAUREN   Administration





     





     





     





     


 


Melatonin  15 mg  04/04/19 22:00  04/09/19 21:41





  Melatonin  PO   15 mg





  HS PRN   Administration





  INSOMNIA   





     





     





     


 


Methylprednisolone Sodium Succinate  40 mg  04/09/19 22:00  04/10/19 09:39





  Solu-Medrol -  IVPUSH   40 mg





  Q12H LAUREN   Administration





     





     





     





     


 


Polyethylene Glycol  17 gm  04/07/19 16:08  04/08/19 14:46





  Miralax (For Daily Use) -  PO   17 grams





  BID PRN   Administration





  CONSTIPATION   





     





     





     


 


Ranitidine HCl  150 mg  04/05/19 10:00  04/10/19 09:39





  Zantac -  PO   150 mg





  DAILY LAUREN   Administration





     





     





     





     


 


Tamsulosin HCl  0.4 mg  04/05/19 08:30  04/10/19 09:39





  Flomax -  PO   0.4 mg





  DAILY@0830 LAUREN   Administration





     





     





     





     








 Microbiology





04/05/19 18:45   Sputum - Expectorated   Gram Stain - Final


04/05/19 18:45   Sputum - Expectorated   Sputum Culture - Final


                            NORMAL RESPIRATORY STU


04/05/19 18:45   Nares - Mrsa Screen - Left   MRSA Screen - Final


                            NO MRSA ISOLATED








Condition: Stable





- Instructions


Diet, Activity, Other Instructions: 


Follow up with Pulmonologist


continue with medication regimen as prescribed


return to ER if severe respiratory distress, chest pain, AMS


Disposition: SKILLED NURSING FACILITY





- Home Medications


Comprehensive Discharge Medication List: 


Ambulatory Orders





Atorvastatin Ca [Lipitor] 20 mg PO HS 10/12/17 


Finasteride 5 mg PO DAILY 10/12/17 


Losartan Potassium 50 mg PO DAILY 10/12/17 


Tamsulosin HCl [Flomax] 0.4 mg PO DAILY 10/12/17 


Nystatin Oral Suspension - [Nystatin Oral Susp 981002 Units/5 ML -] 500,000 

units PO Q6H 10/24/17 


Acetaminophen [Tylenol .Regular Strength -] 650 mg PO Q6H PRN #0 tablet 10/27/

17 


Amlodipine Besylate [Norvasc -] 10 mg PO DAILY  tablet 10/27/17 


Carvedilol [Coreg -] 12.5 mg PO BID  tablet 10/27/17 


Meclizine HCl [Antivert -] 25 mg PO Q8H PRN #0 tablet 10/27/17 


Melatonin 5 mg PO HS  tab 10/27/17 


Omega-3 Acid Ethyl Esters [Lovaza -] 1 gm PO DAILY  cap 10/27/17 


Pantoprazole Sodium [Protonix -] 40 mg PO BID #30 tab 10/27/17 


Polyvinyl Alcohol [Artificial Tears] 1 drop OU TID PRN #0 bottle 10/27/17 


Simethicone [Mylicon -] 80 mg PO Q4H PRN #0 tab.chew 10/27/17

## 2019-06-17 ENCOUNTER — HOSPITAL ENCOUNTER (INPATIENT)
Dept: HOSPITAL 74 - JER | Age: 84
LOS: 10 days | Discharge: HOSPICE | DRG: 193 | End: 2019-06-27
Attending: FAMILY MEDICINE | Admitting: FAMILY MEDICINE
Payer: COMMERCIAL

## 2019-06-17 VITALS — BODY MASS INDEX: 19.3 KG/M2

## 2019-06-17 DIAGNOSIS — N18.9: ICD-10-CM

## 2019-06-17 DIAGNOSIS — Z85.46: ICD-10-CM

## 2019-06-17 DIAGNOSIS — J96.21: ICD-10-CM

## 2019-06-17 DIAGNOSIS — E78.5: ICD-10-CM

## 2019-06-17 DIAGNOSIS — J18.9: Primary | ICD-10-CM

## 2019-06-17 DIAGNOSIS — Z85.528: ICD-10-CM

## 2019-06-17 DIAGNOSIS — Z90.5: ICD-10-CM

## 2019-06-17 DIAGNOSIS — J44.0: ICD-10-CM

## 2019-06-17 DIAGNOSIS — N40.0: ICD-10-CM

## 2019-06-17 DIAGNOSIS — J44.1: ICD-10-CM

## 2019-06-17 DIAGNOSIS — K59.00: ICD-10-CM

## 2019-06-17 DIAGNOSIS — G47.00: ICD-10-CM

## 2019-06-17 DIAGNOSIS — J44.9: ICD-10-CM

## 2019-06-17 DIAGNOSIS — I12.9: ICD-10-CM

## 2019-06-17 LAB
ALBUMIN SERPL-MCNC: 3.5 G/DL (ref 3.4–5)
ALP SERPL-CCNC: 76 U/L (ref 45–117)
ALT SERPL-CCNC: 27 U/L (ref 13–61)
ANION GAP SERPL CALC-SCNC: 8 MMOL/L (ref 8–16)
ARTERIAL BLOOD GAS PCO2: 57.8 MMHG (ref 35–45)
ARTERIAL PATENCY WRIST A: POSITIVE
AST SERPL-CCNC: 21 U/L (ref 15–37)
BASE EXCESS BLDA CALC-SCNC: 3.4 MEQ/L (ref -2–2)
BASOPHILS # BLD: 0.5 % (ref 0–2)
BILIRUB SERPL-MCNC: 0.5 MG/DL (ref 0.2–1)
BNP SERPL-MCNC: 450.1 PG/ML (ref 5–450)
BUN SERPL-MCNC: 31.2 MG/DL (ref 7–18)
CALCIUM SERPL-MCNC: 8.9 MG/DL (ref 8.5–10.1)
CHLORIDE SERPL-SCNC: 103 MMOL/L (ref 98–107)
CO2 SERPL-SCNC: 29 MMOL/L (ref 21–32)
COHGB MFR BLD: 1.4 % (ref 0–2)
CREAT SERPL-MCNC: 1 MG/DL (ref 0.55–1.3)
DEPRECATED RDW RBC AUTO: 15.9 % (ref 11.9–15.9)
EOSINOPHIL # BLD: 0.9 % (ref 0–4.5)
GLUCOSE SERPL-MCNC: 155 MG/DL (ref 74–106)
HCT VFR BLD CALC: 41.7 % (ref 35.4–49)
HGB BLD-MCNC: 13.8 GM/DL (ref 11.7–16.9)
LYMPHOCYTES # BLD: 13.8 % (ref 8–40)
MCH RBC QN AUTO: 28.9 PG (ref 25.7–33.7)
MCHC RBC AUTO-ENTMCNC: 32.9 G/DL (ref 32–35.9)
MCV RBC: 87.8 FL (ref 80–96)
MONOCYTES # BLD AUTO: 10.5 % (ref 3.8–10.2)
NEUTROPHILS # BLD: 74.3 % (ref 42.8–82.8)
PLATELET # BLD AUTO: 323 K/MM3 (ref 134–434)
PMV BLD: 7.3 FL (ref 7.5–11.1)
PO2 BLDA: 53.8 MMHG (ref 80–105)
POTASSIUM SERPLBLD-SCNC: 4 MMOL/L (ref 3.5–5.1)
PROT SERPL-MCNC: 6.6 G/DL (ref 6.4–8.2)
RBC # BLD AUTO: 4.76 M/MM3 (ref 4–5.6)
SAO2 % BLDA: 83.9 % (ref 95–98)
SODIUM SERPL-SCNC: 140 MMOL/L (ref 136–145)
WBC # BLD AUTO: 12.7 K/MM3 (ref 4–10)

## 2019-06-17 RX ADMIN — Medication SCH MG: at 23:11

## 2019-06-17 RX ADMIN — ATORVASTATIN CALCIUM SCH MG: 20 TABLET, FILM COATED ORAL at 23:10

## 2019-06-17 RX ADMIN — SODIUM CHLORIDE, POTASSIUM CHLORIDE, SODIUM LACTATE AND CALCIUM CHLORIDE SCH MLS/HR: 600; 310; 30; 20 INJECTION, SOLUTION INTRAVENOUS at 21:09

## 2019-06-17 RX ADMIN — CARVEDILOL SCH MG: 12.5 TABLET, FILM COATED ORAL at 23:10

## 2019-06-17 RX ADMIN — IPRATROPIUM BROMIDE AND ALBUTEROL SULFATE SCH AMP: .5; 3 SOLUTION RESPIRATORY (INHALATION) at 21:13

## 2019-06-17 RX ADMIN — PANTOPRAZOLE SODIUM SCH MG: 40 TABLET, DELAYED RELEASE ORAL at 23:10

## 2019-06-17 RX ADMIN — SENNOSIDES SCH TAB: 8.6 TABLET, FILM COATED ORAL at 23:10

## 2019-06-17 NOTE — PDOC
History of Present Illness





- General


Chief Complaint: Shortness of Breath


Stated Complaint: TROUBLE BREATHING


Time Seen by Provider: 06/17/19 15:26


History Source: Patient, EMS, Spouse (Wife present at bedside.)


Exam Limitations: No Limitations





- History of Present Illness


Initial Comments: 





HPI: 





88 y/o male BIBEMS to [facility] ER from home complaining of shortness of breath and 

productive cough (clear sputum) worsening for the past week, when he was 

discharged from [facility]. Pt reports feeling weak with decreased appetite. 

Denies fevers, chills, or chest pain. Was evaluated by home nurse today who 

expressed concern for the pts work of breathing and skin color. 





EMS reports the pt was found to be hypoxic to the low 80s on room air. 

Saturation improved with Duo oxygen therapy. Was given Decadron and DuoNeb x1 

prior to arrival.    





PCP: Dr. Mushtaq Abreu Hx: 


- Former smoker, stopped >30 years ago





Medical Hx: 


- COPD w/ chronic cough


- CHF


- CAROLINA, prescribed C-PAP but not using for past 6 months


- HTN


- HTD


- TIA


- Prostate CA


- H/o Renal Cell CA s/p R nephrectomy





Review of Systems: 


In addition to that documented in the HPI above, the additional ROS was obtained

:


Constitutional: Denies fevers or chills


Head: Denies vision changes


ENMT: Denies sore throat


CV: Denies chest pain


Resp: Per HPI


GI: Denies vomiting or diarrhea


: Denies painful urination


MSK: Denies recent trauma


Skin: Denies new rashes


Neuro: Denies new numbness or tingling or weakness


Endocrine: Denies polyuria


Heme: Denies bleeding or bruising








Physical Examination: 


Constitutional: Elderly adult male in mild respiratory distress. Found semi-

fowlers on hospital bed. Alert and oriented x4. Speaking in short, few word 

responses.  





Head: Normocephalic. No obvious external signs of trauma. 





Cardiovascular / Chest: Tachycardic rate with regular rhythm. No murmur, rubs, 

clicks, or gallops. Peripheral pulses: radial pulses full. Trace pretibial 

edema bilaterally.  


 


Respiratory: Breathing shallow and tachypneic. Equal chest rise and fall. 

Rhonchi appreciated over right anterior lung field. No rales appreciated. No 

stridor. 


 


Neuro: Alert and oriented. Moving all four extremities spontaneously. 


  


Skin: Warm, dry, and intact. 


 


Psych: Affect: appropriate.  Mood: normal.








MDM: 


*Reviewed vital signs, nursing notes, and prior visit documentation (if 

available).





National Early Warning Score (NEWS) 2RESULT SUMMARY: Medium risk (6 points)


INPUTS:


Respiratory rate, breaths per minute > 2 = 21-24


Hypercapnic respiratory failure > 1 = Yes


SpO? > 2 = 84-85%


Room air or supplemental O? > 0 = Room air


Temperature > 0 = 36.1-38.0C (96.9-100.4F)


Systolic BP, mmHg > 0 = 111-219


Pulse, beats per minute > 2 = 111-130


Consciousness > 0 = Alert





88 y/o male presenting with progressively worsening SOB and productive cough x1 

week. Hypoxic on room air. In mild acute respiratory distress on arrival. 

Improved with Duoneb and venti mask. Afebrile. Vitals remarkable for mild 

tachycardia without hypotension. Physical exam as described above. NEWS2 Score 

6. Low suspicion for septic infection. Lactic acid mildly elevated but suspect 

secondary to DuoNeb administration. ABG revealed respiratory acidosis with 

partial compensation. Likely mixed acute and chronic. Held BiPAP given rapid 

clinical improvement on Ventimask. CXR revealed possible signs of pneumonia. 

Chest CT with IV contrast revealed bibasilar consolidations suggestive for 

pneumonia, as well as COPD changes. Already received steroids by EMS. Ordered 

Ceftriaxone and Azithromycin for pneumonia in the setting of suspected acute 

COPD exacerbation. Will admit pt as he is requiring supplemental oxygen to 

maintain SPO2 >90%. Does not use home oxygen. 





Telephone consultation with [facility] NP. Verbally appraised of the pts HPI, ED 

course, and current plan of management. Will admit pt to med/surg for attending 

Dr. Reynoso. 





Evaristo Benedict M.D., PGY1


Emergency Medicine Resident 











Past History





- Past Medical History


Allergies/Adverse Reactions: 


 Allergies











Allergy/AdvReac Type Severity Reaction Status Date / Time


 


No Known Drug Allergies Allergy   Verified 06/17/19 16:04











Home Medications: 


Ambulatory Orders





Finasteride 5 mg PO DAILY 10/12/17 


Losartan Potassium 50 mg PO DAILY 10/12/17 


Tamsulosin HCl [Flomax] 0.4 mg PO DAILY 10/12/17 


Nystatin Oral Suspension - [Nystatin Oral Susp 710481 Units/5 ML -] 500,000 

units PO Q6H 10/24/17 


Acetaminophen [Tylenol .Regular Strength -] 650 mg PO Q6H PRN #0 tablet 10/27/

17 


Amlodipine Besylate [Norvasc -] 10 mg PO DAILY  tablet 10/27/17 


Meclizine HCl [Antivert -] 25 mg PO Q8H PRN #0 tablet 10/27/17 


Melatonin 5 mg PO HS  tab 10/27/17 


Omega-3 Acid Ethyl Esters [Lovaza -] 1 gm PO DAILY  cap 10/27/17 


Pantoprazole Sodium [Protonix -] 40 mg PO BID #30 tab 10/27/17 


Simethicone [Mylicon -] 80 mg PO Q4H PRN #0 tab.chew 10/27/17 


Albuterol 0.083% Nebulizer Sol [Ventolin 0.083% Nebulizer Soln -] 1 amp NEB Q4H 

PRN  amp 04/10/19 


Albuterol 2.5/Ipratropium 0.5 [Duoneb -] 1 amp NEB RQID  amp 04/10/19 


Amlodipine Besylate [Norvasc -] 5 mg PO DAILY  tablet 04/10/19 


Ammonium Lactate Lotion [Lac-Hydrin 12] 1 applic TP DAILY PRN  bottle 04/10/19 


Atorvastatin Ca [Lipitor] 20 mg PO HS  tablet 04/10/19 


Budesonide/Formeterol Fumarate [SYMBICORT 160/4.5mcg -] 2 puff IH BID  inhaler 

04/10/19 


Cefuroxime Axetil [Ceftin -] 500 mg PO BID  tablet 04/10/19 


Fluticasone Prop 0.05% Nasal [Flonase -] 2 spray NS DAILY  spray 04/10/19 


Guaifenesin AC [Robitussin AC -] 5 ml PO TID PRN #1 bottle MDD 15ml 04/10/19 


Melatonin 15 mg PO HS PRN  tab 04/10/19 


Polyethylene Glycol 3350 [Miralax 119 gm Btl -] 17 gm PO BID PRN  bottle 04/10/

19 


Polyvinyl Alcohol [Artificial Tears] 1 drop OU Q8H PRN  drops 04/10/19 


Ranitidine [Zantac -] 150 mg PO DAILY  tablet 04/10/19 


Carvedilol [Coreg -] 12.5 mg PO BID 06/17/19 


Furosemide [Lasix] 40 mg PO DAILY 06/17/19 








Anemia: No


Asthma: No


Cancer: Yes (prostate cancer,kidney cancer)


Cardiac Disorders: No


CVA: Yes (TIA,2002)


COPD: Yes (mild,USES ALBUTEROL AS NEEDED, bipap at night.)


CHF: Yes


Dementia: No


Diabetes: No


GI Disorders: Yes (COLON POLYPS, ISCHEMIC COLITIS, DIVERTICULOSIS)


 Disorders: Yes (PROSTATE CA)


HTN: Yes


Hypercholesterolemia: Yes


Liver Disease: No


Seizures: No


Thyroid Disease: No





- Surgical History


Abdominal Surgery: Yes (BILATERAL  INGUINAL HERNIA)


Appendectomy: Yes


Cardiac Surgery: No


Cholecystectomy: Yes (08/23/2017)


Lung Surgery: No


Neurologic Surgery: Yes (Cervical FUSION)


Orthopedic Surgery: Yes (NECK SURGERY, L knee sx 12/8)





- Immunization History


Immunization Up to Date: Yes





- Suicide/Smoking/Psychosocial Hx


Smoking Status: Yes


Smoking History: Former smoker


Have you smoked in the past 12 months: No


Number of Cigarettes Smoked Daily: 0


If you are a former smoker, when did you quit?: 1982


Hx Alcohol Use: No


Drug/Substance Use Hx: No


Substance Use Type: None


Hx Substance Use Treatment: No





ED Treatment Course





- LABORATORY


CBC & Chemistry Diagram: 


 06/17/19 15:45





 06/17/19 15:45





- RADIOLOGY


Radiology Studies Ordered: 














 Category Date Time Status


 


 CHEST X-RAY PORTABLE* [RAD] [facility]  06/17/19 15:47 Ordered














*DC/Admit/Observation/Transfer


Diagnosis at time of Disposition: 


 COPD exacerbation, Hypoxemia requiring supplemental oxygen





Pneumonia


Qualifiers:


 Pneumonia type: due to unspecified organism Laterality: right Lung location: 

unspecified part of lung Qualified Code(s): J18.9 - Pneumonia, unspecified 

organism








- Discharge Dispostion


Condition at time of disposition: Stable


Decision to Admit order: Yes





- Referrals


Referrals: 


Álvaro Landin MD [Primary Care Provider] - 





- Patient Instructions





- Post Discharge Activity

## 2019-06-17 NOTE — HP
Admitting History and Physical





- Primary Care Physician


PCP: Jessica Reynoso





- Admission


Chief Complaint: SOB, cough


History of Present Illness: 


87 Year old white male with PMH of prostate cancer, renal cancer S/p left 

nephroectomy, TIA(), COPD on home O2, HTN, HLD, colonic polyp, ischemic 

colitis, diverticulosis and cholecyctectomy, pneumonia (fungal ), DIC, presents 

to ED for evaluation for dyspnea. 


Pt was recently hospitalized 2019 for COPD exacerbation and was 

discharged to SNF. He was discharged home last week from [facility] and assessed by 

the [facility] today, . EMS was activated by [facility] for pt's labored breathing and 

pallor. Upon arrival, EMS noted O2 sat mid 80s, decadron 10mg and albuterol 

nebs administered with improvement in respiratory status. 


Mr. Mcdermott denies fevers, chills, nausea, vomiting, diarrhea, abdominal pain, 

palpitations, chest pain, dizziness, or changes in strength or sensation.





In ED: 


Pt given Azithromycin 500mg + rocephin 1g + NS 500ml


AB.34/57.8/53.8/30.3/83.9, pt placed on ventimask due to significant 

improvement in resp status


CXR + mild bibasal atelectatic changes. CT chest with moderately severe COPD 

changes


decision made to admit for continued management











History Source: Patient


Limitations to Obtaining History: No Limitations





- Past Medical History


CNS: Yes: Alzheimer's, TIA


Cardiovascular: Yes: AFIB, HTN, Hyperlipdemia, Murmur (tricuspid regurgitation)


Pulmonary: Yes: COPD, Pneumonia (fungal), Other (chronic cough)


Gastrointestinal: Yes: Diverticulitis, Other (cholecystitis, ischemic colitis, 3

/14 sigmoid adenoma removed, SBO 12/15)


Hepatobiliary: Yes: Cholelithiasis, Cholecystitis ( cholecystostomy tube,  lap choly), Choledocholithiasis


Renal/: Yes: Cancer (Prostate/KIDNEY), Neurogenic Bladder


Psych: Yes: Anxiety


Rheumatology: Yes: Other (arthritis knees)





- Past Surgical History


Past Surgical History: Yes: Appendectomy, Cataract Removal, Colonoscopy (last 1 

yrs ago, lynn 2 polyps. h/o ischemic colitis 3,2 and ?years ago), Hernia 

Repair (b/l), Joint Replacement (left knee makoplasty), Laminectomy (cervical 

fusion), Nephrectomy (left)





- Smoking History


Smoking history: Former smoker


Have you smoked in the past 12 months: No


Aproximately how many cigarettes per day: 40 ( 40years)


If you are a former smoker, when did you quit?: 





- Alcohol/Substance Use


Hx Alcohol Use: No


History of Substance Use: reports: None





- Social History


Usual Living Arrangement: Yes: With Spouse


ADL: Independent


Occupation: retired electronics salesman


History of Recent Travel: No





Home Medications





- Allergies


Allergies/Adverse Reactions: 


 Allergies











Allergy/AdvReac Type Severity Reaction Status Date / Time


 


No Known Drug Allergies Allergy   Verified 19 16:04














- Home Medications


Home Medications: 


Ambulatory Orders





Finasteride 5 mg PO DAILY 10/12/17 


Tamsulosin HCl [Flomax] 0.4 mg PO DAILY 10/12/17 


Acetaminophen [Tylenol .Regular Strength -] 650 mg PO Q6H PRN #0 tablet 10/27/

17 


Meclizine HCl [Antivert -] 25 mg PO Q8H PRN #0 tablet 10/27/17 


Melatonin 5 mg PO HS  tab 10/27/17 


Omega-3 Acid Ethyl Esters [Lovaza -] 1 gm PO DAILY  cap 10/27/17 


Pantoprazole Sodium [Protonix -] 40 mg PO BID #30 tab 10/27/17 


Simethicone [Mylicon -] 80 mg PO Q4H PRN #0 tab.chew 10/27/17 


Albuterol 0.083% Nebulizer Sol [Ventolin 0.083% Nebulizer Soln -] 1 amp NEB Q4H 

PRN  amp 04/10/19 


Albuterol 2.5/Ipratropium 0.5 [Duoneb -] 1 amp NEB RQID  amp 04/10/19 


Ammonium Lactate Lotion [Lac-Hydrin 12] 1 applic TP DAILY PRN  bottle 04/10/19 


Budesonide/Formeterol Fumarate [SYMBICORT 160/4.5mcg -] 2 puff IH BID  inhaler 

04/10/19 


Cefuroxime Axetil [Ceftin -] 500 mg PO BID  tablet 04/10/19 


Fluticasone Prop 0.05% Nasal [Flonase -] 2 spray NS DAILY  spray 04/10/19 


Guaifenesin AC [Robitussin AC -] 5 ml PO TID PRN #1 bottle MDD 15ml 04/10/19 


Melatonin 15 mg PO HS PRN  tab 04/10/19 


Polyethylene Glycol 3350 [Miralax 119 gm Btl -] 17 gm PO BID PRN  bottle 04/10/

19 


Polyvinyl Alcohol [Artificial Tears] 1 drop OU Q8H PRN  drops 04/10/19 


Ranitidine [Zantac -] 150 mg PO DAILY  tablet 04/10/19 


Carvedilol [Coreg -] 12.5 mg PO BID 19 


Furosemide [Lasix] 40 mg PO DAILY 19 











Family Disease History





- Family Disease History


Family Disease History: Diabetes: Mother ( 70 diabetic complications), Other

: Father ( 70 of Park[place] ), Brother (3 siblings with cancer ? types)





Review of Systems





- Review of Systems


Constitutional: reports: Lethargy, Weakness


Eyes: reports: No Symptoms


HENT: reports: No Symptoms


Neck: reports: No Symptoms


Cardiovascular: reports: No Symptoms


Respiratory: reports: Cough, SOB


Gastrointestinal: reports: No Symptoms


Genitourinary: reports: No Symptoms


Breasts: reports: No Symptoms Reported


Musculoskeletal: reports: Muscle Weakness


Integumentary: reports: No Symptoms


Neurological: reports: Unsteady Gait


Endocrine: reports: No Symptoms


Hematology/Lymphatic: reports: No Symptoms


Psychiatric: reports: Altered Sleep Pattern





Physical Examination


Vital Signs: 


 Vital Signs











Temperature  99.1 F   19 16:45


 


Pulse Rate  103 H  19 16:00


 


Respiratory Rate  23 H  19 16:00


 


Blood Pressure  160/52 L  19 16:00


 


O2 Sat by Pulse Oximetry (%)  93 L  19 16:00











Constitutional: Yes: No Distress, Calm, Thin


Eyes: Yes: Conjunctiva Clear, PERRL


HENT: Yes: Other (oral thrush)


Neck: Yes: Supple


Cardiovascular: Yes: Regular Rate and Rhythm


Respiratory: Yes: Regular, CTA Bilaterally, Diminished (at bases)


Gastrointestinal: Yes: Soft, Hypoactive Bowel Sounds


...Rectal Exam: Yes: WNL


Renal/: Yes: Incontinence


Musculoskeletal: Yes: Joint Stiffness, Muscle Weakness


Extremities: Yes: WNL


Edema: No


Peripheral Pulses WNL: Yes


Peripheral Pulses: Left Radial: 2+, Right Radial: 2+, Left Doralis Pedis: 2+, 

Right Dorsalis Pedis: 2+


Integumentary: Yes: WNL


Neurological: Yes: Weakness


...Motor Strength: WNL


Psychiatric: Yes: Alert, Oriented


Labs: 


 CBC, BMP





 19 15:45 





 19 15:45 











Imaging





- Results


Chest X-ray: Report Reviewed (CXR 2019 Impression:  Impression:     Mild 

bibasal atelectatic changes. Cannot rule out pneumonic infiltrates. Follow-up 

is needed. Reported By: Mercy Morrissey MD   [id number])


Cat Scan: Report Reviewed (Chest CT 2019 impression:  Moderately severe 

COPD changes are again noted. Previously visualized nodule in the right upper 

lobe, posteriorly is again seen measuring 9 mm and without definite interval 

change when measured at the same level. There is interval bibasal consolidation 

and minimal right pleural effusion. The heart remains slightly to moderately 

enlarged. Calcification of the coronary arteries again seen. Prominent 

atheromatous plaques with calcifications are again seen in the aortic arch and 

descending aorta. At the distal descending aorta level, there is significant 

narrowing of its lumen, approximately 60%. There are also prominent 

atheromatous plaques in the included proximal abdominal aorta. Included upper 

abdomen appears grossly unremarkable. Reported By: Mercy Morrissey MD  [id number])





Problem List





- Problems


(1) Pneumonia


Assessment/Plan: 


azithromycin + ceftriaxone


trend WBC and temp curve


chest PT


IS


OOB to chair


robitussin TID PRN cough


Code(s): J18.9 - PNEUMONIA, UNSPECIFIED ORGANISM   


Qualifiers: 


   Pneumonia type: due to unspecified organism   Laterality: right   Lung 

location: unspecified part of lung   Qualified Code(s): J18.9 - Pneumonia, 

unspecified organism   





(2) COPD (chronic obstructive pulmonary disease)


Assessment/Plan: 


BIPAP 10/5, 40%


serial ABGs


solu-medrol 60mg q8hrs


pulm consult placed


continue symbicort 160/4.5mg BID


duonebs as needed








Code(s): J44.9 - CHRONIC OBSTRUCTIVE PULMONARY DISEASE, UNSPECIFIED   


Qualifiers: 


   COPD type: unspecified COPD   Qualified Code(s): J44.9 - Chronic obstructive 

pulmonary disease, unspecified   





(3) HLD (hyperlipidemia)


Assessment/Plan: 


continue lovaza and lipitor


cardiac diet


Code(s): E78.5 - HYPERLIPIDEMIA, UNSPECIFIED   


Qualifiers: 


 





(4) HTN (hypertension)


Assessment/Plan: 


continue norvasc and losartan


lasix 40mg daily


coreg 12.5mg BID


Code(s): I10 - ESSENTIAL (PRIMARY) HYPERTENSION   


Qualifiers: 


 





(5) Insomnia


Assessment/Plan: 


PRN melatonin


Code(s): G47.00 - INSOMNIA, UNSPECIFIED   





(6) Prophylactic measure


Assessment/Plan: 


Lachydrin TID


Senna/colace


simethicone PRN indigestion


continue flonase and artificial tears


PPI daily


Code(s): Z29.9 - ENCOUNTER FOR PROPHYLACTIC MEASURES, UNSPECIFIED   





(7) BPH (benign prostatic hyperplasia)


Assessment/Plan: 


proscar 5mg daily


Code(s): N40.0 - BENIGN PROSTATIC HYPERPLASIA WITHOUT LOWER URINRY TRACT SYMP   





(8) Constipation


Assessment/Plan: 


PRN miralax


Code(s): K59.00 - CONSTIPATION, UNSPECIFIED   





Assessment/Plan


Code status: Full








Visit type





- Emergency Visit


Emergency Visit: Yes


ED Registration Date: 19


Care time: The patient presented to the Emergency Department on the above date 

and was hospitalized for further evaluation of their emergent condition.





- New Patient


This patient is new to me today: Yes


Date on this admission: 19





- Critical Care


Critical Care patient: No

## 2019-06-17 NOTE — PDOC
Documentation entered by Marisela Milligan SCRIBE, acting as scribe for Kehinde Paige MD.








Kehinde Paige MD:  This documentation has been prepared by the scribe, Marisela Milligan SCRIBE, under my direction and personally reviewed by me in its 

entirety.  I confirm that the documentation accurately reflects all work, 

treatment, procedures, and medical decision making performed by me.  





Attending Attestation





- Resident


Resident Name: Evaristo Benedict





- ED Attending Attestation


I have performed the following: I have examined & evaluated the patient, The 

case was reviewed & discussed with the resident, I agree w/resident's findings 

& plan, Exceptions are as noted





- HPI


HPI: 





06/17/19 16:33


The patient is an 87-year-old male, with a past medical of COPD (on O2 PRN), CHF

, [place], HTN, HL, TIA, prostate ca, hx of RCC s/p right nephrectomy, who presents 

to the ED with shortness of breath. The patient was discharged from rehab and 

has been home for less than a week. He now has a mild cough productive of 

yellow sputum; he reports pain in his chest when he coughs.The patient has a 

visiting nurse who came today and noted that his breathing was much more 

labored today. EMS was called and upon their arrival they noted the patient to 

be cyanotic. He was also noted to be hypoxic in the mid 80s and 10 of decadron 

and albuterol were given with improvement in his respiratory status. Denies any 

lower extremity pain or swelling. 





The patient denies fevers, chills, nausea, vomiting, diarrhea, or abdominal 

pain. Denies any palpitations. Denies any weakness, dizziness, or changes in 

strength or sensation.





Allergies: NKDA


PCP: Dr. Landin








- Physicial Exam


PE: 





06/17/19 16:08


GENERAL: The patient is awake, alert, and fully oriented moderate respiratory


HEAD: Normocephalic, atraumatic.


EYES: extraocular movements intact, sclera anicteric, conjunctiva clear.


ENT: Normal voice,  Moist mucous membranes.


NECK: Normal range of motion, supple


LUNGS: rhonchorus breat sounds


HEART: Regular rate and rhythm, normal S1 and S2 without murmur, rub or gallop.


ABDOMEN: Soft, nontender,


EXTREMITIES: Normal range of motion, trace edema in RLE, neg homans, neg calf 

tenderness


NEUROLOGICAL: No facial assymetry, Normal speech, moving all 4 extremities 

spontaneously and symmetrically 


PSYCH: Normal mood, normal affect.


SKIN: Warm, Dry, normal turgor,








- Medical Decision Making





06/17/19 16:07


87y M pmhx of COPD (on O2 PRN), CHF, [place], HTN, HL, TIA, prostate ca, hx of RCC 

sp R nephrectomy presents for complaint of sob. Per family, pt was having 

increased cough for a few days productive of yellowish sputum, was seen by 

visiting nurse who recommended the pt come to [facility] for evaluation. The pt 

denies any fever/chills, hemoptysis, calf pain.  EMSnoted pt was hypoxic and a 

bit cyantoic that improved with nebs/decadron/supplemental O2.





Upon arrival pt was in moderate respiraotory distrss


pulm exam noted for rhonchi


abd soft notnender


msk: trace edema on RLE





ddx - copd exacerbation vs pna 


will obtain blood work, cxr, sepsis workup


will continue nebs





signed out to evening team to fu with results and dispo pt





**Heart Score/ECG Review





- ECG Impressions


Comment:: 





06/17/19 17:16


rate of 103 w pvcs


left axis deviation


lvh

## 2019-06-18 LAB
ALBUMIN SERPL-MCNC: 3.1 G/DL (ref 3.4–5)
ALP SERPL-CCNC: 71 U/L (ref 45–117)
ALT SERPL-CCNC: 23 U/L (ref 13–61)
ANION GAP SERPL CALC-SCNC: 6 MMOL/L (ref 8–16)
ARTERIAL BLOOD GAS PCO2: 62.7 MMHG (ref 35–45)
ARTERIAL PATENCY WRIST A: POSITIVE
AST SERPL-CCNC: 14 U/L (ref 15–37)
BASE EXCESS BLDA CALC-SCNC: 5.8 MEQ/L (ref -2–2)
BASOPHILS # BLD: 0.1 % (ref 0–2)
BILIRUB SERPL-MCNC: 0.3 MG/DL (ref 0.2–1)
BUN SERPL-MCNC: 28 MG/DL (ref 7–18)
CALCIUM SERPL-MCNC: 8.5 MG/DL (ref 8.5–10.1)
CHLORIDE SERPL-SCNC: 103 MMOL/L (ref 98–107)
CO2 SERPL-SCNC: 34 MMOL/L (ref 21–32)
CREAT SERPL-MCNC: 0.8 MG/DL (ref 0.55–1.3)
DEPRECATED RDW RBC AUTO: 15.1 % (ref 11.9–15.9)
EOSINOPHIL # BLD: 0 % (ref 0–4.5)
GLUCOSE SERPL-MCNC: 153 MG/DL (ref 74–106)
HCT VFR BLD CALC: 39.9 % (ref 35.4–49)
HGB BLD-MCNC: 13.5 GM/DL (ref 11.7–16.9)
LYMPHOCYTES # BLD: 5.9 % (ref 8–40)
MAGNESIUM SERPL-MCNC: 2.4 MG/DL (ref 1.8–2.4)
MCH RBC QN AUTO: 29.4 PG (ref 25.7–33.7)
MCHC RBC AUTO-ENTMCNC: 33.8 G/DL (ref 32–35.9)
MCV RBC: 87 FL (ref 80–96)
MONOCYTES # BLD AUTO: 2.5 % (ref 3.8–10.2)
NEUTROPHILS # BLD: 91.5 % (ref 42.8–82.8)
PHOSPHATE SERPL-MCNC: 5.3 MG/DL (ref 2.5–4.9)
PLATELET # BLD AUTO: 304 K/MM3 (ref 134–434)
PLATELET BLD QL SMEAR: ADEQUATE
PMV BLD: 7.2 FL (ref 7.5–11.1)
PO2 BLDA: 128 MMHG (ref 80–105)
POTASSIUM SERPLBLD-SCNC: 4.3 MMOL/L (ref 3.5–5.1)
PROT SERPL-MCNC: 6 G/DL (ref 6.4–8.2)
RBC # BLD AUTO: 4.58 M/MM3 (ref 4–5.6)
SAO2 % BLDA: 98.6 % (ref 95–98)
SODIUM SERPL-SCNC: 142 MMOL/L (ref 136–145)
WBC # BLD AUTO: 13.6 K/MM3 (ref 4–10)

## 2019-06-18 RX ADMIN — SENNOSIDES SCH TAB: 8.6 TABLET, FILM COATED ORAL at 21:31

## 2019-06-18 RX ADMIN — FINASTERIDE SCH MG: 5 TABLET, FILM COATED ORAL at 09:13

## 2019-06-18 RX ADMIN — PANTOPRAZOLE SODIUM SCH MG: 40 TABLET, DELAYED RELEASE ORAL at 21:31

## 2019-06-18 RX ADMIN — CARVEDILOL SCH MG: 12.5 TABLET, FILM COATED ORAL at 21:31

## 2019-06-18 RX ADMIN — BUDESONIDE AND FORMOTEROL FUMARATE DIHYDRATE SCH: 160; 4.5 AEROSOL RESPIRATORY (INHALATION) at 00:33

## 2019-06-18 RX ADMIN — TAMSULOSIN HYDROCHLORIDE SCH MG: 0.4 CAPSULE ORAL at 09:41

## 2019-06-18 RX ADMIN — IPRATROPIUM BROMIDE AND ALBUTEROL SULFATE SCH AMP: .5; 3 SOLUTION RESPIRATORY (INHALATION) at 07:15

## 2019-06-18 RX ADMIN — INSULIN ASPART SCH UNIT: 100 INJECTION, SOLUTION INTRAVENOUS; SUBCUTANEOUS at 21:31

## 2019-06-18 RX ADMIN — SODIUM CHLORIDE, POTASSIUM CHLORIDE, SODIUM LACTATE AND CALCIUM CHLORIDE SCH: 600; 310; 30; 20 INJECTION, SOLUTION INTRAVENOUS at 19:00

## 2019-06-18 RX ADMIN — Medication SCH MG: at 21:31

## 2019-06-18 RX ADMIN — SODIUM CHLORIDE, POTASSIUM CHLORIDE, SODIUM LACTATE AND CALCIUM CHLORIDE SCH MLS/HR: 600; 310; 30; 20 INJECTION, SOLUTION INTRAVENOUS at 09:42

## 2019-06-18 RX ADMIN — IPRATROPIUM BROMIDE AND ALBUTEROL SULFATE SCH AMP: .5; 3 SOLUTION RESPIRATORY (INHALATION) at 20:58

## 2019-06-18 RX ADMIN — AMLODIPINE BESYLATE SCH MG: 10 TABLET ORAL at 09:12

## 2019-06-18 RX ADMIN — PIPERACILLIN SODIUM AND TAZOBACTAM SODIUM SCH MLS/HR: .25; 2 INJECTION, POWDER, LYOPHILIZED, FOR SOLUTION INTRAVENOUS at 18:36

## 2019-06-18 RX ADMIN — IPRATROPIUM BROMIDE AND ALBUTEROL SULFATE SCH AMP: .5; 3 SOLUTION RESPIRATORY (INHALATION) at 11:15

## 2019-06-18 RX ADMIN — BUDESONIDE AND FORMOTEROL FUMARATE DIHYDRATE SCH PUFF: 160; 4.5 AEROSOL RESPIRATORY (INHALATION) at 09:15

## 2019-06-18 RX ADMIN — ATORVASTATIN CALCIUM SCH MG: 20 TABLET, FILM COATED ORAL at 21:31

## 2019-06-18 RX ADMIN — CARVEDILOL SCH MG: 12.5 TABLET, FILM COATED ORAL at 09:12

## 2019-06-18 RX ADMIN — METHYLPREDNISOLONE SODIUM SUCCINATE SCH MG: 40 INJECTION, POWDER, FOR SOLUTION INTRAMUSCULAR; INTRAVENOUS at 17:32

## 2019-06-18 RX ADMIN — METHYLPREDNISOLONE SODIUM SUCCINATE SCH MG: 40 INJECTION, POWDER, FOR SOLUTION INTRAMUSCULAR; INTRAVENOUS at 02:14

## 2019-06-18 RX ADMIN — METHYLPREDNISOLONE SODIUM SUCCINATE SCH MG: 40 INJECTION, POWDER, FOR SOLUTION INTRAMUSCULAR; INTRAVENOUS at 09:13

## 2019-06-18 RX ADMIN — IPRATROPIUM BROMIDE AND ALBUTEROL SULFATE SCH AMP: .5; 3 SOLUTION RESPIRATORY (INHALATION) at 15:50

## 2019-06-18 RX ADMIN — FUROSEMIDE SCH MG: 40 TABLET ORAL at 09:13

## 2019-06-18 RX ADMIN — BUDESONIDE AND FORMOTEROL FUMARATE DIHYDRATE SCH PUFF: 160; 4.5 AEROSOL RESPIRATORY (INHALATION) at 21:32

## 2019-06-18 RX ADMIN — LOSARTAN POTASSIUM SCH MG: 50 TABLET, FILM COATED ORAL at 15:08

## 2019-06-18 RX ADMIN — FLUTICASONE PROPIONATE SCH SPRAYS: 50 SPRAY, METERED NASAL at 12:35

## 2019-06-18 RX ADMIN — OMEGA-3-ACID ETHYL ESTERS SCH GM: 1 CAPSULE, LIQUID FILLED ORAL at 09:12

## 2019-06-18 RX ADMIN — PANTOPRAZOLE SODIUM SCH MG: 40 TABLET, DELAYED RELEASE ORAL at 09:13

## 2019-06-18 RX ADMIN — AZITHROMYCIN DIHYDRATE SCH MLS/HR: 500 INJECTION, POWDER, LYOPHILIZED, FOR SOLUTION INTRAVENOUS at 10:58

## 2019-06-18 RX ADMIN — RANITIDINE SCH MG: 150 TABLET ORAL at 09:14

## 2019-06-18 NOTE — EKG
Test Reason : 

Blood Pressure : ***/*** mmHG

Vent. Rate : 103 BPM     Atrial Rate : 103 BPM

   P-R Int : 156 ms          QRS Dur : 130 ms

    QT Int : 374 ms       P-R-T Axes : 040 -50 057 degrees

   QTc Int : 489 ms

 

SINUS TACHYCARDIA WITH PREMATURE ATRIAL COMPLEXES

LEFT AXIS DEVIATION

LEFT VENTRICULAR HYPERTROPHY WITH QRS WIDENING

ABNORMAL ECG

Confirmed by MD SHAYNA, SMITA ([id number]) on 6/18/2019 12:12:25 PM

 

Referred By:             Confirmed By:SMITA CORRAL MD

## 2019-06-18 NOTE — CON.PULM
Consult


Consult Specialty:: PULMONARY


Referred by:: Dr Reynoso


Reason for Consultation:: shortness of breath





- History of Present Illness


Chief Complaint: shortness of breath


History of Present Illness: 





88yo male with h/o HTN, hyperlipidemia, COPD, chronic hypoxic respiratory 

failure on home O2, h/o RCC s/p left nephrectomy, prostate ca, recent 

hospitalization for COPD exacerbation, recently discharged home from SNF who 

was sent by [facility] for shortness of breath. Noted to be hypoxic by EMS to mid 80s. 

He reports a cough productive of thick clear/white sputum without wheezing. No 

chest pain or discomfort. No fevers, chills or sweats.








- History Source


History Provided By: Patient, Medical Record


Limitations to Obtaining History: No Limitations





- Past Medical History


CNS: Yes: Alzheimer's, TIA


Cardio/Vascular: Yes: AFIB, HTN, Hyperlipdemia, Murmur (tricuspid regurgitation)


Pulmonary: Yes: COPD, Pneumonia (fungal), Other (chronic cough)


Gastrointestinal: Yes: Diverticulitis, Other (cholecystitis, ischemic colitis, 3

/14 sigmoid adenoma removed, SBO 12/15)


Hepatobiliary: Yes: Cholelithiasis, Cholecystitis ( cholecystostomy tube,  lap choly), Choledocholithiasis


Renal/: Yes: Cancer (Prostate/KIDNEY), Neurogenic Bladder


Psych: Yes: Anxiety


Rheumatology: Yes: Other (arthritis knees)





- Past Surgical History


Past Surgical History: Yes: Appendectomy, Cataract Removal, Colonoscopy (last 1 

yrs ago, lynn 2 polyps. h/o ischemic colitis 3,2 and ?years ago), Hernia 

Repair (b/l), Joint Replacement (left knee makoplasty), Laminectomy (cervical 

fusion), Nephrectomy (left)





- Alcohol/Substance Use


Hx Alcohol Use: No


History of Substance Use: reports: None





- Smoking History


Smoking history: Former smoker


Have you smoked in the past 12 months: No


Aproximately how many cigarettes per day: 40 ( 40years)


If you are a former smoker, when did you quit?: 





- Social History


Usual Living Arrangement: With Spouse


ADL: Independent


Occupation: retired electronics salesman


History of Recent Travel: No





Home Medications





- Allergies


Allergies/Adverse Reactions: 


 Allergies











Allergy/AdvReac Type Severity Reaction Status Date / Time


 


No Known Drug Allergies Allergy   Verified 19 16:04














- Home Medications


Home Medications: 


Ambulatory Orders





Finasteride 5 mg PO DAILY 10/12/17 


Tamsulosin HCl [Flomax] 0.4 mg PO DAILY 10/12/17 


Acetaminophen [Tylenol .Regular Strength -] 650 mg PO Q6H PRN #0 tablet 10/27/

17 


Meclizine HCl [Antivert -] 25 mg PO Q8H PRN #0 tablet 10/27/17 


Melatonin 5 mg PO HS  tab 10/27/17 


Omega-3 Acid Ethyl Esters [Lovaza -] 1 gm PO DAILY  cap 10/27/17 


Pantoprazole Sodium [Protonix -] 40 mg PO BID #30 tab 10/27/17 


Simethicone [Mylicon -] 80 mg PO Q4H PRN #0 tab.chew 10/27/17 


Albuterol 0.083% Nebulizer Sol [Ventolin 0.083% Nebulizer Soln -] 1 amp NEB Q4H 

PRN  amp 04/10/19 


Albuterol 2.5/Ipratropium 0.5 [Duoneb -] 1 amp NEB RQID  amp 04/10/19 


Ammonium Lactate Lotion [Lac-Hydrin 12] 1 applic TP DAILY PRN  bottle 04/10/19 


Budesonide/Formeterol Fumarate [SYMBICORT 160/4.5mcg -] 2 puff IH BID  inhaler 

04/10/19 


Cefuroxime Axetil [Ceftin -] 500 mg PO BID  tablet 04/10/19 


Fluticasone Prop 0.05% Nasal [Flonase -] 2 spray NS DAILY  spray 04/10/19 


Guaifenesin AC [Robitussin AC -] 5 ml PO TID PRN #1 bottle MDD 15ml 04/10/19 


Melatonin 15 mg PO HS PRN  tab 04/10/19 


Polyethylene Glycol 3350 [Miralax 119 gm Btl -] 17 gm PO BID PRN  bottle 04/10/

19 


Polyvinyl Alcohol [Artificial Tears] 1 drop OU Q8H PRN  drops 04/10/19 


Ranitidine [Zantac -] 150 mg PO DAILY  tablet 04/10/19 


Carvedilol [Coreg -] 12.5 mg PO BID 19 


Furosemide [Lasix] 40 mg PO DAILY 19 











Family Disease History





- Family Disease History


Family Disease History: Diabetes: Mother ( 70 diabetic complications), Other

: Father ( 70 of [place] ), Brother (3 siblings with cancer ? types)





Review of Systems





- Review of Systems


Constitutional: reports: Weakness.  denies: Chills, Fever


Eyes: denies: Recent Change in Vision


HENT: denies: Nasal Congestion, Throat Pain


Neck: denies: Stiffness, Tenderness


Cardiovascular: reports: Shortness of Breath.  denies: Chest Pain, Palpitations


Respiratory: reports: Cough, Exercise Intolerance, SOB on Exertion.  denies: 

Hemoptysis, Wheezing


Gastrointestinal: denies: Abdominal Pain, Nausea, Vomiting


Genitourinary: denies: Dysuria, Hematuria


Neurological: denies: Dizziness, Headache


Endocrine: denies: Unexplained Weight Loss





Physical Exam


Vital Sings: 


 Vital Signs











Temperature  97.4 F L  19 10:00


 


Pulse Rate  82   19 10:00


 


Respiratory Rate  20   19 10:00


 


Blood Pressure  110/67   19 10:00


 


O2 Sat by Pulse Oximetry (%)  93 L  19 10:25











Constitutional: Yes: Mild Distress


Eyes: Yes: Conjunctiva Clear, EOM Intact


HENT: Yes: Atraumatic, Normocephalic


Neck: Yes: Supple, Trachea Midline


Cardiovascular: Yes: Regular Rate and Rhythm


Respiratory: Yes: Diminished, Poor Air Entry.  No: Wheezes


...Clubbing: No


Gastrointestinal: Yes: Normal Bowel Sounds, Soft.  No: Tenderness


Edema: No


Neurological: Yes: Alert, Oriented


Labs: 


 CBC, BMP





 19 06:30 





 19 06:30 





 ABG Results











ABG pH  7.34  (7.35-7.45)  L  19  00:15    


 


ABG pCO2 at Pt Temp  62.7 mmHg (35-45)  H  19  00:15    


 


ABG pO2 at Pt Temp  128 mmHg ()  H  19  00:15    


 


ABG HCO3  33.1 mmol/L (22-27)  H  19  00:15    


 


ABG O2 Sat (Measured)  98.6 % (95-98)  H  19  00:15    


 


ABG O2 Content  18.8 % vol (15-22)   19  00:15    


 


ABG Base Excess  5.8 meq/l (-2-2)  H  19  00:15    














Imaging





- Results


Chest X-ray: Report Reviewed, Image Reviewed


Cat Scan: Report Reviewed, Image Reviewed (emphysematous changes, bibasilar 

infiltrates)





Problem List





- Problems


(1) Acute and chronic respiratory failure with hypoxia


Code(s): J96.21 - ACUTE AND CHRONIC RESPIRATORY FAILURE WITH HYPOXIA   





(2) Acute exacerbation of chronic obstructive pulmonary disease (COPD)


Code(s): J44.1 - CHRONIC OBSTRUCTIVE PULMONARY DISEASE W (ACUTE) EXACERBATION   





(3) Pneumonia


Code(s): J18.9 - PNEUMONIA, UNSPECIFIED ORGANISM   


Qualifiers: 


   Pneumonia type: due to unspecified organism   Laterality: right   Lung 

location: unspecified part of lung   Qualified Code(s): J18.9 - Pneumonia, 

unspecified organism   





Assessment/Plan





Acute on Chronic Hypoxic Respiratory Failure


Pneumonia


Acute COPD Exacerbation


HTN


Hyperlipidemia


h/o Renal Cell Carcinoma s/p Left Nephrectomy


h/o Prostate Ca





-  IV antibiotics


-  f/u cultures


-  IV medrol


-  inhaled bronchodilators standing and PRN


-  O2 to keep Spo2 88-95%


-  BiPAP as needed to assist in work of breathing


-  DVT prophylaxis





Thank you for this consult


Kentrell Ruff MD

## 2019-06-18 NOTE — PN
Progress Note, Physician


Chief Complaint: 





COPD


PNA


Chronic hypoxic respiratory failure 


History of Present Illness: 





Previous notes and events reviewed


awake and alert


NAD


complain of productive cough


Ventimask 50%





- Current Medication List


Current Medications: 


Active Medications





Acetaminophen (Tylenol -)  650 mg PO Q6H PRN


   PRN Reason: FEVER


Albuterol Sulfate (Ventolin 0.083% Nebulizer Soln -)  1 amp NEB Q4H PRN


   PRN Reason: SHORT OF BREATH/WHEEZING


Albuterol/Ipratropium (Duoneb -)  1 amp NEB RQID [facility]


   Last Admin: 06/18/19 11:15 Dose:  1 amp


Amlodipine Besylate (Norvasc -)  10 mg PO DAILY [facility]


   Last Admin: 06/18/19 09:12 Dose:  10 mg


Artificial Tears (Artificial Tears)  1 drop OU Q8H PRN


   PRN Reason: DRY EYES


Atorvastatin Calcium (Lipitor -)  20 mg PO HS [facility]


   Last Admin: 06/17/19 23:10 Dose:  20 mg


Budesonide/Formoterol Fumarate (Symbicort 160/4.5mcg -)  2 puff IH BID [facility]


   Last Admin: 06/18/19 09:15 Dose:  2 puff


Carvedilol (Coreg -)  12.5 mg PO BID [facility]


   Last Admin: 06/18/19 09:12 Dose:  12.5 mg


Docusate Sodium (Colace -)  100 mg PO Q8H PRN


   PRN Reason: CONSTIPATION


Finasteride (Proscar -)  5 mg PO DAILY [facility]


   Last Admin: 06/18/19 09:13 Dose:  5 mg


Fluticasone Propionate (Flonase -)  2 spray NS DAILY [facility]


   Last Admin: 06/18/19 12:35 Dose:  2 sprays


Furosemide (Lasix -)  40 mg PO DAILY [facility]


   Last Admin: 06/18/19 09:13 Dose:  40 mg


Guaifenesin/Codeine Phosphate (Robitussin Ac -)  5 ml PO Q8H PRN


   PRN Reason: COUGH


Lactated Ringer's (Lactated Ringers Solution)  1,000 mls @ 75 mls/hr IV ASDIR 

[facility]


   Last Admin: 06/18/19 09:42 Dose:  75 mls/hr


Azithromycin 250 mg/ Dextrose  250 mls @ 250 mls/hr IVPB DAILY [facility]


   Last Admin: 06/18/19 10:58 Dose:  250 mls/hr


Ceftriaxone Sodium 2 gm/ (Dextrose)  100 mls @ 200 mls/hr IVPB DAILY [facility]; 

Protocol


   Last Admin: 06/18/19 09:27 Dose:  200 mls/hr


Lactic Acid (Lac-Hydrin 12)  1 applic TP DAILY PRN


   PRN Reason: xerosis


Losartan Potassium (Cozaar -)  50 mg PO DAILY [facility]


Meclizine HCl (Antivert -)  25 mg PO Q8H PRN


   PRN Reason: dizziness


Melatonin (Melatonin)  5 mg PO HS [facility]


   Last Admin: 06/17/19 23:11 Dose:  5 mg


Methylprednisolone Sodium Succinate (Solu-Medrol -)  60 mg IVPB Q8H-IV [facility]


Omega-3-Acid Ethyl Esters (Lovaza -)  1 gm PO DAILY [facility]


   Last Admin: 06/18/19 09:12 Dose:  1 gm


Pantoprazole Sodium (Protonix -)  40 mg PO BID [facility]


   Last Admin: 06/18/19 09:13 Dose:  40 mg


Polyethylene Glycol (Miralax (For Daily Use) -)  17 gm PO Q12H PRN


   PRN Reason: CONSTIPATION


Ranitidine HCl (Zantac -)  150 mg PO DAILY [facility]


   Last Admin: 06/18/19 09:14 Dose:  150 mg


Senna (Senna -)  2 tab PO HS [facility]


   Last Admin: 06/17/19 23:10 Dose:  2 tab


Simethicone (Mylicon -)  80 mg PO Q4H PRN


   PRN Reason: INDIGESTION


Tamsulosin HCl (Flomax -)  0.4 mg PO DAILY@0830 [facility]


   Last Admin: 06/18/19 09:41 Dose:  0.4 mg











- Objective


Vital Signs: 


 Vital Signs











Temperature  98.7 F   06/18/19 14:00


 


Pulse Rate  99 H  06/18/19 14:00


 


Respiratory Rate  20   06/18/19 14:00


 


Blood Pressure  133/66   06/18/19 14:00


 


O2 Sat by Pulse Oximetry (%)  93 L  06/18/19 10:25











Constitutional: Yes: No Distress, Calm


Eyes: Yes: Conjunctiva Clear


HENT: Yes: Atraumatic


Cardiovascular: Yes: Regular Rate and Rhythm


Respiratory: Yes: Diminished, On Venti-Mask (50%)


Gastrointestinal: Yes: Normal Bowel Sounds, Soft


Genitourinary: Yes: Incontinence


Musculoskeletal: Yes: Muscle Weakness


Extremities: Yes: WNL


Edema: No


Neurological: Yes: Alert


Psychiatric: Yes: Alert, Oriented


Labs: 


 CBC, BMP





 06/18/19 06:30 





 06/18/19 06:30 











- ....Imaging


Cat Scan: Report Reviewed





Problem List





- Problems


(1) Acute and chronic respiratory failure with hypoxia


Assessment/Plan: 


-Pulm on board


-keep SpO2 >90%


-VentiMask 50%


-Bipap PRN 


-bronchodilators


-Solumedrol





Code(s): J96.21 - ACUTE AND CHRONIC RESPIRATORY FAILURE WITH HYPOXIA   





(2) Acute exacerbation of chronic obstructive pulmonary disease (COPD)


Assessment/Plan: 


-Pulm on board


-keep SpO2 >90%


-VentiMask 50%


-Bipap PRN 


-bronchodilators


-Solumedrol


-Symbicort


-Azithromycin and Ceftriaxone


-ID consult


Code(s): J44.1 - CHRONIC OBSTRUCTIVE PULMONARY DISEASE W (ACUTE) EXACERBATION   





(3) Pneumonia


Assessment/Plan: 


-Pulm on board


-keep SpO2 >90%


-VentiMask 50%


-Bipap PRN 


-bronchodilators


-Solumedrol


-Symbicort


-Azithromycin and Ceftriaxone


-ID consult


Code(s): J18.9 - PNEUMONIA, UNSPECIFIED ORGANISM   


Qualifiers: 


   Pneumonia type: due to unspecified organism   Laterality: right   Lung 

location: unspecified part of lung   Qualified Code(s): J18.9 - Pneumonia, 

unspecified organism   





(4) BPH (benign prostatic hyperplasia)


Assessment/Plan: 


-Proscar


Code(s): N40.0 - BENIGN PROSTATIC HYPERPLASIA WITHOUT LOWER URINRY TRACT SYMP   





(5) HLD (hyperlipidemia)


Assessment/Plan: 


-Atorvastatin


Code(s): E78.5 - HYPERLIPIDEMIA, UNSPECIFIED   


Qualifiers: 


 





(6) HTN (hypertension)


Assessment/Plan: 


-Amlodipine


-low Na diet


Code(s): I10 - ESSENTIAL (PRIMARY) HYPERTENSION   


Qualifiers: 


 





(7) Insomnia


Assessment/Plan: 


-Melatonin HS


Code(s): G47.00 - INSOMNIA, UNSPECIFIED   





(8) Constipation


Assessment/Plan: 


-Miralax and colace


Code(s): K59.00 - CONSTIPATION, UNSPECIFIED   





Assessment/Plan





see problem list


dvt ppx

## 2019-06-18 NOTE — CONS
DATE OF CONSULTATION:  

 

DATE OF DICTATION:  06/17/2019

 

HISTORY OF PRESENT ILLNESS:  The patient is an 87-year-old male evaluated for

pneumonia.  History was obtained from the patient as well as family members who were

present at the time of the examination.  He was recently discharged from a skilled

nursing facility after 2 months in rehabilitation.  At home he became increasingly

short of breath.  He reported cough with difficulty expectorating.  He was seen by

visiting nurse services and felt he was in respiratory distress.  EMS was summoned,

he was found to be cyanotic and hypoxemic.  He was taken to the emergency room where

chest x-ray showed increased markings at bases bilaterally.  He was placed on a 50%

Ventimask.  His course was significant for elevated white blood cell count.  CAT scan

of the chest showed bibasilar infiltrates.  He denies any chest pain or hemoptysis,

does have cough with difficulty expectorating.  

 

He had been hospitalized in April of 2019 for a COPD exacerbation, post discharge he

was in a skilled nursing facility up until 1 week ago.  He denies any ill contacts. 

He is a former smoker, stopped 30 years ago, no known ill contacts, no recent travel.

 Patient did receive influenza vaccine, unsure whether or not he received

pneumococcal vaccine.

 

PAST MEDICAL HISTORY:  Positive for chronic obstructive pulmonary disease,

obstructive sleep apnea, congestive heart failure, hypertension, hyperlipidemia,

prostate cancer, renal cell carcinoma.

 

PAST SURGICAL HISTORY:  Status post right nephrectomy.

 

ALLERGIES:  No known allergies.

 

MEDICATION:  Tylenol, albuterol, Norvasc, Lipitor, carvedilol, insulin, losartan,

methylprednisolone, Protonix, Flomax.

SOCIAL HISTORY:  As per HPI.

 

LABORATORY DATA:  White count 13.6, hematocrit 39.9, platelet count 304, creatinine

0.8.  Previous cultures significant for positive culture, sputum culture for MRSA. 

Also positive sputum in culture in the past for aspergillus.  

 

SYSTEMS REVIEW:

Neurologic:  No loss of consciousness, seizure activity, focal weakness.

Cardiac:  Negative for chest pain or palpitations.  

Respiratory:  As per HPI. 

Gastrointestinal:  Negative vomiting or diarrhea.  

Genitourinary:  Negative for urinary tract infection.  

 

PHYSICAL EXAMINATION:

General:  On physical examination, he is an elderly male, he is supine in bed.  He is

slightly short of breath at rest on Ventimask.  

Vital signs:  Temperature 98.7, blood pressure 133/66, pulse 99 regular, respirations

20 per minute.  

HEENT:  Sclerae anicteric.  

Cardiovascular:  Heart sounds S1, S2.

Lungs:  Diminished breath sounds at the bases bilaterally.  

Abdomen:  Soft, no tenderness elicited.  

Extremities:  Negative for edema.  Negative for Homans sign.  

 

IMPRESSION:

1.  Bibasilar  pneumonia, rule out healthcare-acquired pathogens.

2.  Acute exacerbation chronic obstructive pulmonary disease.

3.  Status post nephrectomy.  

 

Obtain sputum culture, urine legionella and pneumococcal antigen.  Would empirically

treat for possible healthcare-acquired pathogens with Zosyn, Zithromax for coverage

of atypical pathogen.  Continue intravenous corticosteroids and inhaled

bronchodilators.  Case discussed with the family members present at the time of the

examination.  Thank you for the kind referral.  

 

 

CARMITA TALBERT M.D.

 

[id number]

DD: 06/18/2019 18:08

DT: 06/18/2019 19:25

Job #:  [id number]

## 2019-06-18 NOTE — PN
Progress Note (short form)





- Note


Progress Note: 





ID CONSULT DICTATED


BIBASILAR PNEUMONIA   ? HCAP


EXACERBATION COPD


SOLITARY KIDNEY


AWAIT C/S


EMPIRIC ZITHROMAX/ ZOSYN

## 2019-06-19 LAB
ALBUMIN SERPL-MCNC: 2.6 G/DL (ref 3.4–5)
ALP SERPL-CCNC: 59 U/L (ref 45–117)
ALT SERPL-CCNC: 17 U/L (ref 13–61)
ANION GAP SERPL CALC-SCNC: 8 MMOL/L (ref 8–16)
AST SERPL-CCNC: 12 U/L (ref 15–37)
BILIRUB SERPL-MCNC: 0.6 MG/DL (ref 0.2–1)
BUN SERPL-MCNC: 36.2 MG/DL (ref 7–18)
CALCIUM SERPL-MCNC: 8.3 MG/DL (ref 8.5–10.1)
CHLORIDE SERPL-SCNC: 99 MMOL/L (ref 98–107)
CO2 SERPL-SCNC: 30 MMOL/L (ref 21–32)
CREAT SERPL-MCNC: 1 MG/DL (ref 0.55–1.3)
DEPRECATED RDW RBC AUTO: 14.8 % (ref 11.9–15.9)
GLUCOSE SERPL-MCNC: 169 MG/DL (ref 74–106)
HCT VFR BLD CALC: 40.4 % (ref 35.4–49)
HGB BLD-MCNC: 14 GM/DL (ref 11.7–16.9)
MCH RBC QN AUTO: 29.8 PG (ref 25.7–33.7)
MCHC RBC AUTO-ENTMCNC: 34.7 G/DL (ref 32–35.9)
MCV RBC: 86 FL (ref 80–96)
PLATELET # BLD AUTO: 290 K/MM3 (ref 134–434)
PMV BLD: 7.6 FL (ref 7.5–11.1)
POTASSIUM SERPLBLD-SCNC: 3.9 MMOL/L (ref 3.5–5.1)
PROT SERPL-MCNC: 5.4 G/DL (ref 6.4–8.2)
RBC # BLD AUTO: 4.7 M/MM3 (ref 4–5.6)
SODIUM SERPL-SCNC: 137 MMOL/L (ref 136–145)
WBC # BLD AUTO: 21.8 K/MM3 (ref 4–10)

## 2019-06-19 RX ADMIN — Medication SCH MG: at 21:55

## 2019-06-19 RX ADMIN — BUDESONIDE AND FORMOTEROL FUMARATE DIHYDRATE SCH PUFF: 160; 4.5 AEROSOL RESPIRATORY (INHALATION) at 09:46

## 2019-06-19 RX ADMIN — METHYLPREDNISOLONE SODIUM SUCCINATE SCH MG: 40 INJECTION, POWDER, FOR SOLUTION INTRAMUSCULAR; INTRAVENOUS at 17:41

## 2019-06-19 RX ADMIN — CARVEDILOL SCH MG: 12.5 TABLET, FILM COATED ORAL at 21:55

## 2019-06-19 RX ADMIN — PANTOPRAZOLE SODIUM SCH MG: 40 TABLET, DELAYED RELEASE ORAL at 21:55

## 2019-06-19 RX ADMIN — IPRATROPIUM BROMIDE AND ALBUTEROL SULFATE SCH AMP: .5; 3 SOLUTION RESPIRATORY (INHALATION) at 11:45

## 2019-06-19 RX ADMIN — IPRATROPIUM BROMIDE AND ALBUTEROL SULFATE SCH AMP: .5; 3 SOLUTION RESPIRATORY (INHALATION) at 08:25

## 2019-06-19 RX ADMIN — PANTOPRAZOLE SODIUM SCH MG: 40 TABLET, DELAYED RELEASE ORAL at 09:36

## 2019-06-19 RX ADMIN — RANITIDINE SCH MG: 150 TABLET ORAL at 09:36

## 2019-06-19 RX ADMIN — METHYLPREDNISOLONE SODIUM SUCCINATE SCH MG: 40 INJECTION, POWDER, FOR SOLUTION INTRAMUSCULAR; INTRAVENOUS at 09:37

## 2019-06-19 RX ADMIN — TAMSULOSIN HYDROCHLORIDE SCH MG: 0.4 CAPSULE ORAL at 09:35

## 2019-06-19 RX ADMIN — INSULIN ASPART SCH UNIT: 100 INJECTION, SOLUTION INTRAVENOUS; SUBCUTANEOUS at 06:04

## 2019-06-19 RX ADMIN — PIPERACILLIN SODIUM AND TAZOBACTAM SODIUM SCH MLS/HR: .25; 2 INJECTION, POWDER, LYOPHILIZED, FOR SOLUTION INTRAVENOUS at 01:50

## 2019-06-19 RX ADMIN — IPRATROPIUM BROMIDE AND ALBUTEROL SULFATE SCH AMP: .5; 3 SOLUTION RESPIRATORY (INHALATION) at 15:59

## 2019-06-19 RX ADMIN — PIPERACILLIN SODIUM AND TAZOBACTAM SODIUM SCH MLS/HR: .25; 2 INJECTION, POWDER, LYOPHILIZED, FOR SOLUTION INTRAVENOUS at 17:41

## 2019-06-19 RX ADMIN — INSULIN ASPART SCH UNIT: 100 INJECTION, SOLUTION INTRAVENOUS; SUBCUTANEOUS at 17:40

## 2019-06-19 RX ADMIN — FLUTICASONE PROPIONATE SCH SPRAYS: 50 SPRAY, METERED NASAL at 09:46

## 2019-06-19 RX ADMIN — FINASTERIDE SCH MG: 5 TABLET, FILM COATED ORAL at 09:36

## 2019-06-19 RX ADMIN — INSULIN ASPART SCH UNIT: 100 INJECTION, SOLUTION INTRAVENOUS; SUBCUTANEOUS at 12:16

## 2019-06-19 RX ADMIN — CARVEDILOL SCH MG: 12.5 TABLET, FILM COATED ORAL at 09:36

## 2019-06-19 RX ADMIN — AMLODIPINE BESYLATE SCH MG: 10 TABLET ORAL at 09:35

## 2019-06-19 RX ADMIN — SODIUM CHLORIDE, POTASSIUM CHLORIDE, SODIUM LACTATE AND CALCIUM CHLORIDE SCH MLS/HR: 600; 310; 30; 20 INJECTION, SOLUTION INTRAVENOUS at 06:03

## 2019-06-19 RX ADMIN — LOSARTAN POTASSIUM SCH MG: 50 TABLET, FILM COATED ORAL at 09:35

## 2019-06-19 RX ADMIN — BUDESONIDE AND FORMOTEROL FUMARATE DIHYDRATE SCH: 160; 4.5 AEROSOL RESPIRATORY (INHALATION) at 22:07

## 2019-06-19 RX ADMIN — FUROSEMIDE SCH MG: 40 TABLET ORAL at 09:36

## 2019-06-19 RX ADMIN — INSULIN ASPART SCH UNIT: 100 INJECTION, SOLUTION INTRAVENOUS; SUBCUTANEOUS at 21:56

## 2019-06-19 RX ADMIN — ATORVASTATIN CALCIUM SCH MG: 20 TABLET, FILM COATED ORAL at 21:55

## 2019-06-19 RX ADMIN — IPRATROPIUM BROMIDE AND ALBUTEROL SULFATE SCH AMP: .5; 3 SOLUTION RESPIRATORY (INHALATION) at 20:40

## 2019-06-19 RX ADMIN — OMEGA-3-ACID ETHYL ESTERS SCH GM: 1 CAPSULE, LIQUID FILLED ORAL at 09:36

## 2019-06-19 RX ADMIN — SENNOSIDES SCH TAB: 8.6 TABLET, FILM COATED ORAL at 21:54

## 2019-06-19 RX ADMIN — METHYLPREDNISOLONE SODIUM SUCCINATE SCH MG: 40 INJECTION, POWDER, FOR SOLUTION INTRAMUSCULAR; INTRAVENOUS at 01:51

## 2019-06-19 RX ADMIN — PIPERACILLIN SODIUM AND TAZOBACTAM SODIUM SCH MLS/HR: .25; 2 INJECTION, POWDER, LYOPHILIZED, FOR SOLUTION INTRAVENOUS at 09:35

## 2019-06-19 RX ADMIN — ENOXAPARIN SODIUM SCH MG: 40 INJECTION SUBCUTANEOUS at 12:04

## 2019-06-19 RX ADMIN — AZITHROMYCIN DIHYDRATE SCH MLS/HR: 500 INJECTION, POWDER, LYOPHILIZED, FOR SOLUTION INTRAVENOUS at 09:37

## 2019-06-19 NOTE — PN
Progress Note, Physician


History of Present Illness: 





PULMONARY





ALERT,DYSPNEIC,ON VM





- Current Medication List


Current Medications: 


Active Medications





Acetaminophen (Tylenol -)  650 mg PO Q6H PRN


   PRN Reason: FEVER


Albuterol Sulfate (Ventolin 0.083% Nebulizer Soln -)  1 amp NEB Q4H PRN


   PRN Reason: SHORT OF BREATH/WHEEZING


Albuterol/Ipratropium (Duoneb -)  1 amp NEB RQID [facility]


   Last Admin: 06/19/19 08:25 Dose:  1 amp


Amlodipine Besylate (Norvasc -)  10 mg PO DAILY [facility]


   Last Admin: 06/19/19 09:35 Dose:  10 mg


Artificial Tears (Artificial Tears)  1 drop OU Q8H PRN


   PRN Reason: DRY EYES


Atorvastatin Calcium (Lipitor -)  20 mg PO HS [facility]


   Last Admin: 06/18/19 21:31 Dose:  20 mg


Budesonide/Formoterol Fumarate (Symbicort 160/4.5mcg -)  2 puff IH BID [facility]


   Last Admin: 06/19/19 09:46 Dose:  2 puff


Carvedilol (Coreg -)  12.5 mg PO BID [facility]


   Last Admin: 06/19/19 09:36 Dose:  12.5 mg


Docusate Sodium (Colace -)  100 mg PO Q8H PRN


   PRN Reason: CONSTIPATION


Enoxaparin Sodium (Lovenox -)  40 mg SQ DAILY [facility]


Finasteride (Proscar -)  5 mg PO DAILY [facility]


   Last Admin: 06/19/19 09:36 Dose:  5 mg


Fluticasone Propionate (Flonase -)  2 spray NS DAILY [facility]


   Last Admin: 06/19/19 09:46 Dose:  2 sprays


Furosemide (Lasix -)  40 mg PO DAILY [facility]


   Last Admin: 06/19/19 09:36 Dose:  40 mg


Guaifenesin/Codeine Phosphate (Robitussin Ac -)  5 ml PO Q8H PRN


   PRN Reason: COUGH


Lactated Ringer's (Lactated Ringers Solution)  1,000 mls @ 75 mls/hr IV ASDIR 

[facility]


   Last Admin: 06/19/19 06:03 Dose:  75 mls/hr


Azithromycin 250 mg/ Dextrose  250 mls @ 250 mls/hr IVPB DAILY [facility]


   Last Admin: 06/19/19 09:37 Dose:  250 mls/hr


Piperacillin Sod/Tazobactam (Sod 2.25 gm/ Dextrose)  50 mls @ 100 mls/hr IVPB 

Q8H-IV [facility]; Protocol


   Last Admin: 06/19/19 09:35 Dose:  100 mls/hr


Insulin Aspart (Novolog Vial Sliding Scale -)  1 vial SQ ACHS [facility]; Protocol


   Last Admin: 06/19/19 06:04 Dose:  4 unit


Lactic Acid (Lac-Hydrin 12)  1 applic TP DAILY [facility]N


   PRN Reason: xerosis


Losartan Potassium (Cozaar -)  50 mg PO DAILY [facility]


   Last Admin: 06/19/19 09:35 Dose:  50 mg


Meclizine HCl (Antivert -)  25 mg PO Q8H PRN


   PRN Reason: dizziness


Melatonin (Melatonin)  5 mg PO [facility]


   Last Admin: 06/18/19 21:31 Dose:  5 mg


Methylprednisolone Sodium Succinate (Solu-Medrol -)  60 mg IVPB Q8H-IV [facility]


   Last Admin: 06/19/19 09:37 Dose:  60 mg


Omega-3-Acid Ethyl Esters (Lovaza -)  1 gm PO DAILY [facility]


   Last Admin: 06/19/19 09:36 Dose:  1 gm


Pantoprazole Sodium (Protonix -)  40 mg PO BID [facility]


   Last Admin: 06/19/19 09:36 Dose:  40 mg


Polyethylene Glycol (Miralax (For Daily Use) -)  17 gm PO Q12H PRN


   PRN Reason: CONSTIPATION


Ranitidine HCl (Zantac -)  150 mg PO DAILY [facility]


   Last Admin: 06/19/19 09:36 Dose:  150 mg


Senna (Senna -)  2 tab PO [facility]


   Last Admin: 06/18/19 21:31 Dose:  2 tab


Simethicone (Mylicon -)  80 mg PO Q4H PRN


   PRN Reason: INDIGESTION


Tamsulosin HCl (Flomax -)  0.4 mg PO DAILY@0830 [facility]


   Last Admin: 06/19/19 09:35 Dose:  0.4 mg











- Objective


Vital Signs: 


 Vital Signs











Temperature  94.9 F L  06/19/19 06:07


 


Pulse Rate  68   06/19/19 09:54


 


Respiratory Rate  22 H  06/19/19 09:54


 


Blood Pressure  150/63   06/19/19 09:54


 


O2 Sat by Pulse Oximetry (%)  95   06/19/19 08:23











Constitutional: Yes: Well Nourished, Mild Distress


Eyes: Yes: WNL


HENT: Yes: WNL


Neck: Yes: WNL


Cardiovascular: Yes: Regular Rate and Rhythm, S1, S2


Respiratory: Yes: Wheezes (SCATTERED HOMA WHEEZES AND RHONCHI)


Gastrointestinal: Yes: Normal Bowel Sounds, Soft


Extremities: Yes: WNL


Edema: No


Labs: 


 CBC, BMP





 06/19/19 07:09 





 06/19/19 07:01 





 Laboratory Tests











  06/18/19





  00:15


 


ABG pH  7.34 L


 


ABG pCO2 at Pt Temp  62.7 H


 


ABG pO2 at Pt Temp  128 H


 


ABG HCO3  33.1 H


 


ABG O2 Sat (Measured)  98.6 H


 


Oxygen Flow Rate  50%


 


Vent Rate  14


 


Pressure Support Vent  10/5














Assessment/Plan





Problem List





- Problems


(1) Acute and chronic respiratory failure with hypoxia


Code(s): J96.21 - ACUTE AND CHRONIC RESPIRATORY FAILURE WITH HYPOXIA   





(2) Acute exacerbation of chronic obstructive pulmonary disease (COPD)


Code(s): J44.1 - CHRONIC OBSTRUCTIVE PULMONARY DISEASE W (ACUTE) EXACERBATION   





(3) Pneumonia


Code(s): J18.9 - PNEUMONIA, UNSPECIFIED ORGANISM   


Qualifiers: 


   Pneumonia type: due to unspecified organism   Laterality: right   Lung 

location: unspecified part of lung   Qualified Code(s): J18.9 - Pneumonia, 

unspecified organism   





Assessment/Plan





Acute on Chronic Hypoxic/Hypercapneic Respiratory Failure


Pneumonia


Acute COPD Exacerbation


HTN


Hyperlipidemia


h/o Renal Cell Carcinoma s/p Left Nephrectomy


h/o Prostate Ca





-  IV antibiotics


-  IV medrol


-  inhaled bronchodilators standing and PRN


-  O2 to keep Spo2 88-95%


-  BiPAP as needed to assist in work of breathing


-  DVT prophylaxis








DR INFANTE

## 2019-06-19 NOTE — PN
Progress Note, Physician


Chief Complaint: 





SOB


COPD exacerbation


History of Present Illness: 





SOB


Feels worse today


Refuses BIPAP, states it makes his breathing worse.


Seen by Pulmonary


On bronchodilators+ ventimask 50%, Medrol and IV abx





- Current Medication List


Current Medications: 


Active Medications





Acetaminophen (Tylenol -)  650 mg PO Q6H PRN


   PRN Reason: FEVER


Albuterol Sulfate (Ventolin 0.083% Nebulizer Soln -)  1 amp NEB Q4H PRN


   PRN Reason: SHORT OF BREATH/WHEEZING


Albuterol/Ipratropium (Duoneb -)  1 amp NEB RQID [facility]


   Last Admin: 06/19/19 08:25 Dose:  1 amp


Amlodipine Besylate (Norvasc -)  10 mg PO DAILY [facility]


   Last Admin: 06/19/19 09:35 Dose:  10 mg


Artificial Tears (Artificial Tears)  1 drop OU Q8H PRN


   PRN Reason: DRY EYES


Atorvastatin Calcium (Lipitor -)  20 mg PO HS [facility]


   Last Admin: 06/18/19 21:31 Dose:  20 mg


Budesonide/Formoterol Fumarate (Symbicort 160/4.5mcg -)  2 puff IH BID [facility]


   Last Admin: 06/19/19 09:46 Dose:  2 puff


Carvedilol (Coreg -)  12.5 mg PO BID [facility]


   Last Admin: 06/19/19 09:36 Dose:  12.5 mg


Docusate Sodium (Colace -)  100 mg PO Q8H PRN


   PRN Reason: CONSTIPATION


Enoxaparin Sodium (Lovenox -)  40 mg SQ DAILY [facility]


Finasteride (Proscar -)  5 mg PO DAILY [facility]


   Last Admin: 06/19/19 09:36 Dose:  5 mg


Fluticasone Propionate (Flonase -)  2 spray NS DAILY [facility]


   Last Admin: 06/19/19 09:46 Dose:  2 sprays


Furosemide (Lasix -)  40 mg PO DAILY [facility]


   Last Admin: 06/19/19 09:36 Dose:  40 mg


Guaifenesin/Codeine Phosphate (Robitussin Ac -)  5 ml PO Q8H PRN


   PRN Reason: COUGH


Lactated Ringer's (Lactated Ringers Solution)  1,000 mls @ 75 mls/hr IV ASDIR 

[facility]


   Last Admin: 06/19/19 06:03 Dose:  75 mls/hr


Azithromycin 250 mg/ Dextrose  250 mls @ 250 mls/hr IVPB DAILY [facility]


   Last Admin: 06/19/19 09:37 Dose:  250 mls/hr


Piperacillin Sod/Tazobactam (Sod 2.25 gm/ Dextrose)  50 mls @ 100 mls/hr IVPB 

Q8H-IV [facility]; Protocol


   Last Admin: 06/19/19 09:35 Dose:  100 mls/hr


Insulin Aspart (Novolog Vial Sliding Scale -)  1 vial SQ ACHS [facility]; Protocol


   Last Admin: 06/19/19 06:04 Dose:  4 unit


Lactic Acid (Lac-Hydrin 12)  1 applic TP DAILY [facility]N


   PRN Reason: xerosis


Losartan Potassium (Cozaar -)  50 mg PO DAILY [facility]


   Last Admin: 06/19/19 09:35 Dose:  50 mg


Meclizine HCl (Antivert -)  25 mg PO Q8H PRN


   PRN Reason: dizziness


Melatonin (Melatonin)  5 mg PO [facility]


   Last Admin: 06/18/19 21:31 Dose:  5 mg


Methylprednisolone Sodium Succinate (Solu-Medrol -)  60 mg IVPB Q8H-IV [facility]


   Last Admin: 06/19/19 09:37 Dose:  60 mg


Omega-3-Acid Ethyl Esters (Lovaza -)  1 gm PO DAILY [facility]


   Last Admin: 06/19/19 09:36 Dose:  1 gm


Pantoprazole Sodium (Protonix -)  40 mg PO BID [facility]


   Last Admin: 06/19/19 09:36 Dose:  40 mg


Polyethylene Glycol (Miralax (For Daily Use) -)  17 gm PO Q12H PRN


   PRN Reason: CONSTIPATION


Ranitidine HCl (Zantac -)  150 mg PO DAILY [facility]


   Last Admin: 06/19/19 09:36 Dose:  150 mg


Senna (Senna -)  2 tab PO [facility]


   Last Admin: 06/18/19 21:31 Dose:  2 tab


Simethicone (Mylicon -)  80 mg PO Q4H PRN


   PRN Reason: INDIGESTION


Tamsulosin HCl (Flomax -)  0.4 mg PO DAILY@0830 [facility]


   Last Admin: 06/19/19 09:35 Dose:  0.4 mg











- Objective


Vital Signs: 


 Vital Signs











Temperature  94.9 F L  06/19/19 06:07


 


Pulse Rate  68   06/19/19 09:54


 


Respiratory Rate  22 H  06/19/19 09:54


 


Blood Pressure  150/63   06/19/19 09:54


 


O2 Sat by Pulse Oximetry (%)  95   06/19/19 08:23











Constitutional: Yes: Calm, Cachectic, Mild Distress


Cardiovascular: Yes: Regular Rate and Rhythm


Respiratory: Yes: Accessory Muscle Use, Diminished (at the bases), On Venti-Mask

, Rhonchi (diffuse), SOB, Tachypnea


Gastrointestinal: Yes: Normal Bowel Sounds, Soft


Genitourinary: Yes: Incontinence


Musculoskeletal: Yes: Muscle Weakness


Extremities: Yes: WNL


Edema: No


Peripheral Pulses WNL: Yes


Neurological: Yes: Alert, Oriented


Psychiatric: Yes: Alert, Oriented


Labs: 


 CBC, BMP





 06/19/19 07:09 





 06/19/19 07:01 











Assessment/Plan





(1) Acute and chronic respiratory failure with hypoxia


Assessment/Plan: 


-Pulm on board


-keep SpO2 >90%


-VentiMask 50%


-Bipap PRN 


-bronchodilators


-Solumedrol tapering dose


-ABG ordered by Pulmonary





Code(s): J96.21 - ACUTE AND CHRONIC RESPIRATORY FAILURE WITH HYPOXIA   





(2) Acute exacerbation of chronic obstructive pulmonary disease (COPD)


Assessment/Plan: 


-Pulm on board


-keep SpO2 >90%


-VentiMask 50%


-Bipap PRN 


-bronchodilators


-Solumedrol


-Symbicort


-Azithromycin and Ceftriaxone


-ID consult


Code(s): J44.1 - CHRONIC OBSTRUCTIVE PULMONARY DISEASE W (ACUTE) EXACERBATION   





(3) Pneumonia


Assessment/Plan: 


-Pulm on board


-keep SpO2 >90%


-VentiMask 50%


-Bipap PRN 


-bronchodilators


-Solumedrol


-Symbicort


-Azithromycin and Ceftriaxone


-ID consult


Code(s): J18.9 - PNEUMONIA, UNSPECIFIED ORGANISM   


Qualifiers: 


   Pneumonia type: due to unspecified organism   Laterality: right   Lung 

location: unspecified part of lung   Qualified Code(s): J18.9 - Pneumonia, 

unspecified organism   





(4) BPH (benign prostatic hyperplasia)


Assessment/Plan: 


-Proscar


Code(s): N40.0 - BENIGN PROSTATIC HYPERPLASIA WITHOUT LOWER URINRY TRACT SYMP   





(5) HLD (hyperlipidemia)


Assessment/Plan: 


-Atorvastatin


Code(s): E78.5 - HYPERLIPIDEMIA, UNSPECIFIED   


Qualifiers: 


 





(6) HTN (hypertension)


Assessment/Plan: 


-Amlodipine


-low Na diet


Code(s): I10 - ESSENTIAL (PRIMARY) HYPERTENSION   


Qualifiers: 


 





(7) Insomnia


Assessment/Plan: 


-Melatonin HS


Code(s): G47.00 - INSOMNIA, UNSPECIFIED   





(8) Constipation


Assessment/Plan: 


-Miralax and colace


Code(s): K59.00 - CONSTIPATION, UNSPECIFIED

## 2019-06-20 RX ADMIN — PIPERACILLIN SODIUM AND TAZOBACTAM SODIUM SCH MLS/HR: .25; 2 INJECTION, POWDER, LYOPHILIZED, FOR SOLUTION INTRAVENOUS at 11:39

## 2019-06-20 RX ADMIN — FINASTERIDE SCH MG: 5 TABLET, FILM COATED ORAL at 09:45

## 2019-06-20 RX ADMIN — METHYLPREDNISOLONE SODIUM SUCCINATE SCH MG: 40 INJECTION, POWDER, FOR SOLUTION INTRAMUSCULAR; INTRAVENOUS at 09:45

## 2019-06-20 RX ADMIN — BUDESONIDE AND FORMOTEROL FUMARATE DIHYDRATE SCH PUFF: 160; 4.5 AEROSOL RESPIRATORY (INHALATION) at 09:54

## 2019-06-20 RX ADMIN — INSULIN ASPART SCH UNIT: 100 INJECTION, SOLUTION INTRAVENOUS; SUBCUTANEOUS at 11:53

## 2019-06-20 RX ADMIN — PIPERACILLIN SODIUM AND TAZOBACTAM SODIUM SCH MLS/HR: .25; 2 INJECTION, POWDER, LYOPHILIZED, FOR SOLUTION INTRAVENOUS at 17:16

## 2019-06-20 RX ADMIN — CARVEDILOL SCH MG: 12.5 TABLET, FILM COATED ORAL at 09:44

## 2019-06-20 RX ADMIN — TAMSULOSIN HYDROCHLORIDE SCH MG: 0.4 CAPSULE ORAL at 09:30

## 2019-06-20 RX ADMIN — METHYLPREDNISOLONE SODIUM SUCCINATE SCH MG: 40 INJECTION, POWDER, FOR SOLUTION INTRAMUSCULAR; INTRAVENOUS at 02:30

## 2019-06-20 RX ADMIN — PANTOPRAZOLE SODIUM SCH MG: 40 TABLET, DELAYED RELEASE ORAL at 09:44

## 2019-06-20 RX ADMIN — FLUTICASONE PROPIONATE SCH SPRAYS: 50 SPRAY, METERED NASAL at 09:54

## 2019-06-20 RX ADMIN — LOSARTAN POTASSIUM SCH MG: 50 TABLET, FILM COATED ORAL at 09:45

## 2019-06-20 RX ADMIN — ATORVASTATIN CALCIUM SCH MG: 20 TABLET, FILM COATED ORAL at 21:50

## 2019-06-20 RX ADMIN — BUDESONIDE AND FORMOTEROL FUMARATE DIHYDRATE SCH: 160; 4.5 AEROSOL RESPIRATORY (INHALATION) at 21:55

## 2019-06-20 RX ADMIN — AMLODIPINE BESYLATE SCH MG: 10 TABLET ORAL at 09:45

## 2019-06-20 RX ADMIN — IPRATROPIUM BROMIDE AND ALBUTEROL SULFATE SCH AMP: .5; 3 SOLUTION RESPIRATORY (INHALATION) at 11:10

## 2019-06-20 RX ADMIN — OMEGA-3-ACID ETHYL ESTERS SCH GM: 1 CAPSULE, LIQUID FILLED ORAL at 09:44

## 2019-06-20 RX ADMIN — METHYLPREDNISOLONE SODIUM SUCCINATE SCH MG: 40 INJECTION, POWDER, FOR SOLUTION INTRAMUSCULAR; INTRAVENOUS at 18:07

## 2019-06-20 RX ADMIN — INSULIN ASPART SCH: 100 INJECTION, SOLUTION INTRAVENOUS; SUBCUTANEOUS at 21:48

## 2019-06-20 RX ADMIN — IPRATROPIUM BROMIDE AND ALBUTEROL SULFATE SCH AMP: .5; 3 SOLUTION RESPIRATORY (INHALATION) at 07:46

## 2019-06-20 RX ADMIN — AZITHROMYCIN DIHYDRATE SCH MLS/HR: 500 INJECTION, POWDER, LYOPHILIZED, FOR SOLUTION INTRAVENOUS at 12:43

## 2019-06-20 RX ADMIN — IPRATROPIUM BROMIDE AND ALBUTEROL SULFATE SCH AMP: .5; 3 SOLUTION RESPIRATORY (INHALATION) at 16:00

## 2019-06-20 RX ADMIN — PIPERACILLIN SODIUM AND TAZOBACTAM SODIUM SCH MLS/HR: .25; 2 INJECTION, POWDER, LYOPHILIZED, FOR SOLUTION INTRAVENOUS at 02:33

## 2019-06-20 RX ADMIN — ENOXAPARIN SODIUM SCH MG: 40 INJECTION SUBCUTANEOUS at 09:45

## 2019-06-20 RX ADMIN — CARVEDILOL SCH MG: 12.5 TABLET, FILM COATED ORAL at 21:49

## 2019-06-20 RX ADMIN — INSULIN ASPART SCH UNIT: 100 INJECTION, SOLUTION INTRAVENOUS; SUBCUTANEOUS at 06:20

## 2019-06-20 RX ADMIN — ACETAMINOPHEN PRN MG: 325 TABLET ORAL at 11:48

## 2019-06-20 RX ADMIN — FUROSEMIDE SCH MG: 40 TABLET ORAL at 09:44

## 2019-06-20 RX ADMIN — SENNOSIDES SCH TAB: 8.6 TABLET, FILM COATED ORAL at 21:49

## 2019-06-20 RX ADMIN — INSULIN ASPART SCH UNIT: 100 INJECTION, SOLUTION INTRAVENOUS; SUBCUTANEOUS at 16:14

## 2019-06-20 RX ADMIN — RANITIDINE SCH MG: 150 TABLET ORAL at 09:44

## 2019-06-20 RX ADMIN — IPRATROPIUM BROMIDE AND ALBUTEROL SULFATE SCH AMP: .5; 3 SOLUTION RESPIRATORY (INHALATION) at 21:09

## 2019-06-20 RX ADMIN — PANTOPRAZOLE SODIUM SCH MG: 40 TABLET, DELAYED RELEASE ORAL at 21:50

## 2019-06-20 NOTE — PN
Progress Note (short form)





- Note


Progress Note: 





PULMONARY





Breathing slightly improved but still with shortness of breath, chest tightness.





 Vital Signs











 Period  Temp  Pulse  Resp  BP Sys/Yun  Pulse Ox


 


 Last 24 Hr  97.4 F-98.4 F    14-22  114-145/49-84  90-93








Gen:  mildly tachypneic with speaking


Heart: RRR


Lung: distant breath sounds, poor air entry


Abd: soft, nontender


Ext: no edema





 CBC, BMP





 06/19/19 07:09 





 06/19/19 07:01 





Active Medications





Acetaminophen (Tylenol -)  650 mg PO Q6H PRN


   PRN Reason: FEVER


Albuterol Sulfate (Ventolin 0.083% Nebulizer Soln -)  1 amp NEB Q4H PRN


   PRN Reason: SHORT OF BREATH/WHEEZING


Albuterol/Ipratropium (Duoneb -)  1 amp NEB RQID [facility]


   Last Admin: 06/20/19 11:10 Dose:  1 amp


Amlodipine Besylate (Norvasc -)  10 mg PO DAILY [facility]


   Last Admin: 06/20/19 09:45 Dose:  10 mg


Artificial Tears (Artificial Tears)  1 drop OU Q8H PRN


   PRN Reason: DRY EYES


Atorvastatin Calcium (Lipitor -)  20 mg PO HS [facility]


   Last Admin: 06/19/19 21:55 Dose:  20 mg


Budesonide/Formoterol Fumarate (Symbicort 160/4.5mcg -)  2 puff IH BID [facility]


   Last Admin: 06/20/19 09:54 Dose:  2 puff


Carvedilol (Coreg -)  12.5 mg PO BID [facility]


   Last Admin: 06/20/19 09:44 Dose:  12.5 mg


Docusate Sodium (Colace -)  100 mg PO Q8H PRN


   PRN Reason: CONSTIPATION


Enoxaparin Sodium (Lovenox -)  40 mg SQ DAILY [facility]


   Last Admin: 06/20/19 09:45 Dose:  40 mg


Finasteride (Proscar -)  5 mg PO DAILY [facility]


   Last Admin: 06/20/19 09:45 Dose:  5 mg


Fluticasone Propionate (Flonase -)  2 spray NS DAILY [facility]


   Last Admin: 06/20/19 09:54 Dose:  2 sprays


Furosemide (Lasix -)  40 mg PO DAILY [facility]


   Last Admin: 06/20/19 09:44 Dose:  40 mg


Guaifenesin/Codeine Phosphate (Robitussin Ac -)  5 ml PO Q8H PRN


   PRN Reason: COUGH


Azithromycin 250 mg/ Dextrose  250 mls @ 250 mls/hr IVPB DAILY [facility]


   Last Admin: 06/19/19 09:37 Dose:  250 mls/hr


Piperacillin Sod/Tazobactam (Sod 2.25 gm/ Dextrose)  50 mls @ 100 mls/hr IVPB 

Q8H-IV [facility]; Protocol


   Last Admin: 06/20/19 02:33 Dose:  100 mls/hr


Insulin Aspart (Novolog Vial Sliding Scale -)  1 vial SQ ACHS [facility]; Protocol


   Last Admin: 06/20/19 06:20 Dose:  2 unit


Lactic Acid (Lac-Hydrin 12)  1 applic TP DAILY PRN


   PRN Reason: xerosis


Losartan Potassium (Cozaar -)  50 mg PO DAILY [facility]


   Last Admin: 06/20/19 09:45 Dose:  50 mg


Meclizine HCl (Antivert -)  25 mg PO Q8H PRN


   PRN Reason: dizziness


Melatonin (Melatonin)  5 mg PO HS [facility]


   Last Admin: 06/19/19 21:55 Dose:  5 mg


Methylprednisolone Sodium Succinate (Solu-Medrol -)  60 mg IVPB Q8H-IV [facility]


   Last Admin: 06/20/19 09:45 Dose:  60 mg


Omega-3-Acid Ethyl Esters (Lovaza -)  1 gm PO DAILY [facility]


   Last Admin: 06/20/19 09:44 Dose:  1 gm


Pantoprazole Sodium (Protonix -)  40 mg PO BID [facility]


   Last Admin: 06/20/19 09:44 Dose:  40 mg


Polyethylene Glycol (Miralax (For Daily Use) -)  17 gm PO Q12H PRN


   PRN Reason: CONSTIPATION


Ranitidine HCl (Zantac -)  150 mg PO DAILY [facility]


   Last Admin: 06/20/19 09:44 Dose:  150 mg


Senna (Senna -)  2 tab PO HS [facility]


   Last Admin: 06/19/19 21:54 Dose:  2 tab


Simethicone (Mylicon -)  80 mg PO Q4H PRN


   PRN Reason: INDIGESTION


Tamsulosin HCl (Flomax -)  0.4 mg PO DAILY@0830 [facility]


   Last Admin: 06/20/19 09:30 Dose:  0.4 mg





A/P


Acute on Chronic Hypoxic Respiratory Failure


Pneumonia


Acute COPD Exacerbation


HTN


Hyperlipidemia


h/o Renal Cell Carcinoma s/p Left Nephrectomy


h/o Prostate Ca





-  continue antibiotics


-  f/u cultures


-  continue medrol at current dose


-  inhaled bronchodilators standing and PRN


-  O2 to keep Spo2 88-95%


-  BiPAP as needed to assist in work of breathing


-  DVT prophylaxis





Problem List





- Problems


(1) Acute and chronic respiratory failure with hypoxia


Code(s): J96.21 - ACUTE AND CHRONIC RESPIRATORY FAILURE WITH HYPOXIA   





(2) Acute exacerbation of chronic obstructive pulmonary disease (COPD)


Code(s): J44.1 - CHRONIC OBSTRUCTIVE PULMONARY DISEASE W (ACUTE) EXACERBATION   





(3) Pneumonia


Code(s): J18.9 - PNEUMONIA, UNSPECIFIED ORGANISM   


Qualifiers: 


   Pneumonia type: due to unspecified organism   Laterality: right   Lung 

location: unspecified part of lung   Qualified Code(s): J18.9 - Pneumonia, 

unspecified organism

## 2019-06-20 NOTE — PN
Progress Note, Physician





- Current Medication List


Current Medications: 


Active Medications





Acetaminophen (Tylenol -)  650 mg PO Q6H PRN


   PRN Reason: FEVER


Albuterol Sulfate (Ventolin 0.083% Nebulizer Soln -)  1 amp NEB Q4H PRN


   PRN Reason: SHORT OF BREATH/WHEEZING


Albuterol/Ipratropium (Duoneb -)  1 amp NEB RQID [facility]


   Last Admin: 06/20/19 11:10 Dose:  1 amp


Amlodipine Besylate (Norvasc -)  10 mg PO DAILY [facility]


   Last Admin: 06/20/19 09:45 Dose:  10 mg


Artificial Tears (Artificial Tears)  1 drop OU Q8H PRN


   PRN Reason: DRY EYES


Atorvastatin Calcium (Lipitor -)  20 mg PO HS [facility]


   Last Admin: 06/19/19 21:55 Dose:  20 mg


Budesonide/Formoterol Fumarate (Symbicort 160/4.5mcg -)  2 puff IH BID [facility]


   Last Admin: 06/20/19 09:54 Dose:  2 puff


Carvedilol (Coreg -)  12.5 mg PO BID [facility]


   Last Admin: 06/20/19 09:44 Dose:  12.5 mg


Docusate Sodium (Colace -)  100 mg PO Q8H PRN


   PRN Reason: CONSTIPATION


Enoxaparin Sodium (Lovenox -)  40 mg SQ DAILY [facility]


   Last Admin: 06/20/19 09:45 Dose:  40 mg


Finasteride (Proscar -)  5 mg PO DAILY [facility]


   Last Admin: 06/20/19 09:45 Dose:  5 mg


Fluticasone Propionate (Flonase -)  2 spray NS DAILY [facility]


   Last Admin: 06/20/19 09:54 Dose:  2 sprays


Furosemide (Lasix -)  40 mg PO DAILY [facility]


   Last Admin: 06/20/19 09:44 Dose:  40 mg


Guaifenesin/Codeine Phosphate (Robitussin Ac -)  5 ml PO Q8H PRN


   PRN Reason: COUGH


Azithromycin 250 mg/ Dextrose  250 mls @ 250 mls/hr IVPB DAILY [facility]


   Last Admin: 06/19/19 09:37 Dose:  250 mls/hr


Piperacillin Sod/Tazobactam (Sod 2.25 gm/ Dextrose)  50 mls @ 100 mls/hr IVPB 

Q8H-IV [facility]; Protocol


   Last Admin: 06/20/19 02:33 Dose:  100 mls/hr


Insulin Aspart (Novolog Vial Sliding Scale -)  1 vial SQ ACHS [facility]; Protocol


   Last Admin: 06/20/19 06:20 Dose:  2 unit


Lactic Acid (Lac-Hydrin 12)  1 applic TP DAILY PRN


   PRN Reason: xerosis


Losartan Potassium (Cozaar -)  50 mg PO DAILY [facility]


   Last Admin: 06/20/19 09:45 Dose:  50 mg


Meclizine HCl (Antivert -)  25 mg PO Q8H PRN


   PRN Reason: dizziness


Melatonin (Melatonin)  5 mg PO HS [facility]


   Last Admin: 06/19/19 21:55 Dose:  5 mg


Methylprednisolone Sodium Succinate (Solu-Medrol -)  60 mg IVPB Q8H-IV [facility]


   Last Admin: 06/20/19 09:45 Dose:  60 mg


Omega-3-Acid Ethyl Esters (Lovaza -)  1 gm PO DAILY [facility]


   Last Admin: 06/20/19 09:44 Dose:  1 gm


Pantoprazole Sodium (Protonix -)  40 mg PO BID [facility]


   Last Admin: 06/20/19 09:44 Dose:  40 mg


Polyethylene Glycol (Miralax (For Daily Use) -)  17 gm PO Q12H PRN


   PRN Reason: CONSTIPATION


Ranitidine HCl (Zantac -)  150 mg PO DAILY [facility]


   Last Admin: 06/20/19 09:44 Dose:  150 mg


Senna (Senna -)  2 tab PO [facility]


   Last Admin: 06/19/19 21:54 Dose:  2 tab


Simethicone (Mylicon -)  80 mg PO Q4H PRN


   PRN Reason: INDIGESTION


Tamsulosin HCl (Flomax -)  0.4 mg PO DAILY@0830 [facility]


   Last Admin: 06/20/19 09:30 Dose:  0.4 mg











- Objective


Vital Signs: 


 Vital Signs











Temperature  97.4 F L  06/20/19 10:00


 


Pulse Rate  74   06/20/19 10:00


 


Respiratory Rate  18   06/20/19 10:00


 


Blood Pressure  145/69   06/20/19 10:00


 


O2 Sat by Pulse Oximetry (%)  93 L  06/20/19 07:46











Constitutional: Yes: Calm, Thin


Cardiovascular: Yes: Regular Rate and Rhythm, S1, S2


Respiratory: Yes: On Venti-Mask, Poor Air Entry


Gastrointestinal: Yes: Normal Bowel Sounds, Soft


Edema: No


Neurological: Yes: Alert


Labs: 


 CBC, BMP





 06/19/19 07:09 





 06/19/19 07:01 











Problem List





- Problems


(1) Acute exacerbation of chronic obstructive pulmonary disease (COPD)


Assessment/Plan: 


bipap to reduce work of breathing


medrol same dose


bronchodilators


dvt ppx


Code(s): J44.1 - CHRONIC OBSTRUCTIVE PULMONARY DISEASE W (ACUTE) EXACERBATION   





(2) Pneumonia


Assessment/Plan: 


iv abx 


ID on board


chest CT noted for bibasilar pneumonia, moderate copd


Code(s): J18.9 - PNEUMONIA, UNSPECIFIED ORGANISM   


Qualifiers: 


   Pneumonia type: due to unspecified organism   Laterality: right   Lung 

location: unspecified part of lung   Qualified Code(s): J18.9 - Pneumonia, 

unspecified organism   





(3) HTN (hypertension)


Assessment/Plan: 


amlodipine


coreg


Code(s): I10 - ESSENTIAL (PRIMARY) HYPERTENSION   


Qualifiers:

## 2019-06-20 NOTE — PN
Progress Note, Physician


History of Present Illness: 





AWAKE, ALERT IN BED


SL TACHYPNEIC ON MASK


OCCASIONAL COUGH


AFEBRILE


WBC INC ON STEROIDS





- Current Medication List


Current Medications: 


Active Medications





Acetaminophen (Tylenol -)  650 mg PO Q6H PRN


   PRN Reason: FEVER


   Last Admin: 06/20/19 11:48 Dose:  650 mg


Albuterol Sulfate (Ventolin 0.083% Nebulizer Soln -)  1 amp NEB Q4H PRN


   PRN Reason: SHORT OF BREATH/WHEEZING


Albuterol/Ipratropium (Duoneb -)  1 amp NEB RQID [facility]


   Last Admin: 06/20/19 16:00 Dose:  1 amp


Amlodipine Besylate (Norvasc -)  10 mg PO DAILY [facility]


   Last Admin: 06/20/19 09:45 Dose:  10 mg


Artificial Tears (Artificial Tears)  1 drop OU Q8H PRN


   PRN Reason: DRY EYES


Atorvastatin Calcium (Lipitor -)  20 mg PO HS [facility]


   Last Admin: 06/19/19 21:55 Dose:  20 mg


Budesonide/Formoterol Fumarate (Symbicort 160/4.5mcg -)  2 puff IH BID [facility]


   Last Admin: 06/20/19 09:54 Dose:  2 puff


Carvedilol (Coreg -)  12.5 mg PO BID [facility]


   Last Admin: 06/20/19 09:44 Dose:  12.5 mg


Docusate Sodium (Colace -)  100 mg PO Q8H PRN


   PRN Reason: CONSTIPATION


Enoxaparin Sodium (Lovenox -)  40 mg SQ DAILY [facility]


   Last Admin: 06/20/19 09:45 Dose:  40 mg


Finasteride (Proscar -)  5 mg PO DAILY [facility]


   Last Admin: 06/20/19 09:45 Dose:  5 mg


Fluticasone Propionate (Flonase -)  2 spray NS DAILY [facility]


   Last Admin: 06/20/19 09:54 Dose:  2 sprays


Furosemide (Lasix -)  40 mg PO DAILY [facility]


   Last Admin: 06/20/19 09:44 Dose:  40 mg


Guaifenesin/Codeine Phosphate (Robitussin Ac -)  5 ml PO Q8H PRN


   PRN Reason: COUGH


Azithromycin 250 mg/ Dextrose  250 mls @ 250 mls/hr IVPB DAILY [facility]


   Last Admin: 06/20/19 12:43 Dose:  250 mls/hr


Piperacillin Sod/Tazobactam (Sod 2.25 gm/ Dextrose)  50 mls @ 100 mls/hr IVPB 

Q8H-IV [facility]; Protocol


   Last Admin: 06/20/19 17:16 Dose:  100 mls/hr


Insulin Aspart (Novolog Vial Sliding Scale -)  1 vial SQ ACHS [facility]; Protocol


   Last Admin: 06/20/19 16:14 Dose:  8 unit


Lactic Acid (Lac-Hydrin 12)  1 applic TP DAILY PRN


   PRN Reason: xerosis


Losartan Potassium (Cozaar -)  50 mg PO DAILY [facility]


   Last Admin: 06/20/19 09:45 Dose:  50 mg


Meclizine HCl (Antivert -)  25 mg PO Q8H PRN


   PRN Reason: dizziness


Melatonin (Melatonin)  5 mg PO HS [facility]


   Last Admin: 06/19/19 21:55 Dose:  5 mg


Methylprednisolone Sodium Succinate (Solu-Medrol -)  60 mg IVPB Q8H-IV [facility]


   Last Admin: 06/20/19 09:45 Dose:  60 mg


Omega-3-Acid Ethyl Esters (Lovaza -)  1 gm PO DAILY [facility]


   Last Admin: 06/20/19 09:44 Dose:  1 gm


Pantoprazole Sodium (Protonix -)  40 mg PO BID [facility]


   Last Admin: 06/20/19 09:44 Dose:  40 mg


Polyethylene Glycol (Miralax (For Daily Use) -)  17 gm PO Q12H PRN


   PRN Reason: CONSTIPATION


Ranitidine HCl (Zantac -)  150 mg PO DAILY [facility]


   Last Admin: 06/20/19 09:44 Dose:  150 mg


Senna (Senna -)  2 tab PO HS [facility]


   Last Admin: 06/19/19 21:54 Dose:  2 tab


Simethicone (Mylicon -)  80 mg PO Q4H PRN


   PRN Reason: INDIGESTION


Tamsulosin HCl (Flomax -)  0.4 mg PO DAILY@0830 [facility]


   Last Admin: 06/20/19 09:30 Dose:  0.4 mg











- Objective


Vital Signs: 


 Vital Signs











Temperature  97.5 F L  06/20/19 14:00


 


Pulse Rate  64   06/20/19 14:00


 


Respiratory Rate  18   06/20/19 14:00


 


Blood Pressure  118/55 L  06/20/19 14:00


 


O2 Sat by Pulse Oximetry (%)  97   06/20/19 09:00











Constitutional: Yes: No Distress


Cardiovascular: Yes: Regular Rate and Rhythm, S1, S2


Respiratory: Yes: Diminished


Gastrointestinal: Yes: Normal Bowel Sounds, Soft.  No: Tenderness


Labs: 


 CBC, BMP





 06/19/19 07:09 





 06/19/19 07:01 











Assessment/Plan





BIBASILAR PNEUMONIA


?HCAP


COPD EXACERBATION


S/P NEPHRECTOMY


CONTINUE ZITHROMAX/ ZOSYN

## 2019-06-21 LAB
ALBUMIN SERPL-MCNC: 2.9 G/DL (ref 3.4–5)
ALP SERPL-CCNC: 54 U/L (ref 45–117)
ALT SERPL-CCNC: 23 U/L (ref 13–61)
ANION GAP SERPL CALC-SCNC: 6 MMOL/L (ref 8–16)
ANISOCYTOSIS BLD QL: 0
AST SERPL-CCNC: 12 U/L (ref 15–37)
BASOPHILS # BLD: 0.1 % (ref 0–2)
BILIRUB SERPL-MCNC: 0.6 MG/DL (ref 0.2–1)
BUN SERPL-MCNC: 35.6 MG/DL (ref 7–18)
CALCIUM SERPL-MCNC: 8.9 MG/DL (ref 8.5–10.1)
CHLORIDE SERPL-SCNC: 97 MMOL/L (ref 98–107)
CO2 SERPL-SCNC: 37 MMOL/L (ref 21–32)
CREAT SERPL-MCNC: 0.9 MG/DL (ref 0.55–1.3)
DEPRECATED RDW RBC AUTO: 15 % (ref 11.9–15.9)
EOSINOPHIL # BLD: 0 % (ref 0–4.5)
GLUCOSE SERPL-MCNC: 192 MG/DL (ref 74–106)
HCT VFR BLD CALC: 47 % (ref 35.4–49)
HGB BLD-MCNC: 15.9 GM/DL (ref 11.7–16.9)
LYMPHOCYTES # BLD: 5.4 % (ref 8–40)
MACROCYTES BLD QL: 0
MCH RBC QN AUTO: 29.1 PG (ref 25.7–33.7)
MCHC RBC AUTO-ENTMCNC: 33.9 G/DL (ref 32–35.9)
MCV RBC: 86 FL (ref 80–96)
MONOCYTES # BLD AUTO: 2.4 % (ref 3.8–10.2)
NEUTROPHILS # BLD: 92.1 % (ref 42.8–82.8)
PLATELET # BLD AUTO: 290 K/MM3 (ref 134–434)
PLATELET BLD QL SMEAR: NORMAL
PMV BLD: 7.6 FL (ref 7.5–11.1)
POTASSIUM SERPLBLD-SCNC: 3.3 MMOL/L (ref 3.5–5.1)
PROT SERPL-MCNC: 6 G/DL (ref 6.4–8.2)
RBC # BLD AUTO: 5.46 M/MM3 (ref 4–5.6)
SODIUM SERPL-SCNC: 140 MMOL/L (ref 136–145)
WBC # BLD AUTO: 13.9 K/MM3 (ref 4–10)

## 2019-06-21 RX ADMIN — FUROSEMIDE SCH MG: 40 TABLET ORAL at 09:15

## 2019-06-21 RX ADMIN — INSULIN ASPART SCH UNIT: 100 INJECTION, SOLUTION INTRAVENOUS; SUBCUTANEOUS at 11:47

## 2019-06-21 RX ADMIN — ENOXAPARIN SODIUM SCH MG: 40 INJECTION SUBCUTANEOUS at 09:21

## 2019-06-21 RX ADMIN — AZITHROMYCIN DIHYDRATE SCH MLS/HR: 500 INJECTION, POWDER, LYOPHILIZED, FOR SOLUTION INTRAVENOUS at 13:52

## 2019-06-21 RX ADMIN — ACETAMINOPHEN PRN MG: 325 TABLET ORAL at 09:14

## 2019-06-21 RX ADMIN — PIPERACILLIN SODIUM AND TAZOBACTAM SODIUM SCH MLS/HR: .25; 2 INJECTION, POWDER, LYOPHILIZED, FOR SOLUTION INTRAVENOUS at 12:38

## 2019-06-21 RX ADMIN — PANTOPRAZOLE SODIUM SCH MG: 40 TABLET, DELAYED RELEASE ORAL at 09:15

## 2019-06-21 RX ADMIN — BUDESONIDE AND FORMOTEROL FUMARATE DIHYDRATE SCH PUFF: 160; 4.5 AEROSOL RESPIRATORY (INHALATION) at 09:21

## 2019-06-21 RX ADMIN — SENNOSIDES SCH TAB: 8.6 TABLET, FILM COATED ORAL at 21:25

## 2019-06-21 RX ADMIN — INSULIN ASPART SCH UNIT: 100 INJECTION, SOLUTION INTRAVENOUS; SUBCUTANEOUS at 06:20

## 2019-06-21 RX ADMIN — Medication SCH MG: at 21:32

## 2019-06-21 RX ADMIN — PANTOPRAZOLE SODIUM SCH MG: 40 TABLET, DELAYED RELEASE ORAL at 21:26

## 2019-06-21 RX ADMIN — IPRATROPIUM BROMIDE AND ALBUTEROL SULFATE SCH AMP: .5; 3 SOLUTION RESPIRATORY (INHALATION) at 11:03

## 2019-06-21 RX ADMIN — FLUTICASONE PROPIONATE SCH SPRAYS: 50 SPRAY, METERED NASAL at 09:21

## 2019-06-21 RX ADMIN — TAMSULOSIN HYDROCHLORIDE SCH MG: 0.4 CAPSULE ORAL at 09:15

## 2019-06-21 RX ADMIN — IPRATROPIUM BROMIDE AND ALBUTEROL SULFATE SCH AMP: .5; 3 SOLUTION RESPIRATORY (INHALATION) at 07:26

## 2019-06-21 RX ADMIN — ATORVASTATIN CALCIUM SCH MG: 20 TABLET, FILM COATED ORAL at 21:25

## 2019-06-21 RX ADMIN — AMLODIPINE BESYLATE SCH MG: 10 TABLET ORAL at 09:15

## 2019-06-21 RX ADMIN — RANITIDINE SCH MG: 150 TABLET ORAL at 09:16

## 2019-06-21 RX ADMIN — Medication SCH: at 21:32

## 2019-06-21 RX ADMIN — INSULIN ASPART SCH UNIT: 100 INJECTION, SOLUTION INTRAVENOUS; SUBCUTANEOUS at 16:23

## 2019-06-21 RX ADMIN — CARVEDILOL SCH MG: 12.5 TABLET, FILM COATED ORAL at 21:26

## 2019-06-21 RX ADMIN — IPRATROPIUM BROMIDE AND ALBUTEROL SULFATE SCH AMP: .5; 3 SOLUTION RESPIRATORY (INHALATION) at 21:13

## 2019-06-21 RX ADMIN — BUDESONIDE AND FORMOTEROL FUMARATE DIHYDRATE SCH: 160; 4.5 AEROSOL RESPIRATORY (INHALATION) at 21:31

## 2019-06-21 RX ADMIN — METHYLPREDNISOLONE SODIUM SUCCINATE SCH MG: 40 INJECTION, POWDER, FOR SOLUTION INTRAMUSCULAR; INTRAVENOUS at 17:23

## 2019-06-21 RX ADMIN — IPRATROPIUM BROMIDE AND ALBUTEROL SULFATE SCH AMP: .5; 3 SOLUTION RESPIRATORY (INHALATION) at 16:05

## 2019-06-21 RX ADMIN — PIPERACILLIN SODIUM AND TAZOBACTAM SODIUM SCH MLS/HR: .25; 2 INJECTION, POWDER, LYOPHILIZED, FOR SOLUTION INTRAVENOUS at 01:28

## 2019-06-21 RX ADMIN — METHYLPREDNISOLONE SODIUM SUCCINATE SCH MG: 40 INJECTION, POWDER, FOR SOLUTION INTRAMUSCULAR; INTRAVENOUS at 03:35

## 2019-06-21 RX ADMIN — FINASTERIDE SCH MG: 5 TABLET, FILM COATED ORAL at 09:15

## 2019-06-21 RX ADMIN — CARVEDILOL SCH MG: 12.5 TABLET, FILM COATED ORAL at 09:15

## 2019-06-21 RX ADMIN — INSULIN ASPART SCH UNIT: 100 INJECTION, SOLUTION INTRAVENOUS; SUBCUTANEOUS at 21:26

## 2019-06-21 RX ADMIN — PIPERACILLIN SODIUM AND TAZOBACTAM SODIUM SCH MLS/HR: .25; 2 INJECTION, POWDER, LYOPHILIZED, FOR SOLUTION INTRAVENOUS at 18:22

## 2019-06-21 RX ADMIN — METHYLPREDNISOLONE SODIUM SUCCINATE SCH MG: 40 INJECTION, POWDER, FOR SOLUTION INTRAMUSCULAR; INTRAVENOUS at 09:16

## 2019-06-21 RX ADMIN — SENNOSIDES SCH: 8.6 TABLET, FILM COATED ORAL at 21:30

## 2019-06-21 RX ADMIN — OMEGA-3-ACID ETHYL ESTERS SCH GM: 1 CAPSULE, LIQUID FILLED ORAL at 09:14

## 2019-06-21 RX ADMIN — LOSARTAN POTASSIUM SCH MG: 50 TABLET, FILM COATED ORAL at 09:16

## 2019-06-21 NOTE — PN
Progress Note (short form)





- Note


Progress Note: 





PULMONARY





Appears tachypneic in bed





VSS/AFEBRILE


Gen:  tachypneic with speaking on V/M


Heart: RRR


Lung: distant breath sounds, poor air entry


Abd: soft, nontender


Ext: no edema





Active Medications noted





images/labs/meds/micro/notes reviewed








A/P


Acute on Chronic Hypoxic Respiratory Failure


Bibasilar Pneumonia


Acute COPD Exacerbation


HTN


Hyperlipidemia


h/o Renal Cell Carcinoma s/p Left Nephrectomy


h/o Prostate Ca





-  antibiotics


-  f/u cultures


-  continue medrol at current dose


-  inhaled bronchodilators standing and PRN


-  O2 to keep Spo2 88-95%


-  BiPAP as needed to assist in work of breathing


-  DVT prophylaxis





GERI MESSER MD

## 2019-06-22 LAB
ALBUMIN SERPL-MCNC: 3 G/DL (ref 3.4–5)
ALP SERPL-CCNC: 54 U/L (ref 45–117)
ALT SERPL-CCNC: 35 U/L (ref 13–61)
ANION GAP SERPL CALC-SCNC: 5 MMOL/L (ref 8–16)
AST SERPL-CCNC: 16 U/L (ref 15–37)
BILIRUB SERPL-MCNC: 0.5 MG/DL (ref 0.2–1)
BUN SERPL-MCNC: 41.9 MG/DL (ref 7–18)
CALCIUM SERPL-MCNC: 8.8 MG/DL (ref 8.5–10.1)
CHLORIDE SERPL-SCNC: 99 MMOL/L (ref 98–107)
CO2 SERPL-SCNC: 37 MMOL/L (ref 21–32)
CREAT SERPL-MCNC: 0.9 MG/DL (ref 0.55–1.3)
GLUCOSE SERPL-MCNC: 191 MG/DL (ref 74–106)
MAGNESIUM SERPL-MCNC: 2.5 MG/DL (ref 1.8–2.4)
POTASSIUM SERPLBLD-SCNC: 4.1 MMOL/L (ref 3.5–5.1)
PROT SERPL-MCNC: 6.1 G/DL (ref 6.4–8.2)
SODIUM SERPL-SCNC: 142 MMOL/L (ref 136–145)

## 2019-06-22 RX ADMIN — IPRATROPIUM BROMIDE AND ALBUTEROL SULFATE SCH AMP: .5; 3 SOLUTION RESPIRATORY (INHALATION) at 08:20

## 2019-06-22 RX ADMIN — METHYLPREDNISOLONE SODIUM SUCCINATE SCH MG: 40 INJECTION, POWDER, FOR SOLUTION INTRAMUSCULAR; INTRAVENOUS at 18:03

## 2019-06-22 RX ADMIN — Medication SCH MG: at 21:00

## 2019-06-22 RX ADMIN — INSULIN ASPART SCH UNIT: 100 INJECTION, SOLUTION INTRAVENOUS; SUBCUTANEOUS at 06:46

## 2019-06-22 RX ADMIN — OMEGA-3-ACID ETHYL ESTERS SCH GM: 1 CAPSULE, LIQUID FILLED ORAL at 11:57

## 2019-06-22 RX ADMIN — PIPERACILLIN SODIUM AND TAZOBACTAM SODIUM SCH MLS/HR: .25; 2 INJECTION, POWDER, LYOPHILIZED, FOR SOLUTION INTRAVENOUS at 18:02

## 2019-06-22 RX ADMIN — FINASTERIDE SCH MG: 5 TABLET, FILM COATED ORAL at 11:56

## 2019-06-22 RX ADMIN — FUROSEMIDE SCH MG: 40 TABLET ORAL at 11:56

## 2019-06-22 RX ADMIN — INSULIN ASPART SCH UNIT: 100 INJECTION, SOLUTION INTRAVENOUS; SUBCUTANEOUS at 21:01

## 2019-06-22 RX ADMIN — PIPERACILLIN SODIUM AND TAZOBACTAM SODIUM SCH MLS/HR: .25; 2 INJECTION, POWDER, LYOPHILIZED, FOR SOLUTION INTRAVENOUS at 11:59

## 2019-06-22 RX ADMIN — CARVEDILOL SCH MG: 12.5 TABLET, FILM COATED ORAL at 11:57

## 2019-06-22 RX ADMIN — CARVEDILOL SCH MG: 12.5 TABLET, FILM COATED ORAL at 21:01

## 2019-06-22 RX ADMIN — FLUTICASONE PROPIONATE SCH SPRAYS: 50 SPRAY, METERED NASAL at 11:58

## 2019-06-22 RX ADMIN — PANTOPRAZOLE SODIUM SCH MG: 40 TABLET, DELAYED RELEASE ORAL at 21:01

## 2019-06-22 RX ADMIN — SENNOSIDES SCH TAB: 8.6 TABLET, FILM COATED ORAL at 21:01

## 2019-06-22 RX ADMIN — TAMSULOSIN HYDROCHLORIDE SCH MG: 0.4 CAPSULE ORAL at 11:58

## 2019-06-22 RX ADMIN — AZITHROMYCIN DIHYDRATE SCH MLS/HR: 500 INJECTION, POWDER, LYOPHILIZED, FOR SOLUTION INTRAVENOUS at 15:54

## 2019-06-22 RX ADMIN — ACETAMINOPHEN PRN MG: 325 TABLET ORAL at 16:44

## 2019-06-22 RX ADMIN — METHYLPREDNISOLONE SODIUM SUCCINATE SCH MG: 40 INJECTION, POWDER, FOR SOLUTION INTRAMUSCULAR; INTRAVENOUS at 11:56

## 2019-06-22 RX ADMIN — IPRATROPIUM BROMIDE AND ALBUTEROL SULFATE SCH AMP: .5; 3 SOLUTION RESPIRATORY (INHALATION) at 16:00

## 2019-06-22 RX ADMIN — IPRATROPIUM BROMIDE AND ALBUTEROL SULFATE SCH AMP: .5; 3 SOLUTION RESPIRATORY (INHALATION) at 13:10

## 2019-06-22 RX ADMIN — ENOXAPARIN SODIUM SCH MG: 40 INJECTION SUBCUTANEOUS at 11:58

## 2019-06-22 RX ADMIN — RANITIDINE SCH MG: 150 TABLET ORAL at 11:57

## 2019-06-22 RX ADMIN — PANTOPRAZOLE SODIUM SCH MG: 40 TABLET, DELAYED RELEASE ORAL at 11:56

## 2019-06-22 RX ADMIN — PIPERACILLIN SODIUM AND TAZOBACTAM SODIUM SCH MLS/HR: .25; 2 INJECTION, POWDER, LYOPHILIZED, FOR SOLUTION INTRAVENOUS at 02:19

## 2019-06-22 RX ADMIN — INSULIN ASPART SCH UNIT: 100 INJECTION, SOLUTION INTRAVENOUS; SUBCUTANEOUS at 13:11

## 2019-06-22 RX ADMIN — BUDESONIDE AND FORMOTEROL FUMARATE DIHYDRATE SCH: 160; 4.5 AEROSOL RESPIRATORY (INHALATION) at 21:03

## 2019-06-22 RX ADMIN — LOSARTAN POTASSIUM SCH MG: 50 TABLET, FILM COATED ORAL at 11:57

## 2019-06-22 RX ADMIN — AMLODIPINE BESYLATE SCH MG: 10 TABLET ORAL at 11:57

## 2019-06-22 RX ADMIN — INSULIN ASPART SCH UNIT: 100 INJECTION, SOLUTION INTRAVENOUS; SUBCUTANEOUS at 17:03

## 2019-06-22 RX ADMIN — ATORVASTATIN CALCIUM SCH MG: 20 TABLET, FILM COATED ORAL at 21:01

## 2019-06-22 RX ADMIN — METHYLPREDNISOLONE SODIUM SUCCINATE SCH MG: 40 INJECTION, POWDER, FOR SOLUTION INTRAMUSCULAR; INTRAVENOUS at 02:19

## 2019-06-22 RX ADMIN — BUDESONIDE AND FORMOTEROL FUMARATE DIHYDRATE SCH PUFF: 160; 4.5 AEROSOL RESPIRATORY (INHALATION) at 11:59

## 2019-06-22 NOTE — PN
Progress Note (short form)





- Note


Progress Note: 


Remains mildly tachypneic on 50% VM O2. 


No acute events overnight. 





 Intake & Output











 06/19/19 06/20/19 06/21/19 06/22/19





 23:59 23:59 23:59 23:59


 


Intake Total 875 1125 1025 200


 


Output Total 200 500  


 


Balance  200


 


Weight 149 lb 139 lb 8 oz  








 Last Vital Signs











Temp Pulse Resp BP Pulse Ox


 


 97.4 F L  70   20   149/66   94 L


 


 06/22/19 06:00  06/22/19 06:00  06/22/19 06:00  06/22/19 06:00  06/22/19 08:49








Active Medications





Acetaminophen (Tylenol -)  650 mg PO Q6H PRN


   PRN Reason: FEVER


   Last Admin: 06/21/19 09:14 Dose:  650 mg


Albuterol Sulfate (Ventolin 0.083% Nebulizer Soln -)  1 amp NEB Q4H PRN


   PRN Reason: SHORT OF BREATH/WHEEZING


Albuterol/Ipratropium (Duoneb -)  1 amp NEB RQID [facility]


   Last Admin: 06/22/19 08:20 Dose:  1 amp


Amlodipine Besylate (Norvasc -)  10 mg PO DAILY [facility]


   Last Admin: 06/22/19 11:57 Dose:  10 mg


Artificial Tears (Artificial Tears)  1 drop OU Q8H PRN


   PRN Reason: DRY EYES


Atorvastatin Calcium (Lipitor -)  20 mg PO DAILY@2000 [facility]


   Last Admin: 06/21/19 21:25 Dose:  20 mg


Budesonide/Formoterol Fumarate (Symbicort 160/4.5mcg -)  2 puff IH BID [facility]


   Last Admin: 06/22/19 11:59 Dose:  2 puff


Carvedilol (Coreg -)  12.5 mg PO BID@0800,2000 [facility]


   Last Admin: 06/22/19 11:57 Dose:  12.5 mg


Docusate Sodium (Colace -)  100 mg PO Q8H PRN


   PRN Reason: CONSTIPATION


Enoxaparin Sodium (Lovenox -)  40 mg SQ DAILY [facility]


   Last Admin: 06/22/19 11:58 Dose:  40 mg


Finasteride (Proscar -)  5 mg PO DAILY [facility]


   Last Admin: 06/22/19 11:56 Dose:  5 mg


Fluticasone Propionate (Flonase -)  2 spray NS DAILY [facility]


   Last Admin: 06/22/19 11:58 Dose:  2 sprays


Furosemide (Lasix -)  40 mg PO DAILY [facility]


   Last Admin: 06/22/19 11:56 Dose:  40 mg


Guaifenesin/Codeine Phosphate (Robitussin Ac -)  5 ml PO Q8H PRN


   PRN Reason: COUGH


   Last Admin: 06/21/19 21:25 Dose:  5 ml


Azithromycin 250 mg/ Dextrose  250 mls @ 250 mls/hr IVPB DAILY [facility]


   Last Admin: 06/21/19 13:52 Dose:  250 mls/hr


Piperacillin Sod/Tazobactam (Sod 2.25 gm/ Dextrose)  50 mls @ 100 mls/hr IVPB 

Q8H-IV [facility]; Protocol


   Last Admin: 06/22/19 11:59 Dose:  100 mls/hr


Insulin Aspart (Novolog Vial Sliding Scale -)  1 vial SQ ACHS [facility]; Protocol


   Last Admin: 06/22/19 06:46 Dose:  4 unit


Lactic Acid (Lac-Hydrin 12)  1 applic TP DAILY PRN


   PRN Reason: xerosis


Losartan Potassium (Cozaar -)  50 mg PO DAILY [facility]


   Last Admin: 06/22/19 11:57 Dose:  50 mg


Meclizine HCl (Antivert -)  25 mg PO Q8H PRN


   PRN Reason: dizziness


Melatonin (Melatonin)  10 mg PO HS [facility]


   Last Admin: 06/21/19 21:32 Dose:  10 mg


Methylprednisolone Sodium Succinate (Solu-Medrol -)  60 mg IVPB Q8H-IV [facility]


   Last Admin: 06/22/19 11:56 Dose:  60 mg


Omega-3-Acid Ethyl Esters (Lovaza -)  1 gm PO DAILY [facility]


   Last Admin: 06/22/19 11:57 Dose:  1 gm


Pantoprazole Sodium (Protonix -)  40 mg PO BID@0800,2000 [facility]


   Last Admin: 06/22/19 11:56 Dose:  40 mg


Polyethylene Glycol (Miralax (For Daily Use) -)  17 gm PO Q12H PRN


   PRN Reason: CONSTIPATION


Ranitidine HCl (Zantac -)  150 mg PO DAILY [facility]


   Last Admin: 06/22/19 11:57 Dose:  150 mg


Senna (Senna -)  2 tab PO [facility]


   Last Admin: 06/21/19 21:30 Dose:  Not Given


Simethicone (Mylicon -)  80 mg PO Q4H PRN


   PRN Reason: INDIGESTION


Tamsulosin HCl (Flomax -)  0.4 mg PO DAILY@0830 [facility]


   Last Admin: 06/22/19 11:58 Dose:  0.4 mg











Gen: Awake and alert, mildly tachypneic with speaking


Heart: RRR


Lung: distant breath sounds, poor air entry


Abd: soft, nontender


Ext: no edema





 Laboratory Results - last 24 hr











  06/21/19 06/21/19 06/22/19





  16:06 21:24 06:45


 


Sodium   


 


Potassium   


 


Chloride   


 


Carbon Dioxide   


 


Anion Gap   


 


BUN   


 


Creatinine   


 


Est GFR (CKD-EPI)AfAm   


 


Est GFR (CKD-EPI)NonAf   


 


POC Glucometer  343  238  242


 


Random Glucose   


 


Calcium   


 


Magnesium   


 


Total Bilirubin   


 


AST   


 


ALT   


 


Alkaline Phosphatase   


 


Total Protein   


 


Albumin   














  06/22/19 06/22/19





  08:46 12:09


 


Sodium  142 


 


Potassium  4.1 


 


Chloride  99 


 


Carbon Dioxide  37 H 


 


Anion Gap  5 L 


 


BUN  41.9 H 


 


Creatinine  0.9 


 


Est GFR (CKD-EPI)AfAm  88.69 


 


Est GFR (CKD-EPI)NonAf  76.52 


 


POC Glucometer   335


 


Random Glucose  191 H 


 


Calcium  8.8 


 


Magnesium  2.5 H 


 


Total Bilirubin  0.5 


 


AST  16 


 


ALT  35 


 


Alkaline Phosphatase  54 


 


Total Protein  6.1 L 


 


Albumin  3.0 L 

















Problem List





- Problems


(1) Acute and chronic respiratory failure with hypoxia


Code(s): J96.21 - ACUTE AND CHRONIC RESPIRATORY FAILURE WITH HYPOXIA   





(2) Acute exacerbation of chronic obstructive pulmonary disease (COPD)


Code(s): J44.1 - CHRONIC OBSTRUCTIVE PULMONARY DISEASE W (ACUTE) EXACERBATION   





(3) Pneumonia


Code(s): J18.9 - PNEUMONIA, UNSPECIFIED ORGANISM   


Qualifiers: 


   Pneumonia type: due to unspecified organism   Laterality: right   Lung 

location: unspecified part of lung   Qualified Code(s): J18.9 - Pneumonia, 

unspecified organism   








A/P


Acute on Chronic Hypoxic Respiratory Failure


Pneumonia


Acute COPD Exacerbation


HTN


Hyperlipidemia


h/o Renal Cell Carcinoma s/p Left Nephrectomy


h/o Prostate Ca





-  continue antibiotics per ID 


-  Medrol at current dose


-  Inhaled bronchodilators standing and PRN


-  O2 to keep Spo2 88-95%


-  NIPPV as needed to assist in work of breathing


-  DVT prophylaxis





Dr [place]

## 2019-06-22 NOTE — PN
Progress Note, Physician


History of Present Illness: 





AWAKE, ALERT IN BED


SL TACHYPNEIC ON MASK


AUDIBLY CONGESTED


AFEBRILE


WBC INC ON STEROIDS





- Current Medication List


Current Medications: 


Active Medications





Acetaminophen (Tylenol -)  650 mg PO Q6H PRN


   PRN Reason: FEVER


   Last Admin: 06/21/19 09:14 Dose:  650 mg


Albuterol Sulfate (Ventolin 0.083% Nebulizer Soln -)  1 amp NEB Q4H PRN


   PRN Reason: SHORT OF BREATH/WHEEZING


Albuterol/Ipratropium (Duoneb -)  1 amp NEB RQID [facility]


   Last Admin: 06/22/19 08:20 Dose:  1 amp


Amlodipine Besylate (Norvasc -)  10 mg PO DAILY [facility]


   Last Admin: 06/21/19 09:15 Dose:  10 mg


Artificial Tears (Artificial Tears)  1 drop OU Q8H PRN


   PRN Reason: DRY EYES


Atorvastatin Calcium (Lipitor -)  20 mg PO DAILY@2000 [facility]


   Last Admin: 06/21/19 21:25 Dose:  20 mg


Budesonide/Formoterol Fumarate (Symbicort 160/4.5mcg -)  2 puff IH BID [facility]


   Last Admin: 06/21/19 21:31 Dose:  Not Given


Carvedilol (Coreg -)  12.5 mg PO BID@0800,2000 [facility]


   Last Admin: 06/21/19 21:26 Dose:  12.5 mg


Docusate Sodium (Colace -)  100 mg PO Q8H PRN


   PRN Reason: CONSTIPATION


Enoxaparin Sodium (Lovenox -)  40 mg SQ DAILY [facility]


   Last Admin: 06/21/19 09:21 Dose:  40 mg


Finasteride (Proscar -)  5 mg PO DAILY [facility]


   Last Admin: 06/21/19 09:15 Dose:  5 mg


Fluticasone Propionate (Flonase -)  2 spray NS DAILY [facility]


   Last Admin: 06/21/19 09:21 Dose:  2 sprays


Furosemide (Lasix -)  40 mg PO DAILY [facility]


   Last Admin: 06/21/19 09:15 Dose:  40 mg


Guaifenesin/Codeine Phosphate (Robitussin Ac -)  5 ml PO Q8H PRN


   PRN Reason: COUGH


   Last Admin: 06/21/19 21:25 Dose:  5 ml


Azithromycin 250 mg/ Dextrose  250 mls @ 250 mls/hr IVPB DAILY [facility]


   Last Admin: 06/21/19 13:52 Dose:  250 mls/hr


Piperacillin Sod/Tazobactam (Sod 2.25 gm/ Dextrose)  50 mls @ 100 mls/hr IVPB 

Q8H-IV [facility]; Protocol


   Last Admin: 06/22/19 02:19 Dose:  100 mls/hr


Insulin Aspart (Novolog Vial Sliding Scale -)  1 vial SQ ACHS [facility]; Protocol


   Last Admin: 06/22/19 06:46 Dose:  4 unit


Lactic Acid (Lac-Hydrin 12)  1 applic TP DAILY PRN


   PRN Reason: xerosis


Losartan Potassium (Cozaar -)  50 mg PO DAILY [facility]


   Last Admin: 06/21/19 09:16 Dose:  50 mg


Meclizine HCl (Antivert -)  25 mg PO Q8H PRN


   PRN Reason: dizziness


Melatonin (Melatonin)  10 mg PO [facility]


   Last Admin: 06/21/19 21:32 Dose:  10 mg


Methylprednisolone Sodium Succinate (Solu-Medrol -)  60 mg IVPB Q8H-IV [facility]


   Last Admin: 06/22/19 02:19 Dose:  60 mg


Omega-3-Acid Ethyl Esters (Lovaza -)  1 gm PO DAILY [facility]


   Last Admin: 06/21/19 09:14 Dose:  1 gm


Pantoprazole Sodium (Protonix -)  40 mg PO BID@0800,2000 [facility]


   Last Admin: 06/21/19 21:26 Dose:  40 mg


Polyethylene Glycol (Miralax (For Daily Use) -)  17 gm PO Q12H PRN


   PRN Reason: CONSTIPATION


Ranitidine HCl (Zantac -)  150 mg PO DAILY [facility]


   Last Admin: 06/21/19 09:16 Dose:  150 mg


Senna (Senna -)  2 tab PO [facility]


   Last Admin: 06/21/19 21:30 Dose:  Not Given


Simethicone (Mylicon -)  80 mg PO Q4H PRN


   PRN Reason: INDIGESTION


Tamsulosin HCl (Flomax -)  0.4 mg PO DAILY@0830 [facility]


   Last Admin: 06/21/19 09:15 Dose:  0.4 mg











- Objective


Vital Signs: 


 Vital Signs











Temperature  97.4 F L  06/22/19 06:00


 


Pulse Rate  70   06/22/19 06:00


 


Respiratory Rate  20   06/22/19 06:00


 


Blood Pressure  149/66   06/22/19 06:00


 


O2 Sat by Pulse Oximetry (%)  94 L  06/22/19 08:49











Constitutional: Yes: No Distress


Eyes: Yes: Conjunctiva Clear


Cardiovascular: Yes: Regular Rate and Rhythm, S1, S2


Respiratory: Yes: Rhonchi


Gastrointestinal: Yes: Normal Bowel Sounds, Soft.  No: Tenderness


Labs: 


 CBC, BMP





 06/21/19 07:10 





 06/22/19 08:46 











Assessment/Plan





BIBASILAR PNEUMONIA


?HCAP


COPD EXACERBATION


S/P NEPHRECTOMY


CONTINUE ZITHROMAX/ ZOSYN

## 2019-06-22 NOTE — PN
Progress Note, Physician


Chief Complaint: 





COPD


PNA


Chronic hypoxic respiratory failure 


History of Present Illness: 





Previous notes and events reviewed


awake and alert


NAD


sts breathing is not improving


noted having retractions while breathing


pt has been refusing Bipap at night and explained importance of using Bipap to 

aid with breathing


Ventimask 50%


complain of chest pressure that started this morning





- Current Medication List


Current Medications: 


Active Medications





Acetaminophen (Tylenol -)  650 mg PO Q6H PRN


   PRN Reason: FEVER


   Last Admin: 06/21/19 09:14 Dose:  650 mg


Albuterol Sulfate (Ventolin 0.083% Nebulizer Soln -)  1 amp NEB Q4H PRN


   PRN Reason: SHORT OF BREATH/WHEEZING


Albuterol/Ipratropium (Duoneb -)  1 amp NEB RQID [facility]


   Last Admin: 06/22/19 13:10 Dose:  1 amp


Amlodipine Besylate (Norvasc -)  10 mg PO DAILY [facility]


   Last Admin: 06/22/19 11:57 Dose:  10 mg


Artificial Tears (Artificial Tears)  1 drop OU Q8H PRN


   PRN Reason: DRY EYES


Atorvastatin Calcium (Lipitor -)  20 mg PO DAILY@2000 [facility]


   Last Admin: 06/21/19 21:25 Dose:  20 mg


Budesonide/Formoterol Fumarate (Symbicort 160/4.5mcg -)  2 puff IH BID [facility]


   Last Admin: 06/22/19 11:59 Dose:  2 puff


Carvedilol (Coreg -)  12.5 mg PO BID@0800,2000 [facility]


   Last Admin: 06/22/19 11:57 Dose:  12.5 mg


Docusate Sodium (Colace -)  100 mg PO Q8H PRN


   PRN Reason: CONSTIPATION


Enoxaparin Sodium (Lovenox -)  40 mg SQ DAILY [facility]


   Last Admin: 06/22/19 11:58 Dose:  40 mg


Finasteride (Proscar -)  5 mg PO DAILY [facility]


   Last Admin: 06/22/19 11:56 Dose:  5 mg


Fluticasone Propionate (Flonase -)  2 spray NS DAILY [facility]


   Last Admin: 06/22/19 11:58 Dose:  2 sprays


Furosemide (Lasix -)  40 mg PO DAILY [facility]


   Last Admin: 06/22/19 11:56 Dose:  40 mg


Guaifenesin/Codeine Phosphate (Robitussin Ac -)  5 ml PO Q8H PRN


   PRN Reason: COUGH


   Last Admin: 06/21/19 21:25 Dose:  5 ml


Azithromycin 250 mg/ Dextrose  250 mls @ 250 mls/hr IVPB DAILY [facility]


   Last Admin: 06/21/19 13:52 Dose:  250 mls/hr


Piperacillin Sod/Tazobactam (Sod 2.25 gm/ Dextrose)  50 mls @ 100 mls/hr IVPB 

Q8H-IV [facility]; Protocol


   Last Admin: 06/22/19 11:59 Dose:  100 mls/hr


Insulin Aspart (Novolog Vial Sliding Scale -)  1 vial SQ ACHS [facility]; Protocol


   Last Admin: 06/22/19 06:46 Dose:  4 unit


Lactic Acid (Lac-Hydrin 12)  1 applic TP DAILY PRN


   PRN Reason: xerosis


Losartan Potassium (Cozaar -)  50 mg PO DAILY [facility]


   Last Admin: 06/22/19 11:57 Dose:  50 mg


Meclizine HCl (Antivert -)  25 mg PO Q8H PRN


   PRN Reason: dizziness


Melatonin (Melatonin)  10 mg PO HS [facility]


   Last Admin: 06/21/19 21:32 Dose:  10 mg


Methylprednisolone Sodium Succinate (Solu-Medrol -)  60 mg IVPB Q8H-IV [facility]


   Last Admin: 06/22/19 11:56 Dose:  60 mg


Omega-3-Acid Ethyl Esters (Lovaza -)  1 gm PO DAILY [facility]


   Last Admin: 06/22/19 11:57 Dose:  1 gm


Pantoprazole Sodium (Protonix -)  40 mg PO BID@0800,2000 [facility]


   Last Admin: 06/22/19 11:56 Dose:  40 mg


Polyethylene Glycol (Miralax (For Daily Use) -)  17 gm PO Q12H PRN


   PRN Reason: CONSTIPATION


Ranitidine HCl (Zantac -)  150 mg PO DAILY [facility]


   Last Admin: 06/22/19 11:57 Dose:  150 mg


Senna (Senna -)  2 tab PO [facility]


   Last Admin: 06/21/19 21:30 Dose:  Not Given


Simethicone (Mylicon -)  80 mg PO Q4H PRN


   PRN Reason: INDIGESTION


Tamsulosin HCl (Flomax -)  0.4 mg PO DAILY@0830 [facility]


   Last Admin: 06/22/19 11:58 Dose:  0.4 mg











- Objective


Vital Signs: 


 Vital Signs











Temperature  97.4 F L  06/22/19 06:00


 


Pulse Rate  70   06/22/19 06:00


 


Respiratory Rate  20   06/22/19 06:00


 


Blood Pressure  149/66   06/22/19 06:00


 


O2 Sat by Pulse Oximetry (%)  93 L  06/22/19 13:17











Constitutional: Yes: Calm, Mild Distress


Eyes: Yes: Conjunctiva Clear


HENT: Yes: Atraumatic


Cardiovascular: Yes: Regular Rate and Rhythm


Respiratory: Yes: Diminished, On Venti-Mask, Tachypnea, Other (retractions)


Gastrointestinal: Yes: Normal Bowel Sounds, Soft


Musculoskeletal: Yes: Muscle Weakness


Extremities: Yes: WNL


Edema: No


Neurological: Yes: Alert, Oriented


Psychiatric: Yes: Alert, Oriented


Labs: 


 CBC, BMP





 06/21/19 07:10 





 06/22/19 08:46 





 Microbiology





06/17/19 15:47   Blood - Peripheral Venous   Blood Culture - Preliminary


                            NO GROWTH OBTAINED AFTER 96 HOURS, INCUBATION TO 

CONTINUE


                            FOR 1 DAYS.


06/17/19 15:35   Blood - Peripheral Venous   Blood Culture - Preliminary


                            NO GROWTH OBTAINED AFTER 96 HOURS, INCUBATION TO 

CONTINUE


                            FOR 1 DAYS.


06/18/19 21:30   Urine For Antigen Detection   Legionella Antigen - Final


06/18/19 21:30   Urine For Antigen Detection   Streptococcus pneumoniae Antigen 

(M - Final











Problem List





- Problems


(1) Acute and chronic respiratory failure with hypoxia


Assessment/Plan: 


-Pulm on board


-keep SpO2 >90%


-VentiMask 50%


-Bipap PRN--educated on importance of using bipap to aid with breathing


-bronchodilators


-Solumedrol





Code(s): J96.21 - ACUTE AND CHRONIC RESPIRATORY FAILURE WITH HYPOXIA   





(2) Acute exacerbation of chronic obstructive pulmonary disease (COPD)


Assessment/Plan: 


-Pulm on board


-keep SpO2 >90%


-VentiMask 50%


-Bipap PRN 


-bronchodilators


-Solumedrol


-Symbicort


-Azithromycin and Zosyn


-ID consult


Code(s): J44.1 - CHRONIC OBSTRUCTIVE PULMONARY DISEASE W (ACUTE) EXACERBATION   





(3) Pneumonia


Assessment/Plan: 


-Pulm on board


-keep SpO2 >90%


-VentiMask 50%


-Bipap PRN 


-bronchodilators


-Solumedrol


-Symbicort


-Azithromycin and Zosyn


-ID consult


Code(s): J18.9 - PNEUMONIA, UNSPECIFIED ORGANISM   


Qualifiers: 


   Pneumonia type: due to unspecified organism   Laterality: right   Lung 

location: unspecified part of lung   Qualified Code(s): J18.9 - Pneumonia, 

unspecified organism   





(4) BPH (benign prostatic hyperplasia)


Assessment/Plan: 


-Proscar


Code(s): N40.0 - BENIGN PROSTATIC HYPERPLASIA WITHOUT LOWER URINRY TRACT SYMP   





(5) HLD (hyperlipidemia)


Assessment/Plan: 


-Atorvastatin


Code(s): E78.5 - HYPERLIPIDEMIA, UNSPECIFIED   


Qualifiers: 


 





(6) HTN (hypertension)


Assessment/Plan: 


-Amlodipine


-low Na diet


Code(s): I10 - ESSENTIAL (PRIMARY) HYPERTENSION   


Qualifiers: 


 





(7) Insomnia


Assessment/Plan: 


-Melatonin HS


Code(s): G47.00 - INSOMNIA, UNSPECIFIED   





(8) Constipation


Assessment/Plan: 


-Miralax and colace


Code(s): K59.00 - CONSTIPATION, UNSPECIFIED   





Assessment/Plan





see problem list


dvt ppx

## 2019-06-23 LAB
ALBUMIN SERPL-MCNC: 2.7 G/DL (ref 3.4–5)
ALP SERPL-CCNC: 47 U/L (ref 45–117)
ALT SERPL-CCNC: 36 U/L (ref 13–61)
ANION GAP SERPL CALC-SCNC: 4 MMOL/L (ref 8–16)
AST SERPL-CCNC: 16 U/L (ref 15–37)
BILIRUB SERPL-MCNC: 0.6 MG/DL (ref 0.2–1)
BUN SERPL-MCNC: 38.4 MG/DL (ref 7–18)
CALCIUM SERPL-MCNC: 8.6 MG/DL (ref 8.5–10.1)
CHLORIDE SERPL-SCNC: 100 MMOL/L (ref 98–107)
CO2 SERPL-SCNC: 39 MMOL/L (ref 21–32)
CREAT SERPL-MCNC: 0.7 MG/DL (ref 0.55–1.3)
DEPRECATED RDW RBC AUTO: 14.8 % (ref 11.9–15.9)
GLUCOSE SERPL-MCNC: 185 MG/DL (ref 74–106)
HCT VFR BLD CALC: 45.4 % (ref 35.4–49)
HGB BLD-MCNC: 15.3 GM/DL (ref 11.7–16.9)
MCH RBC QN AUTO: 29 PG (ref 25.7–33.7)
MCHC RBC AUTO-ENTMCNC: 33.7 G/DL (ref 32–35.9)
MCV RBC: 85.9 FL (ref 80–96)
PLATELET # BLD AUTO: 87 K/MM3 (ref 134–434)
PMV BLD: 8.5 FL (ref 7.5–11.1)
POTASSIUM SERPLBLD-SCNC: 4 MMOL/L (ref 3.5–5.1)
PROT SERPL-MCNC: 5.4 G/DL (ref 6.4–8.2)
RBC # BLD AUTO: 5.28 M/MM3 (ref 4–5.6)
SODIUM SERPL-SCNC: 143 MMOL/L (ref 136–145)
WBC # BLD AUTO: 13.4 K/MM3 (ref 4–10)

## 2019-06-23 RX ADMIN — CARVEDILOL SCH: 12.5 TABLET, FILM COATED ORAL at 22:00

## 2019-06-23 RX ADMIN — INSULIN ASPART SCH: 100 INJECTION, SOLUTION INTRAVENOUS; SUBCUTANEOUS at 06:46

## 2019-06-23 RX ADMIN — BUDESONIDE AND FORMOTEROL FUMARATE DIHYDRATE SCH: 160; 4.5 AEROSOL RESPIRATORY (INHALATION) at 22:01

## 2019-06-23 RX ADMIN — RANITIDINE SCH MG: 150 TABLET ORAL at 11:54

## 2019-06-23 RX ADMIN — METHYLPREDNISOLONE SODIUM SUCCINATE SCH MG: 40 INJECTION, POWDER, FOR SOLUTION INTRAMUSCULAR; INTRAVENOUS at 11:58

## 2019-06-23 RX ADMIN — PANTOPRAZOLE SODIUM SCH: 40 TABLET, DELAYED RELEASE ORAL at 22:01

## 2019-06-23 RX ADMIN — ATORVASTATIN CALCIUM SCH: 20 TABLET, FILM COATED ORAL at 22:00

## 2019-06-23 RX ADMIN — INSULIN ASPART SCH: 100 INJECTION, SOLUTION INTRAVENOUS; SUBCUTANEOUS at 22:01

## 2019-06-23 RX ADMIN — BUDESONIDE AND FORMOTEROL FUMARATE DIHYDRATE SCH PUFF: 160; 4.5 AEROSOL RESPIRATORY (INHALATION) at 11:58

## 2019-06-23 RX ADMIN — PIPERACILLIN SODIUM AND TAZOBACTAM SODIUM SCH MLS/HR: .25; 2 INJECTION, POWDER, LYOPHILIZED, FOR SOLUTION INTRAVENOUS at 11:55

## 2019-06-23 RX ADMIN — IPRATROPIUM BROMIDE AND ALBUTEROL SULFATE PRN AMP: .5; 3 SOLUTION RESPIRATORY (INHALATION) at 11:56

## 2019-06-23 RX ADMIN — AMLODIPINE BESYLATE SCH MG: 10 TABLET ORAL at 11:54

## 2019-06-23 RX ADMIN — FUROSEMIDE SCH MG: 40 TABLET ORAL at 11:54

## 2019-06-23 RX ADMIN — LOSARTAN POTASSIUM SCH MG: 50 TABLET, FILM COATED ORAL at 11:53

## 2019-06-23 RX ADMIN — IPRATROPIUM BROMIDE AND ALBUTEROL SULFATE PRN AMP: .5; 3 SOLUTION RESPIRATORY (INHALATION) at 15:40

## 2019-06-23 RX ADMIN — SENNOSIDES SCH: 8.6 TABLET, FILM COATED ORAL at 22:01

## 2019-06-23 RX ADMIN — FINASTERIDE SCH MG: 5 TABLET, FILM COATED ORAL at 11:53

## 2019-06-23 RX ADMIN — AZITHROMYCIN DIHYDRATE SCH MLS/HR: 500 INJECTION, POWDER, LYOPHILIZED, FOR SOLUTION INTRAVENOUS at 17:39

## 2019-06-23 RX ADMIN — PANTOPRAZOLE SODIUM SCH MG: 40 TABLET, DELAYED RELEASE ORAL at 11:53

## 2019-06-23 RX ADMIN — METHYLPREDNISOLONE SODIUM SUCCINATE SCH MG: 40 INJECTION, POWDER, FOR SOLUTION INTRAMUSCULAR; INTRAVENOUS at 17:41

## 2019-06-23 RX ADMIN — Medication SCH: at 22:01

## 2019-06-23 RX ADMIN — METHYLPREDNISOLONE SODIUM SUCCINATE SCH MG: 40 INJECTION, POWDER, FOR SOLUTION INTRAMUSCULAR; INTRAVENOUS at 01:58

## 2019-06-23 RX ADMIN — CARVEDILOL SCH MG: 12.5 TABLET, FILM COATED ORAL at 11:54

## 2019-06-23 RX ADMIN — INSULIN ASPART SCH UNIT: 100 INJECTION, SOLUTION INTRAVENOUS; SUBCUTANEOUS at 11:57

## 2019-06-23 RX ADMIN — INSULIN ASPART SCH UNIT: 100 INJECTION, SOLUTION INTRAVENOUS; SUBCUTANEOUS at 17:38

## 2019-06-23 RX ADMIN — OMEGA-3-ACID ETHYL ESTERS SCH GM: 1 CAPSULE, LIQUID FILLED ORAL at 11:53

## 2019-06-23 RX ADMIN — TAMSULOSIN HYDROCHLORIDE SCH MG: 0.4 CAPSULE ORAL at 11:53

## 2019-06-23 RX ADMIN — PIPERACILLIN SODIUM AND TAZOBACTAM SODIUM SCH MLS/HR: .25; 2 INJECTION, POWDER, LYOPHILIZED, FOR SOLUTION INTRAVENOUS at 01:58

## 2019-06-23 RX ADMIN — PIPERACILLIN SODIUM AND TAZOBACTAM SODIUM SCH MLS/HR: .25; 2 INJECTION, POWDER, LYOPHILIZED, FOR SOLUTION INTRAVENOUS at 17:39

## 2019-06-23 RX ADMIN — FLUTICASONE PROPIONATE SCH SPRAYS: 50 SPRAY, METERED NASAL at 11:54

## 2019-06-23 RX ADMIN — ENOXAPARIN SODIUM SCH MG: 40 INJECTION SUBCUTANEOUS at 11:54

## 2019-06-23 NOTE — CON.CARD
Consult


Consult Specialty:: Cardiology


Referred by:: Jessica Reynoso MD


Reason for Consultation:: Cardiac evaluation





- History of Present Illness


Chief Complaint: Shortness of breath


History of Present Illness: 








Patient is an 87 year old male well known to me with underlying history of 

prostate CA, renal CA s/p left nephrectomy, TIA, COPD, HTN, hypercholesterolemia

, history of ischemic colitis/diverticular disease who presented with dyspnea. 

Currently he is on CPAP. He has been hospitalized numerous times with COPD 

exacerbation and pneumonia. He has been at SNF and now again admitted with 

desaturation due to above pulmonary issues. Currently he denies chest pain or 

palpitations. He denies fever or chills. He denies nausea, vomiting, diarrhea 

or abdominal pain. He denies headache or lightheadedness.





- History Source


History Provided By: Patient, Medical Record


Limitations to Obtaining History: No Limitations





- Past Medical History


CNS: Yes: Alzheimer's, TIA


Cardio/Vascular: Yes: AFIB, HTN, Hyperlipdemia


Pulmonary: Yes: COPD, Pneumonia (fungal)


Gastrointestinal: Yes: Diverticulitis, Other (cholecystitis, ischemic colitis, 3

/14 sigmoid adenoma removed, SBO 12/15)


Hepatobiliary: Yes: Cholelithiasis, Cholecystitis ( cholecystostomy tube,  laparascopic cholecystectomy), Choledocholithiasis


Renal/: Yes: Cancer (prostate/kidney), Neurogenic Bladder


Psych: Yes: Anxiety


Rheumatology: Yes: Other (arthritis knees)





- Past Surgical History


Past Surgical History: Yes: Appendectomy, Cataract Removal, Colonoscopy, Hernia 

Repair, Joint Replacement (left knee makoplasty), Laminectomy (cervical fusion)

, Nephrectomy (left)





- Alcohol/Substance Use


Hx Alcohol Use: No


History of Substance Use: reports: None





- Smoking History


Smoking history: Former smoker


Have you smoked in the past 12 months: No


Aproximately how many cigarettes per day: 40 ( 40years)


If you are a former smoker, when did you quit?: 





- Social History


Usual Living Arrangement: With Spouse


ADL: Independent


Occupation: retired electronics salesman


History of Recent Travel: No





Home Medications





- Allergies


Allergies/Adverse Reactions: 


 Allergies











Allergy/AdvReac Type Severity Reaction Status Date / Time


 


No Known Drug Allergies Allergy   Verified 19 16:04














- Home Medications


Home Medications: 


Ambulatory Orders





Finasteride 5 mg PO DAILY 10/12/17 


Tamsulosin HCl [Flomax] 0.4 mg PO DAILY 10/12/17 


Acetaminophen [Tylenol .Regular Strength -] 650 mg PO Q6H PRN #0 tablet 10/27/

17 


Meclizine HCl [Antivert -] 25 mg PO Q8H PRN #0 tablet 10/27/17 


Melatonin 5 mg PO HS  tab 10/27/17 


Omega-3 Acid Ethyl Esters [Lovaza -] 1 gm PO DAILY  cap 10/27/17 


Pantoprazole Sodium [Protonix -] 40 mg PO BID #30 tab 10/27/17 


Simethicone [Mylicon -] 80 mg PO Q4H PRN #0 tab.chew 10/27/17 


Albuterol 0.083% Nebulizer Sol [Ventolin 0.083% Nebulizer Soln -] 1 amp NEB Q4H 

PRN  amp 04/10/19 


Albuterol 2.5/Ipratropium 0.5 [Duoneb -] 1 amp NEB RQID  amp 04/10/19 


Ammonium Lactate Lotion [Lac-Hydrin 12] 1 applic TP DAILY PRN  bottle 04/10/19 


Budesonide/Formeterol Fumarate [SYMBICORT 160/4.5mcg -] 2 puff IH BID  inhaler 

04/10/19 


Cefuroxime Axetil [Ceftin -] 500 mg PO BID  tablet 04/10/19 


Fluticasone Prop 0.05% Nasal [Flonase -] 2 spray NS DAILY  spray 04/10/19 


Guaifenesin AC [Robitussin AC -] 5 ml PO TID PRN #1 bottle MDD 15ml 04/10/19 


Melatonin 15 mg PO HS PRN  tab 04/10/19 


Polyethylene Glycol 3350 [Miralax 119 gm Btl -] 17 gm PO BID PRN  bottle 04/10/

19 


Polyvinyl Alcohol [Artificial Tears] 1 drop OU Q8H PRN  drops 04/10/19 


Ranitidine [Zantac -] 150 mg PO DAILY  tablet 04/10/19 


Carvedilol [Coreg -] 12.5 mg PO BID 19 


Furosemide [Lasix] 40 mg PO DAILY 19 











Family Disease History





- Family Disease History


Family Disease History: Diabetes: Mother ( 70 diabetic complications), Other

: Father ( 70 of [place] ), Brother (3 siblings with cancer ? types)





Review of Systems





- Review of Systems


Constitutional: denies: Chills, Fever


Cardiovascular: reports: Shortness of Breath.  denies: Chest Pain, Palpitations


Respiratory: reports: Cough, SOB.  denies: Hemoptysis, Orthopnea, PND


Gastrointestinal: denies: Abdominal Pain, Constipation, Diarrhea, Melena, Nausea

, Rectal Bleeding, Vomiting


Musculoskeletal: denies: Back Pain, Joint Pain


Neurological: denies: Dizziness, Headache, Seizure, Syncope


Vital Signs: 


 Vital Signs











Temperature  97.5 F L  19 06:00


 


Pulse Rate  69   19 06:00


 


Respiratory Rate  24 H  19 06:00


 


Blood Pressure  139/69   19 06:00


 


O2 Sat by Pulse Oximetry (%)  93 L  19 04:26











Eyes: Yes: PERRL


HENT: Yes: Atraumatic


Neck: Yes: Supple


Respiratory: Yes: Diminished, On BiPap


Gastrointestinal: Yes: Normal Bowel Sounds, Soft.  No: Tenderness


Cardiovascular: Yes: Regular Rate and Rhythm


JVD: No


PMI: Non-Displaced


Heart Sounds: Yes: S1, S2


Murmur: Yes: Systolic Murmur, Grade 1


Edema: No





- Other Data


Labs, Other Data: 


 CBC, BMP





 19 07:10 





 19 08:46 





 Troponin, BNP











  19





  14:38


 


Troponin I  0.02








 








Sinus rhythm with sinus arrhythmia. PVC, LVH





Problem List





- Problems


(1) Acute and chronic respiratory failure with hypoxia


Code(s): J96.21 - ACUTE AND CHRONIC RESPIRATORY FAILURE WITH HYPOXIA   





(2) Acute exacerbation of chronic obstructive pulmonary disease (COPD)


Code(s): J44.1 - CHRONIC OBSTRUCTIVE PULMONARY DISEASE W (ACUTE) EXACERBATION   





(3) Hypoxemia requiring supplemental oxygen


Code(s): R09.02 - HYPOXEMIA; Z99.81 - DEPENDENCE ON SUPPLEMENTAL OXYGEN   





(4) Pneumonia


Code(s): J18.9 - PNEUMONIA, UNSPECIFIED ORGANISM   


Qualifiers: 


   Pneumonia type: due to unspecified organism   Laterality: right   Lung 

location: unspecified part of lung   Qualified Code(s): J18.9 - Pneumonia, 

unspecified organism   





(5) SOB (shortness of breath)


Code(s): R06.02 - SHORTNESS OF BREATH   





(6) COPD (chronic obstructive pulmonary disease)


Code(s): J44.9 - CHRONIC OBSTRUCTIVE PULMONARY DISEASE, UNSPECIFIED   


Qualifiers: 


   COPD type: unspecified COPD   Qualified Code(s): J44.9 - Chronic obstructive 

pulmonary disease, unspecified   





(7) HLD (hyperlipidemia)


Code(s): E78.5 - HYPERLIPIDEMIA, UNSPECIFIED   


Qualifiers: 


   Hyperlipidemia type: pure hypercholesterolemia 





(8) HTN (hypertension)


Code(s): I10 - ESSENTIAL (PRIMARY) HYPERTENSION   


Qualifiers: 


   Hypertension type: essential hypertension 





(9) Sleep apnea


Code(s): G47.30 - SLEEP APNEA, UNSPECIFIED   





Assessment/Plan





1. COPD exacerbation on BIPAP


2. HTN


3. Hypercholesterolemia


4. History of TIA


5. History of renal CA s/p nephrectomy


6. History of prostate CA


7. History of diverticular disease





PLAN:


1. Continue pulmonary treatment including steroid, bronchodilator, empiric 

antibiotics, O2 and BIPAP


2. Continue Carvedilol, Amlodipine and Losartan as tolerated


3. Diuretics with PO Lasix


4. Statin


5. DVT prophylaxis





Further plans are to follow


Robin Vazquez MD

## 2019-06-23 NOTE — PN
Progress Note, Physician


Chief Complaint: 





COPD


PNA


Chronic hypoxic respiratory failure 


History of Present Illness: 





Previous notes and events reviewed


awake and alert


NAD


patient is using BIpap machine and sts his breathing is better hilw using it





- Current Medication List


Current Medications: 


Active Medications





Acetaminophen (Tylenol -)  650 mg PO Q6H PRN


   PRN Reason: FEVER


   Last Admin: 06/22/19 16:44 Dose:  650 mg


Albuterol/Ipratropium (Duoneb -)  1 amp NEB Q6H PRN


   PRN Reason: SHORTNESS OF BREATH


   Last Admin: 06/23/19 11:56 Dose:  1 amp


Amlodipine Besylate (Norvasc -)  10 mg PO DAILY [facility]


   Last Admin: 06/23/19 11:54 Dose:  10 mg


Artificial Tears (Artificial Tears)  1 drop OU Q8H PRN


   PRN Reason: DRY EYES


Atorvastatin Calcium (Lipitor -)  20 mg PO DAILY@2000 [facility]


   Last Admin: 06/22/19 21:01 Dose:  20 mg


Budesonide/Formoterol Fumarate (Symbicort 160/4.5mcg -)  2 puff IH BID [facility]


   Last Admin: 06/23/19 11:58 Dose:  2 puff


Carvedilol (Coreg -)  12.5 mg PO BID@0800,2000 [facility]


   Last Admin: 06/23/19 11:54 Dose:  12.5 mg


Docusate Sodium (Colace -)  100 mg PO Q8H PRN


   PRN Reason: CONSTIPATION


Enoxaparin Sodium (Lovenox -)  40 mg SQ DAILY [facility]


   Last Admin: 06/23/19 11:54 Dose:  40 mg


Finasteride (Proscar -)  5 mg PO DAILY [facility]


   Last Admin: 06/23/19 11:53 Dose:  5 mg


Fluticasone Propionate (Flonase -)  2 spray NS DAILY [facility]


   Last Admin: 06/23/19 11:54 Dose:  2 sprays


Furosemide (Lasix -)  40 mg PO DAILY [facility]


   Last Admin: 06/23/19 11:54 Dose:  40 mg


Azithromycin 250 mg/ Dextrose  250 mls @ 250 mls/hr IVPB DAILY [facility]


   Last Admin: 06/22/19 15:54 Dose:  250 mls/hr


Piperacillin Sod/Tazobactam (Sod 2.25 gm/ Dextrose)  50 mls @ 100 mls/hr IVPB 

Q8H-IV [facility]; Protocol


   Last Admin: 06/23/19 11:55 Dose:  100 mls/hr


Insulin Aspart (Novolog Vial Sliding Scale -)  1 vial SQ ACHS [facility]; Protocol


   Last Admin: 06/23/19 11:57 Dose:  8 unit


Lactic Acid (Lac-Hydrin 12)  1 applic TP DAILY PRN


   PRN Reason: xerosis


Losartan Potassium (Cozaar -)  50 mg PO DAILY [facility]


   Last Admin: 06/23/19 11:53 Dose:  50 mg


Meclizine HCl (Antivert -)  25 mg PO Q8H PRN


   PRN Reason: dizziness


Melatonin (Melatonin)  10 mg PO [facility]


   Last Admin: 06/22/19 21:00 Dose:  10 mg


Methylprednisolone Sodium Succinate (Solu-Medrol -)  60 mg IVPB Q8H-IV [facility]


   Last Admin: 06/23/19 11:58 Dose:  60 mg


Omega-3-Acid Ethyl Esters (Lovaza -)  1 gm PO DAILY [facility]


   Last Admin: 06/23/19 11:53 Dose:  1 gm


Pantoprazole Sodium (Protonix -)  40 mg PO BID@0800,2000 [facility]


   Last Admin: 06/23/19 11:53 Dose:  40 mg


Polyethylene Glycol (Miralax (For Daily Use) -)  17 gm PO Q12H PRN


   PRN Reason: CONSTIPATION


Ranitidine HCl (Zantac -)  150 mg PO DAILY [facility]


   Last Admin: 06/23/19 11:54 Dose:  150 mg


Senna (Senna -)  2 tab PO [facility]


   Last Admin: 06/22/19 21:01 Dose:  2 tab


Simethicone (Mylicon -)  80 mg PO Q4H PRN


   PRN Reason: INDIGESTION


Tamsulosin HCl (Flomax -)  0.4 mg PO DAILY@0830 [facility]


   Last Admin: 06/23/19 11:53 Dose:  0.4 mg











- Objective


Vital Signs: 


 Vital Signs











Temperature  97.5 F L  06/23/19 06:00


 


Pulse Rate  69   06/23/19 06:00


 


Respiratory Rate  24 H  06/23/19 06:00


 


Blood Pressure  139/69   06/23/19 06:00


 


O2 Sat by Pulse Oximetry (%)  95   06/23/19 11:57











Constitutional: Yes: No Distress, Calm


Eyes: Yes: Conjunctiva Clear


HENT: Yes: Atraumatic


Cardiovascular: Yes: Regular Rate and Rhythm


Respiratory: Yes: Regular, Diminished, On BiPap


Gastrointestinal: Yes: Normal Bowel Sounds, Soft, Abdomen, Obese


Genitourinary: Yes: Incontinence


Musculoskeletal: Yes: Muscle Weakness


Extremities: Yes: WNL


Edema: No


Neurological: Yes: Alert, Oriented


Psychiatric: Yes: Alert, Oriented


Labs: 


 CBC, BMP





 06/23/19 08:07 





 06/23/19 08:07 





 Microbiology





06/17/19 15:47   Blood - Peripheral Venous   Blood Culture - Final


                            NO GROWTH AFTER 5 DAYS INCUBATION


06/17/19 15:35   Blood - Peripheral Venous   Blood Culture - Final


                            NO GROWTH AFTER 5 DAYS INCUBATION


06/18/19 21:30   Urine For Antigen Detection   Legionella Antigen - Final


06/18/19 21:30   Urine For Antigen Detection   Streptococcus pneumoniae Antigen 

(M - Final











Problem List





- Problems


(1) Acute and chronic respiratory failure with hypoxia


Assessment/Plan: 


-Pulm on board


-keep SpO2 >90%


-VentiMask 50%


-Bipap PRN


-bronchodilators


-Solumedrol





Code(s): J96.21 - ACUTE AND CHRONIC RESPIRATORY FAILURE WITH HYPOXIA   





(2) Acute exacerbation of chronic obstructive pulmonary disease (COPD)


Assessment/Plan: 


-Pulm on board


-keep SpO2 >90%


-VentiMask 50%


-Bipap PRN 


-bronchodilators


-Solumedrol


-Symbicort


-Azithromycin and Zosyn


-ID consult


Code(s): J44.1 - CHRONIC OBSTRUCTIVE PULMONARY DISEASE W (ACUTE) EXACERBATION   





(3) Pneumonia


Assessment/Plan: 


-Pulm on board


-keep SpO2 >90%


-VentiMask 50%


-Bipap PRN 


-bronchodilators


-Solumedrol


-Symbicort


-Azithromycin and Zosyn


-ID consult


Code(s): J18.9 - PNEUMONIA, UNSPECIFIED ORGANISM   


Qualifiers: 


   Pneumonia type: due to unspecified organism   Laterality: right   Lung 

location: unspecified part of lung   Qualified Code(s): J18.9 - Pneumonia, 

unspecified organism   





(4) BPH (benign prostatic hyperplasia)


Assessment/Plan: 


-Proscar


Code(s): N40.0 - BENIGN PROSTATIC HYPERPLASIA WITHOUT LOWER URINRY TRACT SYMP   





(5) HLD (hyperlipidemia)


Assessment/Plan: 


-Atorvastatin


Code(s): E78.5 - HYPERLIPIDEMIA, UNSPECIFIED   


Qualifiers: 


   Hyperlipidemia type: pure hypercholesterolemia 





(6) HTN (hypertension)


Assessment/Plan: 


-Amlodipine


-low Na diet


Code(s): I10 - ESSENTIAL (PRIMARY) HYPERTENSION   


Qualifiers: 


   Hypertension type: essential hypertension 





(7) Insomnia


Assessment/Plan: 


-Melatonin HS


Code(s): G47.00 - INSOMNIA, UNSPECIFIED   





(8) Constipation


Assessment/Plan: 


-Miralax and colace


Code(s): K59.00 - CONSTIPATION, UNSPECIFIED   





Assessment/Plan





see problem list


dvt ppx

## 2019-06-23 NOTE — HOSP
Subjective





- Review of Symptoms


Events since last encounter: 





Hospitalist Encounter


Notified by microblog from the RN, that the patient's family brought in a copy 

of the MOLST Form- DNR/DNI, Comfort Measures, No Antibiotics, No Feeding Tube.


Verified the MOLST.





Plan:


Place order for Code Status








Physical Examination


Vital Signs: 


 Vital Signs











Temperature  98.5 F   06/23/19 18:00


 


Pulse Rate  70   06/23/19 18:00


 


Respiratory Rate  14   06/23/19 18:00


 


Blood Pressure  119/55 L  06/23/19 18:00


 


O2 Sat by Pulse Oximetry (%)  95   06/23/19 18:45











Labs: 


 CBC, BMP





 06/23/19 08:07 





 06/23/19 08:07

## 2019-06-23 NOTE — EKG
Test Reason : 

Blood Pressure : ***/*** mmHG

Vent. Rate : 075 BPM     Atrial Rate : 075 BPM

   P-R Int : 154 ms          QRS Dur : 128 ms

    QT Int : 410 ms       P-R-T Axes : 037 -52 025 degrees

   QTc Int : 457 ms

 

SINUS RHYTHM WITH MARKED SINUS ARRHYTHMIA WITH OCCASIONAL PREMATURE

VENTRICULAR COMPLEXES

LEFT AXIS DEVIATION

LEFT VENTRICULAR HYPERTROPHY WITH QRS WIDENING

ABNORMAL ECG

WHEN COMPARED WITH ECG OF 17-JUN-2019 16:22,

PREMATURE VENTRICULAR COMPLEXES ARE NOW PRESENT

PREMATURE ATRIAL COMPLEXES ARE NO LONGER PRESENT

NONSPECIFIC T WAVE ABNORMALITY NOW EVIDENT IN INFERIOR LEADS

Confirmed by CARMITA ADEN MD ([id number]) on 6/23/2019 1:12:14 PM

 

Referred By: ANN GALLEGO           Confirmed By:CARMITA ADEN MD

## 2019-06-23 NOTE — PN
Progress Note (short form)





- Note


Progress Note: 


Did not use NIPPV overnight but did use from 7AM to noon.  Family did bring in 

his own PAP device but it was not set up. 


Reports feeling a little better. 


Still mildly tachypneic on NC O2. 


No acute events overnight. 





 Intake & Output











 06/20/19 06/21/19 06/22/19 06/23/19





 23:59 23:59 23:59 23:59


 


Intake Total 1125 1025 300 


 


Output Total 500   


 


Balance 625 1025 300 


 


Weight 139 lb 8 oz   








 Last Vital Signs











Temp Pulse Resp BP Pulse Ox


 


 97.5 F L  69   24 H  139/69   95 


 


 06/23/19 06:00  06/23/19 06:00  06/23/19 06:00  06/23/19 06:00  06/23/19 11:57








Active Medications





Acetaminophen (Tylenol -)  650 mg PO Q6H PRN


   PRN Reason: FEVER


   Last Admin: 06/22/19 16:44 Dose:  650 mg


Albuterol/Ipratropium (Duoneb -)  1 amp NEB Q6H PRN


   PRN Reason: SHORTNESS OF BREATH


   Last Admin: 06/23/19 11:56 Dose:  1 amp


Amlodipine Besylate (Norvasc -)  10 mg PO DAILY [facility]


   Last Admin: 06/23/19 11:54 Dose:  10 mg


Artificial Tears (Artificial Tears)  1 drop OU Q8H PRN


   PRN Reason: DRY EYES


Atorvastatin Calcium (Lipitor -)  20 mg PO DAILY@2000 [facility]


   Last Admin: 06/22/19 21:01 Dose:  20 mg


Budesonide/Formoterol Fumarate (Symbicort 160/4.5mcg -)  2 puff IH BID [facility]


   Last Admin: 06/23/19 11:58 Dose:  2 puff


Carvedilol (Coreg -)  12.5 mg PO BID@0800,2000 [facility]


   Last Admin: 06/23/19 11:54 Dose:  12.5 mg


Docusate Sodium (Colace -)  100 mg PO Q8H PRN


   PRN Reason: CONSTIPATION


Enoxaparin Sodium (Lovenox -)  40 mg SQ DAILY [facility]


   Last Admin: 06/23/19 11:54 Dose:  40 mg


Finasteride (Proscar -)  5 mg PO DAILY [facility]


   Last Admin: 06/23/19 11:53 Dose:  5 mg


Fluticasone Propionate (Flonase -)  2 spray NS DAILY [facility]


   Last Admin: 06/23/19 11:54 Dose:  2 sprays


Furosemide (Lasix -)  40 mg PO DAILY [facility]


   Last Admin: 06/23/19 11:54 Dose:  40 mg


Azithromycin 250 mg/ Dextrose  250 mls @ 250 mls/hr IVPB DAILY [facility]


   Last Admin: 06/22/19 15:54 Dose:  250 mls/hr


Piperacillin Sod/Tazobactam (Sod 2.25 gm/ Dextrose)  50 mls @ 100 mls/hr IVPB 

Q8H-IV [facility]; Protocol


   Last Admin: 06/23/19 11:55 Dose:  100 mls/hr


Insulin Aspart (Novolog Vial Sliding Scale -)  1 vial SQ ACHS [facility]; Protocol


   Last Admin: 06/23/19 11:57 Dose:  8 unit


Lactic Acid (Lac-Hydrin 12)  1 applic TP DAILY PRN


   PRN Reason: xerosis


Losartan Potassium (Cozaar -)  50 mg PO DAILY [facility]


   Last Admin: 06/23/19 11:53 Dose:  50 mg


Meclizine HCl (Antivert -)  25 mg PO Q8H PRN


   PRN Reason: dizziness


Melatonin (Melatonin)  10 mg PO HS [facility]


   Last Admin: 06/22/19 21:00 Dose:  10 mg


Methylprednisolone Sodium Succinate (Solu-Medrol -)  60 mg IVPB Q8H-IV [facility]


   Last Admin: 06/23/19 11:58 Dose:  60 mg


Omega-3-Acid Ethyl Esters (Lovaza -)  1 gm PO DAILY [facility]


   Last Admin: 06/23/19 11:53 Dose:  1 gm


Pantoprazole Sodium (Protonix -)  40 mg PO BID@0800,2000 [facility]


   Last Admin: 06/23/19 11:53 Dose:  40 mg


Polyethylene Glycol (Miralax (For Daily Use) -)  17 gm PO Q12H PRN


   PRN Reason: CONSTIPATION


Ranitidine HCl (Zantac -)  150 mg PO DAILY [facility]


   Last Admin: 06/23/19 11:54 Dose:  150 mg


Senna (Senna -)  2 tab PO HS [facility]


   Last Admin: 06/22/19 21:01 Dose:  2 tab


Simethicone (Mylicon -)  80 mg PO Q4H PRN


   PRN Reason: INDIGESTION


Tamsulosin HCl (Flomax -)  0.4 mg PO DAILY@0830 [facility]


   Last Admin: 06/23/19 11:53 Dose:  0.4 mg











Gen: Awake and alert, mildly tachypneic with speaking


Heart: RRR


Lung: distant breath sounds, poor air entry


Abd: soft, nontender


Ext: no edema





  Laboratory Results - last 24 hr











  06/22/19 06/22/19 06/22/19





  14:38 17:02 20:57


 


WBC   


 


RBC   


 


Hgb   


 


Hct   


 


MCV   


 


MCH   


 


MCHC   


 


RDW   


 


Plt Count   


 


MPV   


 


Sodium   


 


Potassium   


 


Chloride   


 


Carbon Dioxide   


 


Anion Gap   


 


BUN   


 


Creatinine   


 


Est GFR (CKD-EPI)AfAm   


 


Est GFR (CKD-EPI)NonAf   


 


POC Glucometer   406  256


 


Random Glucose   


 


Calcium   


 


Total Bilirubin   


 


AST   


 


ALT   


 


Alkaline Phosphatase   


 


Troponin I  0.02  


 


Total Protein   


 


Albumin   














  06/23/19 06/23/19 06/23/19





  06:21 08:07 08:07


 


WBC   13.4 H 


 


RBC   5.28 


 


Hgb   15.3 


 


Hct   45.4 


 


MCV   85.9 


 


MCH   29.0 


 


MCHC   33.7 


 


RDW   14.8 


 


Plt Count   87 L D 


 


MPV   8.5  D 


 


Sodium    143


 


Potassium    4.0


 


Chloride    100


 


Carbon Dioxide    39 H


 


Anion Gap    4 L


 


BUN    38.4 H


 


Creatinine    0.7


 


Est GFR (CKD-EPI)AfAm    98.34


 


Est GFR (CKD-EPI)NonAf    84.85


 


POC Glucometer  175  


 


Random Glucose    185 H


 


Calcium    8.6


 


Total Bilirubin    0.6


 


AST    16


 


ALT    36


 


Alkaline Phosphatase    47


 


Troponin I   


 


Total Protein    5.4 L


 


Albumin    2.7 L














  06/23/19





  11:30


 


WBC 


 


RBC 


 


Hgb 


 


Hct 


 


MCV 


 


MCH 


 


MCHC 


 


RDW 


 


Plt Count 


 


MPV 


 


Sodium 


 


Potassium 


 


Chloride 


 


Carbon Dioxide 


 


Anion Gap 


 


BUN 


 


Creatinine 


 


Est GFR (CKD-EPI)AfAm 


 


Est GFR (CKD-EPI)NonAf 


 


POC Glucometer  306


 


Random Glucose 


 


Calcium 


 


Total Bilirubin 


 


AST 


 


ALT 


 


Alkaline Phosphatase 


 


Troponin I 


 


Total Protein 


 


Albumin 














Problem List





- Problems


(1) Acute and chronic respiratory failure with hypoxia


Code(s): J96.21 - ACUTE AND CHRONIC RESPIRATORY FAILURE WITH HYPOXIA   





(2) Acute exacerbation of chronic obstructive pulmonary disease (COPD)


Code(s): J44.1 - CHRONIC OBSTRUCTIVE PULMONARY DISEASE W (ACUTE) EXACERBATION   





(3) Pneumonia


Code(s): J18.9 - PNEUMONIA, UNSPECIFIED ORGANISM   


Qualifiers: 


   Pneumonia type: due to unspecified organism   Laterality: right   Lung 

location: unspecified part of lung   Qualified Code(s): J18.9 - Pneumonia, 

unspecified organism   








A/P


Acute on Chronic Hypoxic Respiratory Failure


Pneumonia


Acute COPD Exacerbation


HTN


Hyperlipidemia


h/o Renal Cell Carcinoma s/p Left Nephrectomy


h/o Prostate Ca





-  continue antibiotics per ID 


-  Medrol at current dose


-  Inhaled bronchodilators standing and PRN


-  O2 to keep Spo2 88-95%


-  NIPPV as needed to assist in work of breathing


-  DVT prophylaxis





Dr Madera

## 2019-06-24 LAB
ALBUMIN SERPL-MCNC: 2.5 G/DL (ref 3.4–5)
ALP SERPL-CCNC: 44 U/L (ref 45–117)
ALT SERPL-CCNC: 40 U/L (ref 13–61)
ANION GAP SERPL CALC-SCNC: 2 MMOL/L (ref 8–16)
AST SERPL-CCNC: 25 U/L (ref 15–37)
BILIRUB SERPL-MCNC: 0.6 MG/DL (ref 0.2–1)
BUN SERPL-MCNC: 47.4 MG/DL (ref 7–18)
CALCIUM SERPL-MCNC: 8.4 MG/DL (ref 8.5–10.1)
CHLORIDE SERPL-SCNC: 98 MMOL/L (ref 98–107)
CO2 SERPL-SCNC: 39 MMOL/L (ref 21–32)
CREAT SERPL-MCNC: 0.7 MG/DL (ref 0.55–1.3)
DEPRECATED RDW RBC AUTO: 15.2 % (ref 11.9–15.9)
GLUCOSE SERPL-MCNC: 226 MG/DL (ref 74–106)
HCT VFR BLD CALC: 41.8 % (ref 35.4–49)
HGB BLD-MCNC: 14.1 GM/DL (ref 11.7–16.9)
MCH RBC QN AUTO: 29.1 PG (ref 25.7–33.7)
MCHC RBC AUTO-ENTMCNC: 33.7 G/DL (ref 32–35.9)
MCV RBC: 86.3 FL (ref 80–96)
PLATELET # BLD AUTO: 249 K/MM3 (ref 134–434)
PMV BLD: 8 FL (ref 7.5–11.1)
POTASSIUM SERPLBLD-SCNC: 4.6 MMOL/L (ref 3.5–5.1)
PROT SERPL-MCNC: 5.3 G/DL (ref 6.4–8.2)
RBC # BLD AUTO: 4.85 M/MM3 (ref 4–5.6)
SODIUM SERPL-SCNC: 139 MMOL/L (ref 136–145)
WBC # BLD AUTO: 10.1 K/MM3 (ref 4–10)

## 2019-06-24 RX ADMIN — ATORVASTATIN CALCIUM SCH MG: 20 TABLET, FILM COATED ORAL at 21:59

## 2019-06-24 RX ADMIN — Medication SCH MG: at 21:59

## 2019-06-24 RX ADMIN — METHYLPREDNISOLONE SODIUM SUCCINATE SCH MG: 40 INJECTION, POWDER, FOR SOLUTION INTRAMUSCULAR; INTRAVENOUS at 18:48

## 2019-06-24 RX ADMIN — RANITIDINE SCH MG: 150 TABLET ORAL at 10:14

## 2019-06-24 RX ADMIN — METHYLPREDNISOLONE SODIUM SUCCINATE SCH MG: 40 INJECTION, POWDER, FOR SOLUTION INTRAMUSCULAR; INTRAVENOUS at 10:13

## 2019-06-24 RX ADMIN — PIPERACILLIN SODIUM AND TAZOBACTAM SODIUM SCH MLS/HR: .25; 2 INJECTION, POWDER, LYOPHILIZED, FOR SOLUTION INTRAVENOUS at 18:49

## 2019-06-24 RX ADMIN — PANTOPRAZOLE SODIUM SCH MG: 40 TABLET, DELAYED RELEASE ORAL at 08:16

## 2019-06-24 RX ADMIN — FINASTERIDE SCH MG: 5 TABLET, FILM COATED ORAL at 10:14

## 2019-06-24 RX ADMIN — INSULIN ASPART SCH UNIT: 100 INJECTION, SOLUTION INTRAVENOUS; SUBCUTANEOUS at 18:48

## 2019-06-24 RX ADMIN — CARVEDILOL SCH MG: 12.5 TABLET, FILM COATED ORAL at 21:59

## 2019-06-24 RX ADMIN — PANTOPRAZOLE SODIUM SCH MG: 40 TABLET, DELAYED RELEASE ORAL at 21:59

## 2019-06-24 RX ADMIN — INSULIN ASPART SCH UNIT: 100 INJECTION, SOLUTION INTRAVENOUS; SUBCUTANEOUS at 06:40

## 2019-06-24 RX ADMIN — INSULIN ASPART SCH UNIT: 100 INJECTION, SOLUTION INTRAVENOUS; SUBCUTANEOUS at 13:37

## 2019-06-24 RX ADMIN — FUROSEMIDE SCH MG: 40 TABLET ORAL at 10:14

## 2019-06-24 RX ADMIN — TAMSULOSIN HYDROCHLORIDE SCH MG: 0.4 CAPSULE ORAL at 08:16

## 2019-06-24 RX ADMIN — BUDESONIDE AND FORMOTEROL FUMARATE DIHYDRATE SCH PUFF: 160; 4.5 AEROSOL RESPIRATORY (INHALATION) at 22:00

## 2019-06-24 RX ADMIN — OMEGA-3-ACID ETHYL ESTERS SCH GM: 1 CAPSULE, LIQUID FILLED ORAL at 10:14

## 2019-06-24 RX ADMIN — INSULIN ASPART SCH UNIT: 100 INJECTION, SOLUTION INTRAVENOUS; SUBCUTANEOUS at 22:14

## 2019-06-24 RX ADMIN — FLUTICASONE PROPIONATE SCH SPRAYS: 50 SPRAY, METERED NASAL at 10:15

## 2019-06-24 RX ADMIN — IPRATROPIUM BROMIDE AND ALBUTEROL SULFATE PRN AMP: .5; 3 SOLUTION RESPIRATORY (INHALATION) at 21:52

## 2019-06-24 RX ADMIN — METHYLPREDNISOLONE SODIUM SUCCINATE SCH MG: 40 INJECTION, POWDER, FOR SOLUTION INTRAMUSCULAR; INTRAVENOUS at 01:42

## 2019-06-24 RX ADMIN — AMLODIPINE BESYLATE SCH MG: 10 TABLET ORAL at 10:14

## 2019-06-24 RX ADMIN — PIPERACILLIN SODIUM AND TAZOBACTAM SODIUM SCH MLS/HR: .25; 2 INJECTION, POWDER, LYOPHILIZED, FOR SOLUTION INTRAVENOUS at 10:14

## 2019-06-24 RX ADMIN — PIPERACILLIN SODIUM AND TAZOBACTAM SODIUM SCH MLS/HR: .25; 2 INJECTION, POWDER, LYOPHILIZED, FOR SOLUTION INTRAVENOUS at 01:42

## 2019-06-24 RX ADMIN — SENNOSIDES SCH TAB: 8.6 TABLET, FILM COATED ORAL at 21:59

## 2019-06-24 RX ADMIN — BUDESONIDE AND FORMOTEROL FUMARATE DIHYDRATE SCH PUFF: 160; 4.5 AEROSOL RESPIRATORY (INHALATION) at 10:15

## 2019-06-24 RX ADMIN — ENOXAPARIN SODIUM SCH MG: 40 INJECTION SUBCUTANEOUS at 10:14

## 2019-06-24 RX ADMIN — LOSARTAN POTASSIUM SCH MG: 50 TABLET, FILM COATED ORAL at 10:14

## 2019-06-24 RX ADMIN — AZITHROMYCIN DIHYDRATE SCH MLS/HR: 500 INJECTION, POWDER, LYOPHILIZED, FOR SOLUTION INTRAVENOUS at 10:17

## 2019-06-24 RX ADMIN — CARVEDILOL SCH MG: 12.5 TABLET, FILM COATED ORAL at 08:16

## 2019-06-24 NOTE — PN
Progress Note, Physician


Chief Complaint: 





COPD


PNA


Chronic hypoxic respiratory failure 


History of Present Illness: 





Previous notes and events reviewed


awake and alert


NAD


overnight events reviewed, patient was refusing O2 and became cyanotic, he 

improved after Bipap was placed


currently on BIpap and says it does help with his breathing





- Current Medication List


Current Medications: 


Active Medications





Acetaminophen (Tylenol -)  650 mg PO Q6H PRN


   PRN Reason: FEVER


   Last Admin: 06/22/19 16:44 Dose:  650 mg


Albuterol/Ipratropium (Duoneb -)  1 amp NEB Q6H PRN


   PRN Reason: SHORTNESS OF BREATH


   Last Admin: 06/23/19 15:40 Dose:  1 amp


Amlodipine Besylate (Norvasc -)  10 mg PO DAILY [facility]


   Last Admin: 06/24/19 10:14 Dose:  10 mg


Artificial Tears (Artificial Tears)  1 drop OU Q8H PRN


   PRN Reason: DRY EYES


Atorvastatin Calcium (Lipitor -)  20 mg PO DAILY@2000 [facility]


   Last Admin: 06/23/19 22:00 Dose:  Not Given


Budesonide/Formoterol Fumarate (Symbicort 160/4.5mcg -)  2 puff IH BID [facility]


   Last Admin: 06/24/19 10:15 Dose:  2 puff


Carvedilol (Coreg -)  12.5 mg PO BID@0800,2000 [facility]


   Last Admin: 06/24/19 08:16 Dose:  12.5 mg


Docusate Sodium (Colace -)  100 mg PO Q8H PRN


   PRN Reason: CONSTIPATION


Enoxaparin Sodium (Lovenox -)  40 mg SQ DAILY [facility]


   Last Admin: 06/24/19 10:14 Dose:  40 mg


Finasteride (Proscar -)  5 mg PO DAILY [facility]


   Last Admin: 06/24/19 10:14 Dose:  5 mg


Fluticasone Propionate (Flonase -)  2 spray NS DAILY [facility]


   Last Admin: 06/24/19 10:15 Dose:  2 sprays


Furosemide (Lasix -)  40 mg PO DAILY [facility]


   Last Admin: 06/24/19 10:14 Dose:  40 mg


Azithromycin 250 mg/ Dextrose  250 mls @ 250 mls/hr IVPB DAILY [facility]


   Last Admin: 06/24/19 10:17 Dose:  250 mls/hr


Piperacillin Sod/Tazobactam (Sod 2.25 gm/ Dextrose)  50 mls @ 100 mls/hr IVPB 

Q8H-IV [facility]; Protocol


   Last Admin: 06/24/19 10:14 Dose:  100 mls/hr


Insulin Aspart (Novolog Vial Sliding Scale -)  1 vial SQ ACHS [facility]; Protocol


   Last Admin: 06/24/19 06:40 Dose:  4 unit


Lactic Acid (Lac-Hydrin 12)  1 applic TP DAILY PRN


   PRN Reason: xerosis


Losartan Potassium (Cozaar -)  50 mg PO DAILY [facility]


   Last Admin: 06/24/19 10:14 Dose:  50 mg


Meclizine HCl (Antivert -)  25 mg PO Q8H PRN


   PRN Reason: dizziness


Melatonin (Melatonin)  10 mg PO [facility]


   Last Admin: 06/23/19 22:01 Dose:  Not Given


Methylprednisolone Sodium Succinate (Solu-Medrol -)  60 mg IVPB Q8H-IV [facility]


   Last Admin: 06/24/19 10:13 Dose:  60 mg


Omega-3-Acid Ethyl Esters (Lovaza -)  1 gm PO DAILY [facility]


   Last Admin: 06/24/19 10:14 Dose:  1 gm


Pantoprazole Sodium (Protonix -)  40 mg PO BID@0800,2000 [facility]


   Last Admin: 06/24/19 08:16 Dose:  40 mg


Polyethylene Glycol (Miralax (For Daily Use) -)  17 gm PO Q12H PRN


   PRN Reason: CONSTIPATION


Ranitidine HCl (Zantac -)  150 mg PO DAILY [facility]


   Last Admin: 06/24/19 10:14 Dose:  150 mg


Senna (Senna -)  2 tab PO [facility]


   Last Admin: 06/23/19 22:01 Dose:  Not Given


Simethicone (Mylicon -)  80 mg PO Q4H PRN


   PRN Reason: INDIGESTION


Tamsulosin HCl (Flomax -)  0.4 mg PO DAILY@0830 [facility]


   Last Admin: 06/24/19 08:16 Dose:  0.4 mg











- Objective


Vital Signs: 


 Vital Signs











Temperature  96.9 F L  06/24/19 08:10


 


Pulse Rate  74   06/24/19 12:20


 


Respiratory Rate  20   06/24/19 12:20


 


Blood Pressure  103/65   06/24/19 12:20


 


O2 Sat by Pulse Oximetry (%)  95   06/23/19 22:06











Constitutional: Yes: No Distress, Calm


Eyes: Yes: Conjunctiva Clear


HENT: Yes: Atraumatic


Cardiovascular: Yes: Regular Rate and Rhythm


Respiratory: Yes: Diminished, On BiPap, Tachypnea


Gastrointestinal: Yes: Normal Bowel Sounds, Soft


Musculoskeletal: Yes: Muscle Weakness


Extremities: Yes: WNL


Edema: No


Neurological: Yes: Alert, Oriented


Psychiatric: Yes: Alert, Oriented


Labs: 


 CBC, BMP





 06/24/19 06:00 





 06/24/19 06:00 





 Microbiology





06/17/19 15:47   Blood - Peripheral Venous   Blood Culture - Final


                            NO GROWTH AFTER 5 DAYS INCUBATION


06/17/19 15:35   Blood - Peripheral Venous   Blood Culture - Final


                            NO GROWTH AFTER 5 DAYS INCUBATION


06/18/19 21:30   Urine For Antigen Detection   Legionella Antigen - Final


06/18/19 21:30   Urine For Antigen Detection   Streptococcus pneumoniae Antigen 

(M - Final











Problem List





- Problems


(1) Acute and chronic respiratory failure with hypoxia


Assessment/Plan: 


-Pulm on board


-keep SpO2 >90%


-VentiMask 50%


-Bipap PRN


-bronchodilators


-Solumedrol





Code(s): J96.21 - ACUTE AND CHRONIC RESPIRATORY FAILURE WITH HYPOXIA   





(2) Acute exacerbation of chronic obstructive pulmonary disease (COPD)


Assessment/Plan: 


-Pulm on board


-keep SpO2 >90%


-VentiMask 50%


-Bipap PRN 


-bronchodilators


-Solumedrol


-Symbicort


-Azithromycin and Zosyn


-ID consult


Code(s): J44.1 - CHRONIC OBSTRUCTIVE PULMONARY DISEASE W (ACUTE) EXACERBATION   





(3) Pneumonia


Assessment/Plan: 


-Pulm on board


-keep SpO2 >90%


-VentiMask 50%


-Bipap PRN 


-bronchodilators


-Solumedrol


-Symbicort


-Azithromycin and Zosyn


-ID consult


-WBC 10.1


-afebrile


Code(s): J18.9 - PNEUMONIA, UNSPECIFIED ORGANISM   


Qualifiers: 


   Pneumonia type: due to unspecified organism   Laterality: right   Lung 

location: unspecified part of lung   Qualified Code(s): J18.9 - Pneumonia, 

unspecified organism   





(4) BPH (benign prostatic hyperplasia)


Assessment/Plan: 


-Proscar


Code(s): N40.0 - BENIGN PROSTATIC HYPERPLASIA WITHOUT LOWER URINRY TRACT SYMP   





(5) HLD (hyperlipidemia)


Assessment/Plan: 


-Atorvastatin


Code(s): E78.5 - HYPERLIPIDEMIA, UNSPECIFIED   


Qualifiers: 


   Hyperlipidemia type: pure hypercholesterolemia 





(6) HTN (hypertension)


Assessment/Plan: 


-Amlodipine


-low Na diet


Code(s): I10 - ESSENTIAL (PRIMARY) HYPERTENSION   


Qualifiers: 


   Hypertension type: essential hypertension 





(7) Insomnia


Assessment/Plan: 


-Melatonin HS


Code(s): G47.00 - INSOMNIA, UNSPECIFIED   





(8) Constipation


Assessment/Plan: 


-Miralax and colace


Code(s): K59.00 - CONSTIPATION, UNSPECIFIED   





Assessment/Plan





see problem list


dvt ppx 


pulmonary rehab for discharge

## 2019-06-24 NOTE — PN
Progress Note (short form)





- Note


Progress Note: 


Events overnight noted.  Removed O2 and became cyanotic.  


Restrained and placed on NIPPV support with improvement in respiratory status. 


 Intake & Output











 06/21/19 06/22/19 06/23/19 06/24/19





 23:59 23:59 23:59 23:59


 


Intake Total 1025 300  50


 


Balance 1025 300  50








 Last Vital Signs











Temp Pulse Resp BP Pulse Ox


 


 96.9 F L  75   20   130/79   95 


 


 06/24/19 08:10  06/24/19 10:00  06/24/19 10:00  06/24/19 10:00  06/23/19 22:06








Active Medications





Acetaminophen (Tylenol -)  650 mg PO Q6H PRN


   PRN Reason: FEVER


   Last Admin: 06/22/19 16:44 Dose:  650 mg


Albuterol/Ipratropium (Duoneb -)  1 amp NEB Q6H PRN


   PRN Reason: SHORTNESS OF BREATH


   Last Admin: 06/23/19 15:40 Dose:  1 amp


Amlodipine Besylate (Norvasc -)  10 mg PO DAILY [facility]


   Last Admin: 06/24/19 10:14 Dose:  10 mg


Artificial Tears (Artificial Tears)  1 drop OU Q8H PRN


   PRN Reason: DRY EYES


Atorvastatin Calcium (Lipitor -)  20 mg PO DAILY@2000 [facility]


   Last Admin: 06/23/19 22:00 Dose:  Not Given


Budesonide/Formoterol Fumarate (Symbicort 160/4.5mcg -)  2 puff IH BID [facility]


   Last Admin: 06/24/19 10:15 Dose:  2 puff


Carvedilol (Coreg -)  12.5 mg PO BID@0800,2000 [facility]


   Last Admin: 06/24/19 08:16 Dose:  12.5 mg


Docusate Sodium (Colace -)  100 mg PO Q8H PRN


   PRN Reason: CONSTIPATION


Enoxaparin Sodium (Lovenox -)  40 mg SQ DAILY [facility]


   Last Admin: 06/24/19 10:14 Dose:  40 mg


Finasteride (Proscar -)  5 mg PO DAILY [facility]


   Last Admin: 06/24/19 10:14 Dose:  5 mg


Fluticasone Propionate (Flonase -)  2 spray NS DAILY [facility]


   Last Admin: 06/24/19 10:15 Dose:  2 sprays


Furosemide (Lasix -)  40 mg PO DAILY [facility]


   Last Admin: 06/24/19 10:14 Dose:  40 mg


Azithromycin 250 mg/ Dextrose  250 mls @ 250 mls/hr IVPB DAILY [facility]


   Last Admin: 06/24/19 10:17 Dose:  250 mls/hr


Piperacillin Sod/Tazobactam (Sod 2.25 gm/ Dextrose)  50 mls @ 100 mls/hr IVPB 

Q8H-IV [facility]; Protocol


   Last Admin: 06/24/19 10:14 Dose:  100 mls/hr


Insulin Aspart (Novolog Vial Sliding Scale -)  1 vial SQ ACHS [facility]; Protocol


   Last Admin: 06/24/19 06:40 Dose:  4 unit


Lactic Acid (Lac-Hydrin 12)  1 applic TP DAILY PRN


   PRN Reason: xerosis


Losartan Potassium (Cozaar -)  50 mg PO DAILY [facility]


   Last Admin: 06/24/19 10:14 Dose:  50 mg


Meclizine HCl (Antivert -)  25 mg PO Q8H PRN


   PRN Reason: dizziness


Melatonin (Melatonin)  10 mg PO [facility]


   Last Admin: 06/23/19 22:01 Dose:  Not Given


Methylprednisolone Sodium Succinate (Solu-Medrol -)  60 mg IVPB Q8H-IV [facility]


   Last Admin: 06/24/19 10:13 Dose:  60 mg


Omega-3-Acid Ethyl Esters (Lovaza -)  1 gm PO DAILY [facility]


   Last Admin: 06/24/19 10:14 Dose:  1 gm


Pantoprazole Sodium (Protonix -)  40 mg PO BID@0800,2000 [facility]


   Last Admin: 06/24/19 08:16 Dose:  40 mg


Polyethylene Glycol (Miralax (For Daily Use) -)  17 gm PO Q12H PRN


   PRN Reason: CONSTIPATION


Ranitidine HCl (Zantac -)  150 mg PO DAILY [facility]


   Last Admin: 06/24/19 10:14 Dose:  150 mg


Senna (Senna -)  2 tab PO [facility]


   Last Admin: 06/23/19 22:01 Dose:  Not Given


Simethicone (Mylicon -)  80 mg PO Q4H PRN


   PRN Reason: INDIGESTION


Tamsulosin HCl (Flomax -)  0.4 mg PO DAILY@0830 [facility]


   Last Admin: 06/24/19 08:16 Dose:  0.4 mg














Gen: Awake on NIPPV 


Heart: RRR


Lung: distant breath sounds, poor air entry


Abd: soft, nontender


Ext: no edema


 Laboratory Results - last 24 hr











  06/23/19 06/23/19 06/23/19





  11:30 16:23 20:52


 


WBC   


 


RBC   


 


Hgb   


 


Hct   


 


MCV   


 


MCH   


 


MCHC   


 


RDW   


 


Plt Count   


 


MPV   


 


Sodium   


 


Potassium   


 


Chloride   


 


Carbon Dioxide   


 


Anion Gap   


 


BUN   


 


Creatinine   


 


Est GFR (CKD-EPI)AfAm   


 


Est GFR (CKD-EPI)NonAf   


 


POC Glucometer  306  277  236


 


Random Glucose   


 


Calcium   


 


Total Bilirubin   


 


AST   


 


ALT   


 


Alkaline Phosphatase   


 


Total Protein   


 


Albumin   














  06/24/19 06/24/19 06/24/19





  06:00 06:00 06:13


 


WBC  10.1 H  


 


RBC  4.85  


 


Hgb  14.1  


 


Hct  41.8  


 


MCV  86.3  


 


MCH  29.1  


 


MCHC  33.7  


 


RDW  15.2  


 


Plt Count  249  D  


 


MPV  8.0  


 


Sodium   139 


 


Potassium   4.6 


 


Chloride   98 


 


Carbon Dioxide   39 H 


 


Anion Gap   2 L 


 


BUN   47.4 H 


 


Creatinine   0.7 


 


Est GFR (CKD-EPI)AfAm   98.34 


 


Est GFR (CKD-EPI)NonAf   84.85 


 


POC Glucometer    225


 


Random Glucose   226 H 


 


Calcium   8.4 L 


 


Total Bilirubin   0.6 


 


AST   25 


 


ALT   40 


 


Alkaline Phosphatase   44 L 


 


Total Protein   5.3 L 


 


Albumin   2.5 L 











Problem List





- Problems


(1) Acute and chronic respiratory failure with hypoxia


Code(s): J96.21 - ACUTE AND CHRONIC RESPIRATORY FAILURE WITH HYPOXIA   





(2) Acute exacerbation of chronic obstructive pulmonary disease (COPD)


Code(s): J44.1 - CHRONIC OBSTRUCTIVE PULMONARY DISEASE W (ACUTE) EXACERBATION   





(3) Pneumonia


Code(s): J18.9 - PNEUMONIA, UNSPECIFIED ORGANISM   


Qualifiers: 


   Pneumonia type: due to unspecified organism   Laterality: right   Lung 

location: unspecified part of lung   Qualified Code(s): J18.9 - Pneumonia, 

unspecified organism   








A/P


Acute on Chronic Hypoxic Respiratory Failure


Pneumonia


Acute COPD Exacerbation


HTN


Hyperlipidemia


h/o Renal Cell Carcinoma s/p Left Nephrectomy


h/o Prostate Ca








-  NIPPV support 


-  continue antibiotics per ID 


-  Medrol at current dose


-  Inhaled bronchodilators standing and PRN


-  O2 to keep Spo2 88-95%


-  DVT prophylaxis





Dr Madera

## 2019-06-24 NOTE — PN
Progress Note, Physician


History of Present Illness: 


Resting on NIPPV support with improvement in respiratory status. 





- Current Medication List


Current Medications: 


Active Medications





Acetaminophen (Tylenol -)  650 mg PO Q6H PRN


   PRN Reason: FEVER


   Last Admin: 06/22/19 16:44 Dose:  650 mg


Albuterol/Ipratropium (Duoneb -)  1 amp NEB Q6H PRN


   PRN Reason: SHORTNESS OF BREATH


   Last Admin: 06/23/19 15:40 Dose:  1 amp


Amlodipine Besylate (Norvasc -)  10 mg PO DAILY [facility]


   Last Admin: 06/24/19 10:14 Dose:  10 mg


Artificial Tears (Artificial Tears)  1 drop OU Q8H PRN


   PRN Reason: DRY EYES


Atorvastatin Calcium (Lipitor -)  20 mg PO DAILY@2000 [facility]


   Last Admin: 06/23/19 22:00 Dose:  Not Given


Budesonide/Formoterol Fumarate (Symbicort 160/4.5mcg -)  2 puff IH BID [facility]


   Last Admin: 06/24/19 10:15 Dose:  2 puff


Carvedilol (Coreg -)  12.5 mg PO BID@0800,2000 [facility]


   Last Admin: 06/24/19 08:16 Dose:  12.5 mg


Docusate Sodium (Colace -)  100 mg PO Q8H PRN


   PRN Reason: CONSTIPATION


Enoxaparin Sodium (Lovenox -)  40 mg SQ DAILY [facility]


   Last Admin: 06/24/19 10:14 Dose:  40 mg


Finasteride (Proscar -)  5 mg PO DAILY [facility]


   Last Admin: 06/24/19 10:14 Dose:  5 mg


Fluticasone Propionate (Flonase -)  2 spray NS DAILY [facility]


   Last Admin: 06/24/19 10:15 Dose:  2 sprays


Furosemide (Lasix -)  40 mg PO DAILY [facility]


   Last Admin: 06/24/19 10:14 Dose:  40 mg


Azithromycin 250 mg/ Dextrose  250 mls @ 250 mls/hr IVPB DAILY [facility]


   Last Admin: 06/24/19 10:17 Dose:  250 mls/hr


Piperacillin Sod/Tazobactam (Sod 2.25 gm/ Dextrose)  50 mls @ 100 mls/hr IVPB 

Q8H-IV [facility]; Protocol


   Last Admin: 06/24/19 10:14 Dose:  100 mls/hr


Insulin Aspart (Novolog Vial Sliding Scale -)  1 vial SQ ACHS [facility]; Protocol


   Last Admin: 06/24/19 06:40 Dose:  4 unit


Lactic Acid (Lac-Hydrin 12)  1 applic TP DAILY PRN


   PRN Reason: xerosis


Losartan Potassium (Cozaar -)  50 mg PO DAILY [facility]


   Last Admin: 06/24/19 10:14 Dose:  50 mg


Meclizine HCl (Antivert -)  25 mg PO Q8H PRN


   PRN Reason: dizziness


Melatonin (Melatonin)  10 mg PO [facility]


   Last Admin: 06/23/19 22:01 Dose:  Not Given


Methylprednisolone Sodium Succinate (Solu-Medrol -)  60 mg IVPB Q8H-IV [facility]


   Last Admin: 06/24/19 10:13 Dose:  60 mg


Omega-3-Acid Ethyl Esters (Lovaza -)  1 gm PO DAILY [facility]


   Last Admin: 06/24/19 10:14 Dose:  1 gm


Pantoprazole Sodium (Protonix -)  40 mg PO BID@0800,2000 [facility]


   Last Admin: 06/24/19 08:16 Dose:  40 mg


Polyethylene Glycol (Miralax (For Daily Use) -)  17 gm PO Q12H PRN


   PRN Reason: CONSTIPATION


Ranitidine HCl (Zantac -)  150 mg PO DAILY [facility]


   Last Admin: 06/24/19 10:14 Dose:  150 mg


Senna (Senna -)  2 tab PO [facility]


   Last Admin: 06/23/19 22:01 Dose:  Not Given


Simethicone (Mylicon -)  80 mg PO Q4H PRN


   PRN Reason: INDIGESTION


Tamsulosin HCl (Flomax -)  0.4 mg PO DAILY@0830 [facility]


   Last Admin: 06/24/19 08:16 Dose:  0.4 mg











- Objective


Vital Signs: 


 Vital Signs











Temperature  96.9 F L  06/24/19 08:10


 


Pulse Rate  75   06/24/19 10:00


 


Respiratory Rate  20   06/24/19 10:00


 


Blood Pressure  130/79   06/24/19 10:00


 


O2 Sat by Pulse Oximetry (%)  95   06/23/19 22:06











Constitutional: Yes: No Distress, Calm


Neck: Yes: Supple


Cardiovascular: Yes: Regular Rate and Rhythm


Respiratory: Yes: Regular, Diminished, On BiPap, SOB


Gastrointestinal: Yes: Soft, Hypoactive Bowel Sounds


Edema: No


Labs: 


 CBC, BMP





 06/24/19 06:00 





 06/24/19 06:00 











Problem List





- Problems


(1) Acute and chronic respiratory failure with hypoxia


Code(s): J96.21 - ACUTE AND CHRONIC RESPIRATORY FAILURE WITH HYPOXIA   





(2) Acute exacerbation of chronic obstructive pulmonary disease (COPD)


Code(s): J44.1 - CHRONIC OBSTRUCTIVE PULMONARY DISEASE W (ACUTE) EXACERBATION   





(3) SOB (shortness of breath)


Code(s): R06.02 - SHORTNESS OF BREATH   





(4) Diastolic CHF


Code(s): I50.30 - UNSPECIFIED DIASTOLIC (CONGESTIVE) HEART FAILURE   


Qualifiers: 


 





(5) HLD (hyperlipidemia)


Code(s): E78.5 - HYPERLIPIDEMIA, UNSPECIFIED   


Qualifiers: 


   Hyperlipidemia type: pure hypercholesterolemia 





(6) HTN (hypertension)


Code(s): I10 - ESSENTIAL (PRIMARY) HYPERTENSION   


Qualifiers: 


   Hypertension type: essential hypertension 





(7) Sleep apnea


Code(s): G47.30 - SLEEP APNEA, UNSPECIFIED   





Assessment/Plan


1. Acute on Chronic Hypoxemic/Hypercapneic Respiratory Failure


2. Acute COPD exacerbation +/- PNA


3. HTN


4. Hypercholesterolemia


5. History of TIA


6. History of renal CA s/p nephrectomy


7. History of prostate CA


8. History of diverticular disease





PLAN:


1. IV steroid with GI protection, bronchodilator, empiric antibiotics, O2 and 

BIPAP as needed to keep Spo2 88-92%


2. Continue Carvedilol 12.5 bid, Amlodipine 10 qd, Lipitor 20 qd, Lovaza 1qd 

and Losartan 50 qd as tolerated


3. Diuretics with Lasix 40 po qd


4. DVT prophylaxis

## 2019-06-24 NOTE — CONSULT
Consult


Consult Specialty:: Nephrology


Reason for Consultation:: azotemia





- History of Present Illness


Chief Complaint: shortness of breath


History of Present Illness: 





Pt is an 87 year old male with pmhx of COPD, CHF, [place], HTN, HL, TIA and renal 

cell cancer s/p right nephrectomy who presents to the ER with increased 

shortness of breath. He also has a cough. He was admitted and started on 

steroids. Pt was found to have elevated BUN and I was called to evaluate him. 

He feels that his breathing is improving. He still needs bipap. He denies 

dysuria or hematuria. Family are at bedside and assisted with history. He 

denies history of CKD however he did have a right nephrectomy. Pt denies blood 

in stool. 





- History Source


History Provided By: Patient, Family Member, Medical Record





- Past Medical History


CNS: Yes: Alzheimer's, TIA


Cardio/Vascular: Yes: AFIB, HTN, Hyperlipdemia


Pulmonary: Yes: COPD, Pneumonia (fungal)


Gastrointestinal: Yes: Diverticulitis, Other (cholecystitis, ischemic colitis, 3

/14 sigmoid adenoma removed, SBO 12/15)


Hepatobiliary: Yes: Cholelithiasis, Cholecystitis ( cholecystostomy tube,  laparascopic cholecystectomy), Choledocholithiasis


Renal/: Yes: Cancer (prostate/kidney), Neurogenic Bladder


Psych: Yes: Anxiety


Rheumatology: Yes: Other (arthritis knees)





- Past Surgical History


Past Surgical History: Yes: Appendectomy, Cataract Removal, Colonoscopy, Hernia 

Repair, Joint Replacement (left knee makoplasty), Laminectomy (cervical fusion)

, Nephrectomy (left)





- Alcohol/Substance Use


Hx Alcohol Use: No


History of Substance Use: reports: None





- Smoking History


Smoking history: Former smoker


Have you smoked in the past 12 months: No


Aproximately how many cigarettes per day: 40 ( 40years)


If you are a former smoker, when did you quit?: 





- Social History


Usual Living Arrangement: With Spouse


ADL: Independent


Occupation: retired electronics salesman


History of Recent Travel: No





Home Medications





- Allergies


Allergies/Adverse Reactions: 


 Allergies











Allergy/AdvReac Type Severity Reaction Status Date / Time


 


No Known Drug Allergies Allergy   Verified 19 16:04














- Home Medications


Home Medications: 


Ambulatory Orders





Finasteride 5 mg PO DAILY 10/12/17 


Tamsulosin HCl [Flomax] 0.4 mg PO DAILY 10/12/17 


Acetaminophen [Tylenol .Regular Strength -] 650 mg PO Q6H PRN #0 tablet 10/27/

17 


Meclizine HCl [Antivert -] 25 mg PO Q8H PRN #0 tablet 10/27/17 


Melatonin 5 mg PO HS  tab 10/27/17 


Omega-3 Acid Ethyl Esters [Lovaza -] 1 gm PO DAILY  cap 10/27/17 


Pantoprazole Sodium [Protonix -] 40 mg PO BID #30 tab 10/27/17 


Simethicone [Mylicon -] 80 mg PO Q4H PRN #0 tab.chew 10/27/17 


Albuterol 0.083% Nebulizer Sol [Ventolin 0.083% Nebulizer Soln -] 1 amp NEB Q4H 

PRN  amp 04/10/19 


Albuterol 2.5/Ipratropium 0.5 [Duoneb -] 1 amp NEB RQID  amp 04/10/19 


Ammonium Lactate Lotion [Lac-Hydrin 12] 1 applic TP DAILY PRN  bottle 04/10/19 


Budesonide/Formeterol Fumarate [SYMBICORT 160/4.5mcg -] 2 puff IH BID  inhaler 

04/10/19 


Cefuroxime Axetil [Ceftin -] 500 mg PO BID  tablet 04/10/19 


Fluticasone Prop 0.05% Nasal [Flonase -] 2 spray NS DAILY  spray 04/10/19 


Guaifenesin AC [Robitussin AC -] 5 ml PO TID PRN #1 bottle MDD 15ml 04/10/19 


Melatonin 15 mg PO HS PRN  tab 04/10/19 


Polyethylene Glycol 3350 [Miralax 119 gm Btl -] 17 gm PO BID PRN  bottle 04/10/

19 


Polyvinyl Alcohol [Artificial Tears] 1 drop OU Q8H PRN  drops 04/10/19 


Ranitidine [Zantac -] 150 mg PO DAILY  tablet 04/10/19 


Carvedilol [Coreg -] 12.5 mg PO BID 19 


Furosemide [Lasix] 40 mg PO DAILY 19 











Family Disease History





- Family Disease History


Family Disease History: Diabetes: Mother ( 70 diabetic complications), Other

: Father ( 70 of [place] ), Brother (3 siblings with cancer ? types)





Review of Systems





- Review of Systems


Constitutional: reports: Malaise


Eyes: reports: No Symptoms


HENT: reports: No Symptoms


Neck: reports: No Symptoms


Cardiovascular: reports: Shortness of Breath


Respiratory: reports: Cough, SOB, SOB on Exertion


Gastrointestinal: reports: No Symptoms


Genitourinary: reports: No Symptoms


Musculoskeletal: reports: No Symptoms


Integumentary: reports: No Symptoms


Neurological: reports: No Symptoms


Endocrine: reports: No Symptoms


Hematology/Lymphatic: reports: No Symptoms


Psychiatric: reports: No Symptoms





Physical Exam


Vital Signs: 


 Vital Signs











Temperature  97.8 F   19 14:10


 


Pulse Rate  65   19 14:10


 


Respiratory Rate  20   19 14:10


 


Blood Pressure  121/45 L  19 14:10


 


O2 Sat by Pulse Oximetry (%)  95   19 22:06











Constitutional: Yes: Calm


Eyes: Yes: Conjunctiva Clear


HENT: Yes: Atraumatic


Neck: Yes: Supple


Cardiovascular: Yes: S1, S2


Respiratory: Yes: On Nasal O2, Wheezes


Gastrointestinal: Yes: Normal Bowel Sounds, Soft


Renal/: Yes: WNL


Musculoskeletal: Yes: WNL


Extremities: Yes: WNL


Edema: LLE: Trace, RLE: Trace


Neurological: Yes: Oriented


Psychiatric: Yes: Oriented


Labs: 


 CBC, BMP





 19 06:00 





 19 06:00 





 Laboratory Tests











  19





  15:45 06:30 07:01


 


WBC   


 


BUN  31.2 H  28.0 H  36.2 H


 


Creatinine   














  19





  07:10 08:46 08:07


 


WBC    13.4 H


 


BUN  35.6 H  41.9 H 


 


Creatinine   














  19





  08:07 06:00 06:00


 


WBC   10.1 H 


 


BUN  38.4 H   47.4 H


 


Creatinine    0.7














Imaging





- Results


Chest X-ray: Report Reviewed





Problem List





- Problems


(1) Azotemia


Code(s): R79.89 - OTHER SPECIFIED ABNORMAL FINDINGS OF BLOOD CHEMISTRY   





Assessment/Plan





 Current Medications











Generic Name Dose Route Start Last Admin





  Trade Name Freq  PRN Reason Stop Dose Admin


 


Acetaminophen  650 mg  19 18:45  19 16:44





  Tylenol -  PO   650 mg





  Q6H PRN   Administration





  FEVER   





     





     





     


 


Albuterol/Ipratropium  1 amp  19 23:52  19 15:40





  Duoneb -  NEB   1 amp





  Q6H PRN   Administration





  SHORTNESS OF BREATH   





     





     





     


 


Amlodipine Besylate  10 mg  19 10:00  19 10:14





  Norvasc -  PO   10 mg





  DAILY [facility]   Administration





     





     





     





     


 


Artificial Tears  1 drop  19 13:16  





  Artificial Tears  OU   





  BID PRN   





  DRY EYES   





     





     





     


 


Atorvastatin Calcium  20 mg  19 20:00  19 22:00





  Lipitor -  PO   Not Given





  DAILY@ [facility]   





     





     





     





     


 


Budesonide/Formoterol Fumarate  2 puff  19 22:00  19 10:15





  Symbicort 160/4.5mcg -  IH   2 puff





  BID [facility]   Administration





     





     





     





     


 


Carvedilol  12.5 mg  19 20:00  19 08:16





  Coreg -  PO   12.5 mg





  BID@ [facility]   Administration





     





     





     





     


 


Docusate Sodium  100 mg  19 18:55  





  Colace -  PO   





  Q8H PRN   





  CONSTIPATION   





     





     





     


 


Enoxaparin Sodium  40 mg  19 11:30  19 10:14





  Lovenox -  SQ   40 mg





  DAILY [facility]   Administration





     





     





     





     


 


Finasteride  5 mg  19 10:00  19 10:14





  Proscar -  PO   5 mg





  DAILY [facility]   Administration





     





     





     





     


 


Fluticasone Propionate  2 spray  19 10:00  19 10:15





  Flonase -  NS   2 sprays





  DAILY [facility]   Administration





     





     





     





     


 


Furosemide  40 mg  19 10:00  19 10:14





  Lasix -  PO   40 mg





  DAILY [facility]   Administration





     





     





     





     


 


Azithromycin 250 mg/ Dextrose  250 mls @ 250 mls/hr  19 10:00  19 10

:17





  IVPB   250 mls/hr





  DAILY [facility]   Administration





     





     





     





     


 


Piperacillin Sod/Tazobactam  50 mls @ 100 mls/hr  19 18:15  19 10:14





  Sod 2.25 gm/ Dextrose  IVPB   100 mls/hr





  Q8H-IV [facility]   Administration





     





     





  Protocol   





     


 


Insulin Aspart  1 vial  19 22:00  19 13:37





  Novolog Vial Sliding Scale -  SQ   6 unit





  [facility]   Administration





     





     





  Protocol   





     


 


Lactic Acid  1 applic  19 18:45  





  Lac-Hydrin 12  TP   





  DAILY PRN   





  xerosis   





     





     





     


 


Losartan Potassium  50 mg  19 13:00  19 10:14





  Cozaar -  PO   50 mg





  DAILY [facility]   Administration





     





     





     





     


 


Meclizine HCl  25 mg  19 23:25  





  Antivert -  PO   





  Q8H PRN   





  dizziness   





     





     





     


 


Melatonin  10 mg  19 20:00  19 22:01





  Melatonin  PO   Not Given





  [facility]   





     





     





     





     


 


Methylprednisolone Sodium Succinate  60 mg  19 12:32  19 10:13





  Solu-Medrol -  IVPB   60 mg





  Q8H-IV [facility]   Administration





     





     





     





     


 


Omega-3-Acid Ethyl Esters  1 gm  19 10:00  19 10:14





  Lovaza -  PO   1 gm





  DAILY [facility]   Administration





     





     





     





     


 


Pantoprazole Sodium  40 mg  19 20:00  19 08:16





  Protonix -  PO   40 mg





  BID@0800,2000 [facility]   Administration





     





     





     





     


 


Polyethylene Glycol  17 gm  19 18:45  





  Miralax (For Daily Use) -  PO   





  Q12H PRN   





  CONSTIPATION   





     





     





     


 


Ranitidine HCl  150 mg  19 10:00  19 10:14





  Zantac -  PO   150 mg





  DAILY [facility]   Administration





     





     





     





     


 


Senna  2 tab  19 20:00  19 22:01





  Senna -  PO   Not Given





  [facility]   





     





     





     





     


 


Simethicone  80 mg  19 18:45  





  Mylicon -  PO   





  Q4H PRN   





  INDIGESTION   





     





     





     


 


Tamsulosin HCl  0.4 mg  19 08:30  19 08:16





  Flomax -  PO   0.4 mg





  DAILY@0830 [facility]   Administration





     





     





     





     








Impression


1. azotemia


2. PNA


3. copd exacerbation


4. htn


5. hld


6. h/o Renal Cell Carcinoma s/p Left Nephrectomy


7.h/o Prostate Ca


8. CKD





Plan


- monitor bun while on steroids


- steroids can contribute to elevated bun


- check ua


- monitor hg and check stool for occult blood if 


it is dropping


Dr Rapp

## 2019-06-25 LAB
ALBUMIN SERPL-MCNC: 2.4 G/DL (ref 3.4–5)
ALP SERPL-CCNC: 40 U/L (ref 45–117)
ALT SERPL-CCNC: 57 U/L (ref 13–61)
ANION GAP SERPL CALC-SCNC: 4 MMOL/L (ref 8–16)
APPEARANCE UR: CLEAR
AST SERPL-CCNC: 21 U/L (ref 15–37)
BILIRUB SERPL-MCNC: 0.5 MG/DL (ref 0.2–1)
BILIRUB UR STRIP.AUTO-MCNC: NEGATIVE MG/DL
BUN SERPL-MCNC: 51.1 MG/DL (ref 7–18)
CALCIUM SERPL-MCNC: 8.4 MG/DL (ref 8.5–10.1)
CHLORIDE SERPL-SCNC: 98 MMOL/L (ref 98–107)
CO2 SERPL-SCNC: 38 MMOL/L (ref 21–32)
COLOR UR: YELLOW
CREAT SERPL-MCNC: 0.8 MG/DL (ref 0.55–1.3)
DEPRECATED RDW RBC AUTO: 14.2 % (ref 11.9–15.9)
GLUCOSE SERPL-MCNC: 181 MG/DL (ref 74–106)
HCT VFR BLD CALC: 40.5 % (ref 35.4–49)
HGB BLD-MCNC: 13.5 GM/DL (ref 11.7–16.9)
KETONES UR QL STRIP: NEGATIVE
LEUKOCYTE ESTERASE UR QL STRIP.AUTO: NEGATIVE
MCH RBC QN AUTO: 28.9 PG (ref 25.7–33.7)
MCHC RBC AUTO-ENTMCNC: 33.4 G/DL (ref 32–35.9)
MCV RBC: 86.5 FL (ref 80–96)
NITRITE UR QL STRIP: NEGATIVE
PH UR: 6 [PH] (ref 5–8)
PLATELET # BLD AUTO: 245 K/MM3 (ref 134–434)
PMV BLD: 8 FL (ref 7.5–11.1)
POTASSIUM SERPLBLD-SCNC: 4.2 MMOL/L (ref 3.5–5.1)
PROT SERPL-MCNC: 4.9 G/DL (ref 6.4–8.2)
PROT UR QL STRIP: (no result)
PROT UR QL STRIP: NEGATIVE
RBC # BLD AUTO: 4.68 M/MM3 (ref 4–5.6)
SODIUM SERPL-SCNC: 140 MMOL/L (ref 136–145)
SP GR UR: 1.02 (ref 1.01–1.03)
UROBILINOGEN UR STRIP-MCNC: 0.2 MG/DL (ref 0.2–1)
WBC # BLD AUTO: 9.4 K/MM3 (ref 4–10)

## 2019-06-25 RX ADMIN — CARVEDILOL SCH MG: 12.5 TABLET, FILM COATED ORAL at 10:07

## 2019-06-25 RX ADMIN — AMOXICILLIN AND CLAVULANATE POTASSIUM SCH TAB: 875; 125 TABLET, FILM COATED ORAL at 17:50

## 2019-06-25 RX ADMIN — INSULIN ASPART SCH UNIT: 100 INJECTION, SOLUTION INTRAVENOUS; SUBCUTANEOUS at 21:27

## 2019-06-25 RX ADMIN — TAMSULOSIN HYDROCHLORIDE SCH MG: 0.4 CAPSULE ORAL at 10:08

## 2019-06-25 RX ADMIN — ATORVASTATIN CALCIUM SCH MG: 20 TABLET, FILM COATED ORAL at 20:47

## 2019-06-25 RX ADMIN — FLUTICASONE PROPIONATE SCH SPRAYS: 50 SPRAY, METERED NASAL at 10:09

## 2019-06-25 RX ADMIN — ENOXAPARIN SODIUM SCH MG: 40 INJECTION SUBCUTANEOUS at 10:08

## 2019-06-25 RX ADMIN — IPRATROPIUM BROMIDE AND ALBUTEROL SULFATE PRN AMP: .5; 3 SOLUTION RESPIRATORY (INHALATION) at 21:00

## 2019-06-25 RX ADMIN — AZITHROMYCIN DIHYDRATE SCH MLS/HR: 500 INJECTION, POWDER, LYOPHILIZED, FOR SOLUTION INTRAVENOUS at 11:41

## 2019-06-25 RX ADMIN — SENNOSIDES SCH: 8.6 TABLET, FILM COATED ORAL at 21:27

## 2019-06-25 RX ADMIN — METHYLPREDNISOLONE SODIUM SUCCINATE SCH MG: 40 INJECTION, POWDER, FOR SOLUTION INTRAMUSCULAR; INTRAVENOUS at 01:24

## 2019-06-25 RX ADMIN — Medication SCH MG: at 20:54

## 2019-06-25 RX ADMIN — FINASTERIDE SCH MG: 5 TABLET, FILM COATED ORAL at 10:08

## 2019-06-25 RX ADMIN — PANTOPRAZOLE SODIUM SCH MG: 40 TABLET, DELAYED RELEASE ORAL at 10:08

## 2019-06-25 RX ADMIN — INSULIN ASPART SCH UNIT: 100 INJECTION, SOLUTION INTRAVENOUS; SUBCUTANEOUS at 12:37

## 2019-06-25 RX ADMIN — Medication SCH: at 21:25

## 2019-06-25 RX ADMIN — BUDESONIDE AND FORMOTEROL FUMARATE DIHYDRATE SCH PUFF: 160; 4.5 AEROSOL RESPIRATORY (INHALATION) at 21:26

## 2019-06-25 RX ADMIN — FUROSEMIDE SCH MG: 40 TABLET ORAL at 10:07

## 2019-06-25 RX ADMIN — PIPERACILLIN SODIUM AND TAZOBACTAM SODIUM SCH MLS/HR: .25; 2 INJECTION, POWDER, LYOPHILIZED, FOR SOLUTION INTRAVENOUS at 09:51

## 2019-06-25 RX ADMIN — IPRATROPIUM BROMIDE AND ALBUTEROL SULFATE PRN AMP: .5; 3 SOLUTION RESPIRATORY (INHALATION) at 06:15

## 2019-06-25 RX ADMIN — PANTOPRAZOLE SODIUM SCH MG: 40 TABLET, DELAYED RELEASE ORAL at 20:47

## 2019-06-25 RX ADMIN — INSULIN ASPART SCH UNIT: 100 INJECTION, SOLUTION INTRAVENOUS; SUBCUTANEOUS at 06:26

## 2019-06-25 RX ADMIN — METHYLPREDNISOLONE SODIUM SUCCINATE SCH MG: 40 INJECTION, POWDER, FOR SOLUTION INTRAMUSCULAR; INTRAVENOUS at 10:04

## 2019-06-25 RX ADMIN — METHYLPREDNISOLONE SODIUM SUCCINATE SCH MG: 40 INJECTION, POWDER, FOR SOLUTION INTRAMUSCULAR; INTRAVENOUS at 17:52

## 2019-06-25 RX ADMIN — PIPERACILLIN SODIUM AND TAZOBACTAM SODIUM SCH MLS/HR: .25; 2 INJECTION, POWDER, LYOPHILIZED, FOR SOLUTION INTRAVENOUS at 01:24

## 2019-06-25 RX ADMIN — OMEGA-3-ACID ETHYL ESTERS SCH GM: 1 CAPSULE, LIQUID FILLED ORAL at 10:07

## 2019-06-25 RX ADMIN — LOSARTAN POTASSIUM SCH MG: 50 TABLET, FILM COATED ORAL at 10:08

## 2019-06-25 RX ADMIN — RANITIDINE SCH MG: 150 TABLET ORAL at 10:07

## 2019-06-25 RX ADMIN — INSULIN ASPART SCH UNIT: 100 INJECTION, SOLUTION INTRAVENOUS; SUBCUTANEOUS at 17:49

## 2019-06-25 RX ADMIN — CARVEDILOL SCH MG: 12.5 TABLET, FILM COATED ORAL at 20:47

## 2019-06-25 RX ADMIN — BUDESONIDE AND FORMOTEROL FUMARATE DIHYDRATE SCH PUFF: 160; 4.5 AEROSOL RESPIRATORY (INHALATION) at 10:09

## 2019-06-25 RX ADMIN — AMLODIPINE BESYLATE SCH MG: 10 TABLET ORAL at 10:07

## 2019-06-25 NOTE — PN
Progress Note, Physician


Chief Complaint: 





COPD


PNA


Chronic hypoxic respiratory failure 


History of Present Illness: 





Previous notes and events reviewed


awake and alert


NAD


patient on Bipap 


sts his breathing is better when he uses Bipap





- Current Medication List


Current Medications: 


Active Medications





Acetaminophen (Tylenol -)  650 mg PO Q6H PRN


   PRN Reason: FEVER


   Last Admin: 06/22/19 16:44 Dose:  650 mg


Albuterol/Ipratropium (Duoneb -)  1 amp NEB Q6H PRN


   PRN Reason: SHORTNESS OF BREATH


   Last Admin: 06/25/19 06:15 Dose:  1 amp


Amlodipine Besylate (Norvasc -)  10 mg PO DAILY [facility]


   Last Admin: 06/25/19 10:07 Dose:  10 mg


Artificial Tears (Artificial Tears)  1 drop OU BID PRN


   PRN Reason: DRY EYES


Atorvastatin Calcium (Lipitor -)  20 mg PO DAILY@2000 [facility]


   Last Admin: 06/24/19 21:59 Dose:  20 mg


Budesonide/Formoterol Fumarate (Symbicort 160/4.5mcg -)  2 puff IH BID [facility]


   Last Admin: 06/25/19 10:09 Dose:  2 puff


Carvedilol (Coreg -)  12.5 mg PO BID@0800,2000 [facility]


   Last Admin: 06/25/19 10:07 Dose:  12.5 mg


Docusate Sodium (Colace -)  100 mg PO Q8H PRN


   PRN Reason: CONSTIPATION


Enoxaparin Sodium (Lovenox -)  40 mg SQ DAILY [facility]


   Last Admin: 06/25/19 10:08 Dose:  40 mg


Finasteride (Proscar -)  5 mg PO DAILY [facility]


   Last Admin: 06/25/19 10:08 Dose:  5 mg


Fluticasone Propionate (Flonase -)  2 spray NS DAILY [facility]


   Last Admin: 06/25/19 10:09 Dose:  2 sprays


Furosemide (Lasix -)  40 mg PO DAILY [facility]


   Last Admin: 06/25/19 10:07 Dose:  40 mg


Azithromycin 250 mg/ Dextrose  250 mls @ 250 mls/hr IVPB DAILY [facility]


   Last Admin: 06/25/19 11:41 Dose:  250 mls/hr


Piperacillin Sod/Tazobactam (Sod 2.25 gm/ Dextrose)  50 mls @ 100 mls/hr IVPB 

Q8H-IV [facility]; Protocol


   Last Admin: 06/25/19 09:51 Dose:  100 mls/hr


Insulin Aspart (Novolog Vial Sliding Scale -)  [facility]; Protocol


   Last Admin: 06/25/19 12:37 Dose:  8 unit


Lactic Acid (Lac-Hydrin 12)  1 applic TP DAILY PRN


   PRN Reason: xerosis


Losartan Potassium (Cozaar -)  50 mg PO DAILY [facility]


   Last Admin: 06/25/19 10:08 Dose:  50 mg


Meclizine HCl (Antivert -)  25 mg PO Q8H PRN


   PRN Reason: dizziness


Melatonin (Melatonin)  10 mg PO [facility]


   Last Admin: 06/24/19 21:59 Dose:  10 mg


Methylprednisolone Sodium Succinate (Solu-Medrol -)  60 mg IVPB Q8H-IV [facility]


   Last Admin: 06/25/19 10:04 Dose:  60 mg


Omega-3-Acid Ethyl Esters (Lovaza -)  1 gm PO DAILY [facility]


   Last Admin: 06/25/19 10:07 Dose:  1 gm


Pantoprazole Sodium (Protonix -)  40 mg PO BID@0800,2000 [facility]


   Last Admin: 06/25/19 10:08 Dose:  40 mg


Polyethylene Glycol (Miralax (For Daily Use) -)  17 gm PO Q12H PRN


   PRN Reason: CONSTIPATION


Ranitidine HCl (Zantac -)  150 mg PO DAILY [facility]


   Last Admin: 06/25/19 10:07 Dose:  150 mg


Senna (Senna -)  2 tab PO [facility]


   Last Admin: 06/24/19 21:59 Dose:  2 tab


Simethicone (Mylicon -)  80 mg PO Q4H PRN


   PRN Reason: INDIGESTION


Tamsulosin HCl (Flomax -)  0.4 mg PO DAILY@0830 [facility]


   Last Admin: 06/25/19 10:08 Dose:  0.4 mg











- Objective


Vital Signs: 


 Vital Signs











Temperature  97.8 F   06/25/19 10:00


 


Pulse Rate  76   06/25/19 10:00


 


Respiratory Rate  18   06/25/19 10:00


 


Blood Pressure  142/62   06/25/19 10:00


 


O2 Sat by Pulse Oximetry (%)  96   06/24/19 21:30











Constitutional: Yes: No Distress, Calm


Eyes: Yes: Conjunctiva Clear


HENT: Yes: Atraumatic


Cardiovascular: Yes: Regular Rate and Rhythm


Respiratory: Yes: Diminished, On BiPap, Tachypnea


Gastrointestinal: Yes: Normal Bowel Sounds, Soft


Genitourinary: Yes: Incontinence


Musculoskeletal: Yes: Muscle Weakness


Extremities: Yes: WNL


Edema: No


Neurological: Yes: Alert


Psychiatric: Yes: Alert, Oriented


Labs: 


 CBC, BMP





 06/25/19 07:58 





 06/25/19 07:58 





 Microbiology





06/17/19 15:47   Blood - Peripheral Venous   Blood Culture - Final


                            NO GROWTH AFTER 5 DAYS INCUBATION


06/17/19 15:35   Blood - Peripheral Venous   Blood Culture - Final


                            NO GROWTH AFTER 5 DAYS INCUBATION


06/18/19 21:30   Urine For Antigen Detection   Legionella Antigen - Final


06/18/19 21:30   Urine For Antigen Detection   Streptococcus pneumoniae Antigen 

(M - Final











Problem List





- Problems


(1) Acute and chronic respiratory failure with hypoxia


Assessment/Plan: 


-Pulm on board


-keep SpO2 >90%


-VentiMask 50%


-Bipap PRN


-bronchodilators


-Solumedrol





Code(s): J96.21 - ACUTE AND CHRONIC RESPIRATORY FAILURE WITH HYPOXIA   





(2) Acute exacerbation of chronic obstructive pulmonary disease (COPD)


Assessment/Plan: 


-Pulm on board


-keep SpO2 >90%


-VentiMask 50%


-Bipap PRN 


-bronchodilators


-Solumedrol


-Symbicort


-Azithromycin and Zosyn


-ID consult


Code(s): J44.1 - CHRONIC OBSTRUCTIVE PULMONARY DISEASE W (ACUTE) EXACERBATION   





(3) Pneumonia


Assessment/Plan: 


-Pulm on board


-keep SpO2 >90%


-VentiMask 50%


-Bipap PRN 


-bronchodilators


-Solumedrol


-Symbicort


-Azithromycin and Zosyn


-ID consult


-WBC 9.4


-afebrile


Code(s): J18.9 - PNEUMONIA, UNSPECIFIED ORGANISM   


Qualifiers: 


   Pneumonia type: due to unspecified organism   Laterality: right   Lung 

location: unspecified part of lung   Qualified Code(s): J18.9 - Pneumonia, 

unspecified organism   





(4) BPH (benign prostatic hyperplasia)


Assessment/Plan: 


-Proscar


Code(s): N40.0 - BENIGN PROSTATIC HYPERPLASIA WITHOUT LOWER URINRY TRACT SYMP   





(5) HLD (hyperlipidemia)


Assessment/Plan: 


-Atorvastatin


Code(s): E78.5 - HYPERLIPIDEMIA, UNSPECIFIED   


Qualifiers: 


   Hyperlipidemia type: pure hypercholesterolemia   Qualified Code(s): E78.00 - 

Pure hypercholesterolemia, unspecified; E78.0 - Pure hypercholesterolemia   





(6) HTN (hypertension)


Assessment/Plan: 


-Amlodipine


-low Na diet


Code(s): I10 - ESSENTIAL (PRIMARY) HYPERTENSION   


Qualifiers: 


   Hypertension type: essential hypertension   Qualified Code(s): I10 - 

Essential (primary) hypertension   





(7) Insomnia


Assessment/Plan: 


-Melatonin HS


Code(s): G47.00 - INSOMNIA, UNSPECIFIED   





(8) Constipation


Assessment/Plan: 


-Miralax and colace


Code(s): K59.00 - CONSTIPATION, UNSPECIFIED   





Assessment/Plan





see problem list


dvt ppx 


pulmonary rehab for discharge

## 2019-06-25 NOTE — PN
Progress Note, Physician


History of Present Illness: 





AWAKE, ALERT IN BED


SL TACHYPNEIC ON  BIPAP


LESS  CONGESTED


AFEBRILE


WBC IMPROVED  WNL





- Current Medication List


Current Medications: 


Active Medications





Acetaminophen (Tylenol -)  650 mg PO Q6H PRN


   PRN Reason: FEVER


   Last Admin: 06/22/19 16:44 Dose:  650 mg


Albuterol/Ipratropium (Duoneb -)  1 amp NEB Q6H PRN


   PRN Reason: SHORTNESS OF BREATH


   Last Admin: 06/25/19 06:15 Dose:  1 amp


Amlodipine Besylate (Norvasc -)  10 mg PO DAILY [facility]


   Last Admin: 06/25/19 10:07 Dose:  10 mg


Artificial Tears (Artificial Tears)  1 drop OU BID PRN


   PRN Reason: DRY EYES


Atorvastatin Calcium (Lipitor -)  20 mg PO DAILY@2000 [facility]


   Last Admin: 06/24/19 21:59 Dose:  20 mg


Budesonide/Formoterol Fumarate (Symbicort 160/4.5mcg -)  2 puff IH BID [facility]


   Last Admin: 06/25/19 10:09 Dose:  2 puff


Carvedilol (Coreg -)  12.5 mg PO BID@0800,2000 [facility]


   Last Admin: 06/25/19 10:07 Dose:  12.5 mg


Docusate Sodium (Colace -)  100 mg PO Q8H PRN


   PRN Reason: CONSTIPATION


Enoxaparin Sodium (Lovenox -)  40 mg SQ DAILY [facility]


   Last Admin: 06/25/19 10:08 Dose:  40 mg


Finasteride (Proscar -)  5 mg PO DAILY [facility]


   Last Admin: 06/25/19 10:08 Dose:  5 mg


Fluticasone Propionate (Flonase -)  2 spray NS DAILY [facility]


   Last Admin: 06/25/19 10:09 Dose:  2 sprays


Furosemide (Lasix -)  40 mg PO DAILY [facility]


   Last Admin: 06/25/19 10:07 Dose:  40 mg


Azithromycin 250 mg/ Dextrose  250 mls @ 250 mls/hr IVPB DAILY [facility]


   Last Admin: 06/25/19 11:41 Dose:  250 mls/hr


Piperacillin Sod/Tazobactam (Sod 2.25 gm/ Dextrose)  50 mls @ 100 mls/hr IVPB 

Q8H-IV [facility]; Protocol


   Last Admin: 06/25/19 09:51 Dose:  100 mls/hr


Insulin Aspart (Novolog Vial Sliding Scale -)  [facility]; Protocol


   Last Admin: 06/25/19 12:37 Dose:  8 unit


Lactic Acid (Lac-Hydrin 12)  1 applic TP DAILY PRN


   PRN Reason: xerosis


Losartan Potassium (Cozaar -)  50 mg PO DAILY [facility]


   Last Admin: 06/25/19 10:08 Dose:  50 mg


Meclizine HCl (Antivert -)  25 mg PO Q8H PRN


   PRN Reason: dizziness


Melatonin (Melatonin)  10 mg PO [facility]


   Last Admin: 06/24/19 21:59 Dose:  10 mg


Methylprednisolone Sodium Succinate (Solu-Medrol -)  60 mg IVPB Q8H-IV [facility]


   Last Admin: 06/25/19 10:04 Dose:  60 mg


Omega-3-Acid Ethyl Esters (Lovaza -)  1 gm PO DAILY [facility]


   Last Admin: 06/25/19 10:07 Dose:  1 gm


Pantoprazole Sodium (Protonix -)  40 mg PO BID@0800,2000 [facility]


   Last Admin: 06/25/19 10:08 Dose:  40 mg


Polyethylene Glycol (Miralax (For Daily Use) -)  17 gm PO Q12H PRN


   PRN Reason: CONSTIPATION


Ranitidine HCl (Zantac -)  150 mg PO DAILY [facility]


   Last Admin: 06/25/19 10:07 Dose:  150 mg


Senna (Senna -)  2 tab PO [facility]


   Last Admin: 06/24/19 21:59 Dose:  2 tab


Simethicone (Mylicon -)  80 mg PO Q4H PRN


   PRN Reason: INDIGESTION


Tamsulosin HCl (Flomax -)  0.4 mg PO DAILY@0830 [facility]


   Last Admin: 06/25/19 10:08 Dose:  0.4 mg











- Objective


Vital Signs: 


 Vital Signs











Temperature  97.8 F   06/25/19 10:00


 


Pulse Rate  76   06/25/19 10:00


 


Respiratory Rate  18   06/25/19 10:00


 


Blood Pressure  142/62   06/25/19 10:00


 


O2 Sat by Pulse Oximetry (%)  96   06/24/19 21:30











Constitutional: Yes: No Distress


Cardiovascular: Yes: Regular Rate and Rhythm, S1, S2


Respiratory: Yes: Diminished


Gastrointestinal: Yes: Normal Bowel Sounds, Soft.  No: Tenderness


Edema: No


Labs: 


 CBC, BMP





 06/25/19 07:58 





 06/25/19 07:58 











Assessment/Plan





BIBASILAR PNEUMONIA


?HCAP


COPD EXACERBATION


S/P NEPHRECTOMY


DAY#7  ZOSYN


SUBSTITUTE PO AUGMENTIN X 3D

## 2019-06-25 NOTE — PN
Progress Note, Physician


History of Present Illness: 





Pt seen and examined at bedside. He is currently on bipap.





- Current Medication List


Current Medications: 


Active Medications





Acetaminophen (Tylenol -)  650 mg PO Q6H PRN


   PRN Reason: FEVER


   Last Admin: 06/22/19 16:44 Dose:  650 mg


Albuterol/Ipratropium (Duoneb -)  1 amp NEB Q6H PRN


   PRN Reason: SHORTNESS OF BREATH


   Last Admin: 06/25/19 06:15 Dose:  1 amp


Amlodipine Besylate (Norvasc -)  10 mg PO DAILY [facility]


   Last Admin: 06/25/19 10:07 Dose:  10 mg


Amoxicillin/Clavulanate Potassium (Augmentin - 875mg Tablet)  1 tab PO BID@0800,

1730 [facility]


Artificial Tears (Artificial Tears)  1 drop OU BID PRN


   PRN Reason: DRY EYES


Atorvastatin Calcium (Lipitor -)  20 mg PO DAILY@2000 [facility]


   Last Admin: 06/24/19 21:59 Dose:  20 mg


Budesonide/Formoterol Fumarate (Symbicort 160/4.5mcg -)  2 puff IH BID [facility]


   Last Admin: 06/25/19 10:09 Dose:  2 puff


Carvedilol (Coreg -)  12.5 mg PO BID@0800,2000 [facility]


   Last Admin: 06/25/19 10:07 Dose:  12.5 mg


Docusate Sodium (Colace -)  100 mg PO Q8H PRN


   PRN Reason: CONSTIPATION


Enoxaparin Sodium (Lovenox -)  40 mg SQ DAILY [facility]


   Last Admin: 06/25/19 10:08 Dose:  40 mg


Finasteride (Proscar -)  5 mg PO DAILY [facility]


   Last Admin: 06/25/19 10:08 Dose:  5 mg


Fluticasone Propionate (Flonase -)  2 spray NS DAILY [facility]


   Last Admin: 06/25/19 10:09 Dose:  2 sprays


Furosemide (Lasix -)  40 mg PO DAILY [facility]


   Last Admin: 06/25/19 10:07 Dose:  40 mg


Azithromycin 250 mg/ Dextrose  250 mls @ 250 mls/hr IVPB DAILY [facility]


   Last Admin: 06/25/19 11:41 Dose:  250 mls/hr


Insulin Aspart (Novolog Vial Sliding Scale -)  1 vial SQ ACHS [facility]; Protocol


   Last Admin: 06/25/19 12:37 Dose:  8 unit


Lactic Acid (Lac-Hydrin 12)  1 applic TP DAILY PRN


   PRN Reason: xerosis


Losartan Potassium (Cozaar -)  50 mg PO DAILY [facility]


   Last Admin: 06/25/19 10:08 Dose:  50 mg


Meclizine HCl (Antivert -)  25 mg PO Q8H PRN


   PRN Reason: dizziness


Melatonin (Melatonin)  10 mg PO HS [facility]


   Last Admin: 06/24/19 21:59 Dose:  10 mg


Methylprednisolone Sodium Succinate (Solu-Medrol -)  60 mg IVPB Q8H-IV [facility]


   Last Admin: 06/25/19 10:04 Dose:  60 mg


Omega-3-Acid Ethyl Esters (Lovaza -)  1 gm PO DAILY [facility]


   Last Admin: 06/25/19 10:07 Dose:  1 gm


Pantoprazole Sodium (Protonix -)  40 mg PO BID@0800,2000 [facility]


   Last Admin: 06/25/19 10:08 Dose:  40 mg


Polyethylene Glycol (Miralax (For Daily Use) -)  17 gm PO Q12H PRN


   PRN Reason: CONSTIPATION


Ranitidine HCl (Zantac -)  150 mg PO DAILY [facility]


   Last Admin: 06/25/19 10:07 Dose:  150 mg


Senna (Senna -)  2 tab PO [facility]


   Last Admin: 06/24/19 21:59 Dose:  2 tab


Simethicone (Mylicon -)  80 mg PO Q4H PRN


   PRN Reason: INDIGESTION


Tamsulosin HCl (Flomax -)  0.4 mg PO DAILY@0830 [facility]


   Last Admin: 06/25/19 10:08 Dose:  0.4 mg











- Objective


Vital Signs: 


 Vital Signs











Temperature  97.8 F   06/25/19 10:00


 


Pulse Rate  76   06/25/19 10:00


 


Respiratory Rate  18   06/25/19 10:00


 


Blood Pressure  142/62   06/25/19 10:00


 


O2 Sat by Pulse Oximetry (%)  96   06/24/19 21:30











Constitutional: Yes: Calm


Eyes: Yes: Conjunctiva Clear


HENT: Yes: Atraumatic


Neck: Yes: Supple


Cardiovascular: Yes: S1, S2


Respiratory: Yes: On BiPap


Gastrointestinal: Yes: Normal Bowel Sounds, Soft


Genitourinary: Yes: WNL


Musculoskeletal: Yes: Muscle Weakness


Edema: No


Neurological: Yes: Oriented


Psychiatric: Yes: Oriented


Labs: 


 CBC, BMP





 06/25/19 07:58 





 06/25/19 07:58 











Problem List





- Problems


(1) Azotemia


Code(s): R79.89 - OTHER SPECIFIED ABNORMAL FINDINGS OF BLOOD CHEMISTRY   





Assessment/Plan


 Current Medications











Generic Name Dose Route Start Last Admin





  Trade Name Freq  PRN Reason Stop Dose Admin


 


Acetaminophen  650 mg  06/17/19 18:45  06/22/19 16:44





  Tylenol -  PO   650 mg





  Q6H PRN   Administration





  FEVER   





     





     





     


 


Albuterol/Ipratropium  1 amp  06/22/19 23:52  06/25/19 06:15





  Duoneb -  NEB   1 amp





  Q6H PRN   Administration





  SHORTNESS OF BREATH   





     





     





     


 


Amlodipine Besylate  10 mg  06/18/19 10:00  06/25/19 10:07





  Norvasc -  PO   10 mg





  DAILY [facility]   Administration





     





     





     





     


 


Amoxicillin/Clavulanate Potassium  1 tab  06/25/19 17:30  





  Augmentin - 875mg Tablet  PO   





  BID@0800,1730 [facility]   





     





     





     





     


 


Artificial Tears  1 drop  06/24/19 13:16  





  Artificial Tears  OU   





  BID PRN   





  DRY EYES   





     





     





     


 


Atorvastatin Calcium  20 mg  06/21/19 20:00  06/24/19 21:59





  Lipitor -  PO   20 mg





  DAILY@2000 [facility]   Administration





     





     





     





     


 


Budesonide/Formoterol Fumarate  2 puff  06/17/19 22:00  06/25/19 10:09





  Symbicort 160/4.5mcg -  IH   2 puff





  BID [facility]   Administration





     





     





     





     


 


Carvedilol  12.5 mg  06/21/19 20:00  06/25/19 10:07





  Coreg -  PO   12.5 mg





  BID@0800,2000 [facility]   Administration





     





     





     





     


 


Docusate Sodium  100 mg  06/17/19 18:55  





  Colace -  PO   





  Q8H PRN   





  CONSTIPATION   





     





     





     


 


Enoxaparin Sodium  40 mg  06/19/19 11:30  06/25/19 10:08





  Lovenox -  SQ   40 mg





  DAILY [facility]   Administration





     





     





     





     


 


Finasteride  5 mg  06/18/19 10:00  06/25/19 10:08





  Proscar -  PO   5 mg





  DAILY [facility]   Administration





     





     





     





     


 


Fluticasone Propionate  2 spray  06/18/19 10:00  06/25/19 10:09





  Flonase -  NS   2 sprays





  DAILY [facility]   Administration





     





     





     





     


 


Furosemide  40 mg  06/18/19 10:00  06/25/19 10:07





  Lasix -  PO   40 mg





  DAILY [facility]   Administration





     





     





     





     


 


Azithromycin 250 mg/ Dextrose  250 mls @ 250 mls/hr  06/18/19 10:00  06/25/19 11

:41





  IVPB   250 mls/hr





  DAILY [facility]   Administration





     





     





     





     


 


Insulin Aspart  1 vial  06/18/19 22:00  06/25/19 12:37





  Novolog Vial Sliding Scale -  SQ   8 unit





  [facility]   Administration





     





     





  Protocol   





     


 


Lactic Acid  1 applic  06/17/19 18:45  





  Lac-Hydrin 12  TP   





  DAILY PRN   





  xerosis   





     





     





     


 


Losartan Potassium  50 mg  06/18/19 13:00  06/25/19 10:08





  Cozaar -  PO   50 mg





  DAILY [facility]   Administration





     





     





     





     


 


Meclizine HCl  25 mg  06/17/19 23:25  





  Antivert -  PO   





  Q8H PRN   





  dizziness   





     





     





     


 


Melatonin  10 mg  06/21/19 20:00  06/24/19 21:59





  Melatonin  PO   10 mg





  [facility]   Administration





     





     





     





     


 


Methylprednisolone Sodium Succinate  60 mg  06/18/19 12:32  06/25/19 10:04





  Solu-Medrol -  IVPB   60 mg





  Q8H-IV [facility]   Administration





     





     





     





     


 


Omega-3-Acid Ethyl Esters  1 gm  06/18/19 10:00  06/25/19 10:07





  Lovaza -  PO   1 gm





  DAILY [facility]   Administration





     





     





     





     


 


Pantoprazole Sodium  40 mg  06/21/19 20:00  06/25/19 10:08





  Protonix -  PO   40 mg





  BID@0800,2000 [facility]   Administration





     





     





     





     


 


Polyethylene Glycol  17 gm  06/17/19 18:45  





  Miralax (For Daily Use) -  PO   





  Q12H PRN   





  CONSTIPATION   





     





     





     


 


Ranitidine HCl  150 mg  06/18/19 10:00  06/25/19 10:07





  Zantac -  PO   150 mg





  DAILY [facility]   Administration





     





     





     





     


 


Senna  2 tab  06/21/19 20:00  06/24/19 21:59





  Senna -  PO   2 tab





  [facility]   Administration





     





     





     





     


 


Simethicone  80 mg  06/17/19 18:45  





  Mylicon -  PO   





  Q4H PRN   





  INDIGESTION   





     





     





     


 


Tamsulosin HCl  0.4 mg  06/18/19 08:30  06/25/19 10:08





  Flomax -  PO   0.4 mg





  DAILY@0830 [facility]   Administration





     





     





     





     











Impression


1. azotemia


2. PNA


3. copd exacerbation


4. htn


5. hld


6. h/o Renal Cell Carcinoma s/p Left Nephrectomy


7.h/o Prostate Ca


8. CKD





Plan


- elevation in bun possibly from steroids


- follow UA


- cont to monitor renal function


- cont to monitor hg


- discussed with medical team


Dr Rapp

## 2019-06-25 NOTE — PN
Progress Note, Physician


Chief Complaint: 





On BIPAP


Complains of dyspnea


History of Present Illness: 





Patient was seen and examined. Awake and alert. Chart was reviewed


Dyspnea


Denies chest pain





- Current Medication List


Current Medications: 


Active Medications





Acetaminophen (Tylenol -)  650 mg PO Q6H PRN


   PRN Reason: FEVER


   Last Admin: 06/22/19 16:44 Dose:  650 mg


Albuterol/Ipratropium (Duoneb -)  1 amp NEB Q6H PRN


   PRN Reason: SHORTNESS OF BREATH


   Last Admin: 06/25/19 06:15 Dose:  1 amp


Amlodipine Besylate (Norvasc -)  10 mg PO DAILY [facility]


   Last Admin: 06/25/19 10:07 Dose:  10 mg


Amoxicillin/Clavulanate Potassium (Augmentin - 875mg Tablet)  1 tab PO BID@0800,

1730 [facility]


Artificial Tears (Artificial Tears)  1 drop OU BID PRN


   PRN Reason: DRY EYES


Atorvastatin Calcium (Lipitor -)  20 mg PO DAILY@2000 [facility]


   Last Admin: 06/24/19 21:59 Dose:  20 mg


Budesonide/Formoterol Fumarate (Symbicort 160/4.5mcg -)  2 puff IH BID [facility]


   Last Admin: 06/25/19 10:09 Dose:  2 puff


Carvedilol (Coreg -)  12.5 mg PO BID@0800,2000 [facility]


   Last Admin: 06/25/19 10:07 Dose:  12.5 mg


Docusate Sodium (Colace -)  100 mg PO Q8H PRN


   PRN Reason: CONSTIPATION


Enoxaparin Sodium (Lovenox -)  40 mg SQ DAILY [facility]


   Last Admin: 06/25/19 10:08 Dose:  40 mg


Finasteride (Proscar -)  5 mg PO DAILY [facility]


   Last Admin: 06/25/19 10:08 Dose:  5 mg


Fluticasone Propionate (Flonase -)  2 spray NS DAILY [facility]


   Last Admin: 06/25/19 10:09 Dose:  2 sprays


Furosemide (Lasix -)  40 mg PO DAILY [facility]


   Last Admin: 06/25/19 10:07 Dose:  40 mg


Azithromycin 250 mg/ Dextrose  250 mls @ 250 mls/hr IVPB DAILY [facility]


   Last Admin: 06/25/19 11:41 Dose:  250 mls/hr


Insulin Aspart (Novolog Vial Sliding Scale -)  1 vial SQ ACHS [facility]; Protocol


   Last Admin: 06/25/19 12:37 Dose:  8 unit


Lactic Acid (Lac-Hydrin 12)  1 applic TP DAILY PRN


   PRN Reason: xerosis


Losartan Potassium (Cozaar -)  50 mg PO DAILY [facility]


   Last Admin: 06/25/19 10:08 Dose:  50 mg


Meclizine HCl (Antivert -)  25 mg PO Q8H PRN


   PRN Reason: dizziness


Melatonin (Melatonin)  10 mg PO HS [facility]


   Last Admin: 06/24/19 21:59 Dose:  10 mg


Methylprednisolone Sodium Succinate (Solu-Medrol -)  60 mg IVPB Q8H-IV [facility]


   Last Admin: 06/25/19 10:04 Dose:  60 mg


Omega-3-Acid Ethyl Esters (Lovaza -)  1 gm PO DAILY [facility]


   Last Admin: 06/25/19 10:07 Dose:  1 gm


Pantoprazole Sodium (Protonix -)  40 mg PO BID@0800,2000 [facility]


   Last Admin: 06/25/19 10:08 Dose:  40 mg


Polyethylene Glycol (Miralax (For Daily Use) -)  17 gm PO Q12H PRN


   PRN Reason: CONSTIPATION


Ranitidine HCl (Zantac -)  150 mg PO DAILY [facility]


   Last Admin: 06/25/19 10:07 Dose:  150 mg


Senna (Senna -)  2 tab PO [facility]


   Last Admin: 06/24/19 21:59 Dose:  2 tab


Simethicone (Mylicon -)  80 mg PO Q4H PRN


   PRN Reason: INDIGESTION


Tamsulosin HCl (Flomax -)  0.4 mg PO DAILY@0830 [facility]


   Last Admin: 06/25/19 10:08 Dose:  0.4 mg











- Objective


Vital Signs: 


 Vital Signs











Temperature  97.8 F   06/25/19 14:00


 


Pulse Rate  73   06/25/19 14:00


 


Respiratory Rate  18   06/25/19 14:00


 


Blood Pressure  126/51 L  06/25/19 14:00


 


O2 Sat by Pulse Oximetry (%)  96   06/24/19 21:30











Neck: Yes: Supple


Cardiovascular: Yes: Regular Rate and Rhythm, Murmur, S1, S2


Respiratory: Yes: Diminished, On BiPap


Gastrointestinal: Yes: Normal Bowel Sounds, Soft.  No: Tenderness


Edema: No


Labs: 


 CBC, BMP





 06/25/19 07:58 





 06/25/19 07:58 











Problem List





- Problems


(1) Acute and chronic respiratory failure with hypoxia


Code(s): J96.21 - ACUTE AND CHRONIC RESPIRATORY FAILURE WITH HYPOXIA   





(2) Acute exacerbation of chronic obstructive pulmonary disease (COPD)


Code(s): J44.1 - CHRONIC OBSTRUCTIVE PULMONARY DISEASE W (ACUTE) EXACERBATION   





(3) Hypoxemia requiring supplemental oxygen


Code(s): R09.02 - HYPOXEMIA; Z99.81 - DEPENDENCE ON SUPPLEMENTAL OXYGEN   





(4) Pneumonia


Code(s): J18.9 - PNEUMONIA, UNSPECIFIED ORGANISM   


Qualifiers: 


   Pneumonia type: due to unspecified organism   Laterality: right   Lung 

location: unspecified part of lung   Qualified Code(s): J18.9 - Pneumonia, 

unspecified organism   





(5) SOB (shortness of breath)


Code(s): R06.02 - SHORTNESS OF BREATH   





(6) COPD (chronic obstructive pulmonary disease)


Code(s): J44.9 - CHRONIC OBSTRUCTIVE PULMONARY DISEASE, UNSPECIFIED   


Qualifiers: 


   COPD type: unspecified COPD   Qualified Code(s): J44.9 - Chronic obstructive 

pulmonary disease, unspecified   





(7) HLD (hyperlipidemia)


Code(s): E78.5 - HYPERLIPIDEMIA, UNSPECIFIED   


Qualifiers: 


   Hyperlipidemia type: pure hypercholesterolemia   Qualified Code(s): E78.00 - 

Pure hypercholesterolemia, unspecified; E78.0 - Pure hypercholesterolemia   





(8) HTN (hypertension)


Code(s): I10 - ESSENTIAL (PRIMARY) HYPERTENSION   


Qualifiers: 


   Hypertension type: essential hypertension   Qualified Code(s): I10 - 

Essential (primary) hypertension   





(9) Sleep apnea


Code(s): G47.30 - SLEEP APNEA, UNSPECIFIED   





Assessment/Plan





1. Acute on Chronic Hypoxemic/Hypercapneic Respiratory Failure


2. Acute COPD exacerbation +/- pneumonia


3. HTN


4. Hypercholesterolemia


5. History of TIA


6. History of renal CA s/p nephrectomy


7. History of prostate CA


8. History of diverticular disease





PLAN:


1. IV steroid with GI protection, bronchodilator, empiric antibiotics, O2 and 

BIPAP


2. Continue Carvedilol 12.5 mg BID, Amlodipine 10 mg QD, Lipitor 20 mg QD, 

Lovaza 1 g BID and Losartan 50 mg QD as tolerated


3. Diuretics with Lasix 40 mg po QD


4. DVT prophylaxis


5. Bedside PT





Robin Vazquez MD

## 2019-06-25 NOTE — PN
Progress Note (short form)





- Note


Progress Note: 


Still a struggle for staff to have patient comply with NIPPV use.  


He does report today that he breathing feels a little better. 


Intermittently alternating with 50% VM O2. 


 Intake & Output











 06/22/19 06/23/19 06/24/19 06/25/19





 23:59 23:59 23:59 23:59


 


Intake Total 300  175 350


 


Balance 300  175 350








 Last Vital Signs











Temp Pulse Resp BP Pulse Ox


 


 97.8 F   76   18   142/62   96 


 


 06/25/19 10:00  06/25/19 10:00  06/25/19 10:00  06/25/19 10:00  06/24/19 21:30








Active Medications





Acetaminophen (Tylenol -)  650 mg PO Q6H PRN


   PRN Reason: FEVER


   Last Admin: 06/22/19 16:44 Dose:  650 mg


Albuterol/Ipratropium (Duoneb -)  1 amp NEB Q6H PRN


   PRN Reason: SHORTNESS OF BREATH


   Last Admin: 06/25/19 06:15 Dose:  1 amp


Amlodipine Besylate (Norvasc -)  10 mg PO DAILY [facility]


   Last Admin: 06/25/19 10:07 Dose:  10 mg


Artificial Tears (Artificial Tears)  1 drop OU BID PRN


   PRN Reason: DRY EYES


Atorvastatin Calcium (Lipitor -)  20 mg PO DAILY@2000 [facility]


   Last Admin: 06/24/19 21:59 Dose:  20 mg


Budesonide/Formoterol Fumarate (Symbicort 160/4.5mcg -)  2 puff IH BID [facility]


   Last Admin: 06/25/19 10:09 Dose:  2 puff


Carvedilol (Coreg -)  12.5 mg PO BID@0800,2000 [facility]


   Last Admin: 06/25/19 10:07 Dose:  12.5 mg


Docusate Sodium (Colace -)  100 mg PO Q8H PRN


   PRN Reason: CONSTIPATION


Enoxaparin Sodium (Lovenox -)  40 mg SQ DAILY [facility]


   Last Admin: 06/25/19 10:08 Dose:  40 mg


Finasteride (Proscar -)  5 mg PO DAILY [facility]


   Last Admin: 06/25/19 10:08 Dose:  5 mg


Fluticasone Propionate (Flonase -)  2 spray NS DAILY [facility]


   Last Admin: 06/25/19 10:09 Dose:  2 sprays


Furosemide (Lasix -)  40 mg PO DAILY [facility]


   Last Admin: 06/25/19 10:07 Dose:  40 mg


Azithromycin 250 mg/ Dextrose  250 mls @ 250 mls/hr IVPB DAILY [facility]


   Last Admin: 06/25/19 11:41 Dose:  250 mls/hr


Piperacillin Sod/Tazobactam (Sod 2.25 gm/ Dextrose)  50 mls @ 100 mls/hr IVPB 

Q8H-IV [facility]; Protocol


   Last Admin: 06/25/19 09:51 Dose:  100 mls/hr


Insulin Aspart (Novolog Vial Sliding Scale -)  1 vial SQ ACHS [facility]; Protocol


   Last Admin: 06/25/19 12:37 Dose:  8 unit


Lactic Acid (Lac-Hydrin 12)  1 applic TP DAILY PRN


   PRN Reason: xerosis


Losartan Potassium (Cozaar -)  50 mg PO DAILY [facility]


   Last Admin: 06/25/19 10:08 Dose:  50 mg


Meclizine HCl (Antivert -)  25 mg PO Q8H PRN


   PRN Reason: dizziness


Melatonin (Melatonin)  10 mg PO [facility]


   Last Admin: 06/24/19 21:59 Dose:  10 mg


Methylprednisolone Sodium Succinate (Solu-Medrol -)  60 mg IVPB Q8H-IV [facility]


   Last Admin: 06/25/19 10:04 Dose:  60 mg


Omega-3-Acid Ethyl Esters (Lovaza -)  1 gm PO DAILY [facility]


   Last Admin: 06/25/19 10:07 Dose:  1 gm


Pantoprazole Sodium (Protonix -)  40 mg PO BID@0800,2000 [facility]


   Last Admin: 06/25/19 10:08 Dose:  40 mg


Polyethylene Glycol (Miralax (For Daily Use) -)  17 gm PO Q12H PRN


   PRN Reason: CONSTIPATION


Ranitidine HCl (Zantac -)  150 mg PO DAILY [facility]


   Last Admin: 06/25/19 10:07 Dose:  150 mg


Senna (Senna -)  2 tab PO [facility]


   Last Admin: 06/24/19 21:59 Dose:  2 tab


Simethicone (Mylicon -)  80 mg PO Q4H PRN


   PRN Reason: INDIGESTION


Tamsulosin HCl (Flomax -)  0.4 mg PO DAILY@0830 [facility]


   Last Admin: 06/25/19 10:08 Dose:  0.4 mg








Gen: Awake and responsive, mildly tachypneic at rest  


Heart: RRR


Lung: distant breath sounds, poor air entry


Abd: soft, nontender


Ext: no edema


 


 


 Laboratory Results - last 24 hr











  06/24/19 06/24/19 06/25/19





  17:20 22:12 05:34


 


WBC   


 


RBC   


 


Hgb   


 


Hct   


 


MCV   


 


MCH   


 


MCHC   


 


RDW   


 


Plt Count   


 


MPV   


 


Sodium   


 


Potassium   


 


Chloride   


 


Carbon Dioxide   


 


Anion Gap   


 


BUN   


 


Creatinine   


 


Est GFR (CKD-EPI)AfAm   


 


Est GFR (CKD-EPI)NonAf   


 


POC Glucometer  313  258  191


 


Random Glucose   


 


Calcium   


 


Total Bilirubin   


 


AST   


 


ALT   


 


Alkaline Phosphatase   


 


Total Protein   


 


Albumin   














  06/25/19 06/25/19 06/25/19





  07:58 07:58 11:41


 


WBC  9.4  


 


RBC  4.68  


 


Hgb  13.5  


 


Hct  40.5  


 


MCV  86.5  


 


MCH  28.9  


 


MCHC  33.4  


 


RDW  14.2  


 


Plt Count  245  


 


MPV  8.0  


 


Sodium   140 


 


Potassium   4.2 


 


Chloride   98 


 


Carbon Dioxide   38 H 


 


Anion Gap   4 L 


 


BUN   51.1 H 


 


Creatinine   0.8 


 


Est GFR (CKD-EPI)AfAm   93.09 


 


Est GFR (CKD-EPI)NonAf   80.32 


 


POC Glucometer    326


 


Random Glucose   181 H 


 


Calcium   8.4 L 


 


Total Bilirubin   0.5 


 


AST   21 


 


ALT   57 


 


Alkaline Phosphatase   40 L 


 


Total Protein   4.9 L 


 


Albumin   2.4 L 











Problem List





- Problems


(1) Acute and chronic respiratory failure with hypoxia


Code(s): J96.21 - ACUTE AND CHRONIC RESPIRATORY FAILURE WITH HYPOXIA   





(2) Acute exacerbation of chronic obstructive pulmonary disease (COPD)


Code(s): J44.1 - CHRONIC OBSTRUCTIVE PULMONARY DISEASE W (ACUTE) EXACERBATION   





(3) Pneumonia


Code(s): J18.9 - PNEUMONIA, UNSPECIFIED ORGANISM   


Qualifiers: 


   Pneumonia type: due to unspecified organism   Laterality: right   Lung 

location: unspecified part of lung   Qualified Code(s): J18.9 - Pneumonia, 

unspecified organism   








A/P


Acute on Chronic Hypoxic Respiratory Failure


Pneumonia


Acute COPD Exacerbation


HTN


Hyperlipidemia


h/o Renal Cell Carcinoma s/p Left Nephrectomy


h/o Prostate Ca








-  NIPPV support as tolerated 


-  VM O2 as tolerated 


-  continue antibiotics per ID 


-  Medrol at current dose


-  Inhaled bronchodilators standing and PRN


-  O2 to keep Spo2 88-95%


-  DVT prophylaxis


-  (?) LTAC referral 








Dr Madera

## 2019-06-26 LAB
ALBUMIN SERPL-MCNC: 2.5 G/DL (ref 3.4–5)
ALP SERPL-CCNC: 42 U/L (ref 45–117)
ALT SERPL-CCNC: 89 U/L (ref 13–61)
ANION GAP SERPL CALC-SCNC: 2 MMOL/L (ref 8–16)
AST SERPL-CCNC: 32 U/L (ref 15–37)
BILIRUB SERPL-MCNC: 0.5 MG/DL (ref 0.2–1)
BUN SERPL-MCNC: 46.1 MG/DL (ref 7–18)
CALCIUM SERPL-MCNC: 8.4 MG/DL (ref 8.5–10.1)
CHLORIDE SERPL-SCNC: 98 MMOL/L (ref 98–107)
CO2 SERPL-SCNC: 38 MMOL/L (ref 21–32)
CREAT SERPL-MCNC: 0.8 MG/DL (ref 0.55–1.3)
DEPRECATED RDW RBC AUTO: 15 % (ref 11.9–15.9)
GLUCOSE SERPL-MCNC: 232 MG/DL (ref 74–106)
HCT VFR BLD CALC: 40.6 % (ref 35.4–49)
HGB BLD-MCNC: 13.6 GM/DL (ref 11.7–16.9)
MCH RBC QN AUTO: 28.8 PG (ref 25.7–33.7)
MCHC RBC AUTO-ENTMCNC: 33.5 G/DL (ref 32–35.9)
MCV RBC: 85.9 FL (ref 80–96)
PLATELET # BLD AUTO: 229 K/MM3 (ref 134–434)
PMV BLD: 8.1 FL (ref 7.5–11.1)
POTASSIUM SERPLBLD-SCNC: 4.5 MMOL/L (ref 3.5–5.1)
PROT SERPL-MCNC: 5.1 G/DL (ref 6.4–8.2)
RBC # BLD AUTO: 4.73 M/MM3 (ref 4–5.6)
SODIUM SERPL-SCNC: 138 MMOL/L (ref 136–145)
WBC # BLD AUTO: 12.8 K/MM3 (ref 4–10)

## 2019-06-26 RX ADMIN — TAMSULOSIN HYDROCHLORIDE SCH MG: 0.4 CAPSULE ORAL at 08:21

## 2019-06-26 RX ADMIN — Medication SCH: at 21:14

## 2019-06-26 RX ADMIN — AZITHROMYCIN DIHYDRATE SCH MLS/HR: 500 INJECTION, POWDER, LYOPHILIZED, FOR SOLUTION INTRAVENOUS at 09:34

## 2019-06-26 RX ADMIN — INSULIN ASPART SCH UNIT: 100 INJECTION, SOLUTION INTRAVENOUS; SUBCUTANEOUS at 16:17

## 2019-06-26 RX ADMIN — AMLODIPINE BESYLATE SCH MG: 10 TABLET ORAL at 09:53

## 2019-06-26 RX ADMIN — FINASTERIDE SCH MG: 5 TABLET, FILM COATED ORAL at 09:52

## 2019-06-26 RX ADMIN — RANITIDINE SCH MG: 150 TABLET ORAL at 09:53

## 2019-06-26 RX ADMIN — ATORVASTATIN CALCIUM SCH MG: 20 TABLET, FILM COATED ORAL at 20:32

## 2019-06-26 RX ADMIN — ENOXAPARIN SODIUM SCH MG: 40 INJECTION SUBCUTANEOUS at 09:53

## 2019-06-26 RX ADMIN — METHYLPREDNISOLONE SODIUM SUCCINATE SCH MG: 40 INJECTION, POWDER, FOR SOLUTION INTRAMUSCULAR; INTRAVENOUS at 17:15

## 2019-06-26 RX ADMIN — AMOXICILLIN AND CLAVULANATE POTASSIUM SCH TAB: 875; 125 TABLET, FILM COATED ORAL at 08:21

## 2019-06-26 RX ADMIN — BUDESONIDE AND FORMOTEROL FUMARATE DIHYDRATE SCH PUFF: 160; 4.5 AEROSOL RESPIRATORY (INHALATION) at 11:47

## 2019-06-26 RX ADMIN — AMOXICILLIN AND CLAVULANATE POTASSIUM SCH TAB: 875; 125 TABLET, FILM COATED ORAL at 17:03

## 2019-06-26 RX ADMIN — PANTOPRAZOLE SODIUM SCH MG: 40 TABLET, DELAYED RELEASE ORAL at 08:21

## 2019-06-26 RX ADMIN — SENNOSIDES SCH: 8.6 TABLET, FILM COATED ORAL at 21:14

## 2019-06-26 RX ADMIN — FLUTICASONE PROPIONATE SCH SPRAYS: 50 SPRAY, METERED NASAL at 11:48

## 2019-06-26 RX ADMIN — SENNOSIDES SCH TAB: 8.6 TABLET, FILM COATED ORAL at 20:36

## 2019-06-26 RX ADMIN — CARVEDILOL SCH MG: 12.5 TABLET, FILM COATED ORAL at 08:21

## 2019-06-26 RX ADMIN — FUROSEMIDE SCH MG: 40 TABLET ORAL at 09:53

## 2019-06-26 RX ADMIN — BUDESONIDE AND FORMOTEROL FUMARATE DIHYDRATE SCH: 160; 4.5 AEROSOL RESPIRATORY (INHALATION) at 21:14

## 2019-06-26 RX ADMIN — INSULIN ASPART SCH: 100 INJECTION, SOLUTION INTRAVENOUS; SUBCUTANEOUS at 21:14

## 2019-06-26 RX ADMIN — METHYLPREDNISOLONE SODIUM SUCCINATE SCH MG: 40 INJECTION, POWDER, FOR SOLUTION INTRAMUSCULAR; INTRAVENOUS at 02:45

## 2019-06-26 RX ADMIN — INSULIN ASPART SCH UNIT: 100 INJECTION, SOLUTION INTRAVENOUS; SUBCUTANEOUS at 11:48

## 2019-06-26 RX ADMIN — METHYLPREDNISOLONE SODIUM SUCCINATE SCH MG: 40 INJECTION, POWDER, FOR SOLUTION INTRAMUSCULAR; INTRAVENOUS at 09:53

## 2019-06-26 RX ADMIN — Medication SCH MG: at 20:33

## 2019-06-26 RX ADMIN — INSULIN ASPART SCH UNIT: 100 INJECTION, SOLUTION INTRAVENOUS; SUBCUTANEOUS at 06:59

## 2019-06-26 RX ADMIN — LOSARTAN POTASSIUM SCH MG: 50 TABLET, FILM COATED ORAL at 09:53

## 2019-06-26 RX ADMIN — INSULIN ASPART SCH UNIT: 100 INJECTION, SOLUTION INTRAVENOUS; SUBCUTANEOUS at 20:37

## 2019-06-26 RX ADMIN — OMEGA-3-ACID ETHYL ESTERS SCH GM: 1 CAPSULE, LIQUID FILLED ORAL at 09:52

## 2019-06-26 RX ADMIN — PANTOPRAZOLE SODIUM SCH MG: 40 TABLET, DELAYED RELEASE ORAL at 20:32

## 2019-06-26 RX ADMIN — CARVEDILOL SCH MG: 12.5 TABLET, FILM COATED ORAL at 20:32

## 2019-06-26 NOTE — PN
Progress Note, Physician


History of Present Illness: 


Resting on NIPPV support 





- Current Medication List


Current Medications: 


Active Medications





Acetaminophen (Tylenol -)  650 mg PO Q6H PRN


   PRN Reason: FEVER


   Last Admin: 06/22/19 16:44 Dose:  650 mg


Albuterol/Ipratropium (Duoneb -)  1 amp NEB Q6H PRN


   PRN Reason: SHORTNESS OF BREATH


   Last Admin: 06/25/19 21:00 Dose:  1 amp


Amlodipine Besylate (Norvasc -)  10 mg PO DAILY [facility]


   Last Admin: 06/26/19 09:53 Dose:  10 mg


Amoxicillin/Clavulanate Potassium (Augmentin - 875mg Tablet)  1 tab PO BID@0800,

1730 [facility]


   Last Admin: 06/26/19 08:21 Dose:  1 tab


Artificial Tears (Artificial Tears)  1 drop OU BID PRN


   PRN Reason: DRY EYES


Atorvastatin Calcium (Lipitor -)  20 mg PO DAILY@2000 [facility]


   Last Admin: 06/25/19 20:47 Dose:  20 mg


Budesonide/Formoterol Fumarate (Symbicort 160/4.5mcg -)  2 puff IH BID [facility]


   Last Admin: 06/26/19 11:47 Dose:  2 puff


Carvedilol (Coreg -)  12.5 mg PO BID@0800,2000 [facility]


   Last Admin: 06/26/19 08:21 Dose:  12.5 mg


Docusate Sodium (Colace -)  100 mg PO Q8H PRN


   PRN Reason: CONSTIPATION


   Last Admin: 06/25/19 17:51 Dose:  100 mg


Finasteride (Proscar -)  5 mg PO DAILY [facility]


   Last Admin: 06/26/19 09:52 Dose:  5 mg


Fluticasone Propionate (Flonase -)  2 spray NS DAILY [facility]


   Last Admin: 06/26/19 11:48 Dose:  2 sprays


Furosemide (Lasix -)  40 mg PO DAILY [facility]


   Last Admin: 06/26/19 09:53 Dose:  40 mg


Azithromycin 250 mg/ Dextrose  250 mls @ 250 mls/hr IVPB DAILY [facility]


   Last Admin: 06/26/19 09:34 Dose:  250 mls/hr


Insulin Aspart (Novolog Vial Sliding Scale -)  1 vial SQ ACHS [facility]; Protocol


   Last Admin: 06/26/19 11:48 Dose:  8 unit


Lactic Acid (Lac-Hydrin 12)  1 applic TP DAILY PRN


   PRN Reason: xerosis


Losartan Potassium (Cozaar -)  50 mg PO DAILY [facility]


   Last Admin: 06/26/19 09:53 Dose:  50 mg


Meclizine HCl (Antivert -)  25 mg PO Q8H PRN


   PRN Reason: dizziness


Melatonin (Melatonin)  10 mg PO HS [facility]


   Last Admin: 06/25/19 21:25 Dose:  Not Given


Methylprednisolone Sodium Succinate (Solu-Medrol -)  60 mg IVPB Q8H-IV [facility]


   Last Admin: 06/26/19 09:53 Dose:  60 mg


Omega-3-Acid Ethyl Esters (Lovaza -)  1 gm PO DAILY [facility]


   Last Admin: 06/26/19 09:52 Dose:  1 gm


Pantoprazole Sodium (Protonix -)  40 mg PO BID@0800,2000 [facility]


   Last Admin: 06/26/19 08:21 Dose:  40 mg


Polyethylene Glycol (Miralax (For Daily Use) -)  17 gm PO Q12H PRN


   PRN Reason: CONSTIPATION


Ranitidine HCl (Zantac -)  150 mg PO DAILY [facility]


   Last Admin: 06/26/19 09:53 Dose:  150 mg


Senna (Senna -)  2 tab PO [facility]


   Last Admin: 06/25/19 21:27 Dose:  Not Given


Simethicone (Mylicon -)  80 mg PO Q4H PRN


   PRN Reason: INDIGESTION


Tamsulosin HCl (Flomax -)  0.4 mg PO DAILY@0830 [facility]


   Last Admin: 06/26/19 08:21 Dose:  0.4 mg











- Objective


Vital Signs: 


 Vital Signs











Temperature  97.8 F   06/26/19 06:00


 


Pulse Rate  78   06/26/19 06:00


 


Respiratory Rate  18   06/26/19 06:00


 


Blood Pressure  147/50 L  06/26/19 06:00


 


O2 Sat by Pulse Oximetry (%)  95   06/26/19 02:35











Constitutional: Yes: No Distress, Calm, Thin


Neck: Yes: Supple


Cardiovascular: Yes: Regular Rate and Rhythm


Respiratory: Yes: Regular, Diminished, On BiPap


Gastrointestinal: Yes: Soft, Hypoactive Bowel Sounds


Edema: No


Labs: 


 CBC, BMP





 06/26/19 07:46 





 06/26/19 07:46 











Problem List





- Problems


(1) Acute and chronic respiratory failure with hypoxia


Code(s): J96.21 - ACUTE AND CHRONIC RESPIRATORY FAILURE WITH HYPOXIA   





(2) Acute exacerbation of chronic obstructive pulmonary disease (COPD)


Code(s): J44.1 - CHRONIC OBSTRUCTIVE PULMONARY DISEASE W (ACUTE) EXACERBATION   





(3) SOB (shortness of breath)


Code(s): R06.02 - SHORTNESS OF BREATH   





(4) Diastolic CHF


Code(s): I50.30 - UNSPECIFIED DIASTOLIC (CONGESTIVE) HEART FAILURE   


Qualifiers: 


 





(5) HLD (hyperlipidemia)


Code(s): E78.5 - HYPERLIPIDEMIA, UNSPECIFIED   


Qualifiers: 


   Hyperlipidemia type: pure hypercholesterolemia   Qualified Code(s): E78.00 - 

Pure hypercholesterolemia, unspecified; E78.0 - Pure hypercholesterolemia   





(6) HTN (hypertension)


Code(s): I10 - ESSENTIAL (PRIMARY) HYPERTENSION   


Qualifiers: 


   Hypertension type: essential hypertension   Qualified Code(s): I10 - 

Essential (primary) hypertension   





(7) Sleep apnea


Code(s): G47.30 - SLEEP APNEA, UNSPECIFIED   





Assessment/Plan


1. Acute on Chronic Hypoxemic/Hypercapneic Respiratory Failure


2. Acute COPD exacerbation +/- PNA


3. HTN


4. Hypercholesterolemia


5. History of TIA


6. History of renal CA s/p left nephrectomy


7. History of prostate CA


8. History of diverticular disease





PLAN:


1. IV steroid taper with GI protection, bronchodilator, empiric antibiotics, O2 

and BIPAP as needed to keep Spo2 88-92%


2. Continue Carvedilol 12.5 bid, Amlodipine 10 qd, Lipitor 20 qd, Lovaza 1qd 

and Losartan 50 qd as tolerated


3. Diuretics with Lasix 40 po qd


4. DVT prophylaxis

## 2019-06-26 NOTE — PN
Progress Note, Physician


History of Present Illness: 





pulmonary





awake comfortable on bipap,cannot tolerate vm





- Current Medication List


Current Medications: 


Active Medications





Acetaminophen (Tylenol -)  650 mg PO Q6H PRN


   PRN Reason: FEVER


   Last Admin: 06/22/19 16:44 Dose:  650 mg


Albuterol/Ipratropium (Duoneb -)  1 amp NEB Q6H PRN


   PRN Reason: SHORTNESS OF BREATH


   Last Admin: 06/25/19 21:00 Dose:  1 amp


Amlodipine Besylate (Norvasc -)  10 mg PO DAILY [facility]


   Last Admin: 06/26/19 09:53 Dose:  10 mg


Amoxicillin/Clavulanate Potassium (Augmentin - 875mg Tablet)  1 tab PO BID@0800,

1730 [facility]


   Last Admin: 06/26/19 08:21 Dose:  1 tab


Artificial Tears (Artificial Tears)  1 drop OU BID PRN


   PRN Reason: DRY EYES


Atorvastatin Calcium (Lipitor -)  20 mg PO DAILY@2000 [facility]


   Last Admin: 06/25/19 20:47 Dose:  20 mg


Budesonide/Formoterol Fumarate (Symbicort 160/4.5mcg -)  2 puff IH BID [facility]


   Last Admin: 06/26/19 11:47 Dose:  2 puff


Carvedilol (Coreg -)  12.5 mg PO BID@0800,2000 [facility]


   Last Admin: 06/26/19 08:21 Dose:  12.5 mg


Docusate Sodium (Colace -)  100 mg PO Q8H PRN


   PRN Reason: CONSTIPATION


   Last Admin: 06/25/19 17:51 Dose:  100 mg


Finasteride (Proscar -)  5 mg PO DAILY [facility]


   Last Admin: 06/26/19 09:52 Dose:  5 mg


Fluticasone Propionate (Flonase -)  2 spray NS DAILY [facility]


   Last Admin: 06/26/19 11:48 Dose:  2 sprays


Furosemide (Lasix -)  40 mg PO DAILY [facility]


   Last Admin: 06/26/19 09:53 Dose:  40 mg


Azithromycin 250 mg/ Dextrose  250 mls @ 250 mls/hr IVPB DAILY [facility]


   Last Admin: 06/26/19 09:34 Dose:  250 mls/hr


Insulin Aspart (Novolog Vial Sliding Scale -)  1 vial SQ ACHS [facility]; Protocol


   Last Admin: 06/26/19 11:48 Dose:  8 unit


Lactic Acid (Lac-Hydrin 12)  1 applic TP DAILY PRN


   PRN Reason: xerosis


Losartan Potassium (Cozaar -)  50 mg PO DAILY [facility]


   Last Admin: 06/26/19 09:53 Dose:  50 mg


Meclizine HCl (Antivert -)  25 mg PO Q8H PRN


   PRN Reason: dizziness


Melatonin (Melatonin)  10 mg PO HS [facility]


   Last Admin: 06/25/19 21:25 Dose:  Not Given


Methylprednisolone Sodium Succinate (Solu-Medrol -)  60 mg IVPB Q8H-IV [facility]


   Last Admin: 06/26/19 09:53 Dose:  60 mg


Omega-3-Acid Ethyl Esters (Lovaza -)  1 gm PO DAILY [facility]


   Last Admin: 06/26/19 09:52 Dose:  1 gm


Pantoprazole Sodium (Protonix -)  40 mg PO BID@0800,2000 [facility]


   Last Admin: 06/26/19 08:21 Dose:  40 mg


Polyethylene Glycol (Miralax (For Daily Use) -)  17 gm PO Q12H PRN


   PRN Reason: CONSTIPATION


Ranitidine HCl (Zantac -)  150 mg PO DAILY [facility]


   Last Admin: 06/26/19 09:53 Dose:  150 mg


Senna (Senna -)  2 tab PO [facility]


   Last Admin: 06/25/19 21:27 Dose:  Not Given


Simethicone (Mylicon -)  80 mg PO Q4H PRN


   PRN Reason: INDIGESTION


Tamsulosin HCl (Flomax -)  0.4 mg PO DAILY@0830 [facility]


   Last Admin: 06/26/19 08:21 Dose:  0.4 mg











- Objective


Vital Signs: 


 Vital Signs











Temperature  97.9 F   06/26/19 10:00


 


Pulse Rate  64   06/26/19 10:00


 


Respiratory Rate  21 H  06/26/19 10:00


 


Blood Pressure  140/73   06/26/19 10:00


 


O2 Sat by Pulse Oximetry (%)  92 L  06/26/19 09:00











Constitutional: Yes: Well Nourished, Calm


Eyes: Yes: WNL


HENT: Yes: WNL


Neck: Yes: WNL


Cardiovascular: Yes: Regular Rate and Rhythm, S1, S2


Respiratory: Yes: On BiPap, Rhonchi (few rhonchi)


Gastrointestinal: Yes: Normal Bowel Sounds, Soft


Extremities: Yes: WNL


Edema: No


Labs: 


 CBC, BMP





 06/26/19 07:46 





 06/26/19 07:46 











Assessment/Plan





Problem List





- Problems


(1) Acute and chronic respiratory failure with hypoxia


Code(s): J96.21 - ACUTE AND CHRONIC RESPIRATORY FAILURE WITH HYPOXIA   





(2) Acute exacerbation of chronic obstructive pulmonary disease (COPD)


Code(s): J44.1 - CHRONIC OBSTRUCTIVE PULMONARY DISEASE W (ACUTE) EXACERBATION   





(3) Pneumonia


Code(s): J18.9 - PNEUMONIA, UNSPECIFIED ORGANISM   


Qualifiers: 


   Pneumonia type: due to unspecified organism   Laterality: right   Lung 

location: unspecified part of lung   Qualified Code(s): J18.9 - Pneumonia, 

unspecified organism   





Assessment/Plan





Acute on Chronic Hypoxic/Hypercapneic Respiratory Failure


Pneumonia


Acute COPD Exacerbation


HTN


Hyperlipidemia


h/o Renal Cell Carcinoma s/p Left Nephrectomy


h/o Prostate Ca





- antibiotics


-  IV medrol same dose


-  inhaled bronchodilators standing and PRN


-  O2 to keep Spo2 88-95%


-  BiPAP as needed to assist in work of breathing


-  DVT prophylaxis








DR INFANTE

## 2019-06-26 NOTE — CON.GI
Consult


Consult Specialty:: Gastroenterology


Referred by:: Shahida Paredes NP


Reason for Consultation:: Positive stool of occult blood





- History of Present Illness


Chief Complaint: Dyspnea


History of Present Illness: 





87M is admitted with pneumonia exacerbating his COPD. He using a PAP device at 

home. He has a stool that is positive for occult blood but his Hct is 40. I 

last saw him in the office on 18 for paradoxical diarrhea and prescribed 

Miralax. He was guaiac negative at that time. He last had a colonoscopy with me 

on 14 which revealed severe diverticulosis and led to the removal of 

adenomatous and hyperplastic sigmoid polyps. No overt bleeding  reports. 

Celio denies abdominal pain, nausea or vomiting. Hospice care is being 

considered at [facility] . ON 10/25/17 I did an ERCP to remove multiple 

CBD stones.   





- History Source


History Provided By: Patient, Medical Record





- Past Medical History


CNS: Yes: Alzheimer's, TIA


Cardio/Vascular: Yes: AFIB, HTN, Hyperlipdemia


Pulmonary: Yes: COPD, Pneumonia (fungal)


Gastrointestinal: Yes: Diverticulitis, Other (cholecystitis, ischemic colitis, 3

/14 sigmoid adenoma removed, SBO 12/15)


Hepatobiliary: Yes: Cholelithiasis, Cholecystitis ( cholecystostomy tube,  laparascopic cholecystectomy), Choledocholithiasis


Renal/: Yes: Cancer (prostate/kidney), Neurogenic Bladder


Psych: Yes: Anxiety


Rheumatology: Yes: Other (arthritis knees)





- Past Surgical History


Past Surgical History: Yes: Appendectomy, Cataract Removal, Colonoscopy, Hernia 

Repair, Joint Replacement (left knee makoplasty), Laminectomy (cervical fusion)

, Nephrectomy (left)





- Alcohol/Substance Use


Hx Alcohol Use: No


History of Substance Use: reports: None





- Smoking History


Smoking history: Former smoker


Have you smoked in the past 12 months: No


Aproximately how many cigarettes per day: 40 ( 40years)


If you are a former smoker, when did you quit?: 





- Social History


Usual Living Arrangement: With Spouse


ADL: Independent


Occupation: retired electronics salesman


Place of Birth: [place]


History of Recent Travel: No





Home Medications





- Allergies


Allergies/Adverse Reactions: 


 Allergies











Allergy/AdvReac Type Severity Reaction Status Date / Time


 


No Known Drug Allergies Allergy   Verified 19 16:04














- Home Medications


Home Medications: 


Ambulatory Orders





Finasteride 5 mg PO DAILY 10/12/17 


Tamsulosin HCl [Flomax] 0.4 mg PO DAILY 10/12/17 


Acetaminophen [Tylenol .Regular Strength -] 650 mg PO Q6H PRN #0 tablet 10/27/

17 


Meclizine HCl [Antivert -] 25 mg PO Q8H PRN #0 tablet 10/27/17 


Melatonin 5 mg PO HS  tab 10/27/17 


Omega-3 Acid Ethyl Esters [Lovaza -] 1 gm PO DAILY  cap 10/27/17 


Pantoprazole Sodium [Protonix -] 40 mg PO BID #30 tab 10/27/17 


Simethicone [Mylicon -] 80 mg PO Q4H PRN #0 tab.chew 10/27/17 


Albuterol 0.083% Nebulizer Sol [Ventolin 0.083% Nebulizer Soln -] 1 amp NEB Q4H 

PRN  amp 04/10/19 


Albuterol 2.5/Ipratropium 0.5 [Duoneb -] 1 amp NEB RQID  amp 04/10/19 


Ammonium Lactate Lotion [Lac-Hydrin 12] 1 applic TP DAILY PRN  bottle 04/10/19 


Budesonide/Formeterol Fumarate [SYMBICORT 160/4.5mcg -] 2 puff IH BID  inhaler 

04/10/19 


Cefuroxime Axetil [Ceftin -] 500 mg PO BID  tablet 04/10/19 


Fluticasone Prop 0.05% Nasal [Flonase -] 2 spray NS DAILY  spray 04/10/19 


Guaifenesin AC [Robitussin AC -] 5 ml PO TID PRN #1 bottle MDD 15ml 04/10/19 


Melatonin 15 mg PO HS PRN  tab 04/10/19 


Polyethylene Glycol 3350 [Miralax 119 gm Btl -] 17 gm PO BID PRN  bottle 04/10/

19 


Polyvinyl Alcohol [Artificial Tears] 1 drop OU Q8H PRN  drops 04/10/19 


Ranitidine [Zantac -] 150 mg PO DAILY  tablet 04/10/19 


Carvedilol [Coreg -] 12.5 mg PO BID 19 


Furosemide [Lasix] 40 mg PO DAILY 19 











Family Disease History





- Family Disease History


Family Disease History: Diabetes: Mother ( 70 diabetic complications), Other

: Father ( 70 of Parkinsions ), Brother (3 siblings with cancer ? types)





Physical Exam-GI


Vital Signs: 


 Vital Signs











Temperature  97.9 F   19 10:00


 


Pulse Rate  64   19 10:00


 


Respiratory Rate  21 H  19 10:00


 


Blood Pressure  140/73   19 10:00


 


O2 Sat by Pulse Oximetry (%)  95   19 02:35








CBC,CMP











WBC  12.8 K/mm3 (4.0-10.0)  H  19  07:46    


 


RBC  4.73 M/mm3 (4.00-5.60)   19  07:46    


 


Hgb  13.6 GM/dL (11.7-16.9)   19  07:46    


 


Hct  40.6 % (35.4-49)   19  07:46    


 


MCV  85.9 fl (80-96)   19  07:46    


 


MCH  28.8 pg (25.7-33.7)   19  07:46    


 


MCHC  33.5 g/dl (32.0-35.9)   19  07:46    


 


RDW  15.0 % (11.9-15.9)   19  07:46    


 


Plt Count  229 K/MM3 (134-434)   19  07:46    


 


MPV  8.1 fl (7.5-11.1)   19  07:46    


 


Absolute Neuts (auto)  12.8 K/mm3 (1.5-8.0)  H  19  07:10    


 


Neutrophils %  92.1 % (42.8-82.8)  H  19  07:10    


 


Neutrophils % (Manual)  90.0 % (42.8-82.8)  H  19  07:10    


 


Band Neutrophils %  0.0 %  19  07:10    


 


Lymphocytes %  5.4 % (8-40)  L  19  07:10    


 


Lymphocytes % (Manual)  4.0 % (8-40)  L D 19  07:10    


 


Monocytes %  2.4 % (3.8-10.2)  L  19  07:10    


 


Monocytes % (Manual)  1 % (3.8-10.2)  L  19  07:10    


 


Eosinophils %  0.0 % (0-4.5)   19  07:10    


 


Eosinophils % (Manual)  0.0 % (0-4.5)   19  07:10    


 


Basophils %  0.1 % (0-2.0)   19  07:10    


 


Basophils % (Manual)  0.0 % (0-2.0)   19  07:10    


 


Myelocytes % (Man)  0 % (0-2)   19  07:10    


 


Promyelocytes % (Man)  0 % (0-2)   19  07:10    


 


Blast Cells % (Manual)  0 % (0-0)   19  07:10    


 


Nucleated RBC %  0 % (0-0)   19  07:10    


 


Metamyelocytes  0 % (0-2)   19  07:10    


 


Hypochromia  0   19  07:10    


 


Platelet Estimate  Normal   19  07:10    


 


Polychromasia  0   19  07:10    


 


Poikilocytosis  0   19  07:10    


 


Anisocytosis  0   19  07:10    


 


Microcytosis  0   19  07:10    


 


Macrocytosis  0   19  07:10    


 


Sodium  138 mmol/L (136-145)   19  07:46    


 


Potassium  4.5 mmol/L (3.5-5.1)   19  07:46    


 


Chloride  98 mmol/L ()   19  07:46    


 


Carbon Dioxide  38 mmol/L (21-32)  H  19  07:46    


 


Anion Gap  2 MMOL/L (8-16)  L  19  07:46    


 


BUN  46.1 mg/dL (7-18)  H  19  07:46    


 


Creatinine  0.8 mg/dL (0.55-1.3)   19  07:46    


 


Est GFR (CKD-EPI)AfAm  93.09   19  07:46    


 


Est GFR (CKD-EPI)NonAf  80.32   19  07:46    


 


POC Glucometer  279 UNITS ()   19  16:07    


 


Random Glucose  232 mg/dL ()  H  19  07:46    


 


Lactic Acid  2.1 mmol/L (0.4-2.0)  H  19  15:45    


 


Calcium  8.4 mg/dL (8.5-10.1)  L  19  07:46    


 


Phosphorus  5.3 mg/dL (2.5-4.9)  H  19  06:30    


 


Magnesium  2.5 mg/dL (1.8-2.4)  H  19  08:46    


 


Total Bilirubin  0.5 mg/dL (0.2-1)   19  07:46    


 


AST  32 U/L (15-37)   19  07:46    


 


ALT  89 U/L (13-61)  H  19  07:46    


 


Alkaline Phosphatase  42 U/L ()  L  19  07:46    


 


Creatine Kinase  86 U/L ()   19  15:45    


 


Troponin I  0.02 ng/ml (0.00-0.05)   19  14:38    


 


B-Natriuretic Peptide  450.1 pg/ml (5-450)  H  19  15:45    


 


Total Protein  5.1 g/dl (6.4-8.2)  L  19  07:46    


 


Albumin  2.5 g/dl (3.4-5.0)  L  19  07:46    


 


TSH  0.38 uIU/ml (0.358-3.74)  D 19  06:30    








 Current Medications











Generic Name Dose Route Start Last Admin





  Trade Name Jose Alfredo  PRN Reason Stop Dose Admin


 


Acetaminophen  650 mg  19 18:45  19 16:44





  Tylenol -  PO   650 mg





  Q6H PRN   Administration





  FEVER   





     





     





     


 


Albuterol/Ipratropium  1 amp  19 23:52  19 21:00





  Duoneb -  NEB   1 amp





  Q6H PRN   Administration





  SHORTNESS OF BREATH   





     





     





     


 


Amlodipine Besylate  10 mg  19 10:00  19 09:53





  Norvasc -  PO   10 mg





  DAILY LAUREN   Administration





     





     





     





     


 


Amoxicillin/Clavulanate Potassium  1 tab  19 17:30  19 08:21





  Augmentin - 875mg Tablet  PO   1 tab





  BID@0800,1730 LAUREN   Administration





     





     





     





     


 


Artificial Tears  1 drop  19 13:16  





  Artificial Tears  OU   





  BID PRN   





  DRY EYES   





     





     





     


 


Atorvastatin Calcium  20 mg  19 20:00  19 20:47





  Lipitor -  PO   20 mg





  DAILY@ LAUREN   Administration





     





     





     





     


 


Budesonide/Formoterol Fumarate  2 puff  19 22:00  19 11:47





  Symbicort 160/4.5mcg -  IH   2 puff





  BID LAUREN   Administration





     





     





     





     


 


Carvedilol  12.5 mg  19 20:00  19 08:21





  Coreg -  PO   12.5 mg





  BID@08, LAUREN   Administration





     





     





     





     


 


Docusate Sodium  100 mg  19 18:55  19 17:51





  Colace -  PO   100 mg





  Q8H PRN   Administration





  CONSTIPATION   





     





     





     


 


Finasteride  5 mg  19 10:00  19 09:52





  Proscar -  PO   5 mg





  DAILY LAUREN   Administration





     





     





     





     


 


Fluticasone Propionate  2 spray  19 10:00  19 11:48





  Flonase -  NS   2 sprays





  DAILY LAUREN   Administration





     





     





     





     


 


Furosemide  40 mg  19 10:00  19 09:53





  Lasix -  PO   40 mg





  DAILY LAUREN   Administration





     





     





     





     


 


Azithromycin 250 mg/ Dextrose  250 mls @ 250 mls/hr  19 10:00  19 09

:34





  IVPB   250 mls/hr





  DAILY LAUREN   Administration





     





     





     





     


 


Insulin Aspart  1 vial  19 22:00  19 16:17





  Novolog Vial Sliding Scale -  SQ   6 unit





  AC[facility] LAUREN   Administration





     





     





  Protocol   





     


 


Lactic Acid  1 applic  19 18:45  





  Lac-Hydrin 12  TP   





  DAILY PRN   





  xerosis   





     





     





     


 


Losartan Potassium  50 mg  19 13:00  19 09:53





  Cozaar -  PO   50 mg





  DAILY LAUREN   Administration





     





     





     





     


 


Meclizine HCl  25 mg  19 23:25  





  Antivert -  PO   





  Q8H PRN   





  dizziness   





     





     





     


 


Melatonin  10 mg  19 20:00  19 21:25





  Melatonin  PO   Not Given





  HS LAUREN   





     





     





     





     


 


Methylprednisolone Sodium Succinate  60 mg  19 12:32  19 09:53





  Solu-Medrol -  IVPB   60 mg





  Q8H-IV LAUREN   Administration





     





     





     





     


 


Omega-3-Acid Ethyl Esters  1 gm  19 10:00  19 09:52





  Lovaza -  PO   1 gm





  DAILY LAUREN   Administration





     





     





     





     


 


Pantoprazole Sodium  40 mg  19 20:00  19 08:21





  Protonix -  PO   40 mg





  BID@0800,2000 LAUREN   Administration





     





     





     





     


 


Polyethylene Glycol  17 gm  19 18:45  





  Miralax (For Daily Use) -  PO   





  Q12H PRN   





  CONSTIPATION   





     





     





     


 


Ranitidine HCl  150 mg  19 10:00  19 09:53





  Zantac -  PO   150 mg





  DAILY LAUREN   Administration





     





     





     





     


 


Senna  2 tab  19 20:00  19 21:27





  Senna -  PO   Not Given





  HS LAUREN   





     





     





     





     


 


Simethicone  80 mg  19 18:45  





  Mylicon -  PO   





  Q4H PRN   





  INDIGESTION   





     





     





     


 


Tamsulosin HCl  0.4 mg  19 08:30  19 08:21





  Flomax -  PO   0.4 mg





  DAILY@0830 LAUREN   Administration





     





     





     





     











Constitutional: Yes: Calm


Eyes: Yes: Conjunctiva Clear


HENT: Yes: Normocephalic


Neck: Yes: Trachea Midline


Cardiovascular: Yes: Pulse Irregular, Murmur (TR)


Respiratory: Yes: Hyperresonant, On BiPap, Rhonchi


Gastrointestinal Inspection: Yes: Scars (RIH and LIH incisions, laparoscopic , 

RLQ and left flank incisions)


...Auscultate: Yes: Normoactive Bowel Sounds


...Palpate: Yes: Soft, Other (nontender)


...Rectal Exam: Yes: Guaiac Positive (soft brown strongly guaiac positive stool)

, Hemorrhoids/External


Edema: No


Neurological: Yes: Alert, Confusion


Labs: 


 CBC, BMP





 19 07:46 





 19 07:46 











Problem List





- Problems


(1) Occult blood positive stool


Assessment/Plan: 


Celio' stool is strongly guaiac positive and ideally he should have EGD, 

colonoscopy and a capsule endoscopy. His respiratory condition and other 

comorbidities cause the risks to outweigh the benefits however, particularly 

given that he is not anemic.  He could be bleeding from recurrent adenomas or 

cancer but I believe this bleeding is more likely due to stress gastritis or 

ulcer or bleeding vascular ectasias. If bleeding persists and leads to anemia 

then would consider stopping the heparin    


Code(s): R19.5 - OTHER FECAL ABNORMALITIES   





(2) Colon adenoma


Code(s): D12.6 - BENIGN NEOPLASM OF COLON, UNSPECIFIED   





(3) Diverticulosis


Code(s): K57.90 - DVRTCLOS OF INTEST, PART UNSP, W/O PERF OR ABSCESS W/O BLEED 

  





(4) History of ischemic colitis


Code(s): Z87.19 - PERSONAL HISTORY OF OTHER DISEASES OF THE DIGESTIVE SYSTEM   





(5) Acute exacerbation of chronic obstructive pulmonary disease (COPD)


Code(s): J44.1 - CHRONIC OBSTRUCTIVE PULMONARY DISEASE W (ACUTE) EXACERBATION   





(6) Acute respiratory failure with hypoxia


Code(s): J96.01 - ACUTE RESPIRATORY FAILURE WITH HYPOXIA   





(7) Choledocholithiasis


Code(s): K80.50 - CALCULUS OF BILE DUCT W/O CHOLANGITIS OR CHOLECYST W/O OBST   





(8) Allergic rhinitis


Code(s): J30.9 - ALLERGIC RHINITIS, UNSPECIFIED   


Qualifiers: 


   Qualified Code(s): J30.9 - Allergic rhinitis, unspecified   





(9) COPD (chronic obstructive pulmonary disease)


Code(s): J44.9 - CHRONIC OBSTRUCTIVE PULMONARY DISEASE, UNSPECIFIED   


Qualifiers: 


   COPD type: unspecified COPD   Qualified Code(s): J44.9 - Chronic obstructive 

pulmonary disease, unspecified   





(10) Constipation


Code(s): K59.00 - CONSTIPATION, UNSPECIFIED   





(11) Diastolic CHF


Code(s): I50.30 - UNSPECIFIED DIASTOLIC (CONGESTIVE) HEART FAILURE   


Qualifiers: 


 





(12) HLD (hyperlipidemia)


Code(s): E78.5 - HYPERLIPIDEMIA, UNSPECIFIED   


Qualifiers: 


   Hyperlipidemia type: pure hypercholesterolemia   Qualified Code(s): E78.00 - 

Pure hypercholesterolemia, unspecified; E78.0 - Pure hypercholesterolemia   





Assessment/Plan





IMpression:


- Celio' stool is strongly guaiac positive and ideally he should have EGD, 

colonoscopy and a capsule endoscopy. His respiratory condition and other 

comorbidities cause the risks to outweigh the benefits however, particularly 

given that he is not anemic.  He could be bleeding from recurrent adenomas or 

cancer but I believe this bleeding is more likely due to stress gastritis or 

ulcer or bleeding vascular ectasias. 





Plan: 


-- If bleeding persists and leads to anemia then would consider stopping the 

heparin


-- Continue empiric PPI

## 2019-06-26 NOTE — PN
Progress Note, Physician


History of Present Illness: 





Pt seen and examined at bedside. He still has shortness of breath. He is on 

bipap. 





- Current Medication List


Current Medications: 


Active Medications





Acetaminophen (Tylenol -)  650 mg PO Q6H PRN


   PRN Reason: FEVER


   Last Admin: 06/22/19 16:44 Dose:  650 mg


Albuterol/Ipratropium (Duoneb -)  1 amp NEB Q6H PRN


   PRN Reason: SHORTNESS OF BREATH


   Last Admin: 06/25/19 21:00 Dose:  1 amp


Amlodipine Besylate (Norvasc -)  10 mg PO DAILY [facility]


   Last Admin: 06/26/19 09:53 Dose:  10 mg


Amoxicillin/Clavulanate Potassium (Augmentin - 875mg Tablet)  1 tab PO BID@0800,

1730 [facility]


   Last Admin: 06/26/19 08:21 Dose:  1 tab


Artificial Tears (Artificial Tears)  1 drop OU BID PRN


   PRN Reason: DRY EYES


Atorvastatin Calcium (Lipitor -)  20 mg PO DAILY@2000 [facility]


   Last Admin: 06/25/19 20:47 Dose:  20 mg


Budesonide/Formoterol Fumarate (Symbicort 160/4.5mcg -)  2 puff IH BID [facility]


   Last Admin: 06/26/19 11:47 Dose:  2 puff


Carvedilol (Coreg -)  12.5 mg PO BID@0800,2000 [facility]


   Last Admin: 06/26/19 08:21 Dose:  12.5 mg


Docusate Sodium (Colace -)  100 mg PO Q8H PRN


   PRN Reason: CONSTIPATION


   Last Admin: 06/25/19 17:51 Dose:  100 mg


Finasteride (Proscar -)  5 mg PO DAILY [facility]


   Last Admin: 06/26/19 09:52 Dose:  5 mg


Fluticasone Propionate (Flonase -)  2 spray NS DAILY [facility]


   Last Admin: 06/26/19 11:48 Dose:  2 sprays


Furosemide (Lasix -)  40 mg PO DAILY [facility]


   Last Admin: 06/26/19 09:53 Dose:  40 mg


Azithromycin 250 mg/ Dextrose  250 mls @ 250 mls/hr IVPB DAILY [facility]


   Last Admin: 06/26/19 09:34 Dose:  250 mls/hr


Insulin Aspart (Novolog Vial Sliding Scale -)  1 vial SQ ACHS [facility]; Protocol


   Last Admin: 06/26/19 11:48 Dose:  8 unit


Lactic Acid (Lac-Hydrin 12)  1 applic TP DAILY PRN


   PRN Reason: xerosis


Losartan Potassium (Cozaar -)  50 mg PO DAILY [facility]


   Last Admin: 06/26/19 09:53 Dose:  50 mg


Meclizine HCl (Antivert -)  25 mg PO Q8H PRN


   PRN Reason: dizziness


Melatonin (Melatonin)  10 mg PO HS [facility]


   Last Admin: 06/25/19 21:25 Dose:  Not Given


Methylprednisolone Sodium Succinate (Solu-Medrol -)  60 mg IVPB Q8H-IV [facility]


   Last Admin: 06/26/19 09:53 Dose:  60 mg


Omega-3-Acid Ethyl Esters (Lovaza -)  1 gm PO DAILY [facility]


   Last Admin: 06/26/19 09:52 Dose:  1 gm


Pantoprazole Sodium (Protonix -)  40 mg PO BID@0800,2000 [facility]


   Last Admin: 06/26/19 08:21 Dose:  40 mg


Polyethylene Glycol (Miralax (For Daily Use) -)  17 gm PO Q12H PRN


   PRN Reason: CONSTIPATION


Ranitidine HCl (Zantac -)  150 mg PO DAILY [facility]


   Last Admin: 06/26/19 09:53 Dose:  150 mg


Senna (Senna -)  2 tab PO [facility]


   Last Admin: 06/25/19 21:27 Dose:  Not Given


Simethicone (Mylicon -)  80 mg PO Q4H PRN


   PRN Reason: INDIGESTION


Tamsulosin HCl (Flomax -)  0.4 mg PO DAILY@0830 [facility]


   Last Admin: 06/26/19 08:21 Dose:  0.4 mg











- Objective


Vital Signs: 


 Vital Signs











Temperature  97.8 F   06/26/19 06:00


 


Pulse Rate  78   06/26/19 06:00


 


Respiratory Rate  18   06/26/19 06:00


 


Blood Pressure  147/50 L  06/26/19 06:00


 


O2 Sat by Pulse Oximetry (%)  95   06/26/19 02:35











Constitutional: Yes: Calm


Eyes: Yes: Conjunctiva Clear


HENT: Yes: Atraumatic


Neck: Yes: Supple


Cardiovascular: Yes: S1, S2


Respiratory: Yes: On BiPap


Gastrointestinal: Yes: Normal Bowel Sounds, Soft


Genitourinary: Yes: WNL


Musculoskeletal: Yes: WNL


Edema: No


Neurological: Yes: Oriented


Psychiatric: Yes: Oriented


Labs: 


 CBC, BMP





 06/26/19 07:46 





 06/26/19 07:46 











Problem List





- Problems


(1) Azotemia


Code(s): R79.89 - OTHER SPECIFIED ABNORMAL FINDINGS OF BLOOD CHEMISTRY   





Assessment/Plan


 Current Medications











Generic Name Dose Route Start Last Admin





  Trade Name Freq  PRN Reason Stop Dose Admin


 


Acetaminophen  650 mg  06/17/19 18:45  06/22/19 16:44





  Tylenol -  PO   650 mg





  Q6H PRN   Administration





  FEVER   





     





     





     


 


Albuterol/Ipratropium  1 amp  06/22/19 23:52  06/25/19 21:00





  Duoneb -  NEB   1 amp





  Q6H PRN   Administration





  SHORTNESS OF BREATH   





     





     





     


 


Amlodipine Besylate  10 mg  06/18/19 10:00  06/26/19 09:53





  Norvasc -  PO   10 mg





  DAILY LAUREN   Administration





     





     





     





     


 


Amoxicillin/Clavulanate Potassium  1 tab  06/25/19 17:30  06/26/19 08:21





  Augmentin - 875mg Tablet  PO   1 tab





  BID@0800,1730 LAUREN   Administration





     





     





     





     


 


Artificial Tears  1 drop  06/24/19 13:16  





  Artificial Tears  OU   





  BID PRN   





  DRY EYES   





     





     





     


 


Atorvastatin Calcium  20 mg  06/21/19 20:00  06/25/19 20:47





  Lipitor -  PO   20 mg





  DAILY@2000 LAUREN   Administration





     





     





     





     


 


Budesonide/Formoterol Fumarate  2 puff  06/17/19 22:00  06/26/19 11:47





  Symbicort 160/4.5mcg -  IH   2 puff





  BID LAUREN   Administration





     





     





     





     


 


Carvedilol  12.5 mg  06/21/19 20:00  06/26/19 08:21





  Coreg -  PO   12.5 mg





  BID@0800,2000 LAUREN   Administration





     





     





     





     


 


Docusate Sodium  100 mg  06/17/19 18:55  06/25/19 17:51





  Colace -  PO   100 mg





  Q8H PRN   Administration





  CONSTIPATION   





     





     





     


 


Finasteride  5 mg  06/18/19 10:00  06/26/19 09:52





  Proscar -  PO   5 mg





  DAILY LAUREN   Administration





     





     





     





     


 


Fluticasone Propionate  2 spray  06/18/19 10:00  06/26/19 11:48





  Flonase -  NS   2 sprays





  DAILY LAUREN   Administration





     





     





     





     


 


Furosemide  40 mg  06/18/19 10:00  06/26/19 09:53





  Lasix -  PO   40 mg





  DAILY LAUREN   Administration





     





     





     





     


 


Azithromycin 250 mg/ Dextrose  250 mls @ 250 mls/hr  06/18/19 10:00  06/26/19 09

:34





  IVPB   250 mls/hr





  DAILY LAUREN   Administration





     





     





     





     


 


Insulin Aspart  1 vial  06/18/19 22:00  06/26/19 11:48





  Novolog Vial Sliding Scale -  SQ   8 unit





  [facility] LAUREN   Administration





     





     





  Protocol   





     


 


Lactic Acid  1 applic  06/17/19 18:45  





  Lac-Hydrin 12  TP   





  DAILY PRN   





  xerosis   





     





     





     


 


Losartan Potassium  50 mg  06/18/19 13:00  06/26/19 09:53





  Cozaar -  PO   50 mg





  DAILY LAUERN   Administration





     





     





     





     


 


Meclizine HCl  25 mg  06/17/19 23:25  





  Antivert -  PO   





  Q8H PRN   





  dizziness   





     





     





     


 


Melatonin  10 mg  06/21/19 20:00  06/25/19 21:25





  Melatonin  PO   Not Given





  HS [facility]   





     





     





     





     


 


Methylprednisolone Sodium Succinate  60 mg  06/18/19 12:32  06/26/19 09:53





  Solu-Medrol -  IVPB   60 mg





  Q8H-IV [facility]   Administration





     





     





     





     


 


Omega-3-Acid Ethyl Esters  1 gm  06/18/19 10:00  06/26/19 09:52





  Lovaza -  PO   1 gm





  DAILY [facility]   Administration





     





     





     





     


 


Pantoprazole Sodium  40 mg  06/21/19 20:00  06/26/19 08:21





  Protonix -  PO   40 mg





  BID@0800,2000 [facility]   Administration





     





     





     





     


 


Polyethylene Glycol  17 gm  06/17/19 18:45  





  Miralax (For Daily Use) -  PO   





  Q12H PRN   





  CONSTIPATION   





     





     





     


 


Ranitidine HCl  150 mg  06/18/19 10:00  06/26/19 09:53





  Zantac -  PO   150 mg





  DAILY [facility]   Administration





     





     





     





     


 


Senna  2 tab  06/21/19 20:00  06/25/19 21:27





  Senna -  PO   Not Given





  [facility]   





     





     





     





     


 


Simethicone  80 mg  06/17/19 18:45  





  Mylicon -  PO   





  Q4H PRN   





  INDIGESTION   





     





     





     


 


Tamsulosin HCl  0.4 mg  06/18/19 08:30  06/26/19 08:21





  Flomax -  PO   0.4 mg





  DAILY@0830 [facility]   Administration





     





     





     





     








 Laboratory Tests











  06/25/19





  21:04


 


Urine Protein  Negative


 


Urine Blood  Negative














Impression


1. azotemia


2. PNA


3. copd exacerbation


4. htn


5. hld


6. h/o Renal Cell Carcinoma s/p Left Nephrectomy


7.h/o Prostate Ca


8. CKD





Plan


- BUN in improving


- steroids with taper as tolerated


- monitor renal function


- cont to monitor hg


- ua neg for blood or protein


Dr Rapp

## 2019-06-26 NOTE — DS
Physical Examination


Vital Signs: 


 Vital Signs











Temperature  97.7 F   06/26/19 14:00


 


Pulse Rate  72   06/26/19 14:00


 


Respiratory Rate  22 H  06/26/19 14:00


 


Blood Pressure  121/54 L  06/26/19 14:00


 


O2 Sat by Pulse Oximetry (%)  92 L  06/26/19 09:00











Findings/Remarks: 





Patientis a 86 y/o male with past medical history of prostate CA, renal CA s/p 

L nephrectomy, TIA, COPD on home O2, HTN, HLD, colonic polyp, ichemic colitis, 

diverticulosis, cholecystectomy, fungal PNA, DIC.  Patient presented to ER for 

dyspnea.  Patient was recently discharged home from [facility] and when assessed by 

[facility] on home visit noted with labored breathing and pallor.


Constitutional: Yes: No Distress, Calm


Eyes: Yes: Conjunctiva Clear


HENT: Yes: Atraumatic


Cardiovascular: Yes: Regular Rate and Rhythm


Respiratory: Yes: Diminished, On BiPap


Gastrointestinal: Yes: Normal Bowel Sounds, Soft


Musculoskeletal: Yes: Muscle Pain


Extremities: Yes: WNL


Edema: No


Neurological: Yes: Alert, Oriented


Psychiatric: Yes: Alert, Oriented


Labs: 


 CBC, BMP





 06/26/19 07:46 





 06/26/19 07:46 











Discharge Summary


Reason For Visit: HYPOXEMIA REQUIRING SUPPLEMENTAL OXYGEN


Current Active Problems





Acute and chronic respiratory failure with hypoxia (Acute)


Acute exacerbation of chronic obstructive pulmonary disease (COPD) (Acute)


Azotemia (Acute)


Colon adenoma (Acute)


Diverticulosis (Acute)


History of ischemic colitis (Acute)


Hypoxemia requiring supplemental oxygen (Acute)


Occult blood positive stool (Acute)


Pneumonia (Acute)


Prophylactic measure (Acute)








Hospital Course: 





see progress notes


 Laboratory Tests











  06/17/19 06/17/19 06/17/19





  15:45 15:45 15:45


 


WBC   12.7 H 


 


RBC   4.76 


 


Hgb   13.8 


 


Hct   41.7 


 


MCV   87.8 


 


MCH   28.9 


 


MCHC   32.9 


 


RDW   15.9 


 


Plt Count   323  D 


 


MPV   7.3 L 


 


Absolute Neuts (auto)   9.5 H 


 


Neutrophils %   74.3 


 


Neutrophils % (Manual)   


 


Band Neutrophils %   


 


Lymphocytes %   13.8  D 


 


Lymphocytes % (Manual)   


 


Monocytes %   10.5 H D 


 


Monocytes % (Manual)   


 


Eosinophils %   0.9  D 


 


Eosinophils % (Manual)   


 


Basophils %   0.5  D 


 


Basophils % (Manual)   


 


Myelocytes % (Man)   


 


Promyelocytes % (Man)   


 


Blast Cells % (Manual)   


 


Nucleated RBC %   0 


 


Metamyelocytes   


 


Hypochromia   


 


Platelet Estimate   


 


Polychromasia   


 


Poikilocytosis   


 


Anisocytosis   


 


Microcytosis   


 


Macrocytosis   


 


Anticoagulation Therapy  No Result Required.  


 


Puncture Site  No Result Required.  


 


ABG pH  7.34 L  


 


ABG pCO2 at Pt Temp  57.8 H  


 


ABG pO2 at Pt Temp  53.8 L  


 


ABG HCO3  30.3 H  


 


ABG O2 Sat (Measured)  83.9 L  


 


ABG O2 Content  16.6  


 


ABG Base Excess  3.4 H  


 


Sb Test  Positive  


 


Carboxyhemoglobin   


 


Methemoglobin   


 


O2 Delivery Device  No Result Required.  


 


Oxygen Flow Rate  No Result Required.  


 


Vent Mode  No Result Required.  


 


Vent Rate  No Result Required.  


 


Mechanical Rate  No Result Required.  


 


Pressure Support Vent  No Result Required.  


 


Sodium    140


 


Potassium    4.0


 


Chloride    103


 


Carbon Dioxide    29


 


Anion Gap    8


 


BUN    31.2 H


 


Creatinine    1.0


 


Est GFR (CKD-EPI)AfAm    78.08


 


Est GFR (CKD-EPI)NonAf    67.37


 


POC Glucometer   


 


Random Glucose    155 H


 


Lactic Acid   


 


Calcium    8.9


 


Phosphorus   


 


Magnesium   


 


Total Bilirubin    0.5


 


AST    21


 


ALT    27


 


Alkaline Phosphatase    76


 


Creatine Kinase    86


 


Troponin I    0.02


 


B-Natriuretic Peptide    450.1 H


 


Total Protein    6.6


 


Albumin    3.5


 


TSH   


 


Urine Color   


 


Urine Appearance   


 


Urine pH   


 


Ur Specific Gravity   


 


Urine Protein   


 


Urine Glucose (UA)   


 


Urine Ketones   


 


Urine Blood   


 


Urine Nitrite   


 


Urine Bilirubin   


 


Urine Urobilinogen   


 


Ur Leukocyte Esterase   


 


Stool Occult Blood   














  06/17/19 06/17/19 06/18/19





  15:45 15:45 00:15


 


WBC   


 


RBC   


 


Hgb   


 


Hct   


 


MCV   


 


MCH   


 


MCHC   


 


RDW   


 


Plt Count   


 


MPV   


 


Absolute Neuts (auto)   


 


Neutrophils %   


 


Neutrophils % (Manual)   


 


Band Neutrophils %   


 


Lymphocytes %   


 


Lymphocytes % (Manual)   


 


Monocytes %   


 


Monocytes % (Manual)   


 


Eosinophils %   


 


Eosinophils % (Manual)   


 


Basophils %   


 


Basophils % (Manual)   


 


Myelocytes % (Man)   


 


Promyelocytes % (Man)   


 


Blast Cells % (Manual)   


 


Nucleated RBC %   


 


Metamyelocytes   


 


Hypochromia   


 


Platelet Estimate   


 


Polychromasia   


 


Poikilocytosis   


 


Anisocytosis   


 


Microcytosis   


 


Macrocytosis   


 


Anticoagulation Therapy    No Result Required.


 


Puncture Site    Right radial


 


ABG pH    7.34 L


 


ABG pCO2 at Pt Temp    62.7 H


 


ABG pO2 at Pt Temp    128 H


 


ABG HCO3    33.1 H


 


ABG O2 Sat (Measured)    98.6 H


 


ABG O2 Content    18.8


 


ABG Base Excess    5.8 H


 


Sb Test    Positive


 


Carboxyhemoglobin   1.4 


 


Methemoglobin   0.1 


 


O2 Delivery Device    Bipap


 


Oxygen Flow Rate    50%


 


Vent Mode    S/t


 


Vent Rate    14


 


Mechanical Rate    No Result Required.


 


Pressure Support Vent    10/5


 


Sodium   


 


Potassium   


 


Chloride   


 


Carbon Dioxide   


 


Anion Gap   


 


BUN   


 


Creatinine   


 


Est GFR (CKD-EPI)AfAm   


 


Est GFR (CKD-EPI)NonAf   


 


POC Glucometer   


 


Random Glucose   


 


Lactic Acid  2.1 H  


 


Calcium   


 


Phosphorus   


 


Magnesium   


 


Total Bilirubin   


 


AST   


 


ALT   


 


Alkaline Phosphatase   


 


Creatine Kinase   


 


Troponin I   


 


B-Natriuretic Peptide   


 


Total Protein   


 


Albumin   


 


TSH   


 


Urine Color   


 


Urine Appearance   


 


Urine pH   


 


Ur Specific Gravity   


 


Urine Protein   


 


Urine Glucose (UA)   


 


Urine Ketones   


 


Urine Blood   


 


Urine Nitrite   


 


Urine Bilirubin   


 


Urine Urobilinogen   


 


Ur Leukocyte Esterase   


 


Stool Occult Blood   














  06/18/19 06/18/19 06/18/19





  06:30 06:30 16:14


 


WBC  13.6 H  


 


RBC  4.58  


 


Hgb  13.5  


 


Hct  39.9  


 


MCV  87.0  


 


MCH  29.4  


 


MCHC  33.8  


 


RDW  15.1  


 


Plt Count  304  


 


MPV  7.2 L  


 


Absolute Neuts (auto)  12.4 H  


 


Neutrophils %  91.5 H D  


 


Neutrophils % (Manual)  94.8 H  


 


Band Neutrophils %  0.0  


 


Lymphocytes %  5.9 L D  


 


Lymphocytes % (Manual)  3.1 L  


 


Monocytes %  2.5 L  


 


Monocytes % (Manual)  2 L  


 


Eosinophils %  0.0  D  


 


Eosinophils % (Manual)  0.0  


 


Basophils %  0.1  


 


Basophils % (Manual)  0.0  


 


Myelocytes % (Man)  0  


 


Promyelocytes % (Man)  0  


 


Blast Cells % (Manual)  0  


 


Nucleated RBC %  0  


 


Metamyelocytes  0  


 


Hypochromia   


 


Platelet Estimate  Adequate  


 


Polychromasia   


 


Poikilocytosis   


 


Anisocytosis   


 


Microcytosis   


 


Macrocytosis   


 


Anticoagulation Therapy   


 


Puncture Site   


 


ABG pH   


 


ABG pCO2 at Pt Temp   


 


ABG pO2 at Pt Temp   


 


ABG HCO3   


 


ABG O2 Sat (Measured)   


 


ABG O2 Content   


 


ABG Base Excess   


 


Sb Test   


 


Carboxyhemoglobin   


 


Methemoglobin   


 


O2 Delivery Device   


 


Oxygen Flow Rate   


 


Vent Mode   


 


Vent Rate   


 


Mechanical Rate   


 


Pressure Support Vent   


 


Sodium   142 


 


Potassium   4.3 


 


Chloride   103 


 


Carbon Dioxide   34 H 


 


Anion Gap   6 L 


 


BUN   28.0 H 


 


Creatinine   0.8 


 


Est GFR (CKD-EPI)AfAm   93.09 


 


Est GFR (CKD-EPI)NonAf   80.32 


 


POC Glucometer    228


 


Random Glucose   153 H 


 


Lactic Acid   


 


Calcium   8.5 


 


Phosphorus   5.3 H 


 


Magnesium   2.4 


 


Total Bilirubin   0.3 


 


AST   14 L 


 


ALT   23 


 


Alkaline Phosphatase   71 


 


Creatine Kinase   


 


Troponin I   


 


B-Natriuretic Peptide   


 


Total Protein   6.0 L 


 


Albumin   3.1 L 


 


TSH   0.38  D 


 


Urine Color   


 


Urine Appearance   


 


Urine pH   


 


Ur Specific Gravity   


 


Urine Protein   


 


Urine Glucose (UA)   


 


Urine Ketones   


 


Urine Blood   


 


Urine Nitrite   


 


Urine Bilirubin   


 


Urine Urobilinogen   


 


Ur Leukocyte Esterase   


 


Stool Occult Blood   














  06/18/19 06/19/19 06/19/19





  21:07 05:52 07:01


 


WBC   


 


RBC   


 


Hgb   


 


Hct   


 


MCV   


 


MCH   


 


MCHC   


 


RDW   


 


Plt Count   


 


MPV   


 


Absolute Neuts (auto)   


 


Neutrophils %   


 


Neutrophils % (Manual)   


 


Band Neutrophils %   


 


Lymphocytes %   


 


Lymphocytes % (Manual)   


 


Monocytes %   


 


Monocytes % (Manual)   


 


Eosinophils %   


 


Eosinophils % (Manual)   


 


Basophils %   


 


Basophils % (Manual)   


 


Myelocytes % (Man)   


 


Promyelocytes % (Man)   


 


Blast Cells % (Manual)   


 


Nucleated RBC %   


 


Metamyelocytes   


 


Hypochromia   


 


Platelet Estimate   


 


Polychromasia   


 


Poikilocytosis   


 


Anisocytosis   


 


Microcytosis   


 


Macrocytosis   


 


Anticoagulation Therapy   


 


Puncture Site   


 


ABG pH   


 


ABG pCO2 at Pt Temp   


 


ABG pO2 at Pt Temp   


 


ABG HCO3   


 


ABG O2 Sat (Measured)   


 


ABG O2 Content   


 


ABG Base Excess   


 


Sb Test   


 


Carboxyhemoglobin   


 


Methemoglobin   


 


O2 Delivery Device   


 


Oxygen Flow Rate   


 


Vent Mode   


 


Vent Rate   


 


Mechanical Rate   


 


Pressure Support Vent   


 


Sodium    137


 


Potassium    3.9


 


Chloride    99


 


Carbon Dioxide    30


 


Anion Gap    8


 


BUN    36.2 H


 


Creatinine    1.0


 


Est GFR (CKD-EPI)AfAm    78.08


 


Est GFR (CKD-EPI)NonAf    67.37


 


POC Glucometer  161  205 


 


Random Glucose    169 H


 


Lactic Acid   


 


Calcium    8.3 L


 


Phosphorus   


 


Magnesium   


 


Total Bilirubin    0.6


 


AST    12 L


 


ALT    17


 


Alkaline Phosphatase    59


 


Creatine Kinase   


 


Troponin I   


 


B-Natriuretic Peptide   


 


Total Protein    5.4 L


 


Albumin    2.6 L


 


TSH   


 


Urine Color   


 


Urine Appearance   


 


Urine pH   


 


Ur Specific Gravity   


 


Urine Protein   


 


Urine Glucose (UA)   


 


Urine Ketones   


 


Urine Blood   


 


Urine Nitrite   


 


Urine Bilirubin   


 


Urine Urobilinogen   


 


Ur Leukocyte Esterase   


 


Stool Occult Blood   














  06/19/19 06/19/19 06/19/19





  07:09 12:09 16:55


 


WBC  21.8 H  


 


RBC  4.70  


 


Hgb  14.0  


 


Hct  40.4  


 


MCV  86.0  


 


MCH  29.8  


 


MCHC  34.7  


 


RDW  14.8  


 


Plt Count  290  


 


MPV  7.6  


 


Absolute Neuts (auto)   


 


Neutrophils %   


 


Neutrophils % (Manual)   


 


Band Neutrophils %   


 


Lymphocytes %   


 


Lymphocytes % (Manual)   


 


Monocytes %   


 


Monocytes % (Manual)   


 


Eosinophils %   


 


Eosinophils % (Manual)   


 


Basophils %   


 


Basophils % (Manual)   


 


Myelocytes % (Man)   


 


Promyelocytes % (Man)   


 


Blast Cells % (Manual)   


 


Nucleated RBC %   


 


Metamyelocytes   


 


Hypochromia   


 


Platelet Estimate   


 


Polychromasia   


 


Poikilocytosis   


 


Anisocytosis   


 


Microcytosis   


 


Macrocytosis   


 


Anticoagulation Therapy   


 


Puncture Site   


 


ABG pH   


 


ABG pCO2 at Pt Temp   


 


ABG pO2 at Pt Temp   


 


ABG HCO3   


 


ABG O2 Sat (Measured)   


 


ABG O2 Content   


 


ABG Base Excess   


 


Sb Test   


 


Carboxyhemoglobin   


 


Methemoglobin   


 


O2 Delivery Device   


 


Oxygen Flow Rate   


 


Vent Mode   


 


Vent Rate   


 


Mechanical Rate   


 


Pressure Support Vent   


 


Sodium   


 


Potassium   


 


Chloride   


 


Carbon Dioxide   


 


Anion Gap   


 


BUN   


 


Creatinine   


 


Est GFR (CKD-EPI)AfAm   


 


Est GFR (CKD-EPI)NonAf   


 


POC Glucometer   357  160


 


Random Glucose   


 


Lactic Acid   


 


Calcium   


 


Phosphorus   


 


Magnesium   


 


Total Bilirubin   


 


AST   


 


ALT   


 


Alkaline Phosphatase   


 


Creatine Kinase   


 


Troponin I   


 


B-Natriuretic Peptide   


 


Total Protein   


 


Albumin   


 


TSH   


 


Urine Color   


 


Urine Appearance   


 


Urine pH   


 


Ur Specific Gravity   


 


Urine Protein   


 


Urine Glucose (UA)   


 


Urine Ketones   


 


Urine Blood   


 


Urine Nitrite   


 


Urine Bilirubin   


 


Urine Urobilinogen   


 


Ur Leukocyte Esterase   


 


Stool Occult Blood   














  06/19/19 06/20/19 06/20/19





  21:52 06:16 11:41


 


WBC   


 


RBC   


 


Hgb   


 


Hct   


 


MCV   


 


MCH   


 


MCHC   


 


RDW   


 


Plt Count   


 


MPV   


 


Absolute Neuts (auto)   


 


Neutrophils %   


 


Neutrophils % (Manual)   


 


Band Neutrophils %   


 


Lymphocytes %   


 


Lymphocytes % (Manual)   


 


Monocytes %   


 


Monocytes % (Manual)   


 


Eosinophils %   


 


Eosinophils % (Manual)   


 


Basophils %   


 


Basophils % (Manual)   


 


Myelocytes % (Man)   


 


Promyelocytes % (Man)   


 


Blast Cells % (Manual)   


 


Nucleated RBC %   


 


Metamyelocytes   


 


Hypochromia   


 


Platelet Estimate   


 


Polychromasia   


 


Poikilocytosis   


 


Anisocytosis   


 


Microcytosis   


 


Macrocytosis   


 


Anticoagulation Therapy   


 


Puncture Site   


 


ABG pH   


 


ABG pCO2 at Pt Temp   


 


ABG pO2 at Pt Temp   


 


ABG HCO3   


 


ABG O2 Sat (Measured)   


 


ABG O2 Content   


 


ABG Base Excess   


 


Sb Test   


 


Carboxyhemoglobin   


 


Methemoglobin   


 


O2 Delivery Device   


 


Oxygen Flow Rate   


 


Vent Mode   


 


Vent Rate   


 


Mechanical Rate   


 


Pressure Support Vent   


 


Sodium   


 


Potassium   


 


Chloride   


 


Carbon Dioxide   


 


Anion Gap   


 


BUN   


 


Creatinine   


 


Est GFR (CKD-EPI)AfAm   


 


Est GFR (CKD-EPI)NonAf   


 


POC Glucometer  227  192  262


 


Random Glucose   


 


Lactic Acid   


 


Calcium   


 


Phosphorus   


 


Magnesium   


 


Total Bilirubin   


 


AST   


 


ALT   


 


Alkaline Phosphatase   


 


Creatine Kinase   


 


Troponin I   


 


B-Natriuretic Peptide   


 


Total Protein   


 


Albumin   


 


TSH   


 


Urine Color   


 


Urine Appearance   


 


Urine pH   


 


Ur Specific Gravity   


 


Urine Protein   


 


Urine Glucose (UA)   


 


Urine Ketones   


 


Urine Blood   


 


Urine Nitrite   


 


Urine Bilirubin   


 


Urine Urobilinogen   


 


Ur Leukocyte Esterase   


 


Stool Occult Blood   














  06/20/19 06/20/19 06/21/19





  16:12 21:46 06:17


 


WBC   


 


RBC   


 


Hgb   


 


Hct   


 


MCV   


 


MCH   


 


MCHC   


 


RDW   


 


Plt Count   


 


MPV   


 


Absolute Neuts (auto)   


 


Neutrophils %   


 


Neutrophils % (Manual)   


 


Band Neutrophils %   


 


Lymphocytes %   


 


Lymphocytes % (Manual)   


 


Monocytes %   


 


Monocytes % (Manual)   


 


Eosinophils %   


 


Eosinophils % (Manual)   


 


Basophils %   


 


Basophils % (Manual)   


 


Myelocytes % (Man)   


 


Promyelocytes % (Man)   


 


Blast Cells % (Manual)   


 


Nucleated RBC %   


 


Metamyelocytes   


 


Hypochromia   


 


Platelet Estimate   


 


Polychromasia   


 


Poikilocytosis   


 


Anisocytosis   


 


Microcytosis   


 


Macrocytosis   


 


Anticoagulation Therapy   


 


Puncture Site   


 


ABG pH   


 


ABG pCO2 at Pt Temp   


 


ABG pO2 at Pt Temp   


 


ABG HCO3   


 


ABG O2 Sat (Measured)   


 


ABG O2 Content   


 


ABG Base Excess   


 


Sb Test   


 


Carboxyhemoglobin   


 


Methemoglobin   


 


O2 Delivery Device   


 


Oxygen Flow Rate   


 


Vent Mode   


 


Vent Rate   


 


Mechanical Rate   


 


Pressure Support Vent   


 


Sodium   


 


Potassium   


 


Chloride   


 


Carbon Dioxide   


 


Anion Gap   


 


BUN   


 


Creatinine   


 


Est GFR (CKD-EPI)AfAm   


 


Est GFR (CKD-EPI)NonAf   


 


POC Glucometer  303  124  203


 


Random Glucose   


 


Lactic Acid   


 


Calcium   


 


Phosphorus   


 


Magnesium   


 


Total Bilirubin   


 


AST   


 


ALT   


 


Alkaline Phosphatase   


 


Creatine Kinase   


 


Troponin I   


 


B-Natriuretic Peptide   


 


Total Protein   


 


Albumin   


 


TSH   


 


Urine Color   


 


Urine Appearance   


 


Urine pH   


 


Ur Specific Gravity   


 


Urine Protein   


 


Urine Glucose (UA)   


 


Urine Ketones   


 


Urine Blood   


 


Urine Nitrite   


 


Urine Bilirubin   


 


Urine Urobilinogen   


 


Ur Leukocyte Esterase   


 


Stool Occult Blood   














  06/21/19 06/21/19 06/21/19





  07:10 07:10 11:36


 


WBC  13.9 H  


 


RBC  5.46  


 


Hgb  15.9  


 


Hct  47.0  D  


 


MCV  86.0  


 


MCH  29.1  


 


MCHC  33.9  


 


RDW  15.0  


 


Plt Count  290  


 


MPV  7.6  


 


Absolute Neuts (auto)  12.8 H  


 


Neutrophils %  92.1 H  


 


Neutrophils % (Manual)  90.0 H  


 


Band Neutrophils %  0.0  


 


Lymphocytes %  5.4 L  


 


Lymphocytes % (Manual)  4.0 L D  


 


Monocytes %  2.4 L  


 


Monocytes % (Manual)  1 L  


 


Eosinophils %  0.0  


 


Eosinophils % (Manual)  0.0  


 


Basophils %  0.1  


 


Basophils % (Manual)  0.0  


 


Myelocytes % (Man)  0  


 


Promyelocytes % (Man)  0  


 


Blast Cells % (Manual)  0  


 


Nucleated RBC %  0  


 


Metamyelocytes  0  


 


Hypochromia  0  


 


Platelet Estimate  Normal  


 


Polychromasia  0  


 


Poikilocytosis  0  


 


Anisocytosis  0  


 


Microcytosis  0  


 


Macrocytosis  0  


 


Anticoagulation Therapy   


 


Puncture Site   


 


ABG pH   


 


ABG pCO2 at Pt Temp   


 


ABG pO2 at Pt Temp   


 


ABG HCO3   


 


ABG O2 Sat (Measured)   


 


ABG O2 Content   


 


ABG Base Excess   


 


Sb Test   


 


Carboxyhemoglobin   


 


Methemoglobin   


 


O2 Delivery Device   


 


Oxygen Flow Rate   


 


Vent Mode   


 


Vent Rate   


 


Mechanical Rate   


 


Pressure Support Vent   


 


Sodium   140 


 


Potassium   3.3 L 


 


Chloride   97 L 


 


Carbon Dioxide   37 H 


 


Anion Gap   6 L 


 


BUN   35.6 H 


 


Creatinine   0.9 


 


Est GFR (CKD-EPI)AfAm   88.69 


 


Est GFR (CKD-EPI)NonAf   76.52 


 


POC Glucometer    265


 


Random Glucose   192 H 


 


Lactic Acid   


 


Calcium   8.9 


 


Phosphorus   


 


Magnesium   


 


Total Bilirubin   0.6 


 


AST   12 L 


 


ALT   23 


 


Alkaline Phosphatase   54 


 


Creatine Kinase   


 


Troponin I   


 


B-Natriuretic Peptide   


 


Total Protein   6.0 L 


 


Albumin   2.9 L 


 


TSH   


 


Urine Color   


 


Urine Appearance   


 


Urine pH   


 


Ur Specific Gravity   


 


Urine Protein   


 


Urine Glucose (UA)   


 


Urine Ketones   


 


Urine Blood   


 


Urine Nitrite   


 


Urine Bilirubin   


 


Urine Urobilinogen   


 


Ur Leukocyte Esterase   


 


Stool Occult Blood   














  06/21/19 06/21/19 06/22/19





  16:06 21:24 06:45


 


WBC   


 


RBC   


 


Hgb   


 


Hct   


 


MCV   


 


MCH   


 


MCHC   


 


RDW   


 


Plt Count   


 


MPV   


 


Absolute Neuts (auto)   


 


Neutrophils %   


 


Neutrophils % (Manual)   


 


Band Neutrophils %   


 


Lymphocytes %   


 


Lymphocytes % (Manual)   


 


Monocytes %   


 


Monocytes % (Manual)   


 


Eosinophils %   


 


Eosinophils % (Manual)   


 


Basophils %   


 


Basophils % (Manual)   


 


Myelocytes % (Man)   


 


Promyelocytes % (Man)   


 


Blast Cells % (Manual)   


 


Nucleated RBC %   


 


Metamyelocytes   


 


Hypochromia   


 


Platelet Estimate   


 


Polychromasia   


 


Poikilocytosis   


 


Anisocytosis   


 


Microcytosis   


 


Macrocytosis   


 


Anticoagulation Therapy   


 


Puncture Site   


 


ABG pH   


 


ABG pCO2 at Pt Temp   


 


ABG pO2 at Pt Temp   


 


ABG HCO3   


 


ABG O2 Sat (Measured)   


 


ABG O2 Content   


 


ABG Base Excess   


 


Sb Test   


 


Carboxyhemoglobin   


 


Methemoglobin   


 


O2 Delivery Device   


 


Oxygen Flow Rate   


 


Vent Mode   


 


Vent Rate   


 


Mechanical Rate   


 


Pressure Support Vent   


 


Sodium   


 


Potassium   


 


Chloride   


 


Carbon Dioxide   


 


Anion Gap   


 


BUN   


 


Creatinine   


 


Est GFR (CKD-EPI)AfAm   


 


Est GFR (CKD-EPI)NonAf   


 


POC Glucometer  343  238  242


 


Random Glucose   


 


Lactic Acid   


 


Calcium   


 


Phosphorus   


 


Magnesium   


 


Total Bilirubin   


 


AST   


 


ALT   


 


Alkaline Phosphatase   


 


Creatine Kinase   


 


Troponin I   


 


B-Natriuretic Peptide   


 


Total Protein   


 


Albumin   


 


TSH   


 


Urine Color   


 


Urine Appearance   


 


Urine pH   


 


Ur Specific Gravity   


 


Urine Protein   


 


Urine Glucose (UA)   


 


Urine Ketones   


 


Urine Blood   


 


Urine Nitrite   


 


Urine Bilirubin   


 


Urine Urobilinogen   


 


Ur Leukocyte Esterase   


 


Stool Occult Blood   














  06/22/19 06/22/19 06/22/19





  08:46 12:09 14:38


 


WBC   


 


RBC   


 


Hgb   


 


Hct   


 


MCV   


 


MCH   


 


MCHC   


 


RDW   


 


Plt Count   


 


MPV   


 


Absolute Neuts (auto)   


 


Neutrophils %   


 


Neutrophils % (Manual)   


 


Band Neutrophils %   


 


Lymphocytes %   


 


Lymphocytes % (Manual)   


 


Monocytes %   


 


Monocytes % (Manual)   


 


Eosinophils %   


 


Eosinophils % (Manual)   


 


Basophils %   


 


Basophils % (Manual)   


 


Myelocytes % (Man)   


 


Promyelocytes % (Man)   


 


Blast Cells % (Manual)   


 


Nucleated RBC %   


 


Metamyelocytes   


 


Hypochromia   


 


Platelet Estimate   


 


Polychromasia   


 


Poikilocytosis   


 


Anisocytosis   


 


Microcytosis   


 


Macrocytosis   


 


Anticoagulation Therapy   


 


Puncture Site   


 


ABG pH   


 


ABG pCO2 at Pt Temp   


 


ABG pO2 at Pt Temp   


 


ABG HCO3   


 


ABG O2 Sat (Measured)   


 


ABG O2 Content   


 


ABG Base Excess   


 


Sb Test   


 


Carboxyhemoglobin   


 


Methemoglobin   


 


O2 Delivery Device   


 


Oxygen Flow Rate   


 


Vent Mode   


 


Vent Rate   


 


Mechanical Rate   


 


Pressure Support Vent   


 


Sodium  142  


 


Potassium  4.1  


 


Chloride  99  


 


Carbon Dioxide  37 H  


 


Anion Gap  5 L  


 


BUN  41.9 H  


 


Creatinine  0.9  


 


Est GFR (CKD-EPI)AfAm  88.69  


 


Est GFR (CKD-EPI)NonAf  76.52  


 


POC Glucometer   335 


 


Random Glucose  191 H  


 


Lactic Acid   


 


Calcium  8.8  


 


Phosphorus   


 


Magnesium  2.5 H  


 


Total Bilirubin  0.5  


 


AST  16  


 


ALT  35  


 


Alkaline Phosphatase  54  


 


Creatine Kinase   


 


Troponin I    0.02


 


B-Natriuretic Peptide   


 


Total Protein  6.1 L  


 


Albumin  3.0 L  


 


TSH   


 


Urine Color   


 


Urine Appearance   


 


Urine pH   


 


Ur Specific Gravity   


 


Urine Protein   


 


Urine Glucose (UA)   


 


Urine Ketones   


 


Urine Blood   


 


Urine Nitrite   


 


Urine Bilirubin   


 


Urine Urobilinogen   


 


Ur Leukocyte Esterase   


 


Stool Occult Blood   














  06/22/19 06/22/19 06/23/19





  17:02 20:57 06:21


 


WBC   


 


RBC   


 


Hgb   


 


Hct   


 


MCV   


 


MCH   


 


MCHC   


 


RDW   


 


Plt Count   


 


MPV   


 


Absolute Neuts (auto)   


 


Neutrophils %   


 


Neutrophils % (Manual)   


 


Band Neutrophils %   


 


Lymphocytes %   


 


Lymphocytes % (Manual)   


 


Monocytes %   


 


Monocytes % (Manual)   


 


Eosinophils %   


 


Eosinophils % (Manual)   


 


Basophils %   


 


Basophils % (Manual)   


 


Myelocytes % (Man)   


 


Promyelocytes % (Man)   


 


Blast Cells % (Manual)   


 


Nucleated RBC %   


 


Metamyelocytes   


 


Hypochromia   


 


Platelet Estimate   


 


Polychromasia   


 


Poikilocytosis   


 


Anisocytosis   


 


Microcytosis   


 


Macrocytosis   


 


Anticoagulation Therapy   


 


Puncture Site   


 


ABG pH   


 


ABG pCO2 at Pt Temp   


 


ABG pO2 at Pt Temp   


 


ABG HCO3   


 


ABG O2 Sat (Measured)   


 


ABG O2 Content   


 


ABG Base Excess   


 


Sb Test   


 


Carboxyhemoglobin   


 


Methemoglobin   


 


O2 Delivery Device   


 


Oxygen Flow Rate   


 


Vent Mode   


 


Vent Rate   


 


Mechanical Rate   


 


Pressure Support Vent   


 


Sodium   


 


Potassium   


 


Chloride   


 


Carbon Dioxide   


 


Anion Gap   


 


BUN   


 


Creatinine   


 


Est GFR (CKD-EPI)AfAm   


 


Est GFR (CKD-EPI)NonAf   


 


POC Glucometer  406  256  175


 


Random Glucose   


 


Lactic Acid   


 


Calcium   


 


Phosphorus   


 


Magnesium   


 


Total Bilirubin   


 


AST   


 


ALT   


 


Alkaline Phosphatase   


 


Creatine Kinase   


 


Troponin I   


 


B-Natriuretic Peptide   


 


Total Protein   


 


Albumin   


 


TSH   


 


Urine Color   


 


Urine Appearance   


 


Urine pH   


 


Ur Specific Gravity   


 


Urine Protein   


 


Urine Glucose (UA)   


 


Urine Ketones   


 


Urine Blood   


 


Urine Nitrite   


 


Urine Bilirubin   


 


Urine Urobilinogen   


 


Ur Leukocyte Esterase   


 


Stool Occult Blood   














  06/23/19 06/23/19 06/23/19





  08:07 08:07 11:30


 


WBC  13.4 H  


 


RBC  5.28  


 


Hgb  15.3  


 


Hct  45.4  


 


MCV  85.9  


 


MCH  29.0  


 


MCHC  33.7  


 


RDW  14.8  


 


Plt Count  87 L D  


 


MPV  8.5  D  


 


Absolute Neuts (auto)   


 


Neutrophils %   


 


Neutrophils % (Manual)   


 


Band Neutrophils %   


 


Lymphocytes %   


 


Lymphocytes % (Manual)   


 


Monocytes %   


 


Monocytes % (Manual)   


 


Eosinophils %   


 


Eosinophils % (Manual)   


 


Basophils %   


 


Basophils % (Manual)   


 


Myelocytes % (Man)   


 


Promyelocytes % (Man)   


 


Blast Cells % (Manual)   


 


Nucleated RBC %   


 


Metamyelocytes   


 


Hypochromia   


 


Platelet Estimate   


 


Polychromasia   


 


Poikilocytosis   


 


Anisocytosis   


 


Microcytosis   


 


Macrocytosis   


 


Anticoagulation Therapy   


 


Puncture Site   


 


ABG pH   


 


ABG pCO2 at Pt Temp   


 


ABG pO2 at Pt Temp   


 


ABG HCO3   


 


ABG O2 Sat (Measured)   


 


ABG O2 Content   


 


ABG Base Excess   


 


Sb Test   


 


Carboxyhemoglobin   


 


Methemoglobin   


 


O2 Delivery Device   


 


Oxygen Flow Rate   


 


Vent Mode   


 


Vent Rate   


 


Mechanical Rate   


 


Pressure Support Vent   


 


Sodium   143 


 


Potassium   4.0 


 


Chloride   100 


 


Carbon Dioxide   39 H 


 


Anion Gap   4 L 


 


BUN   38.4 H 


 


Creatinine   0.7 


 


Est GFR (CKD-EPI)AfAm   98.34 


 


Est GFR (CKD-EPI)NonAf   84.85 


 


POC Glucometer    306


 


Random Glucose   185 H 


 


Lactic Acid   


 


Calcium   8.6 


 


Phosphorus   


 


Magnesium   


 


Total Bilirubin   0.6 


 


AST   16 


 


ALT   36 


 


Alkaline Phosphatase   47 


 


Creatine Kinase   


 


Troponin I   


 


B-Natriuretic Peptide   


 


Total Protein   5.4 L 


 


Albumin   2.7 L 


 


TSH   


 


Urine Color   


 


Urine Appearance   


 


Urine pH   


 


Ur Specific Gravity   


 


Urine Protein   


 


Urine Glucose (UA)   


 


Urine Ketones   


 


Urine Blood   


 


Urine Nitrite   


 


Urine Bilirubin   


 


Urine Urobilinogen   


 


Ur Leukocyte Esterase   


 


Stool Occult Blood   














  06/23/19 06/23/19 06/24/19





  16:23 20:52 06:00


 


WBC    10.1 H


 


RBC    4.85


 


Hgb    14.1


 


Hct    41.8


 


MCV    86.3


 


MCH    29.1


 


MCHC    33.7


 


RDW    15.2


 


Plt Count    249  D


 


MPV    8.0


 


Absolute Neuts (auto)   


 


Neutrophils %   


 


Neutrophils % (Manual)   


 


Band Neutrophils %   


 


Lymphocytes %   


 


Lymphocytes % (Manual)   


 


Monocytes %   


 


Monocytes % (Manual)   


 


Eosinophils %   


 


Eosinophils % (Manual)   


 


Basophils %   


 


Basophils % (Manual)   


 


Myelocytes % (Man)   


 


Promyelocytes % (Man)   


 


Blast Cells % (Manual)   


 


Nucleated RBC %   


 


Metamyelocytes   


 


Hypochromia   


 


Platelet Estimate   


 


Polychromasia   


 


Poikilocytosis   


 


Anisocytosis   


 


Microcytosis   


 


Macrocytosis   


 


Anticoagulation Therapy   


 


Puncture Site   


 


ABG pH   


 


ABG pCO2 at Pt Temp   


 


ABG pO2 at Pt Temp   


 


ABG HCO3   


 


ABG O2 Sat (Measured)   


 


ABG O2 Content   


 


ABG Base Excess   


 


Sb Test   


 


Carboxyhemoglobin   


 


Methemoglobin   


 


O2 Delivery Device   


 


Oxygen Flow Rate   


 


Vent Mode   


 


Vent Rate   


 


Mechanical Rate   


 


Pressure Support Vent   


 


Sodium   


 


Potassium   


 


Chloride   


 


Carbon Dioxide   


 


Anion Gap   


 


BUN   


 


Creatinine   


 


Est GFR (CKD-EPI)AfAm   


 


Est GFR (CKD-EPI)NonAf   


 


POC Glucometer  277  236 


 


Random Glucose   


 


Lactic Acid   


 


Calcium   


 


Phosphorus   


 


Magnesium   


 


Total Bilirubin   


 


AST   


 


ALT   


 


Alkaline Phosphatase   


 


Creatine Kinase   


 


Troponin I   


 


B-Natriuretic Peptide   


 


Total Protein   


 


Albumin   


 


TSH   


 


Urine Color   


 


Urine Appearance   


 


Urine pH   


 


Ur Specific Gravity   


 


Urine Protein   


 


Urine Glucose (UA)   


 


Urine Ketones   


 


Urine Blood   


 


Urine Nitrite   


 


Urine Bilirubin   


 


Urine Urobilinogen   


 


Ur Leukocyte Esterase   


 


Stool Occult Blood   














  06/24/19 06/24/19 06/24/19





  06:00 06:13 12:14


 


WBC   


 


RBC   


 


Hgb   


 


Hct   


 


MCV   


 


MCH   


 


MCHC   


 


RDW   


 


Plt Count   


 


MPV   


 


Absolute Neuts (auto)   


 


Neutrophils %   


 


Neutrophils % (Manual)   


 


Band Neutrophils %   


 


Lymphocytes %   


 


Lymphocytes % (Manual)   


 


Monocytes %   


 


Monocytes % (Manual)   


 


Eosinophils %   


 


Eosinophils % (Manual)   


 


Basophils %   


 


Basophils % (Manual)   


 


Myelocytes % (Man)   


 


Promyelocytes % (Man)   


 


Blast Cells % (Manual)   


 


Nucleated RBC %   


 


Metamyelocytes   


 


Hypochromia   


 


Platelet Estimate   


 


Polychromasia   


 


Poikilocytosis   


 


Anisocytosis   


 


Microcytosis   


 


Macrocytosis   


 


Anticoagulation Therapy   


 


Puncture Site   


 


ABG pH   


 


ABG pCO2 at Pt Temp   


 


ABG pO2 at Pt Temp   


 


ABG HCO3   


 


ABG O2 Sat (Measured)   


 


ABG O2 Content   


 


ABG Base Excess   


 


Sb Test   


 


Carboxyhemoglobin   


 


Methemoglobin   


 


O2 Delivery Device   


 


Oxygen Flow Rate   


 


Vent Mode   


 


Vent Rate   


 


Mechanical Rate   


 


Pressure Support Vent   


 


Sodium  139  


 


Potassium  4.6  


 


Chloride  98  


 


Carbon Dioxide  39 H  


 


Anion Gap  2 L  


 


BUN  47.4 H  


 


Creatinine  0.7  


 


Est GFR (CKD-EPI)AfAm  98.34  


 


Est GFR (CKD-EPI)NonAf  84.85  


 


POC Glucometer   225  267


 


Random Glucose  226 H  


 


Lactic Acid   


 


Calcium  8.4 L  


 


Phosphorus   


 


Magnesium   


 


Total Bilirubin  0.6  


 


AST  25  


 


ALT  40  


 


Alkaline Phosphatase  44 L  


 


Creatine Kinase   


 


Troponin I   


 


B-Natriuretic Peptide   


 


Total Protein  5.3 L  


 


Albumin  2.5 L  


 


TSH   


 


Urine Color   


 


Urine Appearance   


 


Urine pH   


 


Ur Specific Gravity   


 


Urine Protein   


 


Urine Glucose (UA)   


 


Urine Ketones   


 


Urine Blood   


 


Urine Nitrite   


 


Urine Bilirubin   


 


Urine Urobilinogen   


 


Ur Leukocyte Esterase   


 


Stool Occult Blood   














  06/24/19 06/24/19 06/25/19





  17:20 22:12 05:34


 


WBC   


 


RBC   


 


Hgb   


 


Hct   


 


MCV   


 


MCH   


 


MCHC   


 


RDW   


 


Plt Count   


 


MPV   


 


Absolute Neuts (auto)   


 


Neutrophils %   


 


Neutrophils % (Manual)   


 


Band Neutrophils %   


 


Lymphocytes %   


 


Lymphocytes % (Manual)   


 


Monocytes %   


 


Monocytes % (Manual)   


 


Eosinophils %   


 


Eosinophils % (Manual)   


 


Basophils %   


 


Basophils % (Manual)   


 


Myelocytes % (Man)   


 


Promyelocytes % (Man)   


 


Blast Cells % (Manual)   


 


Nucleated RBC %   


 


Metamyelocytes   


 


Hypochromia   


 


Platelet Estimate   


 


Polychromasia   


 


Poikilocytosis   


 


Anisocytosis   


 


Microcytosis   


 


Macrocytosis   


 


Anticoagulation Therapy   


 


Puncture Site   


 


ABG pH   


 


ABG pCO2 at Pt Temp   


 


ABG pO2 at Pt Temp   


 


ABG HCO3   


 


ABG O2 Sat (Measured)   


 


ABG O2 Content   


 


ABG Base Excess   


 


Sb Test   


 


Carboxyhemoglobin   


 


Methemoglobin   


 


O2 Delivery Device   


 


Oxygen Flow Rate   


 


Vent Mode   


 


Vent Rate   


 


Mechanical Rate   


 


Pressure Support Vent   


 


Sodium   


 


Potassium   


 


Chloride   


 


Carbon Dioxide   


 


Anion Gap   


 


BUN   


 


Creatinine   


 


Est GFR (CKD-EPI)AfAm   


 


Est GFR (CKD-EPI)NonAf   


 


POC Glucometer  313  258  191


 


Random Glucose   


 


Lactic Acid   


 


Calcium   


 


Phosphorus   


 


Magnesium   


 


Total Bilirubin   


 


AST   


 


ALT   


 


Alkaline Phosphatase   


 


Creatine Kinase   


 


Troponin I   


 


B-Natriuretic Peptide   


 


Total Protein   


 


Albumin   


 


TSH   


 


Urine Color   


 


Urine Appearance   


 


Urine pH   


 


Ur Specific Gravity   


 


Urine Protein   


 


Urine Glucose (UA)   


 


Urine Ketones   


 


Urine Blood   


 


Urine Nitrite   


 


Urine Bilirubin   


 


Urine Urobilinogen   


 


Ur Leukocyte Esterase   


 


Stool Occult Blood   














  06/25/19 06/25/19 06/25/19





  07:58 07:58 11:41


 


WBC  9.4  


 


RBC  4.68  


 


Hgb  13.5  


 


Hct  40.5  


 


MCV  86.5  


 


MCH  28.9  


 


MCHC  33.4  


 


RDW  14.2  


 


Plt Count  245  


 


MPV  8.0  


 


Absolute Neuts (auto)   


 


Neutrophils %   


 


Neutrophils % (Manual)   


 


Band Neutrophils %   


 


Lymphocytes %   


 


Lymphocytes % (Manual)   


 


Monocytes %   


 


Monocytes % (Manual)   


 


Eosinophils %   


 


Eosinophils % (Manual)   


 


Basophils %   


 


Basophils % (Manual)   


 


Myelocytes % (Man)   


 


Promyelocytes % (Man)   


 


Blast Cells % (Manual)   


 


Nucleated RBC %   


 


Metamyelocytes   


 


Hypochromia   


 


Platelet Estimate   


 


Polychromasia   


 


Poikilocytosis   


 


Anisocytosis   


 


Microcytosis   


 


Macrocytosis   


 


Anticoagulation Therapy   


 


Puncture Site   


 


ABG pH   


 


ABG pCO2 at Pt Temp   


 


ABG pO2 at Pt Temp   


 


ABG HCO3   


 


ABG O2 Sat (Measured)   


 


ABG O2 Content   


 


ABG Base Excess   


 


Sb Test   


 


Carboxyhemoglobin   


 


Methemoglobin   


 


O2 Delivery Device   


 


Oxygen Flow Rate   


 


Vent Mode   


 


Vent Rate   


 


Mechanical Rate   


 


Pressure Support Vent   


 


Sodium   140 


 


Potassium   4.2 


 


Chloride   98 


 


Carbon Dioxide   38 H 


 


Anion Gap   4 L 


 


BUN   51.1 H 


 


Creatinine   0.8 


 


Est GFR (CKD-EPI)AfAm   93.09 


 


Est GFR (CKD-EPI)NonAf   80.32 


 


POC Glucometer    326


 


Random Glucose   181 H 


 


Lactic Acid   


 


Calcium   8.4 L 


 


Phosphorus   


 


Magnesium   


 


Total Bilirubin   0.5 


 


AST   21 


 


ALT   57 


 


Alkaline Phosphatase   40 L 


 


Creatine Kinase   


 


Troponin I   


 


B-Natriuretic Peptide   


 


Total Protein   4.9 L 


 


Albumin   2.4 L 


 


TSH   


 


Urine Color   


 


Urine Appearance   


 


Urine pH   


 


Ur Specific Gravity   


 


Urine Protein   


 


Urine Glucose (UA)   


 


Urine Ketones   


 


Urine Blood   


 


Urine Nitrite   


 


Urine Bilirubin   


 


Urine Urobilinogen   


 


Ur Leukocyte Esterase   


 


Stool Occult Blood   














  06/25/19 06/25/19 06/25/19





  18:22 20:50 21:04


 


WBC   


 


RBC   


 


Hgb   


 


Hct   


 


MCV   


 


MCH   


 


MCHC   


 


RDW   


 


Plt Count   


 


MPV   


 


Absolute Neuts (auto)   


 


Neutrophils %   


 


Neutrophils % (Manual)   


 


Band Neutrophils %   


 


Lymphocytes %   


 


Lymphocytes % (Manual)   


 


Monocytes %   


 


Monocytes % (Manual)   


 


Eosinophils %   


 


Eosinophils % (Manual)   


 


Basophils %   


 


Basophils % (Manual)   


 


Myelocytes % (Man)   


 


Promyelocytes % (Man)   


 


Blast Cells % (Manual)   


 


Nucleated RBC %   


 


Metamyelocytes   


 


Hypochromia   


 


Platelet Estimate   


 


Polychromasia   


 


Poikilocytosis   


 


Anisocytosis   


 


Microcytosis   


 


Macrocytosis   


 


Anticoagulation Therapy   


 


Puncture Site   


 


ABG pH   


 


ABG pCO2 at Pt Temp   


 


ABG pO2 at Pt Temp   


 


ABG HCO3   


 


ABG O2 Sat (Measured)   


 


ABG O2 Content   


 


ABG Base Excess   


 


Sb Test   


 


Carboxyhemoglobin   


 


Methemoglobin   


 


O2 Delivery Device   


 


Oxygen Flow Rate   


 


Vent Mode   


 


Vent Rate   


 


Mechanical Rate   


 


Pressure Support Vent   


 


Sodium   


 


Potassium   


 


Chloride   


 


Carbon Dioxide   


 


Anion Gap   


 


BUN   


 


Creatinine   


 


Est GFR (CKD-EPI)AfAm   


 


Est GFR (CKD-EPI)NonAf   


 


POC Glucometer   300 


 


Random Glucose   


 


Lactic Acid   


 


Calcium   


 


Phosphorus   


 


Magnesium   


 


Total Bilirubin   


 


AST   


 


ALT   


 


Alkaline Phosphatase   


 


Creatine Kinase   


 


Troponin I   


 


B-Natriuretic Peptide   


 


Total Protein   


 


Albumin   


 


TSH   


 


Urine Color    Yellow


 


Urine Appearance    Clear


 


Urine pH    6.0


 


Ur Specific Gravity    1.017


 


Urine Protein    Negative


 


Urine Glucose (UA)    1+ H


 


Urine Ketones    Negative


 


Urine Blood    Negative


 


Urine Nitrite    Negative


 


Urine Bilirubin    Negative


 


Urine Urobilinogen    0.2


 


Ur Leukocyte Esterase    Negative


 


Stool Occult Blood  Positive  














  06/26/19 06/26/19 06/26/19





  06:58 07:46 07:46


 


WBC    12.8 H


 


RBC    4.73


 


Hgb    13.6


 


Hct    40.6


 


MCV    85.9


 


MCH    28.8


 


MCHC    33.5


 


RDW    15.0


 


Plt Count    229


 


MPV    8.1


 


Absolute Neuts (auto)   


 


Neutrophils %   


 


Neutrophils % (Manual)   


 


Band Neutrophils %   


 


Lymphocytes %   


 


Lymphocytes % (Manual)   


 


Monocytes %   


 


Monocytes % (Manual)   


 


Eosinophils %   


 


Eosinophils % (Manual)   


 


Basophils %   


 


Basophils % (Manual)   


 


Myelocytes % (Man)   


 


Promyelocytes % (Man)   


 


Blast Cells % (Manual)   


 


Nucleated RBC %   


 


Metamyelocytes   


 


Hypochromia   


 


Platelet Estimate   


 


Polychromasia   


 


Poikilocytosis   


 


Anisocytosis   


 


Microcytosis   


 


Macrocytosis   


 


Anticoagulation Therapy   


 


Puncture Site   


 


ABG pH   


 


ABG pCO2 at Pt Temp   


 


ABG pO2 at Pt Temp   


 


ABG HCO3   


 


ABG O2 Sat (Measured)   


 


ABG O2 Content   


 


ABG Base Excess   


 


Sb Test   


 


Carboxyhemoglobin   


 


Methemoglobin   


 


O2 Delivery Device   


 


Oxygen Flow Rate   


 


Vent Mode   


 


Vent Rate   


 


Mechanical Rate   


 


Pressure Support Vent   


 


Sodium   138 


 


Potassium   4.5 


 


Chloride   98 


 


Carbon Dioxide   38 H 


 


Anion Gap   2 L 


 


BUN   46.1 H 


 


Creatinine   0.8 


 


Est GFR (CKD-EPI)AfAm   93.09 


 


Est GFR (CKD-EPI)NonAf   80.32 


 


POC Glucometer  248  


 


Random Glucose   232 H 


 


Lactic Acid   


 


Calcium   8.4 L 


 


Phosphorus   


 


Magnesium   


 


Total Bilirubin   0.5 


 


AST   32 


 


ALT   89 H 


 


Alkaline Phosphatase   42 L 


 


Creatine Kinase   


 


Troponin I   


 


B-Natriuretic Peptide   


 


Total Protein   5.1 L 


 


Albumin   2.5 L 


 


TSH   


 


Urine Color   


 


Urine Appearance   


 


Urine pH   


 


Ur Specific Gravity   


 


Urine Protein   


 


Urine Glucose (UA)   


 


Urine Ketones   


 


Urine Blood   


 


Urine Nitrite   


 


Urine Bilirubin   


 


Urine Urobilinogen   


 


Ur Leukocyte Esterase   


 


Stool Occult Blood   














  06/26/19 06/26/19





  11:33 16:07


 


WBC  


 


RBC  


 


Hgb  


 


Hct  


 


MCV  


 


MCH  


 


MCHC  


 


RDW  


 


Plt Count  


 


MPV  


 


Absolute Neuts (auto)  


 


Neutrophils %  


 


Neutrophils % (Manual)  


 


Band Neutrophils %  


 


Lymphocytes %  


 


Lymphocytes % (Manual)  


 


Monocytes %  


 


Monocytes % (Manual)  


 


Eosinophils %  


 


Eosinophils % (Manual)  


 


Basophils %  


 


Basophils % (Manual)  


 


Myelocytes % (Man)  


 


Promyelocytes % (Man)  


 


Blast Cells % (Manual)  


 


Nucleated RBC %  


 


Metamyelocytes  


 


Hypochromia  


 


Platelet Estimate  


 


Polychromasia  


 


Poikilocytosis  


 


Anisocytosis  


 


Microcytosis  


 


Macrocytosis  


 


Anticoagulation Therapy  


 


Puncture Site  


 


ABG pH  


 


ABG pCO2 at Pt Temp  


 


ABG pO2 at Pt Temp  


 


ABG HCO3  


 


ABG O2 Sat (Measured)  


 


ABG O2 Content  


 


ABG Base Excess  


 


Sb Test  


 


Carboxyhemoglobin  


 


Methemoglobin  


 


O2 Delivery Device  


 


Oxygen Flow Rate  


 


Vent Mode  


 


Vent Rate  


 


Mechanical Rate  


 


Pressure Support Vent  


 


Sodium  


 


Potassium  


 


Chloride  


 


Carbon Dioxide  


 


Anion Gap  


 


BUN  


 


Creatinine  


 


Est GFR (CKD-EPI)AfAm  


 


Est GFR (CKD-EPI)NonAf  


 


POC Glucometer  309  279


 


Random Glucose  


 


Lactic Acid  


 


Calcium  


 


Phosphorus  


 


Magnesium  


 


Total Bilirubin  


 


AST  


 


ALT  


 


Alkaline Phosphatase  


 


Creatine Kinase  


 


Troponin I  


 


B-Natriuretic Peptide  


 


Total Protein  


 


Albumin  


 


TSH  


 


Urine Color  


 


Urine Appearance  


 


Urine pH  


 


Ur Specific Gravity  


 


Urine Protein  


 


Urine Glucose (UA)  


 


Urine Ketones  


 


Urine Blood  


 


Urine Nitrite  


 


Urine Bilirubin  


 


Urine Urobilinogen  


 


Ur Leukocyte Esterase  


 


Stool Occult Blood  








Active Medications











Generic Name Dose Route Start Last Admin





  Trade Name Freq  PRN Reason Stop Dose Admin


 


Acetaminophen  650 mg  06/17/19 18:45  06/22/19 16:44





  Tylenol -  PO   650 mg





  Q6H PRN   Administration





  FEVER   





     





     





     


 


Albuterol/Ipratropium  1 amp  06/22/19 23:52  06/25/19 21:00





  Duoneb -  NEB   1 amp





  Q6H PRN   Administration





  SHORTNESS OF BREATH   





     





     





     


 


Amlodipine Besylate  10 mg  06/18/19 10:00  06/26/19 09:53





  Norvasc -  PO   10 mg





  DAILY LAUREN   Administration





     





     





     





     


 


Amoxicillin/Clavulanate Potassium  1 tab  06/25/19 17:30  06/26/19 17:03





  Augmentin - 875mg Tablet  PO   1 tab





  BID@0800,1730 LAUERN   Administration





     





     





     





     


 


Artificial Tears  1 drop  06/24/19 13:16  





  Artificial Tears  OU   





  BID PRN   





  DRY EYES   





     





     





     


 


Atorvastatin Calcium  20 mg  06/21/19 20:00  06/25/19 20:47





  Lipitor -  PO   20 mg





  DAILY@2000 LAUREN   Administration





     





     





     





     


 


Budesonide/Formoterol Fumarate  2 puff  06/17/19 22:00  06/26/19 11:47





  Symbicort 160/4.5mcg -  IH   2 puff





  BID LAUREN   Administration





     





     





     





     


 


Carvedilol  12.5 mg  06/21/19 20:00  06/26/19 08:21





  Coreg -  PO   12.5 mg





  BID@0800,2000 [facility]   Administration





     





     





     





     


 


Docusate Sodium  100 mg  06/17/19 18:55  06/25/19 17:51





  Colace -  PO   100 mg





  Q8H PRN   Administration





  CONSTIPATION   





     





     





     


 


Finasteride  5 mg  06/18/19 10:00  06/26/19 09:52





  Proscar -  PO   5 mg





  DAILY LAUREN   Administration





     





     





     





     


 


Fluticasone Propionate  2 spray  06/18/19 10:00  06/26/19 11:48





  Flonase -  NS   2 sprays





  DAILY LAUREN   Administration





     





     





     





     


 


Furosemide  40 mg  06/18/19 10:00  06/26/19 09:53





  Lasix -  PO   40 mg





  DAILY LAUREN   Administration





     





     





     





     


 


Azithromycin 250 mg/ Dextrose  250 mls @ 250 mls/hr  06/18/19 10:00  06/26/19 09

:34





  IVPB   250 mls/hr





  DAILY LAUREN   Administration





     





     





     





     


 


Insulin Aspart  1 vial  06/18/19 22:00  06/26/19 16:17





  Novolog Vial Sliding Scale -  SQ   6 unit





  [facility] LAUREN   Administration





     





     





  Protocol   





     


 


Lactic Acid  1 applic  06/17/19 18:45  





  Lac-Hydrin 12  TP   





  DAILY PRN   





  xerosis   





     





     





     


 


Losartan Potassium  50 mg  06/18/19 13:00  06/26/19 09:53





  Cozaar -  PO   50 mg





  DAILY LAUREN   Administration





     





     





     





     


 


Meclizine HCl  25 mg  06/17/19 23:25  





  Antivert -  PO   





  Q8H PRN   





  dizziness   





     





     





     


 


Melatonin  10 mg  06/21/19 20:00  06/25/19 21:25





  Melatonin  PO   Not Given





  [facility] LAUREN   





     





     





     





     


 


Methylprednisolone Sodium Succinate  60 mg  06/18/19 12:32  06/26/19 17:15





  Solu-Medrol -  IVPB   60 mg





  Q8H-IV LAUREN   Administration





     





     





     





     


 


Omega-3-Acid Ethyl Esters  1 gm  06/18/19 10:00  06/26/19 09:52





  Lovaza -  PO   1 gm





  DAILY LAUREN   Administration





     





     





     





     


 


Pantoprazole Sodium  40 mg  06/21/19 20:00  06/26/19 08:21





  Protonix -  PO   40 mg





  BID@0800,2000 [facility]   Administration





     





     





     





     


 


Polyethylene Glycol  17 gm  06/17/19 18:45  





  Miralax (For Daily Use) -  PO   





  Q12H PRN   





  CONSTIPATION   





     





     





     


 


Ranitidine HCl  150 mg  06/18/19 10:00  06/26/19 09:53





  Zantac -  PO   150 mg





  DAILY LAUREN   Administration





     





     





     





     


 


Senna  2 tab  06/21/19 20:00  06/25/19 21:27





  Senna -  PO   Not Given





  HS LAUREN   





     





     





     





     


 


Simethicone  80 mg  06/17/19 18:45  





  Mylicon -  PO   





  Q4H PRN   





  INDIGESTION   





     





     





     


 


Tamsulosin HCl  0.4 mg  06/18/19 08:30  06/26/19 08:21





  Flomax -  PO   0.4 mg





  DAILY@0830 LAUREN   Administration





     





     





     





     








 Microbiology





06/17/19 15:47   Blood - Peripheral Venous   Blood Culture - Final


                            NO GROWTH AFTER 5 DAYS INCUBATION


06/17/19 15:35   Blood - Peripheral Venous   Blood Culture - Final


                            NO GROWTH AFTER 5 DAYS INCUBATION


06/18/19 21:30   Urine For Antigen Detection   Legionella Antigen - Final


06/18/19 21:30   Urine For Antigen Detection   Streptococcus pneumoniae Antigen 

(M - Final








Condition: Guarded





- Instructions


Diet, Activity, Other Instructions: 


continue with medication as prescribed


patient will be discharged to [facility] in AM


Disposition: SKILLED NURSING FACILITY





- Home Medications


Comprehensive Discharge Medication List: 


Ambulatory Orders





Finasteride 5 mg PO DAILY 10/12/17 


Tamsulosin HCl [Flomax] 0.4 mg PO DAILY 10/12/17 


Acetaminophen [Tylenol .Regular Strength -] 650 mg PO Q6H PRN #0 tablet 10/27/

17 


Meclizine HCl [Antivert -] 25 mg PO Q8H PRN #0 tablet 10/27/17 


Melatonin 5 mg PO HS  tab 10/27/17 


Omega-3 Acid Ethyl Esters [Lovaza -] 1 gm PO DAILY  cap 10/27/17 


Pantoprazole Sodium [Protonix -] 40 mg PO BID #30 tab 10/27/17 


Simethicone [Mylicon -] 80 mg PO Q4H PRN #0 tab.chew 10/27/17 


Albuterol 0.083% Nebulizer Sol [Ventolin 0.083% Nebulizer Soln -] 1 amp NEB Q4H 

PRN  amp 04/10/19 


Albuterol 2.5/Ipratropium 0.5 [Duoneb -] 1 amp NEB RQID  amp 04/10/19 


Ammonium Lactate Lotion [Lac-Hydrin 12] 1 applic TP DAILY PRN  bottle 04/10/19 


Budesonide/Formeterol Fumarate [SYMBICORT 160/4.5mcg -] 2 puff IH BID  inhaler 

04/10/19 


Cefuroxime Axetil [Ceftin -] 500 mg PO BID  tablet 04/10/19 


Fluticasone Prop 0.05% Nasal [Flonase -] 2 spray NS DAILY  spray 04/10/19 


Guaifenesin AC [Robitussin AC -] 5 ml PO TID PRN #1 bottle MDD 15ml 04/10/19 


Melatonin 15 mg PO HS PRN  tab 04/10/19 


Polyethylene Glycol 3350 [Miralax 119 gm Btl -] 17 gm PO BID PRN  bottle 04/10/

19 


Ranitidine [Zantac -] 150 mg PO DAILY  tablet 04/10/19 


Carvedilol [Coreg -] 12.5 mg PO BID 06/17/19 


Furosemide [Lasix] 40 mg PO DAILY 06/17/19 


Albuterol 2.5/Ipratropium 0.5 [Duoneb -] 1 amp NEB Q6H PRN  amp 06/26/19 


Amlodipine Besylate [Norvasc -] 10 mg PO DAILY  tablet 06/26/19 


Amox-Tr/K Cl [Augmentin 875-125mg Tablet -] 1 tab PO BID@0800,1730  tablet 06/26 /19 


Atorvastatin Ca [Lipitor] 20 mg PO DAILY@2000  tablet 06/26/19 


Docusate Sodium [Colace -] 100 mg PO Q8H PRN  capsule 06/26/19 


Enoxaparin [Lovenox -] 40 mg SQ DAILY  disp.syrin 06/26/19 


Insulin Sliding Scale [Novolog Vial Sliding Scale -] 1 vial SQ ACHS  units 06/26 /19 


Losartan Potassium [Cozaar -] 50 mg PO DAILY  tablet 06/26/19 


Methylprednisolone Na Succ [Solu-Medrol -] 60 mg IVPB Q8H-IV  vial 06/26/19 


Polyvinyl Alcohol [Artificial Tears] 1 drop OU BID PRN  drops 06/26/19 


Sennosides [Senna -] 2 tab PO HS  tablet 06/26/19

## 2019-06-27 VITALS — TEMPERATURE: 97.9 F | DIASTOLIC BLOOD PRESSURE: 78 MMHG | HEART RATE: 81 BPM | SYSTOLIC BLOOD PRESSURE: 130 MMHG

## 2019-06-27 RX ADMIN — LOSARTAN POTASSIUM SCH MG: 50 TABLET, FILM COATED ORAL at 09:40

## 2019-06-27 RX ADMIN — METHYLPREDNISOLONE SODIUM SUCCINATE SCH MG: 40 INJECTION, POWDER, FOR SOLUTION INTRAMUSCULAR; INTRAVENOUS at 01:57

## 2019-06-27 RX ADMIN — METHYLPREDNISOLONE SODIUM SUCCINATE SCH MG: 40 INJECTION, POWDER, FOR SOLUTION INTRAMUSCULAR; INTRAVENOUS at 09:36

## 2019-06-27 RX ADMIN — CARVEDILOL SCH MG: 12.5 TABLET, FILM COATED ORAL at 09:36

## 2019-06-27 RX ADMIN — INSULIN ASPART SCH UNIT: 100 INJECTION, SOLUTION INTRAVENOUS; SUBCUTANEOUS at 06:43

## 2019-06-27 RX ADMIN — AMLODIPINE BESYLATE SCH MG: 10 TABLET ORAL at 09:36

## 2019-06-27 RX ADMIN — OMEGA-3-ACID ETHYL ESTERS SCH GM: 1 CAPSULE, LIQUID FILLED ORAL at 09:37

## 2019-06-27 RX ADMIN — PANTOPRAZOLE SODIUM SCH MG: 40 TABLET, DELAYED RELEASE ORAL at 09:36

## 2019-06-27 RX ADMIN — RANITIDINE SCH MG: 150 TABLET ORAL at 09:36

## 2019-06-27 RX ADMIN — BUDESONIDE AND FORMOTEROL FUMARATE DIHYDRATE SCH PUFF: 160; 4.5 AEROSOL RESPIRATORY (INHALATION) at 09:35

## 2019-06-27 RX ADMIN — TAMSULOSIN HYDROCHLORIDE SCH MG: 0.4 CAPSULE ORAL at 09:37

## 2019-06-27 RX ADMIN — FLUTICASONE PROPIONATE SCH SPRAYS: 50 SPRAY, METERED NASAL at 09:36

## 2019-06-27 RX ADMIN — FINASTERIDE SCH MG: 5 TABLET, FILM COATED ORAL at 09:36

## 2019-06-27 RX ADMIN — FUROSEMIDE SCH MG: 40 TABLET ORAL at 09:36

## 2019-06-27 RX ADMIN — AMOXICILLIN AND CLAVULANATE POTASSIUM SCH TAB: 875; 125 TABLET, FILM COATED ORAL at 09:37

## 2019-06-27 NOTE — PN
Progress Note, Physician


Chief Complaint: 





COPD


PNA


Chronic hypoxic respiratory failure 


History of Present Illness: 





Previous notes and events reviewed


awake and alert


NAD


[facility] 50%


being discharged to [facility]





- Current Medication List


Current Medications: 


Active Medications





Acetaminophen (Tylenol -)  650 mg PO Q6H PRN


   PRN Reason: FEVER


   Last Admin: 06/22/19 16:44 Dose:  650 mg


Albuterol/Ipratropium (Duoneb -)  1 amp NEB Q6H PRN


   PRN Reason: SHORTNESS OF BREATH


   Last Admin: 06/25/19 21:00 Dose:  1 amp


Amlodipine Besylate (Norvasc -)  10 mg PO DAILY [facility]


   Last Admin: 06/27/19 09:36 Dose:  10 mg


Amoxicillin/Clavulanate Potassium (Augmentin - 875mg Tablet)  1 tab PO BID@0800,

1730 [facility]


   Last Admin: 06/27/19 09:37 Dose:  1 tab


Artificial Tears (Artificial Tears)  1 drop OU BID PRN


   PRN Reason: DRY EYES


Atorvastatin Calcium (Lipitor -)  20 mg PO DAILY@2000 [facility]


   Last Admin: 06/26/19 20:32 Dose:  20 mg


Budesonide/Formoterol Fumarate (Symbicort 160/4.5mcg -)  2 puff IH BID [facility]


   Last Admin: 06/27/19 09:35 Dose:  2 puff


Carvedilol (Coreg -)  12.5 mg PO BID@0800,2000 [facility]


   Last Admin: 06/27/19 09:36 Dose:  12.5 mg


Docusate Sodium (Colace -)  100 mg PO Q8H PRN


   PRN Reason: CONSTIPATION


   Last Admin: 06/25/19 17:51 Dose:  100 mg


Finasteride (Proscar -)  5 mg PO DAILY [facility]


   Last Admin: 06/27/19 09:36 Dose:  5 mg


Fluticasone Propionate (Flonase -)  2 spray NS DAILY [facility]


   Last Admin: 06/27/19 09:36 Dose:  2 sprays


Furosemide (Lasix -)  40 mg PO DAILY [facility]


   Last Admin: 06/27/19 09:36 Dose:  40 mg


Azithromycin 250 mg/ Dextrose  250 mls @ 250 mls/hr IVPB DAILY [facility]


   Last Admin: 06/26/19 09:34 Dose:  250 mls/hr


Insulin Aspart (Novolog Vial Sliding Scale -)  1 vial SQ ACHS [facility]; Protocol


   Last Admin: 06/27/19 06:43 Dose:  4 unit


Lactic Acid (Lac-Hydrin 12)  1 applic TP DAILY PRN


   PRN Reason: xerosis


Losartan Potassium (Cozaar -)  50 mg PO DAILY [facility]


   Last Admin: 06/27/19 09:40 Dose:  50 mg


Meclizine HCl (Antivert -)  25 mg PO Q8H PRN


   PRN Reason: dizziness


Melatonin (Melatonin)  10 mg PO HS [facility]


   Last Admin: 06/26/19 21:14 Dose:  Not Given


Methylprednisolone Sodium Succinate (Solu-Medrol -)  60 mg IVPB Q8H-IV [facility]


   Last Admin: 06/27/19 09:36 Dose:  60 mg


Omega-3-Acid Ethyl Esters (Lovaza -)  1 gm PO DAILY [facility]


   Last Admin: 06/27/19 09:37 Dose:  1 gm


Pantoprazole Sodium (Protonix -)  40 mg PO BID@0800,2000 [facility]


   Last Admin: 06/27/19 09:36 Dose:  40 mg


Polyethylene Glycol (Miralax (For Daily Use) -)  17 gm PO Q12H PRN


   PRN Reason: CONSTIPATION


Ranitidine HCl (Zantac -)  150 mg PO DAILY [facility]


   Last Admin: 06/27/19 09:36 Dose:  150 mg


Senna (Senna -)  2 tab PO [facility]


   Last Admin: 06/26/19 21:14 Dose:  Not Given


Simethicone (Mylicon -)  80 mg PO Q4H PRN


   PRN Reason: INDIGESTION


Tamsulosin HCl (Flomax -)  0.4 mg PO DAILY@0830 [facility]


   Last Admin: 06/27/19 09:37 Dose:  0.4 mg











- Objective


Vital Signs: 


 Vital Signs











Temperature  97.5 F L  06/27/19 06:00


 


Pulse Rate  79   06/27/19 06:00


 


Respiratory Rate  20   06/27/19 06:00


 


Blood Pressure  130/55 L  06/27/19 06:00


 


O2 Sat by Pulse Oximetry (%)  91 L  06/26/19 21:14











Constitutional: Yes: No Distress, Calm


Eyes: Yes: Conjunctiva Clear


HENT: Yes: Atraumatic


Cardiovascular: Yes: Regular Rate and Rhythm


Respiratory: Yes: Regular, Diminished, On Nasal O2


Gastrointestinal: Yes: Normal Bowel Sounds, Soft


Musculoskeletal: Yes: Muscle Weakness


Extremities: Yes: WNL


Edema: No


Neurological: Yes: Alert, Oriented


Psychiatric: Yes: Alert, Oriented


Labs: 


 CBC, BMP





 06/26/19 07:46 





 06/26/19 07:46 











Problem List





- Problems


(1) Acute and chronic respiratory failure with hypoxia


Code(s): J96.21 - ACUTE AND CHRONIC RESPIRATORY FAILURE WITH HYPOXIA   





(2) Acute exacerbation of chronic obstructive pulmonary disease (COPD)


Code(s): J44.1 - CHRONIC OBSTRUCTIVE PULMONARY DISEASE W (ACUTE) EXACERBATION   





(3) Pneumonia


Code(s): J18.9 - PNEUMONIA, UNSPECIFIED ORGANISM   


Qualifiers: 


   Pneumonia type: due to unspecified organism   Laterality: right   Lung 

location: unspecified part of lung   Qualified Code(s): J18.9 - Pneumonia, 

unspecified organism   





(4) BPH (benign prostatic hyperplasia)


Code(s): N40.0 - BENIGN PROSTATIC HYPERPLASIA WITHOUT LOWER URINRY TRACT SYMP   





(5) HLD (hyperlipidemia)


Code(s): E78.5 - HYPERLIPIDEMIA, UNSPECIFIED   


Qualifiers: 


   Hyperlipidemia type: pure hypercholesterolemia   Qualified Code(s): E78.00 - 

Pure hypercholesterolemia, unspecified; E78.0 - Pure hypercholesterolemia   





(6) HTN (hypertension)


Code(s): I10 - ESSENTIAL (PRIMARY) HYPERTENSION   


Qualifiers: 


   Hypertension type: essential hypertension   Qualified Code(s): I10 - 

Essential (primary) hypertension   





(7) Insomnia


Code(s): G47.00 - INSOMNIA, UNSPECIFIED   





(8) Constipation


Code(s): K59.00 - CONSTIPATION, UNSPECIFIED   





Assessment/Plan





patient discharged to [facility]